# Patient Record
Sex: MALE | Race: WHITE | NOT HISPANIC OR LATINO | Employment: OTHER | URBAN - METROPOLITAN AREA
[De-identification: names, ages, dates, MRNs, and addresses within clinical notes are randomized per-mention and may not be internally consistent; named-entity substitution may affect disease eponyms.]

---

## 2017-02-20 ENCOUNTER — HOSPITAL ENCOUNTER (OUTPATIENT)
Dept: LAB | Facility: HOSPITAL | Age: 72
Discharge: HOME | End: 2017-02-20
Attending: INTERNAL MEDICINE | Admitting: INTERNAL MEDICINE

## 2017-02-20 LAB
ANION GAP SERPL CALC-SCNC: 12.7 MMOL/L (ref 10–20)
BASOPHILS # BLD AUTO: 0.1 10*3/UL (ref 0–0.2)
BASOPHILS NFR BLD AUTO: 1 % (ref 0–2)
BILIRUB UR QL STRIP: NEGATIVE MG/DL
BUN SERPL-MCNC: 10 MG/DL (ref 8–20)
BUN/CREAT SERPL: 7.7 (ref 6.2–20.3)
CALCIUM SERPL-MCNC: 9.5 MG/DL (ref 8.9–10.3)
CASTS URNS QL MICRO: NORMAL /[LPF]
CHLORIDE SERPL-SCNC: 95 MMOL/L (ref 101–111)
COLOR UR: YELLOW
CONV BACTERIA IN URINE MICRO: NEGATIVE
CONV CLARITY OF URINE: CLEAR
CONV CO2: 29 MMOL/L (ref 22–32)
CONV HYALINE CASTS IN URINE MICRO: 0 /[LPF] (ref 0–5)
CONV PROTEIN IN URINE BY AUTOMATED TEST STRIP: NEGATIVE MG/DL
CONV SMALL ROUND CELLS: NORMAL /[HPF]
CONV UROBILINOGEN IN URINE BY AUTOMATED TEST STRIP: 0.2 MG/DL
CREAT 24H UR-MCNC: 58.9 MG/DL
CREAT UR-MCNC: 1.3 MG/DL (ref 0.7–1.2)
CULTURE INDICATED?: NORMAL
DIFFERENTIAL METHOD BLD: (no result)
EOSINOPHIL # BLD AUTO: 0.5 10*3/UL (ref 0–0.3)
EOSINOPHIL # BLD AUTO: 7 % (ref 0–3)
ERYTHROCYTE [DISTWIDTH] IN BLOOD BY AUTOMATED COUNT: 14.8 % (ref 11.5–14.5)
GLUCOSE SERPL-MCNC: 103 MG/DL (ref 65–99)
GLUCOSE UR QL: NEGATIVE MG/DL
HCT VFR BLD AUTO: 38.3 % (ref 40–54)
HGB BLD-MCNC: 12.7 G/DL (ref 14–18)
HGB UR QL STRIP: NEGATIVE
IRON SERPL-MCNC: 55 UG/DL (ref 45–182)
KETONES UR QL STRIP: NEGATIVE MG/DL
LEUKOCYTE ESTERASE UR QL STRIP: NEGATIVE
LYMPHOCYTES # BLD AUTO: 2.1 10*3/UL (ref 0.8–4.8)
LYMPHOCYTES NFR BLD AUTO: 31 % (ref 18–42)
MCH RBC QN AUTO: 30.9 PG (ref 26–32)
MCHC RBC AUTO-ENTMCNC: 33.3 G/DL (ref 32–36)
MCV RBC AUTO: 92.8 FL (ref 80–94)
MONOCYTES # BLD AUTO: 1.1 10*3/UL (ref 0.1–1.3)
MONOCYTES NFR BLD AUTO: 16 % (ref 2–11)
NEUTROPHILS # BLD AUTO: 3 10*3/UL (ref 2.3–8.6)
NEUTROPHILS NFR BLD AUTO: 45 % (ref 50–75)
NITRITE UR QL STRIP: NEGATIVE
NRBC BLD AUTO-RTO: 0 /100{WBCS}
NRBC/RBC NFR BLD MANUAL: 0 10*3/UL
PH UR STRIP.AUTO: 6.5 [PH] (ref 4.5–8)
PHOSPHATE SERPL-MCNC: 3.4 MG/DL (ref 2.4–4.7)
PLATELET # BLD AUTO: 211 10*3/UL (ref 150–450)
PMV BLD AUTO: 9 FL (ref 7.4–10.4)
POTASSIUM SERPL-SCNC: 4.7 MMOL/L (ref 3.6–5.1)
PROT UR-MCNC: 12 MG/DL
RBC # BLD AUTO: 4.12 10*6/UL (ref 4.6–6)
RBC #/AREA URNS HPF: 0 /[HPF] (ref 0–3)
SODIUM SERPL-SCNC: 132 MMOL/L (ref 136–144)
SP GR UR: 1.01 (ref 1–1.03)
SPERM URNS QL MICRO: NORMAL /[HPF]
SQUAMOUS SPT QL MICRO: 1 /[HPF] (ref 0–5)
UNIDENT CRYS URNS QL MICRO: NORMAL /[HPF]
WBC # BLD AUTO: 6.8 10*3/UL (ref 4.5–11.5)
WBC #/AREA URNS HPF: 1 /[HPF] (ref 0–5)
YEAST SPEC QL WET PREP: NORMAL /[HPF]

## 2017-02-22 LAB — PTH-INTACT SERPL-MCNC: 71 PG/ML (ref 11–72)

## 2017-08-04 ENCOUNTER — HOSPITAL ENCOUNTER (OUTPATIENT)
Dept: LAB | Facility: HOSPITAL | Age: 72
Discharge: HOME | End: 2017-08-04
Attending: INTERNAL MEDICINE | Admitting: INTERNAL MEDICINE

## 2017-08-04 LAB
ALBUMIN SERPL-MCNC: 3.7 G/DL (ref 3.5–4.8)
ALBUMIN/GLOB SERPL: 1.3 {RATIO} (ref 1–1.7)
ALP SERPL-CCNC: 51 IU/L (ref 32–91)
ALT SERPL-CCNC: 25 IU/L (ref 17–63)
ANION GAP SERPL CALC-SCNC: 15.3 MMOL/L (ref 10–20)
AST SERPL-CCNC: 34 IU/L (ref 15–41)
BASOPHILS # BLD AUTO: 0.1 10*3/UL (ref 0–0.2)
BASOPHILS NFR BLD AUTO: 1 % (ref 0–2)
BILIRUB SERPL-MCNC: 1.5 MG/DL (ref 0.3–1.2)
BILIRUB UR QL STRIP: NEGATIVE MG/DL
BUN SERPL-MCNC: 17 MG/DL (ref 8–20)
BUN/CREAT SERPL: 10 (ref 6.2–20.3)
CALCIUM SERPL-MCNC: 9.5 MG/DL (ref 8.9–10.3)
CASTS URNS QL MICRO: NORMAL /[LPF]
CHLORIDE SERPL-SCNC: 92 MMOL/L (ref 101–111)
CHOLEST SERPL-MCNC: 190 MG/DL
CHOLEST/HDLC SERPL: 1.5 {RATIO}
COLOR UR: YELLOW
CONV BACTERIA IN URINE MICRO: NEGATIVE
CONV CLARITY OF URINE: CLEAR
CONV CO2: 27 MMOL/L (ref 22–32)
CONV HYALINE CASTS IN URINE MICRO: 0 /[LPF] (ref 0–5)
CONV LDL CHOLESTEROL DIRECT: 49 MG/DL (ref 0–100)
CONV MICROALBUM.,U,RANDOM: 5 MG/L
CONV PROTEIN IN URINE BY AUTOMATED TEST STRIP: NEGATIVE MG/DL
CONV SMALL ROUND CELLS: NORMAL /[HPF]
CONV TOTAL PROTEIN: 6.6 G/DL (ref 6.1–7.9)
CONV UROBILINOGEN IN URINE BY AUTOMATED TEST STRIP: 0.2 MG/DL
CREAT 24H UR-MCNC: 22.8 MG/DL
CREAT 24H UR-MCNC: 23.1 MG/DL
CREAT UR-MCNC: 1.7 MG/DL (ref 0.7–1.2)
CULTURE INDICATED?: NORMAL
DIFFERENTIAL METHOD BLD: (no result)
EOSINOPHIL # BLD AUTO: 0.5 10*3/UL (ref 0–0.3)
EOSINOPHIL # BLD AUTO: 9 % (ref 0–3)
ERYTHROCYTE [DISTWIDTH] IN BLOOD BY AUTOMATED COUNT: 14.2 % (ref 11.5–14.5)
FOLATE SERPL-MCNC: >24.8 NG/ML (ref 5.9–24.8)
GLOBULIN UR ELPH-MCNC: 2.9 G/DL (ref 2.5–3.8)
GLUCOSE SERPL-MCNC: 103 MG/DL (ref 65–99)
GLUCOSE UR QL: NEGATIVE MG/DL
HCT VFR BLD AUTO: 39.7 % (ref 40–54)
HDLC SERPL-MCNC: 127 MG/DL
HGB BLD-MCNC: 13.2 G/DL (ref 14–18)
HGB UR QL STRIP: NEGATIVE
IRON SERPL-MCNC: 187 UG/DL (ref 45–182)
KETONES UR QL STRIP: NEGATIVE MG/DL
LDLC/HDLC SERPL: 0.4 {RATIO}
LEUKOCYTE ESTERASE UR QL STRIP: NEGATIVE
LIPID INTERPRETATION: NORMAL
LYMPHOCYTES # BLD AUTO: 1.6 10*3/UL (ref 0.8–4.8)
LYMPHOCYTES NFR BLD AUTO: 25 % (ref 18–42)
MCH RBC QN AUTO: 31.4 PG (ref 26–32)
MCHC RBC AUTO-ENTMCNC: 33.3 G/DL (ref 32–36)
MCV RBC AUTO: 94.4 FL (ref 80–94)
MICROALBUMIN/CREAT UR: 21.6 UG/MG
MONOCYTES # BLD AUTO: 0.7 10*3/UL (ref 0.1–1.3)
MONOCYTES NFR BLD AUTO: 12 % (ref 2–11)
NEUTROPHILS # BLD AUTO: 3.3 10*3/UL (ref 2.3–8.6)
NEUTROPHILS NFR BLD AUTO: 53 % (ref 50–75)
NITRITE UR QL STRIP: NEGATIVE
NRBC BLD AUTO-RTO: 0 /100{WBCS}
NRBC/RBC NFR BLD MANUAL: 0 10*3/UL
PH UR STRIP.AUTO: 6 [PH] (ref 4.5–8)
PHOSPHATE SERPL-MCNC: 3.3 MG/DL (ref 2.4–4.7)
PLATELET # BLD AUTO: 177 10*3/UL (ref 150–450)
PMV BLD AUTO: 8.2 FL (ref 7.4–10.4)
POTASSIUM SERPL-SCNC: 4.3 MMOL/L (ref 3.6–5.1)
PROT UR-MCNC: <2 MG/DL
PSA SERPL-MCNC: 1.32 NG/ML (ref 0–4)
PTH-INTACT SERPL-MCNC: 105 PG/ML (ref 11–72)
RBC # BLD AUTO: 4.21 10*6/UL (ref 4.6–6)
RBC #/AREA URNS HPF: 0 /[HPF] (ref 0–3)
SODIUM SERPL-SCNC: 130 MMOL/L (ref 136–144)
SP GR UR: 1 (ref 1–1.03)
SPERM URNS QL MICRO: NORMAL /[HPF]
SQUAMOUS SPT QL MICRO: 0 /[HPF] (ref 0–5)
TRIGL SERPL-MCNC: 41 MG/DL
TSH SERPL-ACNC: 0.04 UIU/ML (ref 0.34–5.6)
UNIDENT CRYS URNS QL MICRO: NORMAL /[HPF]
VIT B12 SERPL-MCNC: 494 PG/ML (ref 180–914)
VLDLC SERPL CALC-MCNC: 14.7 MG/DL
WBC # BLD AUTO: 6.2 10*3/UL (ref 4.5–11.5)
WBC #/AREA URNS HPF: 0 /[HPF] (ref 0–5)
YEAST SPEC QL WET PREP: NORMAL /[HPF]

## 2018-02-22 ENCOUNTER — HOSPITAL ENCOUNTER (OUTPATIENT)
Dept: LAB | Facility: HOSPITAL | Age: 73
Setting detail: SPECIMEN
Discharge: HOME | End: 2018-02-22
Attending: FAMILY MEDICINE | Admitting: FAMILY MEDICINE

## 2018-02-23 ENCOUNTER — HOSPITAL ENCOUNTER (OUTPATIENT)
Dept: LAB | Facility: HOSPITAL | Age: 73
Discharge: HOME | End: 2018-02-23
Attending: FAMILY MEDICINE | Admitting: FAMILY MEDICINE

## 2018-02-23 LAB
ALBUMIN SERPL-MCNC: 3.6 G/DL (ref 3.5–4.8)
ALBUMIN/GLOB SERPL: 1 {RATIO} (ref 1–1.7)
ALP SERPL-CCNC: 41 IU/L (ref 32–91)
ALT SERPL-CCNC: 15 IU/L (ref 17–63)
ANION GAP SERPL CALC-SCNC: 12.3 MMOL/L (ref 10–20)
AST SERPL-CCNC: 23 IU/L (ref 15–41)
BASOPHILS # BLD AUTO: 0.1 10*3/UL (ref 0–0.2)
BASOPHILS NFR BLD AUTO: 1 % (ref 0–2)
BILIRUB SERPL-MCNC: 1 MG/DL (ref 0.3–1.2)
BUN SERPL-MCNC: 10 MG/DL (ref 8–20)
BUN/CREAT SERPL: 7.1 (ref 6.2–20.3)
CALCIUM SERPL-MCNC: 9.5 MG/DL (ref 8.9–10.3)
CHLORIDE SERPL-SCNC: 93 MMOL/L (ref 101–111)
CONV CO2: 29 MMOL/L (ref 22–32)
CONV MICROALBUM.,U,RANDOM: 41 MG/L
CONV TOTAL PROTEIN: 7.1 G/DL (ref 6.1–7.9)
CREAT UR-MCNC: 1.4 MG/DL (ref 0.7–1.2)
DIFFERENTIAL METHOD BLD: (no result)
EOSINOPHIL # BLD AUTO: 0.5 10*3/UL (ref 0–0.3)
EOSINOPHIL # BLD AUTO: 8 % (ref 0–3)
ERYTHROCYTE [DISTWIDTH] IN BLOOD BY AUTOMATED COUNT: 14.6 % (ref 11.5–14.5)
GLOBULIN UR ELPH-MCNC: 3.5 G/DL (ref 2.5–3.8)
GLUCOSE SERPL-MCNC: 120 MG/DL (ref 65–99)
HCT VFR BLD AUTO: 42.2 % (ref 40–54)
HGB BLD-MCNC: 13.9 G/DL (ref 14–18)
LYMPHOCYTES # BLD AUTO: 1.4 10*3/UL (ref 0.8–4.8)
LYMPHOCYTES NFR BLD AUTO: 20 % (ref 18–42)
MCH RBC QN AUTO: 30.7 PG (ref 26–32)
MCHC RBC AUTO-ENTMCNC: 32.9 G/DL (ref 32–36)
MCV RBC AUTO: 93.3 FL (ref 80–94)
MONOCYTES # BLD AUTO: 0.9 10*3/UL (ref 0.1–1.3)
MONOCYTES NFR BLD AUTO: 13 % (ref 2–11)
NEUTROPHILS # BLD AUTO: 4.2 10*3/UL (ref 2.3–8.6)
NEUTROPHILS NFR BLD AUTO: 58 % (ref 50–75)
NRBC BLD AUTO-RTO: 0 /100{WBCS}
NRBC/RBC NFR BLD MANUAL: 0 10*3/UL
PLATELET # BLD AUTO: 218 10*3/UL (ref 150–450)
PMV BLD AUTO: 8.2 FL (ref 7.4–10.4)
POTASSIUM SERPL-SCNC: 4.3 MMOL/L (ref 3.6–5.1)
RBC # BLD AUTO: 4.52 10*6/UL (ref 4.6–6)
SODIUM SERPL-SCNC: 130 MMOL/L (ref 136–144)
WBC # BLD AUTO: 7.1 10*3/UL (ref 4.5–11.5)

## 2018-02-26 LAB
BACTERIA SPEC AEROBE CULT: NORMAL
COLONY COUNT: NORMAL
Lab: NORMAL
MICRO REPORT STATUS: NORMAL
SPECIMEN SOURCE: NORMAL

## 2018-05-31 ENCOUNTER — HOSPITAL ENCOUNTER (OUTPATIENT)
Dept: LAB | Facility: HOSPITAL | Age: 73
Discharge: HOME | End: 2018-05-31
Attending: INTERNAL MEDICINE | Admitting: INTERNAL MEDICINE

## 2018-05-31 LAB
ANION GAP SERPL CALC-SCNC: 14.2 MMOL/L (ref 10–20)
BACTERIA SPEC AEROBE CULT: NORMAL
BILIRUB UR QL STRIP: NEGATIVE MG/DL
BUN SERPL-MCNC: 14 MG/DL (ref 8–20)
BUN/CREAT SERPL: 8.8 (ref 6.2–20.3)
CALCIUM SERPL-MCNC: 9.6 MG/DL (ref 8.9–10.3)
CASTS URNS QL MICRO: ABNORMAL /[LPF]
CHLORIDE SERPL-SCNC: 102 MMOL/L (ref 101–111)
COLOR UR: YELLOW
CONV BACTERIA IN URINE MICRO: ABNORMAL
CONV CLARITY OF URINE: CLEAR
CONV CO2: 22 MMOL/L (ref 22–32)
CONV HYALINE CASTS IN URINE MICRO: 0 /[LPF] (ref 0–5)
CONV PROTEIN IN URINE BY AUTOMATED TEST STRIP: NEGATIVE MG/DL
CONV SMALL ROUND CELLS: ABNORMAL /[HPF]
CONV UROBILINOGEN IN URINE BY AUTOMATED TEST STRIP: 0.2 MG/DL
CREAT 24H UR-MCNC: 104.3 MG/DL
CREAT UR-MCNC: 1.6 MG/DL (ref 0.7–1.2)
CULTURE INDICATED?: ABNORMAL
ERYTHROCYTE [DISTWIDTH] IN BLOOD BY AUTOMATED COUNT: 14 % (ref 11.5–14.5)
GLUCOSE SERPL-MCNC: 100 MG/DL (ref 65–99)
GLUCOSE UR QL: NEGATIVE MG/DL
HCT VFR BLD AUTO: 39.1 % (ref 40–54)
HGB BLD-MCNC: 12.8 G/DL (ref 14–18)
HGB UR QL STRIP: NEGATIVE
IRON SERPL-MCNC: 27 UG/DL (ref 45–182)
KETONES UR QL STRIP: ABNORMAL MG/DL
LEUKOCYTE ESTERASE UR QL STRIP: ABNORMAL
Lab: NORMAL
MCH RBC QN AUTO: 30.5 PG (ref 26–32)
MCHC RBC AUTO-ENTMCNC: 32.8 G/DL (ref 32–36)
MCV RBC AUTO: 93.1 FL (ref 80–94)
MICRO REPORT STATUS: NORMAL
NITRITE UR QL STRIP: POSITIVE
PH UR STRIP.AUTO: 6 [PH] (ref 4.5–8)
PLATELET # BLD AUTO: 367 10*3/UL (ref 150–450)
PMV BLD AUTO: 8.3 FL (ref 7.4–10.4)
POTASSIUM SERPL-SCNC: 4.2 MMOL/L (ref 3.6–5.1)
PROT UR-MCNC: 22 MG/DL
PROT/CREAT UR: 0.2 MG/MG (ref 0–22)
RBC # BLD AUTO: 4.2 10*6/UL (ref 4.6–6)
RBC #/AREA URNS HPF: 1 /[HPF] (ref 0–3)
SODIUM SERPL-SCNC: 134 MMOL/L (ref 136–144)
SP GR UR: 1.01 (ref 1–1.03)
SPECIMEN SOURCE: NORMAL
SPERM URNS QL MICRO: ABNORMAL /[HPF]
SQUAMOUS SPT QL MICRO: 0 /[HPF] (ref 0–5)
UNIDENT CRYS URNS QL MICRO: ABNORMAL /[HPF]
WBC # BLD AUTO: 9.6 10*3/UL (ref 4.5–11.5)
WBC #/AREA URNS HPF: 46 /[HPF] (ref 0–5)
YEAST SPEC QL WET PREP: ABNORMAL /[HPF]

## 2018-09-21 ENCOUNTER — HOSPITAL ENCOUNTER (OUTPATIENT)
Dept: LAB | Facility: HOSPITAL | Age: 73
Discharge: HOME | End: 2018-09-21
Attending: FAMILY MEDICINE | Admitting: FAMILY MEDICINE

## 2018-09-21 LAB
ALBUMIN SERPL-MCNC: 3.8 G/DL (ref 3.5–4.8)
ALBUMIN/GLOB SERPL: 1.3 {RATIO} (ref 1–1.7)
ALP SERPL-CCNC: 40 IU/L (ref 32–91)
ALT SERPL-CCNC: 19 IU/L (ref 17–63)
ANION GAP SERPL CALC-SCNC: 16.1 MMOL/L (ref 10–20)
AST SERPL-CCNC: 24 IU/L (ref 15–41)
BASOPHILS # BLD AUTO: 0.1 10*3/UL (ref 0–0.2)
BASOPHILS NFR BLD AUTO: 1 % (ref 0–2)
BILIRUB SERPL-MCNC: 0.9 MG/DL (ref 0.3–1.2)
BUN SERPL-MCNC: 11 MG/DL (ref 8–20)
BUN/CREAT SERPL: 7.9 (ref 6.2–20.3)
CALCIUM SERPL-MCNC: 9.4 MG/DL (ref 8.9–10.3)
CHLORIDE SERPL-SCNC: 94 MMOL/L (ref 101–111)
CONV CO2: 26 MMOL/L (ref 22–32)
CONV MICROALBUM.,U,RANDOM: 27 MG/L
CONV TOTAL PROTEIN: 6.7 G/DL (ref 6.1–7.9)
CREAT UR-MCNC: 1.4 MG/DL (ref 0.7–1.2)
DIFFERENTIAL METHOD BLD: (no result)
EOSINOPHIL # BLD AUTO: 0.3 10*3/UL (ref 0–0.3)
EOSINOPHIL # BLD AUTO: 5 % (ref 0–3)
ERYTHROCYTE [DISTWIDTH] IN BLOOD BY AUTOMATED COUNT: 14.8 % (ref 11.5–14.5)
GLOBULIN UR ELPH-MCNC: 2.9 G/DL (ref 2.5–3.8)
GLUCOSE SERPL-MCNC: 101 MG/DL (ref 65–99)
HCT VFR BLD AUTO: 43.7 % (ref 40–54)
HGB BLD-MCNC: 14.4 G/DL (ref 14–18)
LYMPHOCYTES # BLD AUTO: 1.4 10*3/UL (ref 0.8–4.8)
LYMPHOCYTES NFR BLD AUTO: 20 % (ref 18–42)
MCH RBC QN AUTO: 30.3 PG (ref 26–32)
MCHC RBC AUTO-ENTMCNC: 32.9 G/DL (ref 32–36)
MCV RBC AUTO: 92.1 FL (ref 80–94)
MONOCYTES # BLD AUTO: 0.8 10*3/UL (ref 0.1–1.3)
MONOCYTES NFR BLD AUTO: 11 % (ref 2–11)
NEUTROPHILS # BLD AUTO: 4.2 10*3/UL (ref 2.3–8.6)
NEUTROPHILS NFR BLD AUTO: 63 % (ref 50–75)
NRBC BLD AUTO-RTO: 0 /100{WBCS}
NRBC/RBC NFR BLD MANUAL: 0 10*3/UL
PLATELET # BLD AUTO: 237 10*3/UL (ref 150–450)
PMV BLD AUTO: 8.8 FL (ref 7.4–10.4)
POTASSIUM SERPL-SCNC: 4.1 MMOL/L (ref 3.6–5.1)
RBC # BLD AUTO: 4.74 10*6/UL (ref 4.6–6)
SODIUM SERPL-SCNC: 132 MMOL/L (ref 136–144)
WBC # BLD AUTO: 6.8 10*3/UL (ref 4.5–11.5)

## 2018-12-04 ENCOUNTER — HOSPITAL ENCOUNTER (OUTPATIENT)
Dept: LAB | Facility: HOSPITAL | Age: 73
Discharge: HOME | End: 2018-12-04
Attending: INTERNAL MEDICINE | Admitting: INTERNAL MEDICINE

## 2018-12-04 LAB
25(OH)D3 SERPL-MCNC: 92 NG/ML (ref 30–100)
ANION GAP SERPL CALC-SCNC: 11.3 MMOL/L (ref 10–20)
BACTERIA SPEC AEROBE CULT: NORMAL
BACTERIA SPEC AEROBE CULT: NORMAL
BILIRUB UR QL STRIP: NEGATIVE MG/DL
BUN SERPL-MCNC: 13 MG/DL (ref 8–20)
BUN/CREAT SERPL: 9.3 (ref 6.2–20.3)
CALCIUM SERPL-MCNC: 9.7 MG/DL (ref 8.9–10.3)
CASTS URNS QL MICRO: ABNORMAL /[LPF]
CHLORIDE SERPL-SCNC: 104 MMOL/L (ref 101–111)
COLONY COUNT: NORMAL
COLOR UR: YELLOW
CONV BACTERIA IN URINE MICRO: ABNORMAL
CONV CLARITY OF URINE: CLEAR
CONV CO2: 26 MMOL/L (ref 22–32)
CONV HYALINE CASTS IN URINE MICRO: 0 /[LPF] (ref 0–5)
CONV PROTEIN IN URINE BY AUTOMATED TEST STRIP: ABNORMAL MG/DL
CONV SMALL ROUND CELLS: ABNORMAL /[HPF]
CONV UROBILINOGEN IN URINE BY AUTOMATED TEST STRIP: 0.2 MG/DL
CREAT 24H UR-MCNC: 109.7 MG/DL
CREAT UR-MCNC: 1.4 MG/DL (ref 0.7–1.2)
CULTURE INDICATED?: ABNORMAL
ERYTHROCYTE [DISTWIDTH] IN BLOOD BY AUTOMATED COUNT: 15.1 % (ref 11.5–14.5)
GLUCOSE SERPL-MCNC: 89 MG/DL (ref 65–99)
GLUCOSE UR QL: NEGATIVE MG/DL
HCT VFR BLD AUTO: 43.4 % (ref 40–54)
HGB BLD-MCNC: 14 G/DL (ref 14–18)
HGB UR QL STRIP: NEGATIVE
IRON SERPL-MCNC: 72 UG/DL (ref 45–182)
KETONES UR QL STRIP: ABNORMAL MG/DL
LEUKOCYTE ESTERASE UR QL STRIP: ABNORMAL
Lab: NORMAL
MCH RBC QN AUTO: 30.2 PG (ref 26–32)
MCHC RBC AUTO-ENTMCNC: 32.3 G/DL (ref 32–36)
MCV RBC AUTO: 93.3 FL (ref 80–94)
MICRO REPORT STATUS: NORMAL
NITRITE UR QL STRIP: POSITIVE
PH UR STRIP.AUTO: 6 [PH] (ref 4.5–8)
PLATELET # BLD AUTO: 264 10*3/UL (ref 150–450)
PMV BLD AUTO: 9 FL (ref 7.4–10.4)
POTASSIUM SERPL-SCNC: 4.3 MMOL/L (ref 3.6–5.1)
PROT UR-MCNC: 33 MG/DL
PROT/CREAT UR: 0.3 MG/MG (ref 0–22)
RBC # BLD AUTO: 4.66 10*6/UL (ref 4.6–6)
RBC #/AREA URNS HPF: 1 /[HPF] (ref 0–3)
SODIUM SERPL-SCNC: 137 MMOL/L (ref 136–144)
SP GR UR: 1.02 (ref 1–1.03)
SPECIMEN SOURCE: NORMAL
SPERM URNS QL MICRO: ABNORMAL /[HPF]
SQUAMOUS SPT QL MICRO: 1 /[HPF] (ref 0–5)
UNIDENT CRYS URNS QL MICRO: ABNORMAL /[HPF]
WBC # BLD AUTO: 7 10*3/UL (ref 4.5–11.5)
WBC #/AREA URNS HPF: 20 /[HPF] (ref 0–5)
YEAST SPEC QL WET PREP: ABNORMAL /[HPF]

## 2018-12-05 LAB — PTH-INTACT SERPL-MCNC: 56 PG/ML (ref 11–72)

## 2019-03-01 ENCOUNTER — HOSPITAL ENCOUNTER (OUTPATIENT)
Dept: LAB | Facility: HOSPITAL | Age: 74
Discharge: HOME | End: 2019-03-01
Attending: FAMILY MEDICINE | Admitting: FAMILY MEDICINE

## 2019-03-01 LAB
ALBUMIN SERPL-MCNC: 3.8 G/DL (ref 3.5–4.8)
ALBUMIN/GLOB SERPL: 1.2 {RATIO} (ref 1–1.7)
ALP SERPL-CCNC: 41 IU/L (ref 32–91)
ALT SERPL-CCNC: 15 IU/L (ref 17–63)
ANION GAP SERPL CALC-SCNC: 14.5 MMOL/L (ref 10–20)
AST SERPL-CCNC: 19 IU/L (ref 15–41)
BASOPHILS # BLD AUTO: 0 10*3/UL (ref 0–0.2)
BASOPHILS NFR BLD AUTO: 0 % (ref 0–2)
BILIRUB SERPL-MCNC: 0.8 MG/DL (ref 0.3–1.2)
BUN SERPL-MCNC: 7 MG/DL (ref 8–20)
BUN/CREAT SERPL: 5 (ref 6.2–20.3)
CALCIUM SERPL-MCNC: 9.7 MG/DL (ref 8.9–10.3)
CHLORIDE SERPL-SCNC: 100 MMOL/L (ref 101–111)
CHOLEST SERPL-MCNC: 230 MG/DL
CHOLEST/HDLC SERPL: 3.1 {RATIO}
CONV CO2: 24 MMOL/L (ref 22–32)
CONV LDL CHOLESTEROL DIRECT: 146 MG/DL (ref 0–100)
CONV TOTAL PROTEIN: 7 G/DL (ref 6.1–7.9)
CREAT UR-MCNC: 1.4 MG/DL (ref 0.7–1.2)
DIFFERENTIAL METHOD BLD: (no result)
EOSINOPHIL # BLD AUTO: 0.4 10*3/UL (ref 0–0.3)
EOSINOPHIL # BLD AUTO: 5 % (ref 0–3)
ERYTHROCYTE [DISTWIDTH] IN BLOOD BY AUTOMATED COUNT: 14.9 % (ref 11.5–14.5)
GLOBULIN UR ELPH-MCNC: 3.2 G/DL (ref 2.5–3.8)
GLUCOSE SERPL-MCNC: 94 MG/DL (ref 65–99)
HCT VFR BLD AUTO: 45.2 % (ref 40–54)
HDLC SERPL-MCNC: 74 MG/DL
HGB BLD-MCNC: 15.1 G/DL (ref 14–18)
LDLC/HDLC SERPL: 2 {RATIO}
LIPID INTERPRETATION: ABNORMAL
LYMPHOCYTES # BLD AUTO: 1.8 10*3/UL (ref 0.8–4.8)
LYMPHOCYTES NFR BLD AUTO: 26 % (ref 18–42)
MCH RBC QN AUTO: 31.2 PG (ref 26–32)
MCHC RBC AUTO-ENTMCNC: 33.3 G/DL (ref 32–36)
MCV RBC AUTO: 93.6 FL (ref 80–94)
MONOCYTES # BLD AUTO: 0.8 10*3/UL (ref 0.1–1.3)
MONOCYTES NFR BLD AUTO: 11 % (ref 2–11)
NEUTROPHILS # BLD AUTO: 4 10*3/UL (ref 2.3–8.6)
NEUTROPHILS NFR BLD AUTO: 58 % (ref 50–75)
NRBC BLD AUTO-RTO: 0 /100{WBCS}
NRBC/RBC NFR BLD MANUAL: 0 10*3/UL
PLATELET # BLD AUTO: 234 10*3/UL (ref 150–450)
PMV BLD AUTO: 8.1 FL (ref 7.4–10.4)
POTASSIUM SERPL-SCNC: 4.5 MMOL/L (ref 3.6–5.1)
RBC # BLD AUTO: 4.83 10*6/UL (ref 4.6–6)
SODIUM SERPL-SCNC: 134 MMOL/L (ref 136–144)
TRIGL SERPL-MCNC: 73 MG/DL
VLDLC SERPL CALC-MCNC: 10.1 MG/DL
WBC # BLD AUTO: 6.9 10*3/UL (ref 4.5–11.5)

## 2019-06-10 ENCOUNTER — HOSPITAL ENCOUNTER (OUTPATIENT)
Dept: LAB | Facility: HOSPITAL | Age: 74
Discharge: HOME | End: 2019-06-10
Attending: INTERNAL MEDICINE | Admitting: INTERNAL MEDICINE

## 2019-06-10 LAB
25(OH)D3 SERPL-MCNC: 99 NG/ML (ref 30–100)
ANION GAP SERPL CALC-SCNC: 18.3 MMOL/L (ref 10–20)
BACTERIA SPEC AEROBE CULT: NORMAL
BASOPHILS # BLD AUTO: 0.1 10*3/UL (ref 0–0.2)
BASOPHILS NFR BLD AUTO: 1 % (ref 0–2)
BILIRUB UR QL STRIP: NEGATIVE MG/DL
BUN SERPL-MCNC: 14 MG/DL (ref 8–20)
BUN/CREAT SERPL: 10 (ref 6.2–20.3)
CALCIUM SERPL-MCNC: 9.4 MG/DL (ref 8.9–10.3)
CASTS URNS QL MICRO: ABNORMAL /[LPF]
CHLORIDE SERPL-SCNC: 94 MMOL/L (ref 101–111)
COLOR UR: YELLOW
CONV BACTERIA IN URINE MICRO: ABNORMAL
CONV CLARITY OF URINE: ABNORMAL
CONV CO2: 25 MMOL/L (ref 22–32)
CONV HYALINE CASTS IN URINE MICRO: 2 /[LPF] (ref 0–5)
CONV PROTEIN IN URINE BY AUTOMATED TEST STRIP: NEGATIVE MG/DL
CONV SMALL ROUND CELLS: ABNORMAL /[HPF]
CONV UROBILINOGEN IN URINE BY AUTOMATED TEST STRIP: 1 MG/DL
CREAT 24H UR-MCNC: 102.1 MG/DL
CREAT UR-MCNC: 1.4 MG/DL (ref 0.7–1.2)
CULTURE INDICATED?: ABNORMAL
DIFFERENTIAL METHOD BLD: (no result)
EOSINOPHIL # BLD AUTO: 0.5 10*3/UL (ref 0–0.3)
EOSINOPHIL # BLD AUTO: 7 % (ref 0–3)
ERYTHROCYTE [DISTWIDTH] IN BLOOD BY AUTOMATED COUNT: 14.2 % (ref 11.5–14.5)
GLUCOSE SERPL-MCNC: 98 MG/DL (ref 65–99)
GLUCOSE UR QL: NEGATIVE MG/DL
HCT VFR BLD AUTO: 42.7 % (ref 40–54)
HGB BLD-MCNC: 14.2 G/DL (ref 14–18)
HGB UR QL STRIP: NEGATIVE
KETONES UR QL STRIP: 15 MG/DL
LEUKOCYTE ESTERASE UR QL STRIP: ABNORMAL
LYMPHOCYTES # BLD AUTO: 1.5 10*3/UL (ref 0.8–4.8)
LYMPHOCYTES NFR BLD AUTO: 23 % (ref 18–42)
Lab: NORMAL
MCH RBC QN AUTO: 32.1 PG (ref 26–32)
MCHC RBC AUTO-ENTMCNC: 33.3 G/DL (ref 32–36)
MCV RBC AUTO: 96.4 FL (ref 80–94)
MICRO REPORT STATUS: NORMAL
MONOCYTES # BLD AUTO: 0.8 10*3/UL (ref 0.1–1.3)
MONOCYTES NFR BLD AUTO: 13 % (ref 2–11)
NEUTROPHILS # BLD AUTO: 3.5 10*3/UL (ref 2.3–8.6)
NEUTROPHILS NFR BLD AUTO: 56 % (ref 50–75)
NITRITE UR QL STRIP: POSITIVE
NRBC BLD AUTO-RTO: 0 /100{WBCS}
NRBC/RBC NFR BLD MANUAL: 0 10*3/UL
PH UR STRIP.AUTO: 7 [PH] (ref 4.5–8)
PHOSPHATE SERPL-MCNC: 3 MG/DL (ref 2.4–4.7)
PLATELET # BLD AUTO: 224 10*3/UL (ref 150–450)
PMV BLD AUTO: 8.3 FL (ref 7.4–10.4)
POTASSIUM SERPL-SCNC: 4.3 MMOL/L (ref 3.6–5.1)
PROT UR-MCNC: 20 MG/DL
PROT/CREAT UR: 0.2 MG/MG (ref 0–22)
PTH-INTACT SERPL-MCNC: 51 PG/ML (ref 11–72)
RBC # BLD AUTO: 4.43 10*6/UL (ref 4.6–6)
RBC #/AREA URNS HPF: 1 /[HPF] (ref 0–3)
SODIUM SERPL-SCNC: 133 MMOL/L (ref 136–144)
SP GR UR: 1.01 (ref 1–1.03)
SPECIMEN SOURCE: NORMAL
SPERM URNS QL MICRO: ABNORMAL /[HPF]
SQUAMOUS SPT QL MICRO: 6 /[HPF] (ref 0–5)
UNIDENT CRYS URNS QL MICRO: ABNORMAL /[HPF]
WBC # BLD AUTO: 6.4 10*3/UL (ref 4.5–11.5)
WBC #/AREA URNS HPF: 28 /[HPF] (ref 0–5)
YEAST SPEC QL WET PREP: ABNORMAL /[HPF]

## 2019-08-23 ENCOUNTER — TRANSCRIBE ORDERS (OUTPATIENT)
Dept: ADMINISTRATIVE | Facility: HOSPITAL | Age: 74
End: 2019-08-23

## 2019-08-23 ENCOUNTER — LAB (OUTPATIENT)
Dept: LAB | Facility: HOSPITAL | Age: 74
End: 2019-08-23

## 2019-08-23 DIAGNOSIS — R71.8 OTHER ABNORMALITY OF RED BLOOD CELLS: ICD-10-CM

## 2019-08-23 DIAGNOSIS — M10.00 GOUTY NEURITIS (HCC): ICD-10-CM

## 2019-08-23 DIAGNOSIS — E11.9 DIABETES MELLITUS WITHOUT COMPLICATION (HCC): Primary | ICD-10-CM

## 2019-08-23 DIAGNOSIS — I10 HYPERTENSION, UNSPECIFIED TYPE: ICD-10-CM

## 2019-08-23 DIAGNOSIS — G63 GOUTY NEURITIS (HCC): ICD-10-CM

## 2019-08-23 DIAGNOSIS — E11.9 DIABETES MELLITUS WITHOUT COMPLICATION (HCC): ICD-10-CM

## 2019-08-23 LAB
ALBUMIN SERPL-MCNC: 3.7 G/DL (ref 3.5–4.8)
ALBUMIN/GLOB SERPL: 1.2 G/DL (ref 1–1.7)
ALP SERPL-CCNC: 50 U/L (ref 32–91)
ALT SERPL W P-5'-P-CCNC: 19 U/L (ref 17–63)
ANION GAP SERPL CALCULATED.3IONS-SCNC: 16.1 MMOL/L (ref 5–15)
AST SERPL-CCNC: 27 U/L (ref 15–41)
BASOPHILS # BLD AUTO: 0 10*3/MM3 (ref 0–0.2)
BASOPHILS NFR BLD AUTO: 0.3 % (ref 0–1.5)
BILIRUB SERPL-MCNC: 0.9 MG/DL (ref 0.3–1.2)
BUN BLD-MCNC: 7 MG/DL (ref 8–20)
BUN/CREAT SERPL: 5.4 (ref 6.2–20.3)
CALCIUM SPEC-SCNC: 9.1 MG/DL (ref 8.9–10.3)
CHLORIDE SERPL-SCNC: 91 MMOL/L (ref 101–111)
CO2 SERPL-SCNC: 26 MMOL/L (ref 22–32)
CREAT BLD-MCNC: 1.3 MG/DL (ref 0.7–1.2)
DEPRECATED RDW RBC AUTO: 51.2 FL (ref 37–54)
EOSINOPHIL # BLD AUTO: 0.4 10*3/MM3 (ref 0–0.4)
EOSINOPHIL NFR BLD AUTO: 7.7 % (ref 0.3–6.2)
ERYTHROCYTE [DISTWIDTH] IN BLOOD BY AUTOMATED COUNT: 15.6 % (ref 12.3–15.4)
GFR SERPL CREATININE-BSD FRML MDRD: 54 ML/MIN/1.73
GLOBULIN UR ELPH-MCNC: 3 GM/DL (ref 2.5–3.8)
GLUCOSE BLD-MCNC: 97 MG/DL (ref 65–99)
HCT VFR BLD AUTO: 40.9 % (ref 37.5–51)
HGB BLD-MCNC: 13.6 G/DL (ref 13–17.7)
LYMPHOCYTES # BLD AUTO: 1.4 10*3/MM3 (ref 0.7–3.1)
LYMPHOCYTES NFR BLD AUTO: 28.5 % (ref 19.6–45.3)
MCH RBC QN AUTO: 31.6 PG (ref 26.6–33)
MCHC RBC AUTO-ENTMCNC: 33.4 G/DL (ref 31.5–35.7)
MCV RBC AUTO: 94.7 FL (ref 79–97)
MONOCYTES # BLD AUTO: 0.9 10*3/MM3 (ref 0.1–0.9)
MONOCYTES NFR BLD AUTO: 17.3 % (ref 5–12)
NEUTROPHILS # BLD AUTO: 2.3 10*3/MM3 (ref 1.7–7)
NEUTROPHILS NFR BLD AUTO: 46.2 % (ref 42.7–76)
NRBC BLD AUTO-RTO: 0.1 /100 WBC (ref 0–0.2)
PLATELET # BLD AUTO: 214 10*3/MM3 (ref 140–450)
PMV BLD AUTO: 8 FL (ref 6–12)
POTASSIUM BLD-SCNC: 4.1 MMOL/L (ref 3.6–5.1)
PROT SERPL-MCNC: 6.7 G/DL (ref 6.1–7.9)
RBC # BLD AUTO: 4.32 10*6/MM3 (ref 4.14–5.8)
SODIUM BLD-SCNC: 129 MMOL/L (ref 136–144)
URATE SERPL-MCNC: 5.5 MG/DL (ref 4.8–8.7)
WBC NRBC COR # BLD: 5 10*3/MM3 (ref 3.4–10.8)

## 2019-08-23 PROCEDURE — 36415 COLL VENOUS BLD VENIPUNCTURE: CPT

## 2019-08-23 PROCEDURE — 84550 ASSAY OF BLOOD/URIC ACID: CPT

## 2019-08-23 PROCEDURE — 85025 COMPLETE CBC W/AUTO DIFF WBC: CPT

## 2019-08-23 PROCEDURE — 80053 COMPREHEN METABOLIC PANEL: CPT

## 2019-12-03 ENCOUNTER — TRANSCRIBE ORDERS (OUTPATIENT)
Dept: ADMINISTRATIVE | Facility: HOSPITAL | Age: 74
End: 2019-12-03

## 2019-12-03 ENCOUNTER — LAB (OUTPATIENT)
Dept: LAB | Facility: HOSPITAL | Age: 74
End: 2019-12-03

## 2019-12-03 DIAGNOSIS — I10 ESSENTIAL (PRIMARY) HYPERTENSION: ICD-10-CM

## 2019-12-03 DIAGNOSIS — E87.6 HYPOKALEMIA: ICD-10-CM

## 2019-12-03 DIAGNOSIS — N18.30 CHRONIC KIDNEY DISEASE (CKD), STAGE III (MODERATE) (HCC): Primary | ICD-10-CM

## 2019-12-03 DIAGNOSIS — E55.9 VITAMIN D DEFICIENCY, UNSPECIFIED: ICD-10-CM

## 2019-12-03 DIAGNOSIS — E87.1 HYPONATREMIA: ICD-10-CM

## 2019-12-03 DIAGNOSIS — N18.30 CHRONIC KIDNEY DISEASE (CKD), STAGE III (MODERATE) (HCC): ICD-10-CM

## 2019-12-03 LAB
25(OH)D3 SERPL-MCNC: 97.2 NG/ML (ref 30–100)
ANION GAP SERPL CALCULATED.3IONS-SCNC: 13.4 MMOL/L (ref 5–15)
BACTERIA UR QL AUTO: ABNORMAL /HPF
BASOPHILS # BLD AUTO: 0.09 10*3/MM3 (ref 0–0.2)
BASOPHILS NFR BLD AUTO: 1.4 % (ref 0–1.5)
BILIRUB UR QL STRIP: NEGATIVE
BUN BLD-MCNC: 10 MG/DL (ref 8–23)
BUN/CREAT SERPL: 7.9 (ref 7–25)
CALCIUM SPEC-SCNC: 9.6 MG/DL (ref 8.6–10.5)
CHLORIDE SERPL-SCNC: 90 MMOL/L (ref 98–107)
CLARITY UR: ABNORMAL
CO2 SERPL-SCNC: 27.6 MMOL/L (ref 22–29)
COLOR UR: YELLOW
CREAT BLD-MCNC: 1.26 MG/DL (ref 0.76–1.27)
CREAT UR-MCNC: 71.3 MG/DL
DEPRECATED RDW RBC AUTO: 46 FL (ref 37–54)
EOSINOPHIL # BLD AUTO: 0.49 10*3/MM3 (ref 0–0.4)
EOSINOPHIL NFR BLD AUTO: 7.5 % (ref 0.3–6.2)
ERYTHROCYTE [DISTWIDTH] IN BLOOD BY AUTOMATED COUNT: 13.3 % (ref 12.3–15.4)
GFR SERPL CREATININE-BSD FRML MDRD: 56 ML/MIN/1.73
GLUCOSE BLD-MCNC: 105 MG/DL (ref 65–99)
GLUCOSE UR STRIP-MCNC: NEGATIVE MG/DL
HCT VFR BLD AUTO: 42.1 % (ref 37.5–51)
HGB BLD-MCNC: 14.1 G/DL (ref 13–17.7)
HGB UR QL STRIP.AUTO: NEGATIVE
HYALINE CASTS UR QL AUTO: ABNORMAL /LPF
IMM GRANULOCYTES # BLD AUTO: 0.03 10*3/MM3 (ref 0–0.05)
IMM GRANULOCYTES NFR BLD AUTO: 0.5 % (ref 0–0.5)
KETONES UR QL STRIP: NEGATIVE
LEUKOCYTE ESTERASE UR QL STRIP.AUTO: ABNORMAL
LYMPHOCYTES # BLD AUTO: 1.66 10*3/MM3 (ref 0.7–3.1)
LYMPHOCYTES NFR BLD AUTO: 25.5 % (ref 19.6–45.3)
MAGNESIUM SERPL-MCNC: 2.1 MG/DL (ref 1.6–2.4)
MCH RBC QN AUTO: 31.3 PG (ref 26.6–33)
MCHC RBC AUTO-ENTMCNC: 33.5 G/DL (ref 31.5–35.7)
MCV RBC AUTO: 93.6 FL (ref 79–97)
MONOCYTES # BLD AUTO: 1.01 10*3/MM3 (ref 0.1–0.9)
MONOCYTES NFR BLD AUTO: 15.5 % (ref 5–12)
NEUTROPHILS # BLD AUTO: 3.23 10*3/MM3 (ref 1.7–7)
NEUTROPHILS NFR BLD AUTO: 49.6 % (ref 42.7–76)
NITRITE UR QL STRIP: NEGATIVE
NRBC BLD AUTO-RTO: 0 /100 WBC (ref 0–0.2)
PH UR STRIP.AUTO: 7 [PH] (ref 5–8)
PLATELET # BLD AUTO: 223 10*3/MM3 (ref 140–450)
PMV BLD AUTO: 10.7 FL (ref 6–12)
POTASSIUM BLD-SCNC: 4.7 MMOL/L (ref 3.5–5.2)
PROT UR QL STRIP: NEGATIVE
PROT UR-MCNC: 14 MG/DL
PTH-INTACT SERPL-MCNC: 63.1 PG/ML (ref 15–65)
RBC # BLD AUTO: 4.5 10*6/MM3 (ref 4.14–5.8)
RBC # UR: ABNORMAL /HPF
REF LAB TEST METHOD: ABNORMAL
SODIUM BLD-SCNC: 131 MMOL/L (ref 136–145)
SP GR UR STRIP: 1.01 (ref 1–1.03)
SQUAMOUS #/AREA URNS HPF: ABNORMAL /HPF
TSH SERPL DL<=0.05 MIU/L-ACNC: 0.55 UIU/ML (ref 0.27–4.2)
URATE SERPL-MCNC: 4.9 MG/DL (ref 3.4–7)
UROBILINOGEN UR QL STRIP: ABNORMAL
WBC NRBC COR # BLD: 6.51 10*3/MM3 (ref 3.4–10.8)
WBC UR QL AUTO: ABNORMAL /HPF

## 2019-12-03 PROCEDURE — 85025 COMPLETE CBC W/AUTO DIFF WBC: CPT

## 2019-12-03 PROCEDURE — 84156 ASSAY OF PROTEIN URINE: CPT

## 2019-12-03 PROCEDURE — 82306 VITAMIN D 25 HYDROXY: CPT

## 2019-12-03 PROCEDURE — 83735 ASSAY OF MAGNESIUM: CPT

## 2019-12-03 PROCEDURE — 84443 ASSAY THYROID STIM HORMONE: CPT

## 2019-12-03 PROCEDURE — 80048 BASIC METABOLIC PNL TOTAL CA: CPT

## 2019-12-03 PROCEDURE — 36415 COLL VENOUS BLD VENIPUNCTURE: CPT

## 2019-12-03 PROCEDURE — 82570 ASSAY OF URINE CREATININE: CPT

## 2019-12-03 PROCEDURE — 84550 ASSAY OF BLOOD/URIC ACID: CPT

## 2019-12-03 PROCEDURE — 83970 ASSAY OF PARATHORMONE: CPT

## 2019-12-03 PROCEDURE — 81001 URINALYSIS AUTO W/SCOPE: CPT

## 2019-12-03 PROCEDURE — 87086 URINE CULTURE/COLONY COUNT: CPT

## 2019-12-04 LAB — BACTERIA SPEC AEROBE CULT: NORMAL

## 2019-12-30 ENCOUNTER — HOSPITAL ENCOUNTER (EMERGENCY)
Facility: HOSPITAL | Age: 74
Discharge: HOME | End: 2019-12-30
Attending: EMERGENCY MEDICINE | Admitting: EMERGENCY MEDICINE

## 2019-12-30 VITALS
RESPIRATION RATE: 22 BRPM | OXYGEN SATURATION: 98 % | HEIGHT: 69 IN | DIASTOLIC BLOOD PRESSURE: 80 MMHG | HEART RATE: 88 BPM | WEIGHT: 220 LBS | SYSTOLIC BLOOD PRESSURE: 166 MMHG | BODY MASS INDEX: 32.58 KG/M2 | TEMPERATURE: 98.2 F

## 2019-12-30 DIAGNOSIS — L03.115 BILATERAL CELLULITIS OF LOWER LEG: Primary | ICD-10-CM

## 2019-12-30 DIAGNOSIS — L03.116 BILATERAL CELLULITIS OF LOWER LEG: Primary | ICD-10-CM

## 2019-12-30 LAB
ANION GAP SERPL CALCULATED.3IONS-SCNC: 12 MMOL/L (ref 5–15)
BASOPHILS # BLD AUTO: 0 10*3/MM3 (ref 0–0.2)
BASOPHILS NFR BLD AUTO: 0.3 % (ref 0–1.5)
BUN BLD-MCNC: 7 MG/DL (ref 8–23)
BUN/CREAT SERPL: 6.4 (ref 7–25)
CALCIUM SPEC-SCNC: 9.6 MG/DL (ref 8.6–10.5)
CHLORIDE SERPL-SCNC: 90 MMOL/L (ref 98–107)
CO2 SERPL-SCNC: 27 MMOL/L (ref 22–29)
CREAT BLD-MCNC: 1.09 MG/DL (ref 0.76–1.27)
DEPRECATED RDW RBC AUTO: 47.7 FL (ref 37–54)
EOSINOPHIL # BLD AUTO: 0.6 10*3/MM3 (ref 0–0.4)
EOSINOPHIL NFR BLD AUTO: 8.3 % (ref 0.3–6.2)
ERYTHROCYTE [DISTWIDTH] IN BLOOD BY AUTOMATED COUNT: 14.4 % (ref 12.3–15.4)
GFR SERPL CREATININE-BSD FRML MDRD: 66 ML/MIN/1.73
GLUCOSE BLD-MCNC: 97 MG/DL (ref 65–99)
HCT VFR BLD AUTO: 38.3 % (ref 37.5–51)
HGB BLD-MCNC: 12.9 G/DL (ref 13–17.7)
LYMPHOCYTES # BLD AUTO: 1.3 10*3/MM3 (ref 0.7–3.1)
LYMPHOCYTES NFR BLD AUTO: 18.9 % (ref 19.6–45.3)
MCH RBC QN AUTO: 32.1 PG (ref 26.6–33)
MCHC RBC AUTO-ENTMCNC: 33.7 G/DL (ref 31.5–35.7)
MCV RBC AUTO: 95.2 FL (ref 79–97)
MONOCYTES # BLD AUTO: 1 10*3/MM3 (ref 0.1–0.9)
MONOCYTES NFR BLD AUTO: 14.6 % (ref 5–12)
NEUTROPHILS # BLD AUTO: 3.9 10*3/MM3 (ref 1.7–7)
NEUTROPHILS NFR BLD AUTO: 57.9 % (ref 42.7–76)
NRBC BLD AUTO-RTO: 0 /100 WBC (ref 0–0.2)
PLATELET # BLD AUTO: 165 10*3/MM3 (ref 140–450)
PMV BLD AUTO: 8.3 FL (ref 6–12)
POTASSIUM BLD-SCNC: 4.1 MMOL/L (ref 3.5–5.2)
RBC # BLD AUTO: 4.02 10*6/MM3 (ref 4.14–5.8)
SODIUM BLD-SCNC: 129 MMOL/L (ref 136–145)
WBC NRBC COR # BLD: 6.7 10*3/MM3 (ref 3.4–10.8)

## 2019-12-30 PROCEDURE — 99283 EMERGENCY DEPT VISIT LOW MDM: CPT

## 2019-12-30 PROCEDURE — 80048 BASIC METABOLIC PNL TOTAL CA: CPT | Performed by: EMERGENCY MEDICINE

## 2019-12-30 PROCEDURE — 85025 COMPLETE CBC W/AUTO DIFF WBC: CPT | Performed by: EMERGENCY MEDICINE

## 2019-12-30 RX ORDER — ALLOPURINOL 100 MG/1
100 TABLET ORAL DAILY
COMMUNITY

## 2019-12-30 RX ORDER — FLUOXETINE 10 MG/1
10 CAPSULE ORAL DAILY
COMMUNITY

## 2019-12-30 RX ORDER — FOLIC ACID 1 MG/1
1 TABLET ORAL DAILY
COMMUNITY

## 2019-12-30 RX ORDER — SODIUM CHLORIDE 0.9 % (FLUSH) 0.9 %
10 SYRINGE (ML) INJECTION AS NEEDED
Status: DISCONTINUED | OUTPATIENT
Start: 2019-12-30 | End: 2019-12-30 | Stop reason: HOSPADM

## 2019-12-30 RX ORDER — HYDRALAZINE HYDROCHLORIDE 50 MG/1
50 TABLET, FILM COATED ORAL EVERY EVENING
Status: ON HOLD | COMMUNITY
End: 2022-07-13 | Stop reason: SDUPTHER

## 2019-12-30 RX ORDER — PANTOPRAZOLE SODIUM 40 MG/1
40 TABLET, DELAYED RELEASE ORAL DAILY
COMMUNITY

## 2019-12-30 RX ORDER — SULFAMETHOXAZOLE AND TRIMETHOPRIM 800; 160 MG/1; MG/1
1 TABLET ORAL 2 TIMES DAILY
Qty: 14 TABLET | Refills: 0 | Status: ON HOLD | OUTPATIENT
Start: 2019-12-30 | End: 2022-07-09

## 2019-12-30 RX ORDER — LEVOTHYROXINE SODIUM 175 UG/1
175 TABLET ORAL
COMMUNITY

## 2019-12-30 RX ORDER — CEPHALEXIN 500 MG/1
500 CAPSULE ORAL 3 TIMES DAILY
Qty: 21 CAPSULE | Refills: 0 | Status: ON HOLD | OUTPATIENT
Start: 2019-12-30 | End: 2022-07-09

## 2020-07-18 ENCOUNTER — LAB (OUTPATIENT)
Dept: LAB | Facility: HOSPITAL | Age: 75
End: 2020-07-18

## 2020-07-18 ENCOUNTER — TRANSCRIBE ORDERS (OUTPATIENT)
Dept: ADMINISTRATIVE | Facility: HOSPITAL | Age: 75
End: 2020-07-18

## 2020-07-18 DIAGNOSIS — E87.1 HYPOSMOLALITY SYNDROME: ICD-10-CM

## 2020-07-18 DIAGNOSIS — I10 ESSENTIAL HYPERTENSION, MALIGNANT: ICD-10-CM

## 2020-07-18 DIAGNOSIS — N25.81 SECONDARY HYPERPARATHYROIDISM OF RENAL ORIGIN (HCC): ICD-10-CM

## 2020-07-18 DIAGNOSIS — E55.9 AVITAMINOSIS D: ICD-10-CM

## 2020-07-18 DIAGNOSIS — R32 URINARY INCONTINENCE, UNSPECIFIED TYPE: Primary | ICD-10-CM

## 2020-07-18 DIAGNOSIS — N18.30 CHRONIC KIDNEY DISEASE, STAGE III (MODERATE) (HCC): Primary | ICD-10-CM

## 2020-07-18 DIAGNOSIS — R39.191 NEED TO VOID IMMEDIATELY AFTER URINATING: ICD-10-CM

## 2020-07-18 DIAGNOSIS — R32 URINARY INCONTINENCE, UNSPECIFIED TYPE: ICD-10-CM

## 2020-07-18 DIAGNOSIS — N18.30 CHRONIC KIDNEY DISEASE, STAGE III (MODERATE) (HCC): ICD-10-CM

## 2020-07-18 LAB
25(OH)D3 SERPL-MCNC: 83.1 NG/ML (ref 30–100)
ANION GAP SERPL CALCULATED.3IONS-SCNC: 9.4 MMOL/L (ref 5–15)
BACTERIA UR QL AUTO: NORMAL /HPF
BILIRUB UR QL STRIP: NEGATIVE
BUN SERPL-MCNC: 8 MG/DL (ref 8–23)
BUN/CREAT SERPL: 6.6 (ref 7–25)
CALCIUM SPEC-SCNC: 9.6 MG/DL (ref 8.6–10.5)
CHLORIDE SERPL-SCNC: 94 MMOL/L (ref 98–107)
CLARITY UR: CLEAR
CO2 SERPL-SCNC: 25.6 MMOL/L (ref 22–29)
COLOR UR: YELLOW
CREAT SERPL-MCNC: 1.22 MG/DL (ref 0.76–1.27)
CREAT UR-MCNC: 97.6 MG/DL
DEPRECATED RDW RBC AUTO: 48.2 FL (ref 37–54)
ERYTHROCYTE [DISTWIDTH] IN BLOOD BY AUTOMATED COUNT: 14.1 % (ref 12.3–15.4)
GFR SERPL CREATININE-BSD FRML MDRD: 58 ML/MIN/1.73
GLUCOSE SERPL-MCNC: 94 MG/DL (ref 65–99)
GLUCOSE UR STRIP-MCNC: NEGATIVE MG/DL
HCT VFR BLD AUTO: 42.4 % (ref 37.5–51)
HGB BLD-MCNC: 14.4 G/DL (ref 13–17.7)
HGB UR QL STRIP.AUTO: NEGATIVE
HYALINE CASTS UR QL AUTO: NORMAL /LPF
KETONES UR QL STRIP: NEGATIVE
LEUKOCYTE ESTERASE UR QL STRIP.AUTO: NEGATIVE
MCH RBC QN AUTO: 31.9 PG (ref 26.6–33)
MCHC RBC AUTO-ENTMCNC: 34 G/DL (ref 31.5–35.7)
MCV RBC AUTO: 93.8 FL (ref 79–97)
NITRITE UR QL STRIP: NEGATIVE
PH UR STRIP.AUTO: 7 [PH] (ref 5–8)
PLATELET # BLD AUTO: 172 10*3/MM3 (ref 140–450)
PMV BLD AUTO: 10.8 FL (ref 6–12)
POTASSIUM SERPL-SCNC: 4.5 MMOL/L (ref 3.5–5.2)
PROT UR QL STRIP: ABNORMAL
PROT UR-MCNC: 34 MG/DL
PROT/CREAT UR: 348.4 MG/G CREA (ref 0–200)
RBC # BLD AUTO: 4.52 10*6/MM3 (ref 4.14–5.8)
RBC # UR: NORMAL /HPF
REF LAB TEST METHOD: NORMAL
SODIUM SERPL-SCNC: 129 MMOL/L (ref 136–145)
SP GR UR STRIP: 1.01 (ref 1–1.03)
SQUAMOUS #/AREA URNS HPF: NORMAL /HPF
URATE SERPL-MCNC: 6.4 MG/DL (ref 3.4–7)
UROBILINOGEN UR QL STRIP: ABNORMAL
WBC # BLD AUTO: 5.83 10*3/MM3 (ref 3.4–10.8)
WBC UR QL AUTO: NORMAL /HPF

## 2020-07-18 PROCEDURE — 85027 COMPLETE CBC AUTOMATED: CPT

## 2020-07-18 PROCEDURE — 82570 ASSAY OF URINE CREATININE: CPT

## 2020-07-18 PROCEDURE — 84550 ASSAY OF BLOOD/URIC ACID: CPT

## 2020-07-18 PROCEDURE — 84156 ASSAY OF PROTEIN URINE: CPT

## 2020-07-18 PROCEDURE — 80048 BASIC METABOLIC PNL TOTAL CA: CPT

## 2020-07-18 PROCEDURE — 36415 COLL VENOUS BLD VENIPUNCTURE: CPT

## 2020-07-18 PROCEDURE — 82306 VITAMIN D 25 HYDROXY: CPT

## 2020-07-18 PROCEDURE — 81001 URINALYSIS AUTO W/SCOPE: CPT

## 2021-03-05 ENCOUNTER — LAB (OUTPATIENT)
Dept: LAB | Facility: HOSPITAL | Age: 76
End: 2021-03-05

## 2021-03-05 ENCOUNTER — TRANSCRIBE ORDERS (OUTPATIENT)
Dept: ADMINISTRATIVE | Facility: HOSPITAL | Age: 76
End: 2021-03-05

## 2021-03-05 DIAGNOSIS — R80.9 PROTEINURIA, UNSPECIFIED TYPE: ICD-10-CM

## 2021-03-05 DIAGNOSIS — N18.30 CKD STAGE 3 DUE TO TYPE 1 DIABETES MELLITUS (HCC): Primary | ICD-10-CM

## 2021-03-05 DIAGNOSIS — I10 ESSENTIAL HYPERTENSION: ICD-10-CM

## 2021-03-05 DIAGNOSIS — E10.22 CKD STAGE 3 DUE TO TYPE 1 DIABETES MELLITUS (HCC): Primary | ICD-10-CM

## 2021-03-05 DIAGNOSIS — E55.9 VITAMIN D DEFICIENCY: ICD-10-CM

## 2021-03-05 DIAGNOSIS — N18.30 CKD STAGE 3 DUE TO TYPE 1 DIABETES MELLITUS (HCC): ICD-10-CM

## 2021-03-05 DIAGNOSIS — E10.22 CKD STAGE 3 DUE TO TYPE 1 DIABETES MELLITUS (HCC): ICD-10-CM

## 2021-03-05 LAB
25(OH)D3 SERPL-MCNC: 82.2 NG/ML
ANION GAP SERPL CALCULATED.3IONS-SCNC: 14 MMOL/L (ref 5–15)
BACTERIA UR QL AUTO: ABNORMAL /HPF
BASOPHILS # BLD AUTO: 0.1 10*3/MM3 (ref 0–0.2)
BASOPHILS NFR BLD AUTO: 1.3 % (ref 0–1.5)
BILIRUB UR QL STRIP: NEGATIVE
BUN SERPL-MCNC: 9 MG/DL (ref 8–23)
BUN/CREAT SERPL: 7.6 (ref 7–25)
CALCIUM SPEC-SCNC: 10.3 MG/DL (ref 8.6–10.5)
CHLORIDE SERPL-SCNC: 92 MMOL/L (ref 98–107)
CLARITY UR: CLEAR
CO2 SERPL-SCNC: 26 MMOL/L (ref 22–29)
COLOR UR: YELLOW
CREAT SERPL-MCNC: 1.19 MG/DL (ref 0.76–1.27)
CREAT UR-MCNC: 43.2 MG/DL
DEPRECATED RDW RBC AUTO: 41.7 FL (ref 37–54)
EOSINOPHIL # BLD AUTO: 0.49 10*3/MM3 (ref 0–0.4)
EOSINOPHIL NFR BLD AUTO: 6.5 % (ref 0.3–6.2)
ERYTHROCYTE [DISTWIDTH] IN BLOOD BY AUTOMATED COUNT: 12.5 % (ref 12.3–15.4)
GFR SERPL CREATININE-BSD FRML MDRD: 59 ML/MIN/1.73
GLUCOSE SERPL-MCNC: 96 MG/DL (ref 65–99)
GLUCOSE UR STRIP-MCNC: NEGATIVE MG/DL
HCT VFR BLD AUTO: 46.8 % (ref 37.5–51)
HGB BLD-MCNC: 16.1 G/DL (ref 13–17.7)
HGB UR QL STRIP.AUTO: NEGATIVE
HYALINE CASTS UR QL AUTO: ABNORMAL /LPF
IMM GRANULOCYTES # BLD AUTO: 0.03 10*3/MM3 (ref 0–0.05)
IMM GRANULOCYTES NFR BLD AUTO: 0.4 % (ref 0–0.5)
KETONES UR QL STRIP: NEGATIVE
LEUKOCYTE ESTERASE UR QL STRIP.AUTO: ABNORMAL
LYMPHOCYTES # BLD AUTO: 1.59 10*3/MM3 (ref 0.7–3.1)
LYMPHOCYTES NFR BLD AUTO: 21.1 % (ref 19.6–45.3)
MAGNESIUM SERPL-MCNC: 2.1 MG/DL (ref 1.6–2.4)
MCH RBC QN AUTO: 31.4 PG (ref 26.6–33)
MCHC RBC AUTO-ENTMCNC: 34.4 G/DL (ref 31.5–35.7)
MCV RBC AUTO: 91.4 FL (ref 79–97)
MONOCYTES # BLD AUTO: 0.9 10*3/MM3 (ref 0.1–0.9)
MONOCYTES NFR BLD AUTO: 11.9 % (ref 5–12)
NEUTROPHILS NFR BLD AUTO: 4.43 10*3/MM3 (ref 1.7–7)
NEUTROPHILS NFR BLD AUTO: 58.8 % (ref 42.7–76)
NITRITE UR QL STRIP: NEGATIVE
NRBC BLD AUTO-RTO: 0 /100 WBC (ref 0–0.2)
PH UR STRIP.AUTO: 6.5 [PH] (ref 5–8)
PLATELET # BLD AUTO: 220 10*3/MM3 (ref 140–450)
PMV BLD AUTO: 10.7 FL (ref 6–12)
POTASSIUM SERPL-SCNC: 5.2 MMOL/L (ref 3.5–5.2)
PROT UR QL STRIP: NEGATIVE
PROT UR-MCNC: 10 MG/DL
PROT/CREAT UR: 231.5 MG/G CREA (ref 0–200)
PTH-INTACT SERPL-MCNC: 35.7 PG/ML (ref 15–65)
RBC # BLD AUTO: 5.12 10*6/MM3 (ref 4.14–5.8)
RBC # UR: ABNORMAL /HPF
REF LAB TEST METHOD: ABNORMAL
SODIUM SERPL-SCNC: 132 MMOL/L (ref 136–145)
SP GR UR STRIP: 1.01 (ref 1–1.03)
SQUAMOUS #/AREA URNS HPF: ABNORMAL /HPF
URATE SERPL-MCNC: 6.9 MG/DL (ref 3.4–7)
UROBILINOGEN UR QL STRIP: ABNORMAL
WBC # BLD AUTO: 7.54 10*3/MM3 (ref 3.4–10.8)
WBC UR QL AUTO: ABNORMAL /HPF

## 2021-03-05 PROCEDURE — 84550 ASSAY OF BLOOD/URIC ACID: CPT

## 2021-03-05 PROCEDURE — 36415 COLL VENOUS BLD VENIPUNCTURE: CPT

## 2021-03-05 PROCEDURE — 82306 VITAMIN D 25 HYDROXY: CPT

## 2021-03-05 PROCEDURE — 84156 ASSAY OF PROTEIN URINE: CPT

## 2021-03-05 PROCEDURE — 87086 URINE CULTURE/COLONY COUNT: CPT

## 2021-03-05 PROCEDURE — 85025 COMPLETE CBC W/AUTO DIFF WBC: CPT

## 2021-03-05 PROCEDURE — 81001 URINALYSIS AUTO W/SCOPE: CPT

## 2021-03-05 PROCEDURE — 80048 BASIC METABOLIC PNL TOTAL CA: CPT

## 2021-03-05 PROCEDURE — 83735 ASSAY OF MAGNESIUM: CPT

## 2021-03-05 PROCEDURE — 82570 ASSAY OF URINE CREATININE: CPT

## 2021-03-05 PROCEDURE — 83970 ASSAY OF PARATHORMONE: CPT

## 2021-03-06 LAB — BACTERIA SPEC AEROBE CULT: ABNORMAL

## 2021-10-08 ENCOUNTER — TRANSCRIBE ORDERS (OUTPATIENT)
Dept: ADMINISTRATIVE | Facility: HOSPITAL | Age: 76
End: 2021-10-08

## 2021-10-08 ENCOUNTER — LAB (OUTPATIENT)
Dept: LAB | Facility: HOSPITAL | Age: 76
End: 2021-10-08

## 2021-10-08 DIAGNOSIS — I42.6 ALCOHOLIC CARDIOMYOPATHY (HCC): Primary | ICD-10-CM

## 2021-10-08 DIAGNOSIS — N18.30 STAGE 3 CHRONIC KIDNEY DISEASE, UNSPECIFIED WHETHER STAGE 3A OR 3B CKD (HCC): ICD-10-CM

## 2021-10-08 DIAGNOSIS — R41.3 MEMORY LOSS: ICD-10-CM

## 2021-10-08 DIAGNOSIS — K21.9 CHALASIA OF LOWER ESOPHAGEAL SPHINCTER: ICD-10-CM

## 2021-10-08 DIAGNOSIS — E55.9 AVITAMINOSIS D: Primary | ICD-10-CM

## 2021-10-08 DIAGNOSIS — R71.8 OTHER ABNORMALITY OF RED BLOOD CELLS: ICD-10-CM

## 2021-10-08 DIAGNOSIS — E55.9 AVITAMINOSIS D: ICD-10-CM

## 2021-10-08 DIAGNOSIS — I42.6 ALCOHOLIC CARDIOMYOPATHY (HCC): ICD-10-CM

## 2021-10-08 LAB
25(OH)D3 SERPL-MCNC: 77.7 NG/ML
ALBUMIN SERPL-MCNC: 4.1 G/DL (ref 3.5–5.2)
ALBUMIN UR-MCNC: 2.4 MG/DL
ALBUMIN/GLOB SERPL: 1.6 G/DL
ALP SERPL-CCNC: 43 U/L (ref 39–117)
ALT SERPL W P-5'-P-CCNC: 14 U/L (ref 1–41)
ANION GAP SERPL CALCULATED.3IONS-SCNC: 8.1 MMOL/L (ref 5–15)
AST SERPL-CCNC: 21 U/L (ref 1–40)
BACTERIA UR QL AUTO: ABNORMAL /HPF
BASOPHILS # BLD AUTO: 0.1 10*3/MM3 (ref 0–0.2)
BASOPHILS NFR BLD AUTO: 1.6 % (ref 0–1.5)
BILIRUB SERPL-MCNC: 0.6 MG/DL (ref 0–1.2)
BILIRUB UR QL STRIP: NEGATIVE
BUN SERPL-MCNC: 8 MG/DL (ref 8–23)
BUN/CREAT SERPL: 7.6 (ref 7–25)
CALCIUM SPEC-SCNC: 9.7 MG/DL (ref 8.6–10.5)
CHLORIDE SERPL-SCNC: 96 MMOL/L (ref 98–107)
CHOLEST SERPL-MCNC: 162 MG/DL (ref 0–200)
CLARITY UR: CLEAR
CO2 SERPL-SCNC: 27.9 MMOL/L (ref 22–29)
COLOR UR: YELLOW
CREAT SERPL-MCNC: 1.05 MG/DL (ref 0.76–1.27)
CREAT UR-MCNC: 35.8 MG/DL
DEPRECATED RDW RBC AUTO: 44 FL (ref 37–54)
EOSINOPHIL # BLD AUTO: 0.73 10*3/MM3 (ref 0–0.4)
EOSINOPHIL NFR BLD AUTO: 11.6 % (ref 0.3–6.2)
ERYTHROCYTE [DISTWIDTH] IN BLOOD BY AUTOMATED COUNT: 13.1 % (ref 12.3–15.4)
GFR SERPL CREATININE-BSD FRML MDRD: 68 ML/MIN/1.73
GLOBULIN UR ELPH-MCNC: 2.5 GM/DL
GLUCOSE SERPL-MCNC: 95 MG/DL (ref 65–99)
GLUCOSE UR STRIP-MCNC: NEGATIVE MG/DL
HBA1C MFR BLD: 4.8 % (ref 3.5–5.6)
HCT VFR BLD AUTO: 41.4 % (ref 37.5–51)
HDLC SERPL-MCNC: 93 MG/DL (ref 40–60)
HGB BLD-MCNC: 14.1 G/DL (ref 13–17.7)
HGB UR QL STRIP.AUTO: NEGATIVE
HYALINE CASTS UR QL AUTO: ABNORMAL /LPF
IMM GRANULOCYTES # BLD AUTO: 0.02 10*3/MM3 (ref 0–0.05)
IMM GRANULOCYTES NFR BLD AUTO: 0.3 % (ref 0–0.5)
KETONES UR QL STRIP: NEGATIVE
LDLC SERPL CALC-MCNC: 60 MG/DL (ref 0–100)
LDLC/HDLC SERPL: 0.66 {RATIO}
LEUKOCYTE ESTERASE UR QL STRIP.AUTO: ABNORMAL
LYMPHOCYTES # BLD AUTO: 1.44 10*3/MM3 (ref 0.7–3.1)
LYMPHOCYTES NFR BLD AUTO: 23 % (ref 19.6–45.3)
MAGNESIUM SERPL-MCNC: 1.9 MG/DL (ref 1.6–2.4)
MCH RBC QN AUTO: 31.2 PG (ref 26.6–33)
MCHC RBC AUTO-ENTMCNC: 34.1 G/DL (ref 31.5–35.7)
MCV RBC AUTO: 91.6 FL (ref 79–97)
MONOCYTES # BLD AUTO: 0.85 10*3/MM3 (ref 0.1–0.9)
MONOCYTES NFR BLD AUTO: 13.6 % (ref 5–12)
NEUTROPHILS NFR BLD AUTO: 3.13 10*3/MM3 (ref 1.7–7)
NEUTROPHILS NFR BLD AUTO: 49.9 % (ref 42.7–76)
NITRITE UR QL STRIP: NEGATIVE
NRBC BLD AUTO-RTO: 0 /100 WBC (ref 0–0.2)
PH UR STRIP.AUTO: 7 [PH] (ref 5–8)
PLATELET # BLD AUTO: 209 10*3/MM3 (ref 140–450)
PMV BLD AUTO: 11.1 FL (ref 6–12)
POTASSIUM SERPL-SCNC: 5.5 MMOL/L (ref 3.5–5.2)
PROT SERPL-MCNC: 6.6 G/DL (ref 6–8.5)
PROT UR QL STRIP: NEGATIVE
PROT UR-MCNC: 11 MG/DL
PROT/CREAT UR: 307.3 MG/G CREA (ref 0–200)
PTH-INTACT SERPL-MCNC: 51.3 PG/ML (ref 15–65)
RBC # BLD AUTO: 4.52 10*6/MM3 (ref 4.14–5.8)
RBC # UR: ABNORMAL /HPF
REF LAB TEST METHOD: ABNORMAL
SODIUM SERPL-SCNC: 132 MMOL/L (ref 136–145)
SP GR UR STRIP: 1.01 (ref 1–1.03)
SQUAMOUS #/AREA URNS HPF: ABNORMAL /HPF
TRIGL SERPL-MCNC: 40 MG/DL (ref 0–150)
TSH SERPL DL<=0.05 MIU/L-ACNC: 0.08 UIU/ML (ref 0.27–4.2)
URATE SERPL-MCNC: 5 MG/DL (ref 3.4–7)
UROBILINOGEN UR QL STRIP: ABNORMAL
VLDLC SERPL-MCNC: 9 MG/DL (ref 5–40)
WBC # BLD AUTO: 6.27 10*3/MM3 (ref 3.4–10.8)
WBC UR QL AUTO: ABNORMAL /HPF

## 2021-10-08 PROCEDURE — 83036 HEMOGLOBIN GLYCOSYLATED A1C: CPT

## 2021-10-08 PROCEDURE — 80053 COMPREHEN METABOLIC PANEL: CPT

## 2021-10-08 PROCEDURE — 84154 ASSAY OF PSA FREE: CPT

## 2021-10-08 PROCEDURE — 83970 ASSAY OF PARATHORMONE: CPT

## 2021-10-08 PROCEDURE — 84153 ASSAY OF PSA TOTAL: CPT

## 2021-10-08 PROCEDURE — 82306 VITAMIN D 25 HYDROXY: CPT

## 2021-10-08 PROCEDURE — 85025 COMPLETE CBC W/AUTO DIFF WBC: CPT

## 2021-10-08 PROCEDURE — 83735 ASSAY OF MAGNESIUM: CPT

## 2021-10-08 PROCEDURE — 36415 COLL VENOUS BLD VENIPUNCTURE: CPT

## 2021-10-08 PROCEDURE — 84550 ASSAY OF BLOOD/URIC ACID: CPT

## 2021-10-08 PROCEDURE — 87086 URINE CULTURE/COLONY COUNT: CPT

## 2021-10-08 PROCEDURE — 82570 ASSAY OF URINE CREATININE: CPT

## 2021-10-08 PROCEDURE — 81001 URINALYSIS AUTO W/SCOPE: CPT

## 2021-10-08 PROCEDURE — 84443 ASSAY THYROID STIM HORMONE: CPT

## 2021-10-08 PROCEDURE — 80061 LIPID PANEL: CPT

## 2021-10-08 PROCEDURE — 82043 UR ALBUMIN QUANTITATIVE: CPT

## 2021-10-08 PROCEDURE — 84156 ASSAY OF PROTEIN URINE: CPT

## 2021-10-09 LAB
BACTERIA SPEC AEROBE CULT: ABNORMAL
PSA FREE MFR SERPL: 23.1 %
PSA FREE SERPL-MCNC: 0.3 NG/ML
PSA SERPL-MCNC: 1.3 NG/ML (ref 0–4)

## 2022-07-09 ENCOUNTER — HOSPITAL ENCOUNTER (INPATIENT)
Facility: HOSPITAL | Age: 77
LOS: 4 days | Discharge: NURSING, REHAB OR LONG-TERM CARE | End: 2022-07-13
Attending: EMERGENCY MEDICINE | Admitting: FAMILY MEDICINE

## 2022-07-09 ENCOUNTER — APPOINTMENT (OUTPATIENT)
Dept: GENERAL RADIOLOGY | Facility: HOSPITAL | Age: 77
End: 2022-07-09

## 2022-07-09 DIAGNOSIS — L03.115 CELLULITIS OF RIGHT FOOT: ICD-10-CM

## 2022-07-09 DIAGNOSIS — B87.9: ICD-10-CM

## 2022-07-09 DIAGNOSIS — F10.10 ALCOHOL ABUSE: Primary | Chronic | ICD-10-CM

## 2022-07-09 DIAGNOSIS — M15.9 PRIMARY OSTEOARTHRITIS INVOLVING MULTIPLE JOINTS: ICD-10-CM

## 2022-07-09 LAB
ALBUMIN SERPL-MCNC: 3.8 G/DL (ref 3.5–5.2)
ALBUMIN/GLOB SERPL: 1.3 G/DL
ALP SERPL-CCNC: 42 U/L (ref 39–117)
ALT SERPL W P-5'-P-CCNC: 7 U/L (ref 1–41)
ANION GAP SERPL CALCULATED.3IONS-SCNC: 12 MMOL/L (ref 5–15)
AST SERPL-CCNC: 13 U/L (ref 1–40)
BACTERIA UR QL AUTO: ABNORMAL /HPF
BASOPHILS # BLD AUTO: 0.1 10*3/MM3 (ref 0–0.2)
BASOPHILS NFR BLD AUTO: 1 % (ref 0–1.5)
BILIRUB SERPL-MCNC: 0.8 MG/DL (ref 0–1.2)
BILIRUB UR QL STRIP: NEGATIVE
BUN SERPL-MCNC: 9 MG/DL (ref 8–23)
BUN/CREAT SERPL: 8.2 (ref 7–25)
CALCIUM SPEC-SCNC: 8.9 MG/DL (ref 8.6–10.5)
CHLORIDE SERPL-SCNC: 90 MMOL/L (ref 98–107)
CLARITY UR: CLEAR
CO2 SERPL-SCNC: 23 MMOL/L (ref 22–29)
COLOR UR: YELLOW
CREAT SERPL-MCNC: 1.1 MG/DL (ref 0.76–1.27)
DEPRECATED RDW RBC AUTO: 49.9 FL (ref 37–54)
EGFRCR SERPLBLD CKD-EPI 2021: 68.7 ML/MIN/1.73
EOSINOPHIL # BLD AUTO: 0.4 10*3/MM3 (ref 0–0.4)
EOSINOPHIL NFR BLD AUTO: 6.5 % (ref 0.3–6.2)
ERYTHROCYTE [DISTWIDTH] IN BLOOD BY AUTOMATED COUNT: 15.5 % (ref 12.3–15.4)
GLOBULIN UR ELPH-MCNC: 3 GM/DL
GLUCOSE SERPL-MCNC: 103 MG/DL (ref 65–99)
GLUCOSE UR STRIP-MCNC: NEGATIVE MG/DL
HCT VFR BLD AUTO: 39.7 % (ref 37.5–51)
HGB BLD-MCNC: 12.9 G/DL (ref 13–17.7)
HGB UR QL STRIP.AUTO: NEGATIVE
HYALINE CASTS UR QL AUTO: ABNORMAL /LPF
KETONES UR QL STRIP: NEGATIVE
LEUKOCYTE ESTERASE UR QL STRIP.AUTO: NEGATIVE
LYMPHOCYTES # BLD AUTO: 1.1 10*3/MM3 (ref 0.7–3.1)
LYMPHOCYTES NFR BLD AUTO: 15.8 % (ref 19.6–45.3)
MCH RBC QN AUTO: 30.1 PG (ref 26.6–33)
MCHC RBC AUTO-ENTMCNC: 32.5 G/DL (ref 31.5–35.7)
MCV RBC AUTO: 92.8 FL (ref 79–97)
MONOCYTES # BLD AUTO: 1.1 10*3/MM3 (ref 0.1–0.9)
MONOCYTES NFR BLD AUTO: 16.6 % (ref 5–12)
NEUTROPHILS NFR BLD AUTO: 4.1 10*3/MM3 (ref 1.7–7)
NEUTROPHILS NFR BLD AUTO: 60.1 % (ref 42.7–76)
NITRITE UR QL STRIP: NEGATIVE
NRBC BLD AUTO-RTO: 0 /100 WBC (ref 0–0.2)
NT-PROBNP SERPL-MCNC: 1859 PG/ML (ref 0–1800)
OSMOLALITY UR: 260 MOSM/KG (ref 300–800)
PH UR STRIP.AUTO: 6.5 [PH] (ref 5–8)
PLATELET # BLD AUTO: 159 10*3/MM3 (ref 140–450)
PMV BLD AUTO: 7.9 FL (ref 6–12)
POTASSIUM SERPL-SCNC: 4.2 MMOL/L (ref 3.5–5.2)
PROT SERPL-MCNC: 6.8 G/DL (ref 6–8.5)
PROT UR QL STRIP: ABNORMAL
RBC # BLD AUTO: 4.28 10*6/MM3 (ref 4.14–5.8)
RBC # UR STRIP: ABNORMAL /HPF
REF LAB TEST METHOD: ABNORMAL
SARS-COV-2 RNA PNL SPEC NAA+PROBE: NOT DETECTED
SODIUM SERPL-SCNC: 125 MMOL/L (ref 136–145)
SODIUM UR-SCNC: 45 MMOL/L
SP GR UR STRIP: 1.01 (ref 1–1.03)
SQUAMOUS #/AREA URNS HPF: ABNORMAL /HPF
UROBILINOGEN UR QL STRIP: ABNORMAL
WBC # UR STRIP: ABNORMAL /HPF
WBC NRBC COR # BLD: 6.9 10*3/MM3 (ref 3.4–10.8)

## 2022-07-09 PROCEDURE — 81001 URINALYSIS AUTO W/SCOPE: CPT | Performed by: INTERNAL MEDICINE

## 2022-07-09 PROCEDURE — 73620 X-RAY EXAM OF FOOT: CPT

## 2022-07-09 PROCEDURE — 36415 COLL VENOUS BLD VENIPUNCTURE: CPT

## 2022-07-09 PROCEDURE — 25010000002 THIAMINE PER 100 MG: Performed by: EMERGENCY MEDICINE

## 2022-07-09 PROCEDURE — 80053 COMPREHEN METABOLIC PANEL: CPT | Performed by: EMERGENCY MEDICINE

## 2022-07-09 PROCEDURE — 83880 ASSAY OF NATRIURETIC PEPTIDE: CPT | Performed by: INTERNAL MEDICINE

## 2022-07-09 PROCEDURE — 84300 ASSAY OF URINE SODIUM: CPT | Performed by: INTERNAL MEDICINE

## 2022-07-09 PROCEDURE — 85025 COMPLETE CBC W/AUTO DIFF WBC: CPT | Performed by: EMERGENCY MEDICINE

## 2022-07-09 PROCEDURE — 25010000002 VANCOMYCIN 10 G RECONSTITUTED SOLUTION: Performed by: EMERGENCY MEDICINE

## 2022-07-09 PROCEDURE — 99285 EMERGENCY DEPT VISIT HI MDM: CPT

## 2022-07-09 PROCEDURE — 83935 ASSAY OF URINE OSMOLALITY: CPT | Performed by: INTERNAL MEDICINE

## 2022-07-09 PROCEDURE — 87040 BLOOD CULTURE FOR BACTERIA: CPT | Performed by: EMERGENCY MEDICINE

## 2022-07-09 PROCEDURE — 25010000002 CEFEPIME PER 500 MG: Performed by: EMERGENCY MEDICINE

## 2022-07-09 PROCEDURE — 87635 SARS-COV-2 COVID-19 AMP PRB: CPT | Performed by: EMERGENCY MEDICINE

## 2022-07-09 RX ORDER — SODIUM CHLORIDE 0.9 % (FLUSH) 0.9 %
3 SYRINGE (ML) INJECTION EVERY 12 HOURS SCHEDULED
Status: DISCONTINUED | OUTPATIENT
Start: 2022-07-09 | End: 2022-07-13 | Stop reason: HOSPADM

## 2022-07-09 RX ORDER — HYDRALAZINE HYDROCHLORIDE 25 MG/1
50 TABLET, FILM COATED ORAL EVERY EVENING
Status: DISCONTINUED | OUTPATIENT
Start: 2022-07-10 | End: 2022-07-11

## 2022-07-09 RX ORDER — SODIUM CHLORIDE 0.9 % (FLUSH) 0.9 %
3-10 SYRINGE (ML) INJECTION AS NEEDED
Status: DISCONTINUED | OUTPATIENT
Start: 2022-07-09 | End: 2022-07-13 | Stop reason: HOSPADM

## 2022-07-09 RX ORDER — ENOXAPARIN SODIUM 100 MG/ML
40 INJECTION SUBCUTANEOUS EVERY 24 HOURS
Status: DISCONTINUED | OUTPATIENT
Start: 2022-07-10 | End: 2022-07-13 | Stop reason: HOSPADM

## 2022-07-09 RX ORDER — TRAMADOL HYDROCHLORIDE 50 MG/1
50 TABLET ORAL EVERY 6 HOURS PRN
Status: DISCONTINUED | OUTPATIENT
Start: 2022-07-09 | End: 2022-07-13 | Stop reason: HOSPADM

## 2022-07-09 RX ORDER — SODIUM CHLORIDE 9 MG/ML
75 INJECTION, SOLUTION INTRAVENOUS CONTINUOUS
Status: DISCONTINUED | OUTPATIENT
Start: 2022-07-09 | End: 2022-07-10

## 2022-07-09 RX ORDER — ALLOPURINOL 100 MG/1
100 TABLET ORAL DAILY
Status: DISCONTINUED | OUTPATIENT
Start: 2022-07-10 | End: 2022-07-13 | Stop reason: HOSPADM

## 2022-07-09 RX ORDER — FLUOXETINE 10 MG/1
10 CAPSULE ORAL DAILY
Status: DISCONTINUED | OUTPATIENT
Start: 2022-07-10 | End: 2022-07-13 | Stop reason: HOSPADM

## 2022-07-09 RX ORDER — LEVOTHYROXINE SODIUM 175 UG/1
175 TABLET ORAL DAILY
Status: DISCONTINUED | OUTPATIENT
Start: 2022-07-11 | End: 2022-07-13 | Stop reason: HOSPADM

## 2022-07-09 RX ORDER — SODIUM CHLORIDE 0.9 % (FLUSH) 0.9 %
10 SYRINGE (ML) INJECTION AS NEEDED
Status: DISCONTINUED | OUTPATIENT
Start: 2022-07-09 | End: 2022-07-13 | Stop reason: HOSPADM

## 2022-07-09 RX ORDER — SACCHAROMYCES BOULARDII 250 MG
250 CAPSULE ORAL 2 TIMES DAILY
Status: DISCONTINUED | OUTPATIENT
Start: 2022-07-10 | End: 2022-07-10

## 2022-07-09 RX ORDER — CHLORDIAZEPOXIDE HYDROCHLORIDE 10 MG/1
10 CAPSULE, GELATIN COATED ORAL 4 TIMES DAILY PRN
Status: DISCONTINUED | OUTPATIENT
Start: 2022-07-09 | End: 2022-07-13 | Stop reason: HOSPADM

## 2022-07-09 RX ORDER — CALCIUM CARBONATE 200(500)MG
2 TABLET,CHEWABLE ORAL 2 TIMES DAILY PRN
Status: DISCONTINUED | OUTPATIENT
Start: 2022-07-09 | End: 2022-07-13 | Stop reason: HOSPADM

## 2022-07-09 RX ORDER — SACCHAROMYCES BOULARDII 250 MG
250 CAPSULE ORAL 2 TIMES DAILY
COMMUNITY

## 2022-07-09 RX ORDER — FOLIC ACID 1 MG/1
1 TABLET ORAL DAILY
Status: DISCONTINUED | OUTPATIENT
Start: 2022-07-10 | End: 2022-07-13 | Stop reason: HOSPADM

## 2022-07-09 RX ORDER — ONDANSETRON 2 MG/ML
4 INJECTION INTRAMUSCULAR; INTRAVENOUS EVERY 4 HOURS PRN
Status: DISCONTINUED | OUTPATIENT
Start: 2022-07-09 | End: 2022-07-13

## 2022-07-09 RX ORDER — ONDANSETRON 4 MG/1
4 TABLET, FILM COATED ORAL EVERY 4 HOURS PRN
Status: DISCONTINUED | OUTPATIENT
Start: 2022-07-09 | End: 2022-07-13 | Stop reason: HOSPADM

## 2022-07-09 RX ORDER — PANTOPRAZOLE SODIUM 40 MG/1
40 TABLET, DELAYED RELEASE ORAL DAILY
Status: DISCONTINUED | OUTPATIENT
Start: 2022-07-11 | End: 2022-07-13 | Stop reason: HOSPADM

## 2022-07-09 RX ORDER — ALUMINA, MAGNESIA, AND SIMETHICONE 2400; 2400; 240 MG/30ML; MG/30ML; MG/30ML
15 SUSPENSION ORAL EVERY 6 HOURS PRN
Status: DISCONTINUED | OUTPATIENT
Start: 2022-07-09 | End: 2022-07-13 | Stop reason: HOSPADM

## 2022-07-09 RX ADMIN — CEFEPIME HYDROCHLORIDE 2 G: 2 INJECTION, POWDER, FOR SOLUTION INTRAVENOUS at 14:18

## 2022-07-09 RX ADMIN — VANCOMYCIN HYDROCHLORIDE 1500 MG: 10 INJECTION, POWDER, LYOPHILIZED, FOR SOLUTION INTRAVENOUS at 15:43

## 2022-07-09 RX ADMIN — FOLIC ACID 1000 ML/HR: 5 INJECTION, SOLUTION INTRAMUSCULAR; INTRAVENOUS; SUBCUTANEOUS at 14:33

## 2022-07-09 NOTE — ED PROVIDER NOTES
Subjective   Chief complaint wound on right foot    History of present illness a 76-year-old male who states he has had a wound and some redness and swelling to that foot over this past few days.  EMS was called today and they found the patient had maggots between his first and second toe redness and swelling and transported to the hospital.  He complains of moderate pain throbbing aching nature worse with movement better with rest nonradiating ongoing continuous last few days.  Has had some drainage from it as well.  No recent long car ride plane ride immobilization or foreign travels no one in the home with similar illness.  Family reports that they think this is probably been going on for more than a few days his general caregiver has been out for about 29 days there was no reported wound at that time.  But patient never takes his socks off and she states that he is a chronic alcoholic and does not complain of things.  She suspect this is been ongoing for few weeks.          Review of Systems   Constitutional: Negative for chills and fever.   Eyes: Negative for photophobia and visual disturbance.   Respiratory: Negative for chest tightness and shortness of breath.    Cardiovascular: Positive for leg swelling. Negative for chest pain and palpitations.   Gastrointestinal: Negative for abdominal pain and vomiting.   Endocrine: Negative for cold intolerance and heat intolerance.   Genitourinary: Negative for difficulty urinating and dysuria.   Musculoskeletal: Positive for arthralgias. Negative for back pain.   Skin: Positive for wound. Negative for color change and rash.   Neurological: Negative for dizziness and light-headedness.   Psychiatric/Behavioral: Negative for agitation and behavioral problems.       No past medical history on file.  Alcoholism prediabetic thyroid  No Known Allergies    No past surgical history on file.    No family history on file.    Social History     Socioeconomic History   • Marital  status:    Social history lives at home he does drink beer daily denies tobacco or drug use  Prior to Admission medications    Medication Sig Start Date End Date Taking? Authorizing Provider   allopurinol (ZYLOPRIM) 100 MG tablet Take 100 mg by mouth Daily.    Brian Woo MD   cephalexin (KEFLEX) 500 MG capsule Take 1 capsule by mouth 3 (Three) Times a Day. 12/30/19   Haile Lopez MD   FLUoxetine (PROzac) 10 MG capsule Take 10 mg by mouth Daily.    Brian Woo MD   folic acid (FOLVITE) 1 MG tablet Take 1 mg by mouth Daily.    Brian Woo MD   hydrALAZINE (APRESOLINE) 50 MG tablet Take 50 mg by mouth 3 (Three) Times a Day.    Brian Woo MD   levothyroxine (SYNTHROID, LEVOTHROID) 175 MCG tablet Take 175 mcg by mouth Daily.    Brian Woo MD   pantoprazole (PROTONIX) 40 MG EC tablet Take 40 mg by mouth Daily.    Brian Woo MD   sulfamethoxazole-trimethoprim (BACTRIM DS,SEPTRA DS) 800-160 MG per tablet Take 1 tablet by mouth 2 (Two) Times a Day. 12/30/19   Haile Lopez MD           Objective   Physical Exam  Constitutional 76-year-old awake alert in no acute distress temperature 97.1 heart rate 60 blood pressure 146/88.  HEENT extraocular muscles are intact pupils equal round reactive sclera clear.  Neck supple no adenopathy no meningeal signs lungs clear no retractions heart regular without murmur.  Abdomen soft without tenderness no other masses good bowel sounds extremities pulses are equal throughout upper and lower extremities.  Patient has some chronic edema to both legs from venous stasis changes.  The right foot is red hot along the toes between the first and second toe reveals a large wound ulceration with drainage and foul odor there are multiple maggots that are present.  Foot is warm and dry.  He can move it without difficulty.  No crepitus subtendinous air.  Pulses are equal.  Calf is soft without tenderness.  Neurologic he is awake  alert follows commands motor strength normal without focal weakness  Procedures           ED Course      Results for orders placed or performed during the hospital encounter of 07/09/22   Comprehensive Metabolic Panel    Specimen: Blood   Result Value Ref Range    Glucose 103 (H) 65 - 99 mg/dL    BUN 9 8 - 23 mg/dL    Creatinine 1.10 0.76 - 1.27 mg/dL    Sodium 125 (L) 136 - 145 mmol/L    Potassium 4.2 3.5 - 5.2 mmol/L    Chloride 90 (L) 98 - 107 mmol/L    CO2 23.0 22.0 - 29.0 mmol/L    Calcium 8.9 8.6 - 10.5 mg/dL    Total Protein 6.8 6.0 - 8.5 g/dL    Albumin 3.80 3.50 - 5.20 g/dL    ALT (SGPT) 7 1 - 41 U/L    AST (SGOT) 13 1 - 40 U/L    Alkaline Phosphatase 42 39 - 117 U/L    Total Bilirubin 0.8 0.0 - 1.2 mg/dL    Globulin 3.0 gm/dL    A/G Ratio 1.3 g/dL    BUN/Creatinine Ratio 8.2 7.0 - 25.0    Anion Gap 12.0 5.0 - 15.0 mmol/L    eGFR 68.7 >60.0 mL/min/1.73   CBC Auto Differential    Specimen: Blood   Result Value Ref Range    WBC 6.90 3.40 - 10.80 10*3/mm3    RBC 4.28 4.14 - 5.80 10*6/mm3    Hemoglobin 12.9 (L) 13.0 - 17.7 g/dL    Hematocrit 39.7 37.5 - 51.0 %    MCV 92.8 79.0 - 97.0 fL    MCH 30.1 26.6 - 33.0 pg    MCHC 32.5 31.5 - 35.7 g/dL    RDW 15.5 (H) 12.3 - 15.4 %    RDW-SD 49.9 37.0 - 54.0 fl    MPV 7.9 6.0 - 12.0 fL    Platelets 159 140 - 450 10*3/mm3    Neutrophil % 60.1 42.7 - 76.0 %    Lymphocyte % 15.8 (L) 19.6 - 45.3 %    Monocyte % 16.6 (H) 5.0 - 12.0 %    Eosinophil % 6.5 (H) 0.3 - 6.2 %    Basophil % 1.0 0.0 - 1.5 %    Neutrophils, Absolute 4.10 1.70 - 7.00 10*3/mm3    Lymphocytes, Absolute 1.10 0.70 - 3.10 10*3/mm3    Monocytes, Absolute 1.10 (H) 0.10 - 0.90 10*3/mm3    Eosinophils, Absolute 0.40 0.00 - 0.40 10*3/mm3    Basophils, Absolute 0.10 0.00 - 0.20 10*3/mm3    nRBC 0.0 0.0 - 0.2 /100 WBC     XR Foot 2 View Right    Result Date: 7/9/2022  1. Extensive soft tissue swelling of the right foot may relate to edema and/or cellulitis. 2. No discrete area of osseous erosion. 3. No acute  appearing fracture. 4. Additional chronic findings above.  Electronically Signed By-Renny Johnson MD On:7/9/2022 2:09 PM This report was finalized on [id number] by  Renny Johnson MD.    Medications   sodium chloride 0.9 % flush 10 mL (has no administration in time range)   cefepime 2 gm IVPB in 100 ml NS (MBP) (has no administration in time range)   vancomycin 1500 mg/500 mL 0.9% NS IVPB ([facility]) (has no administration in time range)   thiamine (B-1) 100 mg, folic acid 1 mg in sodium chloride 0.9 % 1,000 mL infusion (has no administration in time range)                                            MDM  Number of Diagnoses or Management Options  Cellulitis of right foot: new and requires workup  Infestation, maggots: new and requires workup  Diagnosis management comments: Medical decision making.  Patient IV established given 1 L banana bag and had the above labs obtained reviewed.  CBC white count 6.9 hemoglobin 12.9 platelets were 159,000 chemistries remarkable for sodium 125 blood sugar 103 cultures pending.  Patient's been started on cefepime and vancomycin.  The patient had the maggots cleaned out of his wound.  There is no drainage to culture at this point.  Foot is warm and dry.  I talked to the family.  This wound appears to be ongoing more than a few days.  I see no evidence of sepsis on clinical exam.  There is no evidence of suggest DVT no evidence of compartment syndrome no evidence of arterial vascular compromise.  There is not complete list of all possibilities.  But he will need to be admitted for further work-up.  Dr. Dawkins has been paged.  Labs obtained reviewed by me.  X-ray no evidence of acute osteomyelitis by plain x-ray.  I talked to the family I talked to his primary care doctor discussed the case with Dr. Dawkins.  Sodium is low at 125 but that is where he lives at after discussion with the primary care doctor.  He has been placed on cefepime and vancomycin.  She request consultation with his  nephrologist Dr. Barahona this was placed.  The patient will be admitted for further work-up and care stable otherwise unremarkable ER course all labs x-rays obtained reviewed by me.       Amount and/or Complexity of Data Reviewed  Clinical lab tests: reviewed    Risk of Complications, Morbidity, and/or Mortality  Presenting problems: high  Diagnostic procedures: high  Management options: high    Patient Progress  Patient progress: stable      Final diagnoses:   Cellulitis of right foot   Infestation, maggots       ED Disposition  ED Disposition     ED Disposition   Decision to Admit    Condition   --    Comment   Level of Care: Med/Surg [1]   Admitting Physician: GONZALO DENNISON [[id number]]   Attending Physician: GONZALO DENNISON [[id number]]               No follow-up provider specified.       Medication List      No changes were made to your prescriptions during this visit.          Skip Tripathi MD  07/09/22 [id number]

## 2022-07-09 NOTE — PLAN OF CARE
Goal Outcome Evaluation:              Outcome Evaluation: Patient pleasant. Admitted from ER. Head-to-toe assessment completed. Made call to answering service to let Dr. Dawkins know patient was admitted to unit 2C.

## 2022-07-10 LAB
ALBUMIN SERPL-MCNC: 3.5 G/DL (ref 3.5–5.2)
ALBUMIN/GLOB SERPL: 1.4 G/DL
ALP SERPL-CCNC: 37 U/L (ref 39–117)
ALT SERPL W P-5'-P-CCNC: 6 U/L (ref 1–41)
ANION GAP SERPL CALCULATED.3IONS-SCNC: 8 MMOL/L (ref 5–15)
AST SERPL-CCNC: 11 U/L (ref 1–40)
BASOPHILS # BLD AUTO: 0.1 10*3/MM3 (ref 0–0.2)
BASOPHILS NFR BLD AUTO: 1.3 % (ref 0–1.5)
BILIRUB SERPL-MCNC: 0.8 MG/DL (ref 0–1.2)
BUN SERPL-MCNC: 9 MG/DL (ref 8–23)
BUN/CREAT SERPL: 8.1 (ref 7–25)
CALCIUM SPEC-SCNC: 8.7 MG/DL (ref 8.6–10.5)
CHLORIDE SERPL-SCNC: 95 MMOL/L (ref 98–107)
CO2 SERPL-SCNC: 24 MMOL/L (ref 22–29)
CREAT SERPL-MCNC: 1.11 MG/DL (ref 0.76–1.27)
DEPRECATED RDW RBC AUTO: 48.1 FL (ref 37–54)
EGFRCR SERPLBLD CKD-EPI 2021: 68 ML/MIN/1.73
EOSINOPHIL # BLD AUTO: 0.5 10*3/MM3 (ref 0–0.4)
EOSINOPHIL NFR BLD AUTO: 8 % (ref 0.3–6.2)
ERYTHROCYTE [DISTWIDTH] IN BLOOD BY AUTOMATED COUNT: 15 % (ref 12.3–15.4)
GLOBULIN UR ELPH-MCNC: 2.5 GM/DL
GLUCOSE SERPL-MCNC: 119 MG/DL (ref 65–99)
HCT VFR BLD AUTO: 35.7 % (ref 37.5–51)
HGB BLD-MCNC: 11.8 G/DL (ref 13–17.7)
LYMPHOCYTES # BLD AUTO: 1.4 10*3/MM3 (ref 0.7–3.1)
LYMPHOCYTES NFR BLD AUTO: 22.3 % (ref 19.6–45.3)
MAGNESIUM SERPL-MCNC: 1.8 MG/DL (ref 1.6–2.4)
MCH RBC QN AUTO: 30.7 PG (ref 26.6–33)
MCHC RBC AUTO-ENTMCNC: 33.1 G/DL (ref 31.5–35.7)
MCV RBC AUTO: 92.6 FL (ref 79–97)
MONOCYTES # BLD AUTO: 1.1 10*3/MM3 (ref 0.1–0.9)
MONOCYTES NFR BLD AUTO: 17.3 % (ref 5–12)
NEUTROPHILS NFR BLD AUTO: 3.2 10*3/MM3 (ref 1.7–7)
NEUTROPHILS NFR BLD AUTO: 51.1 % (ref 42.7–76)
NRBC BLD AUTO-RTO: 0 /100 WBC (ref 0–0.2)
PLATELET # BLD AUTO: 161 10*3/MM3 (ref 140–450)
PMV BLD AUTO: 8.4 FL (ref 6–12)
POTASSIUM SERPL-SCNC: 4.2 MMOL/L (ref 3.5–5.2)
PROT SERPL-MCNC: 6 G/DL (ref 6–8.5)
RBC # BLD AUTO: 3.85 10*6/MM3 (ref 4.14–5.8)
SODIUM SERPL-SCNC: 127 MMOL/L (ref 136–145)
WBC NRBC COR # BLD: 6.3 10*3/MM3 (ref 3.4–10.8)

## 2022-07-10 PROCEDURE — 25010000002 VANCOMYCIN HCL 1.25 G RECONSTITUTED SOLUTION 1 EACH VIAL: Performed by: FAMILY MEDICINE

## 2022-07-10 PROCEDURE — 25010000002 CEFEPIME PER 500 MG: Performed by: FAMILY MEDICINE

## 2022-07-10 PROCEDURE — 80053 COMPREHEN METABOLIC PANEL: CPT | Performed by: FAMILY MEDICINE

## 2022-07-10 PROCEDURE — 25010000002 ENOXAPARIN PER 10 MG: Performed by: FAMILY MEDICINE

## 2022-07-10 PROCEDURE — 25010000002 FUROSEMIDE PER 20 MG: Performed by: INTERNAL MEDICINE

## 2022-07-10 PROCEDURE — 83735 ASSAY OF MAGNESIUM: CPT | Performed by: FAMILY MEDICINE

## 2022-07-10 PROCEDURE — 85025 COMPLETE CBC W/AUTO DIFF WBC: CPT | Performed by: FAMILY MEDICINE

## 2022-07-10 RX ORDER — CHLORDIAZEPOXIDE HYDROCHLORIDE 10 MG/1
10 CAPSULE, GELATIN COATED ORAL 2 TIMES DAILY
Status: DISCONTINUED | OUTPATIENT
Start: 2022-07-10 | End: 2022-07-13 | Stop reason: HOSPADM

## 2022-07-10 RX ORDER — FUROSEMIDE 10 MG/ML
40 INJECTION INTRAMUSCULAR; INTRAVENOUS ONCE
Status: COMPLETED | OUTPATIENT
Start: 2022-07-10 | End: 2022-07-10

## 2022-07-10 RX ORDER — FLUCONAZOLE 100 MG/1
100 TABLET ORAL EVERY 24 HOURS
Status: COMPLETED | OUTPATIENT
Start: 2022-07-10 | End: 2022-07-12

## 2022-07-10 RX ORDER — SACCHAROMYCES BOULARDII 250 MG
250 CAPSULE ORAL 2 TIMES DAILY
Status: DISCONTINUED | OUTPATIENT
Start: 2022-07-10 | End: 2022-07-13 | Stop reason: HOSPADM

## 2022-07-10 RX ORDER — NYSTATIN 100000 [USP'U]/G
POWDER TOPICAL EVERY 12 HOURS SCHEDULED
Status: DISCONTINUED | OUTPATIENT
Start: 2022-07-10 | End: 2022-07-13 | Stop reason: HOSPADM

## 2022-07-10 RX ORDER — HYDRALAZINE HYDROCHLORIDE 25 MG/1
25 TABLET, FILM COATED ORAL DAILY
Status: DISCONTINUED | OUTPATIENT
Start: 2022-07-10 | End: 2022-07-11

## 2022-07-10 RX ADMIN — CHLORDIAZEPOXIDE HYDROCHLORIDE 10 MG: 10 CAPSULE ORAL at 13:12

## 2022-07-10 RX ADMIN — CHLORDIAZEPOXIDE HYDROCHLORIDE 10 MG: 10 CAPSULE ORAL at 06:55

## 2022-07-10 RX ADMIN — ALLOPURINOL 100 MG: 100 TABLET ORAL at 15:19

## 2022-07-10 RX ADMIN — FLUCONAZOLE 100 MG: 100 TABLET ORAL at 17:58

## 2022-07-10 RX ADMIN — ENOXAPARIN SODIUM 40 MG: 100 INJECTION SUBCUTANEOUS at 15:19

## 2022-07-10 RX ADMIN — VANCOMYCIN HYDROCHLORIDE 1250 MG: 1.25 INJECTION, POWDER, LYOPHILIZED, FOR SOLUTION INTRAVENOUS at 09:54

## 2022-07-10 RX ADMIN — FOLIC ACID 1 MG: 1 TABLET ORAL at 15:19

## 2022-07-10 RX ADMIN — TRAMADOL HYDROCHLORIDE 50 MG: 50 TABLET, FILM COATED ORAL at 01:02

## 2022-07-10 RX ADMIN — Medication 250 MG: at 15:19

## 2022-07-10 RX ADMIN — CEFEPIME HYDROCHLORIDE 2 G: 2 INJECTION, POWDER, FOR SOLUTION INTRAVENOUS at 08:23

## 2022-07-10 RX ADMIN — Medication 3 ML: at 09:54

## 2022-07-10 RX ADMIN — Medication 3 ML: at 01:03

## 2022-07-10 RX ADMIN — NYSTATIN: 100000 POWDER TOPICAL at 22:30

## 2022-07-10 RX ADMIN — CEFEPIME HYDROCHLORIDE 2 G: 2 INJECTION, POWDER, FOR SOLUTION INTRAVENOUS at 01:02

## 2022-07-10 RX ADMIN — TRAMADOL HYDROCHLORIDE 50 MG: 50 TABLET, FILM COATED ORAL at 06:54

## 2022-07-10 RX ADMIN — Medication 250 MG: at 22:32

## 2022-07-10 RX ADMIN — HYDRALAZINE HYDROCHLORIDE 50 MG: 25 TABLET, FILM COATED ORAL at 17:55

## 2022-07-10 RX ADMIN — SODIUM CHLORIDE 75 ML/HR: 9 INJECTION, SOLUTION INTRAVENOUS at 01:02

## 2022-07-10 RX ADMIN — Medication 3 ML: at 22:29

## 2022-07-10 RX ADMIN — Medication 100 MG: at 15:20

## 2022-07-10 RX ADMIN — CHLORDIAZEPOXIDE HYDROCHLORIDE 10 MG: 10 CAPSULE ORAL at 01:02

## 2022-07-10 RX ADMIN — FLUOXETINE 10 MG: 10 CAPSULE ORAL at 15:19

## 2022-07-10 RX ADMIN — CHLORDIAZEPOXIDE HYDROCHLORIDE 10 MG: 10 CAPSULE ORAL at 22:29

## 2022-07-10 RX ADMIN — HYDRALAZINE HYDROCHLORIDE 25 MG: 25 TABLET, FILM COATED ORAL at 12:57

## 2022-07-10 RX ADMIN — FUROSEMIDE 40 MG: 10 INJECTION, SOLUTION INTRAMUSCULAR; INTRAVENOUS at 12:56

## 2022-07-10 NOTE — H&P
DATE/TIME OF ADMISSION:  7/9/2022 12:21 PM  Patient Care Team:  Montse Dawkins MD as PCP - General  Montse Dawkins MD as PCP - Family Medicine    Chief complaint DAUGHTER RELYING ON 2 CAREGIVERS FOR BATHING, ETC. MORTIFIED TO SEE A RIGHT FOOT WOUND YESTERDAY WITH MAGGOTS IN IT. SHE CALLED ME IMMEDIATELY AND HE WAS SENT TO THE ER. 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  HE IS A BIT CANTANKEROUS AND WILL NOT SHOWER, THOUGH HE HAS A GREAT SET UP IN THE HOME. DRINKS UP TO 12-15 BEERS A DAY CURRENTLY    Subjective     Patient is a 76 y.o. male presents with A RIGHT FOOT INFECTION AND PHYSICAL DECLINE. Onset of symptoms was OVER THE PAST COUPLE OF WEEKS BUT NOTED AND ADDRESSED PROMPTLY BY DAUGHTER ONCE RECOGNIZED. CURRENT CAREGIVERS HAVE DROPPED THE BALL.      Review of Systems   Endocrine: Positive for cold intolerance.   Skin: Positive for wound.   Hematological: Bruises/bleeds easily.   Psychiatric/Behavioral: Positive for confusion.   All other systems reviewed and are negative.         History  History reviewed. No pertinent past medical history.  History reviewed. No pertinent surgical history.  History reviewed. No pertinent family history.  Social History     Tobacco Use   • Smoking status: Never Smoker   • Smokeless tobacco: Never Used     Medications Prior to Admission   Medication Sig Dispense Refill Last Dose   • allopurinol (ZYLOPRIM) 100 MG tablet Take 100 mg by mouth Daily.   7/8/2022 at Unknown time   • FLUoxetine (PROzac) 10 MG capsule Take 10 mg by mouth Daily.   7/8/2022 at Unknown time   • folic acid (FOLVITE) 1 MG tablet Take 1 mg by mouth Daily.   7/8/2022 at Unknown time   • hydrALAZINE (APRESOLINE) 50 MG tablet Take 50 mg by mouth Every Evening. Daughter said MD aware of changes due to blood pressure dropping.   Patient Taking Differently at Unknown time   • levothyroxine (SYNTHROID, LEVOTHROID) 175 MCG tablet Take 175 mcg by mouth Daily.    "7/8/2022 at Unknown time   • pantoprazole (PROTONIX) 40 MG EC tablet Take 40 mg by mouth Daily.   7/8/2022 at Unknown time   • saccharomyces boulardii (FLORASTOR) 250 MG capsule Take 250 mg by mouth 2 (Two) Times a Day. **Daughter said \"He is on a probiotic because he has a history of C-diff, but I am unsure of which one.\"**   7/8/2022 at Unknown time   • thiamine (VITAMIN B-1) 100 MG tablet  tablet Take 100 mg by mouth Daily.   7/8/2022 at Unknown time     Allergies:  Patient has no known allergies.    Objective     Vital Signs  Temp:  [97.1 °F (36.2 °C)-98 °F (36.7 °C)] 97.5 °F (36.4 °C)  Heart Rate:  [53-78] 63  Resp:  [15-20] 20  BP: (146-175)/(80-92) 173/81     Physical Exam:      General Appearance:    Alert, cooperative, in no acute distress; MILDLY CONFUSED AT BASELINE   Head:    Normocephalic, without obvious abnormality, atraumatic   Eyes:            Lids and lashes normal, conjunctivae and sclerae normal, no   icterus, no pallor, corneas clear, PERRLA   Ears:    Ears appear intact with no abnormalities noted   Throat:   No oral lesions, no thrush, oral mucosa moist   Neck:   No adenopathy, supple, trachea midline, no thyromegaly, no   carotid bruit, no JVD   Lungs:     Clear to auscultation,respirations regular, even and                  unlabored    Heart:    Regular rhythm and normal rate, normal S1 and S2, no            murmur, no gallop, no rub, no click   Chest Wall:    No abnormalities observed   Abdomen:     Normal bowel sounds, no masses, no organomegaly, soft        non-tender, non-distended, no guarding, no rebound                tenderness   Extremities:   Moves all extremities well, 1+ edema, no cyanosis, RIGHT FOOT              Redness AND SWELLING TO THE 2ND TOE; VERY TENDER THERE. MOISTNESS IN BETWEEN ALL TOES, ESPECIALLY ON THE RIGHT FOOT WITH TINEA AND SKIN COMPROMISE; THERE WERE MAGGOTS ON ARRIVAL TO THE ER IN THE OPEN AREAS. THANKFULLY VERY SUPERFICIAL WOUNDS   Pulses:   Pulses palpable " and equal bilaterally   Skin:   No bleeding, bruising or rash; AS ABOVE ON THE FEET   Lymph nodes:   No palpable adenopathy   Neurologic:   Cranial nerves 2 - 12 grossly intact, sensation intact, DTR       present and equal bilaterally; MILD BASELINE CONFUSION  COOPERATIVE       I HAVE PERSONALLY EXAMINED THE PATIENT AND HAVE REVIEWED APPROPRIATE LAB AND IMAGING RESULTS.    Imaging Results (Last 24 Hours)     Procedure Component Value Units Date/Time    XR Foot 2 View Right [[id number]] Collected: 07/09/22 1407     Updated: 07/09/22 1411    Narrative:      DATE OF EXAM:  7/9/2022 1:58 PM     PROCEDURE:  XR FOOT 2 VW RIGHT-     INDICATIONS:  Wound between first and second toe maggots present.  Cellulitis;  L03.115-Cellulitis of right lower limb; B87.9-Myiasis, unspecified     COMPARISON:  No Comparisons Available     TECHNIQUE:   A minimum of two routine standard radiographic views were obtained of  the right foot.     FINDINGS:  There is diffuse soft tissue swelling of the right foot bones are  osteopenic. There is no acute appearing fracture. Postoperative findings  noted in the ankle at the medial malleolus and the distal fibula with  fixation screws. The calcaneus is intact. Small vessel vascular  calcifications noted.        Impression:      1. Extensive soft tissue swelling of the right foot may relate to edema  and/or cellulitis.  2. No discrete area of osseous erosion.  3. No acute appearing fracture.  4. Additional chronic findings above.     Electronically Signed By-Renny Johnson MD On:7/9/2022 2:09 PM  This report was finalized on [id number] by  Renny Johnson MD.           Lab Results (last 24 hours)     Procedure Component Value Units Date/Time    Comprehensive Metabolic Panel [[id number]]  (Abnormal) Collected: 07/10/22 0136    Specimen: Blood Updated: 07/10/22 0225     Glucose 119 mg/dL      BUN 9 mg/dL      Creatinine 1.11 mg/dL      Sodium 127 mmol/L      Potassium 4.2 mmol/L      Chloride 95 mmol/L       CO2 24.0 mmol/L      Calcium 8.7 mg/dL      Total Protein 6.0 g/dL      Albumin 3.50 g/dL      ALT (SGPT) 6 U/L      AST (SGOT) 11 U/L      Alkaline Phosphatase 37 U/L      Total Bilirubin 0.8 mg/dL      Globulin 2.5 gm/dL      A/G Ratio 1.4 g/dL      BUN/Creatinine Ratio 8.1     Anion Gap 8.0 mmol/L      eGFR 68.0 mL/min/1.73      Comment: National Kidney Foundation and American Society of Nephrology (ASN) Task Force recommended calculation based on the Chronic Kidney Disease Epidemiology Collaboration (CKD-EPI) equation refit without adjustment for race.       Narrative:      GFR Normal >60  Chronic Kidney Disease <60  Kidney Failure <15      Magnesium [[id number]]  (Normal) Collected: 07/10/22 0136    Specimen: Blood Updated: 07/10/22 0225     Magnesium 1.8 mg/dL     CBC & Differential [[id number]]  (Abnormal) Collected: 07/10/22 0136    Specimen: Blood Updated: 07/10/22 0157    Narrative:      The following orders were created for panel order CBC & Differential.  Procedure                               Abnormality         Status                     ---------                               -----------         ------                     CBC Auto Differential[[id number]]        Abnormal            Final result                 Please view results for these tests on the individual orders.    CBC Auto Differential [[id number]]  (Abnormal) Collected: 07/10/22 0136    Specimen: Blood Updated: 07/10/22 0157     WBC 6.30 10*3/mm3      RBC 3.85 10*6/mm3      Hemoglobin 11.8 g/dL      Hematocrit 35.7 %      MCV 92.6 fL      MCH 30.7 pg      MCHC 33.1 g/dL      RDW 15.0 %      RDW-SD 48.1 fl      MPV 8.4 fL      Platelets 161 10*3/mm3      Neutrophil % 51.1 %      Lymphocyte % 22.3 %      Monocyte % 17.3 %      Eosinophil % 8.0 %      Basophil % 1.3 %      Neutrophils, Absolute 3.20 10*3/mm3      Lymphocytes, Absolute 1.40 10*3/mm3      Monocytes, Absolute 1.10 10*3/mm3      Eosinophils, Absolute 0.50 10*3/mm3      Basophils,  Absolute 0.10 10*3/mm3      nRBC 0.0 /100 WBC     Urinalysis, Microscopic Only - Urine, Clean Catch [[id number]]  (Abnormal) Collected: 07/09/22 2002    Specimen: Urine, Clean Catch Updated: 07/09/22 2252     RBC, UA 0-2 /HPF      WBC, UA 0-2 /HPF      Bacteria, UA None Seen /HPF      Squamous Epithelial Cells, UA 0-2 /HPF      Hyaline Casts, UA 0-2 /LPF      Methodology Automated Microscopy    Urinalysis With Microscopic If Indicated (No Culture) - Urine, Clean Catch [[id number]]  (Abnormal) Collected: 07/09/22 2002    Specimen: Urine, Clean Catch Updated: 07/09/22 2251     Color, UA Yellow     Appearance, UA Clear     pH, UA 6.5     Specific Gravity, UA 1.009     Glucose, UA Negative     Ketones, UA Negative     Bilirubin, UA Negative     Blood, UA Negative     Protein, UA 30 mg/dL (1+)     Leuk Esterase, UA Negative     Nitrite, UA Negative     Urobilinogen, UA 2.0 E.U./dL    Sodium, Urine, Random - Urine, Clean Catch [[id number]] Collected: 07/09/22 2002    Specimen: Urine, Clean Catch Updated: 07/09/22 2206     Sodium, Urine 45 mmol/L     Narrative:      Reference intervals for random urine have not been established.  Clinical usage is dependent upon physician's interpretation in combination with other laboratory tests.       Osmolality, Urine - Urine, Clean Catch [[id number]]  (Abnormal) Collected: 07/09/22 2002    Specimen: Urine, Clean Catch Updated: 07/09/22 2159     Osmolality, Urine 260 mOsm/kg     BNP [[id number]]  (Abnormal) Collected: 07/09/22 1236    Specimen: Blood Updated: 07/09/22 1800     proBNP 1,859.0 pg/mL     Narrative:      Among patients with dyspnea, NT-proBNP is highly sensitive for the detection of acute congestive heart failure. In addition NT-proBNP of <300 pg/ml effectively rules out acute congestive heart failure with 99% negative predictive value.    Results may be falsely decreased if patient taking Biotin.      COVID PRE-OP / PRE-PROCEDURE SCREENING ORDER (NO ISOLATION) - Swab,  Nasopharynx [[id number]]  (Normal) Collected: 07/09/22 1658    Specimen: Swab from Nasopharynx Updated: 07/09/22 1721    Narrative:      The following orders were created for panel order COVID PRE-OP / PRE-PROCEDURE SCREENING ORDER (NO ISOLATION) - Swab, Nasopharynx.  Procedure                               Abnormality         Status                     ---------                               -----------         ------                     COVID-19,CEPHEID/SHELDON,CO...[[id number]]  Normal              Final result                 Please view results for these tests on the individual orders.    COVID-19,CEPHEID/SHELDON,COR/STACIA/PAD/[facility] IN-HOUSE(OR EMERGENT/ADD-ON),NP SWAB IN TRANSPORT MEDIA 3-4 HR TAT, RT-PCR - Swab, Nasopharynx [[id number]]  (Normal) Collected: 07/09/22 1658    Specimen: Swab from Nasopharynx Updated: 07/09/22 1721     COVID19 Not Detected    Narrative:      Fact sheet for providers: https://www.fda.gov/media/436920/download     Fact sheet for patients: https://www.fda.gov/media/479234/download  Fact sheet for providers: https://www.fda.gov/media/633303/download    Fact sheet for patients: https://www.fda.gov/media/262170/download    Test performed by PCR.    Blood Culture - Blood, Arm, Left [[id number]] Collected: 07/09/22 1349    Specimen: Blood from Arm, Left Updated: 07/09/22 1358    Comprehensive Metabolic Panel [[id number]]  (Abnormal) Collected: 07/09/22 1236    Specimen: Blood Updated: 07/09/22 1304     Glucose 103 mg/dL      BUN 9 mg/dL      Creatinine 1.10 mg/dL      Sodium 125 mmol/L      Potassium 4.2 mmol/L      Chloride 90 mmol/L      CO2 23.0 mmol/L      Calcium 8.9 mg/dL      Total Protein 6.8 g/dL      Albumin 3.80 g/dL      ALT (SGPT) 7 U/L      AST (SGOT) 13 U/L      Alkaline Phosphatase 42 U/L      Total Bilirubin 0.8 mg/dL      Globulin 3.0 gm/dL      A/G Ratio 1.3 g/dL      BUN/Creatinine Ratio 8.2     Anion Gap 12.0 mmol/L      eGFR 68.7 mL/min/1.73      Comment: National Kidney  Foundation and American Society of Nephrology (ASN) Task Force recommended calculation based on the Chronic Kidney Disease Epidemiology Collaboration (CKD-EPI) equation refit without adjustment for race.       Narrative:      GFR Normal >60  Chronic Kidney Disease <60  Kidney Failure <15      CBC & Differential [[id number]]  (Abnormal) Collected: 07/09/22 1236    Specimen: Blood Updated: 07/09/22 1258    Narrative:      The following orders were created for panel order CBC & Differential.  Procedure                               Abnormality         Status                     ---------                               -----------         ------                     CBC Auto Differential[[id number]]        Abnormal            Final result               Scan Slide[[id number]]                                                                    Please view results for these tests on the individual orders.    CBC Auto Differential [[id number]]  (Abnormal) Collected: 07/09/22 1236    Specimen: Blood Updated: 07/09/22 1258     WBC 6.90 10*3/mm3      RBC 4.28 10*6/mm3      Hemoglobin 12.9 g/dL      Hematocrit 39.7 %      MCV 92.8 fL      MCH 30.1 pg      MCHC 32.5 g/dL      RDW 15.5 %      RDW-SD 49.9 fl      MPV 7.9 fL      Platelets 159 10*3/mm3      Neutrophil % 60.1 %      Lymphocyte % 15.8 %      Monocyte % 16.6 %      Eosinophil % 6.5 %      Basophil % 1.0 %      Neutrophils, Absolute 4.10 10*3/mm3      Lymphocytes, Absolute 1.10 10*3/mm3      Monocytes, Absolute 1.10 10*3/mm3      Eosinophils, Absolute 0.40 10*3/mm3      Basophils, Absolute 0.10 10*3/mm3      nRBC 0.0 /100 WBC     Blood Culture - Blood, Arm, Right [[id number]] Collected: 07/09/22 1236    Specimen: Blood from Arm, Right Updated: 07/09/22 1247           I reviewed the patient's new clinical results.    Assessment & Plan   ACUTE CELLULITIS OF THE RIGHT FOOT---IV ATB; DR WALL TO ADVISE; CEFEPINE STARTED IN THE ER AND WOULD LIKE TO DISCONTINUE WITH CONFUSION AND  CKD HISTORY  TINEA---NYSTATIN POWDER AND DIFLUCAN  SOCIAL---[facility] AND OUTSIDE AGENCIES TO HELP DAUGHTER AS SHE CANNOT DO ALL CARE FOR ALL THE DEMANDS ON HER TIME  ETOH ABUSE---WILLING TO REDUCE TO NOT > A 6 PACK DAILY ONCE DISCHARGED  HYPONATREMIA---SECONDARY TO BEER INTAKE  DM II---STABLE  CKD3--STABLE  HTN---WILL ADJUST THE HYDRALAZINE DOSE  HYPOTHYROID---STABLE  GOUT---STABLE  GERD---PPI  DEPRESSION---STABLE WITH PROZAC  Active Problems:    Cellulitis of right foot          I discussed the patients findings and my recommendations with patient, DAUGHTER AND SON IN LAW IN DETAIL     Montse Dawkins MD  07/10/22  08:39 EDT

## 2022-07-10 NOTE — PROGRESS NOTES
"Pharmacy Antimicrobial Dosing Service    Subjective:  Rommel Mcfarland is a 76 y.o.male admitted with cellulitis. Pharmacy has been consulted to dose Vancomycin for possible Bone and Joint/SSTI.  Pt with wound and some redness and swelling to that foot over this past few days. EMS was called today and they found the patient had maggots between his first and second toe redness and swelling and transported to the hospital. Reported that pt never takes off his socks.    PMH: Chronic alcoholic, BMI >30      Assessment/Plan    1. Day #1 Vancomycin: Goal -600 mcg*h/mL. Vanc 1500mg (18.5 mg/kg DBW) given in ED. Will schedule vancomycin 1250mg IV (15.4 mg/kg DBW) q18h. Will obtain levels prior to fourth total dose on 7/11.    2. Day #1 Cefepime: 2g IV q8h for estCrCl > 60 mL/min.    Will continue to monitor drug levels, renal function, culture and sensitivities, and patient clinical status.       Objective:  Relevant clinical data and objective history reviewed:  172.7 cm (67.99\")   99.8 kg (220 lb)   Ideal body weight: 68.4 kg (150 lb 12.1 oz)  Adjusted ideal body weight: 80.9 kg (178 lb 7.2 oz)  Body mass index is 33.46 kg/m².        Results from last 7 days   Lab Units 07/09/22  1236   CREATININE mg/dL 1.10     Estimated Creatinine Clearance: 63.4 mL/min (by C-G formula based on SCr of 1.1 mg/dL).  I/O last 3 completed shifts:  In: 1266.7 [I.V.:1166.7; IV Piggyback:100]  Out: 200 [Urine:200]    Results from last 7 days   Lab Units 07/09/22  1236   WBC 10*3/mm3 6.90     Temperature    07/09/22 1500 07/09/22 1723 07/09/22 1930   Temp: 98 °F (36.7 °C) 97.5 °F (36.4 °C) 97.8 °F (36.6 °C)     Baseline culture/source/susceptibility:  Microbiology Results (last 10 days)       Procedure Component Value - Date/Time    COVID PRE-OP / PRE-PROCEDURE SCREENING ORDER (NO ISOLATION) - Swab, Nasopharynx [[id number]]  (Normal) Collected: 07/09/22 1658    Lab Status: Final result Specimen: Swab from Nasopharynx Updated: 07/09/22 " 1721    Narrative:      The following orders were created for panel order COVID PRE-OP / PRE-PROCEDURE SCREENING ORDER (NO ISOLATION) - Swab, Nasopharynx.  Procedure                               Abnormality         Status                     ---------                               -----------         ------                     COVID-19,CEPHEID/SHELDON,CO...[[id number]]  Normal              Final result                 Please view results for these tests on the individual orders.    COVID-19,CEPHEID/SHELDON,COR/STACIA/PAD/CHARLES IN-HOUSE(OR EMERGENT/ADD-ON),NP SWAB IN TRANSPORT MEDIA 3-4 HR TAT, RT-PCR - Swab, Nasopharynx [[id number]]  (Normal) Collected: 07/09/22 1658    Lab Status: Final result Specimen: Swab from Nasopharynx Updated: 07/09/22 1721     COVID19 Not Detected    Narrative:      Fact sheet for providers: https://www.fda.gov/media/961321/download     Fact sheet for patients: https://www.fda.gov/media/449694/download  Fact sheet for providers: https://www.fda.gov/media/132768/download    Fact sheet for patients: https://www.fda.gov/media/962587/download    Test performed by PCR.            Anti-Infectives (From admission, onward)      Ordered     Dose/Rate Route Frequency Start Stop    07/09/22 2255  cefepime 2 gm IVPB in 100 ml NS (MBP)        Ordering Provider: Montse Dawkins MD    2 g  over 4 Hours Intravenous Every 8 Hours 07/09/22 2300 07/16/22 2259    07/09/22 2259  Pharmacy to dose vancomycin        Ordering Provider: Montse Dawkins MD     Does not apply Continuous PRN 07/09/22 2258 07/16/22 2257    07/09/22 1250  cefepime 2 gm IVPB in 100 ml NS (MBP)        Ordering Provider: Skip Tripathi MD    2 g  over 30 Minutes Intravenous Once 07/09/22 1252 07/09/22 1543    07/09/22 1250  vancomycin 1500 mg/500 mL 0.9% NS IVPB ([facility])        Ordering Provider: Skip Tripathi MD    20 mg/kg × 81 kg (Adjusted) Intravenous Once 07/09/22 1252 07/09/22 1543            Marisol Parsons, [facility]  07/09/22 23:00 EDT

## 2022-07-10 NOTE — CONSULTS
Infectious Diseases Consult Note    Referring Provider: Montse Dawkins MD    Reason for Consultation: Cellulitis, right foot wounds    Patient Care Team:  Montse Dawkins MD as PCP - General  Montse Dawkins MD as PCP - Family Medicine  Huey Keyes MD as Consulting Physician (Nephrology)    Chief complaint family states they found some maggots between the toes of his right foot.    Subjective     The patient has been afebrile for the last 24 hours.  The patient is on room air, hemodynamically stable, and is tolerating antimicrobial therapy.    History of present illness:      This is a 76-year-old male who presents to the hospital on 9/7/2022 with complaints of right foot wound that apparently his family found maggots in.  To discuss case with patient's family and his primary care provider and apparently he is a heavy alcoholic and there is some concern that he does not bathe or take care of his daily needs.  Patient cannot tell me how long his foot wounds have been there but denied any recent treatment.  Patient denies fever, chills, diaphoresis, shortness of breath, cough, GI symptoms, or any urinary symptoms.  Patient apparently drinks approximately 20 beers a day    Patient is currently on room air and does not appear to be in acute distress.  Patient's been afebrile since admission.  Patient has a creatinine 1.11, white blood cell count of 6.3, hemoglobin 1.8 and platelets 161.  Blood cultures are negative and COVID-19 screen is negative.  X-ray right foot showed some edema but no obvious osteomyelitis.  Patient is currently on IV cefepime and IV vancomycin has no known allergies    Review of Systems   Review of Systems   Constitutional: Negative.    HENT: Negative.    Eyes: Negative.    Respiratory: Negative.    Cardiovascular: Negative.    Gastrointestinal: Negative.    Endocrine: Negative.    Genitourinary: Negative.    Musculoskeletal: Negative.    Skin: Positive for wound.   Neurological: Negative.   "  Psychiatric/Behavioral: Negative.    All other systems reviewed and are negative.      Medications  Medications Prior to Admission   Medication Sig Dispense Refill Last Dose   • allopurinol (ZYLOPRIM) 100 MG tablet Take 100 mg by mouth Daily.      • FLUoxetine (PROzac) 10 MG capsule Take 10 mg by mouth Daily.      • folic acid (FOLVITE) 1 MG tablet Take 1 mg by mouth Daily.      • hydrALAZINE (APRESOLINE) 50 MG tablet Take 50 mg by mouth Every Evening.      • levothyroxine (SYNTHROID, LEVOTHROID) 175 MCG tablet Take 175 mcg by mouth Every Morning.   7/8/2022   • pantoprazole (PROTONIX) 40 MG EC tablet Take 40 mg by mouth Daily.      • Thiamine HCl (VITAMIN B1 PO) Take 1 dose by mouth Daily.      • saccharomyces boulardii (FLORASTOR) 250 MG capsule Take 250 mg by mouth 2 (Two) Times a Day. **Daughter said \"He is on a probiotic because he has a history of C-diff, but I am unsure of which one.\"**          History  History reviewed. No pertinent past medical history.  History reviewed. No pertinent surgical history.    Family History  History reviewed. No pertinent family history.    Social History   reports that he has never smoked. He has never used smokeless tobacco.    Allergies  Tigecycline    Objective     Vital Signs   Vital Signs (last 24 hours)       07/09 0700  07/10 0659 07/10 0700  07/10 1455   Most Recent      Temp (°F) 97.1 -  98      96.9     96.9 (36.1) 07/10 1213    Heart Rate 53 -  78      59     59 07/10 1213    Resp 15 -  20      20     20 07/10 1213    /88 -  175/88      175/96     175/96 07/10 1213    SpO2 (%) 95 -  98      97     97 07/10 1213          Physical Exam:  Physical Exam  Vitals and nursing note reviewed.   Constitutional:       General: He is not in acute distress.     Appearance: He is well-developed and normal weight. He is ill-appearing. He is not diaphoretic.      Comments:   Chronically ill-appearing   HENT:      Head: Normocephalic and atraumatic.   Eyes:      " Conjunctiva/sclera: Conjunctivae normal.      Pupils: Pupils are equal, round, and reactive to light.   Cardiovascular:      Rate and Rhythm: Normal rate and regular rhythm.      Heart sounds: Normal heart sounds, S1 normal and S2 normal.   Pulmonary:      Effort: Pulmonary effort is normal. No respiratory distress.      Breath sounds: Normal breath sounds. No stridor. No wheezing or rales.   Abdominal:      General: Bowel sounds are normal. There is no distension.      Palpations: Abdomen is soft. There is no mass.      Tenderness: There is no abdominal tenderness. There is no guarding.   Musculoskeletal:         General: No deformity. Normal range of motion.      Cervical back: Neck supple.   Skin:     General: Skin is warm and dry.      Coloration: Skin is not pale.      Findings: No erythema or rash.      Comments: Patient has chronic skin changes to both bilateral lower legs and feet is concerning for chronic edema.  He has some superficial wounds between each of the toes of the right foot but no obvious abscess or drainage.  There are some mild erythema to both bilateral lower legs but no warmth.   Neurological:      Mental Status: He is alert and oriented to person, place, and time.      Cranial Nerves: No cranial nerve deficit.   Psychiatric:         Mood and Affect: Mood normal.         Microbiology  Microbiology Results (last 10 days)     Procedure Component Value - Date/Time    COVID PRE-OP / PRE-PROCEDURE SCREENING ORDER (NO ISOLATION) - Swab, Nasopharynx [[id number]]  (Normal) Collected: 07/09/22 1658    Lab Status: Final result Specimen: Swab from Nasopharynx Updated: 07/09/22 1721    Narrative:      The following orders were created for panel order COVID PRE-OP / PRE-PROCEDURE SCREENING ORDER (NO ISOLATION) - Swab, Nasopharynx.  Procedure                               Abnormality         Status                     ---------                               -----------         ------                      COVID-19,CEPHEID/SHELDON,CO...[[id number]]  Normal              Final result                 Please view results for these tests on the individual orders.    COVID-19,CEPHEID/SHELDON,COR/STACIA/PAD/CHARLES IN-HOUSE(OR EMERGENT/ADD-ON),NP SWAB IN TRANSPORT MEDIA 3-4 HR TAT, RT-PCR - Swab, Nasopharynx [[id number]]  (Normal) Collected: 07/09/22 1658    Lab Status: Final result Specimen: Swab from Nasopharynx Updated: 07/09/22 1721     COVID19 Not Detected    Narrative:      Fact sheet for providers: https://www.fda.gov/media/594505/download     Fact sheet for patients: https://www.fda.gov/media/385201/download  Fact sheet for providers: https://www.fda.gov/media/735148/download    Fact sheet for patients: https://www.fda.gov/media/102015/download    Test performed by PCR.    Blood Culture - Blood, Arm, Left [[id number]]  (Normal) Collected: 07/09/22 1349    Lab Status: Preliminary result Specimen: Blood from Arm, Left Updated: 07/10/22 1404     Blood Culture No growth at 24 hours    Blood Culture - Blood, Arm, Right [[id number]]  (Normal) Collected: 07/09/22 1236    Lab Status: Preliminary result Specimen: Blood from Arm, Right Updated: 07/10/22 1247     Blood Culture No growth at 24 hours          Laboratory  Results from last 7 days   Lab Units 07/10/22  0136   WBC 10*3/mm3 6.30   HEMOGLOBIN g/dL 11.8*   HEMATOCRIT % 35.7*   PLATELETS 10*3/mm3 161     Results from last 7 days   Lab Units 07/10/22  0136   SODIUM mmol/L 127*   POTASSIUM mmol/L 4.2   CHLORIDE mmol/L 95*   CO2 mmol/L 24.0   BUN mg/dL 9   CREATININE mg/dL 1.11   GLUCOSE mg/dL 119*   CALCIUM mg/dL 8.7     Results from last 7 days   Lab Units 07/10/22  0136   SODIUM mmol/L 127*   POTASSIUM mmol/L 4.2   CHLORIDE mmol/L 95*   CO2 mmol/L 24.0   BUN mg/dL 9   CREATININE mg/dL 1.11   GLUCOSE mg/dL 119*   CALCIUM mg/dL 8.7                   Radiology  Imaging Results (Last 72 Hours)     Procedure Component Value Units Date/Time    XR Foot 2 View Right [[id number]] Collected:  07/09/22 1407     Updated: 07/09/22 1411    Narrative:      DATE OF EXAM:  7/9/2022 1:58 PM     PROCEDURE:  XR FOOT 2 VW RIGHT-     INDICATIONS:  Wound between first and second toe maggots present.  Cellulitis;  L03.115-Cellulitis of right lower limb; B87.9-Myiasis, unspecified     COMPARISON:  No Comparisons Available     TECHNIQUE:   A minimum of two routine standard radiographic views were obtained of  the right foot.     FINDINGS:  There is diffuse soft tissue swelling of the right foot bones are  osteopenic. There is no acute appearing fracture. Postoperative findings  noted in the ankle at the medial malleolus and the distal fibula with  fixation screws. The calcaneus is intact. Small vessel vascular  calcifications noted.        Impression:      1. Extensive soft tissue swelling of the right foot may relate to edema  and/or cellulitis.  2. No discrete area of osseous erosion.  3. No acute appearing fracture.  4. Additional chronic findings above.     Electronically Signed By-Renny Johnson MD On:7/9/2022 2:09 PM  This report was finalized on [id number] by  Renny Johnson MD.          Cardiology      Results Review:  I have reviewed all clinical data, test, lab, and imaging results.       Schedule Meds  allopurinol, 100 mg, Oral, Daily  enoxaparin, 40 mg, Subcutaneous, Q24H  FLUoxetine, 10 mg, Oral, Daily  folic acid, 1 mg, Oral, Daily  hydrALAZINE, 25 mg, Oral, Daily  hydrALAZINE, 50 mg, Oral, Q PM  [START ON 7/11/2022] levothyroxine, 175 mcg, Oral, Daily  [START ON 7/11/2022] pantoprazole, 40 mg, Oral, Daily  saccharomyces boulardii, 250 mg, Oral, BID  sodium chloride, 3 mL, Intravenous, Q12H  thiamine, 100 mg, Oral, Daily  vancomycin, 15 mg/kg (Adjusted), Intravenous, Q18H        Infusion Meds  Pharmacy to Dose enoxaparin (LOVENOX),   Pharmacy to dose vancomycin,         PRN Meds  aluminum-magnesium hydroxide-simethicone  •  calcium carbonate  •  chlordiazePOXIDE  •  ondansetron **OR** ondansetron  •   Pharmacy to Dose enoxaparin (LOVENOX)  •  Pharmacy to dose vancomycin  •  [COMPLETED] Insert peripheral IV **AND** sodium chloride  •  sodium chloride  •  traMADol      Assessment & Plan       Assessment    Mildly superimposed cellulitis on top of chronic skin skin changes related to chronic edema.  Patient has some superficial wounds between each toe of his right foot without any significant drainage or signs of abscess.  Family reports they did find several maggots between the first and second toes  -Blood cultures are negative so far    Patient denies significant history other than alcohol abuse.  He apparently drinks approximately 20 beers a day.  Apparently has issues with hygiene/ADLs because of his drinking    History of C. difficile colitis per daughter    Plan    Discontinue IV cefepime   Continue IV vancomycin for now-we will likely be able to switch to p.o. antibiotics in a day or so  Continue supportive care  A.m. labs  Encourage patient to keep feet elevated is much as possible  Case discussed with patient and family members at bedside  Case discussed with primary care provider        NORM Barrios  07/10/22  14:55 EDT    Note is dictated utilizing voice recognition software/Dragon

## 2022-07-10 NOTE — CONSULTS
NEPHROLOGY CONSULTATION-----[facility]    [facility]  [phone]  Huey Keyes MD    Patient Care Team:  Montse Dawkins MD as PCP - General  Montse Dawkins MD as PCP - Family Medicine  Huey Keyes MD as Consulting Physician (Nephrology)    CC/REASON FOR CONSULTATION: Hyponatremia    PHYSICIAN REQUESTING CONSULTATION: Dr. Dawkins    History of Present Illness  76-year-old male with past medical history of hypertension, chronic alcohol use presented to ER with complaints of redness and swelling right foot.  He had worsening swelling in his foot.  EMS was called and found to have maggots between his first and second toes along with redness and swelling.  His sodium was 125 and creatinine was 1.1 admission.  Patient drinks 2-4 beers almost every day.  He is not on any thiazide.  Denies any vomiting or diarrhea.  He does have some swelling in both his legs as well.  No chest pain.  He has some dyspnea.  No vomiting or diarrhea.  He is admitted with cellulitis of the right foot.    Review of Systems   As noted above otherwise 10 systems reviewed and were negative.    History reviewed. No pertinent past medical history.    History reviewed. No pertinent surgical history.    History reviewed. No pertinent family history.    Social History     Tobacco Use   • Smoking status: Never Smoker   • Smokeless tobacco: Never Used       Home Meds:   Medications Prior to Admission   Medication Sig Dispense Refill Last Dose   • allopurinol (ZYLOPRIM) 100 MG tablet Take 100 mg by mouth Daily.   7/8/2022 at Unknown time   • FLUoxetine (PROzac) 10 MG capsule Take 10 mg by mouth Daily.   7/8/2022 at Unknown time   • folic acid (FOLVITE) 1 MG tablet Take 1 mg by mouth Daily.   7/8/2022 at Unknown time   • hydrALAZINE (APRESOLINE) 50 MG tablet Take 50 mg by mouth Every Evening. Daughter said MD aware of changes due to blood pressure dropping.   Patient Taking Differently  "at Unknown time   • levothyroxine (SYNTHROID, LEVOTHROID) 175 MCG tablet Take 175 mcg by mouth Daily.   7/8/2022 at Unknown time   • pantoprazole (PROTONIX) 40 MG EC tablet Take 40 mg by mouth Daily.   7/8/2022 at Unknown time   • saccharomyces boulardii (FLORASTOR) 250 MG capsule Take 250 mg by mouth 2 (Two) Times a Day. **Daughter said \"He is on a probiotic because he has a history of C-diff, but I am unsure of which one.\"**   7/8/2022 at Unknown time   • thiamine (VITAMIN B-1) 100 MG tablet  tablet Take 100 mg by mouth Daily.   7/8/2022 at Unknown time       Scheduled Meds:  allopurinol, 100 mg, Oral, Daily  cefepime, 2 g, Intravenous, Q8H  enoxaparin, 40 mg, Subcutaneous, Q24H  FLUoxetine, 10 mg, Oral, Daily  folic acid, 1 mg, Oral, Daily  hydrALAZINE, 25 mg, Oral, Daily  hydrALAZINE, 50 mg, Oral, Q PM  levothyroxine, 175 mcg, Oral, Daily  pantoprazole, 40 mg, Oral, Daily  saccharomyces boulardii, 250 mg, Oral, BID  sodium chloride, 3 mL, Intravenous, Q12H  thiamine, 100 mg, Oral, Daily  vancomycin, 15 mg/kg (Adjusted), Intravenous, Q18H        Continuous Infusions:  Pharmacy to Dose enoxaparin (LOVENOX),   Pharmacy to dose vancomycin,   sodium chloride, 75 mL/hr, Last Rate: 75 mL/hr (07/10/22 0102)        PRN Meds:  aluminum-magnesium hydroxide-simethicone  •  calcium carbonate  •  chlordiazePOXIDE  •  ondansetron **OR** ondansetron  •  Pharmacy to Dose enoxaparin (LOVENOX)  •  Pharmacy to dose vancomycin  •  [COMPLETED] Insert peripheral IV **AND** sodium chloride  •  sodium chloride  •  traMADol    Allergies:  Patient has no known allergies.    OBJECTIVE    Vital Signs  Temp:  [97.1 °F (36.2 °C)-98 °F (36.7 °C)] 97.5 °F (36.4 °C)  Heart Rate:  [53-78] 63  Resp:  [15-20] 20  BP: (146-175)/(80-92) 173/81    I/O this shift:  In: 240 [P.O.:240]  Out: 250 [Urine:250]  I/O last 3 completed shifts:  In: 1746.7 [P.O.:480; I.V.:1166.7; IV Piggyback:100]  Out: 850 [Urine:850]    Physical Exam:  General Appearance: " alert, appears stated age and cooperative  Head: normocephalic, without obvious abnormality and atraumatic  Eyes: conjunctivae and sclerae normal and no icterus  Neck: supple and no JVD  Lungs: clear to auscultation and respirations regular  Heart: regular rhythm & normal rate and normal S1, S2  Chest Wall: no abnormalities observed  Abdomen: normal bowel sounds and soft non-tender  Extremities: moves extremities well, bilateral edema present.  Skin: Erythema right foot.  Large wound between first and second toe.  Neurologic: Alert, and oriented. No focal deficits    Results Review:    I reviewed the patient's new clinical results.    WBC WBC   Date Value Ref Range Status   07/10/2022 6.30 3.40 - 10.80 10*3/mm3 Final   07/09/2022 6.90 3.40 - 10.80 10*3/mm3 Final      HGB Hemoglobin   Date Value Ref Range Status   07/10/2022 11.8 (L) 13.0 - 17.7 g/dL Final   07/09/2022 12.9 (L) 13.0 - 17.7 g/dL Final      HCT Hematocrit   Date Value Ref Range Status   07/10/2022 35.7 (L) 37.5 - 51.0 % Final   07/09/2022 39.7 37.5 - 51.0 % Final      Platlets No results found for: LABPLAT   MCV MCV   Date Value Ref Range Status   07/10/2022 92.6 79.0 - 97.0 fL Final   07/09/2022 92.8 79.0 - 97.0 fL Final          Sodium Sodium   Date Value Ref Range Status   07/10/2022 127 (L) 136 - 145 mmol/L Final   07/09/2022 125 (L) 136 - 145 mmol/L Final      Potassium Potassium   Date Value Ref Range Status   07/10/2022 4.2 3.5 - 5.2 mmol/L Final   07/09/2022 4.2 3.5 - 5.2 mmol/L Final      Chloride Chloride   Date Value Ref Range Status   07/10/2022 95 (L) 98 - 107 mmol/L Final   07/09/2022 90 (L) 98 - 107 mmol/L Final      CO2 CO2   Date Value Ref Range Status   07/10/2022 24.0 22.0 - 29.0 mmol/L Final   07/09/2022 23.0 22.0 - 29.0 mmol/L Final      BUN BUN   Date Value Ref Range Status   07/10/2022 9 8 - 23 mg/dL Final   07/09/2022 9 8 - 23 mg/dL Final      Creatinine Creatinine   Date Value Ref Range Status   07/10/2022 1.11 0.76 - 1.27  mg/dL Final   07/09/2022 1.10 0.76 - 1.27 mg/dL Final      Calcium Calcium   Date Value Ref Range Status   07/10/2022 8.7 8.6 - 10.5 mg/dL Final   07/09/2022 8.9 8.6 - 10.5 mg/dL Final      PO4 No results found for: CAPO4   Albumin Albumin   Date Value Ref Range Status   07/10/2022 3.50 3.50 - 5.20 g/dL Final   07/09/2022 3.80 3.50 - 5.20 g/dL Final      Magnesium Magnesium   Date Value Ref Range Status   07/10/2022 1.8 1.6 - 2.4 mg/dL Final      Uric Acid No results found for: URICACID       Imaging Results (Last 72 Hours)     Procedure Component Value Units Date/Time    XR Foot 2 View Right [[id number]] Collected: 07/09/22 1407     Updated: 07/09/22 1411    Narrative:      DATE OF EXAM:  7/9/2022 1:58 PM     PROCEDURE:  XR FOOT 2 VW RIGHT-     INDICATIONS:  Wound between first and second toe maggots present.  Cellulitis;  L03.115-Cellulitis of right lower limb; B87.9-Myiasis, unspecified     COMPARISON:  No Comparisons Available     TECHNIQUE:   A minimum of two routine standard radiographic views were obtained of  the right foot.     FINDINGS:  There is diffuse soft tissue swelling of the right foot bones are  osteopenic. There is no acute appearing fracture. Postoperative findings  noted in the ankle at the medial malleolus and the distal fibula with  fixation screws. The calcaneus is intact. Small vessel vascular  calcifications noted.        Impression:      1. Extensive soft tissue swelling of the right foot may relate to edema  and/or cellulitis.  2. No discrete area of osseous erosion.  3. No acute appearing fracture.  4. Additional chronic findings above.     Electronically Signed By-Renny Johnson MD On:7/9/2022 2:09 PM  This report was finalized on [id number] by  Renny Johnson MD.            Results for orders placed during the hospital encounter of 07/09/22    XR Foot 2 View Right    Narrative  DATE OF EXAM:  7/9/2022 1:58 PM    PROCEDURE:  XR FOOT 2 VW RIGHT-    INDICATIONS:  Wound between first and  second toe maggots present.  Cellulitis;  L03.115-Cellulitis of right lower limb; B87.9-Myiasis, unspecified    COMPARISON:  No Comparisons Available    TECHNIQUE:  A minimum of two routine standard radiographic views were obtained of  the right foot.    FINDINGS:  There is diffuse soft tissue swelling of the right foot bones are  osteopenic. There is no acute appearing fracture. Postoperative findings  noted in the ankle at the medial malleolus and the distal fibula with  fixation screws. The calcaneus is intact. Small vessel vascular  calcifications noted.    Impression  1. Extensive soft tissue swelling of the right foot may relate to edema  and/or cellulitis.  2. No discrete area of osseous erosion.  3. No acute appearing fracture.  4. Additional chronic findings above.    Electronically Signed By-Renny Johnson MD On:7/9/2022 2:09 PM  This report was finalized on [id number] by  Renny Johnson MD.            ASSESSMENT / PLAN      Cellulitis of right foot    · Hyponatremia-likely in setting of alcohol use and volume overload.  His urine osmolality is appropriately  256.  We will place him on fluid restriction of 1500 mL/day and gently diurese  · CKD-patient appears to have underlying CKD with baseline creatinine 1-1.2.  CKD may be related to hypertensive nephrosclerosis  · Cellulitis right foot  · Hypertension  · Chronic alcohol use        I discussed the patients findings and my recommendations with patient and nursing staff    Will follow along closely. Thank you for allowing us to see this patient in renal consultation.    [facility]  [phone]  MD Huey Cotter MD  07/10/22  11:17 EDT

## 2022-07-10 NOTE — PLAN OF CARE
Problem: Adult Inpatient Plan of Care  Goal: Plan of Care Review  Outcome: Ongoing, Progressing  Flowsheets (Taken 7/10/2022 1404)  Progress: improving  Plan of Care Reviewed With: patient  Outcome Evaluation: On restriction 1500cc/day per kidney  gave antibiotics and IV lasix as order. no other c/o. will continue to monitor.

## 2022-07-10 NOTE — CASE MANAGEMENT/SOCIAL WORK
Social Work Assessment  [facility]     Patient Name: Rommel Mcfarland  MRN: [id number]  Today's Date: 7/10/2022    Admit Date: 7/9/2022     Psychosocial     Row Name 07/10/22 1827       Coping/Stress    Coping/Stress Comments SW attempted to visit patient at 16:35pm, patient sleeping. SW to follow up                No physical contact with patient on this date.    HARRY Salazar    Phone: [phone]  Fax: [phone]  [email or web address]

## 2022-07-10 NOTE — PLAN OF CARE
Pt resting intermittently during shift; complaints of pain treated per MAR. Pt on room air; continuous cardiac monitoring maintained. Bradycardia noted. IV abx administered per MAR; pt screened throughout shift for s/sx of ETOH withdrawal. Frequent rounding implemented; pt's room close to nurse's station. Call light within reach; pt able to make needs known. Ongoing treatment plan updated per this nurse. Monitoring continued per hospital policy during remainder of this nurse's shift.

## 2022-07-11 LAB
ALBUMIN SERPL-MCNC: 3.3 G/DL (ref 3.5–5.2)
ALBUMIN/GLOB SERPL: 1.2 G/DL
ALP SERPL-CCNC: 40 U/L (ref 39–117)
ALT SERPL W P-5'-P-CCNC: 8 U/L (ref 1–41)
ANION GAP SERPL CALCULATED.3IONS-SCNC: 10 MMOL/L (ref 5–15)
AST SERPL-CCNC: 13 U/L (ref 1–40)
BASOPHILS # BLD AUTO: 0.1 10*3/MM3 (ref 0–0.2)
BASOPHILS # BLD AUTO: 0.1 10*3/MM3 (ref 0–0.2)
BASOPHILS NFR BLD AUTO: 1 % (ref 0–1.5)
BASOPHILS NFR BLD AUTO: 1.2 % (ref 0–1.5)
BILIRUB SERPL-MCNC: 0.5 MG/DL (ref 0–1.2)
BUN SERPL-MCNC: 10 MG/DL (ref 8–23)
BUN/CREAT SERPL: 9.9 (ref 7–25)
CALCIUM SPEC-SCNC: 8.9 MG/DL (ref 8.6–10.5)
CHLORIDE SERPL-SCNC: 96 MMOL/L (ref 98–107)
CO2 SERPL-SCNC: 29 MMOL/L (ref 22–29)
CREAT SERPL-MCNC: 1.01 MG/DL (ref 0.76–1.27)
DEPRECATED RDW RBC AUTO: 49 FL (ref 37–54)
DEPRECATED RDW RBC AUTO: 49.9 FL (ref 37–54)
EGFRCR SERPLBLD CKD-EPI 2021: 76.1 ML/MIN/1.73
EOSINOPHIL # BLD AUTO: 0.6 10*3/MM3 (ref 0–0.4)
EOSINOPHIL # BLD AUTO: 0.7 10*3/MM3 (ref 0–0.4)
EOSINOPHIL NFR BLD AUTO: 7.9 % (ref 0.3–6.2)
EOSINOPHIL NFR BLD AUTO: 9.6 % (ref 0.3–6.2)
ERYTHROCYTE [DISTWIDTH] IN BLOOD BY AUTOMATED COUNT: 15.3 % (ref 12.3–15.4)
ERYTHROCYTE [DISTWIDTH] IN BLOOD BY AUTOMATED COUNT: 15.5 % (ref 12.3–15.4)
GLOBULIN UR ELPH-MCNC: 2.8 GM/DL
GLUCOSE SERPL-MCNC: 103 MG/DL (ref 65–99)
HCT VFR BLD AUTO: 39.2 % (ref 37.5–51)
HCT VFR BLD AUTO: 39.7 % (ref 37.5–51)
HGB BLD-MCNC: 12.8 G/DL (ref 13–17.7)
HGB BLD-MCNC: 13.1 G/DL (ref 13–17.7)
LYMPHOCYTES # BLD AUTO: 1.1 10*3/MM3 (ref 0.7–3.1)
LYMPHOCYTES # BLD AUTO: 1.2 10*3/MM3 (ref 0.7–3.1)
LYMPHOCYTES NFR BLD AUTO: 14.5 % (ref 19.6–45.3)
LYMPHOCYTES NFR BLD AUTO: 15.5 % (ref 19.6–45.3)
MAGNESIUM SERPL-MCNC: 1.8 MG/DL (ref 1.6–2.4)
MCH RBC QN AUTO: 30.3 PG (ref 26.6–33)
MCH RBC QN AUTO: 30.6 PG (ref 26.6–33)
MCHC RBC AUTO-ENTMCNC: 32.7 G/DL (ref 31.5–35.7)
MCHC RBC AUTO-ENTMCNC: 32.9 G/DL (ref 31.5–35.7)
MCV RBC AUTO: 92.2 FL (ref 79–97)
MCV RBC AUTO: 93.6 FL (ref 79–97)
MONOCYTES # BLD AUTO: 1.2 10*3/MM3 (ref 0.1–0.9)
MONOCYTES # BLD AUTO: 1.5 10*3/MM3 (ref 0.1–0.9)
MONOCYTES NFR BLD AUTO: 15.4 % (ref 5–12)
MONOCYTES NFR BLD AUTO: 18.7 % (ref 5–12)
NEUTROPHILS NFR BLD AUTO: 4.4 10*3/MM3 (ref 1.7–7)
NEUTROPHILS NFR BLD AUTO: 4.6 10*3/MM3 (ref 1.7–7)
NEUTROPHILS NFR BLD AUTO: 57.9 % (ref 42.7–76)
NEUTROPHILS NFR BLD AUTO: 58.3 % (ref 42.7–76)
NRBC BLD AUTO-RTO: 0 /100 WBC (ref 0–0.2)
NRBC BLD AUTO-RTO: 0.1 /100 WBC (ref 0–0.2)
PLATELET # BLD AUTO: 184 10*3/MM3 (ref 140–450)
PLATELET # BLD AUTO: 196 10*3/MM3 (ref 140–450)
PMV BLD AUTO: 8.3 FL (ref 6–12)
PMV BLD AUTO: 8.7 FL (ref 6–12)
POTASSIUM SERPL-SCNC: 4.1 MMOL/L (ref 3.5–5.2)
PROT SERPL-MCNC: 6.1 G/DL (ref 6–8.5)
RBC # BLD AUTO: 4.18 10*6/MM3 (ref 4.14–5.8)
RBC # BLD AUTO: 4.31 10*6/MM3 (ref 4.14–5.8)
SODIUM SERPL-SCNC: 135 MMOL/L (ref 136–145)
VANCOMYCIN PEAK SERPL-MCNC: 30.6 MCG/ML (ref 20–40)
WBC NRBC COR # BLD: 7.6 10*3/MM3 (ref 3.4–10.8)
WBC NRBC COR # BLD: 7.9 10*3/MM3 (ref 3.4–10.8)

## 2022-07-11 PROCEDURE — 83735 ASSAY OF MAGNESIUM: CPT | Performed by: FAMILY MEDICINE

## 2022-07-11 PROCEDURE — 80053 COMPREHEN METABOLIC PANEL: CPT | Performed by: FAMILY MEDICINE

## 2022-07-11 PROCEDURE — 85025 COMPLETE CBC W/AUTO DIFF WBC: CPT | Performed by: FAMILY MEDICINE

## 2022-07-11 PROCEDURE — 25010000002 ENOXAPARIN PER 10 MG: Performed by: FAMILY MEDICINE

## 2022-07-11 PROCEDURE — 80202 ASSAY OF VANCOMYCIN: CPT | Performed by: FAMILY MEDICINE

## 2022-07-11 PROCEDURE — 25010000002 VANCOMYCIN HCL 1.25 G RECONSTITUTED SOLUTION 1 EACH VIAL: Performed by: FAMILY MEDICINE

## 2022-07-11 PROCEDURE — 97162 PT EVAL MOD COMPLEX 30 MIN: CPT

## 2022-07-11 RX ORDER — HYDRALAZINE HYDROCHLORIDE 25 MG/1
50 TABLET, FILM COATED ORAL EVERY 12 HOURS SCHEDULED
Status: DISCONTINUED | OUTPATIENT
Start: 2022-07-11 | End: 2022-07-13 | Stop reason: HOSPADM

## 2022-07-11 RX ORDER — SULFAMETHOXAZOLE AND TRIMETHOPRIM 800; 160 MG/1; MG/1
1 TABLET ORAL EVERY 12 HOURS SCHEDULED
Status: DISCONTINUED | OUTPATIENT
Start: 2022-07-11 | End: 2022-07-13 | Stop reason: HOSPADM

## 2022-07-11 RX ORDER — HYDRALAZINE HYDROCHLORIDE 25 MG/1
50 TABLET, FILM COATED ORAL
Status: DISCONTINUED | OUTPATIENT
Start: 2022-07-11 | End: 2022-07-11

## 2022-07-11 RX ORDER — BUMETANIDE 1 MG/1
1 TABLET ORAL DAILY
Status: DISCONTINUED | OUTPATIENT
Start: 2022-07-11 | End: 2022-07-13 | Stop reason: HOSPADM

## 2022-07-11 RX ADMIN — FOLIC ACID 1 MG: 1 TABLET ORAL at 08:29

## 2022-07-11 RX ADMIN — ENOXAPARIN SODIUM 40 MG: 100 INJECTION SUBCUTANEOUS at 16:45

## 2022-07-11 RX ADMIN — HYDRALAZINE HYDROCHLORIDE 50 MG: 25 TABLET, FILM COATED ORAL at 08:29

## 2022-07-11 RX ADMIN — LEVOTHYROXINE SODIUM 175 MCG: 0.17 TABLET ORAL at 08:29

## 2022-07-11 RX ADMIN — CHLORDIAZEPOXIDE HYDROCHLORIDE 10 MG: 10 CAPSULE ORAL at 08:29

## 2022-07-11 RX ADMIN — Medication 3 ML: at 21:22

## 2022-07-11 RX ADMIN — Medication 250 MG: at 08:29

## 2022-07-11 RX ADMIN — Medication 250 MG: at 21:22

## 2022-07-11 RX ADMIN — HYDRALAZINE HYDROCHLORIDE 50 MG: 25 TABLET, FILM COATED ORAL at 21:22

## 2022-07-11 RX ADMIN — VANCOMYCIN HYDROCHLORIDE 1250 MG: 1.25 INJECTION, POWDER, LYOPHILIZED, FOR SOLUTION INTRAVENOUS at 04:50

## 2022-07-11 RX ADMIN — Medication 3 ML: at 08:29

## 2022-07-11 RX ADMIN — ALLOPURINOL 100 MG: 100 TABLET ORAL at 08:29

## 2022-07-11 RX ADMIN — BUMETANIDE 1 MG: 1 TABLET ORAL at 16:45

## 2022-07-11 RX ADMIN — PANTOPRAZOLE SODIUM 40 MG: 40 TABLET, DELAYED RELEASE ORAL at 08:29

## 2022-07-11 RX ADMIN — Medication 100 MG: at 08:29

## 2022-07-11 RX ADMIN — FLUCONAZOLE 100 MG: 100 TABLET ORAL at 16:45

## 2022-07-11 RX ADMIN — FLUOXETINE 10 MG: 10 CAPSULE ORAL at 08:29

## 2022-07-11 RX ADMIN — CHLORDIAZEPOXIDE HYDROCHLORIDE 10 MG: 10 CAPSULE ORAL at 21:22

## 2022-07-11 RX ADMIN — SULFAMETHOXAZOLE AND TRIMETHOPRIM 1 TABLET: 800; 160 TABLET ORAL at 21:22

## 2022-07-11 RX ADMIN — NYSTATIN: 100000 POWDER TOPICAL at 21:22

## 2022-07-11 RX ADMIN — NYSTATIN: 100000 POWDER TOPICAL at 08:30

## 2022-07-11 NOTE — PROGRESS NOTES
"DATE/TIME OF ADMISSION:  7/9/2022 12:21 PM     LOS: 2 days   Patient Care Team:  Montse Dawkins MD as PCP - General  Montse Dawkins MD as PCP - Family Medicine  Huey Keyes MD as Consulting Physician (Nephrology)  Consults     Date and Time Order Name Status Description    7/10/2022 12:34 AM Inpatient Infectious Diseases Consult Completed     7/9/2022  2:14 PM Nephrology (on -call MD unless specified) Completed           Subjective BP IS STILL ELEVATED  INCREASED THE HYDRALAZINE AGAIN THIS AM  TREATING TO PREVENT DT'S WITH LIBRIUM  MILDLY ANXIOUS    Interval History: GENTLY ADDRESSING THE HYPONATREMIA---DUE TO BEER INTAKE AND CKD    Patient Complaints: COOPERATIVE    History taken from: patient    Review of Systems        Objective     Vital Signs  /90   Pulse 62   Temp 97.3 °F (36.3 °C) (Oral)   Resp 18   Ht 172.7 cm (67.99\")   Wt 108 kg (238 lb 5.1 oz)   SpO2 96%   BMI 36.24 kg/m²     Physical Exam:         General Appearance:    Alert, cooperative, in no acute distress; AT BASELINE FOR MILD CONFUSION   Head:    Normocephalic, without obvious abnormality, atraumatic   Eyes:            Lids and lashes normal, conjunctivae and sclerae normal, no   icterus, no pallor, corneas clear, PERRLA   Ears:    Ears appear intact with no abnormalities noted   Throat:   No oral lesions, no thrush, oral mucosa moist   Neck:   No adenopathy, supple, trachea midline, no thyromegaly, no   carotid bruit, no JVD   Lungs:     Clear to auscultation,respirations regular, even and                  unlabored    Heart:    Regular rhythm and normal rate, normal S1 and S2, no            murmur, no gallop, no rub, no click   Chest Wall:    No abnormalities observed   Abdomen:     Normal bowel sounds, no masses, no organomegaly, soft        non-tender, non-distended, no guarding, no rebound                tenderness   Extremities:   Moves all extremities well, 1+ edema, no cyanosis, MILD              Redness AT THE GREAT " TOE AND 2ND TOE OF THE RIGHT FOOT WITH MILD SWELLING LOCALIZED IN THE AREA OF CELLULITIS; MOIST BETWEEN THE TOES AND REDDENED---RIGHT FOOT >LEFT   Pulses:   Pulses palpable and equal bilaterally   Skin:   No bleeding,+  bruising or rash; FEET AS ABOVE   Lymph nodes:   No palpable adenopathy   Neurologic:   Grossly normal, alert and O x 2; MILD CONFUSION AT BASELINE; PLEASANT        I HAVE PERSONALLY EXAMINED AND REVIEWED THE PATIENT'S RECORD     Lab Results (last 48 hours)     Procedure Component Value Units Date/Time    Blood Culture - Blood, Arm, Left [[id number]]  (Normal) Collected: 07/09/22 1349    Specimen: Blood from Arm, Left Updated: 07/10/22 1404     Blood Culture No growth at 24 hours    Blood Culture - Blood, Arm, Right [[id number]]  (Normal) Collected: 07/09/22 1236    Specimen: Blood from Arm, Right Updated: 07/10/22 1247     Blood Culture No growth at 24 hours    Comprehensive Metabolic Panel [[id number]]  (Abnormal) Collected: 07/10/22 0136    Specimen: Blood Updated: 07/10/22 0225     Glucose 119 mg/dL      BUN 9 mg/dL      Creatinine 1.11 mg/dL      Sodium 127 mmol/L      Potassium 4.2 mmol/L      Chloride 95 mmol/L      CO2 24.0 mmol/L      Calcium 8.7 mg/dL      Total Protein 6.0 g/dL      Albumin 3.50 g/dL      ALT (SGPT) 6 U/L      AST (SGOT) 11 U/L      Alkaline Phosphatase 37 U/L      Total Bilirubin 0.8 mg/dL      Globulin 2.5 gm/dL      A/G Ratio 1.4 g/dL      BUN/Creatinine Ratio 8.1     Anion Gap 8.0 mmol/L      eGFR 68.0 mL/min/1.73      Comment: National Kidney Foundation and American Society of Nephrology (ASN) Task Force recommended calculation based on the Chronic Kidney Disease Epidemiology Collaboration (CKD-EPI) equation refit without adjustment for race.       Narrative:      GFR Normal >60  Chronic Kidney Disease <60  Kidney Failure <15      Magnesium [[id number]]  (Normal) Collected: 07/10/22 0136    Specimen: Blood Updated: 07/10/22 0225     Magnesium 1.8 mg/dL     CBC &  Differential [[id number]]  (Abnormal) Collected: 07/10/22 0136    Specimen: Blood Updated: 07/10/22 0157    Narrative:      The following orders were created for panel order CBC & Differential.  Procedure                               Abnormality         Status                     ---------                               -----------         ------                     CBC Auto Differential[[id number]]        Abnormal            Final result                 Please view results for these tests on the individual orders.    CBC Auto Differential [[id number]]  (Abnormal) Collected: 07/10/22 0136    Specimen: Blood Updated: 07/10/22 0157     WBC 6.30 10*3/mm3      RBC 3.85 10*6/mm3      Hemoglobin 11.8 g/dL      Hematocrit 35.7 %      MCV 92.6 fL      MCH 30.7 pg      MCHC 33.1 g/dL      RDW 15.0 %      RDW-SD 48.1 fl      MPV 8.4 fL      Platelets 161 10*3/mm3      Neutrophil % 51.1 %      Lymphocyte % 22.3 %      Monocyte % 17.3 %      Eosinophil % 8.0 %      Basophil % 1.3 %      Neutrophils, Absolute 3.20 10*3/mm3      Lymphocytes, Absolute 1.40 10*3/mm3      Monocytes, Absolute 1.10 10*3/mm3      Eosinophils, Absolute 0.50 10*3/mm3      Basophils, Absolute 0.10 10*3/mm3      nRBC 0.0 /100 WBC     Urinalysis, Microscopic Only - Urine, Clean Catch [[id number]]  (Abnormal) Collected: 07/09/22 2002    Specimen: Urine, Clean Catch Updated: 07/09/22 2252     RBC, UA 0-2 /HPF      WBC, UA 0-2 /HPF      Bacteria, UA None Seen /HPF      Squamous Epithelial Cells, UA 0-2 /HPF      Hyaline Casts, UA 0-2 /LPF      Methodology Automated Microscopy    Urinalysis With Microscopic If Indicated (No Culture) - Urine, Clean Catch [[id number]]  (Abnormal) Collected: 07/09/22 2002    Specimen: Urine, Clean Catch Updated: 07/09/22 2251     Color, UA Yellow     Appearance, UA Clear     pH, UA 6.5     Specific Gravity, UA 1.009     Glucose, UA Negative     Ketones, UA Negative     Bilirubin, UA Negative     Blood, UA Negative     Protein, UA 30  mg/dL (1+)     Leuk Esterase, UA Negative     Nitrite, UA Negative     Urobilinogen, UA 2.0 E.U./dL    Sodium, Urine, Random - Urine, Clean Catch [[id number]] Collected: 07/09/22 2002    Specimen: Urine, Clean Catch Updated: 07/09/22 2206     Sodium, Urine 45 mmol/L     Narrative:      Reference intervals for random urine have not been established.  Clinical usage is dependent upon physician's interpretation in combination with other laboratory tests.       Osmolality, Urine - Urine, Clean Catch [[id number]]  (Abnormal) Collected: 07/09/22 2002    Specimen: Urine, Clean Catch Updated: 07/09/22 2159     Osmolality, Urine 260 mOsm/kg     BNP [[id number]]  (Abnormal) Collected: 07/09/22 1236    Specimen: Blood Updated: 07/09/22 1800     proBNP 1,859.0 pg/mL     Narrative:      Among patients with dyspnea, NT-proBNP is highly sensitive for the detection of acute congestive heart failure. In addition NT-proBNP of <300 pg/ml effectively rules out acute congestive heart failure with 99% negative predictive value.    Results may be falsely decreased if patient taking Biotin.      COVID PRE-OP / PRE-PROCEDURE SCREENING ORDER (NO ISOLATION) - Swab, Nasopharynx [[id number]]  (Normal) Collected: 07/09/22 1658    Specimen: Swab from Nasopharynx Updated: 07/09/22 1721    Narrative:      The following orders were created for panel order COVID PRE-OP / PRE-PROCEDURE SCREENING ORDER (NO ISOLATION) - Swab, Nasopharynx.  Procedure                               Abnormality         Status                     ---------                               -----------         ------                     COVID-19,CEPHEID/SHELDON,CO...[[id number]]  Normal              Final result                 Please view results for these tests on the individual orders.    COVID-19,CEPHEID/SHELDON,COR/STACIA/PAD/CHARLES IN-HOUSE(OR EMERGENT/ADD-ON),NP SWAB IN TRANSPORT MEDIA 3-4 HR TAT, RT-PCR - Swab, Nasopharynx [[id number]]  (Normal) Collected: 07/09/22 1658    Specimen: Swab  from Nasopharynx Updated: 07/09/22 1721     COVID19 Not Detected    Narrative:      Fact sheet for providers: https://www.fda.gov/media/377580/download     Fact sheet for patients: https://www.fda.gov/media/635622/download  Fact sheet for providers: https://www.fda.gov/media/906457/download    Fact sheet for patients: https://www.fda.gov/media/634637/download    Test performed by PCR.    Comprehensive Metabolic Panel [[id number]]  (Abnormal) Collected: 07/09/22 1236    Specimen: Blood Updated: 07/09/22 1304     Glucose 103 mg/dL      BUN 9 mg/dL      Creatinine 1.10 mg/dL      Sodium 125 mmol/L      Potassium 4.2 mmol/L      Chloride 90 mmol/L      CO2 23.0 mmol/L      Calcium 8.9 mg/dL      Total Protein 6.8 g/dL      Albumin 3.80 g/dL      ALT (SGPT) 7 U/L      AST (SGOT) 13 U/L      Alkaline Phosphatase 42 U/L      Total Bilirubin 0.8 mg/dL      Globulin 3.0 gm/dL      A/G Ratio 1.3 g/dL      BUN/Creatinine Ratio 8.2     Anion Gap 12.0 mmol/L      eGFR 68.7 mL/min/1.73      Comment: National Kidney Foundation and American Society of Nephrology (ASN) Task Force recommended calculation based on the Chronic Kidney Disease Epidemiology Collaboration (CKD-EPI) equation refit without adjustment for race.       Narrative:      GFR Normal >60  Chronic Kidney Disease <60  Kidney Failure <15      CBC & Differential [[id number]]  (Abnormal) Collected: 07/09/22 1236    Specimen: Blood Updated: 07/09/22 1258    Narrative:      The following orders were created for panel order CBC & Differential.  Procedure                               Abnormality         Status                     ---------                               -----------         ------                     CBC Auto Differential[[id number]]        Abnormal            Final result               Scan Slide[[id number]]                                                                    Please view results for these tests on the individual orders.    CBC Auto Differential  [[id number]]  (Abnormal) Collected: 07/09/22 1236    Specimen: Blood Updated: 07/09/22 1258     WBC 6.90 10*3/mm3      RBC 4.28 10*6/mm3      Hemoglobin 12.9 g/dL      Hematocrit 39.7 %      MCV 92.8 fL      MCH 30.1 pg      MCHC 32.5 g/dL      RDW 15.5 %      RDW-SD 49.9 fl      MPV 7.9 fL      Platelets 159 10*3/mm3      Neutrophil % 60.1 %      Lymphocyte % 15.8 %      Monocyte % 16.6 %      Eosinophil % 6.5 %      Basophil % 1.0 %      Neutrophils, Absolute 4.10 10*3/mm3      Lymphocytes, Absolute 1.10 10*3/mm3      Monocytes, Absolute 1.10 10*3/mm3      Eosinophils, Absolute 0.40 10*3/mm3      Basophils, Absolute 0.10 10*3/mm3      nRBC 0.0 /100 WBC            Imaging Results (Last 48 Hours)     Procedure Component Value Units Date/Time    XR Foot 2 View Right [[id number]] Collected: 07/09/22 1407     Updated: 07/09/22 1411    Narrative:      DATE OF EXAM:  7/9/2022 1:58 PM     PROCEDURE:  XR FOOT 2 VW RIGHT-     INDICATIONS:  Wound between first and second toe maggots present.  Cellulitis;  L03.115-Cellulitis of right lower limb; B87.9-Myiasis, unspecified     COMPARISON:  No Comparisons Available     TECHNIQUE:   A minimum of two routine standard radiographic views were obtained of  the right foot.     FINDINGS:  There is diffuse soft tissue swelling of the right foot bones are  osteopenic. There is no acute appearing fracture. Postoperative findings  noted in the ankle at the medial malleolus and the distal fibula with  fixation screws. The calcaneus is intact. Small vessel vascular  calcifications noted.        Impression:      1. Extensive soft tissue swelling of the right foot may relate to edema  and/or cellulitis.  2. No discrete area of osseous erosion.  3. No acute appearing fracture.  4. Additional chronic findings above.     Electronically Signed By-Renny Johnson MD On:7/9/2022 2:09 PM  This report was finalized on [id number] by  Renny Johnson MD.               I reviewed the patient's new clinical  results.    History reviewed. No pertinent past medical history.  History reviewed. No pertinent surgical history.      Medication Review:   Scheduled Meds:allopurinol, 100 mg, Oral, Daily  chlordiazePOXIDE, 10 mg, Oral, BID  enoxaparin, 40 mg, Subcutaneous, Q24H  fluconazole, 100 mg, Oral, Q24H  FLUoxetine, 10 mg, Oral, Daily  folic acid, 1 mg, Oral, Daily  hydrALAZINE, 25 mg, Oral, Daily  hydrALAZINE, 50 mg, Oral, Q PM  levothyroxine, 175 mcg, Oral, Daily  nystatin, , Topical, Q12H  pantoprazole, 40 mg, Oral, Daily  saccharomyces boulardii, 250 mg, Oral, BID  sodium chloride, 3 mL, Intravenous, Q12H  thiamine, 100 mg, Oral, Daily  vancomycin, 15 mg/kg (Adjusted), Intravenous, Q18H      Continuous Infusions:Pharmacy to Dose enoxaparin (LOVENOX),   Pharmacy to dose vancomycin,       PRN Meds:.aluminum-magnesium hydroxide-simethicone  •  calcium carbonate  •  chlordiazePOXIDE  •  ondansetron **OR** ondansetron  •  Pharmacy to Dose enoxaparin (LOVENOX)  •  Pharmacy to dose vancomycin  •  [COMPLETED] Insert peripheral IV **AND** sodium chloride  •  sodium chloride  •  traMADol     Assessment & Plan   ACUTE CELLULITIS OF THE RIGHT FOOT---IV ATB; DR WALL TO ADVISE; CEFEPINE STARTED IN THE ER AND WOULD LIKE TO DISCONTINUE WITH CONFUSION AND CKD HISTORY  TINEA---NYSTATIN POWDER AND DIFLUCAN  SOCIAL---[facility] AND OUTSIDE AGENCIES TO HELP DAUGHTER AS SHE CANNOT DO ALL CARE FOR ALL THE DEMANDS ON HER TIME  ETOH ABUSE---WILLING TO REDUCE TO NOT > A 6 PACK DAILY ONCE DISCHARGED  HYPONATREMIA---SECONDARY TO BEER INTAKE  DM II---STABLE  CKD3--STABLE  HTN---WILL ADJUST THE HYDRALAZINE DOSE  HYPOTHYROID---STABLE  GOUT---STABLE  GERD---PPI  DEPRESSION---STABLE WITH PROZAC  Active Problems:    Cellulitis of right foot          Plan for disposition:HOME WITH [facility] AND OUTSIDE [facility] PRIVATE PAY SERVICES AS WELL    Montse Dawkins MD  07/11/22  06:42 EDT

## 2022-07-11 NOTE — PROGRESS NOTES
Infectious Diseases Progress Note      LOS: 2 days   Patient Care Team:  Montse Dawkins MD as PCP - General  Montse Dawkins MD as PCP - Family Medicine  Huey Keyes MD as Consulting Physician (Nephrology)    Chief Complaint: Leg edema    Subjective     The patient had no fever during the last 24 hours.  He remained hemodynamically stable.  He is awake and alert.    Review of Systems:   Review of Systems   Constitutional: Negative.    HENT: Negative.    Eyes: Negative.    Respiratory: Negative.    Cardiovascular: Positive for leg swelling.   Gastrointestinal: Negative.    Genitourinary: Negative.    Musculoskeletal: Negative.    Skin: Positive for rash.   Neurological: Negative.    Hematological: Negative.    Psychiatric/Behavioral: Negative.         Objective     Vital Signs  Temp:  [96.7 °F (35.9 °C)-98.4 °F (36.9 °C)] 98.4 °F (36.9 °C)  Heart Rate:  [57-87] 75  Resp:  [18-20] 20  BP: (137-182)/(75-90) 137/75    Physical Exam:  Physical Exam  Vitals and nursing note reviewed.   Constitutional:       Appearance: He is well-developed.   HENT:      Head: Normocephalic and atraumatic.   Eyes:      Pupils: Pupils are equal, round, and reactive to light.   Cardiovascular:      Rate and Rhythm: Normal rate and regular rhythm.      Heart sounds: Normal heart sounds.   Pulmonary:      Effort: Pulmonary effort is normal. No respiratory distress.      Breath sounds: Normal breath sounds. No wheezing or rales.   Abdominal:      General: Bowel sounds are normal. There is no distension.      Palpations: Abdomen is soft. There is no mass.      Tenderness: There is no abdominal tenderness. There is no guarding or rebound.   Musculoskeletal:         General: No deformity. Normal range of motion.      Cervical back: Normal range of motion and neck supple.      Right lower leg: Edema present.      Left lower leg: Edema present.      Comments: Chronic venous stasis changes bilaterally with very subtle superimposed erythema  mostly on the left leg   Skin:     General: Skin is warm.      Findings: No erythema or rash.   Neurological:      Mental Status: He is alert and oriented to person, place, and time.      Cranial Nerves: No cranial nerve deficit.          Results Review:    I have reviewed all clinical data, test, lab, and imaging results.     Radiology  No Radiology Exams Resulted Within Past 24 Hours    Cardiology    Laboratory    Results from last 7 days   Lab Units 07/11/22  0748 07/10/22  0136 07/09/22  1236   WBC 10*3/mm3 7.60 6.30 6.90   HEMOGLOBIN g/dL 12.8* 11.8* 12.9*   HEMATOCRIT % 39.2 35.7* 39.7   PLATELETS 10*3/mm3 184 161 159     Results from last 7 days   Lab Units 07/11/22  0748 07/10/22  0136 07/09/22  1236   SODIUM mmol/L 135* 127* 125*   POTASSIUM mmol/L 4.1 4.2 4.2   CHLORIDE mmol/L 96* 95* 90*   CO2 mmol/L 29.0 24.0 23.0   BUN mg/dL 10 9 9   CREATININE mg/dL 1.01 1.11 1.10   GLUCOSE mg/dL 103* 119* 103*   ALBUMIN g/dL 3.30* 3.50 3.80   BILIRUBIN mg/dL 0.5 0.8 0.8   ALK PHOS U/L 40 37* 42   AST (SGOT) U/L 13 11 13   ALT (SGPT) U/L 8 6 7   CALCIUM mg/dL 8.9 8.7 8.9                 Microbiology   Microbiology Results (last 10 days)     Procedure Component Value - Date/Time    COVID PRE-OP / PRE-PROCEDURE SCREENING ORDER (NO ISOLATION) - Swab, Nasopharynx [[id number]]  (Normal) Collected: 07/09/22 1658    Lab Status: Final result Specimen: Swab from Nasopharynx Updated: 07/09/22 1721    Narrative:      The following orders were created for panel order COVID PRE-OP / PRE-PROCEDURE SCREENING ORDER (NO ISOLATION) - Swab, Nasopharynx.  Procedure                               Abnormality         Status                     ---------                               -----------         ------                     COVID-19,CEPHEID/SHELDON,CO...[[id number]]  Normal              Final result                 Please view results for these tests on the individual orders.    COVID-19,CEPHEID/SHELDON,COR/STACIA/PAD/CHARLES IN-HOUSE(OR  EMERGENT/ADD-ON),NP SWAB IN TRANSPORT MEDIA 3-4 HR TAT, RT-PCR - Swab, Nasopharynx [[id number]]  (Normal) Collected: 07/09/22 1658    Lab Status: Final result Specimen: Swab from Nasopharynx Updated: 07/09/22 1721     COVID19 Not Detected    Narrative:      Fact sheet for providers: https://www.fda.gov/media/609862/download     Fact sheet for patients: https://www.fda.gov/media/852285/download  Fact sheet for providers: https://www.fda.gov/media/353234/download    Fact sheet for patients: https://www.[employer].gov/media/529089/download    Test performed by PCR.    Blood Culture - Blood, Arm, Left [[id number]]  (Normal) Collected: 07/09/22 1349    Lab Status: Preliminary result Specimen: Blood from Arm, Left Updated: 07/11/22 1402     Blood Culture No growth at 2 days    Blood Culture - Blood, Arm, Right [[id number]]  (Normal) Collected: 07/09/22 1236    Lab Status: Preliminary result Specimen: Blood from Arm, Right Updated: 07/11/22 1246     Blood Culture No growth at 2 days          Medication Review:       Schedule Meds  allopurinol, 100 mg, Oral, Daily  bumetanide, 1 mg, Oral, Daily  chlordiazePOXIDE, 10 mg, Oral, BID  enoxaparin, 40 mg, Subcutaneous, Q24H  fluconazole, 100 mg, Oral, Q24H  FLUoxetine, 10 mg, Oral, Daily  folic acid, 1 mg, Oral, Daily  hydrALAZINE, 50 mg, Oral, Q12H  levothyroxine, 175 mcg, Oral, Daily  nystatin, , Topical, Q12H  pantoprazole, 40 mg, Oral, Daily  saccharomyces boulardii, 250 mg, Oral, BID  sodium chloride, 3 mL, Intravenous, Q12H  sulfamethoxazole-trimethoprim, 1 tablet, Oral, Q12H  thiamine, 100 mg, Oral, Daily        Infusion Meds  Pharmacy to Dose enoxaparin (LOVENOX),         PRN Meds  aluminum-magnesium hydroxide-simethicone  •  calcium carbonate  •  chlordiazePOXIDE  •  ondansetron **OR** ondansetron  •  Pharmacy to Dose enoxaparin (LOVENOX)  •  [COMPLETED] Insert peripheral IV **AND** sodium chloride  •  sodium chloride  •  traMADol        Assessment & Plan       Antimicrobial  Therapy   1.         2.        3.        4.        5.            Assessment     Bilateral venous stasis changes with possible mild superimposed cellulitis.  Mostly noted on the left leg.  The patient has no clinical signs of sepsis with normal white count     Patient denies significant history other than alcohol abuse.  He apparently drinks approximately 20 beers a day.  Apparently has issues with hygiene/ADLs because of his drinking     History of C. difficile colitis      Plan     Discontinue IV vancomycin  Start Bactrim DS 1 tablet p.o. twice daily for 7 days  Probable discharge soon home versus rehab           Moise Liu MD  07/11/22  14:04 EDT    Note is dictated utilizing voice recognition software/Dragon

## 2022-07-11 NOTE — NURSING NOTE
WOCN note:    76 yr old male admitted 7/9/22 with cellulitis of the right foot. The patient reported he had a wound in the right 1st-2nd toe space with maggots noted.     No wound noted today upon assessment. Patient has current orders for cleansing and Nystatin powder to the toe spaces. Will continue current treatment and follow as needed.

## 2022-07-11 NOTE — CASE MANAGEMENT/SOCIAL WORK
Discharge Planning Assessment  [facility]     Patient Name: Rommel Mcfarland  MRN: [id number]  Today's Date: 7/11/2022    Admit Date: 7/9/2022     Discharge Needs Assessment     Row Name 07/11/22 1354       Living Environment    People in Home alone    Current Living Arrangements home    Primary Care Provided by self    Provides Primary Care For no one, unable/limited ability to care for self    Family Caregiver if Needed child(any), adult    Quality of Family Relationships helpful    Able to Return to Prior Arrangements no       Resource/Environmental Concerns    Resource/Environmental Concerns none    Transportation Concerns none       Transition Planning    Patient/Family Anticipates Transition to inpatient rehabilitation facility    Patient/Family Anticipated Services at Transition rehabilitation services    Transportation Anticipated family or friend will provide       Discharge Needs Assessment    Readmission Within the Last 30 Days no previous admission in last 30 days    Current Outpatient/Agency/Support Group homecare agency    Equipment Currently Used at Home cane, straight;walker, rolling    Concerns to be Addressed discharge planning    Anticipated Changes Related to Illness inability to care for self    Equipment Needed After Discharge none    Outpatient/Agency/Support Group Needs skilled nursing facility    Provided Post Acute Provider List? Yes    Post Acute Provider List Nursing Home    Delivered To Support Person    Support Person carmina Gates    Method of Delivery Telephone    Current Discharge Risk physical impairment;lives alone               Discharge Plan     Row Name 07/11/22 7484       Plan    Plan Referral to [facility], pending acceptance. Facility to complete PASRR. No precert required. Next choice [facility].    Patient/Family in Agreement with Plan yes    Plan Comments Met with patient at bedside, reports he lives at home alone. Has a couple caregivers that come in to assist him in  the home. Does not drive, daughter or friends provide transportation. PCP and pharmacy confirmed. No issues affording food or medications. Informed Ayesha DOUGLAS of patients ETOH abuse and need for additional caregiver resources at home. Patient is agreeable to home health on d/c and he choose [facility] since that was being recommended by Dr. Dawkins. Referral to [facility], pending acceptance. Later CM met with patient again to inform therapy recommends skilled rehab on d/c, he is hesitant to be agreeable but agreeable for CM to call his daughter to discuss d/c plan. Called and spoke to daughter Yajaira, she reports she has private pay caregivers that come in the home 4x/week. She is working to arrange additional caregivers at home but having issues finding an agency with staffing, informed Ayesha DOUGLAS to follow up for additional resources. Informed of therapy recommendations and she is agreeable as long as this is recommended by Dr. Dawkins as well. Informed Dr. Dawkins via secure chat and she is agreeable. Informed daughter of recommendations from Dr. Dawkins and her top rehab choices are 1. [facility], 2. [facility] and 3. [facility].    Update 1600-- Received notice from [facility] that patient is declined due to no bed available. Referral to liaison with [facility] as next choice, pending acceptance.              Continued Care and Services - Admitted Since 7/9/2022     Destination     Service Provider Request Status Selected Services Address Phone Fax Patient Preferred    [facility]  Pending - Request Sent N/A [address] [place] [address] [phone] --    [facility]  Pending - Request Sent N/A [address], [place] [address] [phone] --          Home Medical Care     Service Provider Request Status Selected Services Address Phone Fax Patient Preferred    [facility]  Accepted N/A [address] [place]  [address] [phone] --              Expected Discharge Date and Time     Expected Discharge Date Expected Discharge Time    Jul 13, 2022          Demographic Summary     Row Name 07/11/22 1352       General Information    Admission Type inpatient    Arrived From emergency department    Required Notices Provided Important Message from Medicare    Referral Source admission list    Reason for Consult discharge planning    Preferred Language English               Functional Status     Row Name 07/11/22 1352       Functional Status    Usual Activity Tolerance moderate    Current Activity Tolerance fair       Functional Status, IADL    Medications independent    Meal Preparation assistive person    Housekeeping assistive person    Laundry assistive person    Shopping assistive person            Met with patient in room wearing PPE: mask    Maintained distance greater than six feet and spent less than 15 minutes in the room.        Minal Munson RN

## 2022-07-11 NOTE — THERAPY EVALUATION
Patient Name: Rommel Mcfarland  : 1945    MRN: [id number]                              Today's Date: 2022       Admit Date: 2022    Visit Dx:     ICD-10-CM ICD-9-CM   1. Cellulitis of right foot  L03.115 682.7   2. Infestation, maggots  B87.9 134.0     Patient Active Problem List   Diagnosis   • Cellulitis of right foot     History reviewed. No pertinent past medical history.  History reviewed. No pertinent surgical history.   General Information     [facility] Name 22 1114          Physical Therapy Time and Intention    Document Type evaluation  -     Mode of Treatment physical therapy  -[facility] Name 22 111          General Information    Patient Profile Reviewed yes  -JR     Prior Level of Function mod assist:;community mobility  -     Barriers to Rehab cognitive status  -[facility] Name 22 111          Living Environment    People in Home alone;other (see comments)  has life lock he states  -[facility] Name 22 111          Cognition    Orientation Status (Cognition) oriented x 3  -[facility] Name 22 111          Safety Issues, Functional Mobility    Safety Issues Affecting Function (Mobility) positioning of assistive device  -     Impairments Affecting Function (Mobility) balance;strength;endurance/activity tolerance  -           User Key  (r) = Recorded By, (t) = Taken By, (c) = Cosigned By    Initials Name Provider Type    JR Jessy Harris, PT Physical Therapist               Mobility     Row Name 22 111          Bed Mobility    Bed Mobility bed mobility (all) activities  -     All Activities, [place] (Bed Mobility) minimum assist (75% patient effort)  -     Row Name 22 111          Bed-Chair Transfer    Bed-Chair [place] (Transfers) minimum assist (75% patient effort)  -     Assistive Device (Bed-Chair Transfers) walker, front-wheeled  -     Row Name 22 111          Sit-Stand Transfer    Sit-Stand [place]  (Transfers) modified independence  -     Assistive Device (Sit-Stand Transfers) walker, front-wheeled  -     Comment, (Sit-Stand Transfer) needs bed at highest level  -     Row Name 07/11/22 1115          Gait/Stairs (Locomotion)    [place] Level (Gait) minimum assist (75% patient effort)  -     Assistive Device (Gait) walker, front-wheeled  -JR     Distance in Feet (Gait) 3'  -JR     Deviations/Abnormal Patterns (Gait) festinating/shuffling;other (see comments)  no heel strike or toe off  -           User Key  (r) = Recorded By, (t) = Taken By, (c) = Cosigned By    Initials Name Provider Type     Jessy Harris, PT Physical Therapist               Obj/Interventions     Row Name 07/11/22 1117          Range of Motion Comprehensive    General Range of Motion no range of motion deficits identified  -     Row Name 07/11/22 1117          Strength Comprehensive (MMT)    General Manual Muscle Testing (MMT) Assessment lower extremity strength deficits identified  -     Comment, General Manual Muscle Testing (MMT) Assessment LE grossly graded 4-/5  -JR     Row Name 07/11/22 1117          Balance    Balance Assessment sitting static balance;sitting dynamic balance;standing static balance;standing dynamic balance  -JR     Static Sitting Balance independent  -JR     Dynamic Sitting Balance independent  -JR     Static Standing Balance modified independence  -     Dynamic Standing Balance minimal assist  -           User Key  (r) = Recorded By, (t) = Taken By, (c) = Cosigned By    Initials Name Provider Type     Jessy Harris, PT Physical Therapist               Goals/Plan     Row Name 07/11/22 1122          Bed Mobility Goal 1 (PT)    Activity/Assistive Device (Bed Mobility Goal 1, PT) bed mobility activities, all  -JR     [place] Level/Cues Needed (Bed Mobility Goal 1, PT) modified independence  -JR     Time Frame (Bed Mobility Goal 1, PT) 2 weeks  -     Progress/Outcomes (Bed Mobility  Goal 1, PT) continuing progress toward goal  -JR     Row Name 07/11/22 1122          Transfer Goal 1 (PT)    Activity/Assistive Device (Transfer Goal 1, PT) transfers, all  -JR     [place] Level/Cues Needed (Transfer Goal 1, PT) modified independence  -JR     Time Frame (Transfer Goal 1, PT) 2 weeks  -JR     Progress/Outcome (Transfer Goal 1, PT) continuing progress toward goal  -JR     Row Name 07/11/22 1122          Gait Training Goal 1 (PT)    Activity/Assistive Device (Gait Training Goal 1, PT) gait (walking locomotion);assistive device use;decrease fall risk;diminish gait deviation;forward stepping;increase endurance/gait distance  -JR     [place] Level (Gait Training Goal 1, PT) modified independence  -JR     Distance (Gait Training Goal 1, PT) 50'  -JR     Time Frame (Gait Training Goal 1, PT) 2 weeks  -JR     Row Name 07/11/22 1122          Therapy Assessment/Plan (PT)    Planned Therapy Interventions (PT) balance training;bed mobility training;gait training;home exercise program;patient/family education;transfer training;strengthening  -JR           User Key  (r) = Recorded By, (t) = Taken By, (c) = Cosigned By    Initials Name Provider Type    Jessy Talley, PT Physical Therapist               Clinical Impression     Row Name 07/11/22 1117          Plan of Care Review    Plan of Care Reviewed With patient  -JR     Progress no change  -JR     Outcome Evaluation Patient admitted with complications with LE wound. He is alert and oriented.  States he lives alone but has paid caregivers throughout the week and family nearby to check on him.  He states he also has life lock.  He demonstrates LE weakness and issues with transfers, come to stand and gait.  He is able come to stand with modifications of raising bed to highest level.. he could not come to stand from toilet or chair ... he is very unsteady with gait and poor gait dynamics (no heel strike or toe off) and is a high fall risk.  He would  not be safe to return home to live alone at this time and PT recommends SNF until LE strength increases and fall risk decreases.  -     Row Name 07/11/22 1117          Therapy Assessment/Plan (PT)    Rehab Potential (PT) good, to achieve stated therapy goals  -     Criteria for Skilled Interventions Met (PT) yes  -JR     Therapy Frequency (PT) 5 times/wk  -JR     Row Name 07/11/22 1117          Positioning and Restraints    Pre-Treatment Position in bed  -JR     Post Treatment Position bed  -JR     In Bed notified nsg;supine;call light within reach;encouraged to call for assist;exit alarm on  -JR           User Key  (r) = Recorded By, (t) = Taken By, (c) = Cosigned By    Initials Name Provider Type    Jessy Talley, PT Physical Therapist               Outcome Measures     Row Name 07/11/22 1123 07/11/22 0800       How much help from another person do you currently need...    Turning from your back to your side while in flat bed without using bedrails? 4  -JR 4  -CR    Moving from lying on back to sitting on the side of a flat bed without bedrails? 3  -JR 4  -CR    Moving to and from a bed to a chair (including a wheelchair)? 3  -JR 3  -CR    Standing up from a chair using your arms (e.g., wheelchair, bedside chair)? 3  -JR 3  -CR    Climbing 3-5 steps with a railing? 2  -JR 2  -CR    To walk in hospital room? 2  -JR 2  -CR    AM-PAC 6 Clicks Score (PT) 17  -JR 18  -CR          User Key  (r) = Recorded By, (t) = Taken By, (c) = Cosigned By    Initials Name Provider Type    Yael Mills, RN Registered Nurse    Jessy Talley, PT Physical Therapist                             Physical Therapy Education                 Title: PT OT SLP Therapies (In Progress)     Topic: Physical Therapy (In Progress)     Point: Mobility training (In Progress)     Learning Progress Summary           Patient Acceptance, E, NR by  at 7/11/2022 1123    Comment: safety education with use of AD to decrease falls and  improve gait mechanics                               User Key     Initials Effective Dates Name Provider Type Discipline     06/16/21 -  Jessy Harris PT Physical Therapist PT              PT Recommendation and Plan  Planned Therapy Interventions (PT): balance training, bed mobility training, gait training, home exercise program, patient/family education, transfer training, strengthening  Plan of Care Reviewed With: patient  Progress: no change  Outcome Evaluation: Patient admitted with complications with LE wound. He is alert and oriented.  States he lives alone but has paid caregivers throughout the week and family nearby to check on him.  He states he also has life lock.  He demonstrates LE weakness and issues with transfers, come to stand and gait.  He is able come to stand with modifications of raising bed to highest level.. he could not come to stand from toilet or chair ... he is very unsteady with gait and poor gait dynamics (no heel strike or toe off) and is a high fall risk.  He would not be safe to return home to live alone at this time and PT recommends SNF until LE strength increases and fall risk decreases.     Time Calculation:    PT Charges     Row Name 07/11/22 1125             Time Calculation    Start Time 1042  -      Stop Time 1115  -      Time Calculation (min) 33 min  -      PT Received On 07/11/22  -      PT - Next Appointment 07/12/22  -      PT Goal Re-Cert Due Date 07/25/22  -            User Key  (r) = Recorded By, (t) = Taken By, (c) = Cosigned By    Initials Name Provider Type     Jessy Harris PT Physical Therapist              Therapy Charges for Today     Code Description Service Date Service Provider Modifiers Qty    [id number] HC PT EVAL MOD COMPLEXITY 4 7/11/2022 Jessy Harris, PT GP 1          PT G-Codes  AM-PAC 6 Clicks Score (PT): 17    Jessy Harris PT  7/11/2022

## 2022-07-11 NOTE — PLAN OF CARE
Problem: Adult Inpatient Plan of Care  Goal: Plan of Care Review  Outcome: Ongoing, Progressing  Goal: Patient-Specific Goal (Individualized)  Outcome: Ongoing, Progressing  Goal: Absence of Hospital-Acquired Illness or Injury  Outcome: Ongoing, Progressing  Intervention: Identify and Manage Fall Risk  Recent Flowsheet Documentation  Taken 7/11/2022 1800 by Yael Menjivar RN  Safety Promotion/Fall Prevention: safety round/check completed  Taken 7/11/2022 1600 by Yael Menjivar RN  Safety Promotion/Fall Prevention: safety round/check completed  Taken 7/11/2022 1400 by Yael Menjivar RN  Safety Promotion/Fall Prevention: safety round/check completed  Taken 7/11/2022 1200 by Yael Menjivar RN  Safety Promotion/Fall Prevention: safety round/check completed  Taken 7/11/2022 1000 by Yael Menjivar RN  Safety Promotion/Fall Prevention: safety round/check completed  Taken 7/11/2022 0800 by Yael Menjivar RN  Safety Promotion/Fall Prevention:   activity supervised   assistive device/personal items within reach   clutter free environment maintained   nonskid shoes/slippers when out of bed  Goal: Optimal Comfort and Wellbeing  Outcome: Ongoing, Progressing  Goal: Readiness for Transition of Care  Outcome: Ongoing, Progressing     Problem: Fall Injury Risk  Goal: Absence of Fall and Fall-Related Injury  Outcome: Ongoing, Progressing  Intervention: Promote Injury-Free Environment  Recent Flowsheet Documentation  Taken 7/11/2022 1800 by Yael Menjivar RN  Safety Promotion/Fall Prevention: safety round/check completed  Taken 7/11/2022 1600 by Yael Menjivar RN  Safety Promotion/Fall Prevention: safety round/check completed  Taken 7/11/2022 1400 by Yael Menjivar RN  Safety Promotion/Fall Prevention: safety round/check completed  Taken 7/11/2022 1200 by Yael Menjivar RN  Safety Promotion/Fall Prevention: safety round/check completed  Taken 7/11/2022 1000 by Yael Menjivar  RN  Safety Promotion/Fall Prevention: safety round/check completed  Taken 7/11/2022 0800 by Yael Menjivar RN  Safety Promotion/Fall Prevention:   activity supervised   assistive device/personal items within reach   clutter free environment maintained   nonskid shoes/slippers when out of bed     Problem: Pain Acute  Goal: Acceptable Pain Control and Functional Ability  Outcome: Ongoing, Progressing     Problem: Skin Injury Risk Increased  Goal: Skin Health and Integrity  Outcome: Ongoing, Progressing     Problem: Alcohol Withdrawal  Goal: Alcohol Withdrawal Symptom Control  Outcome: Ongoing, Progressing     Problem: Acute Neurologic Deterioration (Alcohol Withdrawal)  Goal: Optimal Neurologic Function  Outcome: Ongoing, Progressing     Problem: Substance Misuse (Alcohol Withdrawal)  Goal: Readiness for Change Identified  Outcome: Ongoing, Progressing     Problem: Activity Intolerance  Goal: Enhanced Capacity and Energy  Outcome: Ongoing, Progressing     Problem: Skin or Soft Tissue Infection  Goal: Absence of Infection Signs and Symptoms  Outcome: Ongoing, Progressing     Problem: Impaired Wound Healing  Goal: Optimal Wound Healing  Outcome: Ongoing, Progressing   Goal Outcome Evaluation:

## 2022-07-11 NOTE — PLAN OF CARE
Goal Outcome Evaluation:  Plan of Care Reviewed With: patient        Progress: no change  Outcome Evaluation: Patient admitted with complications with LE wound. He is alert and oriented.  States he lives alone but has paid caregivers throughout the week and family nearby to check on him.  He states he also has life lock.  He demonstrates LE weakness and issues with transfers, come to stand and gait.  He is able come to stand with modifications of raising bed to highest level.. he could not come to stand from toilet or chair ... he is very unsteady with gait and poor gait dynamics (no heel strike or toe off) and is a high fall risk.  He would not be safe to return home to live alone at this time and PT recommends SNF until LE strength increases and fall risk decreases.

## 2022-07-11 NOTE — PLAN OF CARE
Goal Outcome Evaluation:  Plan of Care Reviewed With: patient        Progress: improving   Nystatin powder applied in between toes, pt receiving IV antibiotics. No complaints of pain. Resting throughout night with no complaints, will continue to monitor.

## 2022-07-11 NOTE — PROGRESS NOTES
"NEPHROLOGY PROGRESS NOTE------[facility]    [facility]  [phone]  Huey Keyes MD      Patient Care Team:  Montse Dawkins MD as PCP - General  Montse Dawkins MD as PCP - Family Medicine  Huey Keyes MD as Consulting Physician (Nephrology)      Provider:  Huey Keyes MD  Patient Name: Rommel Mcfarland  :  1945    SUBJECTIVE:  F/u hyponatremia  NO chest pain or SOA. Breathing better    Medication:  allopurinol, 100 mg, Oral, Daily  chlordiazePOXIDE, 10 mg, Oral, BID  enoxaparin, 40 mg, Subcutaneous, Q24H  fluconazole, 100 mg, Oral, Q24H  FLUoxetine, 10 mg, Oral, Daily  folic acid, 1 mg, Oral, Daily  hydrALAZINE, 50 mg, Oral, Q PM  hydrALAZINE, 50 mg, Oral, Daily With Breakfast  levothyroxine, 175 mcg, Oral, Daily  nystatin, , Topical, Q12H  pantoprazole, 40 mg, Oral, Daily  saccharomyces boulardii, 250 mg, Oral, BID  sodium chloride, 3 mL, Intravenous, Q12H  thiamine, 100 mg, Oral, Daily  vancomycin, 15 mg/kg (Adjusted), Intravenous, Q18H      Pharmacy to Dose enoxaparin (LOVENOX),   Pharmacy to dose vancomycin,         OBJECTIVE    Vital Sign Min/Max for last 24 hours  Temp  Min: 96.7 °F (35.9 °C)  Max: 98.1 °F (36.7 °C)   BP  Min: 160/85  Max: 182/82   Pulse  Min: 57  Max: 87   Resp  Min: 18  Max: 20   SpO2  Min: 96 %  Max: 98 %   No data recorded   Weight  Min: 108 kg (238 lb 5.1 oz)  Max: 108 kg (238 lb 5.1 oz)     Flowsheet Rows    Flowsheet Row First Filed Value   Admission Height 172.7 cm (68\") Documented at 2022 1217   Admission Weight 99.8 kg (220 lb) Documented at 2022 1217          No intake/output data recorded.  I/O last 3 completed shifts:  In: 960 [P.O.:960]  Out: 4350 [Urine:4350]    Physical Exam:  General Appearance: alert, appears stated age and cooperative  Head: normocephalic, without obvious abnormality and atraumatic  Eyes: conjunctivae and sclerae normal and no icterus  Neck: supple and no " JVD  Lungs: clear to auscultation and respirations regular  Heart: regular rhythm & normal rate and normal S1, S2  Chest: Wall no abnormalities observed  Abdomen: normal bowel sounds and soft non-tender  Extremities: moves extremities well, no edema, no cyanosis  Skin: no bleeding, some erythema right foot  Neurologic: Alert, and oriented. No focal deficits    Labs:    WBC WBC   Date Value Ref Range Status   07/11/2022 7.60 3.40 - 10.80 10*3/mm3 Final   07/10/2022 6.30 3.40 - 10.80 10*3/mm3 Final   07/09/2022 6.90 3.40 - 10.80 10*3/mm3 Final      HGB Hemoglobin   Date Value Ref Range Status   07/11/2022 12.8 (L) 13.0 - 17.7 g/dL Final   07/10/2022 11.8 (L) 13.0 - 17.7 g/dL Final   07/09/2022 12.9 (L) 13.0 - 17.7 g/dL Final      HCT Hematocrit   Date Value Ref Range Status   07/11/2022 39.2 37.5 - 51.0 % Final   07/10/2022 35.7 (L) 37.5 - 51.0 % Final   07/09/2022 39.7 37.5 - 51.0 % Final      Platlets No results found for: LABPLAT   MCV MCV   Date Value Ref Range Status   07/11/2022 93.6 79.0 - 97.0 fL Final   07/10/2022 92.6 79.0 - 97.0 fL Final   07/09/2022 92.8 79.0 - 97.0 fL Final          Sodium Sodium   Date Value Ref Range Status   07/10/2022 127 (L) 136 - 145 mmol/L Final   07/09/2022 125 (L) 136 - 145 mmol/L Final      Potassium Potassium   Date Value Ref Range Status   07/10/2022 4.2 3.5 - 5.2 mmol/L Final   07/09/2022 4.2 3.5 - 5.2 mmol/L Final      Chloride Chloride   Date Value Ref Range Status   07/10/2022 95 (L) 98 - 107 mmol/L Final   07/09/2022 90 (L) 98 - 107 mmol/L Final      CO2 CO2   Date Value Ref Range Status   07/10/2022 24.0 22.0 - 29.0 mmol/L Final   07/09/2022 23.0 22.0 - 29.0 mmol/L Final      BUN BUN   Date Value Ref Range Status   07/10/2022 9 8 - 23 mg/dL Final   07/09/2022 9 8 - 23 mg/dL Final      Creatinine Creatinine   Date Value Ref Range Status   07/10/2022 1.11 0.76 - 1.27 mg/dL Final   07/09/2022 1.10 0.76 - 1.27 mg/dL Final      Calcium Calcium   Date Value Ref Range Status    07/10/2022 8.7 8.6 - 10.5 mg/dL Final   07/09/2022 8.9 8.6 - 10.5 mg/dL Final      PO4 No components found for: PO4   Albumin Albumin   Date Value Ref Range Status   07/10/2022 3.50 3.50 - 5.20 g/dL Final   07/09/2022 3.80 3.50 - 5.20 g/dL Final      Magnesium Magnesium   Date Value Ref Range Status   07/10/2022 1.8 1.6 - 2.4 mg/dL Final      Uric Acid No components found for: URIC ACID     Imaging Results (Last 72 Hours)     Procedure Component Value Units Date/Time    XR Foot 2 View Right [[id number]] Collected: 07/09/22 1407     Updated: 07/09/22 1411    Narrative:      DATE OF EXAM:  7/9/2022 1:58 PM     PROCEDURE:  XR FOOT 2 VW RIGHT-     INDICATIONS:  Wound between first and second toe maggots present.  Cellulitis;  L03.115-Cellulitis of right lower limb; B87.9-Myiasis, unspecified     COMPARISON:  No Comparisons Available     TECHNIQUE:   A minimum of two routine standard radiographic views were obtained of  the right foot.     FINDINGS:  There is diffuse soft tissue swelling of the right foot bones are  osteopenic. There is no acute appearing fracture. Postoperative findings  noted in the ankle at the medial malleolus and the distal fibula with  fixation screws. The calcaneus is intact. Small vessel vascular  calcifications noted.        Impression:      1. Extensive soft tissue swelling of the right foot may relate to edema  and/or cellulitis.  2. No discrete area of osseous erosion.  3. No acute appearing fracture.  4. Additional chronic findings above.     Electronically Signed By-Renny Johnson MD On:7/9/2022 2:09 PM  This report was finalized on [id number] by  Renny Johnson MD.          Results for orders placed during the hospital encounter of 07/09/22    XR Foot 2 View Right    Narrative  DATE OF EXAM:  7/9/2022 1:58 PM    PROCEDURE:  XR FOOT 2 VW RIGHT-    INDICATIONS:  Wound between first and second toe maggots present.  Cellulitis;  L03.115-Cellulitis of right lower limb; B87.9-Myiasis,  unspecified    COMPARISON:  No Comparisons Available    TECHNIQUE:  A minimum of two routine standard radiographic views were obtained of  the right foot.    FINDINGS:  There is diffuse soft tissue swelling of the right foot bones are  osteopenic. There is no acute appearing fracture. Postoperative findings  noted in the ankle at the medial malleolus and the distal fibula with  fixation screws. The calcaneus is intact. Small vessel vascular  calcifications noted.    Impression  1. Extensive soft tissue swelling of the right foot may relate to edema  and/or cellulitis.  2. No discrete area of osseous erosion.  3. No acute appearing fracture.  4. Additional chronic findings above.    Electronically Signed By-Renny Johnson MD On:2022 2:09 PM  This report was finalized on [id number] by  Renny Johnson MD.            ASSESSMENT / PLAN      Cellulitis of right foot            Huey Keyes MD  [facility]  [phone]  22  08:36 EDT  NEPHROLOGY PROGRESS NOTE------[facility]    [facility]  [phone]  Huye Keyes MD      Patient Care Team:  Montse Dawkins MD as PCP - General  Montse Dawkins MD as PCP - Family Medicine  Huey Keyes MD as Consulting Physician (Nephrology)      Provider:  Huey Keyes MD  Patient Name: Rommel Mcfarland  :  1945    SUBJECTIVE:      Medication:  allopurinol, 100 mg, Oral, Daily  chlordiazePOXIDE, 10 mg, Oral, BID  enoxaparin, 40 mg, Subcutaneous, Q24H  fluconazole, 100 mg, Oral, Q24H  FLUoxetine, 10 mg, Oral, Daily  folic acid, 1 mg, Oral, Daily  hydrALAZINE, 50 mg, Oral, Q PM  hydrALAZINE, 50 mg, Oral, Daily With Breakfast  levothyroxine, 175 mcg, Oral, Daily  nystatin, , Topical, Q12H  pantoprazole, 40 mg, Oral, Daily  saccharomyces boulardii, 250 mg, Oral, BID  sodium chloride, 3 mL, Intravenous, Q12H  thiamine, 100 mg, Oral, Daily  vancomycin, 15 mg/kg (Adjusted),  "Intravenous, Q18H      Pharmacy to Dose enoxaparin (LOVENOX),   Pharmacy to dose vancomycin,         OBJECTIVE    Vital Sign Min/Max for last 24 hours  Temp  Min: 96.7 °F (35.9 °C)  Max: 98.1 °F (36.7 °C)   BP  Min: 160/85  Max: 182/82   Pulse  Min: 57  Max: 87   Resp  Min: 18  Max: 20   SpO2  Min: 96 %  Max: 98 %   No data recorded   Weight  Min: 108 kg (238 lb 5.1 oz)  Max: 108 kg (238 lb 5.1 oz)     Flowsheet Rows    Flowsheet Row First Filed Value   Admission Height 172.7 cm (68\") Documented at 07/09/2022 1217   Admission Weight 99.8 kg (220 lb) Documented at 07/09/2022 1217          No intake/output data recorded.  I/O last 3 completed shifts:  In: 960 [P.O.:960]  Out: 4350 [Urine:4350]    Physical Exam:  General Appearance: alert, appears stated age and cooperative  Head: normocephalic, without obvious abnormality and atraumatic  Eyes: conjunctivae and sclerae normal and no icterus  Neck: supple and no JVD  Lungs: clear to auscultation and respirations regular  Heart: regular rhythm & normal rate and normal S1, S2  Chest: Wall no abnormalities observed  Abdomen: normal bowel sounds and soft non-tender  Extremities: moves extremities well, no edema, no cyanosis and no redness  Skin: no bleeding, bruising or rash, turgor normal, color normal and no lesions noted  Neurologic: Alert, and oriented. No focal deficits    Labs:    WBC WBC   Date Value Ref Range Status   07/11/2022 7.60 3.40 - 10.80 10*3/mm3 Final   07/10/2022 6.30 3.40 - 10.80 10*3/mm3 Final   07/09/2022 6.90 3.40 - 10.80 10*3/mm3 Final      HGB Hemoglobin   Date Value Ref Range Status   07/11/2022 12.8 (L) 13.0 - 17.7 g/dL Final   07/10/2022 11.8 (L) 13.0 - 17.7 g/dL Final   07/09/2022 12.9 (L) 13.0 - 17.7 g/dL Final      HCT Hematocrit   Date Value Ref Range Status   07/11/2022 39.2 37.5 - 51.0 % Final   07/10/2022 35.7 (L) 37.5 - 51.0 % Final   07/09/2022 39.7 37.5 - 51.0 % Final      Platlets No results found for: LABPLAT   MCV MCV   Date Value " Ref Range Status   07/11/2022 93.6 79.0 - 97.0 fL Final   07/10/2022 92.6 79.0 - 97.0 fL Final   07/09/2022 92.8 79.0 - 97.0 fL Final          Sodium Sodium   Date Value Ref Range Status   07/10/2022 127 (L) 136 - 145 mmol/L Final   07/09/2022 125 (L) 136 - 145 mmol/L Final      Potassium Potassium   Date Value Ref Range Status   07/10/2022 4.2 3.5 - 5.2 mmol/L Final   07/09/2022 4.2 3.5 - 5.2 mmol/L Final      Chloride Chloride   Date Value Ref Range Status   07/10/2022 95 (L) 98 - 107 mmol/L Final   07/09/2022 90 (L) 98 - 107 mmol/L Final      CO2 CO2   Date Value Ref Range Status   07/10/2022 24.0 22.0 - 29.0 mmol/L Final   07/09/2022 23.0 22.0 - 29.0 mmol/L Final      BUN BUN   Date Value Ref Range Status   07/10/2022 9 8 - 23 mg/dL Final   07/09/2022 9 8 - 23 mg/dL Final      Creatinine Creatinine   Date Value Ref Range Status   07/10/2022 1.11 0.76 - 1.27 mg/dL Final   07/09/2022 1.10 0.76 - 1.27 mg/dL Final      Calcium Calcium   Date Value Ref Range Status   07/10/2022 8.7 8.6 - 10.5 mg/dL Final   07/09/2022 8.9 8.6 - 10.5 mg/dL Final      PO4 No components found for: PO4   Albumin Albumin   Date Value Ref Range Status   07/10/2022 3.50 3.50 - 5.20 g/dL Final   07/09/2022 3.80 3.50 - 5.20 g/dL Final      Magnesium Magnesium   Date Value Ref Range Status   07/10/2022 1.8 1.6 - 2.4 mg/dL Final      Uric Acid No components found for: URIC ACID     Imaging Results (Last 72 Hours)     Procedure Component Value Units Date/Time    XR Foot 2 View Right [[id number]] Collected: 07/09/22 1407     Updated: 07/09/22 1411    Narrative:      DATE OF EXAM:  7/9/2022 1:58 PM     PROCEDURE:  XR FOOT 2 VW RIGHT-     INDICATIONS:  Wound between first and second toe maggots present.  Cellulitis;  L03.115-Cellulitis of right lower limb; B87.9-Myiasis, unspecified     COMPARISON:  No Comparisons Available     TECHNIQUE:   A minimum of two routine standard radiographic views were obtained of  the right foot.     FINDINGS:  There is  diffuse soft tissue swelling of the right foot bones are  osteopenic. There is no acute appearing fracture. Postoperative findings  noted in the ankle at the medial malleolus and the distal fibula with  fixation screws. The calcaneus is intact. Small vessel vascular  calcifications noted.        Impression:      1. Extensive soft tissue swelling of the right foot may relate to edema  and/or cellulitis.  2. No discrete area of osseous erosion.  3. No acute appearing fracture.  4. Additional chronic findings above.     Electronically Signed By-Renny Johnson MD On:7/9/2022 2:09 PM  This report was finalized on [id number] by  Renny Johnson MD.          Results for orders placed during the hospital encounter of 07/09/22    XR Foot 2 View Right    Narrative  DATE OF EXAM:  7/9/2022 1:58 PM    PROCEDURE:  XR FOOT 2 VW RIGHT-    INDICATIONS:  Wound between first and second toe maggots present.  Cellulitis;  L03.115-Cellulitis of right lower limb; B87.9-Myiasis, unspecified    COMPARISON:  No Comparisons Available    TECHNIQUE:  A minimum of two routine standard radiographic views were obtained of  the right foot.    FINDINGS:  There is diffuse soft tissue swelling of the right foot bones are  osteopenic. There is no acute appearing fracture. Postoperative findings  noted in the ankle at the medial malleolus and the distal fibula with  fixation screws. The calcaneus is intact. Small vessel vascular  calcifications noted.    Impression  1. Extensive soft tissue swelling of the right foot may relate to edema  and/or cellulitis.  2. No discrete area of osseous erosion.  3. No acute appearing fracture.  4. Additional chronic findings above.    Electronically Signed By-Renny Johnson MD On:7/9/2022 2:09 PM  This report was finalized on [id number] by  Renny Johnson MD.            ASSESSMENT / PLAN      Cellulitis of right foot    · Hyponatremia-likely in setting of alcohol use and volume overload and or thiazides.  His urine  osmolality is appropriately  256.  We will place him on fluid restriction of 1500 mL/day and gently diurese. Avoid Thiazides  · CKD-patient appears to have underlying CKD with baseline creatinine 1-1.2.  CKD may be related to hypertensive nephrosclerosis  · Cellulitis right foot  · Hypertension  · Chronic alcohol use    Sodium better  Continue bumex 1 mg daily/ fluid restriction  Labs in am        Huey Keyes MD  [facility]  [phone]  07/11/22  08:36 EDT

## 2022-07-11 NOTE — CASE MANAGEMENT/SOCIAL WORK
Social Work Assessment   [facility]     Patient Name: Rommel Mcfarland  MRN: [id number]  Today's Date: 7/11/2022    Admit Date: 7/9/2022     Discharge Needs Assessment     Row Name 07/11/22 1646       Discharge Needs Assessment    Concerns to be Addressed decision-making;substance/tobacco abuse/use    Concerns Comments SW met w/ Pt bedside in room 248. Pt. was lying in bed w/ no visitors present. SW introduced self and told Pt the reason for the consult: Re Alcohol. SW asked Pt about his drinking and Pt stated that he currently drinks a six pack of beer daily. Pt. stated that he had previously been drinking a twelve pack of beer daily but was advised to cut back due to his health. SW asked Pt for permission to contact his daughter, Pt gave SW permission to call her. SW contacted Yajaira Pt daughter and told her about the consult for her father regarding alcohol. Yajaira stated that her father has been drinking since the age of 16 yrs. old and he is 77 yrs. old and he is never going to quit. MISAEL told Yajaira about her conversation with Pt, and she stated that he is allowed to drink one six pack of beer daily. She stated that Pt is aware that if he drinks more and his sodium drops, he will end up in the hospital again and she will have to make some in his best interest. MISAEL asked Yajaira if she could refer Pt for any assistance w/ Division of Aging. Yajaira stated that Pt would not qualify for any assistance due to his pension. Yajaira thanked SW for reaching out and stated that there were no other services needed at this time.    Current Discharge Risk substance use/abuse                                                                                                      Substance Abuse     Row Name 07/11/22 1644       Substance Use    Substance Use Status current alcohol use    Last Alcohol Use 07/09/22    Environment Typically Uses Alcohol with group of people;alone;private residence    Reported Characteristics of Alcohol  Use continue despite physical/psychological problems    Attempts to Quit Alcohol Alcoholics Anonymous    Longest Period of Alcohol Sobriety Unk. Pt's daughter stated that Pt has attempted numerous times and had attended AA for a long time.    Previous Substance Use Treatment support group       AUDIT-C (Alcohol Use Disorders ID Test)    Q1: How often do you have a drink containing alcohol? 4 or more ti    Q2: How many drinks containing alcohol do you have on a typical day when you are drinking? 10 or more    Q3: How often do you have six or more drinks on one occasion? Daily    Audit-C Score 12           Met with patient in room wearing PPE: mask.      Maintained distance greater than six feet and spent less than 15 minutes in the room.    Ayesha Ivey,  [facility]        [phone] office  [phone] fax    [email or web address]

## 2022-07-11 NOTE — DISCHARGE PLACEMENT REQUEST
"Bartolo Rommel SPAULDING (76 y.o. Male)             Date of Birth   1945    Social Security Number       Address   [address] [place]    Home Phone   [phone]    MRN   [id number]       Alevism   Holiness    Marital Status                               Admission Date   7/9/22    Admission Type   Emergency    Admitting Provider   Montse Dawkins MD    Attending Provider   Montse Dawkins MD    Department, Room/Bed   [address] MEDICAL INPATIENT, 248/1       Discharge Date       Discharge Disposition       Discharge Destination                               Attending Provider: Montse Dawkins MD    Allergies: Tigecycline    Isolation: None   Infection: None   Code Status: CPR   Advance Care Planning Activity    Ht: 172.7 cm (67.99\")   Wt: 108 kg (238 lb 5.1 oz)    Admission Cmt: None   Principal Problem: None                Active Insurance as of 7/9/2022     Primary Coverage     Payor Plan Insurance Group Employer/Plan Group    MEDICARE MEDICARE A & B      Payor Plan Address Payor Plan Phone Number Payor Plan Fax Number Effective Dates    [facility] [id number] [phone]  10/1/2008 - None Entered    [facility] [id number]       Subscriber Name Subscriber Birth Date Member ID       ROMMEL LOVING 1945 [id number]           Secondary Coverage     Payor Plan Insurance Group Employer/Plan Group    [employer] [place] [employer] [place]      Payor Plan Address Payor Plan Phone Number Payor Plan Fax Number Effective Dates    [address] [employer] [place] [address] [phone]  10/1/2008 - None Entered    [facility] [id number]       Subscriber Name Subscriber Birth Date Member ID       ROMMEL LOVING 1945 [id number]                 Emergency Contacts      (Rel.) Home Phone Work Phone Mobile Phone    [employer]INT [employer] (Daughter) -- -- [phone]          "

## 2022-07-12 LAB
ALBUMIN SERPL-MCNC: 3.7 G/DL (ref 3.5–5.2)
ALBUMIN/GLOB SERPL: 1.2 G/DL
ALP SERPL-CCNC: 51 U/L (ref 39–117)
ALT SERPL W P-5'-P-CCNC: 7 U/L (ref 1–41)
ANION GAP SERPL CALCULATED.3IONS-SCNC: 12 MMOL/L (ref 5–15)
AST SERPL-CCNC: 11 U/L (ref 1–40)
BILIRUB SERPL-MCNC: 0.4 MG/DL (ref 0–1.2)
BUN SERPL-MCNC: 13 MG/DL (ref 8–23)
BUN/CREAT SERPL: 10.7 (ref 7–25)
CALCIUM SPEC-SCNC: 9.1 MG/DL (ref 8.6–10.5)
CHLORIDE SERPL-SCNC: 93 MMOL/L (ref 98–107)
CO2 SERPL-SCNC: 27 MMOL/L (ref 22–29)
CREAT SERPL-MCNC: 1.22 MG/DL (ref 0.76–1.27)
EGFRCR SERPLBLD CKD-EPI 2021: 60.7 ML/MIN/1.73
GLOBULIN UR ELPH-MCNC: 3 GM/DL
GLUCOSE SERPL-MCNC: 119 MG/DL (ref 65–99)
MAGNESIUM SERPL-MCNC: 1.7 MG/DL (ref 1.6–2.4)
POTASSIUM SERPL-SCNC: 3.8 MMOL/L (ref 3.5–5.2)
PROT SERPL-MCNC: 6.7 G/DL (ref 6–8.5)
SODIUM SERPL-SCNC: 132 MMOL/L (ref 136–145)

## 2022-07-12 PROCEDURE — 25010000002 ENOXAPARIN PER 10 MG: Performed by: FAMILY MEDICINE

## 2022-07-12 RX ADMIN — FLUCONAZOLE 100 MG: 100 TABLET ORAL at 17:05

## 2022-07-12 RX ADMIN — NYSTATIN: 100000 POWDER TOPICAL at 21:16

## 2022-07-12 RX ADMIN — ENOXAPARIN SODIUM 40 MG: 100 INJECTION SUBCUTANEOUS at 17:05

## 2022-07-12 RX ADMIN — CHLORDIAZEPOXIDE HYDROCHLORIDE 10 MG: 10 CAPSULE ORAL at 07:44

## 2022-07-12 RX ADMIN — ALLOPURINOL 100 MG: 100 TABLET ORAL at 07:45

## 2022-07-12 RX ADMIN — Medication 3 ML: at 07:45

## 2022-07-12 RX ADMIN — FLUOXETINE 10 MG: 10 CAPSULE ORAL at 07:46

## 2022-07-12 RX ADMIN — PANTOPRAZOLE SODIUM 40 MG: 40 TABLET, DELAYED RELEASE ORAL at 07:45

## 2022-07-12 RX ADMIN — FOLIC ACID 1 MG: 1 TABLET ORAL at 07:45

## 2022-07-12 RX ADMIN — SULFAMETHOXAZOLE AND TRIMETHOPRIM 1 TABLET: 800; 160 TABLET ORAL at 07:45

## 2022-07-12 RX ADMIN — LEVOTHYROXINE SODIUM 175 MCG: 0.17 TABLET ORAL at 07:45

## 2022-07-12 RX ADMIN — SULFAMETHOXAZOLE AND TRIMETHOPRIM 1 TABLET: 800; 160 TABLET ORAL at 21:16

## 2022-07-12 RX ADMIN — NYSTATIN: 100000 POWDER TOPICAL at 07:47

## 2022-07-12 RX ADMIN — Medication 250 MG: at 07:44

## 2022-07-12 RX ADMIN — CHLORDIAZEPOXIDE HYDROCHLORIDE 10 MG: 10 CAPSULE ORAL at 21:16

## 2022-07-12 RX ADMIN — HYDRALAZINE HYDROCHLORIDE 50 MG: 25 TABLET, FILM COATED ORAL at 21:16

## 2022-07-12 RX ADMIN — Medication 3 ML: at 21:16

## 2022-07-12 RX ADMIN — Medication 100 MG: at 07:45

## 2022-07-12 RX ADMIN — HYDRALAZINE HYDROCHLORIDE 50 MG: 25 TABLET, FILM COATED ORAL at 07:45

## 2022-07-12 RX ADMIN — BUMETANIDE 1 MG: 1 TABLET ORAL at 07:45

## 2022-07-12 RX ADMIN — Medication 250 MG: at 21:16

## 2022-07-12 NOTE — CASE MANAGEMENT/SOCIAL WORK
Continued Stay Note   [facility]     Patient Name: Rommel Mcfarland  MRN: [id number]  Today's Date: 7/12/2022    Admit Date: 7/9/2022     Discharge Plan     Row Name 07/12/22 1354       Plan    Plan Referral to [place], pending acceptance. Facility to complete PASRR. No precert required.    Plan Comments [facility], declined, no bed available. Referral to [place] liaison, pending acceptance. Updated patient at bedside and remains agreeable to d/c plan and states he has been to [facility] in the past. Anticipate d/c tomorrow. ID signed off, patient will d/c on PO abx. Renal following patient.              Met with patient in room wearing PPE: mask    Maintained distance greater than six feet and spent less than 15 minutes in the room.          Minal Munson RN

## 2022-07-12 NOTE — CASE MANAGEMENT/SOCIAL WORK
Continued Stay Note  [facility]     Patient Name: Rommel Mcfarland  MRN: [id number]  Today's Date: 7/12/2022    Admit Date: 7/9/2022     Discharge Plan     Row Name 07/12/22 1458       Plan    Plan Referral to [facility], pending acceptance. No PASRR or precert required.    Patient/Family in Agreement with Plan yes    Post Acute Provider List Inpatient Rehab;Nursing Home    Delivered To Support Person    Support Person daughter Yajaira    Method of Delivery Telephone    Plan Comments Received notice from [facility] that patient is declined due to no bed. Called and informed daughter Yajaira, provided with additional skilled rehab choices and IPR choices related to Medicare waiver that is still in place. She choose [facility]. Referral to [facility], pending acceptance.                       Phone communication or documentation only - no physical contact with patient or family.        Minal Munson RN

## 2022-07-12 NOTE — PLAN OF CARE
Goal Outcome Evaluation:  Plan of Care Reviewed With: patient           Outcome Evaluation: Patient now on P.O antibiotics, wound care done per orders. Dr Dawkins has adjuted blood pressure medications, still running on the higher side this shift. Pt rested well through the night with no complaints, will continue to monitor at this time

## 2022-07-12 NOTE — PROGRESS NOTES
Infectious Diseases Progress Note      LOS: 3 days   Patient Care Team:  Montse Dawkins MD as PCP - General  Montse Dawkins MD as PCP - Family Medicine  Huey Keyes MD as Consulting Physician (Nephrology)    Chief Complaint: Leg edema    Subjective     The patient had no fever during the last 24 hours.  He remained hemodynamically stable.  He is awake and alert.    Review of Systems:   Review of Systems   Constitutional: Negative.    HENT: Negative.    Eyes: Negative.    Respiratory: Negative.    Cardiovascular: Positive for leg swelling.   Gastrointestinal: Negative.    Genitourinary: Negative.    Musculoskeletal: Negative.    Skin: Positive for rash.   Neurological: Negative.    Hematological: Negative.    Psychiatric/Behavioral: Negative.         Objective     Vital Signs  Temp:  [97.5 °F (36.4 °C)-98.4 °F (36.9 °C)] 97.9 °F (36.6 °C)  Heart Rate:  [62-86] 68  Resp:  [17-18] 17  BP: (130-178)/(65-90) 130/74    Physical Exam:  Physical Exam  Vitals and nursing note reviewed.   Constitutional:       Appearance: He is well-developed.   HENT:      Head: Normocephalic and atraumatic.   Eyes:      Pupils: Pupils are equal, round, and reactive to light.   Cardiovascular:      Rate and Rhythm: Normal rate and regular rhythm.      Heart sounds: Normal heart sounds.   Pulmonary:      Effort: Pulmonary effort is normal. No respiratory distress.      Breath sounds: Normal breath sounds. No wheezing or rales.   Abdominal:      General: Bowel sounds are normal. There is no distension.      Palpations: Abdomen is soft. There is no mass.      Tenderness: There is no abdominal tenderness. There is no guarding or rebound.   Musculoskeletal:         General: No deformity. Normal range of motion.      Cervical back: Normal range of motion and neck supple.      Right lower leg: Edema present.      Left lower leg: Edema present.      Comments: Chronic venous stasis changes bilaterally with very subtle superimposed erythema  mostly on the left leg   Skin:     General: Skin is warm.      Findings: No erythema or rash.   Neurological:      Mental Status: He is alert and oriented to person, place, and time.      Cranial Nerves: No cranial nerve deficit.          Results Review:    I have reviewed all clinical data, test, lab, and imaging results.     Radiology  No Radiology Exams Resulted Within Past 24 Hours    Cardiology    Laboratory    Results from last 7 days   Lab Units 07/11/22  2310 07/11/22  0748 07/10/22  0136 07/09/22  1236   WBC 10*3/mm3 7.90 7.60 6.30 6.90   HEMOGLOBIN g/dL 13.1 12.8* 11.8* 12.9*   HEMATOCRIT % 39.7 39.2 35.7* 39.7   PLATELETS 10*3/mm3 196 184 161 159     Results from last 7 days   Lab Units 07/11/22  2310 07/11/22  0748 07/10/22  0136 07/09/22  1236   SODIUM mmol/L 132* 135* 127* 125*   POTASSIUM mmol/L 3.8 4.1 4.2 4.2   CHLORIDE mmol/L 93* 96* 95* 90*   CO2 mmol/L 27.0 29.0 24.0 23.0   BUN mg/dL 13 10 9 9   CREATININE mg/dL 1.22 1.01 1.11 1.10   GLUCOSE mg/dL 119* 103* 119* 103*   ALBUMIN g/dL 3.70 3.30* 3.50 3.80   BILIRUBIN mg/dL 0.4 0.5 0.8 0.8   ALK PHOS U/L 51 40 37* 42   AST (SGOT) U/L 11 13 11 13   ALT (SGPT) U/L 7 8 6 7   CALCIUM mg/dL 9.1 8.9 8.7 8.9                 Microbiology   Microbiology Results (last 10 days)     Procedure Component Value - Date/Time    COVID PRE-OP / PRE-PROCEDURE SCREENING ORDER (NO ISOLATION) - Swab, Nasopharynx [[id number]]  (Normal) Collected: 07/09/22 1658    Lab Status: Final result Specimen: Swab from Nasopharynx Updated: 07/09/22 7811    Narrative:      The following orders were created for panel order COVID PRE-OP / PRE-PROCEDURE SCREENING ORDER (NO ISOLATION) - Swab, Nasopharynx.  Procedure                               Abnormality         Status                     ---------                               -----------         ------                     COVID-19,CEPHEID/SHELDON,CO...[[id number]]  Normal              Final result                 Please view results for  these tests on the individual orders.    COVID-19,CEPHEID/SHELDON,COR/STACIA/PAD/[facility] IN-HOUSE(OR EMERGENT/ADD-ON),NP SWAB IN TRANSPORT MEDIA 3-4 HR TAT, RT-PCR - Swab, Nasopharynx [[id number]]  (Normal) Collected: 07/09/22 1658    Lab Status: Final result Specimen: Swab from Nasopharynx Updated: 07/09/22 1721     COVID19 Not Detected    Narrative:      Fact sheet for providers: https://www.fda.gov/media/124533/download     Fact sheet for patients: https://www.fda.gov/media/354665/download  Fact sheet for providers: https://www.fda.gov/media/061964/download    Fact sheet for patients: https://www.[place].gov/media/589919/download    Test performed by PCR.    Blood Culture - Blood, Arm, Left [[id number]]  (Normal) Collected: 07/09/22 1349    Lab Status: Preliminary result Specimen: Blood from Arm, Left Updated: 07/11/22 1402     Blood Culture No growth at 2 days    Blood Culture - Blood, Arm, Right [[id number]]  (Normal) Collected: 07/09/22 1236    Lab Status: Preliminary result Specimen: Blood from Arm, Right Updated: 07/12/22 1247     Blood Culture No growth at 3 days          Medication Review:       Schedule Meds  allopurinol, 100 mg, Oral, Daily  bumetanide, 1 mg, Oral, Daily  chlordiazePOXIDE, 10 mg, Oral, BID  enoxaparin, 40 mg, Subcutaneous, Q24H  fluconazole, 100 mg, Oral, Q24H  FLUoxetine, 10 mg, Oral, Daily  folic acid, 1 mg, Oral, Daily  hydrALAZINE, 50 mg, Oral, Q12H  levothyroxine, 175 mcg, Oral, Daily  nystatin, , Topical, Q12H  pantoprazole, 40 mg, Oral, Daily  saccharomyces boulardii, 250 mg, Oral, BID  sodium chloride, 3 mL, Intravenous, Q12H  sulfamethoxazole-trimethoprim, 1 tablet, Oral, Q12H  thiamine, 100 mg, Oral, Daily        Infusion Meds  Pharmacy to Dose enoxaparin (LOVENOX),         PRN Meds  aluminum-magnesium hydroxide-simethicone  •  calcium carbonate  •  chlordiazePOXIDE  •  ondansetron **OR** ondansetron  •  Pharmacy to Dose enoxaparin (LOVENOX)  •  [COMPLETED] Insert peripheral IV **AND** sodium  chloride  •  sodium chloride  •  traMADol        Assessment & Plan       Antimicrobial Therapy   1.  P.o. Bactrim        2.        3.        4.        5.            Assessment     Bilateral venous stasis changes with possible mild superimposed cellulitis.  Mostly noted on the left leg.  The patient has no clinical signs of sepsis with normal white count     Patient denies significant history other than alcohol abuse.  He apparently drinks approximately 20 beers a day.  Apparently has issues with hygiene/ADLs because of his drinking     History of C. difficile colitis      Plan     Continue Bactrim DS 1 tablet p.o. twice daily for a total of 7 days  Okay to discharge patient from infectious disease point           Moise Liu MD  07/12/22  13:34 EDT    Note is dictated utilizing voice recognition software/Dragon

## 2022-07-12 NOTE — PROGRESS NOTES
"NEPHROLOGY PROGRESS NOTE------[facility]    [facility]  [phone]  Huey Keyes MD      Patient Care Team:  Montse Dawkins MD as PCP - General  Montse Dawkins MD as PCP - Family Medicine  Huey Keyes MD as Consulting Physician (Nephrology)      Provider:  Huey Keyes MD  Patient Name: Rommel Mcfarland  :  1945    SUBJECTIVE:  F/u hyponatremia  NO chest pain or SOA. Breathing better    Medication:  allopurinol, 100 mg, Oral, Daily  bumetanide, 1 mg, Oral, Daily  chlordiazePOXIDE, 10 mg, Oral, BID  enoxaparin, 40 mg, Subcutaneous, Q24H  fluconazole, 100 mg, Oral, Q24H  FLUoxetine, 10 mg, Oral, Daily  folic acid, 1 mg, Oral, Daily  hydrALAZINE, 50 mg, Oral, Q12H  levothyroxine, 175 mcg, Oral, Daily  nystatin, , Topical, Q12H  pantoprazole, 40 mg, Oral, Daily  saccharomyces boulardii, 250 mg, Oral, BID  sodium chloride, 3 mL, Intravenous, Q12H  sulfamethoxazole-trimethoprim, 1 tablet, Oral, Q12H  thiamine, 100 mg, Oral, Daily      Pharmacy to Dose enoxaparin (LOVENOX),         OBJECTIVE    Vital Sign Min/Max for last 24 hours  Temp  Min: 97.5 °F (36.4 °C)  Max: 98.4 °F (36.9 °C)   BP  Min: 137/75  Max: 176/80   Pulse  Min: 62  Max: 86   Resp  Min: 18  Max: 20   SpO2  Min: 96 %  Max: 97 %   No data recorded   Weight  Min: 106 kg (234 lb 2.1 oz)  Max: 106 kg (234 lb 2.1 oz)     Flowsheet Rows    Flowsheet Row First Filed Value   Admission Height 172.7 cm (68\") Documented at 2022 1217   Admission Weight 99.8 kg (220 lb) Documented at 2022 1217          No intake/output data recorded.  I/O last 3 completed shifts:  In: 900 [P.O.:900]  Out: 2475 [Urine:2475]    Physical Exam:  General Appearance: alert, appears stated age and cooperative  Head: normocephalic, without obvious abnormality and atraumatic  Eyes: conjunctivae and sclerae normal and no icterus  Neck: supple and no JVD  Lungs: clear to auscultation and respirations " regular  Heart: regular rhythm & normal rate and normal S1, S2  Chest: Wall no abnormalities observed  Abdomen: normal bowel sounds and soft non-tender  Extremities: moves extremities well, no edema, no cyanosis  Skin: no bleeding, some erythema right foot  Neurologic: Alert, and oriented. No focal deficits    Labs:    WBC WBC   Date Value Ref Range Status   07/11/2022 7.90 3.40 - 10.80 10*3/mm3 Final   07/11/2022 7.60 3.40 - 10.80 10*3/mm3 Final   07/10/2022 6.30 3.40 - 10.80 10*3/mm3 Final   07/09/2022 6.90 3.40 - 10.80 10*3/mm3 Final      HGB Hemoglobin   Date Value Ref Range Status   07/11/2022 13.1 13.0 - 17.7 g/dL Final   07/11/2022 12.8 (L) 13.0 - 17.7 g/dL Final   07/10/2022 11.8 (L) 13.0 - 17.7 g/dL Final   07/09/2022 12.9 (L) 13.0 - 17.7 g/dL Final      HCT Hematocrit   Date Value Ref Range Status   07/11/2022 39.7 37.5 - 51.0 % Final   07/11/2022 39.2 37.5 - 51.0 % Final   07/10/2022 35.7 (L) 37.5 - 51.0 % Final   07/09/2022 39.7 37.5 - 51.0 % Final      Platlets No results found for: LABPLAT   MCV MCV   Date Value Ref Range Status   07/11/2022 92.2 79.0 - 97.0 fL Final   07/11/2022 93.6 79.0 - 97.0 fL Final   07/10/2022 92.6 79.0 - 97.0 fL Final   07/09/2022 92.8 79.0 - 97.0 fL Final          Sodium Sodium   Date Value Ref Range Status   07/11/2022 132 (L) 136 - 145 mmol/L Final   07/11/2022 135 (L) 136 - 145 mmol/L Final   07/10/2022 127 (L) 136 - 145 mmol/L Final   07/09/2022 125 (L) 136 - 145 mmol/L Final      Potassium Potassium   Date Value Ref Range Status   07/11/2022 3.8 3.5 - 5.2 mmol/L Final   07/11/2022 4.1 3.5 - 5.2 mmol/L Final   07/10/2022 4.2 3.5 - 5.2 mmol/L Final   07/09/2022 4.2 3.5 - 5.2 mmol/L Final      Chloride Chloride   Date Value Ref Range Status   07/11/2022 93 (L) 98 - 107 mmol/L Final   07/11/2022 96 (L) 98 - 107 mmol/L Final   07/10/2022 95 (L) 98 - 107 mmol/L Final   07/09/2022 90 (L) 98 - 107 mmol/L Final      CO2 CO2   Date Value Ref Range Status   07/11/2022 27.0 22.0 -  29.0 mmol/L Final   07/11/2022 29.0 22.0 - 29.0 mmol/L Final   07/10/2022 24.0 22.0 - 29.0 mmol/L Final   07/09/2022 23.0 22.0 - 29.0 mmol/L Final      BUN BUN   Date Value Ref Range Status   07/11/2022 13 8 - 23 mg/dL Final   07/11/2022 10 8 - 23 mg/dL Final   07/10/2022 9 8 - 23 mg/dL Final   07/09/2022 9 8 - 23 mg/dL Final      Creatinine Creatinine   Date Value Ref Range Status   07/11/2022 1.22 0.76 - 1.27 mg/dL Final   07/11/2022 1.01 0.76 - 1.27 mg/dL Final   07/10/2022 1.11 0.76 - 1.27 mg/dL Final   07/09/2022 1.10 0.76 - 1.27 mg/dL Final      Calcium Calcium   Date Value Ref Range Status   07/11/2022 9.1 8.6 - 10.5 mg/dL Final   07/11/2022 8.9 8.6 - 10.5 mg/dL Final   07/10/2022 8.7 8.6 - 10.5 mg/dL Final   07/09/2022 8.9 8.6 - 10.5 mg/dL Final      PO4 No components found for: PO4   Albumin Albumin   Date Value Ref Range Status   07/11/2022 3.70 3.50 - 5.20 g/dL Final   07/11/2022 3.30 (L) 3.50 - 5.20 g/dL Final   07/10/2022 3.50 3.50 - 5.20 g/dL Final   07/09/2022 3.80 3.50 - 5.20 g/dL Final      Magnesium Magnesium   Date Value Ref Range Status   07/11/2022 1.7 1.6 - 2.4 mg/dL Final   07/11/2022 1.8 1.6 - 2.4 mg/dL Final   07/10/2022 1.8 1.6 - 2.4 mg/dL Final      Uric Acid No components found for: URIC ACID     Imaging Results (Last 72 Hours)     Procedure Component Value Units Date/Time    XR Foot 2 View Right [[id number]] Collected: 07/09/22 1407     Updated: 07/09/22 1411    Narrative:      DATE OF EXAM:  7/9/2022 1:58 PM     PROCEDURE:  XR FOOT 2 VW RIGHT-     INDICATIONS:  Wound between first and second toe maggots present.  Cellulitis;  L03.115-Cellulitis of right lower limb; B87.9-Myiasis, unspecified     COMPARISON:  No Comparisons Available     TECHNIQUE:   A minimum of two routine standard radiographic views were obtained of  the right foot.     FINDINGS:  There is diffuse soft tissue swelling of the right foot bones are  osteopenic. There is no acute appearing fracture. Postoperative  findings  noted in the ankle at the medial malleolus and the distal fibula with  fixation screws. The calcaneus is intact. Small vessel vascular  calcifications noted.        Impression:      1. Extensive soft tissue swelling of the right foot may relate to edema  and/or cellulitis.  2. No discrete area of osseous erosion.  3. No acute appearing fracture.  4. Additional chronic findings above.     Electronically Signed By-Renny Johnson MD On:7/9/2022 2:09 PM  This report was finalized on [id number] by  Renny Johnson MD.          Results for orders placed during the hospital encounter of 07/09/22    XR Foot 2 View Right    Narrative  DATE OF EXAM:  7/9/2022 1:58 PM    PROCEDURE:  XR FOOT 2 VW RIGHT-    INDICATIONS:  Wound between first and second toe maggots present.  Cellulitis;  L03.115-Cellulitis of right lower limb; B87.9-Myiasis, unspecified    COMPARISON:  No Comparisons Available    TECHNIQUE:  A minimum of two routine standard radiographic views were obtained of  the right foot.    FINDINGS:  There is diffuse soft tissue swelling of the right foot bones are  osteopenic. There is no acute appearing fracture. Postoperative findings  noted in the ankle at the medial malleolus and the distal fibula with  fixation screws. The calcaneus is intact. Small vessel vascular  calcifications noted.    Impression  1. Extensive soft tissue swelling of the right foot may relate to edema  and/or cellulitis.  2. No discrete area of osseous erosion.  3. No acute appearing fracture.  4. Additional chronic findings above.    Electronically Signed By-Renny Johnson MD On:7/9/2022 2:09 PM  This report was finalized on [id number] by  Renny Johnson MD.            ASSESSMENT / PLAN      Cellulitis of right foot            Huey Keyes MD  [facility]  [phone]  07/12/22  07:40 EDT  NEPHROLOGY PROGRESS NOTE------[facility]    [facility]  [phone]  Huey Keyes MD      Patient Care Team:  Montse Dawkins MD as PCP - General  Montse Dawkins MD as PCP - Family Medicine  Huey Keyes MD as Consulting Physician (Nephrology)      Provider:  Huey Keyes MD  Patient Name: Rommel Mcfarland  :  1945    SUBJECTIVE:      Medication:  allopurinol, 100 mg, Oral, Daily  bumetanide, 1 mg, Oral, Daily  chlordiazePOXIDE, 10 mg, Oral, BID  enoxaparin, 40 mg, Subcutaneous, Q24H  fluconazole, 100 mg, Oral, Q24H  FLUoxetine, 10 mg, Oral, Daily  folic acid, 1 mg, Oral, Daily  hydrALAZINE, 50 mg, Oral, Q12H  levothyroxine, 175 mcg, Oral, Daily  nystatin, , Topical, Q12H  pantoprazole, 40 mg, Oral, Daily  saccharomyces boulardii, 250 mg, Oral, BID  sodium chloride, 3 mL, Intravenous, Q12H  sulfamethoxazole-trimethoprim, 1 tablet, Oral, Q12H  thiamine, 100 mg, Oral, Daily      Pharmacy to Dose enoxaparin (LOVENOX),         OBJECTIVE    Vital Sign Min/Max for last 24 hours  Temp  Min: 97.5 °F (36.4 °C)  Max: 98.4 °F (36.9 °C)   BP  Min: 137/75  Max: 176/80   Pulse  Min: 62  Max: 86   Resp  Min: 18  Max: 20   SpO2  Min: 96 %  Max: 97 %   No data recorded   Weight  Min: 106 kg (234 lb 2.1 oz)  Max: 106 kg (234 lb 2.1 oz)     Flowsheet Rows    Flowsheet Row First Filed Value   Admission Height 172.7 cm (68\") Documented at 2022 1217   Admission Weight 99.8 kg (220 lb) Documented at 2022 1217          No intake/output data recorded.  I/O last 3 completed shifts:  In: 900 [P.O.:900]  Out: 2475 [Urine:2475]    Physical Exam:  General Appearance: alert, appears stated age and cooperative  Head: normocephalic, without obvious abnormality and atraumatic  Eyes: conjunctivae and sclerae normal and no icterus  Neck: supple and no JVD  Lungs: clear to auscultation and respirations regular  Heart: regular rhythm & normal rate and normal S1, S2  Chest: Wall no abnormalities observed  Abdomen: normal bowel sounds and soft " non-tender  Extremities: moves extremities well, no edema, no cyanosis and no redness  Skin: no bleeding, bruising or rash, turgor normal, color normal and no lesions noted  Neurologic: Alert, and oriented. No focal deficits    Labs:    WBC WBC   Date Value Ref Range Status   07/11/2022 7.90 3.40 - 10.80 10*3/mm3 Final   07/11/2022 7.60 3.40 - 10.80 10*3/mm3 Final   07/10/2022 6.30 3.40 - 10.80 10*3/mm3 Final   07/09/2022 6.90 3.40 - 10.80 10*3/mm3 Final      HGB Hemoglobin   Date Value Ref Range Status   07/11/2022 13.1 13.0 - 17.7 g/dL Final   07/11/2022 12.8 (L) 13.0 - 17.7 g/dL Final   07/10/2022 11.8 (L) 13.0 - 17.7 g/dL Final   07/09/2022 12.9 (L) 13.0 - 17.7 g/dL Final      HCT Hematocrit   Date Value Ref Range Status   07/11/2022 39.7 37.5 - 51.0 % Final   07/11/2022 39.2 37.5 - 51.0 % Final   07/10/2022 35.7 (L) 37.5 - 51.0 % Final   07/09/2022 39.7 37.5 - 51.0 % Final      Platlets No results found for: LABPLAT   MCV MCV   Date Value Ref Range Status   07/11/2022 92.2 79.0 - 97.0 fL Final   07/11/2022 93.6 79.0 - 97.0 fL Final   07/10/2022 92.6 79.0 - 97.0 fL Final   07/09/2022 92.8 79.0 - 97.0 fL Final          Sodium Sodium   Date Value Ref Range Status   07/11/2022 132 (L) 136 - 145 mmol/L Final   07/11/2022 135 (L) 136 - 145 mmol/L Final   07/10/2022 127 (L) 136 - 145 mmol/L Final   07/09/2022 125 (L) 136 - 145 mmol/L Final      Potassium Potassium   Date Value Ref Range Status   07/11/2022 3.8 3.5 - 5.2 mmol/L Final   07/11/2022 4.1 3.5 - 5.2 mmol/L Final   07/10/2022 4.2 3.5 - 5.2 mmol/L Final   07/09/2022 4.2 3.5 - 5.2 mmol/L Final      Chloride Chloride   Date Value Ref Range Status   07/11/2022 93 (L) 98 - 107 mmol/L Final   07/11/2022 96 (L) 98 - 107 mmol/L Final   07/10/2022 95 (L) 98 - 107 mmol/L Final   07/09/2022 90 (L) 98 - 107 mmol/L Final      CO2 CO2   Date Value Ref Range Status   07/11/2022 27.0 22.0 - 29.0 mmol/L Final   07/11/2022 29.0 22.0 - 29.0 mmol/L Final   07/10/2022 24.0 22.0 -  29.0 mmol/L Final   07/09/2022 23.0 22.0 - 29.0 mmol/L Final      BUN BUN   Date Value Ref Range Status   07/11/2022 13 8 - 23 mg/dL Final   07/11/2022 10 8 - 23 mg/dL Final   07/10/2022 9 8 - 23 mg/dL Final   07/09/2022 9 8 - 23 mg/dL Final      Creatinine Creatinine   Date Value Ref Range Status   07/11/2022 1.22 0.76 - 1.27 mg/dL Final   07/11/2022 1.01 0.76 - 1.27 mg/dL Final   07/10/2022 1.11 0.76 - 1.27 mg/dL Final   07/09/2022 1.10 0.76 - 1.27 mg/dL Final      Calcium Calcium   Date Value Ref Range Status   07/11/2022 9.1 8.6 - 10.5 mg/dL Final   07/11/2022 8.9 8.6 - 10.5 mg/dL Final   07/10/2022 8.7 8.6 - 10.5 mg/dL Final   07/09/2022 8.9 8.6 - 10.5 mg/dL Final      PO4 No components found for: PO4   Albumin Albumin   Date Value Ref Range Status   07/11/2022 3.70 3.50 - 5.20 g/dL Final   07/11/2022 3.30 (L) 3.50 - 5.20 g/dL Final   07/10/2022 3.50 3.50 - 5.20 g/dL Final   07/09/2022 3.80 3.50 - 5.20 g/dL Final      Magnesium Magnesium   Date Value Ref Range Status   07/11/2022 1.7 1.6 - 2.4 mg/dL Final   07/11/2022 1.8 1.6 - 2.4 mg/dL Final   07/10/2022 1.8 1.6 - 2.4 mg/dL Final      Uric Acid No components found for: URIC ACID     Imaging Results (Last 72 Hours)     Procedure Component Value Units Date/Time    XR Foot 2 View Right [[id number]] Collected: 07/09/22 1407     Updated: 07/09/22 1411    Narrative:      DATE OF EXAM:  7/9/2022 1:58 PM     PROCEDURE:  XR FOOT 2 VW RIGHT-     INDICATIONS:  Wound between first and second toe maggots present.  Cellulitis;  L03.115-Cellulitis of right lower limb; B87.9-Myiasis, unspecified     COMPARISON:  No Comparisons Available     TECHNIQUE:   A minimum of two routine standard radiographic views were obtained of  the right foot.     FINDINGS:  There is diffuse soft tissue swelling of the right foot bones are  osteopenic. There is no acute appearing fracture. Postoperative findings  noted in the ankle at the medial malleolus and the distal fibula with  fixation  screws. The calcaneus is intact. Small vessel vascular  calcifications noted.        Impression:      1. Extensive soft tissue swelling of the right foot may relate to edema  and/or cellulitis.  2. No discrete area of osseous erosion.  3. No acute appearing fracture.  4. Additional chronic findings above.     Electronically Signed By-Renny Johnson MD On:7/9/2022 2:09 PM  This report was finalized on [id number] by  Renny Johnson MD.          Results for orders placed during the hospital encounter of 07/09/22    XR Foot 2 View Right    Narrative  DATE OF EXAM:  7/9/2022 1:58 PM    PROCEDURE:  XR FOOT 2 VW RIGHT-    INDICATIONS:  Wound between first and second toe maggots present.  Cellulitis;  L03.115-Cellulitis of right lower limb; B87.9-Myiasis, unspecified    COMPARISON:  No Comparisons Available    TECHNIQUE:  A minimum of two routine standard radiographic views were obtained of  the right foot.    FINDINGS:  There is diffuse soft tissue swelling of the right foot bones are  osteopenic. There is no acute appearing fracture. Postoperative findings  noted in the ankle at the medial malleolus and the distal fibula with  fixation screws. The calcaneus is intact. Small vessel vascular  calcifications noted.    Impression  1. Extensive soft tissue swelling of the right foot may relate to edema  and/or cellulitis.  2. No discrete area of osseous erosion.  3. No acute appearing fracture.  4. Additional chronic findings above.    Electronically Signed By-Renny Johnson MD On:7/9/2022 2:09 PM  This report was finalized on [id number] by  Renny Johnson MD.            ASSESSMENT / PLAN      Cellulitis of right foot    · Hyponatremia-likely in setting of alcohol use and volume overload and or thiazides.  His urine osmolality is appropriately  256.  We will place him on fluid restriction of 1500 mL/day and gently diurese. Avoid Thiazides  · CKD-patient appears to have underlying CKD with baseline creatinine 1-1.2.  CKD may be  related to hypertensive nephrosclerosis  · Cellulitis right foot  · Hypertension  · Chronic alcohol use    Sodium slightly down  On bactrim, monitor CR/K  Continue bumex 1 mg daily/ fluid restriction  Labs in am        Huey Keyes MD  [facility]  [phone]  07/12/22  07:40 EDT

## 2022-07-12 NOTE — PLAN OF CARE
Goal Outcome Evaluation:  Plan of Care Reviewed With: patient        Progress: no change  Outcome Evaluation: Pt resting in bed. Currently on PO abx. Vital signs stable. Will continue to monitor.

## 2022-07-13 VITALS
WEIGHT: 235.45 LBS | TEMPERATURE: 98 F | HEART RATE: 70 BPM | RESPIRATION RATE: 17 BRPM | DIASTOLIC BLOOD PRESSURE: 74 MMHG | BODY MASS INDEX: 35.68 KG/M2 | HEIGHT: 68 IN | SYSTOLIC BLOOD PRESSURE: 135 MMHG | OXYGEN SATURATION: 95 %

## 2022-07-13 PROBLEM — F10.10 ALCOHOL ABUSE: Chronic | Status: ACTIVE | Noted: 2022-07-13

## 2022-07-13 PROBLEM — E03.9 HYPOTHYROIDISM (ACQUIRED): Chronic | Status: ACTIVE | Noted: 2022-07-13

## 2022-07-13 PROBLEM — K21.9 GERD WITHOUT ESOPHAGITIS: Status: ACTIVE | Noted: 2022-07-13

## 2022-07-13 PROBLEM — F32.9 MAJOR DEPRESSIVE DISORDER WITH CURRENT ACTIVE EPISODE: Status: ACTIVE | Noted: 2022-07-13

## 2022-07-13 PROBLEM — I10 ESSENTIAL HYPERTENSION: Chronic | Status: ACTIVE | Noted: 2022-07-13

## 2022-07-13 PROBLEM — B35.1 ONYCHOMYCOSIS OF MULTIPLE TOENAILS WITH TYPE 2 DIABETES MELLITUS: Status: ACTIVE | Noted: 2022-07-13

## 2022-07-13 PROBLEM — N18.31 STAGE 3A CHRONIC KIDNEY DISEASE: Chronic | Status: ACTIVE | Noted: 2022-07-13

## 2022-07-13 PROBLEM — E11.69 ONYCHOMYCOSIS OF MULTIPLE TOENAILS WITH TYPE 2 DIABETES MELLITUS: Status: ACTIVE | Noted: 2022-07-13

## 2022-07-13 PROBLEM — M10.9 GOUT: Chronic | Status: ACTIVE | Noted: 2022-07-13

## 2022-07-13 PROBLEM — R26.81 GAIT INSTABILITY: Chronic | Status: ACTIVE | Noted: 2022-07-13

## 2022-07-13 PROBLEM — E11.40 TYPE 2 DIABETES MELLITUS WITH DIABETIC NEUROPATHY, WITHOUT LONG-TERM CURRENT USE OF INSULIN (HCC): Chronic | Status: ACTIVE | Noted: 2022-07-13

## 2022-07-13 PROBLEM — B35.3 TINEA PEDIS OF BOTH FEET: Status: ACTIVE | Noted: 2022-07-13

## 2022-07-13 LAB
ALBUMIN SERPL-MCNC: 3.3 G/DL (ref 3.5–5.2)
ALBUMIN/GLOB SERPL: 1.1 G/DL
ALP SERPL-CCNC: 42 U/L (ref 39–117)
ALT SERPL W P-5'-P-CCNC: 7 U/L (ref 1–41)
ANION GAP SERPL CALCULATED.3IONS-SCNC: 13 MMOL/L (ref 5–15)
AST SERPL-CCNC: 13 U/L (ref 1–40)
BASOPHILS # BLD AUTO: 0.1 10*3/MM3 (ref 0–0.2)
BASOPHILS NFR BLD AUTO: 0.6 % (ref 0–1.5)
BILIRUB SERPL-MCNC: 0.7 MG/DL (ref 0–1.2)
BUN SERPL-MCNC: 12 MG/DL (ref 8–23)
BUN/CREAT SERPL: 10.4 (ref 7–25)
CALCIUM SPEC-SCNC: 8.6 MG/DL (ref 8.6–10.5)
CHLORIDE SERPL-SCNC: 88 MMOL/L (ref 98–107)
CO2 SERPL-SCNC: 28 MMOL/L (ref 22–29)
CREAT SERPL-MCNC: 1.15 MG/DL (ref 0.76–1.27)
DEPRECATED RDW RBC AUTO: 48.6 FL (ref 37–54)
EGFRCR SERPLBLD CKD-EPI 2021: 65.1 ML/MIN/1.73
EOSINOPHIL # BLD AUTO: 0.4 10*3/MM3 (ref 0–0.4)
EOSINOPHIL NFR BLD AUTO: 4.5 % (ref 0.3–6.2)
ERYTHROCYTE [DISTWIDTH] IN BLOOD BY AUTOMATED COUNT: 15.2 % (ref 12.3–15.4)
GLOBULIN UR ELPH-MCNC: 2.9 GM/DL
GLUCOSE SERPL-MCNC: 105 MG/DL (ref 65–99)
HCT VFR BLD AUTO: 41 % (ref 37.5–51)
HGB BLD-MCNC: 13.8 G/DL (ref 13–17.7)
LYMPHOCYTES # BLD AUTO: 1.4 10*3/MM3 (ref 0.7–3.1)
LYMPHOCYTES NFR BLD AUTO: 16.1 % (ref 19.6–45.3)
MAGNESIUM SERPL-MCNC: 1.5 MG/DL (ref 1.6–2.4)
MCH RBC QN AUTO: 30.8 PG (ref 26.6–33)
MCHC RBC AUTO-ENTMCNC: 33.5 G/DL (ref 31.5–35.7)
MCV RBC AUTO: 91.9 FL (ref 79–97)
MONOCYTES # BLD AUTO: 1.5 10*3/MM3 (ref 0.1–0.9)
MONOCYTES NFR BLD AUTO: 16.9 % (ref 5–12)
NEUTROPHILS NFR BLD AUTO: 5.4 10*3/MM3 (ref 1.7–7)
NEUTROPHILS NFR BLD AUTO: 61.9 % (ref 42.7–76)
NRBC BLD AUTO-RTO: 0 /100 WBC (ref 0–0.2)
PLATELET # BLD AUTO: 215 10*3/MM3 (ref 140–450)
PMV BLD AUTO: 8.4 FL (ref 6–12)
POTASSIUM SERPL-SCNC: 3.6 MMOL/L (ref 3.5–5.2)
PROT SERPL-MCNC: 6.2 G/DL (ref 6–8.5)
RBC # BLD AUTO: 4.47 10*6/MM3 (ref 4.14–5.8)
SODIUM SERPL-SCNC: 129 MMOL/L (ref 136–145)
WBC NRBC COR # BLD: 8.7 10*3/MM3 (ref 3.4–10.8)

## 2022-07-13 PROCEDURE — 25010000002 MAGNESIUM SULFATE 2 GM/50ML SOLUTION: Performed by: FAMILY MEDICINE

## 2022-07-13 PROCEDURE — 80053 COMPREHEN METABOLIC PANEL: CPT | Performed by: FAMILY MEDICINE

## 2022-07-13 PROCEDURE — 97530 THERAPEUTIC ACTIVITIES: CPT

## 2022-07-13 PROCEDURE — 25010000002 ENOXAPARIN PER 10 MG: Performed by: FAMILY MEDICINE

## 2022-07-13 PROCEDURE — 83735 ASSAY OF MAGNESIUM: CPT | Performed by: FAMILY MEDICINE

## 2022-07-13 PROCEDURE — 85025 COMPLETE CBC W/AUTO DIFF WBC: CPT | Performed by: FAMILY MEDICINE

## 2022-07-13 RX ORDER — NYSTATIN 100000 [USP'U]/G
POWDER TOPICAL EVERY 12 HOURS SCHEDULED
Start: 2022-07-13

## 2022-07-13 RX ORDER — HYDRALAZINE HYDROCHLORIDE 50 MG/1
50 TABLET, FILM COATED ORAL 2 TIMES DAILY
Start: 2022-07-13

## 2022-07-13 RX ORDER — SODIUM CHLORIDE 1000 MG
1 TABLET, SOLUBLE MISCELLANEOUS
Status: DISCONTINUED | OUTPATIENT
Start: 2022-07-13 | End: 2022-07-13 | Stop reason: HOSPADM

## 2022-07-13 RX ORDER — BUMETANIDE 1 MG/1
1 TABLET ORAL DAILY
Start: 2022-07-13

## 2022-07-13 RX ORDER — CHLORDIAZEPOXIDE HYDROCHLORIDE 10 MG/1
10 CAPSULE, GELATIN COATED ORAL 2 TIMES DAILY
Qty: 8 CAPSULE | Refills: 0 | Status: SHIPPED | OUTPATIENT
Start: 2022-07-13 | End: 2022-07-17

## 2022-07-13 RX ORDER — ALUMINA, MAGNESIA, AND SIMETHICONE 2400; 2400; 240 MG/30ML; MG/30ML; MG/30ML
15 SUSPENSION ORAL EVERY 6 HOURS PRN
Start: 2022-07-13

## 2022-07-13 RX ORDER — MAGNESIUM SULFATE HEPTAHYDRATE 40 MG/ML
2 INJECTION, SOLUTION INTRAVENOUS ONCE
Status: COMPLETED | OUTPATIENT
Start: 2022-07-13 | End: 2022-07-13

## 2022-07-13 RX ORDER — TRAMADOL HYDROCHLORIDE 50 MG/1
50 TABLET ORAL EVERY 6 HOURS PRN
Start: 2022-07-13 | End: 2022-07-19

## 2022-07-13 RX ORDER — AMLODIPINE BESYLATE 5 MG/1
5 TABLET ORAL
Status: DISCONTINUED | OUTPATIENT
Start: 2022-07-13 | End: 2022-07-13 | Stop reason: HOSPADM

## 2022-07-13 RX ORDER — SODIUM CHLORIDE 1000 MG
1 TABLET, SOLUBLE MISCELLANEOUS
Start: 2022-07-13

## 2022-07-13 RX ORDER — AMLODIPINE BESYLATE 5 MG/1
5 TABLET ORAL
Start: 2022-07-14

## 2022-07-13 RX ORDER — ENOXAPARIN SODIUM 100 MG/ML
40 INJECTION SUBCUTANEOUS EVERY 24 HOURS
Start: 2022-07-14

## 2022-07-13 RX ORDER — ONDANSETRON 4 MG/1
4 TABLET, FILM COATED ORAL EVERY 4 HOURS PRN
Start: 2022-07-13

## 2022-07-13 RX ORDER — CHLORDIAZEPOXIDE HYDROCHLORIDE 10 MG/1
10 CAPSULE, GELATIN COATED ORAL 4 TIMES DAILY PRN
Start: 2022-07-13 | End: 2022-07-16

## 2022-07-13 RX ORDER — SULFAMETHOXAZOLE AND TRIMETHOPRIM 800; 160 MG/1; MG/1
1 TABLET ORAL EVERY 12 HOURS SCHEDULED
Qty: 10 TABLET | Refills: 0 | Status: SHIPPED | OUTPATIENT
Start: 2022-07-13 | End: 2022-07-18

## 2022-07-13 RX ADMIN — Medication 3 ML: at 07:55

## 2022-07-13 RX ADMIN — BUMETANIDE 1 MG: 1 TABLET ORAL at 07:55

## 2022-07-13 RX ADMIN — SULFAMETHOXAZOLE AND TRIMETHOPRIM 1 TABLET: 800; 160 TABLET ORAL at 07:55

## 2022-07-13 RX ADMIN — PANTOPRAZOLE SODIUM 40 MG: 40 TABLET, DELAYED RELEASE ORAL at 07:55

## 2022-07-13 RX ADMIN — LEVOTHYROXINE SODIUM 175 MCG: 0.17 TABLET ORAL at 07:54

## 2022-07-13 RX ADMIN — SODIUM CHLORIDE 1 G: 1 TABLET ORAL at 17:57

## 2022-07-13 RX ADMIN — ENOXAPARIN SODIUM 40 MG: 100 INJECTION SUBCUTANEOUS at 15:53

## 2022-07-13 RX ADMIN — MAGNESIUM SULFATE HEPTAHYDRATE 2 G: 2 INJECTION, SOLUTION INTRAVENOUS at 09:58

## 2022-07-13 RX ADMIN — SODIUM CHLORIDE 1 G: 1 TABLET ORAL at 12:42

## 2022-07-13 RX ADMIN — FOLIC ACID 1 MG: 1 TABLET ORAL at 07:55

## 2022-07-13 RX ADMIN — Medication 100 MG: at 07:55

## 2022-07-13 RX ADMIN — ALLOPURINOL 100 MG: 100 TABLET ORAL at 07:55

## 2022-07-13 RX ADMIN — FLUOXETINE 10 MG: 10 CAPSULE ORAL at 07:55

## 2022-07-13 RX ADMIN — AMLODIPINE BESYLATE 5 MG: 5 TABLET ORAL at 09:58

## 2022-07-13 RX ADMIN — NYSTATIN: 100000 POWDER TOPICAL at 08:01

## 2022-07-13 RX ADMIN — CHLORDIAZEPOXIDE HYDROCHLORIDE 10 MG: 10 CAPSULE ORAL at 07:55

## 2022-07-13 RX ADMIN — Medication 250 MG: at 07:55

## 2022-07-13 RX ADMIN — HYDRALAZINE HYDROCHLORIDE 50 MG: 25 TABLET, FILM COATED ORAL at 07:55

## 2022-07-13 NOTE — PLAN OF CARE
Goal Outcome Evaluation:  Plan of Care Reviewed With: patient        Progress: improving   Nystatin powder applied. Started on PO antibiotics, awaiting acceptance to [facility]. Pt resting comfortably throughout night with no complaints. Will continue to monitor.

## 2022-07-13 NOTE — CASE MANAGEMENT/SOCIAL WORK
Continued Stay Note   Gonzalo     Patient Name: Rommel Mcfarland  MRN: [id number]  Today's Date: 7/13/2022    Admit Date: 7/9/2022     Discharge Plan     Row Name 07/13/22 1240       Plan    Plan [facility], accepted, bed available 7/13. No PASRR required. No precert required.    Plan Comments Received notice from [facility] liaison [facility] is [facility] and she confirmed d/c plan with daughter Yajaira. Bed available today after 3pm. Received notice from daughter Yajaira she is agreeable to [facility] on [facility]/c. Updated Dr. Dawkins via secure chat. Updated RN. Discharge anticipated today.                       Phone communication or documentation only - no physical contact with patient or family.      Minal Munson RN

## 2022-07-13 NOTE — DISCHARGE SUMMARY
DATE/TIME OF ADMISSION:  7/9/2022 12:21 PM  Date of Discharge:  7/13/2022    Discharge Diagnosis:   ACUTE CELLULITIS OF THE RIGHT FOOT---ON ORAL ATB NOW AND GREATLY IMPROVED; DR WALL  ADVISING; CEFEPINE STARTED IN THE ER AND  DISCONTINUED WITH CONFUSION AND CKD HISTORY  TINEA---NYSTATIN POWDER AND DIFLUCAN; GREATLY BETTER  SOCIAL---[facility] AND OUTSIDE AGENCIES  AFTER DISCHARGE FROM REHAB IN 2 WEEKS OR SO TO HELP DAUGHTER AS SHE CANNOT DO ALL CARE FOR ALL THE DEMANDS ON HER TIME  ETOH ABUSE---WILLING TO REDUCE TO NOT > A 6 PACK DAILY ONCE DISCHARGED HOME AND HE HAS BEEN FINE HERE IN THE HOSPITAL WITHOUT BEER; VERY PLEASANT AND COOPERATIVE  HYPONATREMIA---SECONDARY TO BEER INTAKE; WORSE TODAY AND WILL CONSULT NEPHROLOGY FOR OPINION; WILL DO BMP EVERY 3 DAYS AND SEND TO NEPHROLOGIST AND PCP  DM II---STABLE; CONSISTENT CARB DIET  CKD3--STABLE WITH BUMEX BUT WILL WATCH CLOSELY  HTN---ADJUSTED THE HYDRALAZINE DOSE AND IMPROVED; AMLODIPINE ADDED TODAY BY NEPHROLOGIST AND WILL HAVE TO WATCH TO BE SURE SWELLING DOES NOT INCREASE  HYPOTHYROID---STABLE  GOUT---STABLE  GERD---PPI  DEPRESSION---STABLE WITH PROZAC  LOW MAGNESIUM---REPLACED; WILL MONITOR LEVELS  Presenting Problem/History of Present Illness  Active Hospital Problems    Diagnosis  POA   • Cellulitis of right foot [L03.115]  Yes      Resolved Hospital Problems   No resolved problems to display.          Hospital Course  Rommel Mcfarland is a 76 y.o. male who presents with SUPERFICIAL FOOT CELLULITIS AND TINEA. GREATLY IMPROVED WITH TOPICAL AND IV TREATMENTS. KEY IS KEEPING THE WEB SPACES OF THE TOES DRY AND TREATED WITH NYSTATIN POWDER.  OFF OF ALCOHOL AND WE USED LIBRIUM TO PREVENT WITHDRAWAL. HE HAS REMAINED PLEASANT AND COOPERATIVE. THE LIBRIUM CAN BE WEANED OFF MOST LIKELY IN REHAB  BP ELEVATED AND WE HAD TO INCREASE THE HYDRALAZINE AND TODAY DR SOTO ADDED ALSO AMLODIPINE IN A LOW DOSE AS WELL  GOAL 140/80 OR LESS  GOAL IS TO GET BACK HOME WITH INCREASED  CAREGIVERS IN 2 WEEKS OR SO AFTER REHAB      Procedures Performed         Consults:   Consults     Date and Time Order Name Status Description    7/10/2022 12:34 AM Inpatient Infectious Diseases Consult Completed     7/9/2022  2:14 PM Nephrology (on -call MD unless specified) Completed           Pertinent Test Results:    Lab Results (most recent)     Procedure Component Value Units Date/Time    Blood Culture - Blood, Arm, Left [[id number]]  (Normal) Collected: 07/09/22 1349    Specimen: Blood from Arm, Left Updated: 07/13/22 1403     Blood Culture No growth at 4 days    Blood Culture - Blood, Arm, Right [[id number]]  (Normal) Collected: 07/09/22 1236    Specimen: Blood from Arm, Right Updated: 07/13/22 1247     Blood Culture No growth at 4 days    Comprehensive Metabolic Panel [[id number]]  (Abnormal) Collected: 07/13/22 0030    Specimen: Blood Updated: 07/13/22 0150     Glucose 105 mg/dL      BUN 12 mg/dL      Creatinine 1.15 mg/dL      Sodium 129 mmol/L      Potassium 3.6 mmol/L      Comment: Slight hemolysis detected by analyzer. Results may be affected.        Chloride 88 mmol/L      CO2 28.0 mmol/L      Calcium 8.6 mg/dL      Total Protein 6.2 g/dL      Albumin 3.30 g/dL      ALT (SGPT) 7 U/L      AST (SGOT) 13 U/L      Comment: Slight hemolysis detected by analyzer. Results may be affected.        Alkaline Phosphatase 42 U/L      Total Bilirubin 0.7 mg/dL      Globulin 2.9 gm/dL      A/G Ratio 1.1 g/dL      BUN/Creatinine Ratio 10.4     Anion Gap 13.0 mmol/L      eGFR 65.1 mL/min/1.73      Comment: National Kidney Foundation and American Society of Nephrology (ASN) Task Force recommended calculation based on the Chronic Kidney Disease Epidemiology Collaboration (CKD-EPI) equation refit without adjustment for race.       Narrative:      GFR Normal >60  Chronic Kidney Disease <60  Kidney Failure <15      Magnesium [[id number]]  (Abnormal) Collected: 07/13/22 0030    Specimen: Blood Updated: 07/13/22 0147      Magnesium 1.5 mg/dL     CBC & Differential [[id number]]  (Abnormal) Collected: 07/13/22 0030    Specimen: Blood Updated: 07/13/22 0118    Narrative:      The following orders were created for panel order CBC & Differential.  Procedure                               Abnormality         Status                     ---------                               -----------         ------                     CBC Auto Differential[[id number]]        Abnormal            Final result                 Please view results for these tests on the individual orders.    CBC Auto Differential [[id number]]  (Abnormal) Collected: 07/13/22 0030    Specimen: Blood Updated: 07/13/22 0118     WBC 8.70 10*3/mm3      RBC 4.47 10*6/mm3      Hemoglobin 13.8 g/dL      Hematocrit 41.0 %      MCV 91.9 fL      MCH 30.8 pg      MCHC 33.5 g/dL      RDW 15.2 %      RDW-SD 48.6 fl      MPV 8.4 fL      Platelets 215 10*3/mm3      Neutrophil % 61.9 %      Lymphocyte % 16.1 %      Monocyte % 16.9 %      Eosinophil % 4.5 %      Basophil % 0.6 %      Neutrophils, Absolute 5.40 10*3/mm3      Lymphocytes, Absolute 1.40 10*3/mm3      Monocytes, Absolute 1.50 10*3/mm3      Eosinophils, Absolute 0.40 10*3/mm3      Basophils, Absolute 0.10 10*3/mm3      nRBC 0.0 /100 WBC     Magnesium [[id number]]  (Normal) Collected: 07/11/22 2310    Specimen: Blood Updated: 07/12/22 0004     Magnesium 1.7 mg/dL     Comprehensive Metabolic Panel [[id number]]  (Abnormal) Collected: 07/11/22 2310    Specimen: Blood Updated: 07/12/22 0004     Glucose 119 mg/dL      BUN 13 mg/dL      Creatinine 1.22 mg/dL      Sodium 132 mmol/L      Potassium 3.8 mmol/L      Chloride 93 mmol/L      CO2 27.0 mmol/L      Calcium 9.1 mg/dL      Total Protein 6.7 g/dL      Albumin 3.70 g/dL      ALT (SGPT) 7 U/L      AST (SGOT) 11 U/L      Alkaline Phosphatase 51 U/L      Total Bilirubin 0.4 mg/dL      Globulin 3.0 gm/dL      A/G Ratio 1.2 g/dL      BUN/Creatinine Ratio 10.7     Anion Gap 12.0 mmol/L      eGFR  60.7 mL/min/1.73      Comment: National Kidney Foundation and American Society of Nephrology (ASN) Task Force recommended calculation based on the Chronic Kidney Disease Epidemiology Collaboration (CKD-EPI) equation refit without adjustment for race.       Narrative:      GFR Normal >60  Chronic Kidney Disease <60  Kidney Failure <15      CBC & Differential [[id number]]  (Abnormal) Collected: 07/11/22 2310    Specimen: Blood Updated: 07/11/22 2340    Narrative:      The following orders were created for panel order CBC & Differential.  Procedure                               Abnormality         Status                     ---------                               -----------         ------                     CBC Auto Differential[[id number]]        Abnormal            Final result                 Please view results for these tests on the individual orders.    CBC Auto Differential [[id number]]  (Abnormal) Collected: 07/11/22 2310    Specimen: Blood Updated: 07/11/22 2340     WBC 7.90 10*3/mm3      RBC 4.31 10*6/mm3      Hemoglobin 13.1 g/dL      Hematocrit 39.7 %      MCV 92.2 fL      MCH 30.3 pg      MCHC 32.9 g/dL      RDW 15.3 %      RDW-SD 49.0 fl      MPV 8.3 fL      Platelets 196 10*3/mm3      Neutrophil % 57.9 %      Lymphocyte % 14.5 %      Monocyte % 18.7 %      Eosinophil % 7.9 %      Basophil % 1.0 %      Neutrophils, Absolute 4.60 10*3/mm3      Lymphocytes, Absolute 1.10 10*3/mm3      Monocytes, Absolute 1.50 10*3/mm3      Eosinophils, Absolute 0.60 10*3/mm3      Basophils, Absolute 0.10 10*3/mm3      nRBC 0.1 /100 WBC     Vancomycin, Peak [[id number]]  (Normal) Collected: 07/11/22 0748    Specimen: Blood Updated: 07/11/22 0844     Vancomycin Peak 30.60 mcg/mL     Narrative:      Therapeutic Ranges for Vancomycin    Vancomycin Random   5.0-40.0 mcg/mL  Vancomycin Trough   5.0-20.0 mcg/mL  Vancomycin Peak     20.0-40.0 mcg/mL    Urinalysis, Microscopic Only - Urine, Clean Catch [[id number]]  (Abnormal)  Collected: 07/09/22 2002    Specimen: Urine, Clean Catch Updated: 07/09/22 2252     RBC, UA 0-2 /HPF      WBC, UA 0-2 /HPF      Bacteria, UA None Seen /HPF      Squamous Epithelial Cells, UA 0-2 /HPF      Hyaline Casts, UA 0-2 /LPF      Methodology Automated Microscopy    Urinalysis With Microscopic If Indicated (No Culture) - Urine, Clean Catch [[id number]]  (Abnormal) Collected: 07/09/22 2002    Specimen: Urine, Clean Catch Updated: 07/09/22 2251     Color, UA Yellow     Appearance, UA Clear     pH, UA 6.5     Specific Gravity, UA 1.009     Glucose, UA Negative     Ketones, UA Negative     Bilirubin, UA Negative     Blood, UA Negative     Protein, UA 30 mg/dL (1+)     Leuk Esterase, UA Negative     Nitrite, UA Negative     Urobilinogen, UA 2.0 E.U./dL    Sodium, Urine, Random - Urine, Clean Catch [[id number]] Collected: 07/09/22 2002    Specimen: Urine, Clean Catch Updated: 07/09/22 2206     Sodium, Urine 45 mmol/L     Narrative:      Reference intervals for random urine have not been established.  Clinical usage is dependent upon physician's interpretation in combination with other laboratory tests.       Osmolality, Urine - Urine, Clean Catch [[id number]]  (Abnormal) Collected: 07/09/22 2002    Specimen: Urine, Clean Catch Updated: 07/09/22 2159     Osmolality, Urine 260 mOsm/kg     BNP [[id number]]  (Abnormal) Collected: 07/09/22 1236    Specimen: Blood Updated: 07/09/22 1800     proBNP 1,859.0 pg/mL     Narrative:      Among patients with dyspnea, NT-proBNP is highly sensitive for the detection of acute congestive heart failure. In addition NT-proBNP of <300 pg/ml effectively rules out acute congestive heart failure with 99% negative predictive value.    Results may be falsely decreased if patient taking Biotin.      COVID PRE-OP / PRE-PROCEDURE SCREENING ORDER (NO ISOLATION) - Swab, Nasopharynx [[id number]]  (Normal) Collected: 07/09/22 1658    Specimen: Swab from Nasopharynx Updated: 07/09/22 1721    Narrative:       The following orders were created for panel order COVID PRE-OP / PRE-PROCEDURE SCREENING ORDER (NO ISOLATION) - Swab, Nasopharynx.  Procedure                               Abnormality         Status                     ---------                               -----------         ------                     COVID-19,CEPHEID/SHELDON,CO...[[id number]]  Normal              Final result                 Please view results for these tests on the individual orders.    COVID-19,CEPHEID/SHELDON,COR/STACIA/PAD/[facility] IN-HOUSE(OR EMERGENT/ADD-ON),NP SWAB IN TRANSPORT MEDIA 3-4 HR TAT, RT-PCR - Swab, Nasopharynx [[id number]]  (Normal) Collected: 07/09/22 1658    Specimen: Swab from Nasopharynx Updated: 07/09/22 1721     COVID19 Not Detected    Narrative:      Fact sheet for providers: https://www.fda.gov/media/495217/download     Fact sheet for patients: https://www.fda.gov/media/973953/download  Fact sheet for providers: https://www.fda.gov/media/810806/download    Fact sheet for patients: https://www.fda.gov/media/830173/download    Test performed by PCR.                OK       Condition on Discharge:  GOOD    Vital Signs  Temp:  [97.5 °F (36.4 °C)-98.7 °F (37.1 °C)] 98 °F (36.7 °C)  Heart Rate:  [62-74] 70  Resp:  [17-18] 17  BP: (135-174)/(67-93) 135/74    Physical Exam:     General Appearance:    Alert, cooperative, in no acute distress; weak to stand and walks with a shuffling small step gait   Head:    Normocephalic, without obvious abnormality, atraumatic   Eyes:            Lids and lashes normal, conjunctivae and sclerae normal, no   icterus, no pallor, corneas clear, PERRLA   Ears:    Ears appear intact with no abnormalities noted   Throat:   No oral lesions, no thrush, oral mucosa moist   Neck:   No adenopathy, supple, trachea midline, no thyromegaly, no   carotid bruit, no JVD   Lungs:     Clear to auscultation,respirations regular, even and                  unlabored    Heart:    Regular rhythm and normal rate, normal S1 and  "S2, soft 2/6 systolic             murmur, no gallop, no rub, no click   Chest Wall:    No abnormalities observed   Abdomen:     Normal bowel sounds, no masses, no organomegaly, soft        non-tender, non-distended, no guarding, no rebound                Tenderness   Extremities:   Moves all extremities well, trace  edema, no cyanosis, no             Redness; no calf tenderness; toes no longer reddened; nystatin powder in between the toes nicely and dry now   Pulses:   Pulses palpable and equal bilaterally   Skin:   No bleeding, bruising or rash   Lymph nodes:   No palpable adenopathy   Neurologic:   Cranial nerves 2 - 12 grossly intact, sensation decreased in the legs , DTR       present and equal bilaterally; alert O x 2 and pleasant, cooperative. Requires a 2 person assist to get up from the bedside commode and to transfer to the recliner. He tries to help as he can and follows directions. Weak to sit down and tends to \"flop\" down into the chair due to weakness and partly habit       Discharge Disposition  TO [facility]; daughter to take him by car by request      Discharge Medications   Hydralazine is 50mg bid                                              Amlodipine 5mg q hs                                              bumex 1mg qam                                              Librium 10mg bid                                              Nystatin powder bid to the groin area and in between the toes bilaterally                                              Septra ds bid to end 7/17/22                                              Vitamin B1  100mg daily                                              NaCl 1 gram po tid with meals  And as below:       Discharge Medications      ASK your doctor about these medications      Instructions Start Date   allopurinol 100 MG tablet  Commonly known as: ZYLOPRIM   100 mg, Oral, Daily      FLUoxetine 10 MG capsule  Commonly known as: PROzac   10 mg, Oral, Daily      folic acid 1 MG " "tablet  Commonly known as: FOLVITE   1 mg, Oral, Daily      hydrALAZINE 50 MG tablet  Commonly known as: APRESOLINE   50 mg, Oral, Every Evening      levothyroxine 175 MCG tablet  Commonly known as: SYNTHROID, LEVOTHROID   175 mcg, Oral, Every Early Morning      pantoprazole 40 MG EC tablet  Commonly known as: PROTONIX   40 mg, Oral, Daily      saccharomyces boulardii 250 MG capsule  Commonly known as: FLORASTOR   250 mg, Oral, 2 Times Daily, **Daughter said \"He is on a probiotic because he has a history of C-diff, but I am unsure of which one.\"**      VITAMIN B1 PO  Ask about: Which instructions should I use?   1 dose, Oral, Daily             Discharge Diet: CONSISTENT CARB, 1200CC FLUID RESTRICTION    Activity at Discharge: UP WITH ASSISTANCE USING WALKER; BED ALARMS    Follow-up Appointments  ONE WEEK AFTER RELEASE FROM REHAB WITH DR DENNISON AND DR SANCHES OR DR SOTO IN 2 WEEKS  No future appointments.      Test Results Pending at Discharge   BLOOD CULTURES---NO GROWTH SO FAR    DO BMP EVERY 3 DAYS AND SEND RESULTS TO ASAF SANCHES AND [facility] AS WELL AS TO DR DENNISON (FAX # [phone])  Pending Labs     Order Current Status    Blood Culture - Blood, Arm, Left Preliminary result    Blood Culture - Blood, Arm, Right Preliminary result           Montse Dennison MD  07/13/22  16:25 EDT              "

## 2022-07-13 NOTE — PLAN OF CARE
"Rommel Mcfarland presents with functional mobility impairments which indicate the need for skilled intervention. Tolerating session today without incident. Pt requires min A for bed mobility, transfers, and ambulation this date with decreased B LE strength and gait impairments. Pt would benefit from skilled rehab to address functional deficits. Will continue to follow and progress as tolerated.     Plan/Recommendations:   Moderate Intensity Therapy recommended post-acute care. This is recommended as therapy feels the patient would require 3-4 days per week and wouldn't tolerate \"3 hour daily\" rehab intensity. SNF would be the preferred choice. If the patient does not agree to SNF, arrange HH or OP depending on home bound status. If patient is medically complex, consider LTACH.. Pt requires no DME at discharge.     Pt desires Skilled Rehab placement at discharge. Pt cooperative; agreeable to therapeutic recommendations and plan of care.   "

## 2022-07-13 NOTE — CASE MANAGEMENT/SOCIAL WORK
Continued Stay Note   Gonzalo     Patient Name: Rommel Mcfarland  MRN: [id number]  Today's Date: 7/13/2022    Admit Date: 7/9/2022     Discharge Plan     Row Name 07/13/22 1212       Plan    Plan Referral to [facility]. Pending acceptance. Facility to complete PASRR. No precert required.    Plan Comments Received notice from [facility] liafernando that patient is declined. Referral to the other I[facility] for Medicare Waiver, [facility], pending acceptance. Called and updated daughter Yajaira, she is agreeable to [facility] if Dr. Dawkins is also agreeable. Secure chat message sent to Dr. Dawkins to give an update and she is agreeable to [facility] if they accept. Daughter also wants CM to check [facility] and [facility]. She stated no to [facility] or [facility]. Referral to [facility], declined due to no bed. Referral to [facility], pending acceptance. Updated patient at bedside, he remains agreeable to d/c plan.              Met with patient in room wearing PPE: mask    Maintained distance greater than six feet and spent less than 15 minutes in the room.            Minal Munson RN

## 2022-07-13 NOTE — PROGRESS NOTES
"NEPHROLOGY PROGRESS NOTE------[facility]    [facility]  [phone]  Huey Keyes MD      Patient Care Team:  Montse Dawkins MD as PCP - General  Montse Dawkins MD as PCP - Family Medicine  Huey Keyes MD as Consulting Physician (Nephrology)      Provider:  Huey Keyes MD  Patient Name: Rommel Mcfarland  :  1945    SUBJECTIVE:  F/u hyponatremia  NO chest pain or SOA. Breathing better    Medication:  allopurinol, 100 mg, Oral, Daily  bumetanide, 1 mg, Oral, Daily  chlordiazePOXIDE, 10 mg, Oral, BID  enoxaparin, 40 mg, Subcutaneous, Q24H  FLUoxetine, 10 mg, Oral, Daily  folic acid, 1 mg, Oral, Daily  hydrALAZINE, 50 mg, Oral, Q12H  levothyroxine, 175 mcg, Oral, Daily  magnesium sulfate, 2 g, Intravenous, Once  nystatin, , Topical, Q12H  pantoprazole, 40 mg, Oral, Daily  saccharomyces boulardii, 250 mg, Oral, BID  sodium chloride, 3 mL, Intravenous, Q12H  sulfamethoxazole-trimethoprim, 1 tablet, Oral, Q12H  thiamine, 100 mg, Oral, Daily      Pharmacy to Dose enoxaparin (LOVENOX),         OBJECTIVE    Vital Sign Min/Max for last 24 hours  Temp  Min: 97.4 °F (36.3 °C)  Max: 98.7 °F (37.1 °C)   BP  Min: 130/74  Max: 174/93   Pulse  Min: 62  Max: 74   Resp  Min: 17  Max: 18   SpO2  Min: 94 %  Max: 96 %   No data recorded   Weight  Min: 107 kg (235 lb 7.2 oz)  Max: 107 kg (235 lb 7.2 oz)     Flowsheet Rows    Flowsheet Row First Filed Value   Admission Height 172.7 cm (68\") Documented at 2022 1217   Admission Weight 99.8 kg (220 lb) Documented at 2022 1217          No intake/output data recorded.  I/O last 3 completed shifts:  In:  [P.O.:]  Out: 2925 [Urine:2925]    Physical Exam:  General Appearance: alert, appears stated age and cooperative  Head: normocephalic, without obvious abnormality and atraumatic  Eyes: conjunctivae and sclerae normal and no icterus  Neck: supple and no JVD  Lungs: clear to auscultation and " respirations regular  Heart: regular rhythm & normal rate and normal S1, S2  Chest: Wall no abnormalities observed  Abdomen: normal bowel sounds and soft non-tender  Extremities: moves extremities well, no edema, no cyanosis  Skin: no bleeding, some erythema right foot  Neurologic: Alert, and oriented. No focal deficits    Labs:    WBC WBC   Date Value Ref Range Status   07/13/2022 8.70 3.40 - 10.80 10*3/mm3 Final   07/11/2022 7.90 3.40 - 10.80 10*3/mm3 Final   07/11/2022 7.60 3.40 - 10.80 10*3/mm3 Final      HGB Hemoglobin   Date Value Ref Range Status   07/13/2022 13.8 13.0 - 17.7 g/dL Final   07/11/2022 13.1 13.0 - 17.7 g/dL Final   07/11/2022 12.8 (L) 13.0 - 17.7 g/dL Final      HCT Hematocrit   Date Value Ref Range Status   07/13/2022 41.0 37.5 - 51.0 % Final   07/11/2022 39.7 37.5 - 51.0 % Final   07/11/2022 39.2 37.5 - 51.0 % Final      Platlets No results found for: LABPLAT   MCV MCV   Date Value Ref Range Status   07/13/2022 91.9 79.0 - 97.0 fL Final   07/11/2022 92.2 79.0 - 97.0 fL Final   07/11/2022 93.6 79.0 - 97.0 fL Final          Sodium Sodium   Date Value Ref Range Status   07/13/2022 129 (L) 136 - 145 mmol/L Final   07/11/2022 132 (L) 136 - 145 mmol/L Final   07/11/2022 135 (L) 136 - 145 mmol/L Final      Potassium Potassium   Date Value Ref Range Status   07/13/2022 3.6 3.5 - 5.2 mmol/L Final     Comment:     Slight hemolysis detected by analyzer. Results may be affected.   07/11/2022 3.8 3.5 - 5.2 mmol/L Final   07/11/2022 4.1 3.5 - 5.2 mmol/L Final      Chloride Chloride   Date Value Ref Range Status   07/13/2022 88 (L) 98 - 107 mmol/L Final   07/11/2022 93 (L) 98 - 107 mmol/L Final   07/11/2022 96 (L) 98 - 107 mmol/L Final      CO2 CO2   Date Value Ref Range Status   07/13/2022 28.0 22.0 - 29.0 mmol/L Final   07/11/2022 27.0 22.0 - 29.0 mmol/L Final   07/11/2022 29.0 22.0 - 29.0 mmol/L Final      BUN BUN   Date Value Ref Range Status   07/13/2022 12 8 - 23 mg/dL Final   07/11/2022 13 8 - 23  mg/dL Final   07/11/2022 10 8 - 23 mg/dL Final      Creatinine Creatinine   Date Value Ref Range Status   07/13/2022 1.15 0.76 - 1.27 mg/dL Final   07/11/2022 1.22 0.76 - 1.27 mg/dL Final   07/11/2022 1.01 0.76 - 1.27 mg/dL Final      Calcium Calcium   Date Value Ref Range Status   07/13/2022 8.6 8.6 - 10.5 mg/dL Final   07/11/2022 9.1 8.6 - 10.5 mg/dL Final   07/11/2022 8.9 8.6 - 10.5 mg/dL Final      PO4 No components found for: PO4   Albumin Albumin   Date Value Ref Range Status   07/13/2022 3.30 (L) 3.50 - 5.20 g/dL Final   07/11/2022 3.70 3.50 - 5.20 g/dL Final   07/11/2022 3.30 (L) 3.50 - 5.20 g/dL Final      Magnesium Magnesium   Date Value Ref Range Status   07/13/2022 1.5 (L) 1.6 - 2.4 mg/dL Final   07/11/2022 1.7 1.6 - 2.4 mg/dL Final   07/11/2022 1.8 1.6 - 2.4 mg/dL Final      Uric Acid No components found for: URIC ACID     Imaging Results (Last 72 Hours)     ** No results found for the last 72 hours. **          Results for orders placed during the hospital encounter of 07/09/22    XR Foot 2 View Right    Narrative  DATE OF EXAM:  7/9/2022 1:58 PM    PROCEDURE:  XR FOOT 2 VW RIGHT-    INDICATIONS:  Wound between first and second toe maggots present.  Cellulitis;  L03.115-Cellulitis of right lower limb; B87.9-Myiasis, unspecified    COMPARISON:  No Comparisons Available    TECHNIQUE:  A minimum of two routine standard radiographic views were obtained of  the right foot.    FINDINGS:  There is diffuse soft tissue swelling of the right foot bones are  osteopenic. There is no acute appearing fracture. Postoperative findings  noted in the ankle at the medial malleolus and the distal fibula with  fixation screws. The calcaneus is intact. Small vessel vascular  calcifications noted.    Impression  1. Extensive soft tissue swelling of the right foot may relate to edema  and/or cellulitis.  2. No discrete area of osseous erosion.  3. No acute appearing fracture.  4. Additional chronic findings  "above.    Electronically Signed By-Renny Johnson MD On:2022 2:09 PM  This report was finalized on [id number] by  Renny Johnson MD.            ASSESSMENT / PLAN      Cellulitis of right foot            Huey Keyes MD  [facility]  [phone]  22  08:41 EDT  NEPHROLOGY PROGRESS NOTE------[facility]    [facility]  [phone]  Huey Keyes MD      Patient Care Team:  Montse Dawkins MD as PCP - General  Montse Dawkins MD as PCP - Family Medicine  Huey Keyes MD as Consulting Physician (Nephrology)      Provider:  Huey Keyes MD  Patient Name: Rommel Mcfarland  :  1945    SUBJECTIVE:      Medication:  allopurinol, 100 mg, Oral, Daily  bumetanide, 1 mg, Oral, Daily  chlordiazePOXIDE, 10 mg, Oral, BID  enoxaparin, 40 mg, Subcutaneous, Q24H  FLUoxetine, 10 mg, Oral, Daily  folic acid, 1 mg, Oral, Daily  hydrALAZINE, 50 mg, Oral, Q12H  levothyroxine, 175 mcg, Oral, Daily  magnesium sulfate, 2 g, Intravenous, Once  nystatin, , Topical, Q12H  pantoprazole, 40 mg, Oral, Daily  saccharomyces boulardii, 250 mg, Oral, BID  sodium chloride, 3 mL, Intravenous, Q12H  sulfamethoxazole-trimethoprim, 1 tablet, Oral, Q12H  thiamine, 100 mg, Oral, Daily      Pharmacy to Dose enoxaparin (LOVENOX),         OBJECTIVE    Vital Sign Min/Max for last 24 hours  Temp  Min: 97.4 °F (36.3 °C)  Max: 98.7 °F (37.1 °C)   BP  Min: 130/74  Max: 174/93   Pulse  Min: 62  Max: 74   Resp  Min: 17  Max: 18   SpO2  Min: 94 %  Max: 96 %   No data recorded   Weight  Min: 107 kg (235 lb 7.2 oz)  Max: 107 kg (235 lb 7.2 oz)     Flowsheet Rows    Flowsheet Row First Filed Value   Admission Height 172.7 cm (68\") Documented at 2022 1217   Admission Weight 99.8 kg (220 lb) Documented at 2022 1217          No intake/output data recorded.  I/O last 3 completed shifts:  In:  [P.O.:]  Out: 2925 [Urine:2925]    Physical " Exam:  General Appearance: alert, appears stated age and cooperative  Head: normocephalic, without obvious abnormality and atraumatic  Eyes: conjunctivae and sclerae normal and no icterus  Neck: supple and no JVD  Lungs: clear to auscultation and respirations regular  Heart: regular rhythm & normal rate and normal S1, S2  Chest: Wall no abnormalities observed  Abdomen: normal bowel sounds and soft non-tender  Extremities: moves extremities well, no edema, no cyanosis and no redness  Skin: no bleeding, bruising or rash, turgor normal, color normal and no lesions noted  Neurologic: Alert, and oriented. No focal deficits    Labs:    WBC WBC   Date Value Ref Range Status   07/13/2022 8.70 3.40 - 10.80 10*3/mm3 Final   07/11/2022 7.90 3.40 - 10.80 10*3/mm3 Final   07/11/2022 7.60 3.40 - 10.80 10*3/mm3 Final      HGB Hemoglobin   Date Value Ref Range Status   07/13/2022 13.8 13.0 - 17.7 g/dL Final   07/11/2022 13.1 13.0 - 17.7 g/dL Final   07/11/2022 12.8 (L) 13.0 - 17.7 g/dL Final      HCT Hematocrit   Date Value Ref Range Status   07/13/2022 41.0 37.5 - 51.0 % Final   07/11/2022 39.7 37.5 - 51.0 % Final   07/11/2022 39.2 37.5 - 51.0 % Final      Platlets No results found for: LABPLAT   MCV MCV   Date Value Ref Range Status   07/13/2022 91.9 79.0 - 97.0 fL Final   07/11/2022 92.2 79.0 - 97.0 fL Final   07/11/2022 93.6 79.0 - 97.0 fL Final          Sodium Sodium   Date Value Ref Range Status   07/13/2022 129 (L) 136 - 145 mmol/L Final   07/11/2022 132 (L) 136 - 145 mmol/L Final   07/11/2022 135 (L) 136 - 145 mmol/L Final      Potassium Potassium   Date Value Ref Range Status   07/13/2022 3.6 3.5 - 5.2 mmol/L Final     Comment:     Slight hemolysis detected by analyzer. Results may be affected.   07/11/2022 3.8 3.5 - 5.2 mmol/L Final   07/11/2022 4.1 3.5 - 5.2 mmol/L Final      Chloride Chloride   Date Value Ref Range Status   07/13/2022 88 (L) 98 - 107 mmol/L Final   07/11/2022 93 (L) 98 - 107 mmol/L Final   07/11/2022 96  (L) 98 - 107 mmol/L Final      CO2 CO2   Date Value Ref Range Status   07/13/2022 28.0 22.0 - 29.0 mmol/L Final   07/11/2022 27.0 22.0 - 29.0 mmol/L Final   07/11/2022 29.0 22.0 - 29.0 mmol/L Final      BUN BUN   Date Value Ref Range Status   07/13/2022 12 8 - 23 mg/dL Final   07/11/2022 13 8 - 23 mg/dL Final   07/11/2022 10 8 - 23 mg/dL Final      Creatinine Creatinine   Date Value Ref Range Status   07/13/2022 1.15 0.76 - 1.27 mg/dL Final   07/11/2022 1.22 0.76 - 1.27 mg/dL Final   07/11/2022 1.01 0.76 - 1.27 mg/dL Final      Calcium Calcium   Date Value Ref Range Status   07/13/2022 8.6 8.6 - 10.5 mg/dL Final   07/11/2022 9.1 8.6 - 10.5 mg/dL Final   07/11/2022 8.9 8.6 - 10.5 mg/dL Final      PO4 No components found for: PO4   Albumin Albumin   Date Value Ref Range Status   07/13/2022 3.30 (L) 3.50 - 5.20 g/dL Final   07/11/2022 3.70 3.50 - 5.20 g/dL Final   07/11/2022 3.30 (L) 3.50 - 5.20 g/dL Final      Magnesium Magnesium   Date Value Ref Range Status   07/13/2022 1.5 (L) 1.6 - 2.4 mg/dL Final   07/11/2022 1.7 1.6 - 2.4 mg/dL Final   07/11/2022 1.8 1.6 - 2.4 mg/dL Final      Uric Acid No components found for: URIC ACID     Imaging Results (Last 72 Hours)     ** No results found for the last 72 hours. **          Results for orders placed during the hospital encounter of 07/09/22    XR Foot 2 View Right    Narrative  DATE OF EXAM:  7/9/2022 1:58 PM    PROCEDURE:  XR FOOT 2 VW RIGHT-    INDICATIONS:  Wound between first and second toe maggots present.  Cellulitis;  L03.115-Cellulitis of right lower limb; B87.9-Myiasis, unspecified    COMPARISON:  No Comparisons Available    TECHNIQUE:  A minimum of two routine standard radiographic views were obtained of  the right foot.    FINDINGS:  There is diffuse soft tissue swelling of the right foot bones are  osteopenic. There is no acute appearing fracture. Postoperative findings  noted in the ankle at the medial malleolus and the distal fibula with  fixation screws.  The calcaneus is intact. Small vessel vascular  calcifications noted.    Impression  1. Extensive soft tissue swelling of the right foot may relate to edema  and/or cellulitis.  2. No discrete area of osseous erosion.  3. No acute appearing fracture.  4. Additional chronic findings above.    Electronically Signed By-Renny Johnson MD On:7/9/2022 2:09 PM  This report was finalized on [id number] by  Renny Johnson MD.            ASSESSMENT / PLAN      Cellulitis of right foot    · Hyponatremia-likely in setting of alcohol use and volume overload and or thiazides.  His urine osmolality is appropriately  256.  We will place him on fluid restriction of 1500 mL/day and gently diurese. Avoid Thiazides  · CKD-patient appears to have underlying CKD with baseline creatinine 1-1.2.  CKD may be related to hypertensive nephrosclerosis  · Cellulitis right foot  · Hypertension  · Chronic alcohol use    Sodium down to 129.  Tighter fluid restriction to 1200 mls/day , continue bumex 1 mg daily  On bactrim, monitor CR/K   Add amlodipine 5 mg daily for HTN  Add NaCl 1 gm TID  Labs in am        Huey Keyes MD  [facility]  [phone]  07/13/22  08:41 EDT

## 2022-07-13 NOTE — THERAPY TREATMENT NOTE
"Subjective: Pt agreeable to therapeutic plan of care.    Objective:     Bed mobility - Min-A for trunk support and scooting towards EOB.     Transfers -2x STS from EOB Min-A and with rolling walker. First STS, pt was able to stand with posterior lean and had LOB And sat back down. Second attempt with improved balance and given cues to stand upright.     Ambulation - 5 feet Min-A and with rolling walker. Pt with cues given for safety and exhibits short, discontinuous steps and B LE weakness.     Vitals: WNL    Pain: 0 VAS  Education: Provided education on importance of mobility and skilled verbal / tactile cueing throughout intervention.     Assessment: Rommel Mcfarland presents with functional mobility impairments which indicate the need for skilled intervention. Tolerating session today without incident. Pt requires min A for bed mobility, transfers, and ambulation this date with decreased B LE strength and gait impairments. Pt would benefit from skilled rehab to address functional deficits. Will continue to follow and progress as tolerated.     Plan/Recommendations:   Moderate Intensity Therapy recommended post-acute care. This is recommended as therapy feels the patient would require 3-4 days per week and wouldn't tolerate \"3 hour daily\" rehab intensity. SNF would be the preferred choice. If the patient does not agree to SNF, arrange HH or OP depending on home bound status. If patient is medically complex, consider LTACH.. Pt requires no DME at discharge.     Pt desires Skilled Rehab placement at discharge. Pt cooperative; agreeable to therapeutic recommendations and plan of care.         Basic Mobility 6-click:  Rollin = Total, A lot = 2, A little = 3; 4 = None  Supine>Sit:   1 = Total, A lot = 2, A little = 3; 4 = None   Sit>Stand with arms:  1 = Total, A lot = 2, A little = 3; 4 = None  Bed>Chair:   1 = Total, A lot = 2, A little = 3; 4 = None  Ambulate in room:  1 = Total, A lot = 2, A little = " 3; 4 = None  3-5 Steps with railin = Total, A lot = 2, A little = 3; 4 = None  Score: 14    Modified [place]: N/A = No pre-op stroke/TIA    Post-Tx Position: Up in Chair, Alarms activated and Call light and personal items within reach  PPE: gloves, surgical mask, eyewear protection

## 2022-07-13 NOTE — CASE MANAGEMENT/SOCIAL WORK
Continued Stay Note   [place]     Patient Name: Rommel Mcfarland  MRN: [id number]  Today's Date: 7/13/2022    Admit Date: 7/9/2022     Discharge Plan     Row Name 07/13/22 1446       Plan    Plan Comments Received notice from Dr. Dawkins that patient will d/c today pending renal clearance. Per Marisol RN she confirmed with Dr. Keyes patient is cleared for d/c and needs BMP in 1 week and follow up appt in 2 weeks. Informed Dr. Dawkins of this information. She plans to d/c today and would prefer patient to d/c via EMS. Case discussed with RN and PT regarding medical necessity criteria for EMS transportation. Per PT patient was able to ambulate 5 feet and sit in the chair. Based on this information patient does not meet Medicare critieria for EMS transportation. Relayed this information to patients daughter and she is willing to private pay for transportation assistance since [facility] does not offer transportation at this time. Provided daughter with the number for [employer] (# [phone]) and [employer] (# [phone]) so she can call and arrange transportation for patient. Awaiting d/c orders.    Received notice from daughter that she is unable to arrange transportation herself due to her work schedule. She will plan to pick the patient up after work this evening. Updated RN via secure chat.                 Phone communication or documentation only - no physical contact with patient or family.      Minal Munson RN

## 2022-07-13 NOTE — PLAN OF CARE
Goal Outcome Evaluation:  Plan of Care Reviewed With: patient        Progress: improving  Outcome Evaluation: Pt resting in bed. Wound cacre being performed per Dr. Dawkins's orders. Planned discharge pending rehab placement. Will continue to monitor.

## 2022-07-13 NOTE — PLAN OF CARE
Goal Outcome Evaluation:  Plan of Care Reviewed With: patient        Progress: improving  Outcome Evaluation: Pt discharging to rehab, daughter to provide transportation

## 2022-07-13 NOTE — PROGRESS NOTES
Infectious Diseases Progress Note      LOS: 4 days   Patient Care Team:  Montse Dawkins MD as PCP - General  Montse Dawkins MD as PCP - Family Medicine  Huey Keyes MD as Consulting Physician (Nephrology)    Chief Complaint: Leg edema    Subjective     The patient had no fever during the last 24 hours.  He remained hemodynamically stable.  He is awake and alert.    Review of Systems:   Review of Systems   Constitutional: Negative.    HENT: Negative.    Eyes: Negative.    Respiratory: Negative.    Cardiovascular: Positive for leg swelling.   Gastrointestinal: Negative.    Genitourinary: Negative.    Musculoskeletal: Negative.    Skin: Positive for rash.   Neurological: Negative.    Hematological: Negative.    Psychiatric/Behavioral: Negative.         Objective     Vital Signs  Temp:  [97.4 °F (36.3 °C)-98.7 °F (37.1 °C)] 98.4 °F (36.9 °C)  Heart Rate:  [62-74] 68  Resp:  [17-18] 17  BP: (134-174)/(67-93) 143/71    Physical Exam:  Physical Exam  Vitals and nursing note reviewed.   Constitutional:       Appearance: He is well-developed.   HENT:      Head: Normocephalic and atraumatic.   Eyes:      Pupils: Pupils are equal, round, and reactive to light.   Cardiovascular:      Rate and Rhythm: Normal rate and regular rhythm.      Heart sounds: Normal heart sounds.   Pulmonary:      Effort: Pulmonary effort is normal. No respiratory distress.      Breath sounds: Normal breath sounds. No wheezing or rales.   Abdominal:      General: Bowel sounds are normal. There is no distension.      Palpations: Abdomen is soft. There is no mass.      Tenderness: There is no abdominal tenderness. There is no guarding or rebound.   Musculoskeletal:         General: No deformity. Normal range of motion.      Cervical back: Normal range of motion and neck supple.      Right lower leg: Edema present.      Left lower leg: Edema present.      Comments: Chronic venous stasis changes bilaterally with very subtle superimposed erythema  mostly on the left leg   Skin:     General: Skin is warm.      Findings: No erythema or rash.   Neurological:      Mental Status: He is alert and oriented to person, place, and time.      Cranial Nerves: No cranial nerve deficit.          Results Review:    I have reviewed all clinical data, test, lab, and imaging results.     Radiology  No Radiology Exams Resulted Within Past 24 Hours    Cardiology    Laboratory    Results from last 7 days   Lab Units 07/13/22  0030 07/11/22  2310 07/11/22  0748 07/10/22  0136 07/09/22  1236   WBC 10*3/mm3 8.70 7.90 7.60 6.30 6.90   HEMOGLOBIN g/dL 13.8 13.1 12.8* 11.8* 12.9*   HEMATOCRIT % 41.0 39.7 39.2 35.7* 39.7   PLATELETS 10*3/mm3 215 196 184 161 159     Results from last 7 days   Lab Units 07/13/22  0030 07/11/22 2310 07/11/22 0748 07/10/22  0136 07/09/22  1236   SODIUM mmol/L 129* 132* 135* 127* 125*   POTASSIUM mmol/L 3.6 3.8 4.1 4.2 4.2   CHLORIDE mmol/L 88* 93* 96* 95* 90*   CO2 mmol/L 28.0 27.0 29.0 24.0 23.0   BUN mg/dL 12 13 10 9 9   CREATININE mg/dL 1.15 1.22 1.01 1.11 1.10   GLUCOSE mg/dL 105* 119* 103* 119* 103*   ALBUMIN g/dL 3.30* 3.70 3.30* 3.50 3.80   BILIRUBIN mg/dL 0.7 0.4 0.5 0.8 0.8   ALK PHOS U/L 42 51 40 37* 42   AST (SGOT) U/L 13 11 13 11 13   ALT (SGPT) U/L 7 7 8 6 7   CALCIUM mg/dL 8.6 9.1 8.9 8.7 8.9                 Microbiology   Microbiology Results (last 10 days)     Procedure Component Value - Date/Time    COVID PRE-OP / PRE-PROCEDURE SCREENING ORDER (NO ISOLATION) - Swab, Nasopharynx [[id number]]  (Normal) Collected: 07/09/22 1658    Lab Status: Final result Specimen: Swab from Nasopharynx Updated: 07/09/22 7533    Narrative:      The following orders were created for panel order COVID PRE-OP / PRE-PROCEDURE SCREENING ORDER (NO ISOLATION) - Swab, Nasopharynx.  Procedure                               Abnormality         Status                     ---------                               -----------         ------                      COVID-19,CEPHEID/SHELDON,CO...[[id number]]  Normal              Final result                 Please view results for these tests on the individual orders.    COVID-19,CEPHEID/SHELDON,COR/STACIA/PAD/CHARLES IN-HOUSE(OR EMERGENT/ADD-ON),NP SWAB IN TRANSPORT MEDIA 3-4 HR TAT, RT-PCR - Swab, Nasopharynx [[id number]]  (Normal) Collected: 07/09/22 1658    Lab Status: Final result Specimen: Swab from Nasopharynx Updated: 07/09/22 1721     COVID19 Not Detected    Narrative:      Fact sheet for providers: https://www.fda.gov/media/305493/download     Fact sheet for patients: https://www.fda.gov/media/026759/download  Fact sheet for providers: https://www.fda.gov/media/799363/download    Fact sheet for patients: https://www.[employer].gov/media/116531/download    Test performed by PCR.    Blood Culture - Blood, Arm, Left [[id number]]  (Normal) Collected: 07/09/22 1349    Lab Status: Preliminary result Specimen: Blood from Arm, Left Updated: 07/12/22 1404     Blood Culture No growth at 3 days    Blood Culture - Blood, Arm, Right [[id number]]  (Normal) Collected: 07/09/22 1236    Lab Status: Preliminary result Specimen: Blood from Arm, Right Updated: 07/13/22 1247     Blood Culture No growth at 4 days          Medication Review:       Schedule Meds  allopurinol, 100 mg, Oral, Daily  amLODIPine, 5 mg, Oral, Q24H  bumetanide, 1 mg, Oral, Daily  chlordiazePOXIDE, 10 mg, Oral, BID  enoxaparin, 40 mg, Subcutaneous, Q24H  FLUoxetine, 10 mg, Oral, Daily  folic acid, 1 mg, Oral, Daily  hydrALAZINE, 50 mg, Oral, Q12H  levothyroxine, 175 mcg, Oral, Daily  nystatin, , Topical, Q12H  pantoprazole, 40 mg, Oral, Daily  saccharomyces boulardii, 250 mg, Oral, BID  sodium chloride, 3 mL, Intravenous, Q12H  sodium chloride, 1 g, Oral, TID With Meals  sulfamethoxazole-trimethoprim, 1 tablet, Oral, Q12H  thiamine, 100 mg, Oral, Daily        Infusion Meds  Pharmacy to Dose enoxaparin (LOVENOX),         PRN Meds  aluminum-magnesium hydroxide-simethicone  •  calcium  carbonate  •  chlordiazePOXIDE  •  ondansetron **OR** ondansetron  •  Pharmacy to Dose enoxaparin (LOVENOX)  •  [COMPLETED] Insert peripheral IV **AND** sodium chloride  •  sodium chloride  •  traMADol        Assessment & Plan       Antimicrobial Therapy   1.  P.o. Bactrim        2.        3.        4.        5.            Assessment     Bilateral venous stasis changes with possible mild superimposed cellulitis.  Mostly noted on the left leg.  The patient has no clinical signs of sepsis with normal white count     Patient denies significant history other than alcohol abuse.  He apparently drinks approximately 20 beers a day.  Apparently has issues with hygiene/ADLs because of his drinking     History of C. difficile colitis      Plan     Continue Bactrim DS 1 tablet p.o. twice daily for a total of 7 days  Continue supportive care  Okay to discharge patient from infectious disease point           Marleny Sanders, APRN  07/13/22  13:00 EDT    Note is dictated utilizing voice recognition software/Dragon

## 2022-07-13 NOTE — PROGRESS NOTES
"DATE/TIME OF ADMISSION:  7/9/2022 12:21 PM     LOS: 4 days   Patient Care Team:  Montse Dawkins MD as PCP - General  Montse Dawkins MD as PCP - Family Medicine  Huey Keyes MD as Consulting Physician (Nephrology)  Consults     Date and Time Order Name Status Description    7/10/2022 12:34 AM Inpatient Infectious Diseases Consult Completed     7/9/2022  2:14 PM Nephrology (on -call MD unless specified) Completed           Subjective MOVED FROM BED TO CHAIR WITH HELP  CAN PIVOT  BALANCE NOT GOOD  ALERT AND COOPERATIVE  LOOKING AT INPT REHAB OPTIONS    Interval History: FEET WITH TINEA AND CELLULITIS  IMPROVING    Patient Complaints: NONE    History taken from: patient    Review of Systems        Objective     Vital Signs  /75 (BP Location: Left arm, Patient Position: Lying)   Pulse 74   Temp 97.5 °F (36.4 °C) (Oral)   Resp 18   Ht 172.7 cm (67.99\")   Wt 107 kg (235 lb 7.2 oz)   SpO2 96%   BMI 35.81 kg/m²     Physical Exam:     General Appearance:    Alert, cooperative, in no acute distress; PLEASANT   Head:    Normocephalic, without obvious abnormality, atraumatic   Eyes:            Lids and lashes normal, conjunctivae and sclerae normal, no   icterus, no pallor, corneas clear, PERRLA   Ears:    Ears appear intact with no abnormalities noted   Throat:   No oral lesions, no thrush, oral mucosa moist   Neck:   No adenopathy, supple, trachea midline, no thyromegaly, no   carotid bruit, no JVD   Lungs:     Clear to auscultation,respirations regular, even and                  unlabored    Heart:    Regular rhythm and normal rate, normal S1 and S2, no            murmur, no gallop, no rub, no click   Chest Wall:    No abnormalities observed   Abdomen:     Normal bowel sounds, no masses, no organomegaly, soft        non-tender, non-distended, no guarding, no rebound                tenderness   Extremities:   Moves all extremities well, TRACE  edema, no cyanosis, no             Redness; WEBSPACES FOR THE " FEET NOW DRY AND POWDERED AND MUCH IMPROVED   Pulses:   Pulses palpable and equal bilaterally   Skin:   No bleeding, bruising or rash; AS ABOVE   Lymph nodes:   No palpable adenopathy   Neurologic:   Grossly normal, alert and O x 2        I HAVE PERSONALLY EXAMINED AND REVIEWED THE PATIENT'S RECORD     Lab Results (last 48 hours)     Procedure Component Value Units Date/Time    Comprehensive Metabolic Panel [[id number]]  (Abnormal) Collected: 07/13/22 0030    Specimen: Blood Updated: 07/13/22 0150     Glucose 105 mg/dL      BUN 12 mg/dL      Creatinine 1.15 mg/dL      Sodium 129 mmol/L      Potassium 3.6 mmol/L      Comment: Slight hemolysis detected by analyzer. Results may be affected.        Chloride 88 mmol/L      CO2 28.0 mmol/L      Calcium 8.6 mg/dL      Total Protein 6.2 g/dL      Albumin 3.30 g/dL      ALT (SGPT) 7 U/L      AST (SGOT) 13 U/L      Comment: Slight hemolysis detected by analyzer. Results may be affected.        Alkaline Phosphatase 42 U/L      Total Bilirubin 0.7 mg/dL      Globulin 2.9 gm/dL      A/G Ratio 1.1 g/dL      BUN/Creatinine Ratio 10.4     Anion Gap 13.0 mmol/L      eGFR 65.1 mL/min/1.73      Comment: National Kidney Foundation and American Society of Nephrology (ASN) Task Force recommended calculation based on the Chronic Kidney Disease Epidemiology Collaboration (CKD-EPI) equation refit without adjustment for race.       Narrative:      GFR Normal >60  Chronic Kidney Disease <60  Kidney Failure <15      Magnesium [[id number]]  (Abnormal) Collected: 07/13/22 0030    Specimen: Blood Updated: 07/13/22 0149     Magnesium 1.5 mg/dL     CBC & Differential [[id number]]  (Abnormal) Collected: 07/13/22 0030    Specimen: Blood Updated: 07/13/22 0118    Narrative:      The following orders were created for panel order CBC & Differential.  Procedure                               Abnormality         Status                     ---------                               -----------         ------                      CBC Auto Differential[[id number]]        Abnormal            Final result                 Please view results for these tests on the individual orders.    CBC Auto Differential [[id number]]  (Abnormal) Collected: 07/13/22 0030    Specimen: Blood Updated: 07/13/22 0118     WBC 8.70 10*3/mm3      RBC 4.47 10*6/mm3      Hemoglobin 13.8 g/dL      Hematocrit 41.0 %      MCV 91.9 fL      MCH 30.8 pg      MCHC 33.5 g/dL      RDW 15.2 %      RDW-SD 48.6 fl      MPV 8.4 fL      Platelets 215 10*3/mm3      Neutrophil % 61.9 %      Lymphocyte % 16.1 %      Monocyte % 16.9 %      Eosinophil % 4.5 %      Basophil % 0.6 %      Neutrophils, Absolute 5.40 10*3/mm3      Lymphocytes, Absolute 1.40 10*3/mm3      Monocytes, Absolute 1.50 10*3/mm3      Eosinophils, Absolute 0.40 10*3/mm3      Basophils, Absolute 0.10 10*3/mm3      nRBC 0.0 /100 WBC     Blood Culture - Blood, Arm, Left [[id number]]  (Normal) Collected: 07/09/22 1349    Specimen: Blood from Arm, Left Updated: 07/12/22 1404     Blood Culture No growth at 3 days    Blood Culture - Blood, Arm, Right [[id number]]  (Normal) Collected: 07/09/22 1236    Specimen: Blood from Arm, Right Updated: 07/12/22 1247     Blood Culture No growth at 3 days    Magnesium [[id number]]  (Normal) Collected: 07/11/22 2310    Specimen: Blood Updated: 07/12/22 0004     Magnesium 1.7 mg/dL     Comprehensive Metabolic Panel [[id number]]  (Abnormal) Collected: 07/11/22 2310    Specimen: Blood Updated: 07/12/22 0004     Glucose 119 mg/dL      BUN 13 mg/dL      Creatinine 1.22 mg/dL      Sodium 132 mmol/L      Potassium 3.8 mmol/L      Chloride 93 mmol/L      CO2 27.0 mmol/L      Calcium 9.1 mg/dL      Total Protein 6.7 g/dL      Albumin 3.70 g/dL      ALT (SGPT) 7 U/L      AST (SGOT) 11 U/L      Alkaline Phosphatase 51 U/L      Total Bilirubin 0.4 mg/dL      Globulin 3.0 gm/dL      A/G Ratio 1.2 g/dL      BUN/Creatinine Ratio 10.7     Anion Gap 12.0 mmol/L      eGFR 60.7 mL/min/1.73       Comment: National Kidney Foundation and American Society of Nephrology (ASN) Task Force recommended calculation based on the Chronic Kidney Disease Epidemiology Collaboration (CKD-EPI) equation refit without adjustment for race.       Narrative:      GFR Normal >60  Chronic Kidney Disease <60  Kidney Failure <15      CBC & Differential [[id number]]  (Abnormal) Collected: 07/11/22 2310    Specimen: Blood Updated: 07/11/22 2340    Narrative:      The following orders were created for panel order CBC & Differential.  Procedure                               Abnormality         Status                     ---------                               -----------         ------                     CBC Auto Differential[[id number]]        Abnormal            Final result                 Please view results for these tests on the individual orders.    CBC Auto Differential [[id number]]  (Abnormal) Collected: 07/11/22 2310    Specimen: Blood Updated: 07/11/22 2340     WBC 7.90 10*3/mm3      RBC 4.31 10*6/mm3      Hemoglobin 13.1 g/dL      Hematocrit 39.7 %      MCV 92.2 fL      MCH 30.3 pg      MCHC 32.9 g/dL      RDW 15.3 %      RDW-SD 49.0 fl      MPV 8.3 fL      Platelets 196 10*3/mm3      Neutrophil % 57.9 %      Lymphocyte % 14.5 %      Monocyte % 18.7 %      Eosinophil % 7.9 %      Basophil % 1.0 %      Neutrophils, Absolute 4.60 10*3/mm3      Lymphocytes, Absolute 1.10 10*3/mm3      Monocytes, Absolute 1.50 10*3/mm3      Eosinophils, Absolute 0.60 10*3/mm3      Basophils, Absolute 0.10 10*3/mm3      nRBC 0.1 /100 WBC     Comprehensive Metabolic Panel [[id number]]  (Abnormal) Collected: 07/11/22 0748    Specimen: Blood Updated: 07/11/22 0844     Glucose 103 mg/dL      BUN 10 mg/dL      Creatinine 1.01 mg/dL      Sodium 135 mmol/L      Potassium 4.1 mmol/L      Chloride 96 mmol/L      CO2 29.0 mmol/L      Calcium 8.9 mg/dL      Total Protein 6.1 g/dL      Albumin 3.30 g/dL      ALT (SGPT) 8 U/L      AST (SGOT) 13 U/L      Alkaline  Phosphatase 40 U/L      Total Bilirubin 0.5 mg/dL      Globulin 2.8 gm/dL      A/G Ratio 1.2 g/dL      BUN/Creatinine Ratio 9.9     Anion Gap 10.0 mmol/L      eGFR 76.1 mL/min/1.73      Comment: National Kidney Foundation and American Society of Nephrology (ASN) Task Force recommended calculation based on the Chronic Kidney Disease Epidemiology Collaboration (CKD-EPI) equation refit without adjustment for race.       Narrative:      GFR Normal >60  Chronic Kidney Disease <60  Kidney Failure <15      Vancomycin, Peak [[id number]]  (Normal) Collected: 07/11/22 0748    Specimen: Blood Updated: 07/11/22 0844     Vancomycin Peak 30.60 mcg/mL     Narrative:      Therapeutic Ranges for Vancomycin    Vancomycin Random   5.0-40.0 mcg/mL  Vancomycin Trough   5.0-20.0 mcg/mL  Vancomycin Peak     20.0-40.0 mcg/mL    Magnesium [[id number]]  (Normal) Collected: 07/11/22 0748    Specimen: Blood Updated: 07/11/22 0844     Magnesium 1.8 mg/dL     CBC & Differential [[id number]]  (Abnormal) Collected: 07/11/22 0748    Specimen: Blood Updated: 07/11/22 0824    Narrative:      The following orders were created for panel order CBC & Differential.  Procedure                               Abnormality         Status                     ---------                               -----------         ------                     CBC Auto Differential[[id number]]        Abnormal            Final result                 Please view results for these tests on the individual orders.    CBC Auto Differential [[id number]]  (Abnormal) Collected: 07/11/22 0748    Specimen: Blood Updated: 07/11/22 0824     WBC 7.60 10*3/mm3      RBC 4.18 10*6/mm3      Hemoglobin 12.8 g/dL      Hematocrit 39.2 %      MCV 93.6 fL      MCH 30.6 pg      MCHC 32.7 g/dL      RDW 15.5 %      RDW-SD 49.9 fl      MPV 8.7 fL      Platelets 184 10*3/mm3      Neutrophil % 58.3 %      Lymphocyte % 15.5 %      Monocyte % 15.4 %      Eosinophil % 9.6 %      Basophil % 1.2 %      Neutrophils,  Absolute 4.40 10*3/mm3      Lymphocytes, Absolute 1.20 10*3/mm3      Monocytes, Absolute 1.20 10*3/mm3      Eosinophils, Absolute 0.70 10*3/mm3      Basophils, Absolute 0.10 10*3/mm3      nRBC 0.0 /100 WBC            Imaging Results (Last 48 Hours)     ** No results found for the last 48 hours. **               I reviewed the patient's new clinical results.    History reviewed. No pertinent past medical history.  History reviewed. No pertinent surgical history.      Medication Review:   Scheduled Meds:allopurinol, 100 mg, Oral, Daily  bumetanide, 1 mg, Oral, Daily  chlordiazePOXIDE, 10 mg, Oral, BID  enoxaparin, 40 mg, Subcutaneous, Q24H  FLUoxetine, 10 mg, Oral, Daily  folic acid, 1 mg, Oral, Daily  hydrALAZINE, 50 mg, Oral, Q12H  levothyroxine, 175 mcg, Oral, Daily  nystatin, , Topical, Q12H  pantoprazole, 40 mg, Oral, Daily  saccharomyces boulardii, 250 mg, Oral, BID  sodium chloride, 3 mL, Intravenous, Q12H  sulfamethoxazole-trimethoprim, 1 tablet, Oral, Q12H  thiamine, 100 mg, Oral, Daily      Continuous Infusions:Pharmacy to Dose enoxaparin (LOVENOX),       PRN Meds:.aluminum-magnesium hydroxide-simethicone  •  calcium carbonate  •  chlordiazePOXIDE  •  ondansetron **OR** ondansetron  •  Pharmacy to Dose enoxaparin (LOVENOX)  •  [COMPLETED] Insert peripheral IV **AND** sodium chloride  •  sodium chloride  •  traMADol     Assessment & Plan   ACUTE CELLULITIS OF THE RIGHT FOOT---ON ORAL ATB NOW AND GREATLY IMPROVED; DR WALL  ADVISING; CEFEPINE STARTED IN THE ER AND  DISCONTINUED WITH CONFUSION AND CKD HISTORY  TINEA---NYSTATIN POWDER AND DIFLUCAN; GREATLY BETTER  SOCIAL---[facility] AND OUTSIDE AGENCIES TO HELP DAUGHTER AS SHE CANNOT DO ALL CARE FOR ALL THE DEMANDS ON HER TIME  ETOH ABUSE---WILLING TO REDUCE TO NOT > A 6 PACK DAILY ONCE DISCHARGED  HYPONATREMIA---SECONDARY TO BEER INTAKE  DM II---STABLE  CKD3--STABLE  HTN---ADJUSTED THE HYDRALAZINE DOSE AND  IMPROVED  HYPOTHYROID---STABLE  GOUT---STABLE  GERD---PPI  DEPRESSION---STABLE WITH PROZAC  LOW MAGNESIUM---REPLACED  Active Problems:    Cellulitis of right foot          Plan for disposition:IDEALLY INPT REHAB BUT AWAITING A BED; MAY HAVE TO DO HH AND PT THERE    Montse Dawkins MD  07/13/22  07:29 EDT

## 2022-07-14 LAB
BACTERIA SPEC AEROBE CULT: NORMAL
BACTERIA SPEC AEROBE CULT: NORMAL

## 2022-07-14 NOTE — CASE MANAGEMENT/SOCIAL WORK
Case Management Discharge Note      Final Note: [facility]    Provided Post Acute Provider List?: Yes  Post Acute Provider List: Inpatient Rehab, Nursing Home  Delivered To: Support Person  Support Person: carmina Gates  Method of Delivery: Telephone    Selected Continued Care - Discharged on 7/13/2022 Admission date: 7/9/2022 - Discharge disposition: Rehab Facility or Unit (DC - External)    Destination Coordination complete.    Service Provider Selected Services Address Phone Fax Patient Preferred    [facility]  Inpatient Rehabilitation [address] [place] [address] [phone] --               Transportation Services  Private: Car    Final Discharge Disposition Code: 62 - inpatient rehab facility

## 2022-07-25 ENCOUNTER — TRANSCRIBE ORDERS (OUTPATIENT)
Dept: HOME HEALTH SERVICES | Facility: HOME HEALTHCARE | Age: 77
End: 2022-07-25

## 2022-07-25 ENCOUNTER — HOME HEALTH ADMISSION (OUTPATIENT)
Dept: HOME HEALTH SERVICES | Facility: HOME HEALTHCARE | Age: 77
End: 2022-07-25

## 2022-07-25 DIAGNOSIS — L03.115 CELLULITIS OF RIGHT LOWER EXTREMITY: Primary | ICD-10-CM

## 2022-07-26 ENCOUNTER — HOME CARE VISIT (OUTPATIENT)
Dept: HOME HEALTH SERVICES | Facility: HOME HEALTHCARE | Age: 77
End: 2022-07-26

## 2022-07-26 VITALS
DIASTOLIC BLOOD PRESSURE: 82 MMHG | OXYGEN SATURATION: 97 % | RESPIRATION RATE: 17 BRPM | TEMPERATURE: 98 F | HEART RATE: 85 BPM | SYSTOLIC BLOOD PRESSURE: 140 MMHG

## 2022-07-26 PROCEDURE — G0299 HHS/HOSPICE OF RN EA 15 MIN: HCPCS

## 2022-07-26 NOTE — HOME HEALTH
Primary focus of care: Cellulitis of right lower leg; education and monitoring     Vital signs stable. Edema present on left and right lower legs. Education provided about home health services; patient was agreeable and verbilized understanding. Patient denies falls. Patient has family close by and caregiver in the home. Walker at chairside. No wounds present. Appetite fair.     CP assess  monitor edema

## 2022-07-27 ENCOUNTER — HOME CARE VISIT (OUTPATIENT)
Dept: HOME HEALTH SERVICES | Facility: HOME HEALTHCARE | Age: 77
End: 2022-07-27

## 2022-07-27 VITALS
TEMPERATURE: 97.5 F | HEART RATE: 64 BPM | SYSTOLIC BLOOD PRESSURE: 126 MMHG | OXYGEN SATURATION: 96 % | DIASTOLIC BLOOD PRESSURE: 62 MMHG

## 2022-07-27 PROCEDURE — G0151 HHCP-SERV OF PT,EA 15 MIN: HCPCS

## 2022-07-27 NOTE — HOME HEALTH
Pt referred for home health PT sp hospitalization and rehab stay for LE cellulitis.    PLOF Pt residential ambulator with use of wheeled walker. Pt has paid caregivers and daughter taking turns assisting patient at home.     Environment Lives in a single story house with one steps and grab bars to get in and out. House is clutter free. Pt able to access all areas of residence. Pt has appropriate DME.    Pt is I with bed mobility and transfers with use of wheeled walker. Pt able to ambulate in house with use of wheeled walker with shuffling pattern. Pt states he does not need any PT services and is at baseline. PT eval only per pt request. Pt denies medication changes or adverse reaction. Pt agrees with continued use of wheeled walker for support.

## 2022-08-02 ENCOUNTER — HOME CARE VISIT (OUTPATIENT)
Dept: HOME HEALTH SERVICES | Facility: HOME HEALTHCARE | Age: 77
End: 2022-08-02

## 2022-08-02 VITALS
TEMPERATURE: 98.1 F | OXYGEN SATURATION: 97 % | RESPIRATION RATE: 17 BRPM | DIASTOLIC BLOOD PRESSURE: 82 MMHG | HEART RATE: 64 BPM | SYSTOLIC BLOOD PRESSURE: 142 MMHG

## 2022-08-02 PROCEDURE — G0299 HHS/HOSPICE OF RN EA 15 MIN: HCPCS

## 2022-08-02 NOTE — HOME HEALTH
Vital signs stable. Patient does not monitor blood sugar; education provided about the importance; patient verbilized understanding. Appetite well. No issues with elimination. Patient denies falls and pain. Right lower leg assessed; healing noted.     Cp assess  vital signs

## 2022-08-09 ENCOUNTER — TRANSCRIBE ORDERS (OUTPATIENT)
Dept: ADMINISTRATIVE | Facility: HOSPITAL | Age: 77
End: 2022-08-09

## 2022-08-09 ENCOUNTER — HOME CARE VISIT (OUTPATIENT)
Dept: HOME HEALTH SERVICES | Facility: HOME HEALTHCARE | Age: 77
End: 2022-08-09

## 2022-08-09 ENCOUNTER — LAB (OUTPATIENT)
Dept: LAB | Facility: HOSPITAL | Age: 77
End: 2022-08-09

## 2022-08-09 VITALS
DIASTOLIC BLOOD PRESSURE: 64 MMHG | OXYGEN SATURATION: 95 % | SYSTOLIC BLOOD PRESSURE: 118 MMHG | RESPIRATION RATE: 17 BRPM | TEMPERATURE: 98.2 F | HEART RATE: 66 BPM

## 2022-08-09 DIAGNOSIS — E55.9 VITAMIN D DEFICIENCY: ICD-10-CM

## 2022-08-09 DIAGNOSIS — N18.30 STAGE 3 CHRONIC KIDNEY DISEASE, UNSPECIFIED WHETHER STAGE 3A OR 3B CKD: ICD-10-CM

## 2022-08-09 DIAGNOSIS — N18.30 STAGE 3 CHRONIC KIDNEY DISEASE, UNSPECIFIED WHETHER STAGE 3A OR 3B CKD: Primary | ICD-10-CM

## 2022-08-09 LAB
25(OH)D3 SERPL-MCNC: 59.3 NG/ML (ref 30–100)
ANION GAP SERPL CALCULATED.3IONS-SCNC: 14.3 MMOL/L (ref 5–15)
BASOPHILS # BLD AUTO: 0.08 10*3/MM3 (ref 0–0.2)
BASOPHILS NFR BLD AUTO: 1.1 % (ref 0–1.5)
BILIRUB UR QL STRIP: NEGATIVE
BUN SERPL-MCNC: 5 MG/DL (ref 8–23)
BUN/CREAT SERPL: 4.9 (ref 7–25)
CALCIUM SPEC-SCNC: 9.1 MG/DL (ref 8.6–10.5)
CHLORIDE SERPL-SCNC: 91 MMOL/L (ref 98–107)
CLARITY UR: CLEAR
CO2 SERPL-SCNC: 27.7 MMOL/L (ref 22–29)
COLOR UR: YELLOW
CREAT SERPL-MCNC: 1.03 MG/DL (ref 0.76–1.27)
CREAT UR-MCNC: 59 MG/DL
DEPRECATED RDW RBC AUTO: 44.2 FL (ref 37–54)
EGFRCR SERPLBLD CKD-EPI 2021: 74.4 ML/MIN/1.73
EOSINOPHIL # BLD AUTO: 0.55 10*3/MM3 (ref 0–0.4)
EOSINOPHIL NFR BLD AUTO: 7.8 % (ref 0.3–6.2)
ERYTHROCYTE [DISTWIDTH] IN BLOOD BY AUTOMATED COUNT: 13.7 % (ref 12.3–15.4)
GLUCOSE SERPL-MCNC: 92 MG/DL (ref 65–99)
GLUCOSE UR STRIP-MCNC: NEGATIVE MG/DL
HCT VFR BLD AUTO: 37.1 % (ref 37.5–51)
HGB BLD-MCNC: 12.7 G/DL (ref 13–17.7)
HGB UR QL STRIP.AUTO: NEGATIVE
IMM GRANULOCYTES # BLD AUTO: 0.03 10*3/MM3 (ref 0–0.05)
IMM GRANULOCYTES NFR BLD AUTO: 0.4 % (ref 0–0.5)
KETONES UR QL STRIP: NEGATIVE
LEUKOCYTE ESTERASE UR QL STRIP.AUTO: NEGATIVE
LYMPHOCYTES # BLD AUTO: 1.86 10*3/MM3 (ref 0.7–3.1)
LYMPHOCYTES NFR BLD AUTO: 26.4 % (ref 19.6–45.3)
MAGNESIUM SERPL-MCNC: 1.6 MG/DL (ref 1.6–2.4)
MCH RBC QN AUTO: 30.6 PG (ref 26.6–33)
MCHC RBC AUTO-ENTMCNC: 34.2 G/DL (ref 31.5–35.7)
MCV RBC AUTO: 89.4 FL (ref 79–97)
MONOCYTES # BLD AUTO: 1.02 10*3/MM3 (ref 0.1–0.9)
MONOCYTES NFR BLD AUTO: 14.5 % (ref 5–12)
NEUTROPHILS NFR BLD AUTO: 3.5 10*3/MM3 (ref 1.7–7)
NEUTROPHILS NFR BLD AUTO: 49.8 % (ref 42.7–76)
NITRITE UR QL STRIP: NEGATIVE
NRBC BLD AUTO-RTO: 0 /100 WBC (ref 0–0.2)
PH UR STRIP.AUTO: 6 [PH] (ref 5–8)
PLATELET # BLD AUTO: 198 10*3/MM3 (ref 140–450)
PMV BLD AUTO: 10.8 FL (ref 6–12)
POTASSIUM SERPL-SCNC: 4.4 MMOL/L (ref 3.5–5.2)
PROT ?TM UR-MCNC: 10.4 MG/DL
PROT UR QL STRIP: NEGATIVE
PROT/CREAT UR: 176.3 MG/G CREA (ref 0–200)
PTH-INTACT SERPL-MCNC: 75.3 PG/ML (ref 15–65)
RBC # BLD AUTO: 4.15 10*6/MM3 (ref 4.14–5.8)
SODIUM SERPL-SCNC: 133 MMOL/L (ref 136–145)
SP GR UR STRIP: 1.01 (ref 1–1.03)
URATE SERPL-MCNC: 8.5 MG/DL (ref 3.4–7)
UROBILINOGEN UR QL STRIP: NORMAL
WBC NRBC COR # BLD: 7.04 10*3/MM3 (ref 3.4–10.8)

## 2022-08-09 PROCEDURE — G0299 HHS/HOSPICE OF RN EA 15 MIN: HCPCS

## 2022-08-09 PROCEDURE — 80048 BASIC METABOLIC PNL TOTAL CA: CPT

## 2022-08-09 PROCEDURE — 36415 COLL VENOUS BLD VENIPUNCTURE: CPT

## 2022-08-09 PROCEDURE — 83735 ASSAY OF MAGNESIUM: CPT

## 2022-08-09 PROCEDURE — 82306 VITAMIN D 25 HYDROXY: CPT

## 2022-08-09 PROCEDURE — 84550 ASSAY OF BLOOD/URIC ACID: CPT

## 2022-08-09 PROCEDURE — 83970 ASSAY OF PARATHORMONE: CPT

## 2022-08-09 PROCEDURE — 84156 ASSAY OF PROTEIN URINE: CPT

## 2022-08-09 PROCEDURE — 82570 ASSAY OF URINE CREATININE: CPT

## 2022-08-09 PROCEDURE — 85025 COMPLETE CBC W/AUTO DIFF WBC: CPT

## 2022-08-09 PROCEDURE — 81003 URINALYSIS AUTO W/O SCOPE: CPT

## 2022-08-09 NOTE — HOME HEALTH
Pt reports feeling well at this time. Pt denies cough, reports good elimination, denies dizziness or headaches, no pain. Vitals WNL. Legs are both swollen and some weeping noted. Pt encouraged to elevate while sitting in his chair throughout the day. Pt reports it does not make a difference that his edema stays about the same regardless. No open areas noted at this time. Pt denies med changes and reports his daughter manages all medications for him.       CP assess  follow up on lab results  assess edema and weeping  assess for open areas   assess pain  assess falls

## 2022-08-16 ENCOUNTER — HOME CARE VISIT (OUTPATIENT)
Dept: HOME HEALTH SERVICES | Facility: HOME HEALTHCARE | Age: 77
End: 2022-08-16

## 2022-08-16 VITALS
DIASTOLIC BLOOD PRESSURE: 84 MMHG | HEART RATE: 77 BPM | TEMPERATURE: 97.8 F | SYSTOLIC BLOOD PRESSURE: 124 MMHG | OXYGEN SATURATION: 98 % | RESPIRATION RATE: 17 BRPM

## 2022-08-16 PROCEDURE — G0299 HHS/HOSPICE OF RN EA 15 MIN: HCPCS

## 2022-08-16 NOTE — HOME HEALTH
Pt denies any new issues at this time or pain. Pts edema continues but not weeping at this time. No open sores or blisters noted. Pt encouraged to get compression socks while out next time and wear them throughout the day to help decrease swelling and his risk for developing new sores or blisters. Pt and caregiver report understanding.       CP assess  edema assess  assess if compliant with compression socks  assess pain  assess falls

## 2022-08-23 ENCOUNTER — HOME CARE VISIT (OUTPATIENT)
Dept: HOME HEALTH SERVICES | Facility: HOME HEALTHCARE | Age: 77
End: 2022-08-23

## 2022-08-23 VITALS
HEART RATE: 73 BPM | OXYGEN SATURATION: 95 % | DIASTOLIC BLOOD PRESSURE: 68 MMHG | TEMPERATURE: 97.8 F | SYSTOLIC BLOOD PRESSURE: 114 MMHG

## 2022-08-23 PROCEDURE — G0299 HHS/HOSPICE OF RN EA 15 MIN: HCPCS

## 2022-08-23 NOTE — HOME HEALTH
Pt denies any new issues at this time. Pt reports overall feeing well. Pts edema has improved since caregiver has been wrapping legs. No open areas noted at this time. Pt denies pain and reports legs feel better wrapped. Pt encouraged to try to elevate legs to help reduce edema even more. Pt went to see kidney MD last week and got a good report. No changes made, labs all looked good.       CP assess  edema monitoring  assess pain  assess falls  assess compliance with wraps and elevation

## 2022-08-30 ENCOUNTER — HOME CARE VISIT (OUTPATIENT)
Dept: HOME HEALTH SERVICES | Facility: HOME HEALTHCARE | Age: 77
End: 2022-08-30

## 2022-08-30 VITALS
HEART RATE: 77 BPM | RESPIRATION RATE: 18 BRPM | SYSTOLIC BLOOD PRESSURE: 104 MMHG | OXYGEN SATURATION: 97 % | DIASTOLIC BLOOD PRESSURE: 56 MMHG | TEMPERATURE: 97.7 F

## 2022-08-30 PROCEDURE — G0299 HHS/HOSPICE OF RN EA 15 MIN: HCPCS

## 2022-08-30 NOTE — HOME HEALTH
PLAN FOR NEXT VISIT    CP assessment  assess safety and pain  assess edema and compliance with elevation        Patient is non compliant with elevating SHERRON LE, says he just doesnt do it. he reports he cant get out of recliner with foot prop

## 2022-09-06 ENCOUNTER — HOME CARE VISIT (OUTPATIENT)
Dept: HOME HEALTH SERVICES | Facility: HOME HEALTHCARE | Age: 77
End: 2022-09-06

## 2022-09-06 VITALS
SYSTOLIC BLOOD PRESSURE: 124 MMHG | OXYGEN SATURATION: 95 % | RESPIRATION RATE: 17 BRPM | HEART RATE: 75 BPM | DIASTOLIC BLOOD PRESSURE: 74 MMHG | TEMPERATURE: 97.9 F

## 2022-09-06 PROCEDURE — G0299 HHS/HOSPICE OF RN EA 15 MIN: HCPCS

## 2022-09-06 NOTE — HOME HEALTH
No new issues at this time. Pts edema has stabalized with compression wraps at this time. Pt admits his legs do feel better when they are wrapped. Pt aware drinking beer does not help his swelling but he has no plans to stop anytime. Pt educated that DC visit is scheduled next week.       CP assess  discharge  assess pain  assess edema  assess falls  edema education

## 2022-09-12 ENCOUNTER — HOME CARE VISIT (OUTPATIENT)
Dept: HOME HEALTH SERVICES | Facility: HOME HEALTHCARE | Age: 77
End: 2022-09-12

## 2022-09-12 VITALS
HEART RATE: 81 BPM | SYSTOLIC BLOOD PRESSURE: 126 MMHG | OXYGEN SATURATION: 95 % | TEMPERATURE: 98.3 F | RESPIRATION RATE: 16 BRPM | DIASTOLIC BLOOD PRESSURE: 74 MMHG

## 2022-09-12 PROCEDURE — G0299 HHS/HOSPICE OF RN EA 15 MIN: HCPCS

## 2022-10-24 ENCOUNTER — APPOINTMENT (OUTPATIENT)
Dept: CARDIOLOGY | Facility: HOSPITAL | Age: 77
End: 2022-10-24

## 2022-10-24 ENCOUNTER — HOSPITAL ENCOUNTER (EMERGENCY)
Facility: HOSPITAL | Age: 77
Discharge: HOME | End: 2022-10-24
Attending: EMERGENCY MEDICINE | Admitting: EMERGENCY MEDICINE

## 2022-10-24 VITALS
BODY MASS INDEX: 36.88 KG/M2 | OXYGEN SATURATION: 96 % | RESPIRATION RATE: 19 BRPM | TEMPERATURE: 98.4 F | HEIGHT: 67 IN | WEIGHT: 235 LBS | HEART RATE: 64 BPM | DIASTOLIC BLOOD PRESSURE: 85 MMHG | SYSTOLIC BLOOD PRESSURE: 163 MMHG

## 2022-10-24 DIAGNOSIS — L03.115 CELLULITIS OF RIGHT FOOT: Primary | ICD-10-CM

## 2022-10-24 DIAGNOSIS — L03.116 BILATERAL CELLULITIS OF LOWER LEG: ICD-10-CM

## 2022-10-24 DIAGNOSIS — I83.009 VENOUS STASIS ULCER, UNSPECIFIED SITE, UNSPECIFIED ULCER STAGE, UNSPECIFIED WHETHER VARICOSE VEINS PRESENT: ICD-10-CM

## 2022-10-24 DIAGNOSIS — L97.909 VENOUS STASIS ULCER, UNSPECIFIED SITE, UNSPECIFIED ULCER STAGE, UNSPECIFIED WHETHER VARICOSE VEINS PRESENT: ICD-10-CM

## 2022-10-24 DIAGNOSIS — L03.115 BILATERAL CELLULITIS OF LOWER LEG: ICD-10-CM

## 2022-10-24 LAB
ANION GAP SERPL CALCULATED.3IONS-SCNC: 12 MMOL/L (ref 5–15)
BASOPHILS # BLD AUTO: 0 10*3/MM3 (ref 0–0.2)
BASOPHILS NFR BLD AUTO: 0.3 % (ref 0–1.5)
BH CV LOWER VASCULAR LEFT COMMON FEMORAL AUGMENT: NORMAL
BH CV LOWER VASCULAR LEFT COMMON FEMORAL COMPETENT: NORMAL
BH CV LOWER VASCULAR LEFT COMMON FEMORAL COMPRESS: NORMAL
BH CV LOWER VASCULAR LEFT COMMON FEMORAL PHASIC: NORMAL
BH CV LOWER VASCULAR LEFT COMMON FEMORAL SPONT: NORMAL
BH CV LOWER VASCULAR LEFT DISTAL FEMORAL COMPRESS: NORMAL
BH CV LOWER VASCULAR LEFT GASTRONEMIUS COMPRESS: NORMAL
BH CV LOWER VASCULAR LEFT GREATER SAPH AK COMPRESS: NORMAL
BH CV LOWER VASCULAR LEFT GREATER SAPH BK COMPRESS: NORMAL
BH CV LOWER VASCULAR LEFT LESSER SAPH COMPRESS: NORMAL
BH CV LOWER VASCULAR LEFT MID FEMORAL AUGMENT: NORMAL
BH CV LOWER VASCULAR LEFT MID FEMORAL COMPETENT: NORMAL
BH CV LOWER VASCULAR LEFT MID FEMORAL COMPRESS: NORMAL
BH CV LOWER VASCULAR LEFT MID FEMORAL PHASIC: NORMAL
BH CV LOWER VASCULAR LEFT MID FEMORAL SPONT: NORMAL
BH CV LOWER VASCULAR LEFT PERONEAL COMPRESS: NORMAL
BH CV LOWER VASCULAR LEFT POPLITEAL AUGMENT: NORMAL
BH CV LOWER VASCULAR LEFT POPLITEAL COMPETENT: NORMAL
BH CV LOWER VASCULAR LEFT POPLITEAL COMPRESS: NORMAL
BH CV LOWER VASCULAR LEFT POPLITEAL PHASIC: NORMAL
BH CV LOWER VASCULAR LEFT POPLITEAL SPONT: NORMAL
BH CV LOWER VASCULAR LEFT POSTERIOR TIBIAL COMPRESS: NORMAL
BH CV LOWER VASCULAR LEFT PROXIMAL FEMORAL COMPRESS: NORMAL
BH CV LOWER VASCULAR LEFT SAPHENOFEMORAL JUNCTION COMPRESS: NORMAL
BH CV LOWER VASCULAR RIGHT COMMON FEMORAL AUGMENT: NORMAL
BH CV LOWER VASCULAR RIGHT COMMON FEMORAL COMPETENT: NORMAL
BH CV LOWER VASCULAR RIGHT COMMON FEMORAL COMPRESS: NORMAL
BH CV LOWER VASCULAR RIGHT COMMON FEMORAL PHASIC: NORMAL
BH CV LOWER VASCULAR RIGHT COMMON FEMORAL SPONT: NORMAL
BH CV LOWER VASCULAR RIGHT DISTAL FEMORAL COMPRESS: NORMAL
BH CV LOWER VASCULAR RIGHT GASTRONEMIUS COMPRESS: NORMAL
BH CV LOWER VASCULAR RIGHT GREATER SAPH AK COMPRESS: NORMAL
BH CV LOWER VASCULAR RIGHT GREATER SAPH BK COMPRESS: NORMAL
BH CV LOWER VASCULAR RIGHT LESSER SAPH COMPRESS: NORMAL
BH CV LOWER VASCULAR RIGHT MID FEMORAL AUGMENT: NORMAL
BH CV LOWER VASCULAR RIGHT MID FEMORAL COMPETENT: NORMAL
BH CV LOWER VASCULAR RIGHT MID FEMORAL COMPRESS: NORMAL
BH CV LOWER VASCULAR RIGHT MID FEMORAL PHASIC: NORMAL
BH CV LOWER VASCULAR RIGHT MID FEMORAL SPONT: NORMAL
BH CV LOWER VASCULAR RIGHT PERONEAL COMPRESS: NORMAL
BH CV LOWER VASCULAR RIGHT POPLITEAL AUGMENT: NORMAL
BH CV LOWER VASCULAR RIGHT POPLITEAL COMPETENT: NORMAL
BH CV LOWER VASCULAR RIGHT POPLITEAL COMPRESS: NORMAL
BH CV LOWER VASCULAR RIGHT POPLITEAL PHASIC: NORMAL
BH CV LOWER VASCULAR RIGHT POPLITEAL SPONT: NORMAL
BH CV LOWER VASCULAR RIGHT POSTERIOR TIBIAL COMPRESS: NORMAL
BH CV LOWER VASCULAR RIGHT PROXIMAL FEMORAL COMPRESS: NORMAL
BH CV LOWER VASCULAR RIGHT SAPHENOFEMORAL JUNCTION COMPRESS: NORMAL
BH CV VAS PRELIMINARY FINDINGS SCRIPTING: 1
BUN SERPL-MCNC: 9 MG/DL (ref 8–23)
BUN/CREAT SERPL: 8.9 (ref 7–25)
CALCIUM SPEC-SCNC: 9.2 MG/DL (ref 8.6–10.5)
CHLORIDE SERPL-SCNC: 94 MMOL/L (ref 98–107)
CO2 SERPL-SCNC: 27 MMOL/L (ref 22–29)
CREAT SERPL-MCNC: 1.01 MG/DL (ref 0.76–1.27)
D DIMER PPP FEU-MCNC: 2.5 MG/L (FEU) (ref 0–0.59)
DEPRECATED RDW RBC AUTO: 49.4 FL (ref 37–54)
EGFRCR SERPLBLD CKD-EPI 2021: 76.1 ML/MIN/1.73
EOSINOPHIL # BLD AUTO: 0.4 10*3/MM3 (ref 0–0.4)
EOSINOPHIL NFR BLD AUTO: 4.7 % (ref 0.3–6.2)
ERYTHROCYTE [DISTWIDTH] IN BLOOD BY AUTOMATED COUNT: 14.7 % (ref 12.3–15.4)
GLUCOSE SERPL-MCNC: 96 MG/DL (ref 65–99)
HCT VFR BLD AUTO: 40.3 % (ref 37.5–51)
HGB BLD-MCNC: 13.4 G/DL (ref 13–17.7)
LYMPHOCYTES # BLD AUTO: 1.6 10*3/MM3 (ref 0.7–3.1)
LYMPHOCYTES NFR BLD AUTO: 19.9 % (ref 19.6–45.3)
MAXIMAL PREDICTED HEART RATE: 142 BPM
MCH RBC QN AUTO: 30.7 PG (ref 26.6–33)
MCHC RBC AUTO-ENTMCNC: 33.2 G/DL (ref 31.5–35.7)
MCV RBC AUTO: 92.5 FL (ref 79–97)
MONOCYTES # BLD AUTO: 0.9 10*3/MM3 (ref 0.1–0.9)
MONOCYTES NFR BLD AUTO: 10.6 % (ref 5–12)
NEUTROPHILS NFR BLD AUTO: 5.3 10*3/MM3 (ref 1.7–7)
NEUTROPHILS NFR BLD AUTO: 64.5 % (ref 42.7–76)
NRBC BLD AUTO-RTO: 0 /100 WBC (ref 0–0.2)
NT-PROBNP SERPL-MCNC: 3099 PG/ML (ref 0–1800)
PLATELET # BLD AUTO: 393 10*3/MM3 (ref 140–450)
PMV BLD AUTO: 7.7 FL (ref 6–12)
POTASSIUM SERPL-SCNC: 4 MMOL/L (ref 3.5–5.2)
RBC # BLD AUTO: 4.36 10*6/MM3 (ref 4.14–5.8)
SODIUM SERPL-SCNC: 133 MMOL/L (ref 136–145)
STRESS TARGET HR: 121 BPM
WBC NRBC COR # BLD: 8.3 10*3/MM3 (ref 3.4–10.8)

## 2022-10-24 PROCEDURE — 85379 FIBRIN DEGRADATION QUANT: CPT | Performed by: EMERGENCY MEDICINE

## 2022-10-24 PROCEDURE — 85025 COMPLETE CBC W/AUTO DIFF WBC: CPT | Performed by: EMERGENCY MEDICINE

## 2022-10-24 PROCEDURE — 93970 EXTREMITY STUDY: CPT

## 2022-10-24 PROCEDURE — 83880 ASSAY OF NATRIURETIC PEPTIDE: CPT | Performed by: EMERGENCY MEDICINE

## 2022-10-24 PROCEDURE — 99284 EMERGENCY DEPT VISIT MOD MDM: CPT

## 2022-10-24 PROCEDURE — 80048 BASIC METABOLIC PNL TOTAL CA: CPT | Performed by: EMERGENCY MEDICINE

## 2022-10-24 RX ORDER — TRAMADOL HYDROCHLORIDE 50 MG/1
50 TABLET ORAL EVERY 6 HOURS PRN
Qty: 12 TABLET | Refills: 0 | Status: SHIPPED | OUTPATIENT
Start: 2022-10-24

## 2022-10-24 RX ORDER — SODIUM CHLORIDE 0.9 % (FLUSH) 0.9 %
10 SYRINGE (ML) INJECTION AS NEEDED
Status: DISCONTINUED | OUTPATIENT
Start: 2022-10-24 | End: 2022-10-24 | Stop reason: HOSPADM

## 2022-10-24 RX ORDER — SULFAMETHOXAZOLE AND TRIMETHOPRIM 800; 160 MG/1; MG/1
1 TABLET ORAL 2 TIMES DAILY
Qty: 14 TABLET | Refills: 0 | Status: SHIPPED | OUTPATIENT
Start: 2022-10-24

## 2022-10-24 NOTE — ED PROVIDER NOTES
Subjective   History of Present Illness  Patient is a 76-year-old male complaining of swelling redness and pain to both lower legs.  This is developed over the past several weeks.  He denies fever numbness tingling or other complaint        Review of Systems  Negative for headache earache throat cough fever chest pain shortness of breath abdominal pain Augie diarrhea dysuria achiness weight loss recent trauma numbness tingling weakness or other complaint  Past Medical History:   Diagnosis Date   • Alcohol abuse 7/13/2022   • Essential hypertension 7/13/2022   • Gait instability 7/13/2022   • GERD without esophagitis 7/13/2022   • Gout 7/13/2022   • Hypothyroidism (acquired) 7/13/2022   • Onychomycosis of multiple toenails with type 2 diabetes mellitus ([facility]) 7/13/2022   • Stage 3a chronic kidney disease ([facility]) 7/13/2022   • Tinea pedis of both feet 7/13/2022   • Type 2 diabetes mellitus with diabetic neuropathy, without long-term current use of insulin ([facility]) 7/13/2022       Allergies   Allergen Reactions   • Tigecycline Swelling and Rash       No past surgical history on file.    No family history on file.    Social History     Socioeconomic History   • Marital status:    Tobacco Use   • Smoking status: Never   • Smokeless tobacco: Never           Objective   Physical Exam  HEENT exam shows TMs to be clear.  Oropharynx comers but sclerae nonicteric.  Neck has no adenopathy JVD or bruits.  Lungs clear.  Heart has rate rhythm.  Chest is nontender.  Ab soft nontender.  Patient has normal bowel sounds without rebound or guarding.  Back has no CVA tenderness.  Extremity exam shows venous stasis ulcers bilaterally.  These are superficial with surrounding erythema.  There is no apparent drainage.  He has 2+ pulses and is neurovascular intact.  Procedures           ED Course      Results for orders placed or performed during the hospital encounter of 10/24/22   Basic Metabolic Panel    Specimen: Arm, Right; Blood    Result Value Ref Range    Glucose 96 65 - 99 mg/dL    BUN 9 8 - 23 mg/dL    Creatinine 1.01 0.76 - 1.27 mg/dL    Sodium 133 (L) 136 - 145 mmol/L    Potassium 4.0 3.5 - 5.2 mmol/L    Chloride 94 (L) 98 - 107 mmol/L    CO2 27.0 22.0 - 29.0 mmol/L    Calcium 9.2 8.6 - 10.5 mg/dL    BUN/Creatinine Ratio 8.9 7.0 - 25.0    Anion Gap 12.0 5.0 - 15.0 mmol/L    eGFR 76.1 >60.0 mL/min/1.73   BNP    Specimen: Arm, Right; Blood   Result Value Ref Range    proBNP 3,099.0 (H) 0.0 - 1,800.0 pg/mL   D-dimer, Quantitative    Specimen: Arm, Right; Blood   Result Value Ref Range    D-Dimer, Quantitative 2.50 (H) 0.00 - 0.59 mg/L (FEU)   CBC Auto Differential    Specimen: Arm, Right; Blood   Result Value Ref Range    WBC 8.30 3.40 - 10.80 10*3/mm3    RBC 4.36 4.14 - 5.80 10*6/mm3    Hemoglobin 13.4 13.0 - 17.7 g/dL    Hematocrit 40.3 37.5 - 51.0 %    MCV 92.5 79.0 - 97.0 fL    MCH 30.7 26.6 - 33.0 pg    MCHC 33.2 31.5 - 35.7 g/dL    RDW 14.7 12.3 - 15.4 %    RDW-SD 49.4 37.0 - 54.0 fl    MPV 7.7 6.0 - 12.0 fL    Platelets 393 140 - 450 10*3/mm3    Neutrophil % 64.5 42.7 - 76.0 %    Lymphocyte % 19.9 19.6 - 45.3 %    Monocyte % 10.6 5.0 - 12.0 %    Eosinophil % 4.7 0.3 - 6.2 %    Basophil % 0.3 0.0 - 1.5 %    Neutrophils, Absolute 5.30 1.70 - 7.00 10*3/mm3    Lymphocytes, Absolute 1.60 0.70 - 3.10 10*3/mm3    Monocytes, Absolute 0.90 0.10 - 0.90 10*3/mm3    Eosinophils, Absolute 0.40 0.00 - 0.40 10*3/mm3    Basophils, Absolute 0.00 0.00 - 0.20 10*3/mm3    nRBC 0.0 0.0 - 0.2 /100 WBC   Duplex Venous Lower Extremity - Bilateral   Result Value Ref Range    Target HR (85%) 121 bpm    Max. Pred. HR (100%) 142 bpm    BH CV VAS PRELIMINARY FINDINGS SCRIPTING 1.0     Right Common Femoral Spont Y     Right Common Femoral Competent Y     Right Common Femoral Phasic Y     Right Common Femoral Compress C     Right Common Femoral Augment Y     Right Saphenofemoral Junction Compress C     Right Proximal Femoral Compress C     Right Mid Femoral  Spont Y     Right Mid Femoral Competent Y     Right Mid Femoral Phasic Y     Right Mid Femoral Compress C     Right Mid Femoral Augment Y     Right Distal Femoral Compress C     Right Popliteal Spont Y     Right Popliteal Competent Y     Right Popliteal Phasic Y     Right Popliteal Compress C     Right Popliteal Augment Y     Right Posterior Tibial Compress C     Right Peroneal Compress C     Right Gastronemius Compress C     Right Greater Saph AK Compress C     Right Greater Saph BK Compress C     Right Lesser Saph Compress C     Left Common Femoral Spont Y     Left Common Femoral Competent Y     Left Common Femoral Phasic Y     Left Common Femoral Compress C     Left Common Femoral Augment Y     Left Saphenofemoral Junction Compress C     Left Proximal Femoral Compress C     Left Mid Femoral Spont Y     Left Mid Femoral Competent Y     Left Mid Femoral Phasic Y     Left Mid Femoral Compress C     Left Mid Femoral Augment Y     Left Distal Femoral Compress C     Left Popliteal Spont Y     Left Popliteal Competent Y     Left Popliteal Phasic Y     Left Popliteal Compress C     Left Popliteal Augment Y     Left Posterior Tibial Compress C     Left Peroneal Compress C     Left Gastronemius Compress C     Left Greater Saph AK Compress C     Left Greater Saph BK Compress C     Left Lesser Saph Compress C                                             MDM  Number of Diagnoses or Management Options  Diagnosis management comments: Patient did have elevated D-dimer with negative duplex scan for DVT.  Patient does have findings consistent with cellulitis and venous stasis ulcers.  Will be discharged.  Will be placed on doxycycline.  We will elevate his legs is much as possible.  We will follow his MD for recheck.    Risk of Complications, Morbidity, and/or Mortality  Presenting problems: high  Diagnostic procedures: high  Management options: high    Patient Progress  Patient progress: stable      Final diagnoses:   Venous  stasis ulcer, unspecified site, unspecified ulcer stage, unspecified whether varicose veins present (HCC)   Bilateral cellulitis of lower leg       ED Disposition  ED Disposition     ED Disposition   Discharge    Condition   Stable    Comment   --             [facility]  [facility] [address] [phone]             Medication List      New Prescriptions    sulfamethoxazole-trimethoprim 800-160 MG per tablet  Commonly known as: BACTRIM DS,SEPTRA DS  Take 1 tablet by mouth 2 (Two) Times a Day.     traMADol 50 MG tablet  Commonly known as: ULTRAM  Take 1 tablet by mouth Every 6 (Six) Hours As Needed for Severe Pain.           Where to Get Your Medications      Information about where to get these medications is not yet available    Ask your nurse or doctor about these medications  · sulfamethoxazole-trimethoprim 800-160 MG per tablet  · traMADol 50 MG tablet          Haile Lopez MD  10/24/22 [id number]

## 2022-10-24 NOTE — DISCHARGE INSTRUCTIONS
Take 1 extra Lasix pill for 3 days.  Elevate your legs is much as possible.  Follow in the wound care clinic as directed.

## 2022-10-24 NOTE — CASE MANAGEMENT/SOCIAL WORK
Case Management/Social Work    Patient Name:  Rommel Mcfarland  YOB: 1945  MRN: [id number]  Admit Date:  10/24/2022    Spoke with , requesting this patient have follow up at [facility] ([phone]) Called clinic left voice mail of referral in [facility]. Notified patient and caregiver Tia at bedside that the would clinic will follow up with making an appointment.Wound clinic order printed and given to caregiver.      Addendum: Wound clinic returned call and said will call patient to schedule appointment.    Met with patient in room wearing PPE: mask, face shield/goggles, gloves, gown.      Maintained distance greater than six feet and spent less than 15 minutes in the room.    '          Electronically signed by:  Kinga Shetty RN  10/24/22 13:18 EDT

## 2022-10-31 ENCOUNTER — TRANSCRIBE ORDERS (OUTPATIENT)
Dept: ADMINISTRATIVE | Facility: HOSPITAL | Age: 77
End: 2022-10-31

## 2022-10-31 ENCOUNTER — OFFICE VISIT (OUTPATIENT)
Dept: WOUND CARE | Facility: HOSPITAL | Age: 77
End: 2022-10-31

## 2022-10-31 DIAGNOSIS — I70.223 ATHEROSCLEROSIS OF NATIVE ARTERY OF BOTH LOWER EXTREMITIES WITH REST PAIN: Primary | ICD-10-CM

## 2022-10-31 DIAGNOSIS — L97.812 ULCER OF RIGHT PRETIBIAL REGION, WITH FAT LAYER EXPOSED: Primary | ICD-10-CM

## 2022-10-31 PROCEDURE — 87070 CULTURE OTHR SPECIMN AEROBIC: CPT

## 2022-10-31 PROCEDURE — 87205 SMEAR GRAM STAIN: CPT

## 2022-10-31 PROCEDURE — G0463 HOSPITAL OUTPT CLINIC VISIT: HCPCS

## 2022-11-03 LAB
BACTERIA SPEC AEROBE CULT: NORMAL
GRAM STN SPEC: NORMAL

## 2022-11-04 ENCOUNTER — APPOINTMENT (OUTPATIENT)
Dept: CARDIOLOGY | Facility: HOSPITAL | Age: 77
End: 2022-11-04

## 2022-11-07 ENCOUNTER — HOSPITAL ENCOUNTER (OUTPATIENT)
Dept: CARDIOLOGY | Facility: HOSPITAL | Age: 77
Discharge: HOME | End: 2022-11-07

## 2022-11-07 ENCOUNTER — OFFICE VISIT (OUTPATIENT)
Dept: WOUND CARE | Facility: HOSPITAL | Age: 77
End: 2022-11-07

## 2022-11-07 DIAGNOSIS — I70.223 ATHEROSCLEROSIS OF NATIVE ARTERY OF BOTH LOWER EXTREMITIES WITH REST PAIN: ICD-10-CM

## 2022-11-07 LAB
BH CV LOWER ARTERIAL LEFT ABI RATIO: 1.09
BH CV LOWER ARTERIAL LEFT DORSALIS PEDIS SYS MAX: 172
BH CV LOWER ARTERIAL LEFT GREAT TOE SYS MAX: 143
BH CV LOWER ARTERIAL LEFT POST TIBIAL SYS MAX: 170
BH CV LOWER ARTERIAL LEFT TBI RATIO: 0.91
BH CV LOWER ARTERIAL RIGHT ABI RATIO: 0.98
BH CV LOWER ARTERIAL RIGHT DORSALIS PEDIS SYS MAX: 153
BH CV LOWER ARTERIAL RIGHT GREAT TOE SYS MAX: 150
BH CV LOWER ARTERIAL RIGHT POST TIBIAL SYS MAX: 155
BH CV LOWER ARTERIAL RIGHT TBI RATIO: 0.95
MAXIMAL PREDICTED HEART RATE: 141 BPM
STRESS TARGET HR: 120 BPM
UPPER ARTERIAL LEFT ARM BRACHIAL SYS MAX: 140 MMHG
UPPER ARTERIAL RIGHT ARM BRACHIAL SYS MAX: 158 MMHG

## 2022-11-07 PROCEDURE — G0463 HOSPITAL OUTPT CLINIC VISIT: HCPCS

## 2022-11-07 PROCEDURE — 93922 UPR/L XTREMITY ART 2 LEVELS: CPT

## 2022-11-14 ENCOUNTER — OFFICE VISIT (OUTPATIENT)
Dept: WOUND CARE | Facility: HOSPITAL | Age: 77
End: 2022-11-14

## 2022-11-14 PROCEDURE — G0463 HOSPITAL OUTPT CLINIC VISIT: HCPCS

## 2022-11-25 ENCOUNTER — TRANSCRIBE ORDERS (OUTPATIENT)
Dept: ADMINISTRATIVE | Facility: HOSPITAL | Age: 77
End: 2022-11-25

## 2022-11-25 ENCOUNTER — LAB (OUTPATIENT)
Dept: LAB | Facility: HOSPITAL | Age: 77
End: 2022-11-25
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
## 2022-11-25 DIAGNOSIS — R29.6 FALLS FREQUENTLY: ICD-10-CM

## 2022-11-25 DIAGNOSIS — I10 ESSENTIAL HYPERTENSION, MALIGNANT: ICD-10-CM

## 2022-11-25 DIAGNOSIS — R71.8 OTHER ABNORMALITY OF RED BLOOD CELLS: ICD-10-CM

## 2022-11-25 DIAGNOSIS — N18.2 CHRONIC KIDNEY DISEASE, STAGE II (MILD): ICD-10-CM

## 2022-11-25 DIAGNOSIS — E87.1 HYPOSMOLALITY SYNDROME: ICD-10-CM

## 2022-11-25 DIAGNOSIS — E11.9 DIABETES MELLITUS WITHOUT COMPLICATION: ICD-10-CM

## 2022-11-25 DIAGNOSIS — E11.9 DIABETES MELLITUS WITHOUT COMPLICATION: Primary | ICD-10-CM

## 2022-11-25 LAB
ALBUMIN SERPL-MCNC: 3.4 G/DL (ref 3.5–5.2)
ALBUMIN/GLOB SERPL: 1.2 G/DL
ALP SERPL-CCNC: 43 U/L (ref 39–117)
ALT SERPL W P-5'-P-CCNC: <5 U/L (ref 1–41)
ANION GAP SERPL CALCULATED.3IONS-SCNC: 14.1 MMOL/L (ref 5–15)
AST SERPL-CCNC: 9 U/L (ref 1–40)
BACTERIA UR QL AUTO: NORMAL /HPF
BASOPHILS # BLD AUTO: 0.06 10*3/MM3 (ref 0–0.2)
BASOPHILS NFR BLD AUTO: 0.9 % (ref 0–1.5)
BILIRUB SERPL-MCNC: 1 MG/DL (ref 0–1.2)
BILIRUB UR QL STRIP: NEGATIVE
BILIRUB UR QL STRIP: NEGATIVE
BUN SERPL-MCNC: 8 MG/DL (ref 8–23)
BUN/CREAT SERPL: 6.6 (ref 7–25)
CALCIUM SPEC-SCNC: 9.5 MG/DL (ref 8.6–10.5)
CHLORIDE SERPL-SCNC: 93 MMOL/L (ref 98–107)
CLARITY UR: CLEAR
CLARITY UR: CLEAR
CO2 SERPL-SCNC: 27.9 MMOL/L (ref 22–29)
COLOR UR: YELLOW
COLOR UR: YELLOW
CREAT SERPL-MCNC: 1.22 MG/DL (ref 0.76–1.27)
DEPRECATED RDW RBC AUTO: 44.5 FL (ref 37–54)
EGFRCR SERPLBLD CKD-EPI 2021: 60.3 ML/MIN/1.73
EOSINOPHIL # BLD AUTO: 0.59 10*3/MM3 (ref 0–0.4)
EOSINOPHIL NFR BLD AUTO: 8.7 % (ref 0.3–6.2)
ERYTHROCYTE [DISTWIDTH] IN BLOOD BY AUTOMATED COUNT: 14 % (ref 12.3–15.4)
GLOBULIN UR ELPH-MCNC: 2.9 GM/DL
GLUCOSE SERPL-MCNC: 82 MG/DL (ref 65–99)
GLUCOSE UR STRIP-MCNC: NEGATIVE MG/DL
GLUCOSE UR STRIP-MCNC: NEGATIVE MG/DL
HCT VFR BLD AUTO: 37.6 % (ref 37.5–51)
HGB BLD-MCNC: 12.8 G/DL (ref 13–17.7)
HGB UR QL STRIP.AUTO: NEGATIVE
HGB UR QL STRIP.AUTO: NEGATIVE
HYALINE CASTS UR QL AUTO: NORMAL /LPF
IMM GRANULOCYTES # BLD AUTO: 0.04 10*3/MM3 (ref 0–0.05)
IMM GRANULOCYTES NFR BLD AUTO: 0.6 % (ref 0–0.5)
KETONES UR QL STRIP: NEGATIVE
KETONES UR QL STRIP: NEGATIVE
LEUKOCYTE ESTERASE UR QL STRIP.AUTO: NEGATIVE
LEUKOCYTE ESTERASE UR QL STRIP.AUTO: NEGATIVE
LYMPHOCYTES # BLD AUTO: 1.67 10*3/MM3 (ref 0.7–3.1)
LYMPHOCYTES NFR BLD AUTO: 24.7 % (ref 19.6–45.3)
MCH RBC QN AUTO: 29.8 PG (ref 26.6–33)
MCHC RBC AUTO-ENTMCNC: 34 G/DL (ref 31.5–35.7)
MCV RBC AUTO: 87.6 FL (ref 79–97)
MONOCYTES # BLD AUTO: 1.12 10*3/MM3 (ref 0.1–0.9)
MONOCYTES NFR BLD AUTO: 16.6 % (ref 5–12)
NEUTROPHILS NFR BLD AUTO: 3.28 10*3/MM3 (ref 1.7–7)
NEUTROPHILS NFR BLD AUTO: 48.5 % (ref 42.7–76)
NITRITE UR QL STRIP: NEGATIVE
NITRITE UR QL STRIP: NEGATIVE
NRBC BLD AUTO-RTO: 0 /100 WBC (ref 0–0.2)
PH UR STRIP.AUTO: 7 [PH] (ref 5–8)
PH UR STRIP.AUTO: 7 [PH] (ref 5–8)
PLATELET # BLD AUTO: 153 10*3/MM3 (ref 140–450)
PMV BLD AUTO: 10.7 FL (ref 6–12)
POTASSIUM SERPL-SCNC: 4.3 MMOL/L (ref 3.5–5.2)
PROT SERPL-MCNC: 6.3 G/DL (ref 6–8.5)
PROT UR QL STRIP: NEGATIVE
PROT UR QL STRIP: NEGATIVE
RBC # BLD AUTO: 4.29 10*6/MM3 (ref 4.14–5.8)
RBC # UR STRIP: NORMAL /HPF
REF LAB TEST METHOD: NORMAL
SODIUM SERPL-SCNC: 135 MMOL/L (ref 136–145)
SP GR UR STRIP: 1.01 (ref 1–1.03)
SP GR UR STRIP: 1.01 (ref 1–1.03)
SQUAMOUS #/AREA URNS HPF: NORMAL /HPF
UROBILINOGEN UR QL STRIP: ABNORMAL
UROBILINOGEN UR QL STRIP: NORMAL
WBC # UR STRIP: NORMAL /HPF
WBC NRBC COR # BLD: 6.76 10*3/MM3 (ref 3.4–10.8)

## 2022-11-25 PROCEDURE — 81001 URINALYSIS AUTO W/SCOPE: CPT

## 2022-11-25 PROCEDURE — 36415 COLL VENOUS BLD VENIPUNCTURE: CPT

## 2022-11-25 PROCEDURE — 81003 URINALYSIS AUTO W/O SCOPE: CPT

## 2022-11-25 PROCEDURE — 85025 COMPLETE CBC W/AUTO DIFF WBC: CPT

## 2022-11-25 PROCEDURE — 80053 COMPREHEN METABOLIC PANEL: CPT

## 2023-02-08 ENCOUNTER — HOSPITAL ENCOUNTER (OUTPATIENT)
Facility: HOSPITAL | Age: 78
Discharge: HOME | End: 2023-02-08
Attending: EMERGENCY MEDICINE | Admitting: EMERGENCY MEDICINE
Payer: MEDICARE

## 2023-02-08 VITALS
OXYGEN SATURATION: 95 % | BODY MASS INDEX: 36.1 KG/M2 | TEMPERATURE: 98.6 F | HEIGHT: 67 IN | HEART RATE: 75 BPM | WEIGHT: 230 LBS | DIASTOLIC BLOOD PRESSURE: 84 MMHG | SYSTOLIC BLOOD PRESSURE: 170 MMHG | RESPIRATION RATE: 16 BRPM

## 2023-02-08 DIAGNOSIS — I87.2 CHRONIC VENOUS STASIS DERMATITIS: ICD-10-CM

## 2023-02-08 DIAGNOSIS — L03.115 CELLULITIS OF RIGHT LOWER EXTREMITY: Primary | ICD-10-CM

## 2023-02-08 DIAGNOSIS — I89.0 LYMPHEDEMA: ICD-10-CM

## 2023-02-08 PROCEDURE — G0463 HOSPITAL OUTPT CLINIC VISIT: HCPCS | Performed by: EMERGENCY MEDICINE

## 2023-02-08 PROCEDURE — 99204 OFFICE O/P NEW MOD 45 MIN: CPT | Performed by: EMERGENCY MEDICINE

## 2023-02-08 RX ORDER — CEPHALEXIN 500 MG/1
500 CAPSULE ORAL 4 TIMES DAILY
Qty: 40 CAPSULE | Refills: 0 | Status: SHIPPED | OUTPATIENT
Start: 2023-02-08 | End: 2023-02-18

## 2023-02-08 NOTE — DISCHARGE INSTRUCTIONS
Take antibiotics as prescribed.  Follow-up with wound care and primary care.  Continue your current wound care regimen

## 2023-02-08 NOTE — FSED PROVIDER NOTE
"Subjective   History of Present Illness  pt's caregiver states that pt has bilateral skin infection to his LE, states over the weekend he had a \"water blister\" that he popped.    The patient is a 77-year-old who presents with a popped blister on his right lower extremity.  There is some slight increase in erythema.  The patient denies any warmth or significant pain.  The patient's chronic lymphedema and venous stasis dermatitis bilateral lower extremities.  There is no fever chills or any other significant complaints        Review of Systems   Constitutional: Negative.    HENT: Negative.  Negative for sinus pressure and sore throat.    Eyes: Negative.    Respiratory: Negative.  Negative for cough and shortness of breath.    Cardiovascular: Negative.  Negative for chest pain.   Gastrointestinal: Negative for abdominal pain, diarrhea, nausea and vomiting.   Endocrine: Negative.    Genitourinary: Negative.    Musculoskeletal: Negative.    Skin: Negative.    Allergic/Immunologic: Negative.    Neurological: Negative.  Negative for syncope, numbness and headaches.   Hematological: Negative.    Psychiatric/Behavioral: Negative.  Negative for confusion.   All other systems reviewed and are negative.      Past Medical History:   Diagnosis Date   • Alcohol abuse 7/13/2022   • Essential hypertension 7/13/2022   • Gait instability 7/13/2022   • GERD without esophagitis 7/13/2022   • Gout 7/13/2022   • Hypothyroidism (acquired) 7/13/2022   • Onychomycosis of multiple toenails with type 2 diabetes mellitus (HCC) 7/13/2022   • Stage 3a chronic kidney disease ([facility]) 7/13/2022   • Tinea pedis of both feet 7/13/2022   • Type 2 diabetes mellitus with diabetic neuropathy, without long-term current use of insulin ([facility]) 7/13/2022       Allergies   Allergen Reactions   • Tigecycline Swelling and Rash       History reviewed. No pertinent surgical history.    History reviewed. No pertinent family history.    Social History     Socioeconomic " History   • Marital status:    Tobacco Use   • Smoking status: Never   • Smokeless tobacco: Never           Objective   Physical Exam  Vitals and nursing note reviewed.   Constitutional:       Appearance: Normal appearance. He is normal weight.   HENT:      Head: Normocephalic and atraumatic.      Right Ear: External ear normal.      Left Ear: External ear normal.      Nose: Nose normal.      Mouth/Throat:      Mouth: Mucous membranes are moist.      Pharynx: Oropharynx is clear.   Eyes:      Extraocular Movements: Extraocular movements intact.      Conjunctiva/sclera: Conjunctivae normal.      Pupils: Pupils are equal, round, and reactive to light.   Cardiovascular:      Rate and Rhythm: Normal rate and regular rhythm.      Pulses: Normal pulses.      Heart sounds: Normal heart sounds.   Pulmonary:      Effort: Pulmonary effort is normal.      Breath sounds: Normal breath sounds.   Abdominal:      General: Abdomen is flat.      Palpations: Abdomen is soft.      Tenderness: There is no abdominal tenderness.   Musculoskeletal:         General: Normal range of motion.      Cervical back: Normal range of motion and neck supple. No rigidity.   Skin:     General: Skin is warm and dry.      Comments: Chronic venous stasis and lymphedema changes bilateral lower extremities.  Enlarged size bilaterally.  Warmth is symmetric.  Mild increased erythema around a ruptured blister on the anterior aspect of the right lower extremity.   Neurological:      General: No focal deficit present.      Mental Status: He is alert and oriented to person, place, and time. Mental status is at baseline.      Cranial Nerves: No cranial nerve deficit.      Sensory: No sensory deficit.      Motor: No weakness.      Gait: Gait normal.   Psychiatric:         Mood and Affect: Mood normal.         Behavior: Behavior normal.         Thought Content: Thought content normal.         Judgment: Judgment normal.         Procedures           ED  Course                                           Medical Decision Making  This patient presents with initial presentation of local erythema, warmth, swelling concerning for cellulitis. Sensitivity/pain to light touch around the erythematous area. No lymphangitic spread visible and no fluid pockets or fluctuance concerning for abscess noted. Low concern for osteomyelitis or DVT. No immune compromise, bullae, pain out of proportion, or rapid progression concerning for necrotizing fasciitis. Patient to be discharged home with keflex with follow up with their PMD.        Cellulitis of right lower extremity: complicated acute illness or injury  Chronic venous stasis dermatitis: complicated acute illness or injury  Lymphedema: complicated acute illness or injury  Risk  Prescription drug management.          Final diagnoses:   Lymphedema   Chronic venous stasis dermatitis   Cellulitis of right lower extremity       ED Disposition  ED Disposition     ED Disposition   Discharge    Condition   Stable    Comment   --             Montse Dawkins MD  [address] [place] [address] [phone]    Schedule an appointment as soon as possible for a visit in 1 day  For wound re-check         Medication List      New Prescriptions    cephalexin 500 MG capsule  Commonly known as: KEFLEX  Take 1 capsule by mouth 4 (Four) Times a Day for 10 days.           Where to Get Your Medications      These medications were sent to [facility] - [place], [place] - [address] - [phone]  - [phone]   [address] [place] [address]    Phone: [phone]   · cephalexin 500 MG capsule

## 2023-06-09 ENCOUNTER — LAB (OUTPATIENT)
Dept: LAB | Facility: HOSPITAL | Age: 78
End: 2023-06-09
Payer: MEDICARE

## 2023-06-09 ENCOUNTER — TRANSCRIBE ORDERS (OUTPATIENT)
Dept: ADMINISTRATIVE | Facility: HOSPITAL | Age: 78
End: 2023-06-09
Payer: MEDICARE

## 2023-06-09 DIAGNOSIS — E55.9 VITAMIN D DEFICIENCY DISEASE: ICD-10-CM

## 2023-06-09 DIAGNOSIS — I10 HYPERTENSION, UNSPECIFIED TYPE: ICD-10-CM

## 2023-06-09 DIAGNOSIS — R71.8 OTHER ABNORMALITY OF RED BLOOD CELLS: ICD-10-CM

## 2023-06-09 DIAGNOSIS — E03.9 HYPOTHYROIDISM, UNSPECIFIED TYPE: Primary | ICD-10-CM

## 2023-06-09 DIAGNOSIS — N18.30 STAGE 3 CHRONIC KIDNEY DISEASE, UNSPECIFIED WHETHER STAGE 3A OR 3B CKD: Primary | ICD-10-CM

## 2023-06-09 DIAGNOSIS — N18.2 CHRONIC KIDNEY DISEASE, STAGE II (MILD): ICD-10-CM

## 2023-06-09 DIAGNOSIS — N18.30 STAGE 3 CHRONIC KIDNEY DISEASE, UNSPECIFIED WHETHER STAGE 3A OR 3B CKD: ICD-10-CM

## 2023-06-09 DIAGNOSIS — E05.90 HYPERTHYROIDISM: ICD-10-CM

## 2023-06-09 DIAGNOSIS — E03.9 HYPOTHYROIDISM, UNSPECIFIED TYPE: ICD-10-CM

## 2023-06-09 DIAGNOSIS — K21.9 CHALASIA: ICD-10-CM

## 2023-06-09 DIAGNOSIS — E87.1 HYPONATREMIA: ICD-10-CM

## 2023-06-09 DIAGNOSIS — E11.9 DIABETES MELLITUS WITHOUT COMPLICATION: ICD-10-CM

## 2023-06-09 LAB
25(OH)D3 SERPL-MCNC: 46.1 NG/ML (ref 30–100)
ALBUMIN SERPL-MCNC: 3.6 G/DL (ref 3.5–5.2)
ALBUMIN/GLOB SERPL: 1.1 G/DL
ALP SERPL-CCNC: 44 U/L (ref 39–117)
ALT SERPL W P-5'-P-CCNC: 9 U/L (ref 1–41)
ANION GAP SERPL CALCULATED.3IONS-SCNC: 10.9 MMOL/L (ref 5–15)
AST SERPL-CCNC: 14 U/L (ref 1–40)
BACTERIA UR QL AUTO: NORMAL /HPF
BASOPHILS # BLD AUTO: 0.1 10*3/MM3 (ref 0–0.2)
BASOPHILS NFR BLD AUTO: 1.6 % (ref 0–1.5)
BILIRUB SERPL-MCNC: 0.3 MG/DL (ref 0–1.2)
BILIRUB UR QL STRIP: NEGATIVE
BUN SERPL-MCNC: 12 MG/DL (ref 8–23)
BUN/CREAT SERPL: 9.2 (ref 7–25)
CALCIUM SPEC-SCNC: 10 MG/DL (ref 8.6–10.5)
CHLORIDE SERPL-SCNC: 97 MMOL/L (ref 98–107)
CLARITY UR: CLEAR
CO2 SERPL-SCNC: 27.1 MMOL/L (ref 22–29)
COLOR UR: YELLOW
CREAT SERPL-MCNC: 1.3 MG/DL (ref 0.76–1.27)
CREAT UR-MCNC: 76.2 MG/DL
DEPRECATED RDW RBC AUTO: 43.6 FL (ref 37–54)
EGFRCR SERPLBLD CKD-EPI 2021: 55.9 ML/MIN/1.73
EOSINOPHIL # BLD AUTO: 0.6 10*3/MM3 (ref 0–0.4)
EOSINOPHIL NFR BLD AUTO: 9.7 % (ref 0.3–6.2)
ERYTHROCYTE [DISTWIDTH] IN BLOOD BY AUTOMATED COUNT: 13.3 % (ref 12.3–15.4)
GLOBULIN UR ELPH-MCNC: 3.3 GM/DL
GLUCOSE SERPL-MCNC: 100 MG/DL (ref 65–99)
GLUCOSE UR STRIP-MCNC: NEGATIVE MG/DL
HBA1C MFR BLD: 5.1 % (ref 4.8–5.6)
HCT VFR BLD AUTO: 39.1 % (ref 37.5–51)
HGB BLD-MCNC: 13.2 G/DL (ref 13–17.7)
HGB UR QL STRIP.AUTO: NEGATIVE
HYALINE CASTS UR QL AUTO: NORMAL /LPF
IMM GRANULOCYTES # BLD AUTO: 0.02 10*3/MM3 (ref 0–0.05)
IMM GRANULOCYTES NFR BLD AUTO: 0.3 % (ref 0–0.5)
KETONES UR QL STRIP: NEGATIVE
LEUKOCYTE ESTERASE UR QL STRIP.AUTO: NEGATIVE
LYMPHOCYTES # BLD AUTO: 1.88 10*3/MM3 (ref 0.7–3.1)
LYMPHOCYTES NFR BLD AUTO: 30.3 % (ref 19.6–45.3)
MCH RBC QN AUTO: 30.1 PG (ref 26.6–33)
MCHC RBC AUTO-ENTMCNC: 33.8 G/DL (ref 31.5–35.7)
MCV RBC AUTO: 89.3 FL (ref 79–97)
MONOCYTES # BLD AUTO: 0.78 10*3/MM3 (ref 0.1–0.9)
MONOCYTES NFR BLD AUTO: 12.6 % (ref 5–12)
NEUTROPHILS NFR BLD AUTO: 2.82 10*3/MM3 (ref 1.7–7)
NEUTROPHILS NFR BLD AUTO: 45.5 % (ref 42.7–76)
NITRITE UR QL STRIP: NEGATIVE
NRBC BLD AUTO-RTO: 0 /100 WBC (ref 0–0.2)
PH UR STRIP.AUTO: 7 [PH] (ref 5–8)
PLATELET # BLD AUTO: 246 10*3/MM3 (ref 140–450)
PMV BLD AUTO: 10.8 FL (ref 6–12)
POTASSIUM SERPL-SCNC: 4.5 MMOL/L (ref 3.5–5.2)
PROT ?TM UR-MCNC: 26 MG/DL
PROT SERPL-MCNC: 6.9 G/DL (ref 6–8.5)
PROT UR QL STRIP: ABNORMAL
PROT/CREAT UR: 341.2 MG/G CREA (ref 0–200)
PTH-INTACT SERPL-MCNC: 72.3 PG/ML (ref 15–65)
RBC # BLD AUTO: 4.38 10*6/MM3 (ref 4.14–5.8)
RBC # UR STRIP: NORMAL /HPF
REF LAB TEST METHOD: NORMAL
SODIUM SERPL-SCNC: 135 MMOL/L (ref 136–145)
SP GR UR STRIP: 1.01 (ref 1–1.03)
SQUAMOUS #/AREA URNS HPF: NORMAL /HPF
TSH SERPL DL<=0.05 MIU/L-ACNC: 1.5 UIU/ML (ref 0.27–4.2)
URATE SERPL-MCNC: 7 MG/DL (ref 3.4–7)
UROBILINOGEN UR QL STRIP: ABNORMAL
WBC # UR STRIP: NORMAL /HPF
WBC NRBC COR # BLD: 6.2 10*3/MM3 (ref 3.4–10.8)

## 2023-06-09 PROCEDURE — 84443 ASSAY THYROID STIM HORMONE: CPT

## 2023-06-09 PROCEDURE — 81001 URINALYSIS AUTO W/SCOPE: CPT

## 2023-06-09 PROCEDURE — 83970 ASSAY OF PARATHORMONE: CPT

## 2023-06-09 PROCEDURE — 82570 ASSAY OF URINE CREATININE: CPT

## 2023-06-09 PROCEDURE — 84550 ASSAY OF BLOOD/URIC ACID: CPT

## 2023-06-09 PROCEDURE — 82306 VITAMIN D 25 HYDROXY: CPT

## 2023-06-09 PROCEDURE — 80053 COMPREHEN METABOLIC PANEL: CPT

## 2023-06-09 PROCEDURE — 83036 HEMOGLOBIN GLYCOSYLATED A1C: CPT

## 2023-06-09 PROCEDURE — 36415 COLL VENOUS BLD VENIPUNCTURE: CPT

## 2023-06-09 PROCEDURE — 84156 ASSAY OF PROTEIN URINE: CPT

## 2023-06-09 PROCEDURE — 85025 COMPLETE CBC W/AUTO DIFF WBC: CPT

## 2023-09-15 ENCOUNTER — APPOINTMENT (OUTPATIENT)
Dept: GENERAL RADIOLOGY | Facility: HOSPITAL | Age: 78
End: 2023-09-15
Payer: MEDICARE

## 2023-09-15 ENCOUNTER — HOSPITAL ENCOUNTER (EMERGENCY)
Facility: HOSPITAL | Age: 78
Discharge: HOME | End: 2023-09-15
Attending: EMERGENCY MEDICINE
Payer: MEDICARE

## 2023-09-15 VITALS
HEIGHT: 67 IN | SYSTOLIC BLOOD PRESSURE: 183 MMHG | OXYGEN SATURATION: 97 % | TEMPERATURE: 98.2 F | WEIGHT: 230 LBS | DIASTOLIC BLOOD PRESSURE: 97 MMHG | RESPIRATION RATE: 15 BRPM | BODY MASS INDEX: 36.1 KG/M2 | HEART RATE: 63 BPM

## 2023-09-15 DIAGNOSIS — L03.115 CELLULITIS OF RIGHT LEG: Primary | ICD-10-CM

## 2023-09-15 DIAGNOSIS — B87.9 INFESTATION BY MAGGOTS: ICD-10-CM

## 2023-09-15 LAB
ANION GAP SERPL CALCULATED.3IONS-SCNC: 10 MMOL/L (ref 5–15)
BASOPHILS # BLD AUTO: 0.1 10*3/MM3 (ref 0–0.2)
BASOPHILS NFR BLD AUTO: 1.1 % (ref 0–1.5)
BUN SERPL-MCNC: 19 MG/DL (ref 8–23)
BUN/CREAT SERPL: 15.4 (ref 7–25)
CALCIUM SPEC-SCNC: 9.4 MG/DL (ref 8.6–10.5)
CHLORIDE SERPL-SCNC: 94 MMOL/L (ref 98–107)
CO2 SERPL-SCNC: 28 MMOL/L (ref 22–29)
CREAT SERPL-MCNC: 1.23 MG/DL (ref 0.76–1.27)
CRP SERPL-MCNC: 0.7 MG/DL (ref 0–0.5)
D-LACTATE SERPL-SCNC: 1.1 MMOL/L (ref 0.3–2)
DEPRECATED RDW RBC AUTO: 50.8 FL (ref 37–54)
EGFRCR SERPLBLD CKD-EPI 2021: 59.7 ML/MIN/1.73
EOSINOPHIL # BLD AUTO: 0.1 10*3/MM3 (ref 0–0.4)
EOSINOPHIL NFR BLD AUTO: 1.9 % (ref 0.3–6.2)
ERYTHROCYTE [DISTWIDTH] IN BLOOD BY AUTOMATED COUNT: 16.1 % (ref 12.3–15.4)
ERYTHROCYTE [SEDIMENTATION RATE] IN BLOOD: 33 MM/HR (ref 0–20)
GLUCOSE SERPL-MCNC: 131 MG/DL (ref 65–99)
HCT VFR BLD AUTO: 38.7 % (ref 37.5–51)
HGB BLD-MCNC: 12.7 G/DL (ref 13–17.7)
LYMPHOCYTES # BLD AUTO: 1.7 10*3/MM3 (ref 0.7–3.1)
LYMPHOCYTES NFR BLD AUTO: 20.9 % (ref 19.6–45.3)
MCH RBC QN AUTO: 30 PG (ref 26.6–33)
MCHC RBC AUTO-ENTMCNC: 32.8 G/DL (ref 31.5–35.7)
MCV RBC AUTO: 91.4 FL (ref 79–97)
MONOCYTES # BLD AUTO: 0.9 10*3/MM3 (ref 0.1–0.9)
MONOCYTES NFR BLD AUTO: 11.2 % (ref 5–12)
NEUTROPHILS NFR BLD AUTO: 5.2 10*3/MM3 (ref 1.7–7)
NEUTROPHILS NFR BLD AUTO: 64.9 % (ref 42.7–76)
NRBC BLD AUTO-RTO: 0.1 /100 WBC (ref 0–0.2)
PLATELET # BLD AUTO: 276 10*3/MM3 (ref 140–450)
PMV BLD AUTO: 8.2 FL (ref 6–12)
POTASSIUM SERPL-SCNC: 4.1 MMOL/L (ref 3.5–5.2)
RBC # BLD AUTO: 4.24 10*6/MM3 (ref 4.14–5.8)
SODIUM SERPL-SCNC: 132 MMOL/L (ref 136–145)
WBC NRBC COR # BLD: 7.9 10*3/MM3 (ref 3.4–10.8)
WHOLE BLOOD HOLD COAG: NORMAL

## 2023-09-15 PROCEDURE — 99283 EMERGENCY DEPT VISIT LOW MDM: CPT

## 2023-09-15 PROCEDURE — 85652 RBC SED RATE AUTOMATED: CPT | Performed by: PHYSICIAN ASSISTANT

## 2023-09-15 PROCEDURE — 86140 C-REACTIVE PROTEIN: CPT | Performed by: PHYSICIAN ASSISTANT

## 2023-09-15 PROCEDURE — 25010000002 CEFTRIAXONE PER 250 MG: Performed by: PHYSICIAN ASSISTANT

## 2023-09-15 PROCEDURE — 83605 ASSAY OF LACTIC ACID: CPT

## 2023-09-15 PROCEDURE — 73630 X-RAY EXAM OF FOOT: CPT

## 2023-09-15 PROCEDURE — 96365 THER/PROPH/DIAG IV INF INIT: CPT

## 2023-09-15 PROCEDURE — 80048 BASIC METABOLIC PNL TOTAL CA: CPT | Performed by: PHYSICIAN ASSISTANT

## 2023-09-15 PROCEDURE — 36415 COLL VENOUS BLD VENIPUNCTURE: CPT

## 2023-09-15 PROCEDURE — 87040 BLOOD CULTURE FOR BACTERIA: CPT | Performed by: PHYSICIAN ASSISTANT

## 2023-09-15 PROCEDURE — 85025 COMPLETE CBC W/AUTO DIFF WBC: CPT | Performed by: PHYSICIAN ASSISTANT

## 2023-09-15 RX ORDER — SODIUM CHLORIDE 0.9 % (FLUSH) 0.9 %
10 SYRINGE (ML) INJECTION AS NEEDED
Status: DISCONTINUED | OUTPATIENT
Start: 2023-09-15 | End: 2023-09-15 | Stop reason: HOSPADM

## 2023-09-15 RX ORDER — ACETAMINOPHEN 650 MG
TABLET, EXTENDED RELEASE ORAL DAILY
Status: DISCONTINUED | OUTPATIENT
Start: 2023-09-15 | End: 2023-09-15 | Stop reason: HOSPADM

## 2023-09-15 RX ORDER — LEVOFLOXACIN 250 MG/1
250 TABLET ORAL DAILY
Qty: 10 TABLET | Refills: 0 | Status: SHIPPED | OUTPATIENT
Start: 2023-09-15

## 2023-09-15 RX ADMIN — CEFTRIAXONE 2000 MG: 2 INJECTION, POWDER, FOR SOLUTION INTRAMUSCULAR; INTRAVENOUS at 15:25

## 2023-09-15 RX ADMIN — Medication: at 17:22

## 2023-09-15 NOTE — DISCHARGE INSTRUCTIONS
Take Levaquin antibiotics to completion    Continue with wound care and try to keep your feet clean and dry.  If there is a lot of moisture it could cause maggots    Follow-up with your primary care provider for recheck  Return to the ER for new or worsening symptoms

## 2023-09-15 NOTE — NURSING NOTE
WOCN note:    77 yr old male presented to the ED on 9/15/23 for a wound check on the recommendation of his home health care nurse. Patient was noted to have maggots in the right first toe space. He reports he had a podiatry appointment on Tuesday during which the right great toenail was removed. There is also dried blood on several toes of the left foot possibly from nail debridement.   Patient's legs were wrapped with 3 layer compression wraps upon arrival. Per chart review patient has been seen by [facility] wound center in the past for chronic venous insufficiency. ABIs done Nov 2022 were normal. Pedal pulses are palpable.      The maggots were removed as well as the compression wraps. There is edema noted to the lower legs with right greater than left. There is slight erythema to the toes with moisture in the toe spaces. The legs and feet were cleansed. The toes and toe spaces were painted with Betadine. Betadine soaked gauze sponges were placed over the right great toe and in the toe spaces of the right foot. Both legs were re-wrapped with 3 layer compression wraps.   Recommend patient continue with compression wraps per [facility] orders and daily Betadine paint to the right toes and toe spaces.

## 2023-09-15 NOTE — ED PROVIDER NOTES
Subjective   History of Present Illness  Chief Complaint: Right foot wound    Patient is a 77-year-old white male history of hypertension, GERD, diabetes, kidney disease presents to the ER from home for wound check as requested by his wound care nurse.  He states he was told by his wound care nurse to come to the hospital because there were maggots between his toes.  Patient denies any pain.  Patient states that he is able to bear weight on the foot and walk with his walker.  Patient states that wound care nurse comes to clean and take care of his feet weekly.  He states that he also recently had a toenail removed by his doctor.  Patient does have a wound he denies fever or chills.  He denies abdominal pain, chest pain, shortness of breath, nausea vomiting.    PCP: Montse Dawkins    History provided by:  Patient    Review of Systems   Constitutional:  Negative for chills and fever.   HENT:  Negative for sore throat and trouble swallowing.    Eyes: Negative.    Respiratory:  Negative for shortness of breath and wheezing.    Cardiovascular:  Negative for chest pain.   Gastrointestinal:  Negative for abdominal pain, diarrhea, nausea and vomiting.   Endocrine: Negative.    Genitourinary:  Negative for dysuria.   Skin:  Positive for wound. Negative for rash.   Allergic/Immunologic: Negative.    Neurological:  Negative for headaches.   Psychiatric/Behavioral:  Negative for behavioral problems.    All other systems reviewed and are negative.    Past Medical History:   Diagnosis Date    Alcohol abuse 7/13/2022    Essential hypertension 7/13/2022    Gait instability 7/13/2022    GERD without esophagitis 7/13/2022    Gout 7/13/2022    Hypothyroidism (acquired) 7/13/2022    Onychomycosis of multiple toenails with type 2 diabetes mellitus 7/13/2022    Stage 3a chronic kidney disease 7/13/2022    Tinea pedis of both feet 7/13/2022    Type 2 diabetes mellitus with diabetic neuropathy, without long-term current use of insulin  7/13/2022       Allergies   Allergen Reactions    Tigecycline Swelling and Rash       No past surgical history on file.    No family history on file.    Social History     Socioeconomic History    Marital status:    Tobacco Use    Smoking status: Never    Smokeless tobacco: Never           Objective   Physical Exam  Vitals and nursing note reviewed.   Constitutional:       General: He is not in acute distress.     Appearance: Normal appearance. He is obese. He is ill-appearing. He is not diaphoretic.      Comments: Chronically ill in appearance   HENT:      Head: Normocephalic and atraumatic.      Mouth/Throat:      Mouth: Mucous membranes are moist.   Eyes:      Extraocular Movements: Extraocular movements intact.      Pupils: Pupils are equal, round, and reactive to light.   Cardiovascular:      Rate and Rhythm: Normal rate and regular rhythm.      Pulses: Normal pulses.      Heart sounds: Normal heart sounds.   Pulmonary:      Effort: Pulmonary effort is normal.      Breath sounds: Normal breath sounds.   Abdominal:      General: Abdomen is flat.      Palpations: Abdomen is soft.      Tenderness: There is no abdominal tenderness.   Musculoskeletal:         General: Swelling present. Normal range of motion.      Right lower leg: Edema present.      Left lower leg: Edema present.      Comments: Bilateral lower extremity swelling, right lower extremity more erythematous than left    Significant hyperkeratosis of the skin bilateral lower extremities and around the right foot in particular    Missing right great toenail, this was removed per patient's doctor    Area between the first and second toe on the right foot moist with some mild skin breakdown but no large ulcer draining or bleeding wound.  There is a large amount of maggots in this area   Skin:     General: Skin is warm.      Capillary Refill: Capillary refill takes less than 2 seconds.      Findings: Erythema present.   Neurological:      General: No  "focal deficit present.      Mental Status: He is alert and oriented to person, place, and time.   Psychiatric:         Mood and Affect: Mood normal.         Behavior: Behavior normal.       Procedures           ED Course  ED Course as of 09/15/23 2222   Fri Sep 15, 2023   1515 Wound care was consulted and they were able to clean and appropriately dressed and wrap patient's legs []   8265 I spoke with Dr. Dawkins, she states that patient's lower extremities are always erythematous and swollen.  She states \"he always has maggots between his toes\".  She states that this is not anything new.  Patient does not have any leukocytosis, x-ray does not show evidence of osteomyelitis.  After discussion with Dr. Dawkins, patient will be discharged home on Levaquin 250 once daily for 10 days. [MC]      ED Course User Index  [MC] Rhoda Galloway PA    BP (!) 183/97   Pulse 63   Temp 98.2 °F (36.8 °C) (Oral)   Resp 15   Ht 170.2 cm (67\")   Wt 104 kg (230 lb)   SpO2 97%   BMI 36.02 kg/m²   Labs Reviewed   BASIC METABOLIC PANEL - Abnormal; Notable for the following components:       Result Value    Glucose 131 (*)     Sodium 132 (*)     Chloride 94 (*)     eGFR 59.7 (*)     All other components within normal limits    Narrative:     GFR Normal >60  Chronic Kidney Disease <60  Kidney Failure <15    The GFR formula is only valid for adults with stable renal function between ages 18 and 70.   C-REACTIVE PROTEIN - Abnormal; Notable for the following components:    C-Reactive Protein 0.70 (*)     All other components within normal limits   SEDIMENTATION RATE - Abnormal; Notable for the following components:    Sed Rate 33 (*)     All other components within normal limits   CBC WITH AUTO DIFFERENTIAL - Abnormal; Notable for the following components:    Hemoglobin 12.7 (*)     RDW 16.1 (*)     All other components within normal limits   POC LACTATE - Normal   BLOOD CULTURE   BLOOD CULTURE   CBC AND DIFFERENTIAL    Narrative:     " The following orders were created for panel order CBC & Differential.  Procedure                               Abnormality         Status                     ---------                               -----------         ------                     CBC Auto Differential[[id number]]        Abnormal            Final result                 Please view results for these tests on the individual orders.   EXTRA TUBES    Narrative:     The following orders were created for panel order Extra Tubes.  Procedure                               Abnormality         Status                     ---------                               -----------         ------                     Light Blue Top[[id number]]                                   Final result                 Please view results for these tests on the individual orders.   LIGHT BLUE TOP     Medications   cefTRIAXone (ROCEPHIN) 2,000 mg in sodium chloride 0.9 % 100 mL IVPB (0 mg Intravenous Stopped 9/15/23 1712)     XR Foot 3+ View Right    Result Date: 9/15/2023  Impression: 1. No discrete area of osseous erosion to indicate osteomyelitis. 2. Soft tissue swelling overlying the dorsum of the foot may relate to edema and/or cellulitis. 3. Diffuse osteopenia. 4. Extensive small vessel vascular calcifications. Electronically Signed: Renny Johnson MD  9/15/2023 2:35 PM EDT  Workstation ID: [id number]                                          Medical Decision Making  Differential Dx (Includes but not limited to): Osteomyelitis abscess ulcer necrotic tissue  Medical Records Reviewed: Patient's last admission 7/9/2022 for acute cellulitis of the right foot.    Patient had Doppler ABIs November 2022 that were normal.  Labs: On my interpretation, CBC no leukocytosis.  BMP glucose 131 sodium 132 chloride 94.  CRP mildly elevated 0.7.  Sed rate mildly elevated 33.  Imaging: X-ray shows no evidence of osteomyelitis.  Telemetry: N/A  Testing considered but not ordered: MRI, x-ray negative for  osteomyelitis  Nature of Complaint: Acute  Admission vs Discharge: Discharge  Discussion: While in the ED IV was placed and labs were obtained appropriate PPE was worn during exam and throughout all encounters with the patient.  Patient had above evaluation.  Patient no complaints of pain or discomfort.  He is afebrile, nontoxic appearance.  Patient was sent by wound care for wound check and evaluation.  Patient had obvious infestation of maggots between the first and second toe on the right foot.  This was rinsed away with normal saline, revealing some mild skin breakdown and moist skin area but no obvious ulcer or deep wound.  Patient was given IV Rocephin empirically.  Lab work reassuring.  X-ray imaging shows no evidence of osteomyelitis.  I spoke with patient's PCP, Dr. Dawkins, she states that patient was recently on Levaquin for right lower extremity cellulitis.  There is no large wound, drainage bleeding or ulceration at this time.  Patient is already established with home health and wound care.  After discussion with PCP, patient felt stable for discharge for outpatient follow-up.  We will restart patient on Levaquin 250 once daily for 10 days as per PCP request.  Wound care was consulted for appropriate cleaning and wrapping of the patient's legs prior to discharge.    Discharge plan and instructions were discussed with the patient who verbalized understanding and is in agreement with the plan, all questions were answered at this time.  Patient is aware of signs symptoms that would require immediate return to the emergency room.  Patient understands importance of following up with primary care provider for further evaluation and worsening concerns as well as blood pressure recheck in the next 4 weeks.    Patient was discharged in improved stable condition with an upright steady gait.    Patient is aware that discharge does not mean that nothing is wrong but indicates no emergencies present and they must  continue care with follow-up as given below or physician of their choice.    Problems Addressed:  Cellulitis of right leg: acute illness or injury  Infestation by maggots: acute illness or injury    Amount and/or Complexity of Data Reviewed  Labs: ordered. Decision-making details documented in ED Course.  Radiology: ordered. Decision-making details documented in ED Course.    Risk  Prescription drug management.        Final diagnoses:   Cellulitis of right leg   Infestation by maggots       ED Disposition  ED Disposition       ED Disposition   Discharge    Condition   Stable    Comment   --               Montse Dawkins MD  [address] [place] [address] [phone]    Schedule an appointment as soon as possible for a visit in 2 days  As needed, If symptoms worsen         Medication List        New Prescriptions      levoFLOXacin 250 MG tablet  Commonly known as: LEVAQUIN  Take 1 tablet by mouth Daily.               Where to Get Your Medications        These medications were sent to [facility] - [place], [place] - [address] - [phone] [facility] [phone]   [address] [place] [address]      Phone: [phone]   levoFLOXacin 250 MG tablet            Rhoda Galloway PA  09/15/23 7273

## 2023-09-20 LAB
BACTERIA SPEC AEROBE CULT: NORMAL
BACTERIA SPEC AEROBE CULT: NORMAL

## 2024-01-02 ENCOUNTER — LAB (OUTPATIENT)
Dept: LAB | Facility: HOSPITAL | Age: 79
End: 2024-01-02
Payer: MEDICARE

## 2024-01-02 ENCOUNTER — TRANSCRIBE ORDERS (OUTPATIENT)
Dept: ADMINISTRATIVE | Facility: HOSPITAL | Age: 79
End: 2024-01-02
Payer: MEDICARE

## 2024-01-02 DIAGNOSIS — N17.9 ACUTE RENAL FAILURE, UNSPECIFIED ACUTE RENAL FAILURE TYPE: Primary | ICD-10-CM

## 2024-01-02 DIAGNOSIS — R80.9 PROTEINURIA, UNSPECIFIED TYPE: ICD-10-CM

## 2024-01-02 DIAGNOSIS — N18.2 CHRONIC KIDNEY DISEASE, STAGE II (MILD): ICD-10-CM

## 2024-01-02 DIAGNOSIS — E55.9 AVITAMINOSIS D: ICD-10-CM

## 2024-01-02 DIAGNOSIS — N17.9 ACUTE RENAL FAILURE, UNSPECIFIED ACUTE RENAL FAILURE TYPE: ICD-10-CM

## 2024-01-02 DIAGNOSIS — E05.90 PRETIBIAL MYXEDEMA: ICD-10-CM

## 2024-01-02 LAB
25(OH)D3 SERPL-MCNC: 42.2 NG/ML (ref 30–100)
ALBUMIN SERPL-MCNC: 3.9 G/DL (ref 3.5–5.2)
ALBUMIN/GLOB SERPL: 1.3 G/DL
ALP SERPL-CCNC: 54 U/L (ref 39–117)
ALT SERPL W P-5'-P-CCNC: 6 U/L (ref 1–41)
ANION GAP SERPL CALCULATED.3IONS-SCNC: 10 MMOL/L (ref 5–15)
AST SERPL-CCNC: 12 U/L (ref 1–40)
BACTERIA UR QL AUTO: NORMAL /HPF
BASOPHILS # BLD AUTO: 0.1 10*3/MM3 (ref 0–0.2)
BASOPHILS NFR BLD AUTO: 1.1 % (ref 0–1.5)
BILIRUB SERPL-MCNC: 0.5 MG/DL (ref 0–1.2)
BILIRUB UR QL STRIP: NEGATIVE
BUN SERPL-MCNC: 11 MG/DL (ref 8–23)
BUN/CREAT SERPL: 9.9 (ref 7–25)
CALCIUM SPEC-SCNC: 9.6 MG/DL (ref 8.6–10.5)
CHLORIDE SERPL-SCNC: 95 MMOL/L (ref 98–107)
CLARITY UR: CLEAR
CO2 SERPL-SCNC: 25 MMOL/L (ref 22–29)
COLOR UR: YELLOW
CREAT SERPL-MCNC: 1.11 MG/DL (ref 0.76–1.27)
CREAT UR-MCNC: 68 MG/DL
DEPRECATED RDW RBC AUTO: 48.6 FL (ref 37–54)
EGFRCR SERPLBLD CKD-EPI 2021: 67.1 ML/MIN/1.73
EOSINOPHIL # BLD AUTO: 0.5 10*3/MM3 (ref 0–0.4)
EOSINOPHIL NFR BLD AUTO: 8 % (ref 0.3–6.2)
ERYTHROCYTE [DISTWIDTH] IN BLOOD BY AUTOMATED COUNT: 14.8 % (ref 12.3–15.4)
GLOBULIN UR ELPH-MCNC: 3.1 GM/DL
GLUCOSE SERPL-MCNC: 107 MG/DL (ref 65–99)
GLUCOSE UR STRIP-MCNC: NEGATIVE MG/DL
HCT VFR BLD AUTO: 40.7 % (ref 37.5–51)
HGB BLD-MCNC: 13.1 G/DL (ref 13–17.7)
HGB UR QL STRIP.AUTO: NEGATIVE
HYALINE CASTS UR QL AUTO: NORMAL /LPF
KETONES UR QL STRIP: NEGATIVE
LEUKOCYTE ESTERASE UR QL STRIP.AUTO: NEGATIVE
LYMPHOCYTES # BLD AUTO: 1.4 10*3/MM3 (ref 0.7–3.1)
LYMPHOCYTES NFR BLD AUTO: 22.2 % (ref 19.6–45.3)
MCH RBC QN AUTO: 29.2 PG (ref 26.6–33)
MCHC RBC AUTO-ENTMCNC: 32.3 G/DL (ref 31.5–35.7)
MCV RBC AUTO: 90.5 FL (ref 79–97)
MONOCYTES # BLD AUTO: 0.8 10*3/MM3 (ref 0.1–0.9)
MONOCYTES NFR BLD AUTO: 12.3 % (ref 5–12)
NEUTROPHILS NFR BLD AUTO: 3.6 10*3/MM3 (ref 1.7–7)
NEUTROPHILS NFR BLD AUTO: 56.4 % (ref 42.7–76)
NITRITE UR QL STRIP: NEGATIVE
NRBC BLD AUTO-RTO: 0.1 /100 WBC (ref 0–0.2)
PH UR STRIP.AUTO: 6.5 [PH] (ref 5–8)
PLATELET # BLD AUTO: 255 10*3/MM3 (ref 140–450)
PMV BLD AUTO: 8.6 FL (ref 6–12)
POTASSIUM SERPL-SCNC: 4.2 MMOL/L (ref 3.5–5.2)
PROT ?TM UR-MCNC: 33.9 MG/DL
PROT SERPL-MCNC: 7 G/DL (ref 6–8.5)
PROT UR QL STRIP: ABNORMAL
PROT/CREAT UR: 498.5 MG/G CREA (ref 0–200)
RBC # BLD AUTO: 4.49 10*6/MM3 (ref 4.14–5.8)
RBC # UR STRIP: NORMAL /HPF
REF LAB TEST METHOD: NORMAL
SODIUM SERPL-SCNC: 130 MMOL/L (ref 136–145)
SP GR UR STRIP: 1.01 (ref 1–1.03)
SQUAMOUS #/AREA URNS HPF: NORMAL /HPF
T4 FREE SERPL-MCNC: 1.52 NG/DL (ref 0.93–1.7)
TSH SERPL DL<=0.05 MIU/L-ACNC: 1.49 UIU/ML (ref 0.27–4.2)
UROBILINOGEN UR QL STRIP: ABNORMAL
WBC # UR STRIP: NORMAL /HPF
WBC NRBC COR # BLD AUTO: 6.3 10*3/MM3 (ref 3.4–10.8)

## 2024-01-02 PROCEDURE — 82570 ASSAY OF URINE CREATININE: CPT

## 2024-01-02 PROCEDURE — 84156 ASSAY OF PROTEIN URINE: CPT

## 2024-01-02 PROCEDURE — 84443 ASSAY THYROID STIM HORMONE: CPT

## 2024-01-02 PROCEDURE — 81001 URINALYSIS AUTO W/SCOPE: CPT

## 2024-01-02 PROCEDURE — 82306 VITAMIN D 25 HYDROXY: CPT

## 2024-01-02 PROCEDURE — 80053 COMPREHEN METABOLIC PANEL: CPT

## 2024-01-02 PROCEDURE — 85025 COMPLETE CBC W/AUTO DIFF WBC: CPT

## 2024-01-02 PROCEDURE — 36415 COLL VENOUS BLD VENIPUNCTURE: CPT

## 2024-01-02 PROCEDURE — 84439 ASSAY OF FREE THYROXINE: CPT

## 2024-02-26 ENCOUNTER — APPOINTMENT (OUTPATIENT)
Dept: CARDIOLOGY | Facility: HOSPITAL | Age: 79
DRG: 603 | End: 2024-02-26
Payer: MEDICARE

## 2024-02-26 ENCOUNTER — HOSPITAL ENCOUNTER (INPATIENT)
Facility: HOSPITAL | Age: 79
LOS: 3 days | Discharge: NURSING, REHAB OR LONG-TERM CARE | DRG: 603 | End: 2024-03-01
Attending: EMERGENCY MEDICINE | Admitting: FAMILY MEDICINE
Payer: MEDICARE

## 2024-02-26 DIAGNOSIS — L03.116 CELLULITIS OF LEFT LOWER EXTREMITY: Primary | ICD-10-CM

## 2024-02-26 PROBLEM — L03.90 CELLULITIS: Status: ACTIVE | Noted: 2024-02-26

## 2024-02-26 LAB
ALBUMIN SERPL-MCNC: 3.8 G/DL (ref 3.5–5.2)
ALBUMIN/GLOB SERPL: 1.2 G/DL
ALP SERPL-CCNC: 55 U/L (ref 39–117)
ALT SERPL W P-5'-P-CCNC: 6 U/L (ref 1–41)
ANION GAP SERPL CALCULATED.3IONS-SCNC: 13 MMOL/L (ref 5–15)
AST SERPL-CCNC: 14 U/L (ref 1–40)
BASOPHILS # BLD AUTO: 0.2 10*3/MM3 (ref 0–0.2)
BASOPHILS NFR BLD AUTO: 2.5 % (ref 0–1.5)
BH CV LOWER VASCULAR LEFT COMMON FEMORAL AUGMENT: NORMAL
BH CV LOWER VASCULAR LEFT COMMON FEMORAL COMPETENT: NORMAL
BH CV LOWER VASCULAR LEFT COMMON FEMORAL COMPRESS: NORMAL
BH CV LOWER VASCULAR LEFT COMMON FEMORAL PHASIC: NORMAL
BH CV LOWER VASCULAR LEFT COMMON FEMORAL SPONT: NORMAL
BH CV LOWER VASCULAR LEFT DISTAL FEMORAL COMPRESS: NORMAL
BH CV LOWER VASCULAR LEFT GASTRONEMIUS COMPRESS: NORMAL
BH CV LOWER VASCULAR LEFT GREATER SAPH AK COMPRESS: NORMAL
BH CV LOWER VASCULAR LEFT GREATER SAPH BK COMPRESS: NORMAL
BH CV LOWER VASCULAR LEFT LESSER SAPH COMPRESS: NORMAL
BH CV LOWER VASCULAR LEFT MID FEMORAL AUGMENT: NORMAL
BH CV LOWER VASCULAR LEFT MID FEMORAL COMPETENT: NORMAL
BH CV LOWER VASCULAR LEFT MID FEMORAL COMPRESS: NORMAL
BH CV LOWER VASCULAR LEFT MID FEMORAL PHASIC: NORMAL
BH CV LOWER VASCULAR LEFT MID FEMORAL SPONT: NORMAL
BH CV LOWER VASCULAR LEFT PERONEAL COMPRESS: NORMAL
BH CV LOWER VASCULAR LEFT POPLITEAL AUGMENT: NORMAL
BH CV LOWER VASCULAR LEFT POPLITEAL COMPETENT: NORMAL
BH CV LOWER VASCULAR LEFT POPLITEAL COMPRESS: NORMAL
BH CV LOWER VASCULAR LEFT POPLITEAL PHASIC: NORMAL
BH CV LOWER VASCULAR LEFT POPLITEAL SPONT: NORMAL
BH CV LOWER VASCULAR LEFT POSTERIOR TIBIAL COMPRESS: NORMAL
BH CV LOWER VASCULAR LEFT PROXIMAL FEMORAL COMPRESS: NORMAL
BH CV LOWER VASCULAR LEFT SAPHENOFEMORAL JUNCTION COMPRESS: NORMAL
BH CV LOWER VASCULAR RIGHT COMMON FEMORAL AUGMENT: NORMAL
BH CV LOWER VASCULAR RIGHT COMMON FEMORAL COMPETENT: NORMAL
BH CV LOWER VASCULAR RIGHT COMMON FEMORAL COMPRESS: NORMAL
BH CV LOWER VASCULAR RIGHT COMMON FEMORAL PHASIC: NORMAL
BH CV LOWER VASCULAR RIGHT COMMON FEMORAL SPONT: NORMAL
BH CV VAS PRELIMINARY FINDINGS SCRIPTING: 1
BILIRUB SERPL-MCNC: 0.6 MG/DL (ref 0–1.2)
BUN SERPL-MCNC: 15 MG/DL (ref 8–23)
BUN/CREAT SERPL: 9.8 (ref 7–25)
CALCIUM SPEC-SCNC: 9.1 MG/DL (ref 8.6–10.5)
CHLORIDE SERPL-SCNC: 93 MMOL/L (ref 98–107)
CO2 SERPL-SCNC: 25 MMOL/L (ref 22–29)
CREAT SERPL-MCNC: 1.53 MG/DL (ref 0.76–1.27)
CRP SERPL-MCNC: 10.98 MG/DL (ref 0–0.5)
D-LACTATE SERPL-SCNC: 0.8 MMOL/L (ref 0.3–2)
DEPRECATED RDW RBC AUTO: 45.1 FL (ref 37–54)
EGFRCR SERPLBLD CKD-EPI 2021: 45.7 ML/MIN/1.73
EOSINOPHIL # BLD AUTO: 0.5 10*3/MM3 (ref 0–0.4)
EOSINOPHIL NFR BLD AUTO: 6.5 % (ref 0.3–6.2)
ERYTHROCYTE [DISTWIDTH] IN BLOOD BY AUTOMATED COUNT: 14.8 % (ref 12.3–15.4)
ERYTHROCYTE [SEDIMENTATION RATE] IN BLOOD: 46 MM/HR (ref 0–20)
FLUAV RNA RESP QL NAA+PROBE: NOT DETECTED
FLUBV RNA RESP QL NAA+PROBE: NOT DETECTED
GLOBULIN UR ELPH-MCNC: 3.2 GM/DL
GLUCOSE BLDC GLUCOMTR-MCNC: 91 MG/DL (ref 70–105)
GLUCOSE SERPL-MCNC: 116 MG/DL (ref 65–99)
HCT VFR BLD AUTO: 39 % (ref 37.5–51)
HGB BLD-MCNC: 12.2 G/DL (ref 13–17.7)
HOLD SPECIMEN: NORMAL
LYMPHOCYTES # BLD AUTO: 1.3 10*3/MM3 (ref 0.7–3.1)
LYMPHOCYTES NFR BLD AUTO: 18.5 % (ref 19.6–45.3)
MCH RBC QN AUTO: 27.8 PG (ref 26.6–33)
MCHC RBC AUTO-ENTMCNC: 31.3 G/DL (ref 31.5–35.7)
MCV RBC AUTO: 88.9 FL (ref 79–97)
MONOCYTES # BLD AUTO: 1 10*3/MM3 (ref 0.1–0.9)
MONOCYTES NFR BLD AUTO: 13.8 % (ref 5–12)
NEUTROPHILS NFR BLD AUTO: 4.1 10*3/MM3 (ref 1.7–7)
NEUTROPHILS NFR BLD AUTO: 58.7 % (ref 42.7–76)
NRBC BLD AUTO-RTO: 0.1 /100 WBC (ref 0–0.2)
PLATELET # BLD AUTO: 275 10*3/MM3 (ref 140–450)
PMV BLD AUTO: 7.8 FL (ref 6–12)
POTASSIUM SERPL-SCNC: 4 MMOL/L (ref 3.5–5.2)
PROT SERPL-MCNC: 7 G/DL (ref 6–8.5)
RBC # BLD AUTO: 4.39 10*6/MM3 (ref 4.14–5.8)
RSV RNA RESP QL NAA+PROBE: NOT DETECTED
SARS-COV-2 RNA RESP QL NAA+PROBE: NOT DETECTED
SODIUM SERPL-SCNC: 131 MMOL/L (ref 136–145)
WBC NRBC COR # BLD AUTO: 7 10*3/MM3 (ref 3.4–10.8)

## 2024-02-26 PROCEDURE — 82948 REAGENT STRIP/BLOOD GLUCOSE: CPT

## 2024-02-26 PROCEDURE — G0378 HOSPITAL OBSERVATION PER HR: HCPCS

## 2024-02-26 PROCEDURE — 80053 COMPREHEN METABOLIC PANEL: CPT | Performed by: PHYSICIAN ASSISTANT

## 2024-02-26 PROCEDURE — 85652 RBC SED RATE AUTOMATED: CPT | Performed by: PHYSICIAN ASSISTANT

## 2024-02-26 PROCEDURE — 99285 EMERGENCY DEPT VISIT HI MDM: CPT

## 2024-02-26 PROCEDURE — 87637 SARSCOV2&INF A&B&RSV AMP PRB: CPT | Performed by: EMERGENCY MEDICINE

## 2024-02-26 PROCEDURE — 87040 BLOOD CULTURE FOR BACTERIA: CPT | Performed by: PHYSICIAN ASSISTANT

## 2024-02-26 PROCEDURE — 83605 ASSAY OF LACTIC ACID: CPT | Performed by: PHYSICIAN ASSISTANT

## 2024-02-26 PROCEDURE — 25010000002 ENOXAPARIN PER 10 MG: Performed by: FAMILY MEDICINE

## 2024-02-26 PROCEDURE — 86140 C-REACTIVE PROTEIN: CPT | Performed by: PHYSICIAN ASSISTANT

## 2024-02-26 PROCEDURE — 25010000002 CEFAZOLIN PER 500 MG: Performed by: PHYSICIAN ASSISTANT

## 2024-02-26 PROCEDURE — 93971 EXTREMITY STUDY: CPT

## 2024-02-26 PROCEDURE — 25010000002 CEFAZOLIN PER 500 MG: Performed by: FAMILY MEDICINE

## 2024-02-26 PROCEDURE — 85025 COMPLETE CBC W/AUTO DIFF WBC: CPT | Performed by: PHYSICIAN ASSISTANT

## 2024-02-26 RX ORDER — NITROGLYCERIN 0.4 MG/1
0.4 TABLET SUBLINGUAL
Status: DISCONTINUED | OUTPATIENT
Start: 2024-02-26 | End: 2024-03-01 | Stop reason: HOSPADM

## 2024-02-26 RX ORDER — SODIUM CHLORIDE 0.9 % (FLUSH) 0.9 %
10 SYRINGE (ML) INJECTION EVERY 12 HOURS SCHEDULED
Status: DISCONTINUED | OUTPATIENT
Start: 2024-02-26 | End: 2024-03-01 | Stop reason: HOSPADM

## 2024-02-26 RX ORDER — MAGNESIUM OXIDE 400 MG/1
400 TABLET ORAL DAILY
COMMUNITY
End: 2024-02-26

## 2024-02-26 RX ORDER — BUMETANIDE 1 MG/1
0.5 TABLET ORAL DAILY
Status: DISCONTINUED | OUTPATIENT
Start: 2024-02-27 | End: 2024-02-27

## 2024-02-26 RX ORDER — CALCIUM CARBONATE 500 MG/1
2 TABLET, CHEWABLE ORAL 2 TIMES DAILY PRN
Status: DISCONTINUED | OUTPATIENT
Start: 2024-02-26 | End: 2024-03-01 | Stop reason: HOSPADM

## 2024-02-26 RX ORDER — BUMETANIDE 0.5 MG/1
0.5 TABLET ORAL DAILY
COMMUNITY
End: 2024-03-01 | Stop reason: HOSPADM

## 2024-02-26 RX ORDER — ONDANSETRON 2 MG/ML
4 INJECTION INTRAMUSCULAR; INTRAVENOUS EVERY 4 HOURS PRN
Status: DISCONTINUED | OUTPATIENT
Start: 2024-02-26 | End: 2024-03-01 | Stop reason: HOSPADM

## 2024-02-26 RX ORDER — SODIUM CHLORIDE 0.9 % (FLUSH) 0.9 %
10 SYRINGE (ML) INJECTION AS NEEDED
Status: DISCONTINUED | OUTPATIENT
Start: 2024-02-26 | End: 2024-03-01 | Stop reason: HOSPADM

## 2024-02-26 RX ORDER — ACETAMINOPHEN 325 MG/1
650 TABLET ORAL EVERY 4 HOURS PRN
Status: DISCONTINUED | OUTPATIENT
Start: 2024-02-26 | End: 2024-03-01 | Stop reason: HOSPADM

## 2024-02-26 RX ORDER — TRAMADOL HYDROCHLORIDE 50 MG/1
50 TABLET ORAL EVERY 6 HOURS PRN
Status: DISCONTINUED | OUTPATIENT
Start: 2024-02-26 | End: 2024-03-01

## 2024-02-26 RX ORDER — AMOXICILLIN 250 MG
2 CAPSULE ORAL 2 TIMES DAILY PRN
Status: DISCONTINUED | OUTPATIENT
Start: 2024-02-26 | End: 2024-03-01 | Stop reason: HOSPADM

## 2024-02-26 RX ORDER — MONTELUKAST SODIUM 4 MG/1
1 TABLET, CHEWABLE ORAL DAILY
COMMUNITY

## 2024-02-26 RX ORDER — LEVOTHYROXINE SODIUM 175 UG/1
175 TABLET ORAL
Status: DISCONTINUED | OUTPATIENT
Start: 2024-02-27 | End: 2024-03-01 | Stop reason: HOSPADM

## 2024-02-26 RX ORDER — ALUMINA, MAGNESIA, AND SIMETHICONE 2400; 2400; 240 MG/30ML; MG/30ML; MG/30ML
15 SUSPENSION ORAL EVERY 6 HOURS PRN
Status: DISCONTINUED | OUTPATIENT
Start: 2024-02-26 | End: 2024-03-01 | Stop reason: HOSPADM

## 2024-02-26 RX ORDER — CHOLESTYRAMINE LIGHT 4 G/5.7G
1 POWDER, FOR SUSPENSION ORAL DAILY
Status: DISCONTINUED | OUTPATIENT
Start: 2024-02-27 | End: 2024-03-01 | Stop reason: HOSPADM

## 2024-02-26 RX ORDER — SODIUM CHLORIDE 1 G/1
1 TABLET ORAL 2 TIMES DAILY
COMMUNITY
End: 2024-03-01 | Stop reason: HOSPADM

## 2024-02-26 RX ORDER — ALLOPURINOL 100 MG/1
100 TABLET ORAL DAILY
Status: DISCONTINUED | OUTPATIENT
Start: 2024-02-27 | End: 2024-03-01 | Stop reason: HOSPADM

## 2024-02-26 RX ORDER — HYDRALAZINE HYDROCHLORIDE 25 MG/1
25 TABLET, FILM COATED ORAL DAILY
COMMUNITY
End: 2024-03-01 | Stop reason: HOSPADM

## 2024-02-26 RX ORDER — PANTOPRAZOLE SODIUM 40 MG/1
40 TABLET, DELAYED RELEASE ORAL DAILY
Status: DISCONTINUED | OUTPATIENT
Start: 2024-02-27 | End: 2024-03-01 | Stop reason: HOSPADM

## 2024-02-26 RX ORDER — FOLIC ACID 1 MG/1
1 TABLET ORAL DAILY
Status: DISCONTINUED | OUTPATIENT
Start: 2024-02-27 | End: 2024-03-01 | Stop reason: HOSPADM

## 2024-02-26 RX ORDER — POLYETHYLENE GLYCOL 3350 17 G/17G
17 POWDER, FOR SOLUTION ORAL DAILY PRN
Status: DISCONTINUED | OUTPATIENT
Start: 2024-02-26 | End: 2024-03-01 | Stop reason: HOSPADM

## 2024-02-26 RX ORDER — SODIUM CHLORIDE 1 G/1
1 TABLET ORAL 2 TIMES DAILY
Status: DISCONTINUED | OUTPATIENT
Start: 2024-02-26 | End: 2024-03-01

## 2024-02-26 RX ORDER — BISACODYL 5 MG/1
5 TABLET, DELAYED RELEASE ORAL DAILY PRN
Status: DISCONTINUED | OUTPATIENT
Start: 2024-02-26 | End: 2024-03-01 | Stop reason: HOSPADM

## 2024-02-26 RX ORDER — ENOXAPARIN SODIUM 100 MG/ML
40 INJECTION SUBCUTANEOUS DAILY
Status: DISCONTINUED | OUTPATIENT
Start: 2024-02-26 | End: 2024-03-01 | Stop reason: HOSPADM

## 2024-02-26 RX ORDER — FLUOXETINE 10 MG/1
10 CAPSULE ORAL DAILY
Status: DISCONTINUED | OUTPATIENT
Start: 2024-02-27 | End: 2024-03-01 | Stop reason: HOSPADM

## 2024-02-26 RX ORDER — SODIUM CHLORIDE 9 MG/ML
40 INJECTION, SOLUTION INTRAVENOUS AS NEEDED
Status: DISCONTINUED | OUTPATIENT
Start: 2024-02-26 | End: 2024-03-01 | Stop reason: HOSPADM

## 2024-02-26 RX ORDER — SACCHAROMYCES BOULARDII 250 MG
250 CAPSULE ORAL 2 TIMES DAILY
Status: DISCONTINUED | OUTPATIENT
Start: 2024-02-26 | End: 2024-03-01 | Stop reason: HOSPADM

## 2024-02-26 RX ORDER — ONDANSETRON 4 MG/1
4 TABLET, ORALLY DISINTEGRATING ORAL EVERY 4 HOURS PRN
Status: DISCONTINUED | OUTPATIENT
Start: 2024-02-26 | End: 2024-03-01 | Stop reason: HOSPADM

## 2024-02-26 RX ORDER — BISACODYL 10 MG
10 SUPPOSITORY, RECTAL RECTAL DAILY PRN
Status: DISCONTINUED | OUTPATIENT
Start: 2024-02-26 | End: 2024-03-01 | Stop reason: HOSPADM

## 2024-02-26 RX ORDER — HYDRALAZINE HYDROCHLORIDE 25 MG/1
25 TABLET, FILM COATED ORAL DAILY
Status: DISCONTINUED | OUTPATIENT
Start: 2024-02-27 | End: 2024-02-27

## 2024-02-26 RX ADMIN — SODIUM CHLORIDE 2000 MG: 900 INJECTION INTRAVENOUS at 11:47

## 2024-02-26 RX ADMIN — SODIUM CHLORIDE 2000 MG: 900 INJECTION INTRAVENOUS at 19:44

## 2024-02-26 RX ADMIN — ENOXAPARIN SODIUM 40 MG: 100 INJECTION SUBCUTANEOUS at 19:44

## 2024-02-26 RX ADMIN — Medication 10 ML: at 20:03

## 2024-02-26 RX ADMIN — SODIUM CHLORIDE 1 G: 1 TABLET ORAL at 21:04

## 2024-02-26 RX ADMIN — Medication 250 MG: at 21:04

## 2024-02-26 NOTE — ED PROVIDER NOTES
Subjective   History of Present Illness  Patient is an 78-year-old male PMH significant for alcohol abuse currently drinks about 5 beers daily, hypertension, GERD, gout, hypothyroidism, CKD, diabetes resenting to the ED with complaints of left calf pain and left leg swelling over the past month.  States the pain has increased over the past several days.  Patient did scratch his leg a few days ago and was started on a 10-day course of Levaquin which she completed on 2/22 with minimal improvement of symptoms.  This was prescribed by PCP.  Spoke to patient's daughter (Yajaira) who is a nurse practitioner via telephone states she has been wrapping his legs reports his right leg is baseline the left leg has gotten worse.  No new chest pain or shortness of breath.  Patient does take Bumex daily no missed doses.    History provided by:  Patient and relative      Review of Systems   Constitutional: Negative.    Respiratory: Negative.     Cardiovascular:  Positive for leg swelling. Negative for chest pain and palpitations.   Gastrointestinal:  Negative for nausea and vomiting.   Musculoskeletal:  Negative for arthralgias, neck pain and neck stiffness.   Skin:  Positive for color change and wound.   Neurological:  Negative for weakness and numbness.       Past Medical History:   Diagnosis Date    Alcohol abuse 7/13/2022    Essential hypertension 7/13/2022    Gait instability 7/13/2022    GERD without esophagitis 7/13/2022    Gout 7/13/2022    Hypothyroidism (acquired) 7/13/2022    Onychomycosis of multiple toenails with type 2 diabetes mellitus 7/13/2022    Stage 3a chronic kidney disease 7/13/2022    Tinea pedis of both feet 7/13/2022    Type 2 diabetes mellitus with diabetic neuropathy, without long-term current use of insulin 7/13/2022       Allergies   Allergen Reactions    Tigecycline Swelling and Rash       No past surgical history on file.    No family history on file.    Social History     Socioeconomic History     Marital status:    Tobacco Use    Smoking status: Never    Smokeless tobacco: Never           Objective   Physical Exam  Vitals and nursing note reviewed.   Constitutional:       General: He is not in acute distress.     Appearance: He is well-developed. He is obese. He is not ill-appearing, toxic-appearing or diaphoretic.   HENT:      Head: Normocephalic and atraumatic.      Mouth/Throat:      Mouth: Mucous membranes are moist.      Pharynx: Oropharynx is clear.   Eyes:      Extraocular Movements: Extraocular movements intact.      Pupils: Pupils are equal, round, and reactive to light.   Cardiovascular:      Rate and Rhythm: Normal rate and regular rhythm.      Pulses: Normal pulses.      Heart sounds: No murmur heard.     No friction rub. No gallop.   Pulmonary:      Effort: Pulmonary effort is normal. No tachypnea or accessory muscle usage.      Breath sounds: Normal breath sounds. No stridor. No decreased breath sounds, wheezing, rhonchi or rales.   Chest:      Chest wall: No mass, deformity, tenderness or crepitus.   Abdominal:      Palpations: Abdomen is soft.      Tenderness: There is no abdominal tenderness. There is no guarding or rebound.   Musculoskeletal:      Right lower leg: Edema present.      Left lower leg: Edema present.        Legs:       Comments: Bilateral lower extremities are identified this left lower extremity is erythematous with warmth.  There are some superficial abrasions noted along the medial aspect as depicted above.  Sensation intact.  Tenderness in the calf of the left lower extremity.  Compartments are soft.   Skin:     General: Skin is warm.      Capillary Refill: Capillary refill takes less than 2 seconds.      Findings: No rash.   Neurological:      Mental Status: He is alert and oriented to person, place, and time.   Psychiatric:         Mood and Affect: Mood normal.         Behavior: Behavior normal.         Procedures           ED Course    /79   Pulse 50    "Temp 97.9 °F (36.6 °C)   Resp 20   Ht 172.7 cm (68\")   Wt 99.8 kg (220 lb)   SpO2 98%   BMI 33.45 kg/m²   Medications   sodium chloride 0.9 % flush 10 mL (has no administration in time range)   ceFAZolin 2000 mg IVPB in 100 mL NS (MBP) (2,000 mg Intravenous Given 2/26/24 1147)     Labs Reviewed   COMPREHENSIVE METABOLIC PANEL - Abnormal; Notable for the following components:       Result Value    Glucose 116 (*)     Creatinine 1.53 (*)     Sodium 131 (*)     Chloride 93 (*)     eGFR 45.7 (*)     All other components within normal limits    Narrative:     GFR Normal >60  Chronic Kidney Disease <60  Kidney Failure <15    The GFR formula is only valid for adults with stable renal function between ages 18 and 70.   SEDIMENTATION RATE - Abnormal; Notable for the following components:    Sed Rate 46 (*)     All other components within normal limits   C-REACTIVE PROTEIN - Abnormal; Notable for the following components:    C-Reactive Protein 10.98 (*)     All other components within normal limits   CBC WITH AUTO DIFFERENTIAL - Abnormal; Notable for the following components:    Hemoglobin 12.2 (*)     MCHC 31.3 (*)     Lymphocyte % 18.5 (*)     Monocyte % 13.8 (*)     Eosinophil % 6.5 (*)     Basophil % 2.5 (*)     Monocytes, Absolute 1.00 (*)     Eosinophils, Absolute 0.50 (*)     All other components within normal limits   POC LACTATE - Normal   BLOOD CULTURE   BLOOD CULTURE   CBC AND DIFFERENTIAL    Narrative:     The following orders were created for panel order CBC & Differential.  Procedure                               Abnormality         Status                     ---------                               -----------         ------                     CBC Auto Differential[[id number]]        Abnormal            Final result                 Please view results for these tests on the individual orders.   EXTRA TUBES    Narrative:     The following orders were created for panel order Extra Tubes.  Procedure            "                    Abnormality         Status                     ---------                               -----------         ------                     Gold [facility] - [facility][[id number]]                                   Final result                 Please view results for these tests on the individual orders.   GOLD TOP - [facility]     No orders to display                                              Medical Decision Making    Differentials: DVT, cellulitis, CHF, wound infection      ;this list is not all inclusive and does not constitute the entirety of considered causes  Labs: As above    Disposition/Treatment:  Appropriate PPE was worn during exam and throughout all encounters with the patient.  When the ED IV was placed labs were obtained patient placed on proper monitors presents to the ED with complaints of worsening swelling redness and warmth of the left leg.  Labs and imaging were obtained including ultrasound that was negative for acute DVT.  Redness of his leg is consistent with cellulitis he was recently on Levaquin my opinion he has failed outpatient treatment.  Patient was started on cefazolin there is no real drainage to collect for wound culture unfortunately.  Labs are significant for no leukocytosis anemia that appears somewhat chronic and stable.  Sed rate and CRP are elevated.  POC lactic is normal blood cultures are ordered.  Metabolic panel showed a creatinine 1.53.  Does have history of chronic kidney disease however creatinine was normal about a month ago.  Findings were discussed with patient's PCP Dr. Dawkins was in agreement with plan of observation for antibiotics further evaluation of cellulitis.  Findings were discussed with the patient and family at bedside who are in agreement with plan all questions were answered.    Problems Addressed:  Cellulitis of left lower extremity: complicated acute illness or injury    Amount and/or Complexity of Data Reviewed  Labs: ordered. Decision-making details  documented in ED Course.  Discussion of management or test interpretation with external provider(s): As above    Risk  Prescription drug management.  Decision regarding hospitalization.        Final diagnoses:   Cellulitis of left lower extremity       ED Disposition  ED Disposition       ED Disposition   Decision to Admit    Condition   --    Comment   Level of Care: Telemetry [5]   Admitting Physician: GONZALO DENNISON [[id number]]   Attending Physician: GONZALO DENNISON [[id number]]   Patient Class: Observation [104]                 No follow-up provider specified.       Medication List      No changes were made to your prescriptions during this visit.            Luis Antonio Chambers PA  02/26/24 1211

## 2024-02-26 NOTE — CASE MANAGEMENT/SOCIAL WORK
Discharge Planning Assessment   [facility]     Patient Name: Rommel Mcfarland  MRN: [id number]  Today's Date: 2/26/2024    Admit Date: 2/26/2024    Plan: Home, Caregiver 2 dys wk. Used [facility] in past   Discharge Needs Assessment       Row Name 02/26/24 1243       Living Environment    People in Home alone    Unique Family Situation DaughterNhan lives nearby    Current Living Arrangements home    Potentially Unsafe Housing Conditions none    Primary Care Provided by self;child(any);other (see comments)  Caregiver-Tia    Provides Primary Care For no one    Family Caregiver if Needed child(any), adult;other (see comments)    Quality of Family Relationships helpful;involved;supportive    Able to Return to Prior Arrangements yes       Resource/Environmental Concerns    Resource/Environmental Concerns none    Transportation Concerns none       Transition Planning    Patient/Family Anticipates Transition to home    Transportation Anticipated family or friend will provide  Tia-caregiver or Yajaira-daughter       Discharge Needs Assessment    Readmission Within the Last 30 Days no previous admission in last 30 days    Equipment Currently Used at Home walker, standard    Concerns to be Addressed discharge planning    Discharge Facility/Level of Care Needs home with home health  [facility]  in past                   Discharge Plan       Row Name 02/26/24 1246       Plan    Plan Home, Caregiver 2 dys wk. Used [facility]  in past    Plan Comments Spoke with patient at bedside, lives alone. Used [facility]  in the past.Currently has caregiver name Tia 3 dys/wk. Daughter Yajaira available to assist. Confimed PCP and pharmacy. Denies transportation or medication coverage issues. DC Barrier: IVAB's                  Continued Care and Services - Admitted Since 2/26/2024    Coordination has not been started for this encounter.       Expected Discharge Date and Time       Expected Discharge Date Expected Discharge Time     Feb 27, 2024            Demographic Summary       Row Name 02/26/24 1242       General Information    Admission Type observation    Arrived From home    Referral Source patient    Reason for Consult discharge planning    Preferred Language English                   Functional Status       Row Name 02/26/24 1242       Functional Status    Usual Activity Tolerance moderate    Current Activity Tolerance moderate       Functional Status, IADL    Medications assistive person    Meal Preparation assistive person    Housekeeping assistive person    Laundry assistive person    Shopping assistive person       Mental Status    General Appearance WDL WDL           Met with patient in room wearing PPE: mask, face shield/goggles, gloves, gown.      Maintained distance greater than six feet and spent less than 15 minutes in the room.     Kinga Shetty RN

## 2024-02-26 NOTE — Clinical Note
Level of Care: Telemetry [5]   Admitting Physician: GONZALO DENNISON [[id number]]   Attending Physician: GONZALO DENNISON [[id number]]   Patient Class: Observation [104]

## 2024-02-27 LAB
ALBUMIN SERPL-MCNC: 3.3 G/DL (ref 3.5–5.2)
ALBUMIN/GLOB SERPL: 1.2 G/DL
ALP SERPL-CCNC: 40 U/L (ref 39–117)
ALT SERPL W P-5'-P-CCNC: <5 U/L (ref 1–41)
ANION GAP SERPL CALCULATED.3IONS-SCNC: 10 MMOL/L (ref 5–15)
AST SERPL-CCNC: 16 U/L (ref 1–40)
BASOPHILS # BLD AUTO: 0.1 10*3/MM3 (ref 0–0.2)
BASOPHILS NFR BLD AUTO: 1.2 % (ref 0–1.5)
BILIRUB SERPL-MCNC: 0.4 MG/DL (ref 0–1.2)
BUN SERPL-MCNC: 16 MG/DL (ref 8–23)
BUN/CREAT SERPL: 11.9 (ref 7–25)
CALCIUM SPEC-SCNC: 8.7 MG/DL (ref 8.6–10.5)
CHLORIDE SERPL-SCNC: 100 MMOL/L (ref 98–107)
CK SERPL-CCNC: 59 U/L (ref 20–200)
CO2 SERPL-SCNC: 24 MMOL/L (ref 22–29)
CREAT SERPL-MCNC: 1.35 MG/DL (ref 0.76–1.27)
DEPRECATED RDW RBC AUTO: 45.1 FL (ref 37–54)
EGFRCR SERPLBLD CKD-EPI 2021: 53.1 ML/MIN/1.73
EOSINOPHIL # BLD AUTO: 0.3 10*3/MM3 (ref 0–0.4)
EOSINOPHIL NFR BLD AUTO: 5.1 % (ref 0.3–6.2)
ERYTHROCYTE [DISTWIDTH] IN BLOOD BY AUTOMATED COUNT: 14.8 % (ref 12.3–15.4)
GLOBULIN UR ELPH-MCNC: 2.7 GM/DL
GLUCOSE SERPL-MCNC: 91 MG/DL (ref 65–99)
HBA1C MFR BLD: 5.2 % (ref 4.8–5.6)
HCT VFR BLD AUTO: 36.5 % (ref 37.5–51)
HGB BLD-MCNC: 11.8 G/DL (ref 13–17.7)
LYMPHOCYTES # BLD AUTO: 1.5 10*3/MM3 (ref 0.7–3.1)
LYMPHOCYTES NFR BLD AUTO: 23 % (ref 19.6–45.3)
MAGNESIUM SERPL-MCNC: 1.8 MG/DL (ref 1.6–2.4)
MCH RBC QN AUTO: 28.5 PG (ref 26.6–33)
MCHC RBC AUTO-ENTMCNC: 32.3 G/DL (ref 31.5–35.7)
MCV RBC AUTO: 88.3 FL (ref 79–97)
MONOCYTES # BLD AUTO: 0.7 10*3/MM3 (ref 0.1–0.9)
MONOCYTES NFR BLD AUTO: 11.4 % (ref 5–12)
NEUTROPHILS NFR BLD AUTO: 3.8 10*3/MM3 (ref 1.7–7)
NEUTROPHILS NFR BLD AUTO: 59.3 % (ref 42.7–76)
NRBC BLD AUTO-RTO: 0.1 /100 WBC (ref 0–0.2)
PHOSPHATE SERPL-MCNC: 3.3 MG/DL (ref 2.5–4.5)
PLATELET # BLD AUTO: 259 10*3/MM3 (ref 140–450)
PMV BLD AUTO: 8.3 FL (ref 6–12)
POTASSIUM SERPL-SCNC: 4.2 MMOL/L (ref 3.5–5.2)
PROT SERPL-MCNC: 6 G/DL (ref 6–8.5)
RBC # BLD AUTO: 4.13 10*6/MM3 (ref 4.14–5.8)
SODIUM SERPL-SCNC: 134 MMOL/L (ref 136–145)
TSH SERPL DL<=0.05 MIU/L-ACNC: 1.69 UIU/ML (ref 0.27–4.2)
URATE SERPL-MCNC: 6.4 MG/DL (ref 3.4–7)
WBC NRBC COR # BLD AUTO: 6.4 10*3/MM3 (ref 3.4–10.8)

## 2024-02-27 PROCEDURE — 84443 ASSAY THYROID STIM HORMONE: CPT | Performed by: FAMILY MEDICINE

## 2024-02-27 PROCEDURE — 80053 COMPREHEN METABOLIC PANEL: CPT | Performed by: FAMILY MEDICINE

## 2024-02-27 PROCEDURE — 97162 PT EVAL MOD COMPLEX 30 MIN: CPT

## 2024-02-27 PROCEDURE — 25010000002 MAGNESIUM SULFATE IN D5W 1G/100ML (PREMIX) 1-5 GM/100ML-% SOLUTION: Performed by: INTERNAL MEDICINE

## 2024-02-27 PROCEDURE — 82550 ASSAY OF CK (CPK): CPT | Performed by: INTERNAL MEDICINE

## 2024-02-27 PROCEDURE — 84100 ASSAY OF PHOSPHORUS: CPT | Performed by: FAMILY MEDICINE

## 2024-02-27 PROCEDURE — 84550 ASSAY OF BLOOD/URIC ACID: CPT | Performed by: INTERNAL MEDICINE

## 2024-02-27 PROCEDURE — 25810000003 SODIUM CHLORIDE 0.9 % SOLUTION: Performed by: INTERNAL MEDICINE

## 2024-02-27 PROCEDURE — 25010000002 CEFAZOLIN PER 500 MG: Performed by: FAMILY MEDICINE

## 2024-02-27 PROCEDURE — 85025 COMPLETE CBC W/AUTO DIFF WBC: CPT | Performed by: FAMILY MEDICINE

## 2024-02-27 PROCEDURE — 83735 ASSAY OF MAGNESIUM: CPT | Performed by: FAMILY MEDICINE

## 2024-02-27 PROCEDURE — 25010000002 ENOXAPARIN PER 10 MG: Performed by: FAMILY MEDICINE

## 2024-02-27 PROCEDURE — 25010000002 CEFAZOLIN PER 500 MG: Performed by: INTERNAL MEDICINE

## 2024-02-27 PROCEDURE — 83036 HEMOGLOBIN GLYCOSYLATED A1C: CPT | Performed by: FAMILY MEDICINE

## 2024-02-27 RX ORDER — SODIUM CHLORIDE 9 MG/ML
75 INJECTION, SOLUTION INTRAVENOUS CONTINUOUS
Status: DISCONTINUED | OUTPATIENT
Start: 2024-02-27 | End: 2024-02-28

## 2024-02-27 RX ORDER — MAGNESIUM SULFATE 1 G/100ML
1 INJECTION INTRAVENOUS ONCE
Status: COMPLETED | OUTPATIENT
Start: 2024-02-27 | End: 2024-02-27

## 2024-02-27 RX ORDER — HYDRALAZINE HYDROCHLORIDE 25 MG/1
50 TABLET, FILM COATED ORAL 3 TIMES DAILY
Status: DISCONTINUED | OUTPATIENT
Start: 2024-02-27 | End: 2024-03-01

## 2024-02-27 RX ADMIN — SODIUM CHLORIDE 1 G: 1 TABLET ORAL at 21:53

## 2024-02-27 RX ADMIN — CHOLESTYRAMINE 4 G: 4 POWDER, FOR SUSPENSION ORAL at 11:02

## 2024-02-27 RX ADMIN — SODIUM CHLORIDE 2000 MG: 900 INJECTION INTRAVENOUS at 12:32

## 2024-02-27 RX ADMIN — ACETAMINOPHEN 650 MG: 325 TABLET, FILM COATED ORAL at 21:52

## 2024-02-27 RX ADMIN — Medication 10 ML: at 11:01

## 2024-02-27 RX ADMIN — Medication 250 MG: at 11:00

## 2024-02-27 RX ADMIN — LEVOTHYROXINE SODIUM 175 MCG: 0.17 TABLET ORAL at 06:21

## 2024-02-27 RX ADMIN — ENOXAPARIN SODIUM 40 MG: 100 INJECTION SUBCUTANEOUS at 16:02

## 2024-02-27 RX ADMIN — FLUOXETINE 10 MG: 10 CAPSULE ORAL at 11:00

## 2024-02-27 RX ADMIN — SODIUM CHLORIDE 2000 MG: 900 INJECTION INTRAVENOUS at 06:21

## 2024-02-27 RX ADMIN — HYDRALAZINE HYDROCHLORIDE 50 MG: 25 TABLET ORAL at 16:14

## 2024-02-27 RX ADMIN — MAGNESIUM SULFATE IN DEXTROSE 1 G: 10 INJECTION, SOLUTION INTRAVENOUS at 11:00

## 2024-02-27 RX ADMIN — Medication 250 MG: at 21:53

## 2024-02-27 RX ADMIN — HYDRALAZINE HYDROCHLORIDE 50 MG: 25 TABLET ORAL at 21:53

## 2024-02-27 RX ADMIN — PANTOPRAZOLE SODIUM 40 MG: 40 TABLET, DELAYED RELEASE ORAL at 11:00

## 2024-02-27 RX ADMIN — ALLOPURINOL 100 MG: 100 TABLET ORAL at 11:00

## 2024-02-27 RX ADMIN — Medication 100 MG: at 11:00

## 2024-02-27 RX ADMIN — Medication 10 ML: at 21:53

## 2024-02-27 RX ADMIN — SODIUM CHLORIDE 1 G: 1 TABLET ORAL at 11:00

## 2024-02-27 RX ADMIN — SODIUM CHLORIDE 75 ML/HR: 9 INJECTION, SOLUTION INTRAVENOUS at 11:01

## 2024-02-27 RX ADMIN — FOLIC ACID 1 MG: 1 TABLET ORAL at 11:00

## 2024-02-27 RX ADMIN — SODIUM CHLORIDE 2000 MG: 900 INJECTION INTRAVENOUS at 22:03

## 2024-02-27 NOTE — THERAPY EVALUATION
Patient Name: Rommel Mcfarland  : 1945    MRN: [id number]                              Today's Date: 2024       Admit Date: 2024    Visit Dx:     ICD-10-CM ICD-9-CM   1. Cellulitis of left lower extremity  L03.116 682.6     Patient Active Problem List   Diagnosis    Cellulitis of right foot    Tinea pedis of both feet    Onychomycosis of multiple toenails with type 2 diabetes mellitus    Type 2 diabetes mellitus with diabetic neuropathy, without long-term current use of insulin    Essential hypertension    Stage 3a chronic kidney disease    Alcohol abuse    Gout    Hypothyroidism (acquired)    Major depressive disorder with current active episode    GERD without esophagitis    Gait instability    Cellulitis     Past Medical History:   Diagnosis Date    Alcohol abuse 2022    Essential hypertension 2022    Gait instability 2022    GERD without esophagitis 2022    Gout 2022    Hypothyroidism (acquired) 2022    Onychomycosis of multiple toenails with type 2 diabetes mellitus 2022    Stage 3a chronic kidney disease 2022    Tinea pedis of both feet 2022    Type 2 diabetes mellitus with diabetic neuropathy, without long-term current use of insulin 2022     No past surgical history on file.   General Information       Row Name 24 1130          Physical Therapy Time and Intention    Document Type evaluation  -BR     Mode of Treatment physical therapy  -BR       Row Name 24 1130          General Information    Patient Profile Reviewed yes  -BR     Prior Level of Function independent:;all household mobility;gait;transfer  Pt uses a rolling walker at baseline. He reports he rarely leaves his home.  -BR     Existing Precautions/Restrictions oxygen therapy device and L/min;fall;other (see comments)  Bilateral lower legs are ace wrapped.  -BR     Barriers to Rehab medically complex  -BR       Row Name 24 1132          Living Environment     People in Home alone;other (see comments)  Pt reports having caregivers 3 days/week for a few hours.  -BR       Row Name 02/27/24 1130          Home Main Entrance    Number of Stairs, Main Entrance two  -BR     Stair Railings, Main Entrance railings safe and in good condition  -BR       Row Name 02/27/24 1130          Stairs Within Home, Primary    Number of Stairs, Within Home, Primary none  -BR       Row Name 02/27/24 1130          Cognition    Orientation Status (Cognition) oriented x 4  -BR       Row Name 02/27/24 1130          Safety Issues, Functional Mobility    Impairments Affecting Function (Mobility) endurance/activity tolerance;strength;shortness of breath;balance;pain;range of motion (ROM)  -BR               User Key  (r) = Recorded By, (t) = Taken By, (c) = Cosigned By      Initials Name Provider Type    BR Angie Hager, PT Physical Therapist                   Mobility       Row Name 02/27/24 1132          Bed Mobility    Bed Mobility supine-sit  -BR     Supine-Sit [place] (Bed Mobility) moderate assist (50% patient effort);1 person assist;verbal cues  -BR     Assistive Device (Bed Mobility) head of bed elevated;draw sheet  -BR     Comment, (Bed Mobility) Pt required extra effort.  -BR       Row Name 02/27/24 1132          Bed-Chair Transfer    Bed-Chair [place] (Transfers) moderate assist (50% patient effort);1 person assist;verbal cues  -BR     Assistive Device (Bed-Chair Transfers) walker, front-wheeled  -BR       Row Name 02/27/24 1132          Sit-Stand Transfer    Sit-Stand [place] (Transfers) minimum assist (75% patient effort);1 person assist;verbal cues  -BR     Assistive Device (Sit-Stand Transfers) walker, front-wheeled  -BR     Comment, (Sit-Stand Transfer) The height of bed was elevated.  -BR       Row Name 02/27/24 1132          Gait/Stairs (Locomotion)    [place] Level (Gait) moderate assist (50% patient effort);1 person assist;verbal cues  -BR     Assistive  Device (Gait) walker, front-wheeled  -BR     Distance in Feet (Gait) 5 feet for ambulatory steps from bed over to recliner.  -BR     Deviations/Abnormal Patterns (Gait) antalgic;base of support, wide;siva decreased  -BR     Bilateral Gait Deviations heel strike decreased;forward flexed posture  -BR               User Key  (r) = Recorded By, (t) = Taken By, (c) = Cosigned By      Initials Name Provider Type    Angie Mcintosh PT Physical Therapist                   Obj/Interventions       Row Name 02/27/24 1135          Range of Motion Comprehensive    General Range of Motion bilateral lower extremity ROM WFL  -BR     Comment, General Range of Motion Bilateral lower legs and feet are ace wrapped.  -BR       Row Name 02/27/24 1135          Strength Comprehensive (MMT)    Comment, General Manual Muscle Testing (MMT) Assessment BLE strength grossly 3/5  -BR       Row Name 02/27/24 1135          Balance    Balance Assessment sitting static balance;standing static balance  -BR     Static Sitting Balance supervision  -BR     Position, Sitting Balance unsupported;sitting edge of bed  -BR     Static Standing Balance minimal assist;1-person assist  -BR     Position/Device Used, Standing Balance walker, rolling  -BR       Row Name 02/27/24 1135          Sensory Assessment (Somatosensory)    Sensory Assessment (Somatosensory) unable/difficult to assess  -BR               User Key  (r) = Recorded By, (t) = Taken By, (c) = Cosigned By      Initials Name Provider Type    Angie Mcintosh PT Physical Therapist                   Goals/Plan       Row Name 02/27/24 1142          Bed Mobility Goal 1 (PT)    Activity/Assistive Device (Bed Mobility Goal 1, PT) bed mobility activities, all  -BR     [place] Level/Cues Needed (Bed Mobility Goal 1, PT) modified independence  -BR     Time Frame (Bed Mobility Goal 1, PT) long term goal (LTG);2 weeks  -BR       Row Name 02/27/24 1149          Transfer Goal 1 (PT)     Activity/Assistive Device (Transfer Goal 1, PT) transfers, all  -BR     [place] Level/Cues Needed (Transfer Goal 1, PT) supervision required  -BR     Time Frame (Transfer Goal 1, PT) long term goal (LTG);2 weeks  -BR       Row Name 02/27/24 1148          Gait Training Goal 1 (PT)    Activity/Assistive Device (Gait Training Goal 1, PT) gait (walking locomotion);assistive device use  -BR     [place] Level (Gait Training Goal 1, PT) supervision required  -BR     Distance (Gait Training Goal 1, PT) 35  -BR     Time Frame (Gait Training Goal 1, PT) long term goal (LTG);2 weeks  -BR       Row Name 02/27/24 1148          Therapy Assessment/Plan (PT)    Planned Therapy Interventions (PT) balance training;bed mobility training;gait training;neuromuscular re-education;strengthening;ROM (range of motion);transfer training;patient/family education  -BR               User Key  (r) = Recorded By, (t) = Taken By, (c) = Cosigned By      Initials Name Provider Type    BR Angie Hager, PT Physical Therapist                   Clinical Impression       Row Name 02/27/24 1136          Pain    Pretreatment Pain Rating 1/10  -BR     Posttreatment Pain Rating 1/10  -BR     Pain Location - Side/Orientation Left  -BR     Pain Location lower  -BR     Pain Location - extremity  -BR       Row Name 02/27/24 1147 02/27/24 1136       Plan of Care Review    Plan of Care Reviewed With -- patient  -BR    Outcome Evaluation Plan of Care Reviewed With: patient  Patient is an 78-year-old male PM[facility] significant for alcohol abuse, hypertension, GERD, gout, hypothyroidism, CKD, diabetes resenting to the ED with complaints of left calf pain and left leg swelling over the past month.  Per H and P, Patient did scratch his leg a few days ago and was started on a 10-day course of Levaquin which he completed on 2/22 with minimal improvement of symptoms. Pt being hospitalized for treatment of cellulitis. Doppler LLE was normal. Bilateral lower legs  and feet are ace wrapped. Pt A and O x 4. At baseline, pt lives alone in [facility] with 2 BALWINDER. He uses a rolling walker for independence with household mobility. Pt reports that he has a caregiver 3 days /week for a few hours. Pt on O2 at 2 L and he does not use at home. This date, pt requiring mod assist of 1 for supine>sit and for ambulatory transfer with rolling walker from bed over to recliner. Pt was quite fatigued after this ambulatory transfer. PT will follow pt for generalized weakness, decreased functional activity tolerance, and decreased functional mobility skills. PT recommending Skilled Nursing Facility as pt is far below his baseline presently. Pt feels he will not need SNF; if pt does DC to home then he would benefit from Home Health Physical Therapy at least.  -BR --      Row Name 02/27/24 1136          Therapy Assessment/Plan (PT)    Rehab Potential (PT) good, to achieve stated therapy goals  -BR     Criteria for Skilled Interventions Met (PT) yes;meets criteria;skilled treatment is necessary  -BR     Therapy Frequency (PT) 5 times/wk  -BR     Predicted Duration of Therapy Intervention (PT) until D/C  -BR       Row Name 02/27/24 1136          Vital Signs    Pre Systolic BP Rehab 150  -BR     Pre Treatment Diastolic BP 67  -BR     Pretreatment Heart Rate (beats/min) 68  -BR     Pretreatment Resp Rate (breaths/min) 14  -BR     Pre SpO2 (%) 96  -BR     O2 Delivery Pre Treatment nasal cannula  2L  -BR     Intra SpO2 (%) 90  -BR     O2 Delivery Intra Treatment room air  -BR     Post SpO2 (%) 94  -BR     O2 Delivery Post Treatment nasal cannula  2L  -BR     Pre Patient Position Supine  -BR     Intra Patient Position Standing  -BR     Post Patient Position Sitting  -BR       Row Name 02/27/24 1136          Positioning and Restraints    Pre-Treatment Position in bed  -BR     Post Treatment Position chair  -BR     In Chair notified nsg;reclined;call light within reach;encouraged to call for assist;exit alarm  on;legs elevated  -BR               User Key  (r) = Recorded By, (t) = Taken By, (c) = Cosigned By      Initials Name Provider Type    Angie Mcintosh PT Physical Therapist                   Outcome Measures       Row Name 02/27/24 1149 02/27/24 0400       How much help from another person do you currently need...    Turning from your back to your side while in flat bed without using bedrails? 2  -BR 2  -CS    Moving from lying on back to sitting on the side of a flat bed without bedrails? 2  -BR 2  -CS    Moving to and from a bed to a chair (including a wheelchair)? 2  -BR 2  -CS    Standing up from a chair using your arms (e.g., wheelchair, bedside chair)? 2  -BR 2  -CS    Climbing 3-5 steps with a railing? 1  -BR 2  -CS    To walk in hospital room? 2  -BR 2  -CS    AM-PAC 6 Clicks Score (PT) 11  -BR 12  -CS    Highest Level of Mobility Goal 4 --> Transfer to chair/commode  -BR 4 --> Transfer to chair/commode  -CS      Row Name 02/27/24 0000          How much help from another person do you currently need...    Turning from your back to your side while in flat bed without using bedrails? 2  -CS     Moving from lying on back to sitting on the side of a flat bed without bedrails? 2  -CS     Moving to and from a bed to a chair (including a wheelchair)? 2  -CS     Standing up from a chair using your arms (e.g., wheelchair, bedside chair)? 2  -CS     Climbing 3-5 steps with a railing? 2  -CS     To walk in hospital room? 2  -CS     AM-PAC 6 Clicks Score (PT) 12  -CS     Highest Level of Mobility Goal 4 --> Transfer to chair/commode  -CS       Row Name 02/27/24 1149          Functional Assessment    Outcome Measure Options AM-PAC 6 Clicks Basic Mobility (PT)  -BR               User Key  (r) = Recorded By, (t) = Taken By, (c) = Cosigned By      Initials Name Provider Type    Angie Mcintosh, [place] Physical Therapist    CS Kailee Blanton LPN Licensed Nurse                                 Physical Therapy  Education       Title: PT OT SLP Therapies (Done)       Topic: Physical Therapy (Done)       Point: Mobility training (Done)       Learning Progress Summary             Patient Acceptance, E,D, VU,DU,NR by BR at 2/27/2024 1149                         Point: Body mechanics (Done)       Learning Progress Summary             Patient Acceptance, E,D, VU,DU,NR by BR at 2/27/2024 1149                         Point: Precautions (Done)       Learning Progress Summary             Patient Acceptance, E,D, VU,DU,NR by BR at 2/27/2024 1149                                         User Key       Initials Effective Dates Name Provider Type Discipline    BR 02/01/22 -  Angie Hager, [place] Physical Therapist PT                  PT Recommendation and Plan  Planned Therapy Interventions (PT): balance training, bed mobility training, gait training, neuromuscular re-education, strengthening, ROM (range of motion), transfer training, patient/family education  Plan of Care Reviewed With: patient  Outcome Evaluation: Plan of Care Reviewed With: patient  Patient is an 78-year-old male [facility] significant for alcohol abuse, hypertension, GERD, gout, hypothyroidism, CKD, diabetes resenting to the ED with complaints of left calf pain and left leg swelling over the past month.  Per H and P, Patient did scratch his leg a few days ago and was started on a 10-day course of Levaquin which he completed on 2/22 with minimal improvement of symptoms. Pt being hospitalized for treatment of cellulitis. Doppler LLE was normal. Bilateral lower legs and feet are ace wrapped. Pt A and O x 4. At baseline, pt lives alone in [facility] with 2 BALWINDER. He uses a rolling walker for independence with household mobility. Pt reports that he has a caregiver 3 days /week for a few hours. Pt on O2 at 2 L and he does not use at home. This date, pt requiring mod assist of 1 for supine>sit and for ambulatory transfer with rolling walker from bed over to recliner. Pt was quite fatigued  after this ambulatory transfer. PT will follow pt for generalized weakness, decreased functional activity tolerance, and decreased functional mobility skills. PT recommending Skilled Nursing Facility as pt is far below his baseline presently. Pt feels he will not need SNF; if pt does DC to home then he would benefit from Home Health Physical Therapy at least.     Time Calculation:         PT Charges       Row Name 02/27/24 1149             Time Calculation    Start Time 0958  -BR      Stop Time 1024  -BR      Time Calculation (min) 26 min  -BR      PT Received On 02/27/24  -BR      PT - Next Appointment 02/28/24  -BR      PT Goal Re-Cert Due Date 03/12/24  -BR         Time Calculation- PT    Total Timed Code Minutes- PT 0 minute(s)  -BR                User Key  (r) = Recorded By, (t) = Taken By, (c) = Cosigned By      Initials Name Provider Type    Angie Mcintosh, [place] Physical Therapist                  Therapy Charges for Today       Code Description Service Date Service Provider Modifiers Qty    [id number] HC PT EVAL MOD COMPLEXITY 4 2/27/2024 Angie Hager, PT GP 1            PT G-Codes  Outcome Measure Options: AM-PAC 6 Clicks Basic Mobility (PT)  AM-PAC 6 Clicks Score (PT): 11  PT Discharge Summary  Anticipated Discharge Disposition (PT): skilled nursing facility    Angie Hager PT  2/27/2024

## 2024-02-27 NOTE — CONSULTS
NEPHROLOGY CONSULTATION-----[facility]    [facility]  [phone]  Ines Barahona MD    Patient Care Team:  Montse Dawkins MD as PCP - General  Montse Dawkins MD as PCP - Family Medicine  Huey Keyes MD as Consulting Physician (Nephrology)    CC/REASON FOR CONSULTATION: RENAL FAILURE/ELEVATED SERUM CREATININE    PHYSICIAN REQUESTING CONSULTATION:     History of Present Illness    HPI    Patient is a 78 y.o. WM whom I was asked to see in consultation for evaluation and management of renal failure/elevated serum creatinine. Patient was admitted after presenting to ER  with c/o left calf pain and left leg swelling. Patient with known CRF/CKD STG 3. +EtOH abuse.  No NSAIDs or recent IV dye exposure. No known h/o hepatitis, TB, rheumatic fever, jaundice, SLE. Does bleed/bruise easily. Some urinary hesitancy.  No edema or fluid retention.  +Compliance with home meds. Was on diuretics in the form of Bumex prior to admission. Was not on ACE-I/ARB prior to admission. No herbal med use.    Review of Systems   Constitutional:  Positive for activity change and fatigue. Negative for appetite change, chills, diaphoresis, fever and unexpected weight change.   HENT:  Negative for congestion, dental problem, drooling, ear discharge, ear pain, facial swelling, hearing loss, mouth sores, nosebleeds, postnasal drip, rhinorrhea, sinus pressure, sinus pain, sneezing, sore throat, tinnitus, trouble swallowing and voice change.    Eyes:  Negative for photophobia, pain, discharge, redness, itching and visual disturbance.   Respiratory:  Negative for apnea, cough, choking, chest tightness, shortness of breath, wheezing and stridor.    Cardiovascular:  Positive for leg swelling. Negative for chest pain and palpitations.   Gastrointestinal:  Negative for abdominal distention, abdominal pain, anal bleeding, blood in stool, constipation, diarrhea, nausea, rectal  pain and vomiting.   Endocrine: Negative for cold intolerance, heat intolerance, polydipsia, polyphagia and polyuria.   Genitourinary:  Positive for difficulty urinating. Negative for decreased urine volume, dysuria, enuresis, flank pain, frequency, genital sores, hematuria and urgency.   Musculoskeletal:  Positive for arthralgias and back pain. Negative for gait problem, joint swelling, myalgias, neck pain and neck stiffness.   Skin:  Negative for color change, pallor, rash and wound.   Allergic/Immunologic: Negative for environmental allergies, food allergies and immunocompromised state.   Neurological:  Positive for weakness. Negative for dizziness, tremors, seizures, syncope, facial asymmetry, speech difficulty, light-headedness, numbness and headaches.   Hematological:  Negative for adenopathy. Bruises/bleeds easily.   Psychiatric/Behavioral:  Positive for dysphoric mood. Negative for agitation, behavioral problems, confusion, decreased concentration, hallucinations, self-injury, sleep disturbance and suicidal ideas. The patient is not nervous/anxious and is not hyperactive.           Past Medical History:   Diagnosis Date    Alcohol abuse 7/13/2022    Essential hypertension 7/13/2022    Gait instability 7/13/2022    GERD without esophagitis 7/13/2022    Gout 7/13/2022    Hypothyroidism (acquired) 7/13/2022    Onychomycosis of multiple toenails with type 2 diabetes mellitus 7/13/2022    Stage 3a chronic kidney disease 7/13/2022    Tinea pedis of both feet 7/13/2022    Type 2 diabetes mellitus with diabetic neuropathy, without long-term current use of insulin 7/13/2022       No past surgical history on file.    No family history on file.    Social History     Tobacco Use    Smoking status: Never    Smokeless tobacco: Never       Home Meds:   Medications Prior to Admission   Medication Sig Dispense Refill Last Dose    allopurinol (ZYLOPRIM) 100 MG tablet Take 1 tablet by mouth Daily.   2/26/2024    bumetanide  (BUMEX) 0.5 MG tablet Take 1 tablet by mouth Daily.   2/26/2024    colestipol (COLESTID) 1 g tablet Take 1 tablet by mouth Daily.   2/26/2024    FLUoxetine (PROzac) 10 MG capsule Take 1 capsule by mouth Daily.   2/26/2024    folic acid (FOLVITE) 1 MG tablet Take 1 tablet by mouth Daily.   2/26/2024    hydrALAZINE (APRESOLINE) 25 MG tablet Take 1 tablet by mouth Daily.   2/26/2024    levothyroxine (SYNTHROID, LEVOTHROID) 175 MCG tablet Take 1 tablet by mouth Every Morning.   2/26/2024    pantoprazole (PROTONIX) 40 MG EC tablet Take 1 tablet by mouth Daily.   2/26/2024    saccharomyces boulardii (FLORASTOR) 250 MG capsule Take 1 capsule by mouth 2 (Two) Times a Day.   2/26/2024    sodium chloride 1 g tablet Take 1 tablet by mouth 2 (Two) Times a Day.   2/26/2024    Thiamine HCl (Vitamin B1) 100 MG tablet tablet Take 1 tablet by mouth Daily.   2/26/2024       Scheduled Meds:  allopurinol, 100 mg, Oral, Daily  bumetanide, 0.5 mg, Oral, Daily  ceFAZolin, 2,000 mg, Intravenous, Q8H  cholestyramine light, 1 packet, Oral, Daily  enoxaparin, 40 mg, Subcutaneous, Daily  FLUoxetine, 10 mg, Oral, Daily  folic acid, 1 mg, Oral, Daily  hydrALAZINE, 25 mg, Oral, Daily  levothyroxine, 175 mcg, Oral, Q AM  pantoprazole, 40 mg, Oral, Daily  saccharomyces boulardii, 250 mg, Oral, BID  sodium chloride, 10 mL, Intravenous, Q12H  sodium chloride, 1 g, Oral, BID  Vitamin B1, 100 mg, Oral, Daily        Continuous Infusions:  Pharmacy to Dose enoxaparin (LOVENOX),         PRN Meds:    acetaminophen    aluminum-magnesium hydroxide-simethicone    senna-docusate sodium **AND** polyethylene glycol **AND** bisacodyl **AND** bisacodyl    calcium carbonate    nitroglycerin    ondansetron ODT **OR** ondansetron    Pharmacy to Dose enoxaparin (LOVENOX)    [COMPLETED] Insert Peripheral IV **AND** sodium chloride    sodium chloride    sodium chloride    traMADol    Allergies:  Tigecycline    OBJECTIVE    Vital Signs  Temp:  [97.9 °F (36.6 °C)-98.3 °F  "(36.8 °C)] 98.3 °F (36.8 °C)  Heart Rate:  [] 66  Resp:  [13-23] 13  BP: (107-190)/(56-91) 153/70    No intake/output data recorded.  I/O last 3 completed shifts:  In: 120 [P.O.:120]  Out: 875 [Urine:875]    Physical Exam:  General Appearance: alert, appears stated age and cooperative  Head: normocephalic, without obvious abnormality and atraumatic +DRY OP  Eyes: conjunctivae and sclerae normal and no icterus  Neck: supple and no JVD  Lungs: +FEW SCATTERED RHONCHI  Heart: regular rhythm & normal rate and normal S1, S2 +BALDOMERO  Chest Wall: no abnormalities observed  Abdomen: normal bowel sounds and soft, nontender  Extremities: moves extremities well, 1+ BILAT LE EDEMA (R>L), no cyanosis  Skin: +CHRONIC VENOUS INSUFFICIENCY BILAT LE WITH BILAT LE WOUNDS (FOOT AND CALF/PRETIBLAL AREAS)  Neurologic: Alert, and oriented. No focal deficits    Results Review:    I reviewed the patient's new clinical results.    WBC WBC   Date Value Ref Range Status   02/27/2024 6.40 3.40 - 10.80 10*3/mm3 Final   02/26/2024 7.00 3.40 - 10.80 10*3/mm3 Final      HGB Hemoglobin   Date Value Ref Range Status   02/27/2024 11.8 (L) 13.0 - 17.7 g/dL Final   02/26/2024 12.2 (L) 13.0 - 17.7 g/dL Final      HCT Hematocrit   Date Value Ref Range Status   02/27/2024 36.5 (L) 37.5 - 51.0 % Final   02/26/2024 39.0 37.5 - 51.0 % Final      Platelets No results found for: \"LABPLAT\"   MCV MCV   Date Value Ref Range Status   02/27/2024 88.3 79.0 - 97.0 fL Final   02/26/2024 88.9 79.0 - 97.0 fL Final          Sodium Sodium   Date Value Ref Range Status   02/27/2024 134 (L) 136 - 145 mmol/L Final   02/26/2024 131 (L) 136 - 145 mmol/L Final      Potassium Potassium   Date Value Ref Range Status   02/27/2024 4.2 3.5 - 5.2 mmol/L Final   02/26/2024 4.0 3.5 - 5.2 mmol/L Final      Chloride Chloride   Date Value Ref Range Status   02/27/2024 100 98 - 107 mmol/L Final   02/26/2024 93 (L) 98 - 107 mmol/L Final      CO2 CO2   Date Value Ref Range Status " "  02/27/2024 24.0 22.0 - 29.0 mmol/L Final   02/26/2024 25.0 22.0 - 29.0 mmol/L Final      BUN BUN   Date Value Ref Range Status   02/27/2024 16 8 - 23 mg/dL Final   02/26/2024 15 8 - 23 mg/dL Final      Creatinine Creatinine   Date Value Ref Range Status   02/27/2024 1.35 (H) 0.76 - 1.27 mg/dL Final   02/26/2024 1.53 (H) 0.76 - 1.27 mg/dL Final      Calcium Calcium   Date Value Ref Range Status   02/27/2024 8.7 8.6 - 10.5 mg/dL Final   02/26/2024 9.1 8.6 - 10.5 mg/dL Final      PO4 No results found for: \"CAPO4\"   Albumin Albumin   Date Value Ref Range Status   02/27/2024 3.3 (L) 3.5 - 5.2 g/dL Final   02/26/2024 3.8 3.5 - 5.2 g/dL Final      Magnesium Magnesium   Date Value Ref Range Status   02/27/2024 1.8 1.6 - 2.4 mg/dL Final      Uric Acid No results found for: \"URICACID\"       Imaging Results (Last 72 Hours)       ** No results found for the last 72 hours. **              Results for orders placed during the hospital encounter of 09/15/23    XR Foot 3+ View Right    Narrative  XR FOOT 3+ VW RIGHT    Date of Exam: 9/15/2023 2:30 PM EDT    Indication: wound, infection    Comparison: 7/9/2022    Findings:  Bones are diffusely osteopenic. There is no discrete area of osseous erosion to indicate a site of osteomyelitis. Postsurgical changes of screw fixation noted at the distal tibia and fibula similar to the prior exam. Extensive small vessel vascular  calcifications. Soft tissue swelling overlying the dorsum of the foot which may relate to edema and/or cellulitis.    Impression  Impression:  1. No discrete area of osseous erosion to indicate osteomyelitis.  2. Soft tissue swelling overlying the dorsum of the foot may relate to edema and/or cellulitis.  3. Diffuse osteopenia.  4. Extensive small vessel vascular calcifications.        Electronically Signed: Renny Johnson MD  9/15/2023 2:35 PM EDT  Workstation ID: [id number]      Results for orders placed during the hospital encounter of 07/09/22    XR Foot 2 View " Right    Narrative  DATE OF EXAM:  7/9/2022 1:58 PM    PROCEDURE:  XR FOOT 2 VW RIGHT-    INDICATIONS:  Wound between first and second toe maggots present.  Cellulitis;  L03.115-Cellulitis of right lower limb; B87.9-Myiasis, unspecified    COMPARISON:  No Comparisons Available    TECHNIQUE:  A minimum of two routine standard radiographic views were obtained of  the right foot.    FINDINGS:  There is diffuse soft tissue swelling of the right foot bones are  osteopenic. There is no acute appearing fracture. Postoperative findings  noted in the ankle at the medial malleolus and the distal fibula with  fixation screws. The calcaneus is intact. Small vessel vascular  calcifications noted.    Impression  1. Extensive soft tissue swelling of the right foot may relate to edema  and/or cellulitis.  2. No discrete area of osseous erosion.  3. No acute appearing fracture.  4. Additional chronic findings above.    Electronically Signed By-Renny Johnson MD On:7/9/2022 2:09 PM  This report was finalized on [id number] by  Renny Johnson MD.        Results for orders placed during the hospital encounter of 02/26/24    Duplex venous lower extremity left    Interpretation Summary    Normal left lower extremity venous duplex scan.      ASSESSMENT / PLAN      Cellulitis    CRF/CKD-----Nonoliguric. CRF/CKD STG 3A secondary to HTN NS. Clinically prerenal despite LE  Edema/chronic venous insufficiency. Hold Bumex. Gently hydrate. Check few urine and serum studies and PVR. No NSAIDs or IV dye. Dose meds for CrCl 45-60 cc/min    2. MILD HYPONATREMIA--------Not clinically significant right now. Secondary to EtOH abuse, poor solute intake, and SSRI use. No neuro sx. Hold Bumex and give little NS and folow    3. BILATERAL LE EDEMA/CHRONIC VENOUS INSUFFICIENCY/WOUNDS/CELLULITIS------Abx, wound and wound care. Adjust Kefzol dose for CKD    4. DEPRESSION-----Ok to continue SSRI for now    5. ETOH ABUSE------Counseled    6.  OA/DJD/HYPERURICEMIA------On Allopurinol. No NSAIDs.     7. HTN WITH CKD-----BP ok. Avoid hypotension. No ACE-I/ARB/DRI    8. GERD/PUD PROPHYLAXIS------On PPI. Benefits outweigh risks despite CKD    9. HYPOTHYROIDISM------On Synthroid. TSH ok    10. DVT PROPHYLAXIS------Lovenox      I discussed the patient's findings and my recommendations with patient and nursing staff    Will follow along closely. Thank you for allowing us to see this patient in renal consultation.    [facility]  [phone]  MD Ines Orellana MD  02/27/24  09:30 EST

## 2024-02-27 NOTE — PLAN OF CARE
Goal Outcome Evaluation:  Plan of Care Reviewed With: patient  Patient is an 78-year-old male [facility] significant for alcohol abuse, hypertension, GERD, gout, hypothyroidism, CKD, diabetes resenting to the ED with complaints of left calf pain and left leg swelling over the past month.  Per H and P, Patient did scratch his leg a few days ago and was started on a 10-day course of Levaquin which he completed on 2/22 with minimal improvement of symptoms. Pt being hospitalized for treatment of cellulitis. Doppler LLE was normal. Bilateral lower legs and feet are ace wrapped. Pt A and O x 4. At baseline, pt lives alone in [facility] with 2 BALWINDER. He uses a rolling walker for independence with household mobility. Pt reports that he has a caregiver 3 days /week for a few hours. Pt on O2 at 2 L and he does not use at home. This date, pt requiring mod assist of 1 for supine>sit and for ambulatory transfer with rolling walker from bed over to recliner. Pt was quite fatigued after this ambulatory transfer. PT will follow pt for generalized weakness, decreased functional activity tolerance, and decreased functional mobility skills. PT recommending Skilled Nursing Facility as pt is far below his baseline presently. Pt feels he will not need SNF; if pt does DC to home then he would benefit from Home Health Physical Therapy at least.                 Anticipated Discharge Disposition (PT): skilled nursing facility

## 2024-02-27 NOTE — NURSING NOTE
78-year-old male with a history of alcohol abuse, hypertension, GERD, gout, hypothyroidism, CKD, and diabetes presents to the hospital with complaints of left calf pain and leg swelling over the last month.  Patient is seen for bilateral lower extremities.  Patient reports that he has no open areas, however when I remove the dressings that are in place to the left lower extremity.  There is a large superficially denuded area open.  He is also weeping some.  The areas are superficial, pink clean but there is an area of clustering about 5 x 5 cm noted.  Patient has considerable edema to the lower extremities.  proBNP elevated.  Patient has had ABIs in the pastThat has been in November 2022.  At that time they were normal.  I am not able to palpate a pedal pulse through the edema.  He also has very thick and peeling flaking skin.  I cleansed the leg with Vashe solution.  I then applied remedy antifungal cream Maxorb and ABD pads Curlex and Ace wrap's.  The right lower extremity does not have anything open.  There is some very small amount of weeping noted.  I cleansed that leg and applied ABD pads and Curlex and Ace wrap's using two 4 inch Ace wrap's I wrapped from the base of the toes to just below the knee.  Patient would likely benefit from multilayer if he is able to tolerate would consider and recommend follow-up with outpatient wound care after discharge.

## 2024-02-27 NOTE — H&P
DATE/TIME OF ADMISSION:  2/26/2024  9:31 AM  Patient Care Team:  Montse Dawkins MD as PCP - General  Montse Dawkins MD as PCP - Family Medicine  Huey Keyes MD as Consulting Physician (Nephrology)    Chief complaint LEFT LEG SWELLING AND REDNESS DESPITE A 10 DAY COURSE OF RENALLY DOSED LEVAQUIN (WITH HX OF PSEUDOMONAS) AND TOPICAL TREATMENT FROM NP DAUGHTER  NO KNOWN FEVER    Subjective     Patient is a 78 y.o. male presents with LEFT LEB REDNESS AND PAIN. HX OF WELL CONTROLLED DM II, COGNITIVE DECLINE, AND REGULAR ETOH ABUSE (WEANED TO 5 BEERS DAILY)Onset of symptoms was GRADUAL AND HAVE BEEN PRESENT FOR OVER 3-4 WEEKS. BEGAN AS A SCRATCH TO THE LEG.    LIVES ALONE BUT WITH MUCH FAMILY AND HIRED CAREGIVER SUPPORT. HE IS TO BE DNI/NO CPR/NO SHOCK BUT ALL ELSE AS SUPPORT TO BE GIVEN AND HE HAS A LIVING WILL AS WELL. I COMMUNICATED WITH DAUGHTER YESTERDAY IN DETAIL.  Review of Systems   Endocrine: Positive for cold intolerance.   Allergic/Immunologic: Negative for immunocompromised state.   Neurological:  Positive for dizziness.   All other systems reviewed and are negative.         History  Past Medical History:   Diagnosis Date    Alcohol abuse 7/13/2022    Essential hypertension 7/13/2022    Gait instability 7/13/2022    GERD without esophagitis 7/13/2022    Gout 7/13/2022    Hypothyroidism (acquired) 7/13/2022    Onychomycosis of multiple toenails with type 2 diabetes mellitus 7/13/2022    Stage 3a chronic kidney disease 7/13/2022    Tinea pedis of both feet 7/13/2022    Type 2 diabetes mellitus with diabetic neuropathy, without long-term current use of insulin 7/13/2022     No past surgical history on file.  No family history on file.  Social History     Tobacco Use    Smoking status: Never    Smokeless tobacco: Never     Medications Prior to Admission   Medication Sig Dispense Refill Last Dose    allopurinol (ZYLOPRIM) 100 MG tablet Take 1 tablet by mouth Daily.   2/26/2024    bumetanide (BUMEX) 0.5 MG  tablet Take 1 tablet by mouth Daily.   2/26/2024    colestipol (COLESTID) 1 g tablet Take 1 tablet by mouth Daily.   2/26/2024    FLUoxetine (PROzac) 10 MG capsule Take 1 capsule by mouth Daily.   2/26/2024    folic acid (FOLVITE) 1 MG tablet Take 1 tablet by mouth Daily.   2/26/2024    hydrALAZINE (APRESOLINE) 25 MG tablet Take 1 tablet by mouth Daily.   2/26/2024    levothyroxine (SYNTHROID, LEVOTHROID) 175 MCG tablet Take 1 tablet by mouth Every Morning.   2/26/2024    pantoprazole (PROTONIX) 40 MG EC tablet Take 1 tablet by mouth Daily.   2/26/2024    saccharomyces boulardii (FLORASTOR) 250 MG capsule Take 1 capsule by mouth 2 (Two) Times a Day.   2/26/2024    sodium chloride 1 g tablet Take 1 tablet by mouth 2 (Two) Times a Day.   2/26/2024    Thiamine HCl (Vitamin B1) 100 MG tablet tablet Take 1 tablet by mouth Daily.   2/26/2024     Allergies:  Tigecycline    Objective     Vital Signs  Temp:  [97.9 °F (36.6 °C)-98.3 °F (36.8 °C)] 98.3 °F (36.8 °C)  Heart Rate:  [] 66  Resp:  [13-23] 13  BP: (107-190)/(56-91) 153/70     Physical Exam                  O X 3 AND KNEW ME INSTANTLY; APPROPRIATELY CONVERSANT       Alert, cooperative, in no acute distress      Normocephalic, without obvious abnormality, atraumatic      Lids and lashes normal, conjunctivae and sclerae normal, no   icterus, no pallor, corneas clear, PERRLA      Ears appear intact with no abnormalities noted      No oral lesions BUT VERY POOR ORAL HYGIENE---I POINTED OUT THAT ORAL CARE WAS NEEDED TO THE RN, no thrush, oral mucosa moist      No adenopathy, supple, trachea midline, no thyromegaly, no   carotid bruit, no JVD      Clear to auscultation,respirations regular, even and                  unlabored      Regular rhythm and normal rate, normal S1 and S2, no            murmur, no gallop, no rub, no click      No abnormalities observed      Normal bowel sounds, no masses, no organomegaly, soft        non-tender, non-distended, no guarding, no  rebound                tenderness      Moves all extremities well, 1+edema, no cyanosis, MARKED REDNESS TO THE LEFT LEG TO THE THIGH; PICTURES TAKEN AND WOUND CARE NURSE HAS DONE WRAPS OF BOTH LEGS. I DID NOT REMOVE THE FRESHLY DONE DRESSINGS                 Pulses palpable and equal bilaterally      No bleeding, bruising or rash; SKIN IS VERY DRY AND FLAKING      No palpable adenopathy      Cranial nerves 2 - 12 grossly intact, sensation intact, DTR       present and equal bilaterally       I HAVE PERSONALLY EXAMINED THE PATIENT AND HAVE REVIEWED APPROPRIATE LAB AND IMAGING RESULTS.    Imaging Results (Last 24 Hours)       ** No results found for the last 24 hours. **             Lab Results (last 24 hours)       Procedure Component Value Units Date/Time    TSH [[id number]]  (Normal) Collected: 02/27/24 0554    Specimen: Blood Updated: 02/27/24 0629     TSH 1.690 uIU/mL     Phosphorus [[id number]]  (Normal) Collected: 02/27/24 0554    Specimen: Blood Updated: 02/27/24 0628     Phosphorus 3.3 mg/dL     Hemoglobin A1c [[id number]]  (Normal) Collected: 02/27/24 0554    Specimen: Blood Updated: 02/27/24 0626     Hemoglobin A1C 5.20 %     CBC Auto Differential [[id number]]  (Abnormal) Collected: 02/27/24 0554    Specimen: Blood Updated: 02/27/24 0603     WBC 6.40 10*3/mm3      RBC 4.13 10*6/mm3      Hemoglobin 11.8 g/dL      Hematocrit 36.5 %      MCV 88.3 fL      MCH 28.5 pg      MCHC 32.3 g/dL      RDW 14.8 %      RDW-SD 45.1 fl      MPV 8.3 fL      Platelets 259 10*3/mm3      Neutrophil % 59.3 %      Lymphocyte % 23.0 %      Monocyte % 11.4 %      Eosinophil % 5.1 %      Basophil % 1.2 %      Neutrophils, Absolute 3.80 10*3/mm3      Lymphocytes, Absolute 1.50 10*3/mm3      Monocytes, Absolute 0.70 10*3/mm3      Eosinophils, Absolute 0.30 10*3/mm3      Basophils, Absolute 0.10 10*3/mm3      nRBC 0.1 /100 WBC     Comprehensive Metabolic Panel [[id number]] Collected: 02/27/24 0554    Specimen: Blood Updated: 02/27/24 0558     Magnesium [[id number]] Collected: 02/27/24 0554    Specimen: Blood Updated: 02/27/24 0558    POC Glucose Once [[id number]]  (Normal) Collected: 02/26/24 1703    Specimen: Blood Updated: 02/26/24 1705     Glucose 91 mg/dL      Comment: Serial Number: [id number]:  [id number]       COVID-19, FLU A/B, RSV PCR 1 HR TAT - Swab, Nasopharynx [[id number]]  (Normal) Collected: 02/26/24 1550    Specimen: Swab from Nasopharynx Updated: 02/26/24 1642     COVID19 Not Detected     Influenza A PCR Not Detected     Influenza B PCR Not Detected     RSV, PCR Not Detected    Narrative:      Fact sheet for providers: https://www.fda.gov/media/071485/download    Fact sheet for patients: https://www.fda.gov/media/967791/download    Test performed by PCR.    Extra Tubes [[id number]] Collected: 02/26/24 1029    Specimen: Blood, Venous Line Updated: 02/26/24 1130    Narrative:      The following orders were created for panel order Extra Tubes.  Procedure                               Abnormality         Status                     ---------                               -----------         ------                     Gold Top - SST[[id number]]                                   Final result                 Please view results for these tests on the individual orders.    Gold Top - SST [[id number]] Collected: 02/26/24 1029    Specimen: Blood Updated: 02/26/24 1130     Extra Tube Hold for add-ons.     Comment: Auto resulted.       C-reactive Protein [[id number]]  (Abnormal) Collected: 02/26/24 1029    Specimen: Blood Updated: 02/26/24 1123     C-Reactive Protein 10.98 mg/dL     Comprehensive Metabolic Panel [[id number]]  (Abnormal) Collected: 02/26/24 1029    Specimen: Blood Updated: 02/26/24 1103     Glucose 116 mg/dL      BUN 15 mg/dL      Creatinine 1.53 mg/dL      Sodium 131 mmol/L      Potassium 4.0 mmol/L      Chloride 93 mmol/L      CO2 25.0 mmol/L      Calcium 9.1 mg/dL      Total Protein 7.0 g/dL      Albumin 3.8 g/dL      ALT (SGPT) 6 U/L       AST (SGOT) 14 U/L      Alkaline Phosphatase 55 U/L      Total Bilirubin 0.6 mg/dL      Globulin 3.2 gm/dL      A/G Ratio 1.2 g/dL      BUN/Creatinine Ratio 9.8     Anion Gap 13.0 mmol/L      eGFR 45.7 mL/min/1.73     Narrative:      GFR Normal >60  Chronic Kidney Disease <60  Kidney Failure <15    The GFR formula is only valid for adults with stable renal function between ages 18 and 70.    Sedimentation Rate [[id number]]  (Abnormal) Collected: 02/26/24 1029    Specimen: Blood Updated: 02/26/24 1101     Sed Rate 46 mm/hr     Blood Culture - Blood, Arm, Right [[id number]] Collected: 02/26/24 1029    Specimen: Blood from Arm, Right Updated: 02/26/24 1043    CBC & Differential [[id number]]  (Abnormal) Collected: 02/26/24 1029    Specimen: Blood Updated: 02/26/24 1034    Narrative:      The following orders were created for panel order CBC & Differential.  Procedure                               Abnormality         Status                     ---------                               -----------         ------                     CBC Auto Differential[[id number]]        Abnormal            Final result                 Please view results for these tests on the individual orders.    CBC Auto Differential [[id number]]  (Abnormal) Collected: 02/26/24 1029    Specimen: Blood Updated: 02/26/24 1034     WBC 7.00 10*3/mm3      RBC 4.39 10*6/mm3      Hemoglobin 12.2 g/dL      Hematocrit 39.0 %      MCV 88.9 fL      MCH 27.8 pg      MCHC 31.3 g/dL      RDW 14.8 %      RDW-SD 45.1 fl      MPV 7.8 fL      Platelets 275 10*3/mm3      Neutrophil % 58.7 %      Lymphocyte % 18.5 %      Monocyte % 13.8 %      Eosinophil % 6.5 %      Basophil % 2.5 %      Neutrophils, Absolute 4.10 10*3/mm3      Lymphocytes, Absolute 1.30 10*3/mm3      Monocytes, Absolute 1.00 10*3/mm3      Eosinophils, Absolute 0.50 10*3/mm3      Basophils, Absolute 0.20 10*3/mm3      nRBC 0.1 /100 WBC     Blood Culture - Blood, Arm, Left [[id number]] Collected: 02/26/24  1029    Specimen: Blood from Arm, Left Updated: 02/26/24 1030    POC Lactate [[id number]]  (Normal) Collected: 02/26/24 1028    Specimen: Blood Updated: 02/26/24 1029     Lactate 0.8 mmol/L      Comment: Serial Number: [id number]:  [id number]                I reviewed the patient's new clinical results.    Assessment & Plan   CELLULITIS OF THE LEFT LEG WITH FAILED OUTPT TREATMENT AND MANAGEMENT---IF KEFZOL AND TOPICAL CARE; WOUND CARE NURSE TO BE INVOLVED; DRESSINGS DONE; CAN FOLLOW AS OUTPATIENT AS WELL   Alcohol abuse---WATCH FOR DT'S ; DISCUSSED WITH HOUSE SUPERVISOR LAST PM; WILL PUT HIM AT THE PCU FOR NOW  *COGNITIVE DECLINE---LIVES ALONE WITH MUCH HELP FROM HIS NP DAUGHTER AND HIRED CAREGIVERS; COULD BENEFIT FROM SKILLED NURSING FOR A BIT BEFORE GOING HOME. 30 DAYS OR LESS IS WHAT I FEEL CURRENTLY 7/13/2022    Essential hypertension---STABLE ON MEDS 7/13/2022    Gait instability---PT; HE WILL NEED 30 DAYS OR LESS OF SKILLED SERVICES; CASE MANAGEMENT WORKING WITH DAUGHTER (POA) TO GET PROPER FACILITY SET UP 7/13/2022    GERD without esophagitis--PPI 7/13/2022    Gout---STABLE ON MEDS 7/13/2022    Hypothyroidism (acquired) 7/13/2022    Onychomycosis of multiple toenails with type 2 diabetes mellitus 7/13/2022    Stage 3a chronic kidney disease---DR SANCHES/CHARLES 7/13/2022    Tinea pedis of both feet---CHRONIC MANAGEMENT WITH ANTIFUNGALS 7/13/2022    Type 2 diabetes mellitus with diabetic neuropathy, without long-term current use of insulin  DNR/DNI LIMITED CODE STATUS PER DAUGHTER      Principal Problem:    Cellulitis          I discussed the patients findings and my recommendations with patient AND DAUGHTER, CLINT Dawkins MD  02/27/24  06:38 EST

## 2024-02-27 NOTE — SIGNIFICANT NOTE
02/27/24 1628   PASRR   PASRR Status Needs to be completed  (Pending requested exemption and H&P to submit, requested from MD.)

## 2024-02-27 NOTE — PLAN OF CARE
Goal Outcome Evaluation: Pt pleasant and cooperative today. Up in chair most of the day. No change. VSS. Wound care consulted. Will continue to monitor.

## 2024-02-27 NOTE — CASE MANAGEMENT/SOCIAL WORK
Continued Stay Note  ARTUR Sánchez     Patient Name: Rommel Mcfarland  MRN: [id number]  Today's Date: 2/27/2024    Admit Date: 2/26/2024    Plan: SNF, pending choices (no precert needed, needs PASRR) vs return home with caregiver 2 days/ week.   Has used [facility] in past.   Discharge Plan       Row Name 02/27/24 1656       Plan    Plan SNF, pending choices (no precert needed, needs PASRR) vs return home with caregiver 2 days/ week.   Has used [facility] in past.    Patient/Family in Agreement with Plan yes    Plan Comments Barriers to discharge: IV abx. IV fluids.  6L O2.  Nephrology following.               Expected Discharge Date and Time       Expected Discharge Date Expected Discharge Time    Feb 27, 2024               Hien Ferrer RN    Office Phone [phone]  Office Cell [phone]

## 2024-02-28 LAB
ALBUMIN SERPL-MCNC: 3.1 G/DL (ref 3.5–5.2)
ALBUMIN/GLOB SERPL: 1.2 G/DL
ALP SERPL-CCNC: 42 U/L (ref 39–117)
ALT SERPL W P-5'-P-CCNC: <5 U/L (ref 1–41)
ANION GAP SERPL CALCULATED.3IONS-SCNC: 9 MMOL/L (ref 5–15)
AST SERPL-CCNC: 8 U/L (ref 1–40)
BASOPHILS # BLD AUTO: 0 10*3/MM3 (ref 0–0.2)
BASOPHILS NFR BLD AUTO: 0.4 % (ref 0–1.5)
BILIRUB SERPL-MCNC: 0.2 MG/DL (ref 0–1.2)
BILIRUB UR QL STRIP: NEGATIVE
BUN SERPL-MCNC: 16 MG/DL (ref 8–23)
BUN/CREAT SERPL: 11.2 (ref 7–25)
CA-I SERPL ISE-MCNC: 1.25 MMOL/L (ref 1.2–1.3)
CALCIUM SPEC-SCNC: 8.8 MG/DL (ref 8.6–10.5)
CHLORIDE SERPL-SCNC: 105 MMOL/L (ref 98–107)
CLARITY UR: CLEAR
CO2 SERPL-SCNC: 25 MMOL/L (ref 22–29)
COLOR UR: YELLOW
CREAT SERPL-MCNC: 1.43 MG/DL (ref 0.76–1.27)
DEPRECATED RDW RBC AUTO: 45.1 FL (ref 37–54)
EGFRCR SERPLBLD CKD-EPI 2021: 49.5 ML/MIN/1.73
EOSINOPHIL # BLD AUTO: 0.2 10*3/MM3 (ref 0–0.4)
EOSINOPHIL NFR BLD AUTO: 4.5 % (ref 0.3–6.2)
ERYTHROCYTE [DISTWIDTH] IN BLOOD BY AUTOMATED COUNT: 14.7 % (ref 12.3–15.4)
GLOBULIN UR ELPH-MCNC: 2.6 GM/DL
GLUCOSE SERPL-MCNC: 127 MG/DL (ref 65–99)
GLUCOSE UR STRIP-MCNC: NEGATIVE MG/DL
HCT VFR BLD AUTO: 35 % (ref 37.5–51)
HGB BLD-MCNC: 11.1 G/DL (ref 13–17.7)
HGB UR QL STRIP.AUTO: NEGATIVE
KETONES UR QL STRIP: NEGATIVE
LEUKOCYTE ESTERASE UR QL STRIP.AUTO: NEGATIVE
LYMPHOCYTES # BLD AUTO: 1.5 10*3/MM3 (ref 0.7–3.1)
LYMPHOCYTES NFR BLD AUTO: 26.1 % (ref 19.6–45.3)
MAGNESIUM SERPL-MCNC: 2.3 MG/DL (ref 1.6–2.4)
MCH RBC QN AUTO: 28.2 PG (ref 26.6–33)
MCHC RBC AUTO-ENTMCNC: 31.7 G/DL (ref 31.5–35.7)
MCV RBC AUTO: 89 FL (ref 79–97)
MONOCYTES # BLD AUTO: 0.6 10*3/MM3 (ref 0.1–0.9)
MONOCYTES NFR BLD AUTO: 11.1 % (ref 5–12)
NEUTROPHILS NFR BLD AUTO: 3.2 10*3/MM3 (ref 1.7–7)
NEUTROPHILS NFR BLD AUTO: 57.9 % (ref 42.7–76)
NITRITE UR QL STRIP: NEGATIVE
NRBC BLD AUTO-RTO: 0 /100 WBC (ref 0–0.2)
PH UR STRIP.AUTO: 6 [PH] (ref 5–8)
PHOSPHATE SERPL-MCNC: 3.2 MG/DL (ref 2.5–4.5)
PLATELET # BLD AUTO: 247 10*3/MM3 (ref 140–450)
PMV BLD AUTO: 7.9 FL (ref 6–12)
POTASSIUM SERPL-SCNC: 4.7 MMOL/L (ref 3.5–5.2)
PROT SERPL-MCNC: 5.7 G/DL (ref 6–8.5)
PROT UR QL STRIP: ABNORMAL
RBC # BLD AUTO: 3.94 10*6/MM3 (ref 4.14–5.8)
SODIUM SERPL-SCNC: 139 MMOL/L (ref 136–145)
SODIUM UR-SCNC: 110 MMOL/L
SP GR UR STRIP: 1.02 (ref 1–1.03)
UROBILINOGEN UR QL STRIP: ABNORMAL
WBC NRBC COR # BLD AUTO: 5.6 10*3/MM3 (ref 3.4–10.8)

## 2024-02-28 PROCEDURE — 85025 COMPLETE CBC W/AUTO DIFF WBC: CPT | Performed by: FAMILY MEDICINE

## 2024-02-28 PROCEDURE — 97110 THERAPEUTIC EXERCISES: CPT

## 2024-02-28 PROCEDURE — 97530 THERAPEUTIC ACTIVITIES: CPT

## 2024-02-28 PROCEDURE — 25810000003 SODIUM CHLORIDE 0.9 % SOLUTION: Performed by: INTERNAL MEDICINE

## 2024-02-28 PROCEDURE — 25010000002 ENOXAPARIN PER 10 MG: Performed by: FAMILY MEDICINE

## 2024-02-28 PROCEDURE — 97116 GAIT TRAINING THERAPY: CPT

## 2024-02-28 PROCEDURE — 84100 ASSAY OF PHOSPHORUS: CPT | Performed by: INTERNAL MEDICINE

## 2024-02-28 PROCEDURE — 82330 ASSAY OF CALCIUM: CPT | Performed by: INTERNAL MEDICINE

## 2024-02-28 PROCEDURE — 97166 OT EVAL MOD COMPLEX 45 MIN: CPT

## 2024-02-28 PROCEDURE — 83735 ASSAY OF MAGNESIUM: CPT | Performed by: INTERNAL MEDICINE

## 2024-02-28 PROCEDURE — 25010000002 CEFAZOLIN PER 500 MG: Performed by: INTERNAL MEDICINE

## 2024-02-28 PROCEDURE — 80053 COMPREHEN METABOLIC PANEL: CPT | Performed by: INTERNAL MEDICINE

## 2024-02-28 PROCEDURE — 84300 ASSAY OF URINE SODIUM: CPT | Performed by: INTERNAL MEDICINE

## 2024-02-28 PROCEDURE — 81003 URINALYSIS AUTO W/O SCOPE: CPT | Performed by: INTERNAL MEDICINE

## 2024-02-28 RX ADMIN — SODIUM CHLORIDE 2000 MG: 900 INJECTION INTRAVENOUS at 09:29

## 2024-02-28 RX ADMIN — TRAMADOL HYDROCHLORIDE 50 MG: 50 TABLET, COATED ORAL at 21:04

## 2024-02-28 RX ADMIN — TRAMADOL HYDROCHLORIDE 50 MG: 50 TABLET, COATED ORAL at 01:02

## 2024-02-28 RX ADMIN — ENOXAPARIN SODIUM 40 MG: 100 INJECTION SUBCUTANEOUS at 17:09

## 2024-02-28 RX ADMIN — Medication 10 ML: at 21:06

## 2024-02-28 RX ADMIN — FLUOXETINE 10 MG: 10 CAPSULE ORAL at 09:28

## 2024-02-28 RX ADMIN — HYDRALAZINE HYDROCHLORIDE 50 MG: 25 TABLET ORAL at 13:51

## 2024-02-28 RX ADMIN — SODIUM CHLORIDE 1 G: 1 TABLET ORAL at 21:04

## 2024-02-28 RX ADMIN — LEVOTHYROXINE SODIUM 175 MCG: 0.17 TABLET ORAL at 05:29

## 2024-02-28 RX ADMIN — Medication 100 MG: at 09:28

## 2024-02-28 RX ADMIN — SODIUM CHLORIDE 75 ML/HR: 9 INJECTION, SOLUTION INTRAVENOUS at 04:05

## 2024-02-28 RX ADMIN — PANTOPRAZOLE SODIUM 40 MG: 40 TABLET, DELAYED RELEASE ORAL at 09:28

## 2024-02-28 RX ADMIN — Medication 250 MG: at 09:28

## 2024-02-28 RX ADMIN — Medication 10 ML: at 09:28

## 2024-02-28 RX ADMIN — ALLOPURINOL 100 MG: 100 TABLET ORAL at 09:28

## 2024-02-28 RX ADMIN — SODIUM CHLORIDE 1 G: 1 TABLET ORAL at 09:28

## 2024-02-28 RX ADMIN — CHOLESTYRAMINE 4 G: 4 POWDER, FOR SUSPENSION ORAL at 09:30

## 2024-02-28 RX ADMIN — ACETAMINOPHEN 650 MG: 325 TABLET, FILM COATED ORAL at 23:17

## 2024-02-28 RX ADMIN — SODIUM CHLORIDE 2000 MG: 900 INJECTION INTRAVENOUS at 23:18

## 2024-02-28 RX ADMIN — FOLIC ACID 1 MG: 1 TABLET ORAL at 09:28

## 2024-02-28 RX ADMIN — HYDRALAZINE HYDROCHLORIDE 50 MG: 25 TABLET ORAL at 09:28

## 2024-02-28 RX ADMIN — HYDRALAZINE HYDROCHLORIDE 50 MG: 25 TABLET ORAL at 21:04

## 2024-02-28 RX ADMIN — Medication 250 MG: at 21:04

## 2024-02-28 NOTE — PLAN OF CARE
"Assessment: Rommel Mcfarland presents with functional mobility impairments which indicate the need for skilled intervention. Pt tolerated session well, with pt demonstrating some self-limiting behaviors, requiring increased encouragement to mobilize as independently as possible. Will continue to follow and progress as tolerated.     Plan/Recommendations:   If medically appropriate, Moderate Intensity Therapy recommended post-acute care. This is recommended as therapy feels the patient would require 3-4 days per week and wouldn't tolerate \"3 hour daily\" rehab intensity. SNF would be the preferred choice. If the patient does not agree to SNF, arrange HH or OP depending on home bound status. If patient is medically complex, consider LTACH. Pt requires no DME at discharge.     Pt desires Skilled Rehab placement at discharge. Pt cooperative; agreeable to therapeutic recommendations and plan of care.        "

## 2024-02-28 NOTE — PLAN OF CARE
Goal Outcome Evaluation:              Outcome Evaluation: alert and oriened x4 no complaints of pain or discomfort. Daily dressing changes for BLE. patient up with assist x 1 sitting in chair, patient turning self when encouraged every 2 hours and as needed.

## 2024-02-28 NOTE — THERAPY EVALUATION
Patient Name: Rommel Mcfarland  : 1945    MRN: [id number]                              Today's Date: 2024       Admit Date: 2024    Visit Dx:     ICD-10-CM ICD-9-CM   1. Cellulitis of left lower extremity  L03.116 682.6     Patient Active Problem List   Diagnosis    Cellulitis of right foot    Tinea pedis of both feet    Onychomycosis of multiple toenails with type 2 diabetes mellitus    Type 2 diabetes mellitus with diabetic neuropathy, without long-term current use of insulin    Essential hypertension    Stage 3a chronic kidney disease    Alcohol abuse    Gout    Hypothyroidism (acquired)    Major depressive disorder with current active episode    GERD without esophagitis    Gait instability    Cellulitis     Past Medical History:   Diagnosis Date    Alcohol abuse 2022    Essential hypertension 2022    Gait instability 2022    GERD without esophagitis 2022    Gout 2022    Hypothyroidism (acquired) 2022    Onychomycosis of multiple toenails with type 2 diabetes mellitus 2022    Sleep apnea     Stage 3a chronic kidney disease 2022    Tinea pedis of both feet 2022    Type 2 diabetes mellitus with diabetic neuropathy, without long-term current use of insulin 2022     Past Surgical History:   Procedure Laterality Date    ORTHOPEDIC SURGERY      fractured ankle and dislocated shoulder      General Information       Row Name 24 1354          OT Time and Intention    Document Type evaluation  -ES     Mode of Treatment occupational therapy  -ES       Row Name 24 1351          General Information    Patient Profile Reviewed yes  -ES     Prior Level of Function independent:  -ES     Existing Precautions/Restrictions oxygen therapy device and L/min;fall;other (see comments)  Bilateral lower legs are ace wrapped.  -ES       Row Name 24 3860          Occupational Profile    Reason for Services/Referral (Occupational Profile) Pt  is an 79 yo male admitted 2/26/24 with c/o L calf pain. He has been treated with 10-day course of antibiotic  for a scratch to LLE prior to admission. Doppler (-) acute changes. BLE now ace wrapped.    PMHx significant for T2DM, CKD III, ETOH abuse, DPN.     At baseline, pt resides alone in [facility] with 2 BALWINDER. He has assist of daughter, who lives nearby, and caregiver 3x/week. Uses RW at baseline, and states he is able to complete ADLs for self, but requires assist for IADLs.  -ES       Row Name 02/28/24 1354          Living Environment    People in Home alone  -ES       Row Name 02/28/24 1354          Home Main Entrance    Number of Stairs, Main Entrance two  -ES       Row Name 02/28/24 1354          Cognition    Orientation Status (Cognition) oriented x 4  Short Blessed Test: 9/28, indicating questionable cogitive impairment  -ES       Row Name 02/28/24 1354          Safety Issues, Functional Mobility    Safety Issues Affecting Function (Mobility) ability to follow commands;awareness of need for assistance;safety precautions follow-through/compliance;insight into deficits/self-awareness  -ES     Impairments Affecting Function (Mobility) endurance/activity tolerance;strength;shortness of breath;balance;pain;range of motion (ROM)  -ES               User Key  (r) = Recorded By, (t) = Taken By, (c) = Cosigned By      Initials Name Provider Type    Keeley More OT Occupational Therapist                     Mobility/ADL's       Row Name 02/28/24 1400          Transfers    Transfers sit-stand transfer  -ES       Row Name 02/28/24 1400          Bed-Chair Transfer    Bed-Chair [place] (Transfers) moderate assist (50% patient effort);verbal cues;minimum assist (75% patient effort)  -ES     Assistive Device (Bed-Chair Transfers) walker, front-wheeled  -ES       Row Name 02/28/24 1400          Sit-Stand Transfer    Sit-Stand [place] (Transfers) minimum assist (75% patient effort);verbal cues  -ES     Assistive  Device (Sit-Stand Transfers) walker, front-wheeled  -ES       Row Name 02/28/24 1400          Functional Mobility    Functional Mobility- Ind. Level not tested  -ES       Row Name 02/28/24 1400          Activities of Daily Living    BADL Assessment/Intervention lower body dressing;upper body dressing  -ES       Row Name 02/28/24 1400          Lower Body Dressing Assessment/Training    [place] Level (Lower Body Dressing) maximum assist (25% patient effort)  -ES       Row Name 02/28/24 1400          Upper Body Dressing Assessment/Training    [place] Level (Upper Body Dressing) minimum assist (75% patient effort)  -ES               User Key  (r) = Recorded By, (t) = Taken By, (c) = Cosigned By      Initials Name Provider Type    ES Keeley Hall OT Occupational Therapist                   Obj/Interventions       Row Name 02/28/24 1401          Sensory Assessment (Somatosensory)    Sensory Assessment (Somatosensory) unable/difficult to assess  -ES       Row Name 02/28/24 1401          Vision Assessment/Intervention    Visual Impairment/Limitations corrective lenses for reading  -ES       Row Name 02/28/24 1401          Range of Motion Comprehensive    General Range of Motion bilateral upper extremity ROM WNL  -ES       Row Name 02/28/24 1401          Strength Comprehensive (MMT)    Comment, General Manual Muscle Testing (MMT) Assessment BUE 4/5  -ES       Row Name 02/28/24 1401          Balance    Balance Assessment sitting static balance;sitting dynamic balance;standing static balance;standing dynamic balance  -ES     Static Sitting Balance supervision  -ES     Dynamic Sitting Balance supervision  -ES     Static Standing Balance minimal assist  -ES     Dynamic Standing Balance moderate assist;minimal assist  -ES     Position/Device Used, Standing Balance walker, rolling  -ES     Balance Interventions sitting;standing;static;dynamic  -ES               User Key  (r) = Recorded By, (t) = Taken By, (c) =  Cosigned By      Initials Name Provider Type    Keeley More OT Occupational Therapist                   Goals/Plan       Row Name 02/28/24 1404          Bathing Goal 1 (OT)    Activity/Device (Bathing Goal 1, OT) bathing skills, all  -ES     [place] Level/Cues Needed (Bathing Goal 1, OT) minimum assist (75% or more patient effort)  -ES     Time Frame (Bathing Goal 1, OT) 2 weeks  -ES       Row Name 02/28/24 1404          Toileting Goal 1 (OT)    Activity/Device (Toileting Goal 1, OT) toileting skills, all  -ES     [place] Level/Cues Needed (Toileting Goal 1, OT) supervision required  -ES     Time Frame (Toileting Goal 1, OT) 2 weeks  -ES       Row Name 02/28/24 1404          Grooming Goal 1 (OT)    Activity/Device (Grooming Goal 1, OT) grooming skills, all  -ES     [place] (Grooming Goal 1, OT) modified independence  -ES     Time Frame (Grooming Goal 1, OT) 2 weeks  -ES     Strategies/Barriers (Grooming Goal 1, OT) standing at sink  -ES       Row Name 02/28/24 1404          Therapy Assessment/Plan (OT)    Planned Therapy Interventions (OT) activity tolerance training;BADL retraining;neuromuscular control/coordination retraining;cognitive/visual perception retraining;occupation/activity based interventions;patient/caregiver education/training;ROM/therapeutic exercise;strengthening exercise;functional balance retraining  -ES               User Key  (r) = Recorded By, (t) = Taken By, (c) = Cosigned By      Initials Name Provider Type    Keeley More OT Occupational Therapist                   Clinical Impression       Row Name 02/28/24 1402          Pain Assessment    Pretreatment Pain Rating 2/10  -ES     Posttreatment Pain Rating 2/10  -ES     Pain Location lower  -ES     Pain Location - extremity  -ES       Row Name 02/28/24 1400          Plan of Care Review    Plan of Care Reviewed With patient  -ES     Outcome Evaluation Pt is an 79 yo male admitted 2/26/24 with c/o L calf pain.  He has been treated with 10-day course of antibiotic  for a scratch to LLE prior to admission. Doppler (-) acute changes. BLE now ace wrapped.    PMHx significant for T2DM, CKD III, ETOH abuse, DPN.     At baseline, pt resides alone in [facility] with 2 BALWINDER. He has assist of daughter, who lives nearby, and caregiver 3x/week. Uses RW at baseline, and states he is able to complete ADLs for self, but requires assist for IADLs.     Oriented x4. Short Blessed Test: 9/28, indicating questionable cogitive impairment. Patient up in chair upon OT arrival. He requires min - Mod A for transfers, and significant assist for LB dressing due to ace bandages. Patient demos poor activity tolerance, and generalized weakness. He would benefit from acute rehab at [place].  -ES       Row Name 02/28/24 1402          Therapy Assessment/Plan (OT)    Rehab Potential (OT) good, to achieve stated therapy goals  -ES     Criteria for Skilled Therapeutic Interventions Met (OT) yes;skilled treatment is necessary  -ES     Therapy Frequency (OT) 3 times/wk  -ES     Predicted Duration of Therapy Intervention (OT) until dc  -ES       Row Name 02/28/24 1402          Vital Signs    Pre SpO2 (%) 90  -ES     O2 Delivery Pre Treatment room air  -ES     Intra SpO2 (%) 91  -ES     O2 Delivery Intra Treatment room air  -ES     Post SpO2 (%) 90  -ES     O2 Delivery Post Treatment room air  -ES     Pre Patient Position Sitting  -ES     Intra Patient Position Standing  -ES     Post Patient Position Sitting  -ES       Row Name 02/28/24 1402          Positioning and Restraints    Pre-Treatment Position sitting in chair/recliner  -ES     Post Treatment Position chair  -ES     In Chair notified nsg;exit alarm on;with family/caregiver;encouraged to call for assist  -ES               User Key  (r) = Recorded By, (t) = Taken By, (c) = Cosigned By      Initials Name Provider Type    Keeley More OT Occupational Therapist                   Outcome Measures       Row Name  02/28/24 1405          How much help from another is currently needed...    Putting on and taking off regular lower body clothing? 2  -ES     Bathing (including washing, rinsing, and drying) 2  -ES     Toileting (which includes using toilet bed pan or urinal) 2  -ES     Putting on and taking off regular upper body clothing 3  -ES     Taking care of personal grooming (such as brushing teeth) 3  -ES     Eating meals 3  -ES     AM-PAC 6 Clicks Score (OT) 15  -ES       Row Name 02/28/24 0800          How much help from another person do you currently need...    Turning from your back to your side while in flat bed without using bedrails? 2  -JE     Moving from lying on back to sitting on the side of a flat bed without bedrails? 2  -JE     Moving to and from a bed to a chair (including a wheelchair)? 2  -JE     Standing up from a chair using your arms (e.g., wheelchair, bedside chair)? 2  -JE     Climbing 3-5 steps with a railing? 2  -JE     To walk in hospital room? 2  -JE     AM-PAC 6 Clicks Score (PT) 12  -JE     Highest Level of Mobility Goal 4 --> Transfer to chair/commode  -JE       Row Name 02/28/24 1405          Functional Assessment    Outcome Measure Options AM-PAC 6 Clicks Daily Activity (OT)  -ES               User Key  (r) = Recorded By, (t) = Taken By, (c) = Cosigned By      Initials Name Provider Type    Keeley More OT Occupational Therapist    Maria Del Rosario Padgett, RN Registered Nurse                    Occupational Therapy Education       Title: PT OT SLP Therapies (In Progress)       Topic: Occupational Therapy (In Progress)       Point: ADL training (Done)       Description:   Instruct learner(s) on proper safety adaptation and remediation techniques during self care or transfers.   Instruct in proper use of assistive devices.                  Learning Progress Summary             Patient Acceptance, E,TB, VU,NR by  at 2/28/2024 1406                         Point: Home exercise  program (Not Started)       Description:   Instruct learner(s) on appropriate technique for monitoring, assisting and/or progressing therapeutic exercises/activities.                  Learner Progress:  Not documented in this visit.              Point: Precautions (Done)       Description:   Instruct learner(s) on prescribed precautions during self-care and functional transfers.                  Learning Progress Summary             Patient Acceptance, E,TB, VU,NR by ES at 2/28/2024 1406                         Point: Body mechanics (Done)       Description:   Instruct learner(s) on proper positioning and spine alignment during self-care, functional mobility activities and/or exercises.                  Learning Progress Summary             Patient Acceptance, E,TB, VU,NR by  at 2/28/2024 1406                                         User Key       Initials Effective Dates Name Provider Type Discipline     06/16/21 -  Keeley Hall OT Occupational Therapist OT                  OT Recommendation and Plan  Planned Therapy Interventions (OT): activity tolerance training, BADL retraining, neuromuscular control/coordination retraining, cognitive/visual perception retraining, occupation/activity based interventions, patient/caregiver education/training, ROM/therapeutic exercise, strengthening exercise, functional balance retraining  Therapy Frequency (OT): 3 times/wk  Plan of Care Review  Plan of Care Reviewed With: patient  Outcome Evaluation: Pt is an 77 yo male admitted 2/26/24 with c/o L calf pain. He has been treated with 10-day course of antibiotic  for a scratch to LLE prior to admission. Doppler (-) acute changes. BLE now ace wrapped.    PMHx significant for T2DM, CKD III, ETOH abuse, DPN.     At baseline, pt resides alone in [facility] with 2 BALWINDER. He has assist of daughter, who lives nearby, and caregiver 3x/week. Uses RW at baseline, and states he is able to complete ADLs for self, but requires assist for  IADLs.     Oriented x4. Short Blessed Test: 9/28, indicating questionable cogitive impairment. Patient up in chair upon OT arrival. He requires min - Mod A for transfers, and significant assist for LB dressing due to ace bandages. Patient demos poor activity tolerance, and generalized weakness. He would benefit from acute rehab at [place].     Time Calculation:         Time Calculation- OT       Row Name 02/28/24 1405             Time Calculation- OT    OT Received On 02/28/24  -ES      OT - Next Appointment 02/29/24  -ES      OT Goal Re-Cert Due Date 03/13/24  -ES                User Key  (r) = Recorded By, (t) = Taken By, (c) = Cosigned By      Initials Name Provider Type    Keeley More OT Occupational Therapist                           Keeley Hall OT  2/28/2024

## 2024-02-28 NOTE — CASE MANAGEMENT/SOCIAL WORK
Continued Stay Note  [facility]     Patient Name: Rommel Mcfarland  MRN: [id number]  Today's Date: 2/28/2024    Admit Date: 2/26/2024    Plan: [facility] referral pending acceptance. No precert needed, will require PASRR. From home with caregiver 2 days/week. Former[facility] in the past.   Discharge Plan       Row Name 02/28/24 1047       Plan    Plan [facility] referral pending acceptance. No precert needed, will require PASRR. From home with caregiver 2 days/week. Former[facility] in the past.    Patient/Family in Agreement with Plan yes    Provided Post Acute Provider List? Yes    Post Acute Provider List Nursing Home    Provided Post Acute Provider Quality & Resource List? Yes    Post Acute Provider Quality and Resource List Nursing Home    Delivered To Patient    Method of Delivery In person    Plan Comments CM met with patient at bedside to discuss IMM letter and dc planning. Discussed therapy’s recommendation for SNF. SNF list provided for patient to review. He provided his top choice as [facility]. CM added new referral in basket and sent to liaison Marilyn to review. Updated LSW of need for PASRR.             Expected Discharge Date and Time       Expected Discharge Date Expected Discharge Time    Mar 1, 2024           Charity Ovalles RN     Office Phone: [phone]  Office Cell: [phone]

## 2024-02-28 NOTE — DISCHARGE PLACEMENT REQUEST
"Rommel Mcfarland (78 y.o. Male)       Date of Birth   1945    Social Security Number       Address   [address] [place] [facility]    Home Phone   [phone]    MRN   [id number]       Scientology   [employer]    Marital Status                               Admission Date   2/26/24    Admission Type   Emergency    Admitting Provider   Montse Dawkins MD    Attending Provider   Montse Dawkins MD    Department, Room/Bed   [facility], 2111/1       Discharge Date       Discharge Disposition       Discharge Destination                                 Attending Provider: Montse Dawkins MD    Allergies: Tigecycline    Isolation: None   Infection: None   Code Status: No CPR    Ht: 170.2 cm (67\")   Wt: 111 kg (243 lb 13.3 oz)    Admission Cmt: None   Principal Problem: Cellulitis [L03.90]                   Active Insurance as of 2/26/2024       Primary Coverage       Payor Plan Insurance Group Employer/Plan Group    MEDICARE MEDICARE A & B        Payor Plan Address Payor Plan Phone Number Payor Plan Fax Number Effective Dates    [facility] [id number] [phone]  10/1/2008 - None Entered    [facility] [id number]         Subscriber Name Subscriber Birth Date Member ID       ROMMEL MCFARLAND 1945 [id number]               Secondary Coverage       Payor Plan Insurance Group Employer/Plan Group    [employer]        Payor Plan Address Payor Plan Phone Number Payor Plan Fax Number Effective Dates    [address] [phone]  10/1/2008 - None Entered    [facility] [address]         Subscriber Name Subscriber Birth Date Member ID       ROMMEL MCFARLAND 1945 [id number]                     Emergency Contacts        (Rel.) Home Phone Work Phone Mobile Phone    CL[employer] (Daughter) [phone] -- [phone]                 History & Physical        Montse Dawkins MD at 02/27/24 0638           DATE/TIME OF " ADMISSION:  2/26/2024  9:31 AM  Patient Care Team:  Montse Dawkins MD as PCP - General  Montse Dawkins MD as PCP - Family Medicine  Huey Keyes MD as Consulting Physician (Nephrology)    Chief complaint LEFT LEG SWELLING AND REDNESS DESPITE A 10 DAY COURSE OF RENALLY DOSED LEVAQUIN (WITH HX OF PSEUDOMONAS) AND TOPICAL TREATMENT FROM NP DAUGHTER  NO KNOWN FEVER    Subjective    Patient is a 78 y.o. male presents with LEFT LEB REDNESS AND PAIN. HX OF WELL CONTROLLED DM II, COGNITIVE DECLINE, AND REGULAR ETOH ABUSE (WEANED TO 5 BEERS DAILY)Onset of symptoms was GRADUAL AND HAVE BEEN PRESENT FOR OVER 3-4 WEEKS. BEGAN AS A SCRATCH TO THE LEG.    LIVES ALONE BUT WITH MUCH FAMILY AND HIRED CAREGIVER SUPPORT. HE IS TO BE DNI/NO CPR/NO SHOCK BUT ALL ELSE AS SUPPORT TO BE GIVEN AND HE HAS A LIVING WILL AS WELL. I COMMUNICATED WITH DAUGHTER YESTERDAY IN DETAIL.  Review of Systems   Endocrine: Positive for cold intolerance.   Allergic/Immunologic: Negative for immunocompromised state.   Neurological:  Positive for dizziness.   All other systems reviewed and are negative.         History  Past Medical History:   Diagnosis Date    Alcohol abuse 7/13/2022    Essential hypertension 7/13/2022    Gait instability 7/13/2022    GERD without esophagitis 7/13/2022    Gout 7/13/2022    Hypothyroidism (acquired) 7/13/2022    Onychomycosis of multiple toenails with type 2 diabetes mellitus 7/13/2022    Stage 3a chronic kidney disease 7/13/2022    Tinea pedis of both feet 7/13/2022    Type 2 diabetes mellitus with diabetic neuropathy, without long-term current use of insulin 7/13/2022     No past surgical history on file.  No family history on file.  Social History     Tobacco Use    Smoking status: Never    Smokeless tobacco: Never     Medications Prior to Admission   Medication Sig Dispense Refill Last Dose    allopurinol (ZYLOPRIM) 100 MG tablet Take 1 tablet by mouth Daily.   2/26/2024    bumetanide (BUMEX) 0.5 MG tablet Take 1  tablet by mouth Daily.   2/26/2024    colestipol (COLESTID) 1 g tablet Take 1 tablet by mouth Daily.   2/26/2024    FLUoxetine (PROzac) 10 MG capsule Take 1 capsule by mouth Daily.   2/26/2024    folic acid (FOLVITE) 1 MG tablet Take 1 tablet by mouth Daily.   2/26/2024    hydrALAZINE (APRESOLINE) 25 MG tablet Take 1 tablet by mouth Daily.   2/26/2024    levothyroxine (SYNTHROID, LEVOTHROID) 175 MCG tablet Take 1 tablet by mouth Every Morning.   2/26/2024    pantoprazole (PROTONIX) 40 MG EC tablet Take 1 tablet by mouth Daily.   2/26/2024    saccharomyces boulardii (FLORASTOR) 250 MG capsule Take 1 capsule by mouth 2 (Two) Times a Day.   2/26/2024    sodium chloride 1 g tablet Take 1 tablet by mouth 2 (Two) Times a Day.   2/26/2024    Thiamine HCl (Vitamin B1) 100 MG tablet tablet Take 1 tablet by mouth Daily.   2/26/2024     Allergies:  Tigecycline    Objective    Vital Signs  Temp:  [97.9 °F (36.6 °C)-98.3 °F (36.8 °C)] 98.3 °F (36.8 °C)  Heart Rate:  [] 66  Resp:  [13-23] 13  BP: (107-190)/(56-91) 153/70     Physical Exam                  O X 3 AND KNEW ME INSTANTLY; APPROPRIATELY CONVERSANT       Alert, cooperative, in no acute distress      Normocephalic, without obvious abnormality, atraumatic      Lids and lashes normal, conjunctivae and sclerae normal, no   icterus, no pallor, corneas clear, PERRLA      Ears appear intact with no abnormalities noted      No oral lesions BUT VERY POOR ORAL HYGIENE---I POINTED OUT THAT ORAL CARE WAS NEEDED TO THE RN, no thrush, oral mucosa moist      No adenopathy, supple, trachea midline, no thyromegaly, no   carotid bruit, no JVD      Clear to auscultation,respirations regular, even and                  unlabored      Regular rhythm and normal rate, normal S1 and S2, no            murmur, no gallop, no rub, no click      No abnormalities observed      Normal bowel sounds, no masses, no organomegaly, soft        non-tender, non-distended, no guarding, no rebound                 tenderness      Moves all extremities well, 1+edema, no cyanosis, MARKED REDNESS TO THE LEFT LEG TO THE THIGH; PICTURES TAKEN AND WOUND CARE NURSE HAS DONE WRAPS OF BOTH LEGS. I DID NOT REMOVE THE FRESHLY DONE DRESSINGS                 Pulses palpable and equal bilaterally      No bleeding, bruising or rash; SKIN IS VERY DRY AND FLAKING      No palpable adenopathy      Cranial nerves 2 - 12 grossly intact, sensation intact, DTR       present and equal bilaterally       I HAVE PERSONALLY EXAMINED THE PATIENT AND HAVE REVIEWED APPROPRIATE LAB AND IMAGING RESULTS.    Imaging Results (Last 24 Hours)       ** No results found for the last 24 hours. **             Lab Results (last 24 hours)       Procedure Component Value Units Date/Time    TSH [[id number]]  (Normal) Collected: 02/27/24 0554    Specimen: Blood Updated: 02/27/24 0629     TSH 1.690 uIU/mL     Phosphorus [[id number]]  (Normal) Collected: 02/27/24 0554    Specimen: Blood Updated: 02/27/24 0628     Phosphorus 3.3 mg/dL     Hemoglobin A1c [[id number]]  (Normal) Collected: 02/27/24 0554    Specimen: Blood Updated: 02/27/24 0626     Hemoglobin A1C 5.20 %     CBC Auto Differential [[id number]]  (Abnormal) Collected: 02/27/24 0554    Specimen: Blood Updated: 02/27/24 0603     WBC 6.40 10*3/mm3      RBC 4.13 10*6/mm3      Hemoglobin 11.8 g/dL      Hematocrit 36.5 %      MCV 88.3 fL      MCH 28.5 pg      MCHC 32.3 g/dL      RDW 14.8 %      RDW-SD 45.1 fl      MPV 8.3 fL      Platelets 259 10*3/mm3      Neutrophil % 59.3 %      Lymphocyte % 23.0 %      Monocyte % 11.4 %      Eosinophil % 5.1 %      Basophil % 1.2 %      Neutrophils, Absolute 3.80 10*3/mm3      Lymphocytes, Absolute 1.50 10*3/mm3      Monocytes, Absolute 0.70 10*3/mm3      Eosinophils, Absolute 0.30 10*3/mm3      Basophils, Absolute 0.10 10*3/mm3      nRBC 0.1 /100 WBC     Comprehensive Metabolic Panel [[id number]] Collected: 02/27/24 0554    Specimen: Blood Updated: 02/27/24 0558    Magnesium  [[id number]] Collected: 02/27/24 0554    Specimen: Blood Updated: 02/27/24 0558    POC Glucose Once [[id number]]  (Normal) Collected: 02/26/24 1703    Specimen: Blood Updated: 02/26/24 1705     Glucose 91 mg/dL      Comment: Serial Number: [id number]:  [id number]       COVID-19, FLU A/B, RSV PCR 1 HR TAT - Swab, Nasopharynx [[id number]]  (Normal) Collected: 02/26/24 1550    Specimen: Swab from Nasopharynx Updated: 02/26/24 1642     COVID19 Not Detected     Influenza A PCR Not Detected     Influenza B PCR Not Detected     RSV, PCR Not Detected    Narrative:      Fact sheet for providers: https://www.fda.gov/media/075167/download    Fact sheet for patients: https://www.fda.gov/media/497832/download    Test performed by PCR.    Extra Tubes [[id number]] Collected: 02/26/24 1029    Specimen: Blood, Venous Line Updated: 02/26/24 1130    Narrative:      The following orders were created for panel order Extra Tubes.  Procedure                               Abnormality         Status                     ---------                               -----------         ------                     Gold Top - SST[[id number]]                                   Final result                 Please view results for these tests on the individual orders.    Gold Top - SST [[id number]] Collected: 02/26/24 1029    Specimen: Blood Updated: 02/26/24 1130     Extra Tube Hold for add-ons.     Comment: Auto resulted.       C-reactive Protein [[id number]]  (Abnormal) Collected: 02/26/24 1029    Specimen: Blood Updated: 02/26/24 1123     C-Reactive Protein 10.98 mg/dL     Comprehensive Metabolic Panel [[id number]]  (Abnormal) Collected: 02/26/24 1029    Specimen: Blood Updated: 02/26/24 1103     Glucose 116 mg/dL      BUN 15 mg/dL      Creatinine 1.53 mg/dL      Sodium 131 mmol/L      Potassium 4.0 mmol/L      Chloride 93 mmol/L      CO2 25.0 mmol/L      Calcium 9.1 mg/dL      Total Protein 7.0 g/dL      Albumin 3.8 g/dL      ALT (SGPT) 6 U/L      AST  (SGOT) 14 U/L      Alkaline Phosphatase 55 U/L      Total Bilirubin 0.6 mg/dL      Globulin 3.2 gm/dL      A/G Ratio 1.2 g/dL      BUN/Creatinine Ratio 9.8     Anion Gap 13.0 mmol/L      eGFR 45.7 mL/min/1.73     Narrative:      GFR Normal >60  Chronic Kidney Disease <60  Kidney Failure <15    The GFR formula is only valid for adults with stable renal function between ages 18 and 70.    Sedimentation Rate [[id number]]  (Abnormal) Collected: 02/26/24 1029    Specimen: Blood Updated: 02/26/24 1101     Sed Rate 46 mm/hr     Blood Culture - Blood, Arm, Right [[id number]] Collected: 02/26/24 1029    Specimen: Blood from Arm, Right Updated: 02/26/24 1043    CBC & Differential [[id number]]  (Abnormal) Collected: 02/26/24 1029    Specimen: Blood Updated: 02/26/24 1034    Narrative:      The following orders were created for panel order CBC & Differential.  Procedure                               Abnormality         Status                     ---------                               -----------         ------                     CBC Auto Differential[[id number]]        Abnormal            Final result                 Please view results for these tests on the individual orders.    CBC Auto Differential [[id number]]  (Abnormal) Collected: 02/26/24 1029    Specimen: Blood Updated: 02/26/24 1034     WBC 7.00 10*3/mm3      RBC 4.39 10*6/mm3      Hemoglobin 12.2 g/dL      Hematocrit 39.0 %      MCV 88.9 fL      MCH 27.8 pg      MCHC 31.3 g/dL      RDW 14.8 %      RDW-SD 45.1 fl      MPV 7.8 fL      Platelets 275 10*3/mm3      Neutrophil % 58.7 %      Lymphocyte % 18.5 %      Monocyte % 13.8 %      Eosinophil % 6.5 %      Basophil % 2.5 %      Neutrophils, Absolute 4.10 10*3/mm3      Lymphocytes, Absolute 1.30 10*3/mm3      Monocytes, Absolute 1.00 10*3/mm3      Eosinophils, Absolute 0.50 10*3/mm3      Basophils, Absolute 0.20 10*3/mm3      nRBC 0.1 /100 WBC     Blood Culture - Blood, Arm, Left [[id number]] Collected: 02/26/24 1029     Specimen: Blood from Arm, Left Updated: 02/26/24 1030    POC Lactate [[id number]]  (Normal) Collected: 02/26/24 1028    Specimen: Blood Updated: 02/26/24 1029     Lactate 0.8 mmol/L      Comment: Serial Number: [id number]:  [id number]                I reviewed the patient's new clinical results.    Assessment & Plan  CELLULITIS OF THE LEFT LEG WITH FAILED OUTPT TREATMENT AND MANAGEMENT---IF KEFZOL AND TOPICAL CARE; WOUND CARE NURSE TO BE INVOLVED; DRESSINGS DONE; CAN FOLLOW AS OUTPATIENT AS WELL   Alcohol abuse---WATCH FOR DT'S ; DISCUSSED WITH HOUSE SUPERVISOR LAST PM; WILL PUT HIM AT THE PCU FOR NOW  *COGNITIVE DECLINE---LIVES ALONE WITH MUCH HELP FROM HIS NP DAUGHTER AND HIRED CAREGIVERS; COULD BENEFIT FROM SKILLED NURSING FOR A BIT BEFORE GOING HOME. 30 DAYS OR LESS IS WHAT I FEEL CURRENTLY 7/13/2022    Essential hypertension---STABLE ON MEDS 7/13/2022    Gait instability---PT; HE WILL NEED 30 DAYS OR LESS OF SKILLED SERVICES; CASE MANAGEMENT WORKING WITH DAUGHTER (POA) TO GET PROPER FACILITY SET UP 7/13/2022    GERD without esophagitis--PPI 7/13/2022    Gout---STABLE ON MEDS 7/13/2022    Hypothyroidism (acquired) 7/13/2022    Onychomycosis of multiple toenails with type 2 diabetes mellitus 7/13/2022    Stage 3a chronic kidney disease---DR SANCHES/CHARLES 7/13/2022    Tinea pedis of both feet---CHRONIC MANAGEMENT WITH ANTIFUNGALS 7/13/2022    Type 2 diabetes mellitus with diabetic neuropathy, without long-term current use of insulin  DNR/DNI LIMITED CODE STATUS PER DAUGHTER      Principal Problem:    Cellulitis          I discussed the patients findings and my recommendations with patient AND DAUGHTER, CLINT Dawkins MD  02/27/24  06:38 EST          Electronically signed by Montse Dawkins MD at 02/27/24 5502

## 2024-02-28 NOTE — PLAN OF CARE
Goal Outcome Evaluation:  Plan of Care Reviewed With: patient           Outcome Evaluation: Pt is an 79 yo male admitted 2/26/24 with c/o L calf pain. He has been treated with 10-day course of antibiotic  for a scratch to LLE prior to admission. Doppler (-) acute changes. BLE now ace wrapped.    PMHx significant for T2DM, CKD III, ETOH abuse, DPN.     At baseline, pt resides alone in [facility] with 2 BALWINDER. He has assist of daughter, who lives nearby, and caregiver 3x/week. Uses RW at baseline, and states he is able to complete ADLs for self, but requires assist for IADLs.     Oriented x4. Short Blessed Test: 9/28, indicating questionable cogitive impairment. Patient up in chair upon OT arrival. He requires min - Mod A for transfers, and significant assist for LB dressing due to ace bandages. Patient demos poor activity tolerance, and generalized weakness. He would benefit from acute rehab at [place].

## 2024-02-28 NOTE — CASE MANAGEMENT/SOCIAL WORK
Continued Stay Note  ARTUR Sánchez     Patient Name: Rommel Mcfarland  MRN: [id number]  Today's Date: 2/28/2024    Admit Date: 2/26/2024    Plan: [facility] referral pending acceptance. No precert required, PASRR QFR. From home with caregiver 2 days/week. [facility] in the past.   Discharge Plan       Row Name 02/28/24 1304       Plan    Plan [facility] referral pending acceptance. No precert required, PASRR QFR. From home with caregiver 2 days/week. [facility] in the past.    Patient/Family in Agreement with Plan yes    Plan Comments Received update from [facility] liaison Marilyn that patient’s daughter requested to discuss with CM. CM contacted patient’s daughter, Yajaira who reported that patient has been to [facility] before. She reported that he has also had [facility] as they have a wound care nurse. She reported that his legs are a chronic concern and she does have caregivers for patient, but wanted to further discuss with MD. NITIN sent request to MD.             Charity Ovalles RN     Office Phone: [phone]  Office Cell: [phone]

## 2024-02-28 NOTE — THERAPY TREATMENT NOTE
"Subjective: Pt agreeable to therapeutic plan of care.    Objective:     Bed mobility - Mod-A. Increased encouragement required to complete task as independently as possible.    Transfers - STS from elevated EOB Min-A and with rolling walker    Ambulation - 5 feet CGA and with rolling walker    Therapeutic Exercise - 20 Reps B LE AROM supported sitting / chair    Vitals: WNL    Pain: 6 VAS   Location: RLE  Intervention for pain: Repositioned, Increased Activity, and Therapeutic Presence    Education: Provided education on the importance of mobility in the acute care setting, Verbal/Tactile Cues, Transfer Training, Gait Training, and Stroke prevention and risk factors    Assessment: Rommel Mcfarland presents with functional mobility impairments which indicate the need for skilled intervention. Pt tolerated session well, with pt demonstrating some self-limiting behaviors, requiring increased encouragement to mobilize as independently as possible. Will continue to follow and progress as tolerated.     Plan/Recommendations:   If medically appropriate, Moderate Intensity Therapy recommended post-acute care. This is recommended as therapy feels the patient would require 3-4 days per week and wouldn't tolerate \"3 hour daily\" rehab intensity. SNF would be the preferred choice. If the patient does not agree to SNF, arrange HH or OP depending on home bound status. If patient is medically complex, consider LTACH. Pt requires no DME at discharge.     Pt desires Skilled Rehab placement at discharge. Pt cooperative; agreeable to therapeutic recommendations and plan of care.         Basic Mobility 6-click:  Rollin = Total, A lot = 2, A little = 3; 4 = None  Supine>Sit:   1 = Total, A lot = 2, A little = 3; 4 = None   Sit>Stand with arms:  1 = Total, A lot = 2, A little = 3; 4 = None  Bed>Chair:   1 = Total, A lot = 2, A little = 3; 4 = None  Ambulate in room:  1 = Total, A lot = 2, A little = 3; 4 = None  3-5 Steps " with railin = Total, A lot = 2, A little = 3; 4 = None  Score: 13    Modified Corinne: N/A = No pre-op stroke/TIA    Post-Tx Position: Up in Chair, Alarms activated, and Call light and personal items within reach  PPE: gloves

## 2024-02-28 NOTE — PROGRESS NOTES
"NEPHROLOGY PROGRESS NOTE------[facility]    [facility]  [phone]  Ines Barahona MD      Patient Care Team:  Montse Dawkins MD as PCP - General  Montse Dawkins MD as PCP - Family Medicine  Huey Keyes MD as Consulting Physician (Nephrology)      Provider:  Ines Barahona MD  Patient Name: Rommel Mcfarland  :  1945    SUBJECTIVE:    F/U CRF/CKD    Comfortable. No dysuria. No SOB, CP, palpitations, cramping.     Medication:  allopurinol, 100 mg, Oral, Daily  ceFAZolin, 2,000 mg, Intravenous, Q12H  cholestyramine light, 1 packet, Oral, Daily  enoxaparin, 40 mg, Subcutaneous, Daily  FLUoxetine, 10 mg, Oral, Daily  folic acid, 1 mg, Oral, Daily  hydrALAZINE, 50 mg, Oral, TID  levothyroxine, 175 mcg, Oral, Q AM  pantoprazole, 40 mg, Oral, Daily  saccharomyces boulardii, 250 mg, Oral, BID  sodium chloride, 10 mL, Intravenous, Q12H  sodium chloride, 1 g, Oral, BID  Vitamin B1, 100 mg, Oral, Daily      Pharmacy to Dose enoxaparin (LOVENOX),   sodium chloride, 75 mL/hr, Last Rate: 75 mL/hr (24 0405)        OBJECTIVE    Vital Sign Min/Max for last 24 hours  Temp  Min: 97.9 °F (36.6 °C)  Max: 98.3 °F (36.8 °C)   BP  Min: 121/54  Max: 169/77   Pulse  Min: 61  Max: 82   Resp  Min: 12  Max: 21   SpO2  Min: 92 %  Max: 99 %   No data recorded   Weight  Min: 111 kg (243 lb 13.3 oz)  Max: 111 kg (243 lb 13.3 oz)     Flowsheet Rows      Flowsheet Row First Filed Value   Admission Height 172.7 cm (68\") Documented at 2024 0938   Admission Weight 99.8 kg (220 lb) Documented at 2024 0938            No intake/output data recorded.  I/O last 3 completed shifts:  In: 1500 [P.O.:1500]  Out: 750 [Urine:750]    Physical Exam:  General Appearance: alert, appears stated age and cooperative  Head: normocephalic, without obvious abnormality and atraumatic    Eyes: conjunctivae and sclerae normal and no icterus  Neck: supple and no " "JVD  Lungs: +FEW SCATTERED RHONCHI  Heart: regular rhythm & normal rate and normal S1, S2 +BALDOMERO  Chest Wall: no abnormalities observed  Abdomen: normal bowel sounds and soft, nontender  Extremities: moves extremities well, 1+ BILAT LE EDEMA (R>L), no cyanosis  Skin: +CHRONIC VENOUS INSUFFICIENCY BILAT LE WITH BILAT LE WOUNDS (FOOT AND CALF/PRETIBLAL AREAS)  Neurologic: Alert, and oriented. No focal deficits    Labs:    WBC WBC   Date Value Ref Range Status   02/28/2024 5.60 3.40 - 10.80 10*3/mm3 Final   02/27/2024 6.40 3.40 - 10.80 10*3/mm3 Final   02/26/2024 7.00 3.40 - 10.80 10*3/mm3 Final      HGB Hemoglobin   Date Value Ref Range Status   02/28/2024 11.1 (L) 13.0 - 17.7 g/dL Final   02/27/2024 11.8 (L) 13.0 - 17.7 g/dL Final   02/26/2024 12.2 (L) 13.0 - 17.7 g/dL Final      HCT Hematocrit   Date Value Ref Range Status   02/28/2024 35.0 (L) 37.5 - 51.0 % Final   02/27/2024 36.5 (L) 37.5 - 51.0 % Final   02/26/2024 39.0 37.5 - 51.0 % Final      Platelets No results found for: \"LABPLAT\"   MCV MCV   Date Value Ref Range Status   02/28/2024 89.0 79.0 - 97.0 fL Final   02/27/2024 88.3 79.0 - 97.0 fL Final   02/26/2024 88.9 79.0 - 97.0 fL Final          Sodium Sodium   Date Value Ref Range Status   02/28/2024 139 136 - 145 mmol/L Final   02/27/2024 134 (L) 136 - 145 mmol/L Final   02/26/2024 131 (L) 136 - 145 mmol/L Final      Potassium Potassium   Date Value Ref Range Status   02/28/2024 4.7 3.5 - 5.2 mmol/L Final   02/27/2024 4.2 3.5 - 5.2 mmol/L Final   02/26/2024 4.0 3.5 - 5.2 mmol/L Final      Chloride Chloride   Date Value Ref Range Status   02/28/2024 105 98 - 107 mmol/L Final   02/27/2024 100 98 - 107 mmol/L Final   02/26/2024 93 (L) 98 - 107 mmol/L Final      CO2 CO2   Date Value Ref Range Status   02/28/2024 25.0 22.0 - 29.0 mmol/L Final   02/27/2024 24.0 22.0 - 29.0 mmol/L Final   02/26/2024 25.0 22.0 - 29.0 mmol/L Final      BUN BUN   Date Value Ref Range Status   02/28/2024 16 8 - 23 mg/dL Final " "  02/27/2024 16 8 - 23 mg/dL Final   02/26/2024 15 8 - 23 mg/dL Final      Creatinine Creatinine   Date Value Ref Range Status   02/28/2024 1.43 (H) 0.76 - 1.27 mg/dL Final   02/27/2024 1.35 (H) 0.76 - 1.27 mg/dL Final   02/26/2024 1.53 (H) 0.76 - 1.27 mg/dL Final      Calcium Calcium   Date Value Ref Range Status   02/28/2024 8.8 8.6 - 10.5 mg/dL Final   02/27/2024 8.7 8.6 - 10.5 mg/dL Final   02/26/2024 9.1 8.6 - 10.5 mg/dL Final      PO4 No components found for: \"PO4\"   Albumin Albumin   Date Value Ref Range Status   02/28/2024 3.1 (L) 3.5 - 5.2 g/dL Final   02/27/2024 3.3 (L) 3.5 - 5.2 g/dL Final   02/26/2024 3.8 3.5 - 5.2 g/dL Final      Magnesium Magnesium   Date Value Ref Range Status   02/28/2024 2.3 1.6 - 2.4 mg/dL Final   02/27/2024 1.8 1.6 - 2.4 mg/dL Final      Uric Acid No components found for: \"URIC ACID\"     Imaging Results (Last 72 Hours)       ** No results found for the last 72 hours. **            Results for orders placed during the hospital encounter of 09/15/23    XR Foot 3+ View Right    Narrative  XR FOOT 3+ VW RIGHT    Date of Exam: 9/15/2023 2:30 PM EDT    Indication: wound, infection    Comparison: 7/9/2022    Findings:  Bones are diffusely osteopenic. There is no discrete area of osseous erosion to indicate a site of osteomyelitis. Postsurgical changes of screw fixation noted at the distal tibia and fibula similar to the prior exam. Extensive small vessel vascular  calcifications. Soft tissue swelling overlying the dorsum of the foot which may relate to edema and/or cellulitis.    Impression  Impression:  1. No discrete area of osseous erosion to indicate osteomyelitis.  2. Soft tissue swelling overlying the dorsum of the foot may relate to edema and/or cellulitis.  3. Diffuse osteopenia.  4. Extensive small vessel vascular calcifications.        Electronically Signed: Renny Johnson MD  9/15/2023 2:35 PM EDT  Workstation ID: [id number]      Results for orders placed during the hospital " encounter of 07/09/22    XR Foot 2 View Right    Narrative  DATE OF EXAM:  7/9/2022 1:58 PM    PROCEDURE:  XR FOOT 2 VW RIGHT-    INDICATIONS:  Wound between first and second toe maggots present.  Cellulitis;  L03.115-Cellulitis of right lower limb; B87.9-Myiasis, unspecified    COMPARISON:  No Comparisons Available    TECHNIQUE:  A minimum of two routine standard radiographic views were obtained of  the right foot.    FINDINGS:  There is diffuse soft tissue swelling of the right foot bones are  osteopenic. There is no acute appearing fracture. Postoperative findings  noted in the ankle at the medial malleolus and the distal fibula with  fixation screws. The calcaneus is intact. Small vessel vascular  calcifications noted.    Impression  1. Extensive soft tissue swelling of the right foot may relate to edema  and/or cellulitis.  2. No discrete area of osseous erosion.  3. No acute appearing fracture.  4. Additional chronic findings above.    Electronically Signed By-Renny Johnson MD On:7/9/2022 2:09 PM  This report was finalized on [id number] by  Renny Johnson MD.      Results for orders placed during the hospital encounter of 02/26/24    Duplex venous lower extremity left    Interpretation Summary    Normal left lower extremity venous duplex scan.        ASSESSMENT / PLAN      Cellulitis    CRF/CKD-----Nonoliguric. CRF/CKD STG 3A secondary to HTN NS. Clinically prerenal despite LE  Edema/chronic venous insufficiency. Holding Bumex. D/C IVFs today. No NSAIDs or IV dye. Dose meds for CrCl 45-60 cc/min     2. MILD HYPONATREMIA--------Na+ normal off of Bumex and post NS. Not clinically significant right now. Secondary to EtOH abuse, poor solute intake, and SSRI use. No neuro sx.       3. BILATERAL LE EDEMA/CHRONIC VENOUS INSUFFICIENCY/WOUNDS/CELLULITIS------Abx, wound and wound care. Adjust Kefzol dose for CKD     4. DEPRESSION-----Ok to continue SSRI for now     5. ETOH ABUSE------Counseled     6.  OA/DJD/HYPERURICEMIA------On Allopurinol. No NSAIDs.      7. HTN WITH CKD-----BP ok. Avoid hypotension. No ACE-I/ARB/DRI     8. GERD/PUD PROPHYLAXIS------On PPI. Benefits outweigh risks despite CKD     9. HYPOTHYROIDISM------On Synthroid. TSH ok     10. DVT PROPHYLAXIS------Lovenox      Ines Barahona MD  [facility]  [phone]  02/28/24  07:28 EST

## 2024-02-28 NOTE — PROGRESS NOTES
"DATE/TIME OF ADMISSION:  2/26/2024  9:31 AM     LOS: 1 day   Patient Care Team:  Montse Dawkins MD as PCP - General  Montse Dawkins MD as PCP - Family Medicine  Huey Keyes MD as Consulting Physician (Nephrology)  Consults       Date and Time Order Name Status Description    2/26/2024  5:16 PM Inpatient Nephrology Consult Completed     2/26/2024 11:35 AM Family Medicine Consult              Subjective     Interval History: LOOKING AT GOING TO REHAB AFTER DISCHARGE  ON IV ANTIBIOTICS AND TOPICAL TREATMENT OF THE LEGS AS WELL  WOUND CARE RN CONSULTED  WILLING TO REDUCE FURTHER HIS BEER INTAKE    Patient Complaints: NONE    History taken from: patient    Review of Systems        Objective     Vital Signs  /59   Pulse 63   Temp 98.2 °F (36.8 °C) (Oral)   Resp 20   Ht 170.2 cm (67\")   Wt 110 kg (243 lb 6.2 oz)   SpO2 95%   BMI 38.12 kg/m²     Physical Exam:        General Appearance:    Alert, cooperative, in no acute distress   Head:    Normocephalic, without obvious abnormality, atraumatic   Eyes:            Lids and lashes normal, conjunctivae and sclerae normal, no   icterus, no pallor, corneas clear, PERRLA   Ears:    Ears appear intact with no abnormalities noted   Throat:   No oral lesions, no thrush, oral mucosa moist   Neck:   No adenopathy, supple, trachea midline, no thyromegaly, no   carotid bruit, no JVD   Lungs:     Clear to auscultation,respirations regular, even and                  unlabored    Heart:    Regular rhythm and normal rate, normal S1 and S2, no            murmur, no gallop, no rub, no click   Chest Wall:    No abnormalities observed   Abdomen:     Normal bowel sounds, no masses, no organomegaly, soft        non-tender, non-distended, no guarding, no rebound                tenderness   Extremities:   Moves all extremities well, 1+edema, no cyanosis, LESSENED LEFT LEG              redness; WRAP NOT REMOVED HOWEVER   Pulses:   Pulses palpable and equal bilaterally   Skin:   " No bleeding, bruising or rash; VERY DRY   Lymph nodes:   No palpable adenopathy   Neurologic:   Grossly normal, alert and O x 2        I HAVE PERSONALLY EXAMINED AND REVIEWED THE PATIENT'S RECORD     Lab Results (last 48 hours)       Procedure Component Value Units Date/Time    Magnesium [[id number]]  (Normal) Collected: 02/28/24 0403    Specimen: Blood Updated: 02/28/24 0459     Magnesium 2.3 mg/dL     Phosphorus [[id number]]  (Normal) Collected: 02/28/24 0403    Specimen: Blood Updated: 02/28/24 0449     Phosphorus 3.2 mg/dL     Calcium, Ionized [[id number]]  (Normal) Collected: 02/28/24 0403    Specimen: Blood Updated: 02/28/24 0446     Ionized Calcium 1.25 mmol/L     CBC Auto Differential [[id number]]  (Abnormal) Collected: 02/28/24 0403    Specimen: Blood Updated: 02/28/24 0417     WBC 5.60 10*3/mm3      RBC 3.94 10*6/mm3      Hemoglobin 11.1 g/dL      Hematocrit 35.0 %      MCV 89.0 fL      MCH 28.2 pg      MCHC 31.7 g/dL      RDW 14.7 %      RDW-SD 45.1 fl      MPV 7.9 fL      Platelets 247 10*3/mm3      Neutrophil % 57.9 %      Lymphocyte % 26.1 %      Monocyte % 11.1 %      Eosinophil % 4.5 %      Basophil % 0.4 %      Neutrophils, Absolute 3.20 10*3/mm3      Lymphocytes, Absolute 1.50 10*3/mm3      Monocytes, Absolute 0.60 10*3/mm3      Eosinophils, Absolute 0.20 10*3/mm3      Basophils, Absolute 0.00 10*3/mm3      nRBC 0.0 /100 WBC     Comprehensive Metabolic Panel [[id number]] Collected: 02/28/24 0403    Specimen: Blood Updated: 02/28/24 0410    Sodium, Urine, Random - Urine, Clean Catch [[id number]] Collected: 02/28/24 0058    Specimen: Urine, Clean Catch Updated: 02/28/24 0122     Sodium, Urine 110 mmol/L     Narrative:      Reference intervals for random urine have not been established.  Clinical usage is dependent upon physician's interpretation in combination with other laboratory tests.       Urinalysis With Culture If Indicated - Urine, Clean Catch [[id number]]  (Abnormal) Collected: 02/28/24 0058     Specimen: Urine, Clean Catch Updated: 02/28/24 0108     Color, UA Yellow     Appearance, UA Clear     pH, UA 6.0     Specific Gravity, UA 1.017     Glucose, UA Negative     Ketones, UA Negative     Bilirubin, UA Negative     Blood, UA Negative     Protein, UA Trace     Leuk Esterase, UA Negative     Nitrite, UA Negative     Urobilinogen, UA 0.2 E.U./dL    Narrative:      In absence of clinical symptoms, the presence of pyuria, bacteria, and/or nitrites on the urinalysis result does not correlate with infection.  Urine microscopic not indicated.    CK [[id number]]  (Normal) Collected: 02/27/24 0554    Specimen: Blood Updated: 02/27/24 1800     Creatine Kinase 59 U/L     Uric Acid [[id number]]  (Normal) Collected: 02/27/24 0554    Specimen: Blood Updated: 02/27/24 1800     Uric Acid 6.4 mg/dL     Blood Culture - Blood, Arm, Right [[id number]]  (Normal) Collected: 02/26/24 1029    Specimen: Blood from Arm, Right Updated: 02/27/24 1045     Blood Culture No growth at 24 hours    Blood Culture - Blood, Arm, Left [[id number]]  (Normal) Collected: 02/26/24 1029    Specimen: Blood from Arm, Left Updated: 02/27/24 1045     Blood Culture No growth at 24 hours    Comprehensive Metabolic Panel [[id number]]  (Abnormal) Collected: 02/27/24 0554    Specimen: Blood Updated: 02/27/24 0639     Glucose 91 mg/dL      BUN 16 mg/dL      Creatinine 1.35 mg/dL      Sodium 134 mmol/L      Potassium 4.2 mmol/L      Chloride 100 mmol/L      CO2 24.0 mmol/L      Calcium 8.7 mg/dL      Total Protein 6.0 g/dL      Albumin 3.3 g/dL      ALT (SGPT) <5 U/L      AST (SGOT) 16 U/L      Alkaline Phosphatase 40 U/L      Total Bilirubin 0.4 mg/dL      Globulin 2.7 gm/dL      A/G Ratio 1.2 g/dL      BUN/Creatinine Ratio 11.9     Anion Gap 10.0 mmol/L      eGFR 53.1 mL/min/1.73     Narrative:      GFR Normal >60  Chronic Kidney Disease <60  Kidney Failure <15    The GFR formula is only valid for adults with stable renal function between ages 18 and 70.     Magnesium [[id number]]  (Normal) Collected: 02/27/24 0554    Specimen: Blood Updated: 02/27/24 0639     Magnesium 1.8 mg/dL     TSH [[id number]]  (Normal) Collected: 02/27/24 0554    Specimen: Blood Updated: 02/27/24 0629     TSH 1.690 uIU/mL     Phosphorus [[id number]]  (Normal) Collected: 02/27/24 0554    Specimen: Blood Updated: 02/27/24 0628     Phosphorus 3.3 mg/dL     Hemoglobin A1c [[id number]]  (Normal) Collected: 02/27/24 0554    Specimen: Blood Updated: 02/27/24 0626     Hemoglobin A1C 5.20 %     CBC Auto Differential [[id number]]  (Abnormal) Collected: 02/27/24 0554    Specimen: Blood Updated: 02/27/24 0603     WBC 6.40 10*3/mm3      RBC 4.13 10*6/mm3      Hemoglobin 11.8 g/dL      Hematocrit 36.5 %      MCV 88.3 fL      MCH 28.5 pg      MCHC 32.3 g/dL      RDW 14.8 %      RDW-SD 45.1 fl      MPV 8.3 fL      Platelets 259 10*3/mm3      Neutrophil % 59.3 %      Lymphocyte % 23.0 %      Monocyte % 11.4 %      Eosinophil % 5.1 %      Basophil % 1.2 %      Neutrophils, Absolute 3.80 10*3/mm3      Lymphocytes, Absolute 1.50 10*3/mm3      Monocytes, Absolute 0.70 10*3/mm3      Eosinophils, Absolute 0.30 10*3/mm3      Basophils, Absolute 0.10 10*3/mm3      nRBC 0.1 /100 WBC     POC Glucose Once [[id number]]  (Normal) Collected: 02/26/24 1703    Specimen: Blood Updated: 02/26/24 1705     Glucose 91 mg/dL      Comment: Serial Number: [id number]:  [id number]       COVID-19, FLU A/B, RSV PCR 1 HR TAT - Swab, Nasopharynx [[id number]]  (Normal) Collected: 02/26/24 1550    Specimen: Swab from Nasopharynx Updated: 02/26/24 1642     COVID19 Not Detected     Influenza A PCR Not Detected     Influenza B PCR Not Detected     RSV, PCR Not Detected    Narrative:      Fact sheet for providers: https://www.fda.gov/media/027036/download    Fact sheet for patients: https://www.fda.gov/media/809045/download    Test performed by PCR.    Extra Tubes [[id number]] Collected: 02/26/24 1029    Specimen: Blood, Venous Line Updated:  02/26/24 1130    Narrative:      The following orders were created for panel order Extra Tubes.  Procedure                               Abnormality         Status                     ---------                               -----------         ------                     Gold Top - SST[[id number]]                                   Final result                 Please view results for these tests on the individual orders.    Gold Top - SST [[id number]] Collected: 02/26/24 1029    Specimen: Blood Updated: 02/26/24 1130     Extra Tube Hold for add-ons.     Comment: Auto resulted.       C-reactive Protein [[id number]]  (Abnormal) Collected: 02/26/24 1029    Specimen: Blood Updated: 02/26/24 1123     C-Reactive Protein 10.98 mg/dL     Comprehensive Metabolic Panel [[id number]]  (Abnormal) Collected: 02/26/24 1029    Specimen: Blood Updated: 02/26/24 1103     Glucose 116 mg/dL      BUN 15 mg/dL      Creatinine 1.53 mg/dL      Sodium 131 mmol/L      Potassium 4.0 mmol/L      Chloride 93 mmol/L      CO2 25.0 mmol/L      Calcium 9.1 mg/dL      Total Protein 7.0 g/dL      Albumin 3.8 g/dL      ALT (SGPT) 6 U/L      AST (SGOT) 14 U/L      Alkaline Phosphatase 55 U/L      Total Bilirubin 0.6 mg/dL      Globulin 3.2 gm/dL      A/G Ratio 1.2 g/dL      BUN/Creatinine Ratio 9.8     Anion Gap 13.0 mmol/L      eGFR 45.7 mL/min/1.73     Narrative:      GFR Normal >60  Chronic Kidney Disease <60  Kidney Failure <15    The GFR formula is only valid for adults with stable renal function between ages 18 and 70.    Sedimentation Rate [[id number]]  (Abnormal) Collected: 02/26/24 1029    Specimen: Blood Updated: 02/26/24 1101     Sed Rate 46 mm/hr     CBC & Differential [[id number]]  (Abnormal) Collected: 02/26/24 1029    Specimen: Blood Updated: 02/26/24 1034    Narrative:      The following orders were created for panel order CBC & Differential.  Procedure                               Abnormality         Status                     ---------                                -----------         ------                     CBC Auto Differential[[id number]]        Abnormal            Final result                 Please view results for these tests on the individual orders.    CBC Auto Differential [[id number]]  (Abnormal) Collected: 02/26/24 1029    Specimen: Blood Updated: 02/26/24 1034     WBC 7.00 10*3/mm3      RBC 4.39 10*6/mm3      Hemoglobin 12.2 g/dL      Hematocrit 39.0 %      MCV 88.9 fL      MCH 27.8 pg      MCHC 31.3 g/dL      RDW 14.8 %      RDW-SD 45.1 fl      MPV 7.8 fL      Platelets 275 10*3/mm3      Neutrophil % 58.7 %      Lymphocyte % 18.5 %      Monocyte % 13.8 %      Eosinophil % 6.5 %      Basophil % 2.5 %      Neutrophils, Absolute 4.10 10*3/mm3      Lymphocytes, Absolute 1.30 10*3/mm3      Monocytes, Absolute 1.00 10*3/mm3      Eosinophils, Absolute 0.50 10*3/mm3      Basophils, Absolute 0.20 10*3/mm3      nRBC 0.1 /100 WBC     POC Lactate [[id number]]  (Normal) Collected: 02/26/24 1028    Specimen: Blood Updated: 02/26/24 1029     Lactate 0.8 mmol/L      Comment: Serial Number: [id number]:  [id number]                Imaging Results (Last 48 Hours)       ** No results found for the last 48 hours. **                 I reviewed the patient's new clinical results.    Past Medical History:   Diagnosis Date    Alcohol abuse 07/13/2022    Essential hypertension 07/13/2022    Gait instability 07/13/2022    GERD without esophagitis 07/13/2022    Gout 07/13/2022    Hypothyroidism (acquired) 07/13/2022    Onychomycosis of multiple toenails with type 2 diabetes mellitus 07/13/2022    Sleep apnea     Stage 3a chronic kidney disease 07/13/2022    Tinea pedis of both feet 07/13/2022    Type 2 diabetes mellitus with diabetic neuropathy, without long-term current use of insulin 07/13/2022     Past Surgical History:   Procedure Laterality Date    ORTHOPEDIC SURGERY      fractured ankle and dislocated shoulder         Medication Review:   Scheduled  Meds:allopurinol, 100 mg, Oral, Daily  ceFAZolin, 2,000 mg, Intravenous, Q12H  cholestyramine light, 1 packet, Oral, Daily  enoxaparin, 40 mg, Subcutaneous, Daily  FLUoxetine, 10 mg, Oral, Daily  folic acid, 1 mg, Oral, Daily  hydrALAZINE, 50 mg, Oral, TID  levothyroxine, 175 mcg, Oral, Q AM  pantoprazole, 40 mg, Oral, Daily  saccharomyces boulardii, 250 mg, Oral, BID  sodium chloride, 10 mL, Intravenous, Q12H  sodium chloride, 1 g, Oral, BID  Vitamin B1, 100 mg, Oral, Daily      Continuous Infusions:Pharmacy to Dose enoxaparin (LOVENOX),   sodium chloride, 75 mL/hr, Last Rate: 75 mL/hr (02/28/24 0405)      PRN Meds:.  acetaminophen    aluminum-magnesium hydroxide-simethicone    senna-docusate sodium **AND** polyethylene glycol **AND** bisacodyl **AND** bisacodyl    calcium carbonate    nitroglycerin    ondansetron ODT **OR** ondansetron    Pharmacy to Dose enoxaparin (LOVENOX)    [COMPLETED] Insert Peripheral IV **AND** sodium chloride    sodium chloride    sodium chloride    traMADol     Assessment & Plan   CELLULITIS OF THE LEFT LEG WITH FAILED OUTPT TREATMENT AND MANAGEMENT---IF KEFZOL AND TOPICAL CARE; WOUND CARE NURSE TO BE INVOLVED; DRESSINGS DONE; CAN FOLLOW AS OUTPATIENT AS WELL AS CELLULITIS HAS BEEN RECURRENT   Alcohol abuse---WATCH FOR DT'S ; DISCUSSED WITH HOUSE SUPERVISOR LAST PM; WILL PUT HIM AT THE PCU FOR NOW; DOING OK SO FAR  *COGNITIVE DECLINE---LIVES ALONE WITH MUCH HELP FROM HIS NP DAUGHTER AND HIRED CAREGIVERS; COULD BENEFIT FROM SKILLED NURSING FOR A BIT BEFORE GOING HOME. 30 DAYS OR LESS IS WHAT I FEEL CURRENTLY 7/13/2022    Essential hypertension---STABLE ON MEDS 7/13/2022    Gait instability---PT; HE WILL NEED 30 DAYS OR LESS OF SKILLED SERVICES; CASE MANAGEMENT WORKING WITH DAUGHTER (POA) TO GET PROPER FACILITY SET UP 7/13/2022    GERD without esophagitis--PPI 7/13/2022    Gout---STABLE ON MEDS 7/13/2022    Hypothyroidism (acquired) 7/13/2022    Onychomycosis of multiple toenails with type  2 diabetes mellitus 7/13/2022    Stage 3a chronic kidney disease---DR SANCHES/CHARLES 7/13/2022    Tinea pedis of both feet---CHRONIC MANAGEMENT WITH ANTIFUNGALS 7/13/2022    Type 2 diabetes mellitus with diabetic neuropathy, without long-term current use of insulin; COULD CONSIDER ORAL MEDS  DNR/DNI LIMITED CODE STATUS PER DAUGHTER      Principal Problem:    Cellulitis          Plan for disposition:TO REHAB UPON DISCHARGE  HE HAS BEEN AT [facility] IN THE PAST    Montse Dawkins MD  02/28/24  05:00 EST

## 2024-02-29 LAB
ANION GAP SERPL CALCULATED.3IONS-SCNC: 9 MMOL/L (ref 5–15)
BASOPHILS # BLD AUTO: 0.1 10*3/MM3 (ref 0–0.2)
BASOPHILS NFR BLD AUTO: 2 % (ref 0–1.5)
BUN SERPL-MCNC: 17 MG/DL (ref 8–23)
BUN/CREAT SERPL: 13.1 (ref 7–25)
CALCIUM SPEC-SCNC: 9 MG/DL (ref 8.6–10.5)
CHLORIDE SERPL-SCNC: 105 MMOL/L (ref 98–107)
CO2 SERPL-SCNC: 24 MMOL/L (ref 22–29)
CREAT SERPL-MCNC: 1.3 MG/DL (ref 0.76–1.27)
DEPRECATED RDW RBC AUTO: 45.1 FL (ref 37–54)
EGFRCR SERPLBLD CKD-EPI 2021: 55.5 ML/MIN/1.73
EOSINOPHIL # BLD AUTO: 0.4 10*3/MM3 (ref 0–0.4)
EOSINOPHIL NFR BLD AUTO: 7.2 % (ref 0.3–6.2)
ERYTHROCYTE [DISTWIDTH] IN BLOOD BY AUTOMATED COUNT: 14.8 % (ref 12.3–15.4)
GLUCOSE SERPL-MCNC: 94 MG/DL (ref 65–99)
HCT VFR BLD AUTO: 35.5 % (ref 37.5–51)
HGB BLD-MCNC: 11.1 G/DL (ref 13–17.7)
LYMPHOCYTES # BLD AUTO: 1.6 10*3/MM3 (ref 0.7–3.1)
LYMPHOCYTES NFR BLD AUTO: 27.6 % (ref 19.6–45.3)
MAGNESIUM SERPL-MCNC: 1.8 MG/DL (ref 1.6–2.4)
MCH RBC QN AUTO: 28 PG (ref 26.6–33)
MCHC RBC AUTO-ENTMCNC: 31.4 G/DL (ref 31.5–35.7)
MCV RBC AUTO: 89 FL (ref 79–97)
MONOCYTES # BLD AUTO: 0.6 10*3/MM3 (ref 0.1–0.9)
MONOCYTES NFR BLD AUTO: 10 % (ref 5–12)
NEUTROPHILS NFR BLD AUTO: 3.1 10*3/MM3 (ref 1.7–7)
NEUTROPHILS NFR BLD AUTO: 53.2 % (ref 42.7–76)
NRBC BLD AUTO-RTO: 0 /100 WBC (ref 0–0.2)
PHOSPHATE SERPL-MCNC: 3.3 MG/DL (ref 2.5–4.5)
PLATELET # BLD AUTO: 224 10*3/MM3 (ref 140–450)
PMV BLD AUTO: 7.8 FL (ref 6–12)
POTASSIUM SERPL-SCNC: 4.8 MMOL/L (ref 3.5–5.2)
RBC # BLD AUTO: 3.99 10*6/MM3 (ref 4.14–5.8)
SODIUM SERPL-SCNC: 138 MMOL/L (ref 136–145)
WBC NRBC COR # BLD AUTO: 5.8 10*3/MM3 (ref 3.4–10.8)

## 2024-02-29 PROCEDURE — 97112 NEUROMUSCULAR REEDUCATION: CPT

## 2024-02-29 PROCEDURE — 97116 GAIT TRAINING THERAPY: CPT

## 2024-02-29 PROCEDURE — 84100 ASSAY OF PHOSPHORUS: CPT | Performed by: INTERNAL MEDICINE

## 2024-02-29 PROCEDURE — 85025 COMPLETE CBC W/AUTO DIFF WBC: CPT | Performed by: FAMILY MEDICINE

## 2024-02-29 PROCEDURE — 97535 SELF CARE MNGMENT TRAINING: CPT

## 2024-02-29 PROCEDURE — 25010000002 CEFAZOLIN PER 500 MG: Performed by: INTERNAL MEDICINE

## 2024-02-29 PROCEDURE — 80048 BASIC METABOLIC PNL TOTAL CA: CPT | Performed by: INTERNAL MEDICINE

## 2024-02-29 PROCEDURE — 97530 THERAPEUTIC ACTIVITIES: CPT

## 2024-02-29 PROCEDURE — 25010000002 CEFAZOLIN PER 500 MG: Performed by: FAMILY MEDICINE

## 2024-02-29 PROCEDURE — 25010000002 MAGNESIUM SULFATE IN D5W 1G/100ML (PREMIX) 1-5 GM/100ML-% SOLUTION: Performed by: INTERNAL MEDICINE

## 2024-02-29 PROCEDURE — 83735 ASSAY OF MAGNESIUM: CPT | Performed by: INTERNAL MEDICINE

## 2024-02-29 PROCEDURE — 25010000002 ENOXAPARIN PER 10 MG: Performed by: FAMILY MEDICINE

## 2024-02-29 RX ORDER — MAGNESIUM SULFATE 1 G/100ML
1 INJECTION INTRAVENOUS ONCE
Status: COMPLETED | OUTPATIENT
Start: 2024-02-29 | End: 2024-02-29

## 2024-02-29 RX ADMIN — PANTOPRAZOLE SODIUM 40 MG: 40 TABLET, DELAYED RELEASE ORAL at 08:43

## 2024-02-29 RX ADMIN — Medication 10 ML: at 08:43

## 2024-02-29 RX ADMIN — ALLOPURINOL 100 MG: 100 TABLET ORAL at 08:43

## 2024-02-29 RX ADMIN — SODIUM CHLORIDE 2000 MG: 900 INJECTION INTRAVENOUS at 19:40

## 2024-02-29 RX ADMIN — HYDRALAZINE HYDROCHLORIDE 50 MG: 25 TABLET ORAL at 15:58

## 2024-02-29 RX ADMIN — LEVOTHYROXINE SODIUM 175 MCG: 0.17 TABLET ORAL at 05:49

## 2024-02-29 RX ADMIN — HYDRALAZINE HYDROCHLORIDE 50 MG: 25 TABLET ORAL at 20:00

## 2024-02-29 RX ADMIN — Medication 100 MG: at 08:43

## 2024-02-29 RX ADMIN — SODIUM CHLORIDE 1 G: 1 TABLET ORAL at 20:00

## 2024-02-29 RX ADMIN — Medication 10 ML: at 20:00

## 2024-02-29 RX ADMIN — HYDRALAZINE HYDROCHLORIDE 50 MG: 25 TABLET ORAL at 08:43

## 2024-02-29 RX ADMIN — ENOXAPARIN SODIUM 40 MG: 100 INJECTION SUBCUTANEOUS at 15:58

## 2024-02-29 RX ADMIN — CHOLESTYRAMINE 4 G: 4 POWDER, FOR SUSPENSION ORAL at 11:59

## 2024-02-29 RX ADMIN — Medication 250 MG: at 08:43

## 2024-02-29 RX ADMIN — FLUOXETINE 10 MG: 10 CAPSULE ORAL at 08:43

## 2024-02-29 RX ADMIN — FOLIC ACID 1 MG: 1 TABLET ORAL at 08:43

## 2024-02-29 RX ADMIN — Medication 250 MG: at 20:00

## 2024-02-29 RX ADMIN — MAGNESIUM SULFATE IN DEXTROSE 1 G: 10 INJECTION, SOLUTION INTRAVENOUS at 08:43

## 2024-02-29 RX ADMIN — SODIUM CHLORIDE 1 G: 1 TABLET ORAL at 08:43

## 2024-02-29 RX ADMIN — SODIUM CHLORIDE 2000 MG: 900 INJECTION INTRAVENOUS at 12:00

## 2024-02-29 NOTE — PLAN OF CARE
Goal Outcome Evaluation:      Provided ABT IV, encouraged water intake throughout the shift, and turned q2h. Patient appears to have had a good night. BLE wound tx done this am.

## 2024-02-29 NOTE — THERAPY TREATMENT NOTE
"Subjective: Pt agreeable to therapeutic plan of care.    Objective:   Pt having spillage with attempt at using urinal; pt agreeable to transferring to BSC.     Bed mobility - Mod-A with verbal cues for task segmentation  Transfers - Min-A, Assist x 2, and with rolling walker for sit<>stand from elevated EOB and transfer to BSC  Ambulation - 8 feet Min-A, Assist x 2, and with rolling walker    Vitals: WNL    Pain: 2 VAS   Location: feet  Intervention for pain: Repositioned, Increased Activity, and Therapeutic Presence    Education: Provided education on the importance of mobility in the acute care setting, Verbal/Tactile Cues, Transfer Training, and Gait Training    Assessment: Rommel Mcfarland presents with functional mobility impairments which indicate the need for skilled intervention. Pt required total assist for perianal care. Pt ambulates with forward flexed posture with wide base of support and decreased foot clearance BLE. Tolerating session today without incident. Will continue to follow and progress as tolerated.     Plan/Recommendations:   If medically appropriate, Moderate Intensity Therapy recommended post-acute care. This is recommended as therapy feels the patient would require 3-4 days per week and wouldn't tolerate \"3 hour daily\" rehab intensity. SNF would be the preferred choice. If the patient does not agree to SNF, arrange HH or OP depending on home bound status. If patient is medically complex, consider LTACH. Pt requires no DME at discharge.     Pt desires Skilled Rehab placement at discharge. Pt cooperative; agreeable to therapeutic recommendations and plan of care.         Basic Mobility 6-click:  Rollin = Total, A lot = 2, A little = 3; 4 = None  Supine>Sit:   1 = Total, A lot = 2, A little = 3; 4 = None   Sit>Stand with arms:  1 = Total, A lot = 2, A little = 3; 4 = None  Bed>Chair:   1 = Total, A lot = 2, A little = 3; 4 = None  Ambulate in room:  1 = Total, A lot = 2, A " little = 3; 4 = None  3-5 Steps with railin = Total, A lot = 2, A little = 3; 4 = None  Score: 11    Modified [place]: N/A = No pre-op stroke/TIA    Post-Tx Position: Up in Chair, Alarms activated, and Call light and personal items within reach  PPE: gloves

## 2024-02-29 NOTE — PLAN OF CARE
Goal Outcome Evaluation:        Problem: Skin Injury Risk Increased  Goal: Skin Health and Integrity  Outcome: Ongoing, Progressing  Intervention: Optimize Skin Protection  Recent Flowsheet Documentation  Taken 2/29/2024 1627 by Ayala Fisher RN  Pressure Reduction Techniques: frequent weight shift encouraged  Head of Bed (HOB) Positioning: HOB at 30-45 degrees  Pressure Reduction Devices: positioning supports utilized  Skin Protection: adhesive use limited  Taken 2/29/2024 1235 by Ayala Fisher RN  Pressure Reduction Techniques: frequent weight shift encouraged  Head of Bed (HOB) Positioning: HOB at 30-45 degrees  Pressure Reduction Devices: positioning supports utilized  Skin Protection: adhesive use limited  Taken 2/29/2024 0845 by Ayala Fisher RN  Pressure Reduction Techniques: frequent weight shift encouraged  Head of Bed (HOB) Positioning: HOB at 30-45 degrees  Pressure Reduction Devices: positioning supports utilized  Skin Protection: adhesive use limited     Problem: Fall Injury Risk  Goal: Absence of Fall and Fall-Related Injury  Outcome: Ongoing, Progressing  Intervention: Identify and Manage Contributors  Recent Flowsheet Documentation  Taken 2/29/2024 1627 by Ayala Fisher RN  Medication Review/Management: medications reviewed  Self-Care Promotion:   independence encouraged   BADL personal objects within reach  Taken 2/29/2024 1446 by Ayala Fisher RN  Medication Review/Management: medications reviewed  Taken 2/29/2024 1235 by Ayala Fisher RN  Medication Review/Management: medications reviewed  Self-Care Promotion:   independence encouraged   BADL personal objects within reach  Taken 2/29/2024 1036 by Ayala Fisher RN  Medication Review/Management: medications reviewed  Taken 2/29/2024 0845 by Ayala Fisher RN  Medication Review/Management: medications reviewed  Self-Care Promotion:   independence encouraged   BADL personal objects within reach  Intervention: Promote Injury-Free  Environment  Recent Flowsheet Documentation  Taken 2/29/2024 1627 by Ayala Fisher RN  Safety Promotion/Fall Prevention:   activity supervised   clutter free environment maintained   assistive device/personal items within reach   fall prevention program maintained   nonskid shoes/slippers when out of bed   room organization consistent   safety round/check completed  Taken 2/29/2024 1446 by Ayala Fisher RN  Safety Promotion/Fall Prevention:   activity supervised   assistive device/personal items within reach   clutter free environment maintained   fall prevention program maintained   nonskid shoes/slippers when out of bed   room organization consistent   safety round/check completed  Taken 2/29/2024 1235 by Ayala Fisher RN  Safety Promotion/Fall Prevention:   activity supervised   assistive device/personal items within reach   clutter free environment maintained   fall prevention program maintained   nonskid shoes/slippers when out of bed   room organization consistent   safety round/check completed  Taken 2/29/2024 1036 by Ayala Fisher RN  Safety Promotion/Fall Prevention:   activity supervised   assistive device/personal items within reach   clutter free environment maintained   fall prevention program maintained   nonskid shoes/slippers when out of bed   room organization consistent   safety round/check completed  Taken 2/29/2024 0845 by Ayala Fisher RN  Safety Promotion/Fall Prevention:   activity supervised   assistive device/personal items within reach   clutter free environment maintained   fall prevention program maintained   nonskid shoes/slippers when out of bed   room organization consistent   safety round/check completed     Problem: Adult Inpatient Plan of Care  Goal: Plan of Care Review  Outcome: Ongoing, Progressing  Goal: Patient-Specific Goal (Individualized)  Outcome: Ongoing, Progressing  Goal: Absence of Hospital-Acquired Illness or Injury  Outcome: Ongoing,  Progressing  Intervention: Identify and Manage Fall Risk  Recent Flowsheet Documentation  Taken 2/29/2024 1627 by Ayala Fisher RN  Safety Promotion/Fall Prevention:   activity supervised   clutter free environment maintained   assistive device/personal items within reach   fall prevention program maintained   nonskid shoes/slippers when out of bed   room organization consistent   safety round/check completed  Taken 2/29/2024 1446 by Ayala Fisher RN  Safety Promotion/Fall Prevention:   activity supervised   assistive device/personal items within reach   clutter free environment maintained   fall prevention program maintained   nonskid shoes/slippers when out of bed   room organization consistent   safety round/check completed  Taken 2/29/2024 1235 by Ayala Fisher RN  Safety Promotion/Fall Prevention:   activity supervised   assistive device/personal items within reach   clutter free environment maintained   fall prevention program maintained   nonskid shoes/slippers when out of bed   room organization consistent   safety round/check completed  Taken 2/29/2024 1036 by Ayala Fisher RN  Safety Promotion/Fall Prevention:   activity supervised   assistive device/personal items within reach   clutter free environment maintained   fall prevention program maintained   nonskid shoes/slippers when out of bed   room organization consistent   safety round/check completed  Taken 2/29/2024 0845 by Ayala Fisher RN  Safety Promotion/Fall Prevention:   activity supervised   assistive device/personal items within reach   clutter free environment maintained   fall prevention program maintained   nonskid shoes/slippers when out of bed   room organization consistent   safety round/check completed  Intervention: Prevent Skin Injury  Recent Flowsheet Documentation  Taken 2/29/2024 1627 by Ayala Fisher RN  Body Position: position changed independently  Skin Protection: adhesive use limited  Taken 2/29/2024 1235 by  Ayala Fisher RN  Body Position: position changed independently  Skin Protection: adhesive use limited  Taken 2/29/2024 0845 by Ayala Fisher RN  Body Position:   turned   left  Skin Protection: adhesive use limited  Intervention: Prevent and Manage VTE (Venous Thromboembolism) Risk  Recent Flowsheet Documentation  Taken 2/29/2024 1627 by Ayala Fisher RN  Activity Management: up in chair  VTE Prevention/Management: patient refused intervention  Range of Motion: active ROM (range of motion) encouraged  Taken 2/29/2024 1235 by Ayala Fisher RN  Activity Management: up in chair  VTE Prevention/Management: patient refused intervention  Range of Motion: active ROM (range of motion) encouraged  Taken 2/29/2024 0845 by Ayala Fisher RN  Activity Management: activity encouraged  VTE Prevention/Management: patient refused intervention  Range of Motion: active ROM (range of motion) encouraged  Intervention: Prevent Infection  Recent Flowsheet Documentation  Taken 2/29/2024 1627 by Ayala Fisher RN  Infection Prevention:   hand hygiene promoted   personal protective equipment utilized   rest/sleep promoted   single patient room provided   environmental surveillance performed  Taken 2/29/2024 1446 by Ayala Fisher RN  Infection Prevention:   hand hygiene promoted   personal protective equipment utilized   rest/sleep promoted   single patient room provided   environmental surveillance performed  Taken 2/29/2024 1235 by Ayala Fisher RN  Infection Prevention:   hand hygiene promoted   personal protective equipment utilized   rest/sleep promoted   single patient room provided   environmental surveillance performed  Taken 2/29/2024 1036 by Ayala Fisher RN  Infection Prevention:   hand hygiene promoted   personal protective equipment utilized   rest/sleep promoted   single patient room provided   environmental surveillance performed  Taken 2/29/2024 0845 by Ayala Fisher RN  Infection Prevention:    hand hygiene promoted   personal protective equipment utilized   rest/sleep promoted   single patient room provided   environmental surveillance performed  Goal: Optimal Comfort and Wellbeing  Outcome: Ongoing, Progressing  Intervention: Provide Person-Centered Care  Recent Flowsheet Documentation  Taken 2/29/2024 1627 by Ayala Fisher RN  Trust Relationship/Rapport:   care explained   choices provided  Taken 2/29/2024 1235 by Ayala Fisher, RN  Trust Relationship/Rapport:   care explained   choices provided  Taken 2/29/2024 0845 by Ayala Fisher RN  Trust Relationship/Rapport:   care explained   choices provided  Goal: Readiness for Transition of Care  Outcome: Ongoing, Progressing

## 2024-02-29 NOTE — PLAN OF CARE
"Goal Outcome Evaluation:  Plan of Care Reviewed With: patient     Assessment: Rommel Mcfarland presents with functional mobility impairments which indicate the need for skilled intervention. Pt required total assist for perianal care. Pt ambulates with forward flexed posture with wide base of support and decreased foot clearance BLE. Tolerating session today without incident. Will continue to follow and progress as tolerated.     Plan/Recommendations:   If medically appropriate, Moderate Intensity Therapy recommended post-acute care. This is recommended as therapy feels the patient would require 3-4 days per week and wouldn't tolerate \"3 hour daily\" rehab intensity. SNF would be the preferred choice. If the patient does not agree to SNF, arrange HH or OP depending on home bound status. If patient is medically complex, consider LTACH. Pt requires no DME at discharge.     Pt desires Skilled Rehab placement at discharge. Pt cooperative; agreeable to therapeutic recommendations and plan of care.          Anticipated Discharge Disposition (PT): skilled nursing facility                        "

## 2024-02-29 NOTE — PROGRESS NOTES
"NEPHROLOGY PROGRESS NOTE------[facility]    [facility]  [phone]  Ines Barahona MD      Patient Care Team:  Montse Dawkins MD as PCP - General  Montse Dawkins MD as PCP - Family Medicine  Huey Keyes MD as Consulting Physician (Nephrology)      Provider:  Ines Barahona MD  Patient Name: Rommel Mcfarland  :  1945    SUBJECTIVE:    F/U CRF/CKD    No complaints. No SOB, CP, dysuria. Legs wrapped. Appetite good     Medication:  allopurinol, 100 mg, Oral, Daily  ceFAZolin, 2,000 mg, Intravenous, Q12H  cholestyramine light, 1 packet, Oral, Daily  enoxaparin, 40 mg, Subcutaneous, Daily  FLUoxetine, 10 mg, Oral, Daily  folic acid, 1 mg, Oral, Daily  hydrALAZINE, 50 mg, Oral, TID  levothyroxine, 175 mcg, Oral, Q AM  pantoprazole, 40 mg, Oral, Daily  saccharomyces boulardii, 250 mg, Oral, BID  sodium chloride, 10 mL, Intravenous, Q12H  sodium chloride, 1 g, Oral, BID  Vitamin B1, 100 mg, Oral, Daily      Pharmacy to Dose enoxaparin (LOVENOX),         OBJECTIVE    Vital Sign Min/Max for last 24 hours  Temp  Min: 97.6 °F (36.4 °C)  Max: 98.7 °F (37.1 °C)   BP  Min: 131/60  Max: 177/84   Pulse  Min: 50  Max: 70   Resp  Min: 13  Max: 20   SpO2  Min: 92 %  Max: 94 %   No data recorded   Weight  Min: 111 kg (245 lb 9.5 oz)  Max: 111 kg (245 lb 9.5 oz)     Flowsheet Rows      Flowsheet Row First Filed Value   Admission Height 172.7 cm (68\") Documented at 2024 0938   Admission Weight 99.8 kg (220 lb) Documented at 2024 0938            No intake/output data recorded.  I/O last 3 completed shifts:  In: 1320 [P.O.:1320]  Out: 1325 [Urine:1325]    Physical Exam:  General Appearance: alert, appears stated age and cooperative  Head: normocephalic, without obvious abnormality and atraumatic    Eyes: conjunctivae and sclerae normal and no icterus  Neck: supple and no JVD  Lungs: +FEW SCATTERED RHONCHI  Heart: regular rhythm & normal " "rate and normal S1, S2 +BALDOMERO  Chest Wall: no abnormalities observed  Abdomen: normal bowel sounds and soft, nontender  Extremities: moves extremities well, 1+ BILAT LE EDEMA (R>L), no cyanosis  Skin: +CHRONIC VENOUS INSUFFICIENCY BILAT LE WITH BILAT LE WOUNDS (FOOT AND CALF/PRETIBLAL AREAS)  Neurologic: Alert, and oriented. No focal deficits    Labs:    WBC WBC   Date Value Ref Range Status   02/29/2024 5.80 3.40 - 10.80 10*3/mm3 Final   02/28/2024 5.60 3.40 - 10.80 10*3/mm3 Final   02/27/2024 6.40 3.40 - 10.80 10*3/mm3 Final   02/26/2024 7.00 3.40 - 10.80 10*3/mm3 Final      HGB Hemoglobin   Date Value Ref Range Status   02/29/2024 11.1 (L) 13.0 - 17.7 g/dL Final   02/28/2024 11.1 (L) 13.0 - 17.7 g/dL Final   02/27/2024 11.8 (L) 13.0 - 17.7 g/dL Final   02/26/2024 12.2 (L) 13.0 - 17.7 g/dL Final      HCT Hematocrit   Date Value Ref Range Status   02/29/2024 35.5 (L) 37.5 - 51.0 % Final   02/28/2024 35.0 (L) 37.5 - 51.0 % Final   02/27/2024 36.5 (L) 37.5 - 51.0 % Final   02/26/2024 39.0 37.5 - 51.0 % Final      Platelets No results found for: \"LABPLAT\"   MCV MCV   Date Value Ref Range Status   02/29/2024 89.0 79.0 - 97.0 fL Final   02/28/2024 89.0 79.0 - 97.0 fL Final   02/27/2024 88.3 79.0 - 97.0 fL Final   02/26/2024 88.9 79.0 - 97.0 fL Final          Sodium Sodium   Date Value Ref Range Status   02/29/2024 138 136 - 145 mmol/L Final   02/28/2024 139 136 - 145 mmol/L Final   02/27/2024 134 (L) 136 - 145 mmol/L Final   02/26/2024 131 (L) 136 - 145 mmol/L Final      Potassium Potassium   Date Value Ref Range Status   02/29/2024 4.8 3.5 - 5.2 mmol/L Final   02/28/2024 4.7 3.5 - 5.2 mmol/L Final   02/27/2024 4.2 3.5 - 5.2 mmol/L Final   02/26/2024 4.0 3.5 - 5.2 mmol/L Final      Chloride Chloride   Date Value Ref Range Status   02/29/2024 105 98 - 107 mmol/L Final   02/28/2024 105 98 - 107 mmol/L Final   02/27/2024 100 98 - 107 mmol/L Final   02/26/2024 93 (L) 98 - 107 mmol/L Final      CO2 CO2   Date Value Ref Range " "Status   02/29/2024 24.0 22.0 - 29.0 mmol/L Final   02/28/2024 25.0 22.0 - 29.0 mmol/L Final   02/27/2024 24.0 22.0 - 29.0 mmol/L Final   02/26/2024 25.0 22.0 - 29.0 mmol/L Final      BUN BUN   Date Value Ref Range Status   02/29/2024 17 8 - 23 mg/dL Final   02/28/2024 16 8 - 23 mg/dL Final   02/27/2024 16 8 - 23 mg/dL Final   02/26/2024 15 8 - 23 mg/dL Final      Creatinine Creatinine   Date Value Ref Range Status   02/29/2024 1.30 (H) 0.76 - 1.27 mg/dL Final   02/28/2024 1.43 (H) 0.76 - 1.27 mg/dL Final   02/27/2024 1.35 (H) 0.76 - 1.27 mg/dL Final   02/26/2024 1.53 (H) 0.76 - 1.27 mg/dL Final      Calcium Calcium   Date Value Ref Range Status   02/29/2024 9.0 8.6 - 10.5 mg/dL Final   02/28/2024 8.8 8.6 - 10.5 mg/dL Final   02/27/2024 8.7 8.6 - 10.5 mg/dL Final   02/26/2024 9.1 8.6 - 10.5 mg/dL Final      PO4 No components found for: \"PO4\"   Albumin Albumin   Date Value Ref Range Status   02/28/2024 3.1 (L) 3.5 - 5.2 g/dL Final   02/27/2024 3.3 (L) 3.5 - 5.2 g/dL Final   02/26/2024 3.8 3.5 - 5.2 g/dL Final      Magnesium Magnesium   Date Value Ref Range Status   02/29/2024 1.8 1.6 - 2.4 mg/dL Final   02/28/2024 2.3 1.6 - 2.4 mg/dL Final   02/27/2024 1.8 1.6 - 2.4 mg/dL Final      Uric Acid No components found for: \"URIC ACID\"     Imaging Results (Last 72 Hours)       ** No results found for the last 72 hours. **            Results for orders placed during the hospital encounter of 09/15/23    XR Foot 3+ View Right    Narrative  XR FOOT 3+ VW RIGHT    Date of Exam: 9/15/2023 2:30 PM EDT    Indication: wound, infection    Comparison: 7/9/2022    Findings:  Bones are diffusely osteopenic. There is no discrete area of osseous erosion to indicate a site of osteomyelitis. Postsurgical changes of screw fixation noted at the distal tibia and fibula similar to the prior exam. Extensive small vessel vascular  calcifications. Soft tissue swelling overlying the dorsum of the foot which may relate to edema and/or " cellulitis.    Impression  Impression:  1. No discrete area of osseous erosion to indicate osteomyelitis.  2. Soft tissue swelling overlying the dorsum of the foot may relate to edema and/or cellulitis.  3. Diffuse osteopenia.  4. Extensive small vessel vascular calcifications.        Electronically Signed: Renny Johnson MD  9/15/2023 2:35 PM EDT  Workstation ID: [id number]      Results for orders placed during the hospital encounter of 07/09/22    XR Foot 2 View Right    Narrative  DATE OF EXAM:  7/9/2022 1:58 PM    PROCEDURE:  XR FOOT 2 VW RIGHT-    INDICATIONS:  Wound between first and second toe maggots present.  Cellulitis;  L03.115-Cellulitis of right lower limb; B87.9-Myiasis, unspecified    COMPARISON:  No Comparisons Available    TECHNIQUE:  A minimum of two routine standard radiographic views were obtained of  the right foot.    FINDINGS:  There is diffuse soft tissue swelling of the right foot bones are  osteopenic. There is no acute appearing fracture. Postoperative findings  noted in the ankle at the medial malleolus and the distal fibula with  fixation screws. The calcaneus is intact. Small vessel vascular  calcifications noted.    Impression  1. Extensive soft tissue swelling of the right foot may relate to edema  and/or cellulitis.  2. No discrete area of osseous erosion.  3. No acute appearing fracture.  4. Additional chronic findings above.    Electronically Signed By-Renny Johnson MD On:7/9/2022 2:09 PM  This report was finalized on [id number] by  Renny Johnson MD.      Results for orders placed during the hospital encounter of 02/26/24    Duplex venous lower extremity left    Interpretation Summary    Normal left lower extremity venous duplex scan.        ASSESSMENT / PLAN      Cellulitis    CRF/CKD-----Nonoliguric. CRF/CKD STG 3A secondary to HTN NS. Clinically prerenal despite LE  Edema/chronic venous insufficiency. Holding Bumex. Stable off of IVFs. No NSAIDs or IV dye. Dose meds for CrCl 45-60  cc/min     2. MILD HYPONATREMIA--------Na+ normal off of Bumex and post NS. Normal levels now. Secondary to EtOH abuse, poor solute intake, and SSRI use. No neuro sx.       3. BILATERAL LE EDEMA/CHRONIC VENOUS INSUFFICIENCY/WOUNDS/CELLULITIS------Abx, wound and wound care. Adjust Kefzol dose for CKD     4. DEPRESSION-----Ok to continue SSRI for now     5. ETOH ABUSE------Counseled     6. OA/DJD/HYPERURICEMIA------On Allopurinol. No NSAIDs.      7. HTN WITH CKD-----BP ok. Avoid hypotension. No ACE-I/ARB/DRI     8. GERD/PUD PROPHYLAXIS------On PPI. Benefits outweigh risks despite CKD     9. HYPOTHYROIDISM------On Synthroid. TSH ok     10. DVT PROPHYLAXIS------Lovenox      Ines Barahona MD  [facility]  [phone]  02/29/24  08:21 EST

## 2024-02-29 NOTE — PLAN OF CARE
"  Assessment: Rommel Mcfarland presents with ADL impairments affecting function including balance, endurance / activity tolerance, pain, and strength. Demonstrated functioning below baseline abilities indicate the need for continued skilled intervention while inpatient. Tolerating session today without incident. Will continue to follow and progress as tolerated.     Plan/Recommendations:   Moderate Intensity Therapy recommended post-acute care. This is recommended as therapy feels the patient would require 3-4 days per week and wouldn't tolerate \"3 hour daily\" rehab intensity. SNF would be the preferred choice. If the patient does not agree to SNF, arrange HH or OP depending on home bound status. If patient is medically complex, consider LTACH.. Pt requires no DME at discharge.   "

## 2024-02-29 NOTE — CASE MANAGEMENT/SOCIAL WORK
Continued Stay Note  [facility]     Patient Name: Rommel Mcfarland  MRN: [id number]  Today's Date: 2/29/2024    Admit Date: 2/26/2024    Plan: [facility] accepted. Bed available 3/1. PASRR approved. From home with caregiver 2 days/week. Has used [facility] previously.   Discharge Plan       Row Name 02/29/24 1322       Plan    Plan [facility] accepted. Bed available 3/1. PASRR approved. From home with caregiver 2 days/week. Has used [facility] previously.    Patient/Family in Agreement with Plan yes    Plan Comments CM confirmed with [facility] liaison that bed will be available tomorrow. CM updated MD. CM updated daughter by phone, verbalizes agreement. Barrier to D/C: IV abx, replacing electrolytes.                      Expected Discharge Date and Time       Expected Discharge Date Expected Discharge Time    Mar 1, 2024                Phone communication or documentation only - no physical contact with patient or family.     BLAISE Davey, RN    [address] [address]    Office: [phone]  Fax: [phone]

## 2024-02-29 NOTE — CASE MANAGEMENT/SOCIAL WORK
Social Work Assessment  [facility]     Patient Name: Rommel Mcfarland  MRN: [id number]  Today's Date: 2/29/2024    Admit Date: 2/26/2024     Discharge Plan       Row Name 02/29/24 0901       Plan    Plan [facility] referral pending acceptance. No precert required, PASRR approved. From home with caregiver 2 days/week. [facility] in the past      MARYAN Shi, MSW    Phone: [phone]  Fax: [phone]  Email: [email or web address]

## 2024-02-29 NOTE — THERAPY TREATMENT NOTE
"Subjective: Pt agreeable to therapeutic plan of care.  Cognition: arousal/alertness: Alert and Attentive    Objective:     Bed Mobility: Mod-A   Functional Transfers: Min-A and Assist x 2     Balance: sitting EOB Supervision    Toileting: Dependent  ADL Position: supported standing  ADL Comments: [place] care     Vitals: WNL    Pain: 2 VAS  Location: Feet  Interventions for pain: Repositioned  Education: Provided education on the importance of mobility in the acute care setting      Assessment: Rommel Mcfarland presents with ADL impairments affecting function including balance, endurance / activity tolerance, pain, and strength. Demonstrated functioning below baseline abilities indicate the need for continued skilled intervention while inpatient. Tolerating session today without incident. Will continue to follow and progress as tolerated.     Plan/Recommendations:   Moderate Intensity Therapy recommended post-acute care. This is recommended as therapy feels the patient would require 3-4 days per week and wouldn't tolerate \"3 hour daily\" rehab intensity. SNF would be the preferred choice. If the patient does not agree to SNF, arrange HH or OP depending on home bound status. If patient is medically complex, consider LTACH.. Pt requires no DME at discharge.     Pt desires Skilled Rehab placement at discharge. Pt cooperative; agreeable to therapeutic recommendations and plan of care.     Modified [place]: N/A = No pre-op stroke/TIA    Post-Tx Position: Up in Chair, Alarms activated, and Call light and personal items within reach  PPE: gloves    "

## 2024-02-29 NOTE — PROGRESS NOTES
"DATE/TIME OF ADMISSION:  2/26/2024  9:31 AM     LOS: 2 days   Patient Care Team:  Montse Dawkins MD as PCP - General  Montse Dawkins MD as PCP - Family Medicine  Huey Keyes MD as Consulting Physician (Nephrology)  Consults       Date and Time Order Name Status Description    2/26/2024  5:16 PM Inpatient Nephrology Consult Completed     2/26/2024 11:35 AM Family Medicine Consult              Subjective PLANNING ON [facility] TOMORROW FOR FURTHER CARE NEEDED FOR CELLULITIS    Interval History: DOING WELL; NO EVIDENCE OF DT'S    Patient Complaints: NONE; PLEASANT AND AGREEABLE TO [facility] FOR REHAB  AGREES TO NOT >2 BEERS A DAY    History taken from: patient    Review of Systems        Objective     Vital Signs  /64 (BP Location: Right arm, Patient Position: Lying)   Pulse 57   Temp 97.6 °F (36.4 °C) (Oral)   Resp 20   Ht 170.2 cm (67\")   Wt 111 kg (245 lb 9.5 oz)   SpO2 94%   BMI 38.47 kg/m²     Physical Exam:       General Appearance:    Alert, cooperative, in no acute distress WITTING UP IN THE BEDSIDE CHAIR   Head:    Normocephalic, without obvious abnormality, atraumatic   Eyes:            Lids and lashes normal, conjunctivae and sclerae normal, no   icterus, no pallor, corneas clear, PERRLA   Ears:    Ears appear intact with no abnormalities noted   Throat:   No oral lesions, no thrush, oral mucosa moist   Neck:   No adenopathy, supple, trachea midline, no thyromegaly, no   carotid bruit, no JVD   Lungs:     Clear to auscultation,respirations regular, even and                  unlabored    Heart:    Regular rhythm and normal rate, normal S1 and S2, no            murmur, no gallop, no rub, no click   Chest Wall:    No abnormalities observed   Abdomen:     Normal bowel sounds, no masses, no organomegaly, soft        non-tender, non-distended, no guarding, no rebound                tenderness   Extremities:   Moves all extremities well, no edema, no cyanosis, REDUCED  redness TO THE LEFT " LEG; NON TENDER NOW; LEGS WRAPPED   Pulses:   Pulses palpable and equal bilaterally   Skin:   No bleeding, bruising or rash   Lymph nodes:   No palpable adenopathy   Neurologic:   Grossly normal, alert and O x 3        I HAVE PERSONALLY EXAMINED AND REVIEWED THE PATIENT'S RECORD     Lab Results (last 48 hours)       Procedure Component Value Units Date/Time    Basic Metabolic Panel [[id number]]  (Abnormal) Collected: 02/29/24 0438    Specimen: Blood from Arm, Left Updated: 02/29/24 0508     Glucose 94 mg/dL      BUN 17 mg/dL      Creatinine 1.30 mg/dL      Sodium 138 mmol/L      Potassium 4.8 mmol/L      Chloride 105 mmol/L      CO2 24.0 mmol/L      Calcium 9.0 mg/dL      BUN/Creatinine Ratio 13.1     Anion Gap 9.0 mmol/L      eGFR 55.5 mL/min/1.73     Narrative:      GFR Normal >60  Chronic Kidney Disease <60  Kidney Failure <15    The GFR formula is only valid for adults with stable renal function between ages 18 and 70.    Magnesium [[id number]]  (Normal) Collected: 02/29/24 0438    Specimen: Blood from Arm, Left Updated: 02/29/24 0508     Magnesium 1.8 mg/dL     Phosphorus [[id number]]  (Normal) Collected: 02/29/24 0438    Specimen: Blood from Arm, Left Updated: 02/29/24 0508     Phosphorus 3.3 mg/dL     CBC Auto Differential [[id number]]  (Abnormal) Collected: 02/29/24 0438    Specimen: Blood from Arm, Left Updated: 02/29/24 0444     WBC 5.80 10*3/mm3      RBC 3.99 10*6/mm3      Hemoglobin 11.1 g/dL      Hematocrit 35.5 %      MCV 89.0 fL      MCH 28.0 pg      MCHC 31.4 g/dL      RDW 14.8 %      RDW-SD 45.1 fl      MPV 7.8 fL      Platelets 224 10*3/mm3      Neutrophil % 53.2 %      Lymphocyte % 27.6 %      Monocyte % 10.0 %      Eosinophil % 7.2 %      Basophil % 2.0 %      Neutrophils, Absolute 3.10 10*3/mm3      Lymphocytes, Absolute 1.60 10*3/mm3      Monocytes, Absolute 0.60 10*3/mm3      Eosinophils, Absolute 0.40 10*3/mm3      Basophils, Absolute 0.10 10*3/mm3      nRBC 0.0 /100 WBC     Blood Culture -  Blood, Arm, Right [[id number]]  (Normal) Collected: 02/26/24 1029    Specimen: Blood from Arm, Right Updated: 02/28/24 1045     Blood Culture No growth at 2 days    Blood Culture - Blood, Arm, Left [[id number]]  (Normal) Collected: 02/26/24 1029    Specimen: Blood from Arm, Left Updated: 02/28/24 1045     Blood Culture No growth at 2 days    Comprehensive Metabolic Panel [[id number]]  (Abnormal) Collected: 02/28/24 0403    Specimen: Blood Updated: 02/28/24 0506     Glucose 127 mg/dL      BUN 16 mg/dL      Creatinine 1.43 mg/dL      Sodium 139 mmol/L      Potassium 4.7 mmol/L      Chloride 105 mmol/L      CO2 25.0 mmol/L      Calcium 8.8 mg/dL      Total Protein 5.7 g/dL      Albumin 3.1 g/dL      ALT (SGPT) <5 U/L      AST (SGOT) 8 U/L      Alkaline Phosphatase 42 U/L      Total Bilirubin 0.2 mg/dL      Globulin 2.6 gm/dL      A/G Ratio 1.2 g/dL      BUN/Creatinine Ratio 11.2     Anion Gap 9.0 mmol/L      eGFR 49.5 mL/min/1.73     Narrative:      GFR Normal >60  Chronic Kidney Disease <60  Kidney Failure <15    The GFR formula is only valid for adults with stable renal function between ages 18 and 70.    Magnesium [[id number]]  (Normal) Collected: 02/28/24 0403    Specimen: Blood Updated: 02/28/24 0459     Magnesium 2.3 mg/dL     Phosphorus [[id number]]  (Normal) Collected: 02/28/24 0403    Specimen: Blood Updated: 02/28/24 0449     Phosphorus 3.2 mg/dL     Calcium, Ionized [[id number]]  (Normal) Collected: 02/28/24 0403    Specimen: Blood Updated: 02/28/24 0446     Ionized Calcium 1.25 mmol/L     CBC Auto Differential [[id number]]  (Abnormal) Collected: 02/28/24 0403    Specimen: Blood Updated: 02/28/24 0417     WBC 5.60 10*3/mm3      RBC 3.94 10*6/mm3      Hemoglobin 11.1 g/dL      Hematocrit 35.0 %      MCV 89.0 fL      MCH 28.2 pg      MCHC 31.7 g/dL      RDW 14.7 %      RDW-SD 45.1 fl      MPV 7.9 fL      Platelets 247 10*3/mm3      Neutrophil % 57.9 %      Lymphocyte % 26.1 %      Monocyte % 11.1 %      Eosinophil %  4.5 %      Basophil % 0.4 %      Neutrophils, Absolute 3.20 10*3/mm3      Lymphocytes, Absolute 1.50 10*3/mm3      Monocytes, Absolute 0.60 10*3/mm3      Eosinophils, Absolute 0.20 10*3/mm3      Basophils, Absolute 0.00 10*3/mm3      nRBC 0.0 /100 WBC     Sodium, Urine, Random - Urine, Clean Catch [[id number]] Collected: 02/28/24 0058    Specimen: Urine, Clean Catch Updated: 02/28/24 0122     Sodium, Urine 110 mmol/L     Narrative:      Reference intervals for random urine have not been established.  Clinical usage is dependent upon physician's interpretation in combination with other laboratory tests.       Urinalysis With Culture If Indicated - Urine, Clean Catch [[id number]]  (Abnormal) Collected: 02/28/24 0058    Specimen: Urine, Clean Catch Updated: 02/28/24 0108     Color, UA Yellow     Appearance, UA Clear     pH, UA 6.0     Specific Gravity, UA 1.017     Glucose, UA Negative     Ketones, UA Negative     Bilirubin, UA Negative     Blood, UA Negative     Protein, UA Trace     Leuk Esterase, UA Negative     Nitrite, UA Negative     Urobilinogen, UA 0.2 E.U./dL    Narrative:      In absence of clinical symptoms, the presence of pyuria, bacteria, and/or nitrites on the urinalysis result does not correlate with infection.  Urine microscopic not indicated.    CK [[id number]]  (Normal) Collected: 02/27/24 0554    Specimen: Blood Updated: 02/27/24 1800     Creatine Kinase 59 U/L     Uric Acid [[id number]]  (Normal) Collected: 02/27/24 0554    Specimen: Blood Updated: 02/27/24 1800     Uric Acid 6.4 mg/dL              Imaging Results (Last 48 Hours)       ** No results found for the last 48 hours. **                 I reviewed the patient's new clinical results.    Past Medical History:   Diagnosis Date    Alcohol abuse 07/13/2022    Essential hypertension 07/13/2022    Gait instability 07/13/2022    GERD without esophagitis 07/13/2022    Gout 07/13/2022    Hypothyroidism (acquired) 07/13/2022    Onychomycosis of multiple  toenails with type 2 diabetes mellitus 07/13/2022    Sleep apnea     Stage 3a chronic kidney disease 07/13/2022    Tinea pedis of both feet 07/13/2022    Type 2 diabetes mellitus with diabetic neuropathy, without long-term current use of insulin 07/13/2022     Past Surgical History:   Procedure Laterality Date    ORTHOPEDIC SURGERY      fractured ankle and dislocated shoulder         Medication Review:   Scheduled Meds:allopurinol, 100 mg, Oral, Daily  ceFAZolin, 2,000 mg, Intravenous, Q12H  cholestyramine light, 1 packet, Oral, Daily  enoxaparin, 40 mg, Subcutaneous, Daily  FLUoxetine, 10 mg, Oral, Daily  folic acid, 1 mg, Oral, Daily  hydrALAZINE, 50 mg, Oral, TID  levothyroxine, 175 mcg, Oral, Q AM  pantoprazole, 40 mg, Oral, Daily  saccharomyces boulardii, 250 mg, Oral, BID  sodium chloride, 10 mL, Intravenous, Q12H  sodium chloride, 1 g, Oral, BID  Vitamin B1, 100 mg, Oral, Daily      Continuous Infusions:Pharmacy to Dose enoxaparin (LOVENOX),       PRN Meds:.  acetaminophen    aluminum-magnesium hydroxide-simethicone    senna-docusate sodium **AND** polyethylene glycol **AND** bisacodyl **AND** bisacodyl    calcium carbonate    nitroglycerin    ondansetron ODT **OR** ondansetron    Pharmacy to Dose enoxaparin (LOVENOX)    [COMPLETED] Insert Peripheral IV **AND** sodium chloride    sodium chloride    sodium chloride    traMADol     Assessment & Plan   CELLULITIS OF THE LEFT LEG WITH FAILED OUTPT TREATMENT AND MANAGEMENT---IF KEFZOL AND TOPICAL CARE---WILL CHANGE TO PO KEFLEX UPON DISCHARGE TO COMPLETE COURSE OF TREATMENT; WOUND CARE NURSE TO BE INVOLVED; DRESSINGS DONE; CAN FOLLOW AS OUTPATIENT AS WELL AS CELLULITIS HAS BEEN RECURRENT   Alcohol abuse---WATCH FOR DT'S ; DISCUSSED WITH HOUSE SUPERVISOR LAST PM; WILL PUT HIM AT THE PCU FOR NOW; DOING OK SO FAR; NO EVEIDENCE OF DT'S  *COGNITIVE DECLINE---LIVES ALONE WITH MUCH HELP FROM HIS NP DAUGHTER AND HIRED CAREGIVERS; COULD BENEFIT FROM SKILLED NURSING FOR A  BIT BEFORE GOING HOME. 30 DAYS OR LESS IS WHAT I FEEL CURRENTLY; HOPEFULLY TRANSFER TO [facility] TOMORROW PENDING BED AVAILABILITY 7/13/2022    Essential hypertension---STABLE ON MEDS 7/13/2022    Gait instability---PT; HE WILL NEED 30 DAYS OR LESS OF SKILLED SERVICES; CASE MANAGEMENT WORKING WITH DAUGHTER (POA) TO GET PROPER FACILITY SET UP 7/13/2022    GERD without esophagitis--PPI 7/13/2022    Gout---STABLE ON MEDS 7/13/2022    Hypothyroidism (acquired) 7/13/2022    Onychomycosis of multiple toenails with type 2 diabetes mellitus 7/13/2022    Stage 3a chronic kidney disease---DR SANCHES/CHARLES 7/13/2022    Tinea pedis of both feet---CHRONIC MANAGEMENT WITH ANTIFUNGALS 7/13/2022    Type 2 diabetes mellitus with diabetic neuropathy, without long-term current use of insulin; COULD CONSIDER ORAL MEDS---JANUVIA 50MG DAILY OK WITH NEPHROLOGIST  DNR/DNI LIMITED CODE STATUS PER DAUGHTER      Principal Problem:    Cellulitis          Plan for disposition:[facility] FOR REHAB AND SKILLED NURSING CARE UPON DISCHARGE---?TOMORROW    Montse Dawkins MD  02/29/24  07:15 EST

## 2024-03-01 VITALS
SYSTOLIC BLOOD PRESSURE: 170 MMHG | HEART RATE: 67 BPM | DIASTOLIC BLOOD PRESSURE: 85 MMHG | HEIGHT: 67 IN | OXYGEN SATURATION: 92 % | WEIGHT: 250 LBS | TEMPERATURE: 98.3 F | RESPIRATION RATE: 20 BRPM | BODY MASS INDEX: 39.24 KG/M2

## 2024-03-01 LAB
ANION GAP SERPL CALCULATED.3IONS-SCNC: 10 MMOL/L (ref 5–15)
BASOPHILS # BLD AUTO: 0 10*3/MM3 (ref 0–0.2)
BASOPHILS NFR BLD AUTO: 0.2 % (ref 0–1.5)
BUN SERPL-MCNC: 18 MG/DL (ref 8–23)
BUN/CREAT SERPL: 13.1 (ref 7–25)
CALCIUM SPEC-SCNC: 8.9 MG/DL (ref 8.6–10.5)
CHLORIDE SERPL-SCNC: 100 MMOL/L (ref 98–107)
CO2 SERPL-SCNC: 24 MMOL/L (ref 22–29)
CREAT SERPL-MCNC: 1.37 MG/DL (ref 0.76–1.27)
DEPRECATED RDW RBC AUTO: 45.1 FL (ref 37–54)
EGFRCR SERPLBLD CKD-EPI 2021: 52.1 ML/MIN/1.73
EOSINOPHIL # BLD AUTO: 0.6 10*3/MM3 (ref 0–0.4)
EOSINOPHIL NFR BLD AUTO: 8.8 % (ref 0.3–6.2)
ERYTHROCYTE [DISTWIDTH] IN BLOOD BY AUTOMATED COUNT: 14.7 % (ref 12.3–15.4)
GLUCOSE SERPL-MCNC: 100 MG/DL (ref 65–99)
HCT VFR BLD AUTO: 35.2 % (ref 37.5–51)
HGB BLD-MCNC: 11.1 G/DL (ref 13–17.7)
LYMPHOCYTES # BLD AUTO: 1.6 10*3/MM3 (ref 0.7–3.1)
LYMPHOCYTES NFR BLD AUTO: 23.1 % (ref 19.6–45.3)
MAGNESIUM SERPL-MCNC: 1.9 MG/DL (ref 1.6–2.4)
MCH RBC QN AUTO: 27.9 PG (ref 26.6–33)
MCHC RBC AUTO-ENTMCNC: 31.7 G/DL (ref 31.5–35.7)
MCV RBC AUTO: 88.1 FL (ref 79–97)
MONOCYTES # BLD AUTO: 0.7 10*3/MM3 (ref 0.1–0.9)
MONOCYTES NFR BLD AUTO: 9.9 % (ref 5–12)
NEUTROPHILS NFR BLD AUTO: 4.1 10*3/MM3 (ref 1.7–7)
NEUTROPHILS NFR BLD AUTO: 58 % (ref 42.7–76)
NRBC BLD AUTO-RTO: 0 /100 WBC (ref 0–0.2)
PLATELET # BLD AUTO: 252 10*3/MM3 (ref 140–450)
PMV BLD AUTO: 8.1 FL (ref 6–12)
POTASSIUM SERPL-SCNC: 4.7 MMOL/L (ref 3.5–5.2)
RBC # BLD AUTO: 3.99 10*6/MM3 (ref 4.14–5.8)
SODIUM SERPL-SCNC: 134 MMOL/L (ref 136–145)
WBC NRBC COR # BLD AUTO: 7 10*3/MM3 (ref 3.4–10.8)

## 2024-03-01 PROCEDURE — 80048 BASIC METABOLIC PNL TOTAL CA: CPT | Performed by: INTERNAL MEDICINE

## 2024-03-01 PROCEDURE — 25010000002 CEFAZOLIN PER 500 MG: Performed by: FAMILY MEDICINE

## 2024-03-01 PROCEDURE — 25010000002 MAGNESIUM SULFATE IN D5W 1G/100ML (PREMIX) 1-5 GM/100ML-% SOLUTION: Performed by: INTERNAL MEDICINE

## 2024-03-01 PROCEDURE — 85025 COMPLETE CBC W/AUTO DIFF WBC: CPT | Performed by: FAMILY MEDICINE

## 2024-03-01 PROCEDURE — 83735 ASSAY OF MAGNESIUM: CPT | Performed by: INTERNAL MEDICINE

## 2024-03-01 RX ORDER — HYDRALAZINE HYDROCHLORIDE 25 MG/1
75 TABLET, FILM COATED ORAL 3 TIMES DAILY
Status: DISCONTINUED | OUTPATIENT
Start: 2024-03-01 | End: 2024-03-01 | Stop reason: HOSPADM

## 2024-03-01 RX ORDER — MAGNESIUM SULFATE 1 G/100ML
1 INJECTION INTRAVENOUS ONCE
Status: COMPLETED | OUTPATIENT
Start: 2024-03-01 | End: 2024-03-01

## 2024-03-01 RX ORDER — ENOXAPARIN SODIUM 100 MG/ML
40 INJECTION SUBCUTANEOUS DAILY
Start: 2024-03-01

## 2024-03-01 RX ORDER — BISACODYL 5 MG/1
5 TABLET, DELAYED RELEASE ORAL DAILY PRN
Start: 2024-03-01

## 2024-03-01 RX ORDER — HYDRALAZINE HYDROCHLORIDE 25 MG/1
75 TABLET, FILM COATED ORAL 3 TIMES DAILY
Qty: 135 TABLET | Refills: 11 | Status: SHIPPED | OUTPATIENT
Start: 2024-03-01

## 2024-03-01 RX ORDER — HYDRALAZINE HYDROCHLORIDE 25 MG/1
75 TABLET, FILM COATED ORAL 3 TIMES DAILY
Status: DISCONTINUED | OUTPATIENT
Start: 2024-03-01 | End: 2024-03-01

## 2024-03-01 RX ORDER — ACETAMINOPHEN 325 MG/1
650 TABLET ORAL EVERY 4 HOURS PRN
Start: 2024-03-01

## 2024-03-01 RX ORDER — CEPHALEXIN 500 MG/1
500 CAPSULE ORAL EVERY 6 HOURS SCHEDULED
Status: DISCONTINUED | OUTPATIENT
Start: 2024-03-01 | End: 2024-03-01 | Stop reason: HOSPADM

## 2024-03-01 RX ORDER — HYDRALAZINE HYDROCHLORIDE 25 MG/1
100 TABLET, FILM COATED ORAL 3 TIMES DAILY
Status: DISCONTINUED | OUTPATIENT
Start: 2024-03-01 | End: 2024-03-01

## 2024-03-01 RX ORDER — HYDRALAZINE HYDROCHLORIDE 50 MG/1
50 TABLET, FILM COATED ORAL 3 TIMES DAILY
Start: 2024-03-01

## 2024-03-01 RX ORDER — ONDANSETRON 4 MG/1
4 TABLET, ORALLY DISINTEGRATING ORAL EVERY 4 HOURS PRN
Start: 2024-03-01

## 2024-03-01 RX ORDER — BISACODYL 10 MG
10 SUPPOSITORY, RECTAL RECTAL DAILY PRN
Start: 2024-03-01

## 2024-03-01 RX ORDER — CEPHALEXIN 500 MG/1
500 CAPSULE ORAL EVERY 6 HOURS SCHEDULED
Qty: 56 CAPSULE | Refills: 0 | Status: SHIPPED | OUTPATIENT
Start: 2024-03-01 | End: 2024-03-15

## 2024-03-01 RX ORDER — POLYETHYLENE GLYCOL 3350 17 G/17G
17 POWDER, FOR SOLUTION ORAL DAILY PRN
Start: 2024-03-01

## 2024-03-01 RX ORDER — AMOXICILLIN 250 MG
2 CAPSULE ORAL 2 TIMES DAILY PRN
Start: 2024-03-01

## 2024-03-01 RX ADMIN — Medication 100 MG: at 08:41

## 2024-03-01 RX ADMIN — Medication 250 MG: at 08:42

## 2024-03-01 RX ADMIN — CEPHALEXIN 500 MG: 500 CAPSULE ORAL at 11:37

## 2024-03-01 RX ADMIN — ALLOPURINOL 100 MG: 100 TABLET ORAL at 08:42

## 2024-03-01 RX ADMIN — MAGNESIUM SULFATE IN DEXTROSE 1 G: 10 INJECTION, SOLUTION INTRAVENOUS at 08:43

## 2024-03-01 RX ADMIN — HYDRALAZINE HYDROCHLORIDE 50 MG: 25 TABLET ORAL at 08:42

## 2024-03-01 RX ADMIN — CHOLESTYRAMINE 4 G: 4 POWDER, FOR SUSPENSION ORAL at 11:37

## 2024-03-01 RX ADMIN — FOLIC ACID 1 MG: 1 TABLET ORAL at 08:42

## 2024-03-01 RX ADMIN — FLUOXETINE 10 MG: 10 CAPSULE ORAL at 08:41

## 2024-03-01 RX ADMIN — SODIUM CHLORIDE 2000 MG: 900 INJECTION INTRAVENOUS at 06:17

## 2024-03-01 RX ADMIN — SODIUM CHLORIDE 1 G: 1 TABLET ORAL at 08:42

## 2024-03-01 RX ADMIN — PANTOPRAZOLE SODIUM 40 MG: 40 TABLET, DELAYED RELEASE ORAL at 08:41

## 2024-03-01 RX ADMIN — Medication 10 ML: at 08:43

## 2024-03-01 RX ADMIN — LEVOTHYROXINE SODIUM 175 MCG: 0.17 TABLET ORAL at 06:17

## 2024-03-01 NOTE — CASE MANAGEMENT/SOCIAL WORK
Case Management Discharge Note      Final Note: [facility]    Provided Post Acute Provider List?: Yes  Post Acute Provider List: Nursing Home  Provided Post Acute Provider Quality & Resource List?: Yes  Post Acute Provider Quality and Resource List: Nursing Home  Delivered To: Patient  Method of Delivery: In person    Selected Continued Care - Admitted Since 2/26/2024       Destination Coordination complete.      Service Provider Selected Services Address Phone Fax Patient Preferred    [facility]  [address] [place] [address] [phone]            Transportation Services  W/C Van: Skilled Nursing Facility Van    Final Discharge Disposition Code: 03 - skilled nursing facility (SNF)      Continued Stay Note   [place]     Patient Name: Rommel Mcfarland  MRN: [id number]  Today's Date: 3/1/2024    Admit Date: 2/26/2024    Plan: [facility] accepted. Bed available 3/1. PASRR approved. From home with caregiver 2 days/week. Has used [facility] previously.   Discharge Plan       Row Name 03/01/24 1137       Plan    Plan Comments verified with [facility] liasion that bed will be ready at noon today.  Facility to transport at noon.  RN aware. DC orders noted.                       Expected Discharge Date and Time       Expected Discharge Date Expected Discharge Time    Mar 1, 2024           Hien Ferrer RN     Office Phone [phone]  Office Cell [phone]

## 2024-03-01 NOTE — DISCHARGE SUMMARY
DATE/TIME OF ADMISSION:  2/26/2024  9:31 AM  Date of Discharge:  3/1/2024    Discharge Diagnosis: CELLULITIS OF THE LEFT LEG WITH FAILED OUTPT TREATMENT AND MANAGEMENT---IF KEFZOL AND TOPICAL CARE--- CHANGED TO PO KEFLEX UPON DISCHARGE TO COMPLETE COURSE OF TREATMENT; WOUND CARE NURSE TO BE INVOLVED; DRESSINGS DONE; CAN FOLLOW AS OUTPATIENT AS WELL AS CELLULITIS HAS BEEN RECURRENT   Alcohol abuse---WATCH FOR DT'S ; DISCUSSED WITH HOUSE SUPERVISOR LAST PM; WILL PUT HIM AT THE PCU FOR NOW; DOING OK SO FAR; NO EVEIDENCE OF DT'S  *COGNITIVE DECLINE---LIVES ALONE WITH MUCH HELP FROM HIS NP DAUGHTER AND HIRED CAREGIVERS; COULD BENEFIT FROM SKILLED NURSING FOR A BIT BEFORE GOING HOME. 30 DAYS OR LESS IS WHAT I FEEL CURRENTLY; TRANSFER TO [facility] TODAY WITH BED AVAILABILITY 7/13/2022    Essential hypertension---STABLE ON MEDS 7/13/2022    Gait instability---PT; HE WILL NEED 30 DAYS OR LESS OF SKILLED SERVICES; CASE MANAGEMENT WORKING WITH DAUGHTER (POA) TO GET PROPER FACILITY SET UP 7/13/2022    GERD without esophagitis--PPI 7/13/2022    Gout---STABLE ON MEDS 7/13/2022    Hypothyroidism (acquired) 7/13/2022    Onychomycosis of multiple toenails with type 2 diabetes mellitus 7/13/2022    Stage 3a chronic kidney disease---DR SANCHES/CHARLES 7/13/2022    Tinea pedis of both feet---CHRONIC MANAGEMENT WITH ANTIFUNGALS 7/13/2022    Type 2 diabetes mellitus with diabetic neuropathy, without long-term current use of insulin; COULD CONSIDER ORAL MEDS---JANUVIA 50MG DAILY OK WITH NEPHROLOGIST  DNR/DNI LIMITED CODE STATUS PER DAUGHTER        Presenting Problem/History of Present Illness  Active Hospital Problems    Diagnosis  POA    **Cellulitis [L03.90]  Yes      Resolved Hospital Problems   No resolved problems to display.          Hospital Course  Rommel Mcfarland is a 78 y.o. male who presents with LEFT LEG RECURRENT CELLULITIS. IMPROVED WITH IV KEFZOL. LABS ALWAYS GOOD AND WAS AFEBRILE ON ADMISSION AND REMAINED SO THROUGH  DISCHARGE. NO EVIDENCE OF DT'S AND REMAINS PLEASANT.       Procedures Performed         Consults:   Consults       Date and Time Order Name Status Description    2/26/2024  5:16 PM Inpatient Nephrology Consult Completed     2/26/2024 11:35 AM Family Medicine Consult              Pertinent Test Results:    Lab Results (most recent)       Procedure Component Value Units Date/Time    Basic Metabolic Panel [[id number]]  (Abnormal) Collected: 03/01/24 0254    Specimen: Blood from Arm, Right Updated: 03/01/24 0337     Glucose 100 mg/dL      BUN 18 mg/dL      Creatinine 1.37 mg/dL      Sodium 134 mmol/L      Potassium 4.7 mmol/L      Chloride 100 mmol/L      CO2 24.0 mmol/L      Calcium 8.9 mg/dL      BUN/Creatinine Ratio 13.1     Anion Gap 10.0 mmol/L      eGFR 52.1 mL/min/1.73     Narrative:      GFR Normal >60  Chronic Kidney Disease <60  Kidney Failure <15    The GFR formula is only valid for adults with stable renal function between ages 18 and 70.    Magnesium [[id number]]  (Normal) Collected: 03/01/24 0254    Specimen: Blood from Arm, Right Updated: 03/01/24 0337     Magnesium 1.9 mg/dL     CBC Auto Differential [[id number]]  (Abnormal) Collected: 03/01/24 0254    Specimen: Blood from Arm, Right Updated: 03/01/24 0300     WBC 7.00 10*3/mm3      RBC 3.99 10*6/mm3      Hemoglobin 11.1 g/dL      Hematocrit 35.2 %      MCV 88.1 fL      MCH 27.9 pg      MCHC 31.7 g/dL      RDW 14.7 %      RDW-SD 45.1 fl      MPV 8.1 fL      Platelets 252 10*3/mm3      Neutrophil % 58.0 %      Lymphocyte % 23.1 %      Monocyte % 9.9 %      Eosinophil % 8.8 %      Basophil % 0.2 %      Neutrophils, Absolute 4.10 10*3/mm3      Lymphocytes, Absolute 1.60 10*3/mm3      Monocytes, Absolute 0.70 10*3/mm3      Eosinophils, Absolute 0.60 10*3/mm3      Basophils, Absolute 0.00 10*3/mm3      nRBC 0.0 /100 WBC     Blood Culture - Blood, Arm, Right [[id number]]  (Normal) Collected: 02/26/24 1029    Specimen: Blood from Arm, Right Updated: 02/29/24 1045      Blood Culture No growth at 3 days    Blood Culture - Blood, Arm, Left [[id number]]  (Normal) Collected: 02/26/24 1029    Specimen: Blood from Arm, Left Updated: 02/29/24 1045     Blood Culture No growth at 3 days    Basic Metabolic Panel [[id number]]  (Abnormal) Collected: 02/29/24 0438    Specimen: Blood from Arm, Left Updated: 02/29/24 0508     Glucose 94 mg/dL      BUN 17 mg/dL      Creatinine 1.30 mg/dL      Sodium 138 mmol/L      Potassium 4.8 mmol/L      Chloride 105 mmol/L      CO2 24.0 mmol/L      Calcium 9.0 mg/dL      BUN/Creatinine Ratio 13.1     Anion Gap 9.0 mmol/L      eGFR 55.5 mL/min/1.73     Narrative:      GFR Normal >60  Chronic Kidney Disease <60  Kidney Failure <15    The GFR formula is only valid for adults with stable renal function between ages 18 and 70.    Magnesium [[id number]]  (Normal) Collected: 02/29/24 0438    Specimen: Blood from Arm, Left Updated: 02/29/24 0508     Magnesium 1.8 mg/dL     Phosphorus [[id number]]  (Normal) Collected: 02/29/24 0438    Specimen: Blood from Arm, Left Updated: 02/29/24 0508     Phosphorus 3.3 mg/dL     CBC Auto Differential [[id number]]  (Abnormal) Collected: 02/29/24 0438    Specimen: Blood from Arm, Left Updated: 02/29/24 0444     WBC 5.80 10*3/mm3      RBC 3.99 10*6/mm3      Hemoglobin 11.1 g/dL      Hematocrit 35.5 %      MCV 89.0 fL      MCH 28.0 pg      MCHC 31.4 g/dL      RDW 14.8 %      RDW-SD 45.1 fl      MPV 7.8 fL      Platelets 224 10*3/mm3      Neutrophil % 53.2 %      Lymphocyte % 27.6 %      Monocyte % 10.0 %      Eosinophil % 7.2 %      Basophil % 2.0 %      Neutrophils, Absolute 3.10 10*3/mm3      Lymphocytes, Absolute 1.60 10*3/mm3      Monocytes, Absolute 0.60 10*3/mm3      Eosinophils, Absolute 0.40 10*3/mm3      Basophils, Absolute 0.10 10*3/mm3      nRBC 0.0 /100 WBC     Comprehensive Metabolic Panel [[id number]]  (Abnormal) Collected: 02/28/24 0403    Specimen: Blood Updated: 02/28/24 0506     Glucose 127 mg/dL      BUN 16 mg/dL       Creatinine 1.43 mg/dL      Sodium 139 mmol/L      Potassium 4.7 mmol/L      Chloride 105 mmol/L      CO2 25.0 mmol/L      Calcium 8.8 mg/dL      Total Protein 5.7 g/dL      Albumin 3.1 g/dL      ALT (SGPT) <5 U/L      AST (SGOT) 8 U/L      Alkaline Phosphatase 42 U/L      Total Bilirubin 0.2 mg/dL      Globulin 2.6 gm/dL      A/G Ratio 1.2 g/dL      BUN/Creatinine Ratio 11.2     Anion Gap 9.0 mmol/L      eGFR 49.5 mL/min/1.73     Narrative:      GFR Normal >60  Chronic Kidney Disease <60  Kidney Failure <15    The GFR formula is only valid for adults with stable renal function between ages 18 and 70.    Phosphorus [[id number]]  (Normal) Collected: 02/28/24 0403    Specimen: Blood Updated: 02/28/24 0449     Phosphorus 3.2 mg/dL     Calcium, Ionized [[id number]]  (Normal) Collected: 02/28/24 0403    Specimen: Blood Updated: 02/28/24 0446     Ionized Calcium 1.25 mmol/L     Sodium, Urine, Random - Urine, Clean Catch [[id number]] Collected: 02/28/24 0058    Specimen: Urine, Clean Catch Updated: 02/28/24 0122     Sodium, Urine 110 mmol/L     Narrative:      Reference intervals for random urine have not been established.  Clinical usage is dependent upon physician's interpretation in combination with other laboratory tests.       Urinalysis With Culture If Indicated - Urine, Clean Catch [[id number]]  (Abnormal) Collected: 02/28/24 0058    Specimen: Urine, Clean Catch Updated: 02/28/24 0108     Color, UA Yellow     Appearance, UA Clear     pH, UA 6.0     Specific Gravity, UA 1.017     Glucose, UA Negative     Ketones, UA Negative     Bilirubin, UA Negative     Blood, UA Negative     Protein, UA Trace     Leuk Esterase, UA Negative     Nitrite, UA Negative     Urobilinogen, UA 0.2 E.U./dL    Narrative:      In absence of clinical symptoms, the presence of pyuria, bacteria, and/or nitrites on the urinalysis result does not correlate with infection.  Urine microscopic not indicated.    CK [[id number]]  (Normal) Collected: 02/27/24  0554    Specimen: Blood Updated: 02/27/24 1800     Creatine Kinase 59 U/L     Uric Acid [[id number]]  (Normal) Collected: 02/27/24 0554    Specimen: Blood Updated: 02/27/24 1800     Uric Acid 6.4 mg/dL     Comprehensive Metabolic Panel [[id number]]  (Abnormal) Collected: 02/27/24 0554    Specimen: Blood Updated: 02/27/24 0639     Glucose 91 mg/dL      BUN 16 mg/dL      Creatinine 1.35 mg/dL      Sodium 134 mmol/L      Potassium 4.2 mmol/L      Chloride 100 mmol/L      CO2 24.0 mmol/L      Calcium 8.7 mg/dL      Total Protein 6.0 g/dL      Albumin 3.3 g/dL      ALT (SGPT) <5 U/L      AST (SGOT) 16 U/L      Alkaline Phosphatase 40 U/L      Total Bilirubin 0.4 mg/dL      Globulin 2.7 gm/dL      A/G Ratio 1.2 g/dL      BUN/Creatinine Ratio 11.9     Anion Gap 10.0 mmol/L      eGFR 53.1 mL/min/1.73     Narrative:      GFR Normal >60  Chronic Kidney Disease <60  Kidney Failure <15    The GFR formula is only valid for adults with stable renal function between ages 18 and 70.    TSH [[id number]]  (Normal) Collected: 02/27/24 0554    Specimen: Blood Updated: 02/27/24 0629     TSH 1.690 uIU/mL     Hemoglobin A1c [[id number]]  (Normal) Collected: 02/27/24 0554    Specimen: Blood Updated: 02/27/24 0626     Hemoglobin A1C 5.20 %     POC Glucose Once [[id number]]  (Normal) Collected: 02/26/24 1703    Specimen: Blood Updated: 02/26/24 1705     Glucose 91 mg/dL      Comment: Serial Number: [id number]:  [id number]       COVID-19, FLU A/B, RSV PCR 1 HR TAT - Swab, Nasopharynx [[id number]]  (Normal) Collected: 02/26/24 1550    Specimen: Swab from Nasopharynx Updated: 02/26/24 1642     COVID19 Not Detected     Influenza A PCR Not Detected     Influenza B PCR Not Detected     RSV, PCR Not Detected    Narrative:      Fact sheet for providers: https://www.fda.gov/media/921350/download    Fact sheet for patients: https://www.fda.gov/media/575771/download    Test performed by PCR.    Extra Tubes [[id number]] Collected: 02/26/24 1029     Specimen: Blood, Venous Line Updated: 02/26/24 1130    Narrative:      The following orders were created for panel order Extra Tubes.  Procedure                               Abnormality         Status                     ---------                               -----------         ------                     Gold Top - SST[[id number]]                                   Final result                 Please view results for these tests on the individual orders.    Gold Top - SST [[id number]] Collected: 02/26/24 1029    Specimen: Blood Updated: 02/26/24 1130     Extra Tube Hold for add-ons.     Comment: Auto resulted.       C-reactive Protein [[id number]]  (Abnormal) Collected: 02/26/24 1029    Specimen: Blood Updated: 02/26/24 1123     C-Reactive Protein 10.98 mg/dL     Sedimentation Rate [[id number]]  (Abnormal) Collected: 02/26/24 1029    Specimen: Blood Updated: 02/26/24 1101     Sed Rate 46 mm/hr     CBC & Differential [[id number]]  (Abnormal) Collected: 02/26/24 1029    Specimen: Blood Updated: 02/26/24 1034    Narrative:      The following orders were created for panel order CBC & Differential.  Procedure                               Abnormality         Status                     ---------                               -----------         ------                     CBC Auto Differential[[id number]]        Abnormal            Final result                 Please view results for these tests on the individual orders.    POC Lactate [[id number]]  (Normal) Collected: 02/26/24 1028    Specimen: Blood Updated: 02/26/24 1029     Lactate 0.8 mmol/L      Comment: Serial Number: [id number]:  [id number]                     ok       Condition on Discharge:      Vital Signs  Temp:  [97.4 °F (36.3 °C)-98.3 °F (36.8 °C)] 98.3 °F (36.8 °C)  Heart Rate:  [57-70] 67  Resp:  [16-20] 20  BP: (137-179)/(61-94) 170/85    Physical Exam:     General Appearance:    Alert, cooperative, in no acute distress   Head:    Normocephalic, without  obvious abnormality, atraumatic   Eyes:            Lids and lashes normal, conjunctivae and sclerae normal, no   icterus, no pallor, corneas clear, PERRLA   Ears:    Ears appear intact with no abnormalities noted   Throat:   No oral lesions, no thrush, oral mucosa moist   Neck:   No adenopathy, supple, trachea midline, no thyromegaly, no   carotid bruit, no JVD   Lungs:     Clear to auscultation,respirations regular, even and                  unlabored    Heart:    Regular rhythm and normal rate, normal S1 and S2, no            murmur, no gallop, no rub, no click   Chest Wall:    No abnormalities observed   Abdomen:     Normal bowel sounds, no masses, no organomegaly, soft        non-tender, non-distended, no guarding, no rebound                tenderness   Extremities:   Moves all extremities well, no edema, no cyanosis, MILD RESIDUAL            redness OF THE LEFT LEG---GREATLY IMPROVED   Pulses:   Pulses palpable and equal bilaterally   Skin:   No bleeding, bruising or rash   Lymph nodes:   No palpable adenopathy   Neurologic:   Cranial nerves 2 - 12 grossly intact, sensation intact, DTR       present and equal bilaterally       Discharge Disposition  Skilled Nursing Facility (DC - External)    Discharge Medications     Discharge Medications        New Medications        Instructions Start Date   acetaminophen 325 MG tablet  Commonly known as: TYLENOL   650 mg, Oral, Every 4 Hours PRN      bisacodyl 5 MG EC tablet  Commonly known as: DULCOLAX   5 mg, Oral, Daily PRN      bisacodyl 10 MG suppository  Commonly known as: DULCOLAX   10 mg, Rectal, Daily PRN      cephalexin 500 MG capsule  Commonly known as: KEFLEX   500 mg, Oral, Every 6 Hours Scheduled      Enoxaparin Sodium 40 MG/0.4ML solution prefilled syringe syringe  Commonly known as: LOVENOX   40 mg, Subcutaneous, Daily      ondansetron ODT 4 MG disintegrating tablet  Commonly known as: ZOFRAN-ODT   4 mg, Translingual, Every 4 Hours PRN      polyethylene  glycol 17 g packet  Commonly known as: MIRALAX   17 g, Oral, Daily PRN      sennosides-docusate 8.6-50 MG per tablet  Commonly known as: PERICOLACE   2 tablets, Oral, 2 Times Daily PRN             Changes to Medications        Instructions Start Date   hydrALAZINE 50 MG tablet  Commonly known as: APRESOLINE  What changed:   medication strength  how much to take  when to take this   50 mg, Oral, 3 times daily             Continue These Medications        Instructions Start Date   allopurinol 100 MG tablet  Commonly known as: ZYLOPRIM   100 mg, Oral, Daily      colestipol 1 g tablet  Commonly known as: COLESTID   1 g, Oral, Daily      FLUoxetine 10 MG capsule  Commonly known as: PROzac   10 mg, Oral, Daily      folic acid 1 MG tablet  Commonly known as: FOLVITE   1 mg, Oral, Daily      levothyroxine 175 MCG tablet  Commonly known as: SYNTHROID, LEVOTHROID   175 mcg, Oral, Every Early Morning      pantoprazole 40 MG EC tablet  Commonly known as: PROTONIX   40 mg, Oral, Daily      saccharomyces boulardii 250 MG capsule  Commonly known as: FLORASTOR   250 mg, Oral, 2 Times Daily      Vitamin B1 100 MG tablet tablet   100 mg, Oral, Daily             Stop These Medications      bumetanide 0.5 MG tablet  Commonly known as: BUMEX     sodium chloride 1 g tablet              Discharge Diet: CONSISTENT CARB    Activity at Discharge: UP WITH WALKER WITH ASSISTANCE    Follow-up Appointments   [facility] FOR FOLLOW UP  No future appointments.      Test Results Pending at Discharge  Pending Labs       Order Current Status    Blood Culture - Blood, Arm, Left Preliminary result    Blood Culture - Blood, Arm, Right Preliminary result             Montse Dawkins MD  03/01/24  10:39 EST

## 2024-03-01 NOTE — PLAN OF CARE
Goal Outcome Evaluation:           Problem: Skin Injury Risk Increased  Goal: Skin Health and Integrity  Outcome: Ongoing, Progressing  Intervention: Optimize Skin Protection  Recent Flowsheet Documentation  Taken 3/1/2024 0400 by Kanwal Love RN  Pressure Reduction Techniques:   frequent weight shift encouraged   sit time limited to 2 hours   weight shift assistance provided  Pressure Reduction Devices: pressure-redistributing mattress utilized  Skin Protection:   adhesive use limited   protective footwear used   tubing/devices free from skin contact  Taken 3/1/2024 0000 by Kanwal Love RN  Pressure Reduction Techniques:   frequent weight shift encouraged   sit time limited to 2 hours   weight shift assistance provided  Pressure Reduction Devices: pressure-redistributing mattress utilized  Skin Protection: adhesive use limited  Taken 2/29/2024 2000 by Kanwal Love RN  Pressure Reduction Techniques:   frequent weight shift encouraged   sit time limited to 2 hours   weight shift assistance provided  Pressure Reduction Devices: pressure-redistributing mattress utilized  Skin Protection:   adhesive use limited   incontinence pads utilized     Problem: Fall Injury Risk  Goal: Absence of Fall and Fall-Related Injury  Outcome: Ongoing, Progressing  Intervention: Identify and Manage Contributors  Recent Flowsheet Documentation  Taken 3/1/2024 0600 by Kanwal Love RN  Medication Review/Management: medications reviewed  Taken 3/1/2024 0400 by Kanwal Love RN  Medication Review/Management: medications reviewed  Taken 3/1/2024 0200 by Kanwal Love RN  Medication Review/Management: medications reviewed  Taken 3/1/2024 0000 by Kanwal Love RN  Medication Review/Management: medications reviewed  Taken 2/29/2024 2200 by Kanwal Love RN  Medication Review/Management: medications reviewed  Taken 2/29/2024 2000 by Kanwal Love RN  Medication Review/Management: medications reviewed  Intervention: Promote Injury-Free  Environment  Recent Flowsheet Documentation  Taken 3/1/2024 0600 by Kanwal Love RN  Safety Promotion/Fall Prevention:   room organization consistent   safety round/check completed  Taken 3/1/2024 0400 by Kanwal Love RN  Safety Promotion/Fall Prevention:   safety round/check completed   room organization consistent  Taken 3/1/2024 0200 by Kanwal Love RN  Safety Promotion/Fall Prevention:   room organization consistent   safety round/check completed  Taken 3/1/2024 0000 by Kanwal Love RN  Safety Promotion/Fall Prevention:   safety round/check completed   room organization consistent  Taken 2/29/2024 2200 by Kanwal Love RN  Safety Promotion/Fall Prevention:   safety round/check completed   room organization consistent  Taken 2/29/2024 2000 by Kanwal Love RN  Safety Promotion/Fall Prevention:   safety round/check completed   room organization consistent     Problem: Adult Inpatient Plan of Care  Goal: Plan of Care Review  Outcome: Ongoing, Progressing  Goal: Patient-Specific Goal (Individualized)  Outcome: Ongoing, Progressing  Goal: Absence of Hospital-Acquired Illness or Injury  Outcome: Ongoing, Progressing  Intervention: Identify and Manage Fall Risk  Recent Flowsheet Documentation  Taken 3/1/2024 0600 by Kanwal Love RN  Safety Promotion/Fall Prevention:   room organization consistent   safety round/check completed  Taken 3/1/2024 0400 by Kanwal Love RN  Safety Promotion/Fall Prevention:   safety round/check completed   room organization consistent  Taken 3/1/2024 0200 by Kanwal Love RN  Safety Promotion/Fall Prevention:   room organization consistent   safety round/check completed  Taken 3/1/2024 0000 by Kanwal Love RN  Safety Promotion/Fall Prevention:   safety round/check completed   room organization consistent  Taken 2/29/2024 2200 by Kanwal Love RN  Safety Promotion/Fall Prevention:   safety round/check completed   room organization consistent  Taken 2/29/2024 2000 by Kanwal Love  RN  Safety Promotion/Fall Prevention:   safety round/check completed   room organization consistent  Intervention: Prevent Skin Injury  Recent Flowsheet Documentation  Taken 3/1/2024 0400 by Kanwal Love RN  Skin Protection:   adhesive use limited   protective footwear used   tubing/devices free from skin contact  Taken 3/1/2024 0000 by Kanwal Love RN  Skin Protection: adhesive use limited  Taken 2/29/2024 2000 by Kanwal Love RN  Skin Protection:   adhesive use limited   incontinence pads utilized  Intervention: Prevent and Manage VTE (Venous Thromboembolism) Risk  Recent Flowsheet Documentation  Taken 3/1/2024 0400 by Kanwal Love RN  Range of Motion: active ROM (range of motion) encouraged  Taken 3/1/2024 0000 by Kanwal Love RN  Range of Motion: active ROM (range of motion) encouraged  Taken 2/29/2024 2000 by Kanwal Love RN  Range of Motion: active ROM (range of motion) encouraged  Intervention: Prevent Infection  Recent Flowsheet Documentation  Taken 3/1/2024 0600 by Kanwal Love RN  Infection Prevention:   single patient room provided   rest/sleep promoted   hand hygiene promoted  Taken 3/1/2024 0400 by Kanwal Love RN  Infection Prevention:   single patient room provided   rest/sleep promoted   hand hygiene promoted  Taken 3/1/2024 0200 by Kanwal Love RN  Infection Prevention:   single patient room provided   rest/sleep promoted   hand hygiene promoted  Taken 3/1/2024 0000 by Kanwal Love RN  Infection Prevention:   single patient room provided   rest/sleep promoted   hand hygiene promoted  Taken 2/29/2024 2200 by Kanwal Love RN  Infection Prevention:   single patient room provided   rest/sleep promoted   hand hygiene promoted  Taken 2/29/2024 2000 by Kanwal Love RN  Infection Prevention:   single patient room provided   rest/sleep promoted   hand hygiene promoted  Goal: Optimal Comfort and Wellbeing  Outcome: Ongoing, Progressing  Intervention: Provide Person-Centered Care  Recent Flowsheet  Documentation  Taken 3/1/2024 0400 by Kanwal Love RN  Trust Relationship/Rapport:   care explained   choices provided   emotional support provided  Taken 3/1/2024 0000 by Kanwal Love RN  Trust Relationship/Rapport:   care explained   choices provided   emotional support provided  Taken 2/29/2024 2000 by Kanwal Love RN  Trust Relationship/Rapport:   care explained   choices provided   emotional support provided  Goal: Readiness for Transition of Care  Outcome: Ongoing, Progressing

## 2024-03-01 NOTE — PROGRESS NOTES
"NEPHROLOGY PROGRESS NOTE------[facility]    [facility]  [phone]  Ines Barahona MD      Patient Care Team:  Montse Dawkins MD as PCP - General  Montse Dawkins MD as PCP - Family Medicine  Huey Keyes MD as Consulting Physician (Nephrology)      Provider:  Ines Barahona MD  Patient Name: Rommel Mcfarland  :  1945    SUBJECTIVE:    F/U CRF/CKD    No complaints. No SOB, CP, palpitations, cramping. No dysuria.     Medication:  allopurinol, 100 mg, Oral, Daily  ceFAZolin, 2,000 mg, Intravenous, Q8H  cholestyramine light, 1 packet, Oral, Daily  enoxaparin, 40 mg, Subcutaneous, Daily  FLUoxetine, 10 mg, Oral, Daily  folic acid, 1 mg, Oral, Daily  hydrALAZINE, 50 mg, Oral, TID  levothyroxine, 175 mcg, Oral, Q AM  pantoprazole, 40 mg, Oral, Daily  saccharomyces boulardii, 250 mg, Oral, BID  sodium chloride, 10 mL, Intravenous, Q12H  sodium chloride, 1 g, Oral, BID  Vitamin B1, 100 mg, Oral, Daily      Pharmacy to Dose enoxaparin (LOVENOX),         OBJECTIVE    Vital Sign Min/Max for last 24 hours  Temp  Min: 97.4 °F (36.3 °C)  Max: 98.2 °F (36.8 °C)   BP  Min: 137/64  Max: 179/94   Pulse  Min: 57  Max: 70   Resp  Min: 16  Max: 23   SpO2  Min: 93 %  Max: 96 %   No data recorded   Weight  Min: 113 kg (250 lb)  Max: 113 kg (250 lb)     Flowsheet Rows      Flowsheet Row First Filed Value   Admission Height 172.7 cm (68\") Documented at 2024 0938   Admission Weight 99.8 kg (220 lb) Documented at 2024 0938            No intake/output data recorded.  I/O last 3 completed shifts:  In: 660 [P.O.:660]  Out: 1875 [Urine:1875]    Physical Exam:  General Appearance: alert, appears stated age and cooperative  Head: normocephalic, without obvious abnormality and atraumatic    Eyes: conjunctivae and sclerae normal and no icterus  Neck: supple and no JVD  Lungs: +FEW SCATTERED RHONCHI  Heart: regular rhythm & normal rate and normal " "S1, S2 +BALDOMERO  Chest Wall: no abnormalities observed  Abdomen: normal bowel sounds and soft, nontender  Extremities: moves extremities well, 1+ BILAT LE EDEMA (R>L), no cyanosis  Skin: +CHRONIC VENOUS INSUFFICIENCY BILAT LE WITH BILAT LE WOUNDS (FOOT AND CALF/PRETIBLAL AREAS)  Neurologic: Alert, and oriented. No focal deficits    Labs:    WBC WBC   Date Value Ref Range Status   03/01/2024 7.00 3.40 - 10.80 10*3/mm3 Final   02/29/2024 5.80 3.40 - 10.80 10*3/mm3 Final   02/28/2024 5.60 3.40 - 10.80 10*3/mm3 Final      HGB Hemoglobin   Date Value Ref Range Status   03/01/2024 11.1 (L) 13.0 - 17.7 g/dL Final   02/29/2024 11.1 (L) 13.0 - 17.7 g/dL Final   02/28/2024 11.1 (L) 13.0 - 17.7 g/dL Final      HCT Hematocrit   Date Value Ref Range Status   03/01/2024 35.2 (L) 37.5 - 51.0 % Final   02/29/2024 35.5 (L) 37.5 - 51.0 % Final   02/28/2024 35.0 (L) 37.5 - 51.0 % Final      Platelets No results found for: \"LABPLAT\"   MCV MCV   Date Value Ref Range Status   03/01/2024 88.1 79.0 - 97.0 fL Final   02/29/2024 89.0 79.0 - 97.0 fL Final   02/28/2024 89.0 79.0 - 97.0 fL Final          Sodium Sodium   Date Value Ref Range Status   03/01/2024 134 (L) 136 - 145 mmol/L Final   02/29/2024 138 136 - 145 mmol/L Final   02/28/2024 139 136 - 145 mmol/L Final      Potassium Potassium   Date Value Ref Range Status   03/01/2024 4.7 3.5 - 5.2 mmol/L Final   02/29/2024 4.8 3.5 - 5.2 mmol/L Final   02/28/2024 4.7 3.5 - 5.2 mmol/L Final      Chloride Chloride   Date Value Ref Range Status   03/01/2024 100 98 - 107 mmol/L Final   02/29/2024 105 98 - 107 mmol/L Final   02/28/2024 105 98 - 107 mmol/L Final      CO2 CO2   Date Value Ref Range Status   03/01/2024 24.0 22.0 - 29.0 mmol/L Final   02/29/2024 24.0 22.0 - 29.0 mmol/L Final   02/28/2024 25.0 22.0 - 29.0 mmol/L Final      BUN BUN   Date Value Ref Range Status   03/01/2024 18 8 - 23 mg/dL Final   02/29/2024 17 8 - 23 mg/dL Final   02/28/2024 16 8 - 23 mg/dL Final      Creatinine Creatinine " "  Date Value Ref Range Status   03/01/2024 1.37 (H) 0.76 - 1.27 mg/dL Final   02/29/2024 1.30 (H) 0.76 - 1.27 mg/dL Final   02/28/2024 1.43 (H) 0.76 - 1.27 mg/dL Final      Calcium Calcium   Date Value Ref Range Status   03/01/2024 8.9 8.6 - 10.5 mg/dL Final   02/29/2024 9.0 8.6 - 10.5 mg/dL Final   02/28/2024 8.8 8.6 - 10.5 mg/dL Final      PO4 No components found for: \"PO4\"   Albumin Albumin   Date Value Ref Range Status   02/28/2024 3.1 (L) 3.5 - 5.2 g/dL Final      Magnesium Magnesium   Date Value Ref Range Status   03/01/2024 1.9 1.6 - 2.4 mg/dL Final   02/29/2024 1.8 1.6 - 2.4 mg/dL Final   02/28/2024 2.3 1.6 - 2.4 mg/dL Final      Uric Acid No components found for: \"URIC ACID\"     Imaging Results (Last 72 Hours)       ** No results found for the last 72 hours. **            Results for orders placed during the hospital encounter of 09/15/23    XR Foot 3+ View Right    Narrative  XR FOOT 3+ VW RIGHT    Date of Exam: 9/15/2023 2:30 PM EDT    Indication: wound, infection    Comparison: 7/9/2022    Findings:  Bones are diffusely osteopenic. There is no discrete area of osseous erosion to indicate a site of osteomyelitis. Postsurgical changes of screw fixation noted at the distal tibia and fibula similar to the prior exam. Extensive small vessel vascular  calcifications. Soft tissue swelling overlying the dorsum of the foot which may relate to edema and/or cellulitis.    Impression  Impression:  1. No discrete area of osseous erosion to indicate osteomyelitis.  2. Soft tissue swelling overlying the dorsum of the foot may relate to edema and/or cellulitis.  3. Diffuse osteopenia.  4. Extensive small vessel vascular calcifications.        Electronically Signed: Renny Johnson MD  9/15/2023 2:35 PM EDT  Workstation ID: [id number]      Results for orders placed during the hospital encounter of 07/09/22    XR Foot 2 View Right    Narrative  DATE OF EXAM:  7/9/2022 1:58 PM    PROCEDURE:  XR FOOT 2 VW " RIGHT-    INDICATIONS:  Wound between first and second toe maggots present.  Cellulitis;  L03.115-Cellulitis of right lower limb; B87.9-Myiasis, unspecified    COMPARISON:  No Comparisons Available    TECHNIQUE:  A minimum of two routine standard radiographic views were obtained of  the right foot.    FINDINGS:  There is diffuse soft tissue swelling of the right foot bones are  osteopenic. There is no acute appearing fracture. Postoperative findings  noted in the ankle at the medial malleolus and the distal fibula with  fixation screws. The calcaneus is intact. Small vessel vascular  calcifications noted.    Impression  1. Extensive soft tissue swelling of the right foot may relate to edema  and/or cellulitis.  2. No discrete area of osseous erosion.  3. No acute appearing fracture.  4. Additional chronic findings above.    Electronically Signed By-Renny Johnson MD On:7/9/2022 2:09 PM  This report was finalized on [id number] by  Renny Johnson MD.      Results for orders placed during the hospital encounter of 02/26/24    Duplex venous lower extremity left    Interpretation Summary    Normal left lower extremity venous duplex scan.        ASSESSMENT / PLAN      Cellulitis    CRF/CKD-----Nonoliguric. CRF/CKD STG 3A secondary to HTN NS. Clinically prerenal despite LE  Edema/chronic venous insufficiency. Holding Bumex. Stable off of IVFs. No NSAIDs or IV dye. Dose meds for CrCl 45-60 cc/min     2. MILD HYPONATREMIA--------Resolved. D/C Salt tabs     3. BILATERAL LE EDEMA/CHRONIC VENOUS INSUFFICIENCY/WOUNDS/CELLULITIS------Abx, wound and wound care. Adjust Kefzol dose for CKD     4. DEPRESSION-----Ok to continue SSRI for now     5. ETOH ABUSE------Counseled     6. OA/DJD/HYPERURICEMIA------On Allopurinol. No NSAIDs.      7. HTN WITH CKD-----BP up. Increase Hydralazine to 75 mg po TID and follow. Avoid hypotension. No ACE-I/ARB/DRI     8. GERD/PUD PROPHYLAXIS------On PPI. Benefits outweigh risks despite CKD     9.  HYPOTHYROIDISM------On Synthroid. TSH ok     10. DVT PROPHYLAXIS------Lovenox    Okay from RENAL standpoint to d/c to rehab today    Ines Barahona MD  [facility]  [phone]  03/01/24  07:57 EST

## 2024-03-01 NOTE — PLAN OF CARE
Goal Outcome Evaluation:         Patient to d/c to [facility]

## 2024-03-02 LAB
BACTERIA SPEC AEROBE CULT: NORMAL
BACTERIA SPEC AEROBE CULT: NORMAL

## 2024-04-01 ENCOUNTER — LAB REQUISITION (OUTPATIENT)
Dept: LAB | Facility: HOSPITAL | Age: 79
End: 2024-04-01
Payer: MEDICARE

## 2024-04-01 DIAGNOSIS — D69.3 IMMUNE THROMBOCYTOPENIC PURPURA: ICD-10-CM

## 2024-04-01 LAB
ALBUMIN SERPL-MCNC: 3.4 G/DL (ref 3.5–5.2)
ALBUMIN/GLOB SERPL: 1.3 G/DL
ALP SERPL-CCNC: 41 U/L (ref 39–117)
ALT SERPL W P-5'-P-CCNC: 6 U/L (ref 1–41)
ANION GAP SERPL CALCULATED.3IONS-SCNC: 10 MMOL/L (ref 5–15)
APTT PPP: 30.5 SECONDS (ref 24–31)
AST SERPL-CCNC: 10 U/L (ref 1–40)
BASOPHILS # BLD AUTO: 0.02 10*3/MM3 (ref 0–0.2)
BASOPHILS NFR BLD AUTO: 0.5 % (ref 0–1.5)
BILIRUB SERPL-MCNC: 0.4 MG/DL (ref 0–1.2)
BUN SERPL-MCNC: 21 MG/DL (ref 8–23)
BUN/CREAT SERPL: 13.1 (ref 7–25)
CALCIUM SPEC-SCNC: 8.6 MG/DL (ref 8.6–10.5)
CHLORIDE SERPL-SCNC: 101 MMOL/L (ref 98–107)
CO2 SERPL-SCNC: 20 MMOL/L (ref 22–29)
CREAT SERPL-MCNC: 1.6 MG/DL (ref 0.76–1.27)
DEPRECATED RDW RBC AUTO: 41.5 FL (ref 37–54)
EGFRCR SERPLBLD CKD-EPI 2021: 43.3 ML/MIN/1.73
EOSINOPHIL # BLD AUTO: 0.13 10*3/MM3 (ref 0–0.4)
EOSINOPHIL NFR BLD AUTO: 3 % (ref 0.3–6.2)
ERYTHROCYTE [DISTWIDTH] IN BLOOD BY AUTOMATED COUNT: 13.5 % (ref 12.3–15.4)
GLOBULIN UR ELPH-MCNC: 2.6 GM/DL
GLUCOSE SERPL-MCNC: 89 MG/DL (ref 65–99)
HCT VFR BLD AUTO: 33.5 % (ref 37.5–51)
HGB BLD-MCNC: 10.9 G/DL (ref 13–17.7)
IMM GRANULOCYTES # BLD AUTO: 0.03 10*3/MM3 (ref 0–0.05)
IMM GRANULOCYTES NFR BLD AUTO: 0.7 % (ref 0–0.5)
INR PPP: 1.03 (ref 0.93–1.1)
LYMPHOCYTES # BLD AUTO: 1.32 10*3/MM3 (ref 0.7–3.1)
LYMPHOCYTES NFR BLD AUTO: 30 % (ref 19.6–45.3)
MCH RBC QN AUTO: 27.8 PG (ref 26.6–33)
MCHC RBC AUTO-ENTMCNC: 32.5 G/DL (ref 31.5–35.7)
MCV RBC AUTO: 85.5 FL (ref 79–97)
MONOCYTES # BLD AUTO: 0.87 10*3/MM3 (ref 0.1–0.9)
MONOCYTES NFR BLD AUTO: 19.8 % (ref 5–12)
NEUTROPHILS NFR BLD AUTO: 2.03 10*3/MM3 (ref 1.7–7)
NEUTROPHILS NFR BLD AUTO: 46 % (ref 42.7–76)
NRBC BLD AUTO-RTO: 0 /100 WBC (ref 0–0.2)
PLATELET # BLD AUTO: 260 10*3/MM3 (ref 140–450)
PMV BLD AUTO: 10.2 FL (ref 6–12)
POTASSIUM SERPL-SCNC: 5 MMOL/L (ref 3.5–5.2)
PROT SERPL-MCNC: 6 G/DL (ref 6–8.5)
PROTHROMBIN TIME: 11.2 SECONDS (ref 9.6–11.7)
RBC # BLD AUTO: 3.92 10*6/MM3 (ref 4.14–5.8)
SODIUM SERPL-SCNC: 131 MMOL/L (ref 136–145)
WBC NRBC COR # BLD AUTO: 4.4 10*3/MM3 (ref 3.4–10.8)

## 2024-04-01 PROCEDURE — 85610 PROTHROMBIN TIME: CPT | Performed by: FAMILY MEDICINE

## 2024-04-01 PROCEDURE — 85025 COMPLETE CBC W/AUTO DIFF WBC: CPT | Performed by: FAMILY MEDICINE

## 2024-04-01 PROCEDURE — 80053 COMPREHEN METABOLIC PANEL: CPT | Performed by: FAMILY MEDICINE

## 2024-04-01 PROCEDURE — 85300 ANTITHROMBIN III ACTIVITY: CPT | Performed by: FAMILY MEDICINE

## 2024-04-01 PROCEDURE — 85730 THROMBOPLASTIN TIME PARTIAL: CPT | Performed by: FAMILY MEDICINE

## 2024-04-03 LAB — AT III PPP CHRO-ACNC: 83 % (ref 75–120)

## 2024-07-08 ENCOUNTER — LAB (OUTPATIENT)
Dept: LAB | Facility: HOSPITAL | Age: 79
End: 2024-07-08
Payer: MEDICARE

## 2024-07-08 ENCOUNTER — TRANSCRIBE ORDERS (OUTPATIENT)
Dept: ADMINISTRATIVE | Facility: HOSPITAL | Age: 79
End: 2024-07-08
Payer: MEDICARE

## 2024-07-08 DIAGNOSIS — E79.0 HYPERURICEMIA: ICD-10-CM

## 2024-07-08 DIAGNOSIS — E55.9 VITAMIN D DEFICIENCY DISEASE: ICD-10-CM

## 2024-07-08 DIAGNOSIS — E05.90 PRIMARY HYPERTHYROIDISM: ICD-10-CM

## 2024-07-08 DIAGNOSIS — N18.30 STAGE 3 CHRONIC KIDNEY DISEASE, UNSPECIFIED WHETHER STAGE 3A OR 3B CKD: Primary | ICD-10-CM

## 2024-07-08 DIAGNOSIS — N18.30 STAGE 3 CHRONIC KIDNEY DISEASE, UNSPECIFIED WHETHER STAGE 3A OR 3B CKD: ICD-10-CM

## 2024-07-08 LAB
25(OH)D3 SERPL-MCNC: 26.4 NG/ML (ref 30–100)
ANION GAP SERPL CALCULATED.3IONS-SCNC: 10 MMOL/L (ref 5–15)
BASOPHILS # BLD AUTO: 0.11 10*3/MM3 (ref 0–0.2)
BASOPHILS NFR BLD AUTO: 1.4 % (ref 0–1.5)
BILIRUB UR QL STRIP: NEGATIVE
BUN SERPL-MCNC: 15 MG/DL (ref 8–23)
BUN/CREAT SERPL: 11.9 (ref 7–25)
CALCIUM SPEC-SCNC: 9.6 MG/DL (ref 8.6–10.5)
CHLORIDE SERPL-SCNC: 103 MMOL/L (ref 98–107)
CLARITY UR: CLEAR
CO2 SERPL-SCNC: 25 MMOL/L (ref 22–29)
COLOR UR: YELLOW
CREAT SERPL-MCNC: 1.26 MG/DL (ref 0.76–1.27)
CREAT UR-MCNC: 50.2 MG/DL
DEPRECATED RDW RBC AUTO: 47.6 FL (ref 37–54)
EGFRCR SERPLBLD CKD-EPI 2021: 57.7 ML/MIN/1.73
EOSINOPHIL # BLD AUTO: 0.44 10*3/MM3 (ref 0–0.4)
EOSINOPHIL NFR BLD AUTO: 5.8 % (ref 0.3–6.2)
ERYTHROCYTE [DISTWIDTH] IN BLOOD BY AUTOMATED COUNT: 15.4 % (ref 12.3–15.4)
GLUCOSE SERPL-MCNC: 94 MG/DL (ref 65–99)
GLUCOSE UR STRIP-MCNC: NEGATIVE MG/DL
HCT VFR BLD AUTO: 41.2 % (ref 37.5–51)
HGB BLD-MCNC: 13 G/DL (ref 13–17.7)
HGB UR QL STRIP.AUTO: NEGATIVE
HOLD SPECIMEN: NORMAL
IMM GRANULOCYTES # BLD AUTO: 0.03 10*3/MM3 (ref 0–0.05)
IMM GRANULOCYTES NFR BLD AUTO: 0.4 % (ref 0–0.5)
KETONES UR QL STRIP: NEGATIVE
LEUKOCYTE ESTERASE UR QL STRIP.AUTO: NEGATIVE
LYMPHOCYTES # BLD AUTO: 1.7 10*3/MM3 (ref 0.7–3.1)
LYMPHOCYTES NFR BLD AUTO: 22.3 % (ref 19.6–45.3)
MAGNESIUM SERPL-MCNC: 2 MG/DL (ref 1.6–2.4)
MCH RBC QN AUTO: 27.1 PG (ref 26.6–33)
MCHC RBC AUTO-ENTMCNC: 31.6 G/DL (ref 31.5–35.7)
MCV RBC AUTO: 85.8 FL (ref 79–97)
MONOCYTES # BLD AUTO: 0.79 10*3/MM3 (ref 0.1–0.9)
MONOCYTES NFR BLD AUTO: 10.3 % (ref 5–12)
NEUTROPHILS NFR BLD AUTO: 4.57 10*3/MM3 (ref 1.7–7)
NEUTROPHILS NFR BLD AUTO: 59.8 % (ref 42.7–76)
NITRITE UR QL STRIP: NEGATIVE
NRBC BLD AUTO-RTO: 0 /100 WBC (ref 0–0.2)
PH UR STRIP.AUTO: 6.5 [PH] (ref 5–8)
PHOSPHATE SERPL-MCNC: 3.2 MG/DL (ref 2.5–4.5)
PLATELET # BLD AUTO: 234 10*3/MM3 (ref 140–450)
PMV BLD AUTO: 10.6 FL (ref 6–12)
POTASSIUM SERPL-SCNC: 4.8 MMOL/L (ref 3.5–5.2)
PROT ?TM UR-MCNC: 14.1 MG/DL
PROT UR QL STRIP: NEGATIVE
PROT/CREAT UR: 280.9 MG/G CREA (ref 0–200)
PTH-INTACT SERPL-MCNC: 57 PG/ML (ref 15–65)
RBC # BLD AUTO: 4.8 10*6/MM3 (ref 4.14–5.8)
SODIUM SERPL-SCNC: 138 MMOL/L (ref 136–145)
SP GR UR STRIP: 1.01 (ref 1–1.03)
TSH SERPL DL<=0.05 MIU/L-ACNC: 0.37 UIU/ML (ref 0.27–4.2)
URATE SERPL-MCNC: 5.7 MG/DL (ref 3.4–7)
UROBILINOGEN UR QL STRIP: NORMAL
WBC NRBC COR # BLD AUTO: 7.64 10*3/MM3 (ref 3.4–10.8)

## 2024-07-08 PROCEDURE — 82306 VITAMIN D 25 HYDROXY: CPT

## 2024-07-08 PROCEDURE — 81003 URINALYSIS AUTO W/O SCOPE: CPT

## 2024-07-08 PROCEDURE — 85025 COMPLETE CBC W/AUTO DIFF WBC: CPT

## 2024-07-08 PROCEDURE — 84100 ASSAY OF PHOSPHORUS: CPT

## 2024-07-08 PROCEDURE — 83970 ASSAY OF PARATHORMONE: CPT

## 2024-07-08 PROCEDURE — 84156 ASSAY OF PROTEIN URINE: CPT

## 2024-07-08 PROCEDURE — 80048 BASIC METABOLIC PNL TOTAL CA: CPT

## 2024-07-08 PROCEDURE — 82570 ASSAY OF URINE CREATININE: CPT

## 2024-07-08 PROCEDURE — 84443 ASSAY THYROID STIM HORMONE: CPT

## 2024-07-08 PROCEDURE — 83735 ASSAY OF MAGNESIUM: CPT

## 2024-07-08 PROCEDURE — 36415 COLL VENOUS BLD VENIPUNCTURE: CPT

## 2024-07-08 PROCEDURE — 84550 ASSAY OF BLOOD/URIC ACID: CPT

## 2025-01-28 ENCOUNTER — TRANSCRIBE ORDERS (OUTPATIENT)
Dept: ADMINISTRATIVE | Facility: HOSPITAL | Age: 80
End: 2025-01-28
Payer: MEDICARE

## 2025-01-28 ENCOUNTER — LAB (OUTPATIENT)
Dept: LAB | Facility: HOSPITAL | Age: 80
End: 2025-01-28
Payer: MEDICARE

## 2025-01-28 DIAGNOSIS — E05.90 PRETIBIAL MYXEDEMA: ICD-10-CM

## 2025-01-28 DIAGNOSIS — N18.30 STAGE 3 CHRONIC KIDNEY DISEASE, UNSPECIFIED WHETHER STAGE 3A OR 3B CKD: ICD-10-CM

## 2025-01-28 DIAGNOSIS — N18.30 STAGE 3 CHRONIC KIDNEY DISEASE, UNSPECIFIED WHETHER STAGE 3A OR 3B CKD: Primary | ICD-10-CM

## 2025-01-28 LAB
25(OH)D3 SERPL-MCNC: 25.1 NG/ML (ref 30–100)
ANION GAP SERPL CALCULATED.3IONS-SCNC: 11.1 MMOL/L (ref 5–15)
BACTERIA UR QL AUTO: NORMAL /HPF
BASOPHILS # BLD AUTO: 0.12 10*3/MM3 (ref 0–0.2)
BASOPHILS NFR BLD AUTO: 1.4 % (ref 0–1.5)
BILIRUB UR QL STRIP: NEGATIVE
BUN SERPL-MCNC: 24 MG/DL (ref 8–23)
BUN/CREAT SERPL: 15 (ref 7–25)
CALCIUM SPEC-SCNC: 9.3 MG/DL (ref 8.6–10.5)
CHLORIDE SERPL-SCNC: 101 MMOL/L (ref 98–107)
CK SERPL-CCNC: 35 U/L (ref 20–200)
CLARITY UR: CLEAR
CO2 SERPL-SCNC: 25.9 MMOL/L (ref 22–29)
COLOR UR: YELLOW
CREAT SERPL-MCNC: 1.6 MG/DL (ref 0.76–1.27)
CREAT UR-MCNC: 70.7 MG/DL
DEPRECATED RDW RBC AUTO: 47.3 FL (ref 37–54)
EGFRCR SERPLBLD CKD-EPI 2021: 43 ML/MIN/1.73
EOSINOPHIL # BLD AUTO: 0.45 10*3/MM3 (ref 0–0.4)
EOSINOPHIL NFR BLD AUTO: 5.2 % (ref 0.3–6.2)
ERYTHROCYTE [DISTWIDTH] IN BLOOD BY AUTOMATED COUNT: 14.8 % (ref 12.3–15.4)
GLUCOSE SERPL-MCNC: 88 MG/DL (ref 65–99)
GLUCOSE UR STRIP-MCNC: NEGATIVE MG/DL
HCT VFR BLD AUTO: 42.5 % (ref 37.5–51)
HGB BLD-MCNC: 13 G/DL (ref 13–17.7)
HGB UR QL STRIP.AUTO: NEGATIVE
HOLD SPECIMEN: NORMAL
HYALINE CASTS UR QL AUTO: NORMAL /LPF
IMM GRANULOCYTES # BLD AUTO: 0.04 10*3/MM3 (ref 0–0.05)
IMM GRANULOCYTES NFR BLD AUTO: 0.5 % (ref 0–0.5)
KETONES UR QL STRIP: NEGATIVE
LEUKOCYTE ESTERASE UR QL STRIP.AUTO: NEGATIVE
LYMPHOCYTES # BLD AUTO: 2.04 10*3/MM3 (ref 0.7–3.1)
LYMPHOCYTES NFR BLD AUTO: 23.7 % (ref 19.6–45.3)
MAGNESIUM SERPL-MCNC: 2 MG/DL (ref 1.6–2.4)
MCH RBC QN AUTO: 26.6 PG (ref 26.6–33)
MCHC RBC AUTO-ENTMCNC: 30.6 G/DL (ref 31.5–35.7)
MCV RBC AUTO: 87.1 FL (ref 79–97)
MONOCYTES # BLD AUTO: 0.74 10*3/MM3 (ref 0.1–0.9)
MONOCYTES NFR BLD AUTO: 8.6 % (ref 5–12)
NEUTROPHILS NFR BLD AUTO: 5.23 10*3/MM3 (ref 1.7–7)
NEUTROPHILS NFR BLD AUTO: 60.6 % (ref 42.7–76)
NITRITE UR QL STRIP: NEGATIVE
NRBC BLD AUTO-RTO: 0 /100 WBC (ref 0–0.2)
PH UR STRIP.AUTO: 6.5 [PH] (ref 5–8)
PHOSPHATE SERPL-MCNC: 3.1 MG/DL (ref 2.5–4.5)
PLATELET # BLD AUTO: 285 10*3/MM3 (ref 140–450)
PMV BLD AUTO: 10.7 FL (ref 6–12)
POTASSIUM SERPL-SCNC: 4.7 MMOL/L (ref 3.5–5.2)
PROT ?TM UR-MCNC: 31.8 MG/DL
PROT UR QL STRIP: ABNORMAL
PROT/CREAT UR: 449.8 MG/G CREA (ref 0–200)
PTH-INTACT SERPL-MCNC: 83.2 PG/ML (ref 15–65)
RBC # BLD AUTO: 4.88 10*6/MM3 (ref 4.14–5.8)
RBC # UR STRIP: NORMAL /HPF
REF LAB TEST METHOD: NORMAL
SODIUM SERPL-SCNC: 138 MMOL/L (ref 136–145)
SP GR UR STRIP: 1.02 (ref 1–1.03)
SQUAMOUS #/AREA URNS HPF: NORMAL /HPF
TSH SERPL DL<=0.05 MIU/L-ACNC: 0.99 UIU/ML (ref 0.27–4.2)
URATE SERPL-MCNC: 6.3 MG/DL (ref 3.4–7)
UROBILINOGEN UR QL STRIP: ABNORMAL
WBC # UR STRIP: NORMAL /HPF
WBC NRBC COR # BLD AUTO: 8.62 10*3/MM3 (ref 3.4–10.8)

## 2025-01-28 PROCEDURE — 84100 ASSAY OF PHOSPHORUS: CPT

## 2025-01-28 PROCEDURE — 82550 ASSAY OF CK (CPK): CPT

## 2025-01-28 PROCEDURE — 83970 ASSAY OF PARATHORMONE: CPT

## 2025-01-28 PROCEDURE — 83735 ASSAY OF MAGNESIUM: CPT

## 2025-01-28 PROCEDURE — 36415 COLL VENOUS BLD VENIPUNCTURE: CPT

## 2025-01-28 PROCEDURE — 84443 ASSAY THYROID STIM HORMONE: CPT

## 2025-01-28 PROCEDURE — 84156 ASSAY OF PROTEIN URINE: CPT

## 2025-01-28 PROCEDURE — 82570 ASSAY OF URINE CREATININE: CPT

## 2025-01-28 PROCEDURE — 84550 ASSAY OF BLOOD/URIC ACID: CPT

## 2025-01-28 PROCEDURE — 81001 URINALYSIS AUTO W/SCOPE: CPT

## 2025-01-28 PROCEDURE — 85025 COMPLETE CBC W/AUTO DIFF WBC: CPT

## 2025-01-28 PROCEDURE — 82306 VITAMIN D 25 HYDROXY: CPT

## 2025-01-28 PROCEDURE — 80048 BASIC METABOLIC PNL TOTAL CA: CPT

## 2025-03-10 ENCOUNTER — TRANSCRIBE ORDERS (OUTPATIENT)
Dept: ADMINISTRATIVE | Facility: HOSPITAL | Age: 80
End: 2025-03-10
Payer: MEDICARE

## 2025-03-10 ENCOUNTER — LAB (OUTPATIENT)
Dept: LAB | Facility: HOSPITAL | Age: 80
End: 2025-03-10
Payer: MEDICARE

## 2025-03-10 DIAGNOSIS — N18.30 STAGE 3 CHRONIC KIDNEY DISEASE, UNSPECIFIED WHETHER STAGE 3A OR 3B CKD: Primary | ICD-10-CM

## 2025-03-10 DIAGNOSIS — N18.30 STAGE 3 CHRONIC KIDNEY DISEASE, UNSPECIFIED WHETHER STAGE 3A OR 3B CKD: ICD-10-CM

## 2025-03-10 LAB
ANION GAP SERPL CALCULATED.3IONS-SCNC: 11 MMOL/L (ref 5–15)
BUN SERPL-MCNC: 18 MG/DL (ref 8–23)
BUN/CREAT SERPL: 11.6 (ref 7–25)
CALCIUM SPEC-SCNC: 9.7 MG/DL (ref 8.6–10.5)
CHLORIDE SERPL-SCNC: 99 MMOL/L (ref 98–107)
CO2 SERPL-SCNC: 24 MMOL/L (ref 22–29)
CREAT SERPL-MCNC: 1.55 MG/DL (ref 0.76–1.27)
EGFRCR SERPLBLD CKD-EPI 2021: 44.7 ML/MIN/1.73
GLUCOSE SERPL-MCNC: 95 MG/DL (ref 65–99)
POTASSIUM SERPL-SCNC: 4.8 MMOL/L (ref 3.5–5.2)
SODIUM SERPL-SCNC: 134 MMOL/L (ref 136–145)

## 2025-03-10 PROCEDURE — 80048 BASIC METABOLIC PNL TOTAL CA: CPT

## 2025-03-10 PROCEDURE — 36415 COLL VENOUS BLD VENIPUNCTURE: CPT

## 2025-03-27 ENCOUNTER — APPOINTMENT (OUTPATIENT)
Dept: CARDIOLOGY | Facility: HOSPITAL | Age: 80
End: 2025-03-27
Payer: MEDICARE

## 2025-03-27 ENCOUNTER — HOSPITAL ENCOUNTER (INPATIENT)
Facility: HOSPITAL | Age: 80
LOS: 5 days | Discharge: HOME HEALTH | End: 2025-04-01
Attending: EMERGENCY MEDICINE | Admitting: FAMILY MEDICINE
Payer: MEDICARE

## 2025-03-27 DIAGNOSIS — L03.116 CELLULITIS OF LEFT LOWER EXTREMITY: Primary | ICD-10-CM

## 2025-03-27 LAB
ALBUMIN SERPL-MCNC: 3.7 G/DL (ref 3.5–5.2)
ALBUMIN/GLOB SERPL: 1.3 G/DL
ALP SERPL-CCNC: 57 U/L (ref 39–117)
ALT SERPL W P-5'-P-CCNC: 7 U/L (ref 1–41)
ANION GAP SERPL CALCULATED.3IONS-SCNC: 10.9 MMOL/L (ref 5–15)
APTT PPP: 26.5 SECONDS (ref 22.7–35.4)
AST SERPL-CCNC: 13 U/L (ref 1–40)
BASOPHILS # BLD AUTO: 0.03 10*3/MM3 (ref 0–0.2)
BASOPHILS # BLD AUTO: 0.03 10*3/MM3 (ref 0–0.2)
BASOPHILS NFR BLD AUTO: 0.4 % (ref 0–1.5)
BASOPHILS NFR BLD AUTO: 0.6 % (ref 0–1.5)
BH CV LOWER VASCULAR LEFT COMMON FEMORAL AUGMENT: NORMAL
BH CV LOWER VASCULAR LEFT COMMON FEMORAL COMPETENT: NORMAL
BH CV LOWER VASCULAR LEFT COMMON FEMORAL COMPRESS: NORMAL
BH CV LOWER VASCULAR LEFT COMMON FEMORAL PHASIC: NORMAL
BH CV LOWER VASCULAR LEFT COMMON FEMORAL SPONT: NORMAL
BH CV LOWER VASCULAR LEFT DISTAL FEMORAL COMPRESS: NORMAL
BH CV LOWER VASCULAR LEFT GASTRONEMIUS COMPRESS: NORMAL
BH CV LOWER VASCULAR LEFT GREATER SAPH AK COMPRESS: NORMAL
BH CV LOWER VASCULAR LEFT GREATER SAPH BK COMPRESS: NORMAL
BH CV LOWER VASCULAR LEFT MID FEMORAL AUGMENT: NORMAL
BH CV LOWER VASCULAR LEFT MID FEMORAL COMPETENT: NORMAL
BH CV LOWER VASCULAR LEFT MID FEMORAL COMPRESS: NORMAL
BH CV LOWER VASCULAR LEFT MID FEMORAL PHASIC: NORMAL
BH CV LOWER VASCULAR LEFT MID FEMORAL SPONT: NORMAL
BH CV LOWER VASCULAR LEFT PERONEAL COMPRESS: NORMAL
BH CV LOWER VASCULAR LEFT POPLITEAL AUGMENT: NORMAL
BH CV LOWER VASCULAR LEFT POPLITEAL COMPETENT: NORMAL
BH CV LOWER VASCULAR LEFT POPLITEAL COMPRESS: NORMAL
BH CV LOWER VASCULAR LEFT POPLITEAL PHASIC: NORMAL
BH CV LOWER VASCULAR LEFT POPLITEAL SPONT: NORMAL
BH CV LOWER VASCULAR LEFT POSTERIOR TIBIAL COMPRESS: NORMAL
BH CV LOWER VASCULAR LEFT PROXIMAL FEMORAL COMPRESS: NORMAL
BH CV LOWER VASCULAR LEFT SAPHENOFEMORAL JUNCTION COMPRESS: NORMAL
BH CV LOWER VASCULAR RIGHT COMMON FEMORAL AUGMENT: NORMAL
BH CV LOWER VASCULAR RIGHT COMMON FEMORAL COMPETENT: NORMAL
BH CV LOWER VASCULAR RIGHT COMMON FEMORAL COMPRESS: NORMAL
BH CV LOWER VASCULAR RIGHT COMMON FEMORAL PHASIC: NORMAL
BH CV LOWER VASCULAR RIGHT COMMON FEMORAL SPONT: NORMAL
BH CV LOWER VASCULAR RIGHT GASTRONEMIUS COMPRESS: NORMAL
BH CV LOWER VASCULAR RIGHT GREATER SAPH AK COMPRESS: NORMAL
BH CV LOWER VASCULAR RIGHT GREATER SAPH BK COMPRESS: NORMAL
BH CV LOWER VASCULAR RIGHT MID FEMORAL AUGMENT: NORMAL
BH CV LOWER VASCULAR RIGHT MID FEMORAL COMPETENT: NORMAL
BH CV LOWER VASCULAR RIGHT MID FEMORAL COMPRESS: NORMAL
BH CV LOWER VASCULAR RIGHT MID FEMORAL PHASIC: NORMAL
BH CV LOWER VASCULAR RIGHT MID FEMORAL SPONT: NORMAL
BH CV LOWER VASCULAR RIGHT PERONEAL COMPRESS: NORMAL
BH CV LOWER VASCULAR RIGHT POPLITEAL AUGMENT: NORMAL
BH CV LOWER VASCULAR RIGHT POPLITEAL COMPETENT: NORMAL
BH CV LOWER VASCULAR RIGHT POPLITEAL COMPRESS: NORMAL
BH CV LOWER VASCULAR RIGHT POPLITEAL PHASIC: NORMAL
BH CV LOWER VASCULAR RIGHT POPLITEAL SPONT: NORMAL
BH CV LOWER VASCULAR RIGHT POSTERIOR TIBIAL COMPRESS: NORMAL
BH CV LOWER VASCULAR RIGHT PROXIMAL FEMORAL COMPRESS: NORMAL
BH CV LOWER VASCULAR RIGHT SAPHENOFEMORAL JUNCTION COMPRESS: NORMAL
BH CV VAS PRELIMINARY FINDINGS SCRIPTING: 1
BILIRUB SERPL-MCNC: 0.6 MG/DL (ref 0–1.2)
BUN SERPL-MCNC: 17 MG/DL (ref 8–23)
BUN/CREAT SERPL: 11.3 (ref 7–25)
CALCIUM SPEC-SCNC: 9.1 MG/DL (ref 8.6–10.5)
CHLORIDE SERPL-SCNC: 99 MMOL/L (ref 98–107)
CO2 SERPL-SCNC: 24.1 MMOL/L (ref 22–29)
CREAT SERPL-MCNC: 1.5 MG/DL (ref 0.76–1.27)
CRP SERPL-MCNC: 18.7 MG/DL (ref 0–0.5)
D-LACTATE SERPL-SCNC: 0.7 MMOL/L (ref 0.3–2)
DEPRECATED RDW RBC AUTO: 49.5 FL (ref 37–54)
DEPRECATED RDW RBC AUTO: 50.7 FL (ref 37–54)
EGFRCR SERPLBLD CKD-EPI 2021: 46.5 ML/MIN/1.73
EOSINOPHIL # BLD AUTO: 0.15 10*3/MM3 (ref 0–0.4)
EOSINOPHIL # BLD AUTO: 0.23 10*3/MM3 (ref 0–0.4)
EOSINOPHIL NFR BLD AUTO: 2.2 % (ref 0.3–6.2)
EOSINOPHIL NFR BLD AUTO: 4.3 % (ref 0.3–6.2)
ERYTHROCYTE [DISTWIDTH] IN BLOOD BY AUTOMATED COUNT: 15.8 % (ref 12.3–15.4)
ERYTHROCYTE [DISTWIDTH] IN BLOOD BY AUTOMATED COUNT: 15.9 % (ref 12.3–15.4)
ERYTHROCYTE [SEDIMENTATION RATE] IN BLOOD: 21 MM/HR (ref 0–20)
GLOBULIN UR ELPH-MCNC: 2.8 GM/DL
GLUCOSE SERPL-MCNC: 128 MG/DL (ref 65–99)
HCT VFR BLD AUTO: 37.9 % (ref 37.5–51)
HCT VFR BLD AUTO: 41.2 % (ref 37.5–51)
HGB BLD-MCNC: 11.8 G/DL (ref 13–17.7)
HGB BLD-MCNC: 12.7 G/DL (ref 13–17.7)
HOLD SPECIMEN: NORMAL
IMM GRANULOCYTES # BLD AUTO: 0.04 10*3/MM3 (ref 0–0.05)
IMM GRANULOCYTES # BLD AUTO: 0.04 10*3/MM3 (ref 0–0.05)
IMM GRANULOCYTES NFR BLD AUTO: 0.6 % (ref 0–0.5)
IMM GRANULOCYTES NFR BLD AUTO: 0.7 % (ref 0–0.5)
INR PPP: 1.17 (ref 0.9–1.1)
LYMPHOCYTES # BLD AUTO: 1.4 10*3/MM3 (ref 0.7–3.1)
LYMPHOCYTES # BLD AUTO: 1.75 10*3/MM3 (ref 0.7–3.1)
LYMPHOCYTES NFR BLD AUTO: 20.6 % (ref 19.6–45.3)
LYMPHOCYTES NFR BLD AUTO: 32.5 % (ref 19.6–45.3)
MAGNESIUM SERPL-MCNC: 1.9 MG/DL (ref 1.6–2.4)
MCH RBC QN AUTO: 26.7 PG (ref 26.6–33)
MCH RBC QN AUTO: 26.8 PG (ref 26.6–33)
MCHC RBC AUTO-ENTMCNC: 30.8 G/DL (ref 31.5–35.7)
MCHC RBC AUTO-ENTMCNC: 31.1 G/DL (ref 31.5–35.7)
MCV RBC AUTO: 85.9 FL (ref 79–97)
MCV RBC AUTO: 86.7 FL (ref 79–97)
MONOCYTES # BLD AUTO: 0.67 10*3/MM3 (ref 0.1–0.9)
MONOCYTES # BLD AUTO: 0.9 10*3/MM3 (ref 0.1–0.9)
MONOCYTES NFR BLD AUTO: 12.4 % (ref 5–12)
MONOCYTES NFR BLD AUTO: 13.2 % (ref 5–12)
NEUTROPHILS NFR BLD AUTO: 2.67 10*3/MM3 (ref 1.7–7)
NEUTROPHILS NFR BLD AUTO: 4.29 10*3/MM3 (ref 1.7–7)
NEUTROPHILS NFR BLD AUTO: 49.5 % (ref 42.7–76)
NEUTROPHILS NFR BLD AUTO: 63 % (ref 42.7–76)
NRBC BLD AUTO-RTO: 0 /100 WBC (ref 0–0.2)
NRBC BLD AUTO-RTO: 0 /100 WBC (ref 0–0.2)
PLATELET # BLD AUTO: 259 10*3/MM3 (ref 140–450)
PLATELET # BLD AUTO: 281 10*3/MM3 (ref 140–450)
PMV BLD AUTO: 10.3 FL (ref 6–12)
PMV BLD AUTO: 10.4 FL (ref 6–12)
POTASSIUM SERPL-SCNC: 4.8 MMOL/L (ref 3.5–5.2)
PROCALCITONIN SERPL-MCNC: 0.06 NG/ML (ref 0–0.25)
PROT SERPL-MCNC: 6.5 G/DL (ref 6–8.5)
PROTHROMBIN TIME: 14.8 SECONDS (ref 11.7–14.2)
RBC # BLD AUTO: 4.41 10*6/MM3 (ref 4.14–5.8)
RBC # BLD AUTO: 4.75 10*6/MM3 (ref 4.14–5.8)
SODIUM SERPL-SCNC: 134 MMOL/L (ref 136–145)
URATE SERPL-MCNC: 7.5 MG/DL (ref 3.4–7)
WBC NRBC COR # BLD AUTO: 5.39 10*3/MM3 (ref 3.4–10.8)
WBC NRBC COR # BLD AUTO: 6.81 10*3/MM3 (ref 3.4–10.8)

## 2025-03-27 PROCEDURE — 83605 ASSAY OF LACTIC ACID: CPT

## 2025-03-27 PROCEDURE — 99285 EMERGENCY DEPT VISIT HI MDM: CPT

## 2025-03-27 PROCEDURE — 25010000002 CEFAZOLIN PER 500 MG: Performed by: EMERGENCY MEDICINE

## 2025-03-27 PROCEDURE — 86140 C-REACTIVE PROTEIN: CPT | Performed by: NURSE PRACTITIONER

## 2025-03-27 PROCEDURE — 85025 COMPLETE CBC W/AUTO DIFF WBC: CPT | Performed by: NURSE PRACTITIONER

## 2025-03-27 PROCEDURE — 36415 COLL VENOUS BLD VENIPUNCTURE: CPT

## 2025-03-27 PROCEDURE — 83735 ASSAY OF MAGNESIUM: CPT | Performed by: FAMILY MEDICINE

## 2025-03-27 PROCEDURE — 85025 COMPLETE CBC W/AUTO DIFF WBC: CPT | Performed by: FAMILY MEDICINE

## 2025-03-27 PROCEDURE — 80053 COMPREHEN METABOLIC PANEL: CPT | Performed by: NURSE PRACTITIONER

## 2025-03-27 PROCEDURE — 84550 ASSAY OF BLOOD/URIC ACID: CPT | Performed by: NURSE PRACTITIONER

## 2025-03-27 PROCEDURE — 25010000002 CEFAZOLIN PER 500 MG: Performed by: FAMILY MEDICINE

## 2025-03-27 PROCEDURE — 83036 HEMOGLOBIN GLYCOSYLATED A1C: CPT | Performed by: FAMILY MEDICINE

## 2025-03-27 PROCEDURE — 93970 EXTREMITY STUDY: CPT | Performed by: STUDENT IN AN ORGANIZED HEALTH CARE EDUCATION/TRAINING PROGRAM

## 2025-03-27 PROCEDURE — 85610 PROTHROMBIN TIME: CPT | Performed by: EMERGENCY MEDICINE

## 2025-03-27 PROCEDURE — 83540 ASSAY OF IRON: CPT | Performed by: INTERNAL MEDICINE

## 2025-03-27 PROCEDURE — 85730 THROMBOPLASTIN TIME PARTIAL: CPT | Performed by: EMERGENCY MEDICINE

## 2025-03-27 PROCEDURE — 82550 ASSAY OF CK (CPK): CPT | Performed by: INTERNAL MEDICINE

## 2025-03-27 PROCEDURE — 83735 ASSAY OF MAGNESIUM: CPT | Performed by: NURSE PRACTITIONER

## 2025-03-27 PROCEDURE — 93970 EXTREMITY STUDY: CPT

## 2025-03-27 PROCEDURE — 25010000002 ENOXAPARIN PER 10 MG: Performed by: FAMILY MEDICINE

## 2025-03-27 PROCEDURE — 85652 RBC SED RATE AUTOMATED: CPT | Performed by: NURSE PRACTITIONER

## 2025-03-27 PROCEDURE — 84550 ASSAY OF BLOOD/URIC ACID: CPT | Performed by: INTERNAL MEDICINE

## 2025-03-27 PROCEDURE — 84466 ASSAY OF TRANSFERRIN: CPT | Performed by: INTERNAL MEDICINE

## 2025-03-27 PROCEDURE — 82728 ASSAY OF FERRITIN: CPT | Performed by: INTERNAL MEDICINE

## 2025-03-27 PROCEDURE — 25010000002 MORPHINE PER 10 MG: Performed by: EMERGENCY MEDICINE

## 2025-03-27 PROCEDURE — 84145 PROCALCITONIN (PCT): CPT | Performed by: NURSE PRACTITIONER

## 2025-03-27 PROCEDURE — 87040 BLOOD CULTURE FOR BACTERIA: CPT | Performed by: NURSE PRACTITIONER

## 2025-03-27 PROCEDURE — 80053 COMPREHEN METABOLIC PANEL: CPT | Performed by: FAMILY MEDICINE

## 2025-03-27 RX ORDER — BISACODYL 10 MG
10 SUPPOSITORY, RECTAL RECTAL DAILY PRN
Status: DISCONTINUED | OUTPATIENT
Start: 2025-03-27 | End: 2025-04-01

## 2025-03-27 RX ORDER — CALCIUM CARBONATE 500 MG/1
2 TABLET, CHEWABLE ORAL 2 TIMES DAILY PRN
Status: DISCONTINUED | OUTPATIENT
Start: 2025-03-27 | End: 2025-04-01

## 2025-03-27 RX ORDER — ENOXAPARIN SODIUM 100 MG/ML
40 INJECTION SUBCUTANEOUS ONCE
Status: COMPLETED | OUTPATIENT
Start: 2025-03-27 | End: 2025-03-27

## 2025-03-27 RX ORDER — SACCHAROMYCES BOULARDII 250 MG
250 CAPSULE ORAL 2 TIMES DAILY
Status: DISCONTINUED | OUTPATIENT
Start: 2025-03-27 | End: 2025-04-01 | Stop reason: HOSPADM

## 2025-03-27 RX ORDER — LEVOTHYROXINE SODIUM 175 UG/1
175 TABLET ORAL
Status: DISCONTINUED | OUTPATIENT
Start: 2025-03-28 | End: 2025-04-01 | Stop reason: HOSPADM

## 2025-03-27 RX ORDER — SODIUM CHLORIDE 0.9 % (FLUSH) 0.9 %
10 SYRINGE (ML) INJECTION AS NEEDED
Status: DISCONTINUED | OUTPATIENT
Start: 2025-03-27 | End: 2025-04-01

## 2025-03-27 RX ORDER — AMOXICILLIN 250 MG
2 CAPSULE ORAL 2 TIMES DAILY PRN
Status: DISCONTINUED | OUTPATIENT
Start: 2025-03-27 | End: 2025-04-01

## 2025-03-27 RX ORDER — POLYETHYLENE GLYCOL 3350 17 G/17G
17 POWDER, FOR SOLUTION ORAL DAILY PRN
Status: DISCONTINUED | OUTPATIENT
Start: 2025-03-27 | End: 2025-04-01 | Stop reason: HOSPADM

## 2025-03-27 RX ORDER — SODIUM CHLORIDE 0.9 % (FLUSH) 0.9 %
10 SYRINGE (ML) INJECTION AS NEEDED
Status: DISCONTINUED | OUTPATIENT
Start: 2025-03-27 | End: 2025-04-01 | Stop reason: HOSPADM

## 2025-03-27 RX ORDER — HYDROCORTISONE 25 MG/G
CREAM TOPICAL EVERY 12 HOURS SCHEDULED
Status: DISCONTINUED | OUTPATIENT
Start: 2025-03-27 | End: 2025-04-01 | Stop reason: HOSPADM

## 2025-03-27 RX ORDER — ONDANSETRON 2 MG/ML
4 INJECTION INTRAMUSCULAR; INTRAVENOUS EVERY 4 HOURS PRN
Status: DISCONTINUED | OUTPATIENT
Start: 2025-03-27 | End: 2025-04-01

## 2025-03-27 RX ORDER — AMOXICILLIN 250 MG
2 CAPSULE ORAL 2 TIMES DAILY PRN
Status: DISCONTINUED | OUTPATIENT
Start: 2025-03-27 | End: 2025-03-27 | Stop reason: SDUPTHER

## 2025-03-27 RX ORDER — SODIUM CHLORIDE 9 MG/ML
40 INJECTION, SOLUTION INTRAVENOUS AS NEEDED
Status: DISCONTINUED | OUTPATIENT
Start: 2025-03-27 | End: 2025-04-01

## 2025-03-27 RX ORDER — BISACODYL 5 MG/1
5 TABLET, DELAYED RELEASE ORAL DAILY PRN
Status: DISCONTINUED | OUTPATIENT
Start: 2025-03-27 | End: 2025-04-01

## 2025-03-27 RX ORDER — COLCHICINE 0.6 MG/1
0.6 TABLET ORAL DAILY
Status: DISCONTINUED | OUTPATIENT
Start: 2025-03-27 | End: 2025-04-01

## 2025-03-27 RX ORDER — ACETAMINOPHEN 650 MG/1
650 SUPPOSITORY RECTAL EVERY 4 HOURS PRN
Status: DISCONTINUED | OUTPATIENT
Start: 2025-03-27 | End: 2025-04-01

## 2025-03-27 RX ORDER — ENOXAPARIN SODIUM 100 MG/ML
40 INJECTION SUBCUTANEOUS EVERY 24 HOURS
Status: DISCONTINUED | OUTPATIENT
Start: 2025-03-28 | End: 2025-04-01

## 2025-03-27 RX ORDER — FLUOXETINE 10 MG/1
10 CAPSULE ORAL DAILY
Status: DISCONTINUED | OUTPATIENT
Start: 2025-03-27 | End: 2025-04-01 | Stop reason: HOSPADM

## 2025-03-27 RX ORDER — FOLIC ACID 1 MG/1
1 TABLET ORAL DAILY
Status: DISCONTINUED | OUTPATIENT
Start: 2025-03-27 | End: 2025-04-01 | Stop reason: HOSPADM

## 2025-03-27 RX ORDER — ACETAMINOPHEN 325 MG/1
650 TABLET ORAL EVERY 4 HOURS PRN
Status: DISCONTINUED | OUTPATIENT
Start: 2025-03-27 | End: 2025-04-01 | Stop reason: HOSPADM

## 2025-03-27 RX ORDER — HYDRALAZINE HYDROCHLORIDE 25 MG/1
50 TABLET, FILM COATED ORAL DAILY
Status: DISCONTINUED | OUTPATIENT
Start: 2025-03-27 | End: 2025-04-01 | Stop reason: HOSPADM

## 2025-03-27 RX ORDER — ONDANSETRON 4 MG/1
4 TABLET, ORALLY DISINTEGRATING ORAL EVERY 4 HOURS PRN
Status: DISCONTINUED | OUTPATIENT
Start: 2025-03-27 | End: 2025-04-01

## 2025-03-27 RX ORDER — HYDRALAZINE HYDROCHLORIDE 50 MG/1
50 TABLET, FILM COATED ORAL DAILY
COMMUNITY

## 2025-03-27 RX ORDER — PANTOPRAZOLE SODIUM 40 MG/1
40 TABLET, DELAYED RELEASE ORAL DAILY
Status: DISCONTINUED | OUTPATIENT
Start: 2025-03-27 | End: 2025-04-01 | Stop reason: HOSPADM

## 2025-03-27 RX ORDER — CHOLESTYRAMINE LIGHT 4 G/5.7G
1 POWDER, FOR SUSPENSION ORAL DAILY
Status: DISCONTINUED | OUTPATIENT
Start: 2025-03-27 | End: 2025-03-29

## 2025-03-27 RX ORDER — ALLOPURINOL 100 MG/1
100 TABLET ORAL DAILY
Status: DISCONTINUED | OUTPATIENT
Start: 2025-03-27 | End: 2025-03-28

## 2025-03-27 RX ORDER — SODIUM CHLORIDE 0.9 % (FLUSH) 0.9 %
10 SYRINGE (ML) INJECTION EVERY 12 HOURS SCHEDULED
Status: DISCONTINUED | OUTPATIENT
Start: 2025-03-27 | End: 2025-04-01 | Stop reason: HOSPADM

## 2025-03-27 RX ORDER — ACETAMINOPHEN 160 MG/5ML
650 SOLUTION ORAL EVERY 4 HOURS PRN
Status: DISCONTINUED | OUTPATIENT
Start: 2025-03-27 | End: 2025-04-01

## 2025-03-27 RX ORDER — ALUMINA, MAGNESIA, AND SIMETHICONE 2400; 2400; 240 MG/30ML; MG/30ML; MG/30ML
15 SUSPENSION ORAL EVERY 6 HOURS PRN
Status: DISCONTINUED | OUTPATIENT
Start: 2025-03-27 | End: 2025-04-01

## 2025-03-27 RX ORDER — BISACODYL 10 MG
10 SUPPOSITORY, RECTAL RECTAL DAILY PRN
Status: DISCONTINUED | OUTPATIENT
Start: 2025-03-27 | End: 2025-03-27 | Stop reason: SDUPTHER

## 2025-03-27 RX ORDER — POLYETHYLENE GLYCOL 3350 17 G/17G
17 POWDER, FOR SOLUTION ORAL DAILY PRN
Status: DISCONTINUED | OUTPATIENT
Start: 2025-03-27 | End: 2025-03-27 | Stop reason: SDUPTHER

## 2025-03-27 RX ORDER — BISACODYL 5 MG/1
5 TABLET, DELAYED RELEASE ORAL DAILY PRN
Status: DISCONTINUED | OUTPATIENT
Start: 2025-03-27 | End: 2025-03-27 | Stop reason: SDUPTHER

## 2025-03-27 RX ORDER — KETOCONAZOLE 20 MG/G
CREAM TOPICAL
Status: DISCONTINUED | OUTPATIENT
Start: 2025-03-27 | End: 2025-04-01 | Stop reason: HOSPADM

## 2025-03-27 RX ADMIN — ACETAMINOPHEN 650 MG: 325 TABLET, FILM COATED ORAL at 23:23

## 2025-03-27 RX ADMIN — CEFAZOLIN 2000 MG: 2 INJECTION, POWDER, FOR SOLUTION INTRAMUSCULAR; INTRAVENOUS at 16:33

## 2025-03-27 RX ADMIN — ENOXAPARIN SODIUM 40 MG: 100 INJECTION SUBCUTANEOUS at 20:06

## 2025-03-27 RX ADMIN — MORPHINE SULFATE 4 MG: 4 INJECTION, SOLUTION INTRAMUSCULAR; INTRAVENOUS at 16:24

## 2025-03-27 RX ADMIN — HYDRALAZINE HYDROCHLORIDE 50 MG: 25 TABLET ORAL at 21:37

## 2025-03-27 RX ADMIN — Medication 10 ML: at 20:11

## 2025-03-27 RX ADMIN — Medication 250 MG: at 21:37

## 2025-03-27 RX ADMIN — COLCHICINE 0.6 MG: 0.6 TABLET ORAL at 20:06

## 2025-03-27 RX ADMIN — CEFAZOLIN 2000 MG: 2 INJECTION, POWDER, FOR SOLUTION INTRAMUSCULAR; INTRAVENOUS at 23:32

## 2025-03-27 NOTE — ED PROVIDER NOTES
Subjective   Provider in Triage Note  79 yom presents with family with c/o BLE pain and swelling. Recent admission 3 weeks ago for cellulitis that failed outpatient keflex. Family reports worsening wounds. No trauma. No fever. Denies watery stools.       Pcp:dr nakul rodarte    Chart review: 2/26-3/1/2024: cellulitis LLE with failed outpatient treatment  chronic tinea pedis          History of Present Illness  Provider in triage note reviewed    History of present illness this is an 79-year-old male multiple health problems history of recurrent cellulitis who states that Sunday he had a lot of left big toe pain and started with some redness and thought to be gout initially started on colchicine by his nephrologist but the redness and pain progressed up the patient's leg up to his knee since Sunday.  The patient denies any fever chills or trauma no recent long car ride plane ride immobilization foreign travels.  Patient was last hospitalized about a year ago in March for a cellulitis to the left leg.  She has chronic venous stasis and has chronic edema in his treated by his daughter nurse practitioner on the right leg with some wraps and compression for his chronic venous stasis.  Complains of some moderate pain to that leg.  Constitutional this is an 79-year-old awake alert no acute distress triage vital signs reviewed.  Constitutional 79-year-old awake alert no acute distress triage vital signs reviewed constitutional      Review of Systems   Constitutional:  Negative for chills and fever.   Respiratory:  Negative for chest tightness and shortness of breath.    Cardiovascular:  Positive for leg swelling. Negative for chest pain.   Gastrointestinal:  Negative for abdominal pain and vomiting.   Genitourinary:  Negative for difficulty urinating.   Skin:  Positive for wound.   Neurological:  Positive for weakness.   Psychiatric/Behavioral:  Negative for confusion.        Past Medical History:   Diagnosis Date     Alcohol abuse 07/13/2022    Essential hypertension 07/13/2022    Gait instability 07/13/2022    GERD without esophagitis 07/13/2022    Gout 07/13/2022    Hypothyroidism (acquired) 07/13/2022    Onychomycosis of multiple toenails with type 2 diabetes mellitus 07/13/2022    Sleep apnea     Stage 3a chronic kidney disease 07/13/2022    Tinea pedis of both feet 07/13/2022    Type 2 diabetes mellitus with diabetic neuropathy, without long-term current use of insulin 07/13/2022       Allergies   Allergen Reactions    Tigecycline Swelling and Rash       Past Surgical History:   Procedure Laterality Date    ORTHOPEDIC SURGERY      fractured ankle and dislocated shoulder       Family History   Family history unknown: Yes       Social History     Socioeconomic History    Marital status:    Tobacco Use    Smoking status: Never    Smokeless tobacco: Never   Vaping Use    Vaping status: Never Used   Substance and Sexual Activity    Alcohol use: Yes     Alcohol/week: 30.0 standard drinks of alcohol     Types: 30 Cans of beer per week    Drug use: Never    Sexual activity: Defer     Prior to Admission medications    Medication Sig Start Date End Date Taking? Authorizing Provider   allopurinol (ZYLOPRIM) 100 MG tablet Take 1 tablet by mouth Daily.   Yes Brian Woo MD   colchicine 0.6 MG tablet Take 1 tablet by mouth 2 (Two) Times a Day. 3/24/25 3/29/25 Yes Brian Woo MD   colestipol (COLESTID) 1 g tablet Take 1-2 tablets by mouth Daily.   Yes Brian Woo MD   FLUoxetine (PROzac) 10 MG capsule Take 1 capsule by mouth Daily.   Yes Brian Woo MD   folic acid (FOLVITE) 1 MG tablet Take 1 tablet by mouth Daily.   Yes Brian Woo MD   hydrALAZINE (APRESOLINE) 50 MG tablet Take 1 tablet by mouth Daily.   Yes Brian Woo MD   levothyroxine (SYNTHROID, LEVOTHROID) 175 MCG tablet Take 1 tablet by mouth Every Morning.   Yes Brian Woo MD   pantoprazole  (PROTONIX) 40 MG EC tablet Take 1 tablet by mouth Daily.   Yes Brian Woo MD   Thiamine HCl (Vitamin B1) 100 MG tablet tablet Take 1 tablet by mouth Daily.   Yes Provider, Historical, MD   saccharomyces boulardii (FLORASTOR) 250 MG capsule Take 1 capsule by mouth 2 (Two) Times a Day.    Brian Woo MD   acetaminophen (TYLENOL) 325 MG tablet Take 2 tablets by mouth Every 4 (Four) Hours As Needed for Mild Pain or Fever. 3/1/24 3/27/25  Montse Dawkins MD   bisacodyl (DULCOLAX) 10 MG suppository Insert 1 suppository into the rectum Daily As Needed for Constipation (Use if bisacodyl oral is ineffective). 3/1/24 3/27/25  Montse Dawkins MD   bisacodyl (DULCOLAX) 5 MG EC tablet Take 1 tablet by mouth Daily As Needed for Constipation (Use if polyethylene glycol is ineffective). 3/1/24 3/27/25  Montse Dawkins MD   Enoxaparin Sodium (LOVENOX) 40 MG/0.4ML solution prefilled syringe syringe Inject 0.4 mL under the skin into the appropriate area as directed Daily. Indications: Prevention of Unwanted Clot in Veins 3/1/24 3/27/25  Montse Dawkins MD   hydrALAZINE (APRESOLINE) 25 MG tablet Take 3 tablets by mouth 3 times a day. 3/1/24 3/27/25  Yoli Barahona MD   hydrALAZINE (APRESOLINE) 50 MG tablet Take 1 tablet by mouth 3 times a day.  Patient taking differently: Take 1 tablet by mouth Daily. 3/1/24 3/27/25  Montse Dawkins MD   hydrocortisone 2.5 % cream Apply  topically to the appropriate area as directed. 3/9/25 3/27/25  Brian Woo MD   ketoconazole (NIZORAL) 2 % cream Apply 2 % topically to the appropriate area as directed Every 12 (Twelve) Hours. 3/9/25 3/27/25  Brian Woo MD   ondansetron ODT (ZOFRAN-ODT) 4 MG disintegrating tablet Place 1 tablet on the tongue Every 4 (Four) Hours As Needed for Nausea or Vomiting. 3/1/24 3/27/25  Montse Dawkins MD   polyethylene glycol (MIRALAX) 17 g packet Take 17 g by mouth Daily As Needed (Use if senna-docusate is ineffective).  3/1/24 3/27/25  Montse Dawkins MD   sennosides-docusate (PERICOLACE) 8.6-50 MG per tablet Take 2 tablets by mouth 2 (Two) Times a Day As Needed for Constipation. 3/1/24 3/27/25  Montse Dawkins MD          Objective   Physical Exam  Constitutional 79-year-old awake alert no acute distress chronically ill-appearing triage vital signs reviewed.  HEENT unremarkable.  Neck supple no adenopathy.  Lungs clear no retraction.  Heart regular without murmur rub  Abdomen soft nontender good bowel sounds no pulsatile masses.  Extremities pulses equal upper and lower extremities chronic edema venous stasis noted to the lower extremities.  Large wrap noted to the right leg.  Patient's left leg is red and hot from the toes from foot up to the knee.  He has some petechial purpuric rash to the area compartments are soft there is no pain with passive stretching or Kaitlynn portion exam foots warm and dry good and equal dorsalis pedis and posterior tibial pulses no calf tenderness or Homans' sign or evidence of DVT toes up-and-down occluding big toe dorsiflex plantarflex out difficulty is neurovascular motor sensor intact.  No redness or swelling up in the groin or perineal area.  Neurologic awake alert and follows commands motor strength normal without focal weak  Procedures           ED Course      Results for orders placed or performed during the hospital encounter of 03/27/25   Comprehensive Metabolic Panel    Collection Time: 03/27/25  4:16 PM    Specimen: Blood   Result Value Ref Range    Glucose 128 (H) 65 - 99 mg/dL    BUN 17 8 - 23 mg/dL    Creatinine 1.50 (H) 0.76 - 1.27 mg/dL    Sodium 134 (L) 136 - 145 mmol/L    Potassium 4.8 3.5 - 5.2 mmol/L    Chloride 99 98 - 107 mmol/L    CO2 24.1 22.0 - 29.0 mmol/L    Calcium 9.1 8.6 - 10.5 mg/dL    Total Protein 6.5 6.0 - 8.5 g/dL    Albumin 3.7 3.5 - 5.2 g/dL    ALT (SGPT) 7 1 - 41 U/L    AST (SGOT) 13 1 - 40 U/L    Alkaline Phosphatase 57 39 - 117 U/L    Total Bilirubin 0.6 0.0 - 1.2  mg/dL    Globulin 2.8 gm/dL    A/G Ratio 1.3 g/dL    BUN/Creatinine Ratio 11.3 7.0 - 25.0    Anion Gap 10.9 5.0 - 15.0 mmol/L    eGFR 46.5 (L) >60.0 mL/min/1.73   C-reactive Protein    Collection Time: 03/27/25  4:16 PM    Specimen: Blood   Result Value Ref Range    C-Reactive Protein 18.70 (H) 0.00 - 0.50 mg/dL   Sedimentation Rate    Collection Time: 03/27/25  4:16 PM    Specimen: Blood   Result Value Ref Range    Sed Rate 21 (H) 0 - 20 mm/hr   Procalcitonin    Collection Time: 03/27/25  4:16 PM    Specimen: Blood   Result Value Ref Range    Procalcitonin 0.06 0.00 - 0.25 ng/mL   Magnesium    Collection Time: 03/27/25  4:16 PM    Specimen: Blood   Result Value Ref Range    Magnesium 1.9 1.6 - 2.4 mg/dL   CBC Auto Differential    Collection Time: 03/27/25  4:16 PM    Specimen: Blood   Result Value Ref Range    WBC 6.81 3.40 - 10.80 10*3/mm3    RBC 4.75 4.14 - 5.80 10*6/mm3    Hemoglobin 12.7 (L) 13.0 - 17.7 g/dL    Hematocrit 41.2 37.5 - 51.0 %    MCV 86.7 79.0 - 97.0 fL    MCH 26.7 26.6 - 33.0 pg    MCHC 30.8 (L) 31.5 - 35.7 g/dL    RDW 15.9 (H) 12.3 - 15.4 %    RDW-SD 50.7 37.0 - 54.0 fl    MPV 10.3 6.0 - 12.0 fL    Platelets 281 140 - 450 10*3/mm3    Neutrophil % 63.0 42.7 - 76.0 %    Lymphocyte % 20.6 19.6 - 45.3 %    Monocyte % 13.2 (H) 5.0 - 12.0 %    Eosinophil % 2.2 0.3 - 6.2 %    Basophil % 0.4 0.0 - 1.5 %    Immature Grans % 0.6 (H) 0.0 - 0.5 %    Neutrophils, Absolute 4.29 1.70 - 7.00 10*3/mm3    Lymphocytes, Absolute 1.40 0.70 - 3.10 10*3/mm3    Monocytes, Absolute 0.90 0.10 - 0.90 10*3/mm3    Eosinophils, Absolute 0.15 0.00 - 0.40 10*3/mm3    Basophils, Absolute 0.03 0.00 - 0.20 10*3/mm3    Immature Grans, Absolute 0.04 0.00 - 0.05 10*3/mm3    nRBC 0.0 0.0 - 0.2 /100 WBC   Uric Acid    Collection Time: 03/27/25  4:16 PM    Specimen: Blood   Result Value Ref Range    Uric Acid 7.5 (H) 3.4 - 7.0 mg/dL   Protime-INR    Collection Time: 03/27/25  4:16 PM    Specimen: Blood   Result Value Ref Range     Protime 14.8 (H) 11.7 - 14.2 Seconds    INR 1.17 (H) 0.90 - 1.10   aPTT    Collection Time: 03/27/25  4:16 PM    Specimen: Blood   Result Value Ref Range    PTT 26.5 22.7 - 35.4 seconds   Gold Top - SST    Collection Time: 03/27/25  4:16 PM   Result Value Ref Range    Extra Tube Hold for add-ons.    POC Lactate    Collection Time: 03/27/25  4:20 PM    Specimen: Blood   Result Value Ref Range    Lactate 0.7 0.3 - 2.0 mmol/L   Duplex Venous Lower Extremity - BILATERAL    Collection Time: 03/27/25  5:16 PM   Result Value Ref Range    Right Common Femoral Spont Y     Right Common Femoral Competent Y     Right Common Femoral Phasic Y     Right Common Femoral Compress C     Right Common Femoral Augment Y     Right Saphenofemoral Junction Compress C     Right Proximal Femoral Compress C     Right Mid Femoral Spont Y     Right Mid Femoral Competent Y     Right Mid Femoral Phasic Y     Right Mid Femoral Compress C     Right Mid Femoral Augment Y     Right Popliteal Spont Y     Right Popliteal Competent Y     Right Popliteal Phasic Y     Right Popliteal Compress C     Right Popliteal Augment Y     Right Posterior Tibial Compress C     Right Peroneal Compress C     Right Gastronemius Compress C     Right Greater Saph AK Compress C     Right Greater Saph BK Compress C     Left Common Femoral Spont Y     Left Common Femoral Competent Y     Left Common Femoral Phasic Y     Left Common Femoral Compress C     Left Common Femoral Augment Y     Left Saphenofemoral Junction Compress C     Left Proximal Femoral Compress C     Left Mid Femoral Spont Y     Left Mid Femoral Competent Y     Left Mid Femoral Phasic Y     Left Mid Femoral Compress C     Left Mid Femoral Augment Y     Left Distal Femoral Compress C     Left Popliteal Spont Y     Left Popliteal Competent Y     Left Popliteal Phasic Y     Left Popliteal Compress C     Left Popliteal Augment Y     Left Posterior Tibial Compress C     Left Peroneal Compress C     Left  Gastronemius Compress C     Left Greater Saph AK Compress C     Left Greater Saph BK Compress C     BH CV VAS PRELIMINARY FINDINGS SCRIPTING 1.0      No radiology results for the last day  Medications   sodium chloride 0.9 % flush 10 mL (has no administration in time range)   ceFAZolin 2000 mg IVPB in 100 mL NS (MBP) (0 mg Intravenous Stopped 3/27/25 1707)   morphine injection 4 mg (4 mg Intravenous Given 3/27/25 1624)                                                        Medical Decision Making  Medical decision making.  IV established given morphine 4 mg IV.  Ultrasound obtained report sent to me and reviewed no DVT noted.  Labs obtained by independent review comprehensive metabolic profile unremarkable and a creatinine of 1.5.  Sodium 134 CRP was 18.7 sed rate 21 Pro-Juan Jose was normal magnesium normal CBC unremarkable uric acid 7.5 lactic acid 0.7 all labs independent reviewed by me blood cultures pending.  Patient was started on Kefzol 2 g IV.  Records reviewed that he was in the hospital March 2024 with significant cellulitis and responded well to kefzol at that time.  I do not see any evidence of DVT or arterial compromise or vascular compromise I will see any evidence of sepsis or bacteremia or compartment syndrome or necrotizing fasciitis.  Not a complete list of all possibilities but based on my history and physical clinical findings.  I talked to the patient as well as his daughter I talked to Dr. Dawkins Case discussed and the patient will be admitted to hospital for further care stable otherwise unremarkable ER course    Problems Addressed:  Cellulitis of left lower extremity: complicated acute illness or injury    Amount and/or Complexity of Data Reviewed  External Data Reviewed: notes.  Labs: ordered. Decision-making details documented in ED Course.    Risk  Prescription drug management.  Decision regarding hospitalization.        Final diagnoses:   Cellulitis of left lower extremity       ED  Disposition  ED Disposition       ED Disposition   Decision to Admit    Condition   --    Comment   Level of Care: Med/Surg [1]   Admitting Physician: GONZALO DENNISON [[id number]]   Attending Physician: GONZALO DENNISON [[id number]]                 No follow-up provider specified.       Medication List      No changes were made to your prescriptions during this visit.            Skip Tripathi MD  03/27/25 [id number]

## 2025-03-27 NOTE — Clinical Note
Level of Care: Med/Surg [1]   Admitting Physician: GONZALO DENNISON [[id number]]   Attending Physician: GONZALO DENNISON [[id number]]

## 2025-03-27 NOTE — CONSULTS
Assessed pt for U/S piv. Both of pt's upper extremities are extremely edemitus and was unable to see any vessels that I could get with a PIV, midline or even a PICC line. Spoke with provider and RN. Suggested potentially a CVC or IR consult if advanced lines are needed.

## 2025-03-27 NOTE — ED NOTES
Nursing report ED to floor  Rommel Mcfarland  79 y.o.  male    HPI:   Chief Complaint   Patient presents with    Wound Check     Pt reports was seen at [facility] for wounds and swelling to BLE with worsening to LLE, pt sent here for further eval of possible cellulitis       Admitting doctor:   Montse Dawkins MD    Admitting diagnosis:   The encounter diagnosis was Cellulitis of left lower extremity.    Code status:   Current Code Status       Date Active Code Status Order ID Comments User Context       3/27/2025 1843 CPR (Attempt to Resuscitate) [id number]  Montse Dawkins MD ED        Question Answer    Code Status (Patient has no pulse and is not breathing) CPR (Attempt to Resuscitate)    Medical Interventions (Patient has pulse or is breathing) Full Support    Level Of Support Discussed With Patient                    Allergies:   Tigecycline    Isolation:  No active isolations     Fall Risk:  Fall Risk Assessment was completed, and patient is at moderate risk for falls.   Predictive Model Details         26 (Low) Factor Value    Calculated 3/27/2025 19:17 Age 79    Risk of Fall Model Active Peripheral IV Present     Respiratory Rate 18     Magnesium 1.9 mg/dL     Number of Distinct Medication Classes administered 3     Rocky Scale not on file     Chloride 99 mmol/L     Albumin 3.7 g/dL     Creatinine 1.5 mg/dL     Clinically Relevant Sex Not Female     Calcium 9.1 mg/dL     Number of administrations of Analgesic Narcotics 1     Diastolic BP 87     Total Bilirubin 0.6 mg/dL     Potassium 4.8 mmol/L     Duration of Current Encounter 0.187 days     ALT 7 U/L         Weight:       03/27/25  1443   Weight: 104 kg (230 lb)       Intake and Output  No intake or output data in the 24 hours ending 03/27/25 1917    Diet:   Dietary Orders (From admission, onward)       Start     Ordered    03/27/25 1841  Diet: Cardiac, Diabetic; Healthy Heart (2-3 Na+); Consistent Carbohydrate; Fluid Consistency: Thin (IDDSI 0)  Diet  Effective Now        References:    Diet Order Definitions   Question Answer Comment   Diets: Cardiac    Diets: Diabetic    Cardiac Diet: Healthy Heart (2-3 Na+)    Diabetic Diet: Consistent Carbohydrate    Fluid Consistency: Thin (IDDSI 0)        03/27/25 1843                     Most recent vitals:   Vitals:    03/27/25 1732 03/27/25 1746 03/27/25 1814 03/27/25 1817   BP: 158/76 148/74 169/86 123/87   BP Location:       Patient Position:       Pulse: 72 72 87 71   Resp:       Temp:       TempSrc:       SpO2: 95% 94% 96% 94%   Weight:       Height:           Active LDAs/IV Access:   Lines, Drains & Airways       Active LDAs       Name Placement date Placement time Site Days    Peripheral IV 03/27/25 1618 Right Antecubital 03/27/25 1618  Antecubital  less than 1                    Skin Condition:   Skin Assessments (last day)       None             Labs (abnormal labs have a star):   Labs Reviewed   COMPREHENSIVE METABOLIC PANEL - Abnormal; Notable for the following components:       Result Value    Glucose 128 (*)     Creatinine 1.50 (*)     Sodium 134 (*)     eGFR 46.5 (*)     All other components within normal limits    Narrative:     GFR Categories in Chronic Kidney Disease (CKD)      GFR Category          GFR (mL/min/1.73)    Interpretation  G1                     90 or greater         Normal or high (1)  G2                      60-89                Mild decrease (1)  G3a                   45-59                Mild to moderate decrease  G3b                   30-44                Moderate to severe decrease  G4                    15-29                Severe decrease  G5                    14 or less           Kidney failure          (1)In the absence of evidence of kidney disease, neither GFR category G1 or G2 fulfill the criteria for CKD.    eGFR calculation 2021 CKD-EPI creatinine equation, which does not include race as a factor   C-REACTIVE PROTEIN - Abnormal; Notable for the following components:     "C-Reactive Protein 18.70 (*)     All other components within normal limits   SEDIMENTATION RATE - Abnormal; Notable for the following components:    Sed Rate 21 (*)     All other components within normal limits   CBC WITH AUTO DIFFERENTIAL - Abnormal; Notable for the following components:    Hemoglobin 12.7 (*)     MCHC 30.8 (*)     RDW 15.9 (*)     Monocyte % 13.2 (*)     Immature Grans % 0.6 (*)     All other components within normal limits   URIC ACID - Abnormal; Notable for the following components:    Uric Acid 7.5 (*)     All other components within normal limits   PROTIME-INR - Abnormal; Notable for the following components:    Protime 14.8 (*)     INR 1.17 (*)     All other components within normal limits   PROCALCITONIN - Normal    Narrative:     As a Marker for Sepsis (Non-Neonates):    1. <0.5 ng/mL represents a low risk of severe sepsis and/or septic shock.  2. >2 ng/mL represents a high risk of severe sepsis and/or septic shock.    As a Marker for Lower Respiratory Tract Infections that require antibiotic therapy:    PCT on Admission    Antibiotic Therapy       6-12 Hrs later    >0.5                Strongly Recommended  >0.25 - <0.5        Recommended   0.1 - 0.25          Discouraged              Remeasure/reassess PCT  <0.1                Strongly Discouraged     Remeasure/reassess PCT    As 28 day mortality risk marker: \"Change in Procalcitonin Result\" (>80% or <=80%) if Day 0 (or Day 1) and Day 4 values are available. Refer to http://www.[facility]-pct-calculator.com    Change in PCT <=80%  A decrease of PCT levels below or equal to 80% defines a positive change in PCT test result representing a higher risk for 28-day all-cause mortality of patients diagnosed with severe sepsis for septic shock.    Change in PCT >80%  A decrease of PCT levels of more than 80% defines a negative change in PCT result representing a lower risk for 28-day all-cause mortality of patients diagnosed with severe sepsis or " septic shock.      MAGNESIUM - Normal   APTT - Normal   POC LACTATE - Normal   BLOOD CULTURE   BLOOD CULTURE   POCT GLUCOSE FINGERSTICK   POCT GLUCOSE FINGERSTICK   POCT GLUCOSE FINGERSTICK   POCT GLUCOSE FINGERSTICK   CBC AND DIFFERENTIAL    Narrative:     The following orders were created for panel order CBC & Differential.  Procedure                               Abnormality         Status                     ---------                               -----------         ------                     CBC Auto Differential[[id number]]        Abnormal            Final result                 Please view results for these tests on the individual orders.   EXTRA TUBES    Narrative:     The following orders were created for panel order Extra Tubes.  Procedure                               Abnormality         Status                     ---------                               -----------         ------                     Gold Top - SST[[id number]]                                   Final result                 Please view results for these tests on the individual orders.   GOLD TOP - SST       LOC: Person, Place, Time, and Situation    Telemetry:  Med/Surg    Cardiac Monitoring Ordered: no    EKG:   No orders to display       Medications Given in the ED:   Medications   sodium chloride 0.9 % flush 10 mL (has no administration in time range)   sodium chloride 0.9 % flush 10 mL (has no administration in time range)   sodium chloride 0.9 % flush 10 mL (has no administration in time range)   sodium chloride 0.9 % infusion 40 mL (has no administration in time range)   calcium carbonate (TUMS) chewable tablet 500 mg (200 mg elemental) (has no administration in time range)   aluminum-magnesium hydroxide-simethicone (MAALOX MAX) 400-400-40 MG/5ML suspension 15 mL (has no administration in time range)   ondansetron ODT (ZOFRAN-ODT) disintegrating tablet 4 mg (has no administration in time range)     Or   ondansetron (ZOFRAN) injection 4  mg (has no administration in time range)   Pharmacy to Dose enoxaparin (LOVENOX) (has no administration in time range)   acetaminophen (TYLENOL) tablet 650 mg (has no administration in time range)     Or   acetaminophen (TYLENOL) 160 MG/5ML oral solution 650 mg (has no administration in time range)     Or   acetaminophen (TYLENOL) suppository 650 mg (has no administration in time range)   sennosides-docusate (PERICOLACE) 8.6-50 MG per tablet 2 tablet (has no administration in time range)     And   polyethylene glycol (MIRALAX) packet 17 g (has no administration in time range)     And   bisacodyl (DULCOLAX) EC tablet 5 mg (has no administration in time range)     And   bisacodyl (DULCOLAX) suppository 10 mg (has no administration in time range)   pantoprazole (PROTONIX) EC tablet 40 mg (has no administration in time range)   saccharomyces boulardii (FLORASTOR) capsule 250 mg (has no administration in time range)   thiamine (VITAMIN B-1) tablet 100 mg (has no administration in time range)   colchicine tablet 0.6 mg (has no administration in time range)   hydrocortisone 2.5 % cream (has no administration in time range)   allopurinol (ZYLOPRIM) tablet 100 mg (has no administration in time range)   cholestyramine light packet 4 g (has no administration in time range)   FLUoxetine (PROzac) capsule 10 mg (has no administration in time range)   folic acid (FOLVITE) tablet 1 mg (has no administration in time range)   hydrALAZINE (APRESOLINE) tablet 50 mg (has no administration in time range)   ketoconazole (NIZORAL) 2 % cream (has no administration in time range)   levothyroxine (SYNTHROID, LEVOTHROID) tablet 175 mcg (has no administration in time range)   bisacodyl (DULCOLAX) suppository 10 mg (has no administration in time range)   sennosides-docusate (PERICOLACE) 8.6-50 MG per tablet 2 tablet (has no administration in time range)   polyethylene glycol (MIRALAX) packet 17 g (has no administration in time range)   bisacodyl  (DULCOLAX) EC tablet 5 mg (has no administration in time range)   ceFAZolin 2000 mg IVPB in 100 mL NS (MBP) (has no administration in time range)   enoxaparin sodium (LOVENOX) syringe 40 mg (has no administration in time range)   enoxaparin sodium (LOVENOX) syringe 40 mg (has no administration in time range)   ceFAZolin 2000 mg IVPB in 100 mL NS (MBP) (0 mg Intravenous Stopped 3/27/25 1707)   morphine injection 4 mg (4 mg Intravenous Given 3/27/25 1624)       Imaging results:  No radiology results for the last day    Social issues:   Social History     Socioeconomic History    Marital status:    Tobacco Use    Smoking status: Never    Smokeless tobacco: Never   Vaping Use    Vaping status: Never Used   Substance and Sexual Activity    Alcohol use: Yes     Alcohol/week: 30.0 standard drinks of alcohol     Types: 30 Cans of beer per week    Drug use: Never    Sexual activity: Defer       NIH Stroke Scale:  Interval: (not recorded)  1a. Level of Consciousness: (not recorded)  1b. LOC Questions: (not recorded)  1c. LOC Commands: (not recorded)  2. Best Gaze: (not recorded)  3. Visual: (not recorded)  4. Facial Palsy: (not recorded)  5a. Motor Arm, Left: (not recorded)  5b. Motor Arm, Right: (not recorded)  6a. Motor Leg, Left: (not recorded)  6b. Motor Leg, Right: (not recorded)  7. Limb Ataxia: (not recorded)  8. Sensory: (not recorded)  9. Best Language: (not recorded)  10. Dysarthria: (not recorded)  11. Extinction and Inattention (formerly Neglect): (not recorded)    Total (NIH Stroke Scale): (not recorded)     Additional notable assessment information:     Nursing report ED to floor:  MACK Solorio RN   03/27/25 19:17 EDT

## 2025-03-28 LAB
ALBUMIN SERPL-MCNC: 3.2 G/DL (ref 3.5–5.2)
ALBUMIN/GLOB SERPL: 1.1 G/DL
ALP SERPL-CCNC: 44 U/L (ref 39–117)
ALT SERPL W P-5'-P-CCNC: 6 U/L (ref 1–41)
ANION GAP SERPL CALCULATED.3IONS-SCNC: 10.7 MMOL/L (ref 5–15)
AST SERPL-CCNC: 12 U/L (ref 1–40)
BILIRUB SERPL-MCNC: 0.5 MG/DL (ref 0–1.2)
BUN SERPL-MCNC: 17 MG/DL (ref 8–23)
BUN/CREAT SERPL: 11.3 (ref 7–25)
CALCIUM SPEC-SCNC: 8.8 MG/DL (ref 8.6–10.5)
CHLORIDE SERPL-SCNC: 102 MMOL/L (ref 98–107)
CK SERPL-CCNC: 93 U/L (ref 20–200)
CO2 SERPL-SCNC: 23.3 MMOL/L (ref 22–29)
CREAT SERPL-MCNC: 1.5 MG/DL (ref 0.76–1.27)
EGFRCR SERPLBLD CKD-EPI 2021: 46.5 ML/MIN/1.73
FERRITIN SERPL-MCNC: 146 NG/ML (ref 30–400)
GLOBULIN UR ELPH-MCNC: 2.8 GM/DL
GLUCOSE BLDC GLUCOMTR-MCNC: 107 MG/DL (ref 70–105)
GLUCOSE BLDC GLUCOMTR-MCNC: 112 MG/DL (ref 70–105)
GLUCOSE BLDC GLUCOMTR-MCNC: 122 MG/DL (ref 70–105)
GLUCOSE BLDC GLUCOMTR-MCNC: 96 MG/DL (ref 70–105)
GLUCOSE SERPL-MCNC: 125 MG/DL (ref 65–99)
HBA1C MFR BLD: 5.22 % (ref 4.8–5.6)
IRON 24H UR-MRATE: 22 MCG/DL (ref 59–158)
IRON SATN MFR SERPL: 7 % (ref 20–50)
MAGNESIUM SERPL-MCNC: 1.8 MG/DL (ref 1.6–2.4)
POTASSIUM SERPL-SCNC: 4.2 MMOL/L (ref 3.5–5.2)
PROT SERPL-MCNC: 6 G/DL (ref 6–8.5)
SODIUM SERPL-SCNC: 136 MMOL/L (ref 136–145)
TIBC SERPL-MCNC: 307 MCG/DL (ref 298–536)
TRANSFERRIN SERPL-MCNC: 206 MG/DL (ref 200–360)
URATE SERPL-MCNC: 7.7 MG/DL (ref 3.4–7)

## 2025-03-28 PROCEDURE — 97535 SELF CARE MNGMENT TRAINING: CPT

## 2025-03-28 PROCEDURE — 82948 REAGENT STRIP/BLOOD GLUCOSE: CPT | Performed by: FAMILY MEDICINE

## 2025-03-28 PROCEDURE — 25010000002 MAGNESIUM SULFATE IN D5W 1G/100ML (PREMIX) 1-5 GM/100ML-% SOLUTION: Performed by: INTERNAL MEDICINE

## 2025-03-28 PROCEDURE — 25010000002 ENOXAPARIN PER 10 MG: Performed by: FAMILY MEDICINE

## 2025-03-28 PROCEDURE — 82948 REAGENT STRIP/BLOOD GLUCOSE: CPT

## 2025-03-28 PROCEDURE — 97162 PT EVAL MOD COMPLEX 30 MIN: CPT

## 2025-03-28 PROCEDURE — 25010000002 CEFAZOLIN PER 500 MG: Performed by: FAMILY MEDICINE

## 2025-03-28 RX ORDER — BUMETANIDE 0.25 MG/ML
1 INJECTION, SOLUTION INTRAMUSCULAR; INTRAVENOUS EVERY 12 HOURS
Status: DISCONTINUED | OUTPATIENT
Start: 2025-03-28 | End: 2025-03-28

## 2025-03-28 RX ORDER — ALLOPURINOL 100 MG/1
200 TABLET ORAL DAILY
Status: DISCONTINUED | OUTPATIENT
Start: 2025-03-29 | End: 2025-04-01 | Stop reason: HOSPADM

## 2025-03-28 RX ORDER — BUMETANIDE 1 MG/1
1 TABLET ORAL DAILY
Status: DISCONTINUED | OUTPATIENT
Start: 2025-03-29 | End: 2025-03-29

## 2025-03-28 RX ORDER — MAGNESIUM SULFATE 1 G/100ML
1 INJECTION INTRAVENOUS ONCE
Status: COMPLETED | OUTPATIENT
Start: 2025-03-28 | End: 2025-03-28

## 2025-03-28 RX ORDER — HYDROCODONE BITARTRATE AND ACETAMINOPHEN 5; 325 MG/1; MG/1
1 TABLET ORAL EVERY 4 HOURS PRN
Refills: 0 | Status: DISCONTINUED | OUTPATIENT
Start: 2025-03-28 | End: 2025-04-01

## 2025-03-28 RX ADMIN — COLCHICINE 0.6 MG: 0.6 TABLET ORAL at 08:34

## 2025-03-28 RX ADMIN — FOLIC ACID 1 MG: 1 TABLET ORAL at 08:34

## 2025-03-28 RX ADMIN — Medication 10 ML: at 22:02

## 2025-03-28 RX ADMIN — ALLOPURINOL 100 MG: 100 TABLET ORAL at 08:34

## 2025-03-28 RX ADMIN — Medication 250 MG: at 08:34

## 2025-03-28 RX ADMIN — ENOXAPARIN SODIUM 40 MG: 100 INJECTION SUBCUTANEOUS at 17:58

## 2025-03-28 RX ADMIN — Medication 10 ML: at 08:35

## 2025-03-28 RX ADMIN — Medication 250 MG: at 22:01

## 2025-03-28 RX ADMIN — CEFAZOLIN 2000 MG: 2 INJECTION, POWDER, FOR SOLUTION INTRAMUSCULAR; INTRAVENOUS at 17:45

## 2025-03-28 RX ADMIN — CHOLESTYRAMINE 4 G: 4 POWDER, FOR SUSPENSION ORAL at 08:34

## 2025-03-28 RX ADMIN — ACETAMINOPHEN 650 MG: 325 TABLET, FILM COATED ORAL at 05:31

## 2025-03-28 RX ADMIN — HYDROCODONE BITARTRATE AND ACETAMINOPHEN 1 TABLET: 5; 325 TABLET ORAL at 14:51

## 2025-03-28 RX ADMIN — KETOCONAZOLE: 20 CREAM TOPICAL at 17:44

## 2025-03-28 RX ADMIN — LEVOTHYROXINE SODIUM 175 MCG: 175 TABLET ORAL at 05:30

## 2025-03-28 RX ADMIN — Medication 100 MG: at 08:40

## 2025-03-28 RX ADMIN — MAGNESIUM SULFATE HEPTAHYDRATE 1 G: 1 INJECTION, SOLUTION INTRAVENOUS at 13:10

## 2025-03-28 RX ADMIN — HYDROCORTISONE: 25 CREAM TOPICAL at 22:03

## 2025-03-28 RX ADMIN — PANTOPRAZOLE SODIUM 40 MG: 40 TABLET, DELAYED RELEASE ORAL at 08:34

## 2025-03-28 RX ADMIN — FLUOXETINE HYDROCHLORIDE 10 MG: 10 CAPSULE ORAL at 08:34

## 2025-03-28 RX ADMIN — HYDROCORTISONE: 25 CREAM TOPICAL at 08:44

## 2025-03-28 RX ADMIN — HYDRALAZINE HYDROCHLORIDE 50 MG: 25 TABLET ORAL at 22:01

## 2025-03-28 RX ADMIN — HYDROCODONE BITARTRATE AND ACETAMINOPHEN 1 TABLET: 5; 325 TABLET ORAL at 22:01

## 2025-03-28 RX ADMIN — CEFAZOLIN 2000 MG: 2 INJECTION, POWDER, FOR SOLUTION INTRAMUSCULAR; INTRAVENOUS at 08:35

## 2025-03-28 NOTE — CASE MANAGEMENT/SOCIAL WORK
Discharge Planning Assessment   [place]     Patient Name: Rommel Mcfarland  MRN: [id number]  Today's Date: 3/28/2025    Admit Date: 3/27/2025    Plan: Return home with [facility] (pending acceptance- will need order.)   Discharge Needs Assessment       Row Name 03/28/25 1534       Living Environment    People in Home alone    Current Living Arrangements home    Potentially Unsafe Housing Conditions none    In the past 12 months has the electric, gas, oil, or water company threatened to shut off services in your home? No    Primary Care Provided by self;homecare agency    Provides Primary Care For no one    Family Caregiver if Needed child(any), adult    Family Caregiver Names dtr Yajaira    Quality of Family Relationships helpful;involved;supportive    Able to Return to Prior Arrangements yes       Resource/Environmental Concerns    Resource/Environmental Concerns none    Transportation Concerns none       Transportation Needs    In the past 12 months, has lack of transportation kept you from medical appointments or from getting medications? no    In the past 12 months, has lack of transportation kept you from meetings, work, or from getting things needed for daily living? No       Food Insecurity    Within the past 12 months, you worried that your food would run out before you got the money to buy more. Never true    Within the past 12 months, the food you bought just didn't last and you didn't have money to get more. Never true       Transition Planning    Patient/Family Anticipates Transition to home    Patient/Family Anticipated Services at Transition home health care    Transportation Anticipated family or friend will provide       Discharge Needs Assessment    Readmission Within the Last 30 Days no previous admission in last 30 days    Current Outpatient/Agency/Support Group homecare agency    Equipment Currently Used at Home walker, rolling;wheelchair;hospital bed;power chair,(recliner lift)    Concerns to  be Addressed discharge planning    Anticipated Changes Related to Illness none    Equipment Needed After Discharge none    Discharge Facility/Level of Care Needs home with home health    Current Discharge Risk dependent with mobility/activities of daily living                   Discharge Plan       Row Name 03/28/25 1536       Plan    Plan Return home with [facility] (pending acceptance- will need order.)    Patient/Family in Agreement with Plan yes    Plan Comments CM met with pt at bedside to discuss discharge needs. Pt lives at home alone, does not drive and is assist with ADLS (private paid caregivers M-F 4-6hrs daily.) PCP and pharmacy verified- [facility] declined. No issues stated affording food or medications, and home environment is safe. Current DME: lift chair, hospital bed, wheelchair and walker. HHC choices 1) [facility] referral placed and liaison contacted (pending acceptance). Family will transport at discharge.                  Continued Care and Services - Admitted Since 3/27/2025       Home Medical Care       Service Provider Request Status Services Address Phone Fax Patient Preferred    [facility] P[facility] - [facility] Sent [facility] [address] [facility] [address] [phone] --                  Demographic Summary       Row Name 03/28/25 1534       General Information    Admission Type inpatient    Arrived From emergency department    Required Notices Provided Important Message from Medicare    Referral Source admission list    Reason for Consult discharge planning    Preferred Language English       Contact Information    Permission Granted to Share Info With                    Functional Status       Row Name 03/28/25 1534       Functional Status    Usual Activity Tolerance fair    Current Activity Tolerance poor       Functional Status, IADL    Medications completely dependent    Meal Preparation completely dependent    Housekeeping completely  dependent    Laundry completely dependent    Shopping completely dependent       Mental Status    General Appearance WDL WDL       Mental Status Summary    Recent Changes in Mental Status/Cognitive Functioning no changes       Employment/    Current or Previous Occupation not applicable               Antonella Miller RN     Office phone: [phone]  Office fax: [phone]

## 2025-03-28 NOTE — H&P
DATE/TIME OF ADMISSION:  3/27/2025  2:49 PM  Patient Care Team:  Montse Dawkins MD as PCP - General (Family Medicine)  Huey Keyes MD as Consulting Physician (Nephrology)    Chief complaint LEG REDNESS AND SWELLING. HAS BEEN GOING TO THE [facility] FOR TREATMENT AND WAS SENT IN TO THE ER    Subjective     Patient is a 79 y.o. male presents with LEFT 5TH DIGIT PAIN AND LEFT HEEL PAIN WITH FOOT REDNESS. Onset of symptoms was GRADUAL IN NATURE AND FIRST THOUGHT TO BE A GOUTY FLARE AND COLCHICINE GIVEN WITHOUT IMPROVEMENT.      Review of Systems   Constitutional:  Positive for activity change and fatigue.   Cardiovascular:  Positive for leg swelling.   Musculoskeletal:  Positive for gait problem and joint swelling.   Skin:  Positive for color change.   All other systems reviewed and are negative.         History  Past Medical History:   Diagnosis Date    Alcohol abuse 07/13/2022    Essential hypertension 07/13/2022    Gait instability 07/13/2022    GERD without esophagitis 07/13/2022    Gout 07/13/2022    Hypothyroidism (acquired) 07/13/2022    Onychomycosis of multiple toenails with type 2 diabetes mellitus 07/13/2022    Sleep apnea     Stage 3a chronic kidney disease 07/13/2022    Tinea pedis of both feet 07/13/2022    Type 2 diabetes mellitus with diabetic neuropathy, without long-term current use of insulin 07/13/2022     Past Surgical History:   Procedure Laterality Date    ORTHOPEDIC SURGERY      fractured ankle and dislocated shoulder     Family History   Family history unknown: Yes     Social History     Tobacco Use    Smoking status: Never    Smokeless tobacco: Never   Vaping Use    Vaping status: Never Used   Substance Use Topics    Alcohol use: Yes     Alcohol/week: 30.0 standard drinks of alcohol     Types: 30 Cans of beer per week    Drug use: Never     Medications Prior to Admission   Medication Sig Dispense Refill Last Dose/Taking    allopurinol (ZYLOPRIM) 100 MG tablet Take 1 tablet by mouth Daily.    Taking    colchicine 0.6 MG tablet Take 1 tablet by mouth 2 (Two) Times a Day.   Taking    colestipol (COLESTID) 1 g tablet Take 1-2 tablets by mouth Daily.   Taking    FLUoxetine (PROzac) 10 MG capsule Take 1 capsule by mouth Daily.   Taking    folic acid (FOLVITE) 1 MG tablet Take 1 tablet by mouth Daily.   Taking    hydrALAZINE (APRESOLINE) 50 MG tablet Take 1 tablet by mouth Daily.   Taking    levothyroxine (SYNTHROID, LEVOTHROID) 175 MCG tablet Take 1 tablet by mouth Every Morning.   Taking    pantoprazole (PROTONIX) 40 MG EC tablet Take 1 tablet by mouth Daily.   Taking    Thiamine HCl (Vitamin B1) 100 MG tablet tablet Take 1 tablet by mouth Daily.   Taking    saccharomyces boulardii (FLORASTOR) 250 MG capsule Take 1 capsule by mouth 2 (Two) Times a Day.        Allergies:  Tigecycline    Objective     Vital Signs  Temp:  [97.3 °F (36.3 °C)-98.3 °F (36.8 °C)] 97.6 °F (36.4 °C)  Heart Rate:  [] 70  Resp:  [16-20] 19  BP: (106-238)/() 139/62     Physical Exam:      General Appearance:    Alert, cooperative, in no acute distress   Head:    Normocephalic, without obvious abnormality, atraumatic   Eyes:            Lids and lashes normal, conjunctivae and sclerae normal, no   icterus, no pallor, corneas clear, PERRLA   Ears:    Ears appear intact with no abnormalities noted   Throat:   No oral lesions, no thrush, oral mucosa moist   Neck:   No adenopathy, supple, trachea midline, no thyromegaly, no   carotid bruit, no JVD   Lungs:     Clear to auscultation,respirations regular, even and                  unlabored    Heart:    Regular rhythm and normal rate, normal S1 and S2, no            murmur, no gallop, no rub, no click   Chest Wall:    No abnormalities observed   Abdomen:     Normal bowel sounds, no masses, no organomegaly, soft        non-tender, non-distended, no guarding, no rebound                tenderness   Extremities:   Moves all extremities well, 1+ edema, no cyanosis, MINIMAL               redness TO THE LEFT DISTAL FOOT   Pulses:   Pulses palpable and equal bilaterally   Skin:   No bleeding, bruising or rash   Lymph nodes:   No palpable adenopathy   Neurologic:   Cranial nerves 2 - 12 grossly intact, sensation intact, DTR       present and equal bilaterally       I HAVE PERSONALLY EXAMINED THE PATIENT AND HAVE REVIEWED APPROPRIATE LAB AND IMAGING RESULTS.    Imaging Results (Last 24 Hours)       ** No results found for the last 24 hours. **             Lab Results (last 24 hours)       Procedure Component Value Units Date/Time    CK [[id number]] Collected: 03/27/25 2333    Specimen: Blood Updated: 03/28/25 0852    Ferritin [[id number]] Collected: 03/27/25 2333    Specimen: Blood Updated: 03/28/25 0852    Iron Profile [[id number]] Collected: 03/27/25 2333    Specimen: Blood Updated: 03/28/25 0852    Uric Acid [[id number]] Collected: 03/27/25 2333    Specimen: Blood Updated: 03/28/25 0852    POC Glucose Finger 4x Daily Before Meals & at Bedtime [[id number]]  (Normal) Collected: 03/28/25 0736    Specimen: Blood from Finger Updated: 03/28/25 0737     Glucose 96 mg/dL      Comment: Serial Number: [id number]:  [id number]       Comprehensive Metabolic Panel [[id number]]  (Abnormal) Collected: 03/27/25 2333    Specimen: Blood Updated: 03/28/25 0020     Glucose 125 mg/dL      BUN 17 mg/dL      Creatinine 1.50 mg/dL      Sodium 136 mmol/L      Potassium 4.2 mmol/L      Chloride 102 mmol/L      CO2 23.3 mmol/L      Calcium 8.8 mg/dL      Total Protein 6.0 g/dL      Albumin 3.2 g/dL      ALT (SGPT) 6 U/L      AST (SGOT) 12 U/L      Alkaline Phosphatase 44 U/L      Total Bilirubin 0.5 mg/dL      Globulin 2.8 gm/dL      A/G Ratio 1.1 g/dL      BUN/Creatinine Ratio 11.3     Anion Gap 10.7 mmol/L      eGFR 46.5 mL/min/1.73     Narrative:      GFR Categories in Chronic Kidney Disease (CKD)      GFR Category          GFR (mL/min/1.73)    Interpretation  G1                     90 or greater         Normal or high  (1)  G2                      60-89                Mild decrease (1)  G3a                   45-59                Mild to moderate decrease  G3b                   30-44                Moderate to severe decrease  G4                    15-29                Severe decrease  G5                    14 or less           Kidney failure          (1)In the absence of evidence of kidney disease, neither GFR category G1 or G2 fulfill the criteria for CKD.    eGFR calculation 2021 CKD-EPI creatinine equation, which does not include race as a factor    Magnesium [[id number]]  (Normal) Collected: 03/27/25 2333    Specimen: Blood Updated: 03/28/25 0020     Magnesium 1.8 mg/dL     Hemoglobin A1c [[id number]]  (Normal) Collected: 03/27/25 2333    Specimen: Blood Updated: 03/28/25 0020     Hemoglobin A1C 5.22 %     CBC Auto Differential [[id number]]  (Abnormal) Collected: 03/27/25 2333    Specimen: Blood Updated: 03/27/25 2354     WBC 5.39 10*3/mm3      RBC 4.41 10*6/mm3      Hemoglobin 11.8 g/dL      Hematocrit 37.9 %      MCV 85.9 fL      MCH 26.8 pg      MCHC 31.1 g/dL      RDW 15.8 %      RDW-SD 49.5 fl      MPV 10.4 fL      Platelets 259 10*3/mm3      Neutrophil % 49.5 %      Lymphocyte % 32.5 %      Monocyte % 12.4 %      Eosinophil % 4.3 %      Basophil % 0.6 %      Immature Grans % 0.7 %      Neutrophils, Absolute 2.67 10*3/mm3      Lymphocytes, Absolute 1.75 10*3/mm3      Monocytes, Absolute 0.67 10*3/mm3      Eosinophils, Absolute 0.23 10*3/mm3      Basophils, Absolute 0.03 10*3/mm3      Immature Grans, Absolute 0.04 10*3/mm3      nRBC 0.0 /100 WBC     Procalcitonin [[id number]]  (Normal) Collected: 03/27/25 1616    Specimen: Blood Updated: 03/27/25 1658     Procalcitonin 0.06 ng/mL     Narrative:      As a Marker for Sepsis (Non-Neonates):    1. <0.5 ng/mL represents a low risk of severe sepsis and/or septic shock.  2. >2 ng/mL represents a high risk of severe sepsis and/or septic shock.    As a Marker for Lower Respiratory  "Tract Infections that require antibiotic therapy:    PCT on Admission    Antibiotic Therapy       6-12 Hrs later    >0.5                Strongly Recommended  >0.25 - <0.5        Recommended   0.1 - 0.25          Discouraged              Remeasure/reassess PCT  <0.1                Strongly Discouraged     Remeasure/reassess PCT    As 28 day mortality risk marker: \"Change in Procalcitonin Result\" (>80% or <=80%) if Day 0 (or Day 1) and Day 4 values are available. Refer to http://www.[facility]-pct-calculator.com    Change in PCT <=80%  A decrease of PCT levels below or equal to 80% defines a positive change in PCT test result representing a higher risk for 28-day all-cause mortality of patients diagnosed with severe sepsis for septic shock.    Change in PCT >80%  A decrease of PCT levels of more than 80% defines a negative change in PCT result representing a lower risk for 28-day all-cause mortality of patients diagnosed with severe sepsis or septic shock.       Magnesium [[id number]]  (Normal) Collected: 03/27/25 1616    Specimen: Blood Updated: 03/27/25 1658     Magnesium 1.9 mg/dL     Comprehensive Metabolic Panel [[id number]]  (Abnormal) Collected: 03/27/25 1616    Specimen: Blood Updated: 03/27/25 1658     Glucose 128 mg/dL      BUN 17 mg/dL      Creatinine 1.50 mg/dL      Sodium 134 mmol/L      Potassium 4.8 mmol/L      Chloride 99 mmol/L      CO2 24.1 mmol/L      Calcium 9.1 mg/dL      Total Protein 6.5 g/dL      Albumin 3.7 g/dL      ALT (SGPT) 7 U/L      AST (SGOT) 13 U/L      Alkaline Phosphatase 57 U/L      Total Bilirubin 0.6 mg/dL      Globulin 2.8 gm/dL      A/G Ratio 1.3 g/dL      BUN/Creatinine Ratio 11.3     Anion Gap 10.9 mmol/L      eGFR 46.5 mL/min/1.73     Narrative:      GFR Categories in Chronic Kidney Disease (CKD)      GFR Category          GFR (mL/min/1.73)    Interpretation  G1                     90 or greater         Normal or high (1)  G2                      60-89                Mild decrease " (1)  G3a                   45-59                Mild to moderate decrease  G3b                   30-44                Moderate to severe decrease  G4                    15-29                Severe decrease  G5                    14 or less           Kidney failure          (1)In the absence of evidence of kidney disease, neither GFR category G1 or G2 fulfill the criteria for CKD.    eGFR calculation 2021 CKD-EPI creatinine equation, which does not include race as a factor    C-reactive Protein [[id number]]  (Abnormal) Collected: 03/27/25 1616    Specimen: Blood Updated: 03/27/25 1658     C-Reactive Protein 18.70 mg/dL     Uric Acid [[id number]]  (Abnormal) Collected: 03/27/25 1616    Specimen: Blood Updated: 03/27/25 1658     Uric Acid 7.5 mg/dL     Protime-INR [[id number]]  (Abnormal) Collected: 03/27/25 1616    Specimen: Blood Updated: 03/27/25 1645     Protime 14.8 Seconds      INR 1.17    aPTT [[id number]]  (Normal) Collected: 03/27/25 1616    Specimen: Blood Updated: 03/27/25 1645     PTT 26.5 seconds     Sedimentation Rate [[id number]]  (Abnormal) Collected: 03/27/25 1616    Specimen: Blood Updated: 03/27/25 1637     Sed Rate 21 mm/hr     Blood Culture - Blood, Arm, Left [[id number]] Collected: 03/27/25 1633    Specimen: Blood from Arm, Left Updated: 03/27/25 1636    Extra Tubes [[id number]] Collected: 03/27/25 1616    Specimen: Blood, Venous Line Updated: 03/27/25 1632    Narrative:      The following orders were created for panel order Extra Tubes.  Procedure                               Abnormality         Status                     ---------                               -----------         ------                     Gold Top - SST[[id number]]                                   Final result                 Please view results for these tests on the individual orders.    Gold Top - SST [[id number]] Collected: 03/27/25 1616    Specimen: Blood Updated: 03/27/25 1632     Extra Tube Hold for add-ons.     Comment: Auto  resulted.       CBC & Differential [[id number]]  (Abnormal) Collected: 03/27/25 1616    Specimen: Blood Updated: 03/27/25 1628    Narrative:      The following orders were created for panel order CBC & Differential.  Procedure                               Abnormality         Status                     ---------                               -----------         ------                     CBC Auto Differential[[id number]]        Abnormal            Final result                 Please view results for these tests on the individual orders.    CBC Auto Differential [[id number]]  (Abnormal) Collected: 03/27/25 1616    Specimen: Blood Updated: 03/27/25 1628     WBC 6.81 10*3/mm3      RBC 4.75 10*6/mm3      Hemoglobin 12.7 g/dL      Hematocrit 41.2 %      MCV 86.7 fL      MCH 26.7 pg      MCHC 30.8 g/dL      RDW 15.9 %      RDW-SD 50.7 fl      MPV 10.3 fL      Platelets 281 10*3/mm3      Neutrophil % 63.0 %      Lymphocyte % 20.6 %      Monocyte % 13.2 %      Eosinophil % 2.2 %      Basophil % 0.4 %      Immature Grans % 0.6 %      Neutrophils, Absolute 4.29 10*3/mm3      Lymphocytes, Absolute 1.40 10*3/mm3      Monocytes, Absolute 0.90 10*3/mm3      Eosinophils, Absolute 0.15 10*3/mm3      Basophils, Absolute 0.03 10*3/mm3      Immature Grans, Absolute 0.04 10*3/mm3      nRBC 0.0 /100 WBC     Blood Culture - Blood, Arm, Right [[id number]] Collected: 03/27/25 1616    Specimen: Blood from Arm, Right Updated: 03/27/25 1625    POC Lactate [[id number]]  (Normal) Collected: 03/27/25 1620    Specimen: Blood Updated: 03/27/25 1621     Lactate 0.7 mmol/L      Comment: Serial Number: [id number]:  [id number]                I reviewed the patient's new clinical results.    Assessment & Plan   CELLULITIS LOWER EXTREMITIES/CHRONIC VENOUS INSUFFICIENCY/BILATERAL PEDAL EDEMA---IV KEFZOL  CKD----DR SANCHES HELPING MANAGE  DM II---GOOD CONTROL  HYPONATREMIA---DUE TO ETOH (BEER) INTAKE  DEPRESSION---STABLE ON MEDS  CHRONIC ETOH  ABUSE---LIMITED TO 4 BEERS PER DAY ; HE DOES NOT HAVE DT'S WHEN OFF ETOH NORMALLY  DJD/GOUT---ON ALLOPURINOL AND COLCHICINE CURRENTLY; URIC ACID LEVEL ELEVATED  HTN---STABLE  HYPOTHYROIDISM---EUTHYROID  FULL CODE STATUS  Principal Problem:    Cellulitis          I discussed the patients findings and my recommendations with patient , SON IN LAW AT THE BEDSIDE , AND DAUGHTER(BY PHONE)    Montse Dawkins MD  03/28/25  08:54 EDT

## 2025-03-28 NOTE — PLAN OF CARE
Goal Outcome Evaluation:         Pt rested well this shift with minimal complaints. Tylenol was sufficient in managing pain according to pt. Family requests stronger pain meds as well as possible wound consult to evaluate cellulitis on legs. VSS at this time.

## 2025-03-28 NOTE — PLAN OF CARE
Problem: Adult Inpatient Plan of Care  Goal: Patient-Specific Goal (Individualized)  Outcome: Progressing     Problem: Adult Inpatient Plan of Care  Goal: Absence of Hospital-Acquired Illness or Injury  Outcome: Progressing  Intervention: Identify and Manage Fall Risk  Recent Flowsheet Documentation  Taken 3/28/2025 1400 by Gurmeet Tsang RN  Safety Promotion/Fall Prevention: safety round/check completed  Taken 3/28/2025 1000 by Gurmeet Tsang RN  Safety Promotion/Fall Prevention: safety round/check completed  Taken 3/28/2025 0800 by Gurmeet Tsang RN  Safety Promotion/Fall Prevention:   safety round/check completed   assistive device/personal items within reach   clutter free environment maintained  Intervention: Prevent Skin Injury  Recent Flowsheet Documentation  Taken 3/28/2025 0800 by Gurmeet Tsang RN  Body Position: supine  Intervention: Prevent Infection  Recent Flowsheet Documentation  Taken 3/28/2025 1400 by Gurmeet Tsang RN  Infection Prevention:   equipment surfaces disinfected   environmental surveillance performed   hand hygiene promoted   single patient room provided  Taken 3/28/2025 1000 by Gurmeet Tsang RN  Infection Prevention:   environmental surveillance performed   equipment surfaces disinfected   hand hygiene promoted   single patient room provided  Taken 3/28/2025 0800 by Gurmeet Tsang RN  Infection Prevention:   environmental surveillance performed   equipment surfaces disinfected   hand hygiene promoted   single patient room provided     Problem: Adult Inpatient Plan of Care  Goal: Absence of Hospital-Acquired Illness or Injury  Intervention: Prevent Skin Injury  Recent Flowsheet Documentation  Taken 3/28/2025 0800 by Gurmeet Tsang RN  Body Position: supine     Problem: Adult Inpatient Plan of Care  Goal: Absence of Hospital-Acquired Illness or Injury  Intervention: Prevent Infection  Recent Flowsheet Documentation  Taken 3/28/2025 1400 by Gurmeet Tsang RN  Infection  Prevention:   equipment surfaces disinfected   environmental surveillance performed   hand hygiene promoted   single patient room provided  Taken 3/28/2025 1000 by Gurmeet Tsang RN  Infection Prevention:   environmental surveillance performed   equipment surfaces disinfected   hand hygiene promoted   single patient room provided  Taken 3/28/2025 0800 by Gurmeet Tsang RN  Infection Prevention:   environmental surveillance performed   equipment surfaces disinfected   hand hygiene promoted   single patient room provided   Goal Outcome Evaluation:           Progress: improving

## 2025-03-28 NOTE — CONSULTS
NEPHROLOGY CONSULTATION-----[facility]    [facility]  [phone]  Ines Barahona MD    Patient Care Team:  Montse Dawkins MD as PCP - General (Family Medicine)  Huey Keyes MD as Consulting Physician (Nephrology)    CC/REASON FOR CONSULTATION: RENAL FAILURE/ELEVATED SERUM CREATININE    PHYSICIAN REQUESTING CONSULTATION: Montse Dawkins MD     History of Present Illness    Patient is a 79 y.o. WM whom I was asked to see in consultation for evaluation and management of renal failure/elevated serum creatinine. Patient was admitted after presenting to ER  with c/o bilateral LE swelling and left foot acute pain. Patient with known CRF/CKD STG 3. +EtOH abuse.  No NSAIDs or recent IV dye exposure. No known h/o hepatitis, TB, rheumatic fever, jaundice, SLE. Does bleed/bruise easily. Some urinary hesitancy.  Chronic LE edema. +Compliance with home meds. Was on diuretics in the form of Bumex prior to admission. Was not on ACE-I/ARB prior to admission. No herbal med use.     Review of Systems   Constitutional:  Positive for activity change and appetite change. Negative for chills, diaphoresis, fatigue, fever and unexpected weight change.   HENT:  Negative for congestion, dental problem, drooling, ear discharge, ear pain, facial swelling, hearing loss, mouth sores, nosebleeds, postnasal drip, rhinorrhea, sinus pressure, sinus pain, sneezing, sore throat, tinnitus, trouble swallowing and voice change.    Eyes:  Negative for photophobia, pain, discharge, redness, itching and visual disturbance.   Respiratory:  Negative for apnea, cough, choking, chest tightness, shortness of breath, wheezing and stridor.    Cardiovascular:  Positive for leg swelling. Negative for chest pain and palpitations.   Gastrointestinal:  Negative for abdominal distention, abdominal pain, anal bleeding, blood in stool, constipation, diarrhea, nausea, rectal pain and vomiting.    Endocrine: Negative for cold intolerance, heat intolerance, polydipsia, polyphagia and polyuria.   Genitourinary:  Positive for difficulty urinating. Negative for decreased urine volume, dysuria, enuresis, flank pain, frequency, genital sores, hematuria and urgency.   Musculoskeletal:  Negative for arthralgias, back pain, gait problem, joint swelling, myalgias, neck pain and neck stiffness.   Skin:  Positive for rash and wound. Negative for color change and pallor.   Allergic/Immunologic: Negative for environmental allergies, food allergies and immunocompromised state.   Neurological:  Positive for weakness. Negative for dizziness, tremors, seizures, syncope, facial asymmetry, speech difficulty, light-headedness, numbness and headaches.   Hematological:  Negative for adenopathy. Bruises/bleeds easily.   Psychiatric/Behavioral:  Negative for agitation, behavioral problems, confusion, decreased concentration, dysphoric mood, hallucinations, self-injury, sleep disturbance and suicidal ideas. The patient is not nervous/anxious and is not hyperactive.           Past Medical History:   Diagnosis Date    Alcohol abuse 07/13/2022    Essential hypertension 07/13/2022    Gait instability 07/13/2022    GERD without esophagitis 07/13/2022    Gout 07/13/2022    Hypothyroidism (acquired) 07/13/2022    Onychomycosis of multiple toenails with type 2 diabetes mellitus 07/13/2022    Sleep apnea     Stage 3a chronic kidney disease 07/13/2022    Tinea pedis of both feet 07/13/2022    Type 2 diabetes mellitus with diabetic neuropathy, without long-term current use of insulin 07/13/2022       Past Surgical History:   Procedure Laterality Date    ORTHOPEDIC SURGERY      fractured ankle and dislocated shoulder       Family History   Family history unknown: Yes       Social History     Tobacco Use    Smoking status: Never    Smokeless tobacco: Never   Vaping Use    Vaping status: Never Used   Substance Use Topics    Alcohol use: Yes      Alcohol/week: 30.0 standard drinks of alcohol     Types: 30 Cans of beer per week    Drug use: Never       Home Meds:   Medications Prior to Admission   Medication Sig Dispense Refill Last Dose/Taking    allopurinol (ZYLOPRIM) 100 MG tablet Take 1 tablet by mouth Daily.   Taking    colchicine 0.6 MG tablet Take 1 tablet by mouth 2 (Two) Times a Day.   Taking    colestipol (COLESTID) 1 g tablet Take 1-2 tablets by mouth Daily.   Taking    FLUoxetine (PROzac) 10 MG capsule Take 1 capsule by mouth Daily.   Taking    folic acid (FOLVITE) 1 MG tablet Take 1 tablet by mouth Daily.   Taking    hydrALAZINE (APRESOLINE) 50 MG tablet Take 1 tablet by mouth Daily.   Taking    levothyroxine (SYNTHROID, LEVOTHROID) 175 MCG tablet Take 1 tablet by mouth Every Morning.   Taking    pantoprazole (PROTONIX) 40 MG EC tablet Take 1 tablet by mouth Daily.   Taking    Thiamine HCl (Vitamin B1) 100 MG tablet tablet Take 1 tablet by mouth Daily.   Taking    saccharomyces boulardii (FLORASTOR) 250 MG capsule Take 1 capsule by mouth 2 (Two) Times a Day.          Scheduled Meds:  allopurinol, 100 mg, Oral, Daily  ceFAZolin, 2,000 mg, Intravenous, Q8H  cholestyramine light, 1 packet, Oral, Daily  colchicine, 0.6 mg, Oral, Daily  enoxaparin sodium, 40 mg, Subcutaneous, Q24H  FLUoxetine, 10 mg, Oral, Daily  folic acid, 1 mg, Oral, Daily  hydrALAZINE, 50 mg, Oral, Daily  hydrocortisone, , Topical, Q12H  ketoconazole, , Topical, Q24H  levothyroxine, 175 mcg, Oral, Q AM  pantoprazole, 40 mg, Oral, Daily  saccharomyces boulardii, 250 mg, Oral, BID  sodium chloride, 10 mL, Intravenous, Q12H  Vitamin B1, 100 mg, Oral, Daily        Continuous Infusions:  Pharmacy to Dose enoxaparin (LOVENOX),         PRN Meds:    acetaminophen **OR** acetaminophen **OR** acetaminophen    aluminum-magnesium hydroxide-simethicone    senna-docusate sodium **AND** polyethylene glycol **AND** bisacodyl **AND** bisacodyl    calcium carbonate    ondansetron ODT **OR**  "ondansetron    Pharmacy to Dose enoxaparin (LOVENOX)    [COMPLETED] Insert Peripheral IV **AND** sodium chloride    sodium chloride    sodium chloride    Allergies:  Tigecycline    OBJECTIVE    Vital Signs  Temp:  [97.3 °F (36.3 °C)-98.3 °F (36.8 °C)] 97.6 °F (36.4 °C)  Heart Rate:  [] 70  Resp:  [16-20] 19  BP: (106-238)/() 139/62    No intake/output data recorded.  No intake/output data recorded.    Physical Exam:  General Appearance: alert, appears stated age and cooperative  Head: normocephalic, without obvious abnormality and atraumatic   Eyes: conjunctivae and sclerae normal and no icterus  Neck: supple and no JVD  Lungs: +FEW SCATTERED RHONCHI  Heart: regular rhythm & normal rate and normal S1, S2 +BALDOMERO  Chest Wall: no abnormalities observed  Abdomen: normal bowel sounds and soft, nontender  Extremities: moves extremities well, 1+ BILAT LE EDEMA (R>L), no cyanosis  Skin: +CHRONIC VENOUS INSUFFICIENCY BILAT LE WITH BILAT LE WOUNDS (FOOT AND CALF/PRETIBLAL AREAS) +SCATTERED ECCHYMOSIS  Neurologic: Alert, and oriented. No focal deficits    Results Review:    I reviewed the patient's new clinical results.    WBC WBC   Date Value Ref Range Status   03/27/2025 5.39 3.40 - 10.80 10*3/mm3 Final   03/27/2025 6.81 3.40 - 10.80 10*3/mm3 Final      HGB Hemoglobin   Date Value Ref Range Status   03/27/2025 11.8 (L) 13.0 - 17.7 g/dL Final   03/27/2025 12.7 (L) 13.0 - 17.7 g/dL Final      HCT Hematocrit   Date Value Ref Range Status   03/27/2025 37.9 37.5 - 51.0 % Final   03/27/2025 41.2 37.5 - 51.0 % Final      Platelets No results found for: \"LABPLAT\"   MCV MCV   Date Value Ref Range Status   03/27/2025 85.9 79.0 - 97.0 fL Final   03/27/2025 86.7 79.0 - 97.0 fL Final          Sodium Sodium   Date Value Ref Range Status   03/27/2025 136 136 - 145 mmol/L Final   03/27/2025 134 (L) 136 - 145 mmol/L Final      Potassium Potassium   Date Value Ref Range Status   03/27/2025 4.2 3.5 - 5.2 mmol/L Final   03/27/2025 4.8 " "3.5 - 5.2 mmol/L Final      Chloride Chloride   Date Value Ref Range Status   03/27/2025 102 98 - 107 mmol/L Final   03/27/2025 99 98 - 107 mmol/L Final      CO2 CO2   Date Value Ref Range Status   03/27/2025 23.3 22.0 - 29.0 mmol/L Final   03/27/2025 24.1 22.0 - 29.0 mmol/L Final      BUN BUN   Date Value Ref Range Status   03/27/2025 17 8 - 23 mg/dL Final   03/27/2025 17 8 - 23 mg/dL Final      Creatinine Creatinine   Date Value Ref Range Status   03/27/2025 1.50 (H) 0.76 - 1.27 mg/dL Final   03/27/2025 1.50 (H) 0.76 - 1.27 mg/dL Final      Calcium Calcium   Date Value Ref Range Status   03/27/2025 8.8 8.6 - 10.5 mg/dL Final   03/27/2025 9.1 8.6 - 10.5 mg/dL Final      PO4 No results found for: \"CAPO4\"   Albumin Albumin   Date Value Ref Range Status   03/27/2025 3.2 (L) 3.5 - 5.2 g/dL Final   03/27/2025 3.7 3.5 - 5.2 g/dL Final      Magnesium Magnesium   Date Value Ref Range Status   03/27/2025 1.8 1.6 - 2.4 mg/dL Final   03/27/2025 1.9 1.6 - 2.4 mg/dL Final      Uric Acid Uric Acid   Date Value Ref Range Status   03/27/2025 7.5 (H) 3.4 - 7.0 mg/dL Final          Imaging Results (Last 72 Hours)       ** No results found for the last 72 hours. **              Results for orders placed during the hospital encounter of 09/15/23    XR Foot 3+ View Right    Narrative  XR FOOT 3+ VW RIGHT    Date of Exam: 9/15/2023 2:30 PM EDT    Indication: wound, infection    Comparison: 7/9/2022    Findings:  Bones are diffusely osteopenic. There is no discrete area of osseous erosion to indicate a site of osteomyelitis. Postsurgical changes of screw fixation noted at the distal tibia and fibula similar to the prior exam. Extensive small vessel vascular  calcifications. Soft tissue swelling overlying the dorsum of the foot which may relate to edema and/or cellulitis.    Impression  Impression:  1. No discrete area of osseous erosion to indicate osteomyelitis.  2. Soft tissue swelling overlying the dorsum of the foot may relate to " edema and/or cellulitis.  3. Diffuse osteopenia.  4. Extensive small vessel vascular calcifications.        Electronically Signed: Renny Johnson MD  9/15/2023 2:35 PM EDT  Workstation ID: [id number]      Results for orders placed during the hospital encounter of 07/09/22    XR Foot 2 View Right    Narrative  DATE OF EXAM:  7/9/2022 1:58 PM    PROCEDURE:  XR FOOT 2 VW RIGHT-    INDICATIONS:  Wound between first and second toe maggots present.  Cellulitis;  L03.115-Cellulitis of right lower limb; B87.9-Myiasis, unspecified    COMPARISON:  No Comparisons Available    TECHNIQUE:  A minimum of two routine standard radiographic views were obtained of  the right foot.    FINDINGS:  There is diffuse soft tissue swelling of the right foot bones are  osteopenic. There is no acute appearing fracture. Postoperative findings  noted in the ankle at the medial malleolus and the distal fibula with  fixation screws. The calcaneus is intact. Small vessel vascular  calcifications noted.    Impression  1. Extensive soft tissue swelling of the right foot may relate to edema  and/or cellulitis.  2. No discrete area of osseous erosion.  3. No acute appearing fracture.  4. Additional chronic findings above.    Electronically Signed By-Renny Johnson MD On:7/9/2022 2:09 PM  This report was finalized on [id number] by  Renny Johnson MD.        Results for orders placed during the hospital encounter of 03/27/25    Duplex Venous Lower Extremity - BILATERAL    Interpretation Summary    Normal bilateral lower extremity venous duplex scan.    Small saphenous veins not visualized bilaterally      ASSESSMENT / PLAN      Cellulitis      CRF/CKD-----Nonoliguric. CRF/CKD STG 3A secondary to HTN NS. Give little IV Bumex and follow.  Current Cr at baseline. Check few urine and serum studies and PVR. No NSAIDs or IV dye. Dose meds for CrCl 45-60 cc/min     2. GOUT/HYPERURICEMIA-----Increase Allopurinol despite renal dysfunction and follow with mild diuretic  exposure     3. BILATERAL LE EDEMA/CHRONIC VENOUS INSUFFICIENCY/WOUNDS/CELLULITIS------Wound care. Little Bumex     4. DEPRESSION-----Ok to continue SSRI for now     5. ETOH ABUSE------Counseled     6. OA/DJD ------On Allopurinol. No NSAIDs.      7. HTN WITH CKD-----BP ok. Avoid hypotension. No ACE-I/ARB/DRI     8. GERD/PUD PROPHYLAXIS------On PPI. Benefits outweigh risks despite CKD     9. HYPOTHYROIDISM------On Synthroid. TSH ok     10. DVT PROPHYLAXIS------Lovenox    11. DEPRESSION------On Prozac    12. HYPOMAGNESEMIA------Replace IV and follow    13. CHRONIC IBS------On Florastor      I discussed the patient's findings and my recommendations with patient and his family    Will follow along closely. Thank you for allowing us to see this patient in renal consultation.    [facility]  [phone]  MD Ines Orellana MD  03/28/25  08:29 EDT

## 2025-03-28 NOTE — DISCHARGE PLACEMENT REQUEST
"Janeskane Rommel C (79 y.o. Male)       Date of Birth   1945    Social Security Number       Address   [address] [place] [facility]    Home Phone   [phone]    MRN   [id number]       Judaism   Sabianist    Marital Status                               Admission Date   3/27/2025    Admission Type   Emergency    Admitting Provider   Montse Dawkins MD    Attending Provider   Montse Dawkins MD    Department, Room/Bed   [facility] INPATIENT, 303/1       Discharge Date       Discharge Disposition       Discharge Destination                                 Attending Provider: Montse Dawkins MD    Allergies: Tigecycline    Isolation: None   Infection: None   Code Status: CPR    Ht: 170.2 cm (67\")   Wt: 117 kg (258 lb 13.1 oz)    Admission Cmt: None   Principal Problem: Cellulitis [L03.90]                   Active Insurance as of 3/27/2025       Primary Coverage       Payor Plan Insurance Group Employer/Plan Group    MEDICARE MEDICARE A & B        Payor Plan Address Payor Plan Phone Number Payor Plan Fax Number Effective Dates    [facility] [id number] [phone]  10/1/2008 - None Entered    [facility] [id number]         Subscriber Name Subscriber Birth Date Member ID       ROMMEL LOVING 1945 [id number]               Secondary Coverage       Payor Plan Insurance Group Employer/Plan Group    [employer] [employer] [place]        Payor Plan Address Payor Plan Phone Number Payor Plan Fax Number Effective Dates    [address] [employer] [employer] [address] [phone]  10/1/2008 - None Entered    [facility] [id number]         Subscriber Name Subscriber Birth Date Member ID       ROMMEL LOVING 1945 [id number]                     Emergency Contacts        (Rel.) Home Phone Work Phone Mobile Phone    [employer] (Daughter) [phone] -- [phone]                "

## 2025-03-28 NOTE — THERAPY EVALUATION
Patient Name: Rommel Mcfarland  : 1945    MRN: [id number]                              Today's Date: 3/28/2025       Admit Date: 3/27/2025    Visit Dx:     ICD-10-CM ICD-9-CM   1. Cellulitis of left lower extremity  L03.116 682.6     Patient Active Problem List   Diagnosis    Cellulitis of right foot    Tinea pedis of both feet    Onychomycosis of multiple toenails with type 2 diabetes mellitus    Type 2 diabetes mellitus with diabetic neuropathy, without long-term current use of insulin    Essential hypertension    Stage 3a chronic kidney disease    Alcohol abuse    Gout    Hypothyroidism (acquired)    Major depressive disorder with current active episode    GERD without esophagitis    Gait instability    Cellulitis     Past Medical History:   Diagnosis Date    Alcohol abuse 2022    Essential hypertension 2022    Gait instability 2022    GERD without esophagitis 2022    Gout 2022    Hypothyroidism (acquired) 2022    Onychomycosis of multiple toenails with type 2 diabetes mellitus 2022    Sleep apnea     Stage 3a chronic kidney disease 2022    Tinea pedis of both feet 2022    Type 2 diabetes mellitus with diabetic neuropathy, without long-term current use of insulin 2022     Past Surgical History:   Procedure Laterality Date    ORTHOPEDIC SURGERY      fractured ankle and dislocated shoulder      General Information       Row Name 25 1239          Physical Therapy Time and Intention    Document Type evaluation  -JV     Mode of Treatment physical therapy  -JV       Row Name 25 1239          General Information    Patient Profile Reviewed yes  -JV     Prior Level of Function independent:;all household mobility;ADL's;mod assist:;bathing  -JV     Existing Precautions/Restrictions fall;other (see comments)  poor skin integrity - waffle cushion provided for sitting in chair.  -JV     Barriers to Rehab medically complex;previous functional  deficit  -JV       Row Name 03/28/25 1239          Living Environment    Current Living Arrangements home  -JV     People in Home alone;other (see comments)  pt with frequent daily sup/assist from caregivers and daughter.  -JV       Row Name 03/28/25 1239          Home Main Entrance    Number of Stairs, Main Entrance two  -JV     Stair Railings, Main Entrance railings on both sides of stairs  -JV       Row Name 03/28/25 1239          Stairs Within Home, Primary    Number of Stairs, Within Home, Primary none  -JV       Row Name 03/28/25 1239          Cognition    Orientation Status (Cognition) oriented x 4  -JV       Row Name 03/28/25 1239          Safety Issues/Impairments Affecting Functional Mobility    Safety Issues Affecting Function (Mobility) awareness of need for assistance;insight into deficits/self-awareness  -JV     Impairments Affecting Function (Mobility) balance;endurance/activity tolerance;range of motion (ROM);strength  -JV               User Key  (r) = Recorded By, (t) = Taken By, (c) = Cosigned By      Initials Name Provider Type    Krystal Wolfe Physical Therapist                   Mobility       Row Name 03/28/25 1244          Bed Mobility    Bed Mobility supine-sit-supine  -JV     Supine-Sit-Supine [place] (Bed Mobility) minimum assist (75% patient effort);verbal cues;nonverbal cues (demo/gesture)  -JV     Assistive Device (Bed Mobility) bed rails;head of bed elevated  -JV     Comment, (Bed Mobility) cues for log roll sequence for increased pt's (I) with mobility and decreased effort/valsalva  -JV       Row Name 03/28/25 1244          Transfers    Comment, (Transfers) pt requires total assist for hygiene post bowel incontinence.  -JV       Row Name 03/28/25 1244          Bed-Chair Transfer    Bed-Chair [place] (Transfers) verbal cues;nonverbal cues (demo/gesture);contact guard  -JV     Assistive Device (Bed-Chair Transfers) walker, front-wheeled  -JV       Row Name 03/28/25 1249           Sit-Stand Transfer    Sit-Stand [place] (Transfers) contact guard;verbal cues;nonverbal cues (demo/gesture)  -JV     Assistive Device (Sit-Stand Transfers) walker, front-wheeled  -JV       Row Name 03/28/25 1244          Gait/Stairs (Locomotion)    [place] Level (Gait) contact guard  -JV     Assistive Device (Gait) walker, front-wheeled  -JV     Distance in Feet (Gait) 25  -JV     Deviations/Abnormal Patterns (Gait) stride length decreased;base of support, wide;siva decreased  -JV     Bilateral Gait Deviations forward flexed posture  -JV     Comment, (Gait/Stairs) cues for proximity to AD  -JV               User Key  (r) = Recorded By, (t) = Taken By, (c) = Cosigned By      Initials Name Provider Type    Krystal Wolfe Physical Therapist                   Obj/Interventions       Row Name 03/28/25 1247          Range of Motion Comprehensive    Comment, General Range of Motion BLE AROM 75%; limited by body habitus  -JV       Row Name 03/28/25 1247          Strength Comprehensive (MMT)    Comment, General Manual Muscle Testing (MMT) Assessment BLE grossly 3+/5  -JV       Row Name 03/28/25 1247          Balance    Balance Assessment standing static balance;standing dynamic balance;sitting static balance  -JV     Static Sitting Balance modified independence  -JV     Position, Sitting Balance unsupported  -JV     Static Standing Balance contact guard  -JV     Dynamic Standing Balance contact guard  -JV     Position/Device Used, Standing Balance walker, front-wheeled  -JV       Row Name 03/28/25 1247          Sensory Assessment (Somatosensory)    Sensory Assessment (Somatosensory) LE sensation intact  -JV               User Key  (r) = Recorded By, (t) = Taken By, (c) = Cosigned By      Initials Name Provider Type    Krystal Wolfe Physical Therapist                   Goals/Plan       Row Name 03/28/25 1256          Bed Mobility Goal 1 (PT)    Activity/Assistive Device (Bed Mobility Goal  1, PT) bed mobility activities, all  -JV     [place] Level/Cues Needed (Bed Mobility Goal 1, PT) modified independence  -JV     Time Frame (Bed Mobility Goal 1, PT) long term goal (LTG);2 weeks  -JV     Strategies/Barriers (Bed Mobility Goal 1, PT) with hospital bed set-up  -JV       Row Name 03/28/25 1256          Transfer Goal 1 (PT)    Activity/Assistive Device (Transfer Goal 1, PT) sit-to-stand/stand-to-sit;bed-to-chair/chair-to-bed  -JV     [place] Level/Cues Needed (Transfer Goal 1, PT) modified independence  -JV     Time Frame (Transfer Goal 1, PT) long term goal (LTG);2 weeks  -JV       Row Name 03/28/25 1256          Gait Training Goal 1 (PT)    Activity/Assistive Device (Gait Training Goal 1, PT) gait (walking locomotion);walker, rolling  -JV     [place] Level (Gait Training Goal 1, PT) modified independence  -JV     Distance (Gait Training Goal 1, PT) 50 ft  -JV     Time Frame (Gait Training Goal 1, PT) long term goal (LTG);2 weeks  -JV       Row Name 03/28/25 1256          Therapy Assessment/Plan (PT)    Planned Therapy Interventions (PT) balance training;bed mobility training;gait training;home exercise program;joint mobilization;strengthening;stair training;patient/family education;transfer training  -JV               User Key  (r) = Recorded By, (t) = Taken By, (c) = Cosigned By      Initials Name Provider Type    Krystal Wolfe Physical Therapist                   Clinical Impression       Row Name 03/28/25 1251          Pain    Additional Documentation Pain Scale: FACES Pre/Post-Treatment (Group)  -JV       Row Name 03/28/25 1251          Pain Scale: FACES Pre/Post-Treatment    Pain: FACES Scale, Pretreatment 4-->hurts little more  -JV     Posttreatment Pain Rating 4-->hurts little more  -JV     Pre/Posttreatment Pain Comment BLE  -JV       Row Name 03/28/25 1251          Plan of Care Review    Outcome Evaluation Pt is an 80 y/o male presenting to [facility] on 3/27/25; he reports was  "seen at urgent care for wounds and swelling to BLE with worsening to LLE, pt sent here for further eval of possible cellulitis.  3/27: Normal BLE venous duplex scan.  Pt A&Ox4. Pt reports PLOF of living alone in [facility] with 2 step entry with SHERRON railings. Pt reports PLOF Mod(I) with household/community mobility/ambulation with Rwx. Pt daughter at bed side and reports distance <50 ft and reports he has caregiver assist, daily for 4-5 hours; daughter is his neighbor and checks on him every evening once home from work. Pt with assist for medication mgmt and bathing. Pt sleeps in lift chair as he has a hard time getting into/out of hospital bed, but lift chair LE no longer elevating and pt with increasing BLE edema.   At time of PT eval, pt educated on log rolling sequence with supine to/from sit for increased (I) and decreased effort - pt progressed and able to complete with MIN A with bed rail; pt completes transfers and gait x25 ft with Rwx and CGA. Pt is near his baseline, but he does have impaired motivation to mobilize, stating \"I'm lazy here\" and he will benefit from skilled PT to progress mobility to PLOF, increased household accessibility, improve (I)/safety to allow pt to sleep in hospital bed, and for decreased caregiver burden of care; HHPT follow-up recommended and IP PT while at [facility] 5x/week to allow for safe d/c home.  -JV       Row Name 03/28/25 1251          Therapy Assessment/Plan (PT)    Criteria for Skilled Interventions Met (PT) yes;meets criteria;skilled treatment is necessary  -JV     Therapy Frequency (PT) 5 times/wk  -JV     Predicted Duration of Therapy Intervention (PT) until d/c  -JV       Row Name 03/28/25 1251          Positioning and Restraints    Pre-Treatment Position in bed  -JV     Post Treatment Position chair  -JV     In Chair notified nsg;sitting;call light within reach;encouraged to call for assist;exit alarm on;with family/caregiver;waffle cushion  -JV               User Key  (r) = " Recorded By, (t) = Taken By, (c) = Cosigned By      Initials Name Provider Type    Krystal Wolfe Physical Therapist                   Outcome Measures    No documentation.                                Physical Therapy Education       Title: PT OT SLP Therapies (Done)       Topic: Physical Therapy (Done)       Point: Mobility training (Done)       Learning Progress Summary            Patient Acceptance, E,TB, VU by TIFFANY at 3/28/2025 1257                      Point: Precautions (Done)       Learning Progress Summary            Patient Acceptance, E,TB, VU by TIFFANY at 3/28/2025 1257                                      User Key       Initials Effective Dates Name Provider Type Discipline    TIFFANY 03/30/21 -  Krystal Rivera Physical Therapist PT                  PT Recommendation and Plan  Planned Therapy Interventions (PT): balance training, bed mobility training, gait training, home exercise program, joint mobilization, strengthening, stair training, patient/family education, transfer training  Outcome Evaluation: Pt is an 80 y/o male presenting to [facility] on 3/27/25; he reports was seen at urgent care for wounds and swelling to BLE with worsening to LLE, pt sent here for further eval of possible cellulitis.  3/27: Normal BLE venous duplex scan.  Pt A&Ox4. Pt reports PLOF of living alone in [facility] with 2 step entry with SHERRON railings. Pt reports PLOF Mod(I) with household/community mobility/ambulation with Rwx. Pt daughter at bed side and reports distance <50 ft and reports he has caregiver assist, daily for 4-5 hours; daughter is his neighbor and checks on him every evening once home from work. Pt with assist for medication mgmt and bathing. Pt sleeps in lift chair as he has a hard time getting into/out of hospital bed, but lift chair LE no longer elevating and pt with increasing BLE edema.   At time of PT eval, pt educated on log rolling sequence with supine to/from sit for increased (I) and decreased effort - pt  "progressed and able to complete with MIN A with bed rail; pt completes transfers and gait x25 ft with Rwx and CGA. Pt is near his baseline, but he does have impaired motivation to mobilize, stating \"I'm lazy here\" and he will benefit from skilled PT to progress mobility to PLOF, increased household accessibility, improve (I)/safety to allow pt to sleep in hospital bed, and for decreased caregiver burden of care; HHPT follow-up recommended and IP PT while at [facility] 5x/week to allow for safe d/c home.     Time Calculation:         PT Charges       Row Name 03/28/25 1257             Time Calculation    Start Time 0953  -JV      Stop Time 1040  -JV      Time Calculation (min) 47 min  -JV      PT Received On 03/28/25  -JV      PT - Next Appointment 03/30/25  -JJOANNA      PT Goal Re-Cert Due Date 04/11/25  -JJOANNA         Time Calculation- PT    Total Timed Code Minutes- PT 10 minute(s)  -JV                User Key  (r) = Recorded By, (t) = Taken By, (c) = Cosigned By      Initials Name Provider Type    Krystal Wolfe Physical Therapist                  Therapy Charges for Today       Code Description Service Date Service Provider Modifiers Qty    26537522506 HC PT EVAL MOD COMPLEXITY 4 3/28/2025 Krystal Rivera GP 1    08572298529 HC PT SELF CARE/MGMT/TRAIN EA 15 MIN 3/28/2025 Krystal Rivera GP 1            PT G-Codes  AM-PAC 6 Clicks Score (PT): 15  PT Discharge Summary  Anticipated Discharge Disposition (PT): home with assist, home with home health    Krystal Rivera  3/28/2025    "

## 2025-03-29 ENCOUNTER — APPOINTMENT (OUTPATIENT)
Dept: CT IMAGING | Facility: HOSPITAL | Age: 80
End: 2025-03-29
Payer: MEDICARE

## 2025-03-29 ENCOUNTER — APPOINTMENT (OUTPATIENT)
Dept: GENERAL RADIOLOGY | Facility: HOSPITAL | Age: 80
End: 2025-03-29
Payer: MEDICARE

## 2025-03-29 LAB
ALBUMIN SERPL-MCNC: 3.2 G/DL (ref 3.5–5.2)
ALBUMIN/GLOB SERPL: 1.2 G/DL
ALP SERPL-CCNC: 51 U/L (ref 39–117)
ALT SERPL W P-5'-P-CCNC: <5 U/L (ref 1–41)
ANION GAP SERPL CALCULATED.3IONS-SCNC: 9.4 MMOL/L (ref 5–15)
AST SERPL-CCNC: 14 U/L (ref 1–40)
BASOPHILS # BLD AUTO: 0.04 10*3/MM3 (ref 0–0.2)
BASOPHILS NFR BLD AUTO: 0.8 % (ref 0–1.5)
BILIRUB SERPL-MCNC: 0.2 MG/DL (ref 0–1.2)
BUN SERPL-MCNC: 16 MG/DL (ref 8–23)
BUN/CREAT SERPL: 10.8 (ref 7–25)
CA-I SERPL ISE-MCNC: 1.17 MMOL/L (ref 1.15–1.3)
CALCIUM SPEC-SCNC: 8.8 MG/DL (ref 8.6–10.5)
CHLORIDE SERPL-SCNC: 103 MMOL/L (ref 98–107)
CO2 SERPL-SCNC: 22.6 MMOL/L (ref 22–29)
CREAT SERPL-MCNC: 1.48 MG/DL (ref 0.76–1.27)
DEPRECATED RDW RBC AUTO: 49.9 FL (ref 37–54)
EGFRCR SERPLBLD CKD-EPI 2021: 47.2 ML/MIN/1.73
EOSINOPHIL # BLD AUTO: 0.36 10*3/MM3 (ref 0–0.4)
EOSINOPHIL NFR BLD AUTO: 6.9 % (ref 0.3–6.2)
ERYTHROCYTE [DISTWIDTH] IN BLOOD BY AUTOMATED COUNT: 16 % (ref 12.3–15.4)
GLOBULIN UR ELPH-MCNC: 2.6 GM/DL
GLUCOSE BLDC GLUCOMTR-MCNC: 101 MG/DL (ref 70–105)
GLUCOSE BLDC GLUCOMTR-MCNC: 111 MG/DL (ref 70–105)
GLUCOSE BLDC GLUCOMTR-MCNC: 121 MG/DL (ref 70–105)
GLUCOSE BLDC GLUCOMTR-MCNC: 96 MG/DL (ref 70–105)
GLUCOSE SERPL-MCNC: 107 MG/DL (ref 65–99)
HCT VFR BLD AUTO: 36.2 % (ref 37.5–51)
HGB BLD-MCNC: 11.3 G/DL (ref 13–17.7)
IMM GRANULOCYTES # BLD AUTO: 0.03 10*3/MM3 (ref 0–0.05)
IMM GRANULOCYTES NFR BLD AUTO: 0.6 % (ref 0–0.5)
LYMPHOCYTES # BLD AUTO: 1.66 10*3/MM3 (ref 0.7–3.1)
LYMPHOCYTES NFR BLD AUTO: 31.8 % (ref 19.6–45.3)
MAGNESIUM SERPL-MCNC: 2.1 MG/DL (ref 1.6–2.4)
MCH RBC QN AUTO: 26.8 PG (ref 26.6–33)
MCHC RBC AUTO-ENTMCNC: 31.2 G/DL (ref 31.5–35.7)
MCV RBC AUTO: 86 FL (ref 79–97)
MONOCYTES # BLD AUTO: 0.65 10*3/MM3 (ref 0.1–0.9)
MONOCYTES NFR BLD AUTO: 12.5 % (ref 5–12)
NEUTROPHILS NFR BLD AUTO: 2.48 10*3/MM3 (ref 1.7–7)
NEUTROPHILS NFR BLD AUTO: 47.4 % (ref 42.7–76)
NRBC BLD AUTO-RTO: 0 /100 WBC (ref 0–0.2)
PHOSPHATE SERPL-MCNC: 2.5 MG/DL (ref 2.5–4.5)
PLATELET # BLD AUTO: 253 10*3/MM3 (ref 140–450)
PMV BLD AUTO: 10.2 FL (ref 6–12)
POTASSIUM SERPL-SCNC: 4.5 MMOL/L (ref 3.5–5.2)
PROT SERPL-MCNC: 5.8 G/DL (ref 6–8.5)
RBC # BLD AUTO: 4.21 10*6/MM3 (ref 4.14–5.8)
SODIUM SERPL-SCNC: 135 MMOL/L (ref 136–145)
WBC NRBC COR # BLD AUTO: 5.22 10*3/MM3 (ref 3.4–10.8)

## 2025-03-29 PROCEDURE — 73590 X-RAY EXAM OF LOWER LEG: CPT

## 2025-03-29 PROCEDURE — 84100 ASSAY OF PHOSPHORUS: CPT | Performed by: INTERNAL MEDICINE

## 2025-03-29 PROCEDURE — 82948 REAGENT STRIP/BLOOD GLUCOSE: CPT | Performed by: FAMILY MEDICINE

## 2025-03-29 PROCEDURE — 82948 REAGENT STRIP/BLOOD GLUCOSE: CPT

## 2025-03-29 PROCEDURE — 25010000002 CEFAZOLIN PER 500 MG: Performed by: FAMILY MEDICINE

## 2025-03-29 PROCEDURE — 25010000002 NA FERRIC GLUC CPLX PER 12.5 MG: Performed by: INTERNAL MEDICINE

## 2025-03-29 PROCEDURE — 25010000002 ENOXAPARIN PER 10 MG: Performed by: FAMILY MEDICINE

## 2025-03-29 PROCEDURE — 83735 ASSAY OF MAGNESIUM: CPT | Performed by: FAMILY MEDICINE

## 2025-03-29 PROCEDURE — 73700 CT LOWER EXTREMITY W/O DYE: CPT

## 2025-03-29 PROCEDURE — 85025 COMPLETE CBC W/AUTO DIFF WBC: CPT | Performed by: FAMILY MEDICINE

## 2025-03-29 PROCEDURE — 82330 ASSAY OF CALCIUM: CPT | Performed by: INTERNAL MEDICINE

## 2025-03-29 PROCEDURE — 80053 COMPREHEN METABOLIC PANEL: CPT | Performed by: FAMILY MEDICINE

## 2025-03-29 RX ORDER — COLESTIPOL HYDROCHLORIDE 1 G/1
2 TABLET ORAL DAILY
Status: DISCONTINUED | OUTPATIENT
Start: 2025-03-29 | End: 2025-04-01 | Stop reason: HOSPADM

## 2025-03-29 RX ORDER — BUMETANIDE 1 MG/1
1 TABLET ORAL
Status: DISCONTINUED | OUTPATIENT
Start: 2025-03-29 | End: 2025-03-30

## 2025-03-29 RX ADMIN — FOLIC ACID 1 MG: 1 TABLET ORAL at 13:25

## 2025-03-29 RX ADMIN — CEFAZOLIN 2000 MG: 2 INJECTION, POWDER, FOR SOLUTION INTRAMUSCULAR; INTRAVENOUS at 08:25

## 2025-03-29 RX ADMIN — Medication 250 MG: at 21:00

## 2025-03-29 RX ADMIN — HYDRALAZINE HYDROCHLORIDE 50 MG: 25 TABLET ORAL at 21:00

## 2025-03-29 RX ADMIN — CEFAZOLIN 2000 MG: 2 INJECTION, POWDER, FOR SOLUTION INTRAMUSCULAR; INTRAVENOUS at 00:12

## 2025-03-29 RX ADMIN — KETOCONAZOLE: 20 CREAM TOPICAL at 08:26

## 2025-03-29 RX ADMIN — Medication 10 ML: at 21:00

## 2025-03-29 RX ADMIN — SODIUM CHLORIDE 125 MG: 9 INJECTION, SOLUTION INTRAVENOUS at 09:28

## 2025-03-29 RX ADMIN — HYDROCORTISONE: 25 CREAM TOPICAL at 08:26

## 2025-03-29 RX ADMIN — ALLOPURINOL 200 MG: 100 TABLET ORAL at 08:25

## 2025-03-29 RX ADMIN — COLCHICINE 0.6 MG: 0.6 TABLET ORAL at 08:25

## 2025-03-29 RX ADMIN — PANTOPRAZOLE SODIUM 40 MG: 40 TABLET, DELAYED RELEASE ORAL at 08:25

## 2025-03-29 RX ADMIN — FLUOXETINE HYDROCHLORIDE 10 MG: 10 CAPSULE ORAL at 08:25

## 2025-03-29 RX ADMIN — BUMETANIDE 1 MG: 1 TABLET ORAL at 09:28

## 2025-03-29 RX ADMIN — CEFAZOLIN 2000 MG: 2 INJECTION, POWDER, FOR SOLUTION INTRAMUSCULAR; INTRAVENOUS at 18:35

## 2025-03-29 RX ADMIN — Medication 100 MG: at 08:25

## 2025-03-29 RX ADMIN — ENOXAPARIN SODIUM 40 MG: 100 INJECTION SUBCUTANEOUS at 15:18

## 2025-03-29 RX ADMIN — BUMETANIDE 1 MG: 1 TABLET ORAL at 18:34

## 2025-03-29 RX ADMIN — Medication 250 MG: at 08:25

## 2025-03-29 RX ADMIN — Medication 10 ML: at 13:25

## 2025-03-29 RX ADMIN — LEVOTHYROXINE SODIUM 175 MCG: 175 TABLET ORAL at 06:01

## 2025-03-29 RX ADMIN — CHOLESTYRAMINE 4 G: 4 POWDER, FOR SUSPENSION ORAL at 08:25

## 2025-03-29 RX ADMIN — HYDROCODONE BITARTRATE AND ACETAMINOPHEN 1 TABLET: 5; 325 TABLET ORAL at 15:23

## 2025-03-29 NOTE — PROGRESS NOTES
"NEPHROLOGY PROGRESS NOTE------[facility]    [facility]  [phone]  Ines Barahona MD      Patient Care Team:  Montse Dawkins MD as PCP - General (Family Medicine)  Huey Keyes MD as Consulting Physician (Nephrology)      Provider:  Ines Barahona MD  Patient Name: Rommel Mcfarland  :  1945    SUBJECTIVE:    F/U CRF/CKD    Comfortable. Breathing ok. No angina. No dysuria. No gout flares    Medication:  allopurinol, 200 mg, Oral, Daily  bumetanide, 1 mg, Oral, Daily  ceFAZolin, 2,000 mg, Intravenous, Q8H  cholestyramine light, 1 packet, Oral, Daily  colchicine, 0.6 mg, Oral, Daily  enoxaparin sodium, 40 mg, Subcutaneous, Q24H  FLUoxetine, 10 mg, Oral, Daily  folic acid, 1 mg, Oral, Daily  hydrALAZINE, 50 mg, Oral, Daily  hydrocortisone, , Topical, Q12H  ketoconazole, , Topical, Q24H  levothyroxine, 175 mcg, Oral, Q AM  pantoprazole, 40 mg, Oral, Daily  saccharomyces boulardii, 250 mg, Oral, BID  sodium chloride, 10 mL, Intravenous, Q12H  Vitamin B1, 100 mg, Oral, Daily      Pharmacy to Dose enoxaparin (LOVENOX),         OBJECTIVE    Vital Sign Min/Max for last 24 hours  Temp  Min: 97.4 °F (36.3 °C)  Max: 97.9 °F (36.6 °C)   BP  Min: 133/68  Max: 183/79   Pulse  Min: 62  Max: 76   Resp  Min: 16  Max: 20   SpO2  Min: 95 %  Max: 97 %   No data recorded   Weight  Min: 118 kg (260 lb 5.8 oz)  Max: 118 kg (260 lb 5.8 oz)     Flowsheet Rows      Flowsheet Row First Filed Value   Admission Height 170.2 cm (67\") Documented at 2025 1443   Admission Weight 104 kg (230 lb) Documented at 2025 1443            No intake/output data recorded.  I/O last 3 completed shifts:  In: 540 [P.O.:540]  Out: 425 [Urine:425]    Physical Exam:  General Appearance: alert, appears stated age and cooperative  Head: normocephalic, without obvious abnormality and atraumatic   Eyes: conjunctivae and sclerae normal and no icterus  Neck: supple and " "no JVD  Lungs: +FEW SCATTERED RHONCHI  Heart: regular rhythm & normal rate and normal S1, S2 +BALDOMERO  Chest Wall: no abnormalities observed  Abdomen: normal bowel sounds and soft, nontender  Extremities: moves extremities well, 1+ BILAT LE EDEMA (R>L), no cyanosis  Skin: +CHRONIC VENOUS INSUFFICIENCY BILAT LE WITH BILAT LE WOUNDS (FOOT AND CALF/PRETIBLAL AREAS) +SCATTERED ECCHYMOSIS  Neurologic: Alert, and oriented. No focal deficits    Labs:    WBC WBC   Date Value Ref Range Status   03/29/2025 5.22 3.40 - 10.80 10*3/mm3 Final   03/27/2025 5.39 3.40 - 10.80 10*3/mm3 Final   03/27/2025 6.81 3.40 - 10.80 10*3/mm3 Final      HGB Hemoglobin   Date Value Ref Range Status   03/29/2025 11.3 (L) 13.0 - 17.7 g/dL Final   03/27/2025 11.8 (L) 13.0 - 17.7 g/dL Final   03/27/2025 12.7 (L) 13.0 - 17.7 g/dL Final      HCT Hematocrit   Date Value Ref Range Status   03/29/2025 36.2 (L) 37.5 - 51.0 % Final   03/27/2025 37.9 37.5 - 51.0 % Final   03/27/2025 41.2 37.5 - 51.0 % Final      Platelets No results found for: \"LABPLAT\"   MCV MCV   Date Value Ref Range Status   03/29/2025 86.0 79.0 - 97.0 fL Final   03/27/2025 85.9 79.0 - 97.0 fL Final   03/27/2025 86.7 79.0 - 97.0 fL Final          Sodium Sodium   Date Value Ref Range Status   03/29/2025 135 (L) 136 - 145 mmol/L Final   03/27/2025 136 136 - 145 mmol/L Final   03/27/2025 134 (L) 136 - 145 mmol/L Final      Potassium Potassium   Date Value Ref Range Status   03/29/2025 4.5 3.5 - 5.2 mmol/L Final   03/27/2025 4.2 3.5 - 5.2 mmol/L Final   03/27/2025 4.8 3.5 - 5.2 mmol/L Final      Chloride Chloride   Date Value Ref Range Status   03/29/2025 103 98 - 107 mmol/L Final   03/27/2025 102 98 - 107 mmol/L Final   03/27/2025 99 98 - 107 mmol/L Final      CO2 CO2   Date Value Ref Range Status   03/29/2025 22.6 22.0 - 29.0 mmol/L Final   03/27/2025 23.3 22.0 - 29.0 mmol/L Final   03/27/2025 24.1 22.0 - 29.0 mmol/L Final      BUN BUN   Date Value Ref Range Status   03/29/2025 16 8 - 23 mg/dL " "Final   03/27/2025 17 8 - 23 mg/dL Final   03/27/2025 17 8 - 23 mg/dL Final      Creatinine Creatinine   Date Value Ref Range Status   03/29/2025 1.48 (H) 0.76 - 1.27 mg/dL Final   03/27/2025 1.50 (H) 0.76 - 1.27 mg/dL Final   03/27/2025 1.50 (H) 0.76 - 1.27 mg/dL Final      Calcium Calcium   Date Value Ref Range Status   03/29/2025 8.8 8.6 - 10.5 mg/dL Final   03/27/2025 8.8 8.6 - 10.5 mg/dL Final   03/27/2025 9.1 8.6 - 10.5 mg/dL Final      PO4 No components found for: \"PO4\"   Albumin Albumin   Date Value Ref Range Status   03/29/2025 3.2 (L) 3.5 - 5.2 g/dL Final   03/27/2025 3.2 (L) 3.5 - 5.2 g/dL Final   03/27/2025 3.7 3.5 - 5.2 g/dL Final      Magnesium Magnesium   Date Value Ref Range Status   03/29/2025 2.1 1.6 - 2.4 mg/dL Final   03/27/2025 1.8 1.6 - 2.4 mg/dL Final   03/27/2025 1.9 1.6 - 2.4 mg/dL Final      Uric Acid No components found for: \"URIC ACID\"     Imaging Results (Last 72 Hours)       ** No results found for the last 72 hours. **            Results for orders placed during the hospital encounter of 09/15/23    XR Foot 3+ View Right    Narrative  XR FOOT 3+ VW RIGHT    Date of Exam: 9/15/2023 2:30 PM EDT    Indication: wound, infection    Comparison: 7/9/2022    Findings:  Bones are diffusely osteopenic. There is no discrete area of osseous erosion to indicate a site of osteomyelitis. Postsurgical changes of screw fixation noted at the distal tibia and fibula similar to the prior exam. Extensive small vessel vascular  calcifications. Soft tissue swelling overlying the dorsum of the foot which may relate to edema and/or cellulitis.    Impression  Impression:  1. No discrete area of osseous erosion to indicate osteomyelitis.  2. Soft tissue swelling overlying the dorsum of the foot may relate to edema and/or cellulitis.  3. Diffuse osteopenia.  4. Extensive small vessel vascular calcifications.        Electronically Signed: Renny Johnson MD  9/15/2023 2:35 PM EDT  Workstation ID: " [id number]      Results for orders placed during the hospital encounter of 07/09/22    XR Foot 2 View Right    Narrative  DATE OF EXAM:  7/9/2022 1:58 PM    PROCEDURE:  XR FOOT 2 VW RIGHT-    INDICATIONS:  Wound between first and second toe maggots present.  Cellulitis;  L03.115-Cellulitis of right lower limb; B87.9-Myiasis, unspecified    COMPARISON:  No Comparisons Available    TECHNIQUE:  A minimum of two routine standard radiographic views were obtained of  the right foot.    FINDINGS:  There is diffuse soft tissue swelling of the right foot bones are  osteopenic. There is no acute appearing fracture. Postoperative findings  noted in the ankle at the medial malleolus and the distal fibula with  fixation screws. The calcaneus is intact. Small vessel vascular  calcifications noted.    Impression  1. Extensive soft tissue swelling of the right foot may relate to edema  and/or cellulitis.  2. No discrete area of osseous erosion.  3. No acute appearing fracture.  4. Additional chronic findings above.    Electronically Signed By-Renny Johnson MD On:7/9/2022 2:09 PM  This report was finalized on [id number] by  Renny Johnson MD.      Results for orders placed during the hospital encounter of 03/27/25    Duplex Venous Lower Extremity - BILATERAL    Interpretation Summary    Normal bilateral lower extremity venous duplex scan.    Small saphenous veins not visualized bilaterally        ASSESSMENT / PLAN      Cellulitis    CRF/CKD-----Nonoliguric. CRF/CKD STG 3A secondary to HTN NS. Continue gentle diuresis. Current Cr at baseline. Check few urine and serum studies and PVR. No NSAIDs or IV dye. Dose meds for CrCl 45-60 cc/min     2. GOUT/HYPERURICEMIA-----Increased Allopurinol despite renal dysfunction and follow with mild diuretic exposure     3. BILATERAL LE EDEMA/CHRONIC VENOUS INSUFFICIENCY/WOUNDS/CELLULITIS------Wound care. Increase Bumex to BID     4. DEPRESSION-----Ok to continue SSRI for now     5. ETOH  ABUSE------Counseled     6. OA/DJD ------On Allopurinol. No NSAIDs.      7. HTN WITH CKD-----BP ok. Avoid hypotension. No ACE-I/ARB/DRI     8. GERD/PUD PROPHYLAXIS------On PPI. Benefits outweigh risks despite CKD     9. HYPOTHYROIDISM------On Synthroid. TSH ok     10. DVT PROPHYLAXIS------Lovenox     11. DEPRESSION------On Prozac     12. HYPOMAGNESEMIA------Replaced     13. CHRONIC IBS------On Florastor        Ines Barahona MD  [facility]  [phone]  03/29/25  08:10 EDT

## 2025-03-29 NOTE — PROGRESS NOTES
"DATE/TIME OF ADMISSION:  3/27/2025  2:49 PM     LOS: 2 days   Patient Care Team:  Montse Dawkins MD as PCP - General (Family Medicine)  Huey Keyes MD as Consulting Physician (Nephrology)  Consults       Date and Time Order Name Status Description    3/27/2025  6:43 PM Inpatient Nephrology Consult Completed             Subjective STILL WITH LEFT HEEL PAIN; BUT SOME BETTER  HX OF FRACTURE TO THE RIGHT LEG AND MAY HAVE PINS IN PLACE  WILL DO X RAY AND THEN EITHER MRI IF NO PINS PRESENT, OR CT IF PINS ARE THERE STILL---PINS PRESENT SO CT ORDERED    Interval History: LOOSE STOOLS---BUT NOT WATERY, X 5 YESTERDAY    Patient Complaints: LEFT HEEP PAIN    History taken from: patient    Review of Systems        Objective     Vital Signs  /90 (BP Location: Left arm, Patient Position: Lying)   Pulse 74   Temp 98 °F (36.7 °C) (Oral)   Resp 16   Ht 170.2 cm (67\")   Wt 118 kg (260 lb 5.8 oz)   SpO2 95%   BMI 40.78 kg/m²     Physical Exam:       General Appearance:    Alert, cooperative, in no acute distress   Head:    Normocephalic, without obvious abnormality, atraumatic   Eyes:            Lids and lashes normal, conjunctivae and sclerae normal, no   icterus, no pallor, corneas clear, PERRLA   Ears:    Ears appear intact with no abnormalities noted   Throat:   No oral lesions, no thrush, oral mucosa moist   Neck:   No adenopathy, supple, trachea midline, no thyromegaly, no   carotid bruit, no JVD   Lungs:     Clear to auscultation,respirations regular, even and                  unlabored    Heart:    Regular rhythm and normal rate, normal S1 and S2, no            murmur, no gallop, no rub, no click   Chest Wall:    No abnormalities observed   Abdomen:     Normal bowel sounds, no masses, no organomegaly, soft        non-tender, non-distended, no guarding, no rebound                tenderness   Extremities:   Moves all extremities well, 1+ edema, no cyanosis, MINIMAL LEG             redness ON THE LEFT; RIGHT " LEG NICELY WRAPPED WITH UNABOOT AND ACE   Pulses:   Pulses palpable and equal bilaterally   Skin:   No bleeding, bruising or rash; CHRONIC CHANGES ON THE LEGS WITH VENOUS STASIS   Lymph nodes:   No palpable adenopathy   Neurologic:   Grossly normal, alert and O x 3        I HAVE PERSONALLY EXAMINED AND REVIEWED THE PATIENT'S RECORD     Lab Results (last 48 hours)       Procedure Component Value Units Date/Time    POC Glucose Finger 4x Daily Before Meals & at Bedtime [[id number]]  (Normal) Collected: 03/29/25 0822    Specimen: Blood from Finger Updated: 03/29/25 0824     Glucose 101 mg/dL      Comment: Serial Number: [id number]:  [id number]       Comprehensive Metabolic Panel [[id number]]  (Abnormal) Collected: 03/29/25 0012    Specimen: Blood from Arm, Right Updated: 03/29/25 0224     Glucose 107 mg/dL      BUN 16 mg/dL      Creatinine 1.48 mg/dL      Sodium 135 mmol/L      Potassium 4.5 mmol/L      Chloride 103 mmol/L      CO2 22.6 mmol/L      Calcium 8.8 mg/dL      Total Protein 5.8 g/dL      Albumin 3.2 g/dL      ALT (SGPT) <5 U/L      AST (SGOT) 14 U/L      Alkaline Phosphatase 51 U/L      Total Bilirubin 0.2 mg/dL      Globulin 2.6 gm/dL      A/G Ratio 1.2 g/dL      BUN/Creatinine Ratio 10.8     Anion Gap 9.4 mmol/L      eGFR 47.2 mL/min/1.73     Narrative:      GFR Categories in Chronic Kidney Disease (CKD)      GFR Category          GFR (mL/min/1.73)    Interpretation  G1                     90 or greater         Normal or high (1)  G2                      60-89                Mild decrease (1)  G3a                   45-59                Mild to moderate decrease  G3b                   30-44                Moderate to severe decrease  G4                    15-29                Severe decrease  G5                    14 or less           Kidney failure          (1)In the absence of evidence of kidney disease, neither GFR category G1 or G2 fulfill the criteria for CKD.    eGFR calculation 2021 CKD-EPI  creatinine equation, which does not include race as a factor    Magnesium [[id number]]  (Normal) Collected: 03/29/25 0012    Specimen: Blood from Arm, Right Updated: 03/29/25 0224     Magnesium 2.1 mg/dL     Phosphorus [[id number]]  (Normal) Collected: 03/29/25 0012    Specimen: Blood from Arm, Right Updated: 03/29/25 0211     Phosphorus 2.5 mg/dL     Calcium, Ionized [[id number]]  (Normal) Collected: 03/29/25 0012    Specimen: Blood from Arm, Right Updated: 03/29/25 0150     Ionized Calcium 1.17 mmol/L     CBC Auto Differential [[id number]]  (Abnormal) Collected: 03/29/25 0012    Specimen: Blood from Arm, Right Updated: 03/29/25 0144     WBC 5.22 10*3/mm3      RBC 4.21 10*6/mm3      Hemoglobin 11.3 g/dL      Hematocrit 36.2 %      MCV 86.0 fL      MCH 26.8 pg      MCHC 31.2 g/dL      RDW 16.0 %      RDW-SD 49.9 fl      MPV 10.2 fL      Platelets 253 10*3/mm3      Neutrophil % 47.4 %      Lymphocyte % 31.8 %      Monocyte % 12.5 %      Eosinophil % 6.9 %      Basophil % 0.8 %      Immature Grans % 0.6 %      Neutrophils, Absolute 2.48 10*3/mm3      Lymphocytes, Absolute 1.66 10*3/mm3      Monocytes, Absolute 0.65 10*3/mm3      Eosinophils, Absolute 0.36 10*3/mm3      Basophils, Absolute 0.04 10*3/mm3      Immature Grans, Absolute 0.03 10*3/mm3      nRBC 0.0 /100 WBC     POC Glucose Once [[id number]]  (Abnormal) Collected: 03/28/25 2026    Specimen: Blood Updated: 03/28/25 2027     Glucose 122 mg/dL      Comment: Serial Number: [id number]:  [id number]       POC Glucose Once [[id number]]  (Abnormal) Collected: 03/28/25 1705    Specimen: Blood Updated: 03/28/25 1709     Glucose 107 mg/dL      Comment: Serial Number: [id number]:  [id number]       Blood Culture - Blood, Arm, Left [[id number]]  (Normal) Collected: 03/27/25 1633    Specimen: Blood from Arm, Left Updated: 03/28/25 1645     Blood Culture No growth at 24 hours    Blood Culture - Blood, Arm, Right [[id number]]  (Normal) Collected: 03/27/25 1613     Specimen: Blood from Arm, Right Updated: 03/28/25 1631     Blood Culture No growth at 24 hours    POC Glucose Once [[id number]]  (Abnormal) Collected: 03/28/25 1054    Specimen: Blood Updated: 03/28/25 1056     Glucose 112 mg/dL      Comment: Serial Number: [id number]:  [id number]       Ferritin [[id number]]  (Normal) Collected: 03/27/25 2333    Specimen: Blood Updated: 03/28/25 0913     Ferritin 146.00 ng/mL     Narrative:      Results may be falsely decreased if patient taking Biotin.      CK [[id number]]  (Normal) Collected: 03/27/25 2333    Specimen: Blood Updated: 03/28/25 0913     Creatine Kinase 93 U/L     Uric Acid [[id number]]  (Abnormal) Collected: 03/27/25 2333    Specimen: Blood Updated: 03/28/25 0913     Uric Acid 7.7 mg/dL     Iron Profile [[id number]]  (Abnormal) Collected: 03/27/25 2333    Specimen: Blood Updated: 03/28/25 0913     Iron 22 mcg/dL      Iron Saturation (TSAT) 7 %      Transferrin 206 mg/dL      TIBC 307 mcg/dL     POC Glucose Finger 4x Daily Before Meals & at Bedtime [[id number]]  (Normal) Collected: 03/28/25 0736    Specimen: Blood from Finger Updated: 03/28/25 0737     Glucose 96 mg/dL      Comment: Serial Number: [id number]:  [id number]       Comprehensive Metabolic Panel [[id number]]  (Abnormal) Collected: 03/27/25 2333    Specimen: Blood Updated: 03/28/25 0020     Glucose 125 mg/dL      BUN 17 mg/dL      Creatinine 1.50 mg/dL      Sodium 136 mmol/L      Potassium 4.2 mmol/L      Chloride 102 mmol/L      CO2 23.3 mmol/L      Calcium 8.8 mg/dL      Total Protein 6.0 g/dL      Albumin 3.2 g/dL      ALT (SGPT) 6 U/L      AST (SGOT) 12 U/L      Alkaline Phosphatase 44 U/L      Total Bilirubin 0.5 mg/dL      Globulin 2.8 gm/dL      A/G Ratio 1.1 g/dL      BUN/Creatinine Ratio 11.3     Anion Gap 10.7 mmol/L      eGFR 46.5 mL/min/1.73     Narrative:      GFR Categories in Chronic Kidney Disease (CKD)      GFR Category          GFR (mL/min/1.73)    Interpretation  G1                      90 or greater         Normal or high (1)  G2                      60-89                Mild decrease (1)  G3a                   45-59                Mild to moderate decrease  G3b                   30-44                Moderate to severe decrease  G4                    15-29                Severe decrease  G5                    14 or less           Kidney failure          (1)In the absence of evidence of kidney disease, neither GFR category G1 or G2 fulfill the criteria for CKD.    eGFR calculation 2021 CKD-EPI creatinine equation, which does not include race as a factor    Magnesium [[id number]]  (Normal) Collected: 03/27/25 2333    Specimen: Blood Updated: 03/28/25 0020     Magnesium 1.8 mg/dL     Hemoglobin A1c [[id number]]  (Normal) Collected: 03/27/25 2333    Specimen: Blood Updated: 03/28/25 0020     Hemoglobin A1C 5.22 %     CBC Auto Differential [[id number]]  (Abnormal) Collected: 03/27/25 2333    Specimen: Blood Updated: 03/27/25 2354     WBC 5.39 10*3/mm3      RBC 4.41 10*6/mm3      Hemoglobin 11.8 g/dL      Hematocrit 37.9 %      MCV 85.9 fL      MCH 26.8 pg      MCHC 31.1 g/dL      RDW 15.8 %      RDW-SD 49.5 fl      MPV 10.4 fL      Platelets 259 10*3/mm3      Neutrophil % 49.5 %      Lymphocyte % 32.5 %      Monocyte % 12.4 %      Eosinophil % 4.3 %      Basophil % 0.6 %      Immature Grans % 0.7 %      Neutrophils, Absolute 2.67 10*3/mm3      Lymphocytes, Absolute 1.75 10*3/mm3      Monocytes, Absolute 0.67 10*3/mm3      Eosinophils, Absolute 0.23 10*3/mm3      Basophils, Absolute 0.03 10*3/mm3      Immature Grans, Absolute 0.04 10*3/mm3      nRBC 0.0 /100 WBC     Procalcitonin [[id number]]  (Normal) Collected: 03/27/25 1616    Specimen: Blood Updated: 03/27/25 1658     Procalcitonin 0.06 ng/mL     Narrative:      As a Marker for Sepsis (Non-Neonates):    1. <0.5 ng/mL represents a low risk of severe sepsis and/or septic shock.  2. >2 ng/mL represents a high risk of severe sepsis and/or septic  "shock.    As a Marker for Lower Respiratory Tract Infections that require antibiotic therapy:    PCT on Admission    Antibiotic Therapy       6-12 Hrs later    >0.5                Strongly Recommended  >0.25 - <0.5        Recommended   0.1 - 0.25          Discouraged              Remeasure/reassess PCT  <0.1                Strongly Discouraged     Remeasure/reassess PCT    As 28 day mortality risk marker: \"Change in Procalcitonin Result\" (>80% or <=80%) if Day 0 (or Day 1) and Day 4 values are available. Refer to [email or web address]    Change in PCT <=80%  A decrease of PCT levels below or equal to 80% defines a positive change in PCT test result representing a higher risk for 28-day all-cause mortality of patients diagnosed with severe sepsis for septic shock.    Change in PCT >80%  A decrease of PCT levels of more than 80% defines a negative change in PCT result representing a lower risk for 28-day all-cause mortality of patients diagnosed with severe sepsis or septic shock.       Magnesium [[id number]]  (Normal) Collected: 03/27/25 1616    Specimen: Blood Updated: 03/27/25 1658     Magnesium 1.9 mg/dL     Comprehensive Metabolic Panel [[id number]]  (Abnormal) Collected: 03/27/25 1616    Specimen: Blood Updated: 03/27/25 1658     Glucose 128 mg/dL      BUN 17 mg/dL      Creatinine 1.50 mg/dL      Sodium 134 mmol/L      Potassium 4.8 mmol/L      Chloride 99 mmol/L      CO2 24.1 mmol/L      Calcium 9.1 mg/dL      Total Protein 6.5 g/dL      Albumin 3.7 g/dL      ALT (SGPT) 7 U/L      AST (SGOT) 13 U/L      Alkaline Phosphatase 57 U/L      Total Bilirubin 0.6 mg/dL      Globulin 2.8 gm/dL      A/G Ratio 1.3 g/dL      BUN/Creatinine Ratio 11.3     Anion Gap 10.9 mmol/L      eGFR 46.5 mL/min/1.73     Narrative:      GFR Categories in Chronic Kidney Disease (CKD)      GFR Category          GFR (mL/min/1.73)    Interpretation  G1                     90 or greater         Normal or high (1)  G2              "         60-89                Mild decrease (1)  G3a                   45-59                Mild to moderate decrease  G3b                   30-44                Moderate to severe decrease  G4                    15-29                Severe decrease  G5                    14 or less           Kidney failure          (1)In the absence of evidence of kidney disease, neither GFR category G1 or G2 fulfill the criteria for CKD.    eGFR calculation 2021 CKD-EPI creatinine equation, which does not include race as a factor    C-reactive Protein [[id number]]  (Abnormal) Collected: 03/27/25 1616    Specimen: Blood Updated: 03/27/25 1658     C-Reactive Protein 18.70 mg/dL     Uric Acid [[id number]]  (Abnormal) Collected: 03/27/25 1616    Specimen: Blood Updated: 03/27/25 1658     Uric Acid 7.5 mg/dL     Protime-INR [[id number]]  (Abnormal) Collected: 03/27/25 1616    Specimen: Blood Updated: 03/27/25 1645     Protime 14.8 Seconds      INR 1.17    aPTT [[id number]]  (Normal) Collected: 03/27/25 1616    Specimen: Blood Updated: 03/27/25 1645     PTT 26.5 seconds     Sedimentation Rate [[id number]]  (Abnormal) Collected: 03/27/25 1616    Specimen: Blood Updated: 03/27/25 1637     Sed Rate 21 mm/hr     Extra Tubes [[id number]] Collected: 03/27/25 1616    Specimen: Blood, Venous Line Updated: 03/27/25 1632    Narrative:      The following orders were created for panel order Extra Tubes.  Procedure                               Abnormality         Status                     ---------                               -----------         ------                     Gold Top - SST[[id number]]                                   Final result                 Please view results for these tests on the individual orders.    Gold Top - SST [[id number]] Collected: 03/27/25 1616    Specimen: Blood Updated: 03/27/25 1632     Extra Tube Hold for add-ons.     Comment: Auto resulted.       CBC & Differential [[id number]]  (Abnormal) Collected: 03/27/25 1616     Specimen: Blood Updated: 03/27/25 1628    Narrative:      The following orders were created for panel order CBC & Differential.  Procedure                               Abnormality         Status                     ---------                               -----------         ------                     CBC Auto Differential[[id number]]        Abnormal            Final result                 Please view results for these tests on the individual orders.    CBC Auto Differential [[id number]]  (Abnormal) Collected: 03/27/25 1616    Specimen: Blood Updated: 03/27/25 1628     WBC 6.81 10*3/mm3      RBC 4.75 10*6/mm3      Hemoglobin 12.7 g/dL      Hematocrit 41.2 %      MCV 86.7 fL      MCH 26.7 pg      MCHC 30.8 g/dL      RDW 15.9 %      RDW-SD 50.7 fl      MPV 10.3 fL      Platelets 281 10*3/mm3      Neutrophil % 63.0 %      Lymphocyte % 20.6 %      Monocyte % 13.2 %      Eosinophil % 2.2 %      Basophil % 0.4 %      Immature Grans % 0.6 %      Neutrophils, Absolute 4.29 10*3/mm3      Lymphocytes, Absolute 1.40 10*3/mm3      Monocytes, Absolute 0.90 10*3/mm3      Eosinophils, Absolute 0.15 10*3/mm3      Basophils, Absolute 0.03 10*3/mm3      Immature Grans, Absolute 0.04 10*3/mm3      nRBC 0.0 /100 WBC     POC Lactate [[id number]]  (Normal) Collected: 03/27/25 1620    Specimen: Blood Updated: 03/27/25 1621     Lactate 0.7 mmol/L      Comment: Serial Number: [id number]:  [id number]                Imaging Results (Last 48 Hours)       ** No results found for the last 48 hours. **                 I reviewed the patient's new clinical results.    Past Medical History:   Diagnosis Date    Alcohol abuse 07/13/2022    Essential hypertension 07/13/2022    Gait instability 07/13/2022    GERD without esophagitis 07/13/2022    Gout 07/13/2022    Hypothyroidism (acquired) 07/13/2022    Onychomycosis of multiple toenails with type 2 diabetes mellitus 07/13/2022    Sleep apnea     Stage 3a chronic kidney disease 07/13/2022    Tinea  pedis of both feet 07/13/2022    Type 2 diabetes mellitus with diabetic neuropathy, without long-term current use of insulin 07/13/2022     Past Surgical History:   Procedure Laterality Date    ORTHOPEDIC SURGERY      fractured ankle and dislocated shoulder         Medication Review:   Scheduled Meds:allopurinol, 200 mg, Oral, Daily  bumetanide, 1 mg, Oral, BID Diuretics  ceFAZolin, 2,000 mg, Intravenous, Q8H  cholestyramine light, 1 packet, Oral, Daily  colchicine, 0.6 mg, Oral, Daily  enoxaparin sodium, 40 mg, Subcutaneous, Q24H  ferric gluconate, 125 mg, Intravenous, Once  FLUoxetine, 10 mg, Oral, Daily  folic acid, 1 mg, Oral, Daily  hydrALAZINE, 50 mg, Oral, Daily  hydrocortisone, , Topical, Q12H  ketoconazole, , Topical, Q24H  levothyroxine, 175 mcg, Oral, Q AM  pantoprazole, 40 mg, Oral, Daily  saccharomyces boulardii, 250 mg, Oral, BID  sodium chloride, 10 mL, Intravenous, Q12H  Vitamin B1, 100 mg, Oral, Daily      Continuous Infusions:Pharmacy to Dose enoxaparin (LOVENOX),       PRN Meds:.  acetaminophen **OR** acetaminophen **OR** acetaminophen    aluminum-magnesium hydroxide-simethicone    senna-docusate sodium **AND** polyethylene glycol **AND** bisacodyl **AND** bisacodyl    calcium carbonate    HYDROcodone-acetaminophen    ondansetron ODT **OR** ondansetron    Pharmacy to Dose enoxaparin (LOVENOX)    [COMPLETED] Insert Peripheral IV **AND** sodium chloride    sodium chloride    sodium chloride     Assessment & Plan   CELLULITIS LOWER EXTREMITIES/CHRONIC VENOUS INSUFFICIENCY/BILATERAL PEDAL EDEMA---IV KEFZOL; CAMILA BOOT TO THE RIGHT LEG TODAY; WILL CHECK TO SEE IF PINS IN PLACE ON THE RIGHT LEG/ANKLE OR NOT AND WILL PROCEED WITH EITHER CT TO INVESTIGATE THE LEFT HEEL PAIN TO EXCLUDE OSTEOMYELITIS  CKD----DR SANCHES HELPING MANAGE  DM II---GOOD CONTROL  HYPONATREMIA---DUE TO ETOH (BEER) INTAKE; STABLE  DEPRESSION---STABLE ON MEDS  CHRONIC ETOH ABUSE---LIMITED TO 4 BEERS PER DAY AND HE DOES NOT NORMALLY  EXHIBIT DT'S WHEN OFF THE BEER IN THE HOSPITAL  DJD/GOUT---ON ALLOPURINOL AND COLCHICINE NOW  HTN---STABLE  HYPOTHYROIDISM---EUTHYROID  FULL CODE STATUS  Principal Problem:    Cellulitis          Plan for disposition:HOME WITH LIMITED AND REALISTIC PT; HOME HEALTH    Montse Dawkins MD  03/29/25  09:32 EDT

## 2025-03-29 NOTE — PLAN OF CARE
Goal Outcome Evaluation:  Plan of Care Reviewed With: patient   Continues IV abt/Cellulitis BLE. No adverse side effects noted. BLE edematous, red and warm to touch. Request and given Norco c/o BLE pain with effectiveness. Legs elevated as tolerated. Family member at bedside this evening. Bed alarm on.

## 2025-03-30 LAB
ALBUMIN SERPL-MCNC: 3.4 G/DL (ref 3.5–5.2)
ALBUMIN/GLOB SERPL: 1.4 G/DL
ALP SERPL-CCNC: 52 U/L (ref 39–117)
ALT SERPL W P-5'-P-CCNC: 7 U/L (ref 1–41)
ANION GAP SERPL CALCULATED.3IONS-SCNC: 10 MMOL/L (ref 5–15)
AST SERPL-CCNC: 23 U/L (ref 1–40)
BASOPHILS # BLD AUTO: 0.08 10*3/MM3 (ref 0–0.2)
BASOPHILS NFR BLD AUTO: 1.4 % (ref 0–1.5)
BILIRUB SERPL-MCNC: 0.2 MG/DL (ref 0–1.2)
BUN SERPL-MCNC: 19 MG/DL (ref 8–23)
BUN/CREAT SERPL: 10.2 (ref 7–25)
CALCIUM SPEC-SCNC: 9 MG/DL (ref 8.6–10.5)
CHLORIDE SERPL-SCNC: 101 MMOL/L (ref 98–107)
CO2 SERPL-SCNC: 25 MMOL/L (ref 22–29)
CREAT SERPL-MCNC: 1.87 MG/DL (ref 0.76–1.27)
DEPRECATED RDW RBC AUTO: 49.9 FL (ref 37–54)
EGFRCR SERPLBLD CKD-EPI 2021: 35.7 ML/MIN/1.73
EOSINOPHIL # BLD AUTO: 0.53 10*3/MM3 (ref 0–0.4)
EOSINOPHIL NFR BLD AUTO: 9 % (ref 0.3–6.2)
ERYTHROCYTE [DISTWIDTH] IN BLOOD BY AUTOMATED COUNT: 15.9 % (ref 12.3–15.4)
GLOBULIN UR ELPH-MCNC: 2.5 GM/DL
GLUCOSE BLDC GLUCOMTR-MCNC: 104 MG/DL (ref 70–105)
GLUCOSE BLDC GLUCOMTR-MCNC: 109 MG/DL (ref 70–105)
GLUCOSE BLDC GLUCOMTR-MCNC: 120 MG/DL (ref 70–105)
GLUCOSE BLDC GLUCOMTR-MCNC: 128 MG/DL (ref 70–105)
GLUCOSE SERPL-MCNC: 102 MG/DL (ref 65–99)
HCT VFR BLD AUTO: 36.8 % (ref 37.5–51)
HGB BLD-MCNC: 11.2 G/DL (ref 13–17.7)
IMM GRANULOCYTES # BLD AUTO: 0.03 10*3/MM3 (ref 0–0.05)
IMM GRANULOCYTES NFR BLD AUTO: 0.5 % (ref 0–0.5)
LYMPHOCYTES # BLD AUTO: 1.83 10*3/MM3 (ref 0.7–3.1)
LYMPHOCYTES NFR BLD AUTO: 30.9 % (ref 19.6–45.3)
MAGNESIUM SERPL-MCNC: 1.9 MG/DL (ref 1.6–2.4)
MCH RBC QN AUTO: 26.2 PG (ref 26.6–33)
MCHC RBC AUTO-ENTMCNC: 30.4 G/DL (ref 31.5–35.7)
MCV RBC AUTO: 86 FL (ref 79–97)
MONOCYTES # BLD AUTO: 0.76 10*3/MM3 (ref 0.1–0.9)
MONOCYTES NFR BLD AUTO: 12.8 % (ref 5–12)
NEUTROPHILS NFR BLD AUTO: 2.69 10*3/MM3 (ref 1.7–7)
NEUTROPHILS NFR BLD AUTO: 45.4 % (ref 42.7–76)
NRBC BLD AUTO-RTO: 0 /100 WBC (ref 0–0.2)
PLATELET # BLD AUTO: 288 10*3/MM3 (ref 140–450)
PMV BLD AUTO: 10.2 FL (ref 6–12)
POTASSIUM SERPL-SCNC: 4.6 MMOL/L (ref 3.5–5.2)
PROT SERPL-MCNC: 5.9 G/DL (ref 6–8.5)
RBC # BLD AUTO: 4.28 10*6/MM3 (ref 4.14–5.8)
SODIUM SERPL-SCNC: 136 MMOL/L (ref 136–145)
WBC NRBC COR # BLD AUTO: 5.92 10*3/MM3 (ref 3.4–10.8)

## 2025-03-30 PROCEDURE — 97112 NEUROMUSCULAR REEDUCATION: CPT

## 2025-03-30 PROCEDURE — 82948 REAGENT STRIP/BLOOD GLUCOSE: CPT

## 2025-03-30 PROCEDURE — 83735 ASSAY OF MAGNESIUM: CPT | Performed by: FAMILY MEDICINE

## 2025-03-30 PROCEDURE — 80053 COMPREHEN METABOLIC PANEL: CPT | Performed by: FAMILY MEDICINE

## 2025-03-30 PROCEDURE — 25010000002 CEFAZOLIN PER 500 MG: Performed by: FAMILY MEDICINE

## 2025-03-30 PROCEDURE — 97535 SELF CARE MNGMENT TRAINING: CPT

## 2025-03-30 PROCEDURE — 85025 COMPLETE CBC W/AUTO DIFF WBC: CPT | Performed by: FAMILY MEDICINE

## 2025-03-30 PROCEDURE — 25010000002 ENOXAPARIN PER 10 MG: Performed by: FAMILY MEDICINE

## 2025-03-30 PROCEDURE — 97116 GAIT TRAINING THERAPY: CPT

## 2025-03-30 PROCEDURE — 82948 REAGENT STRIP/BLOOD GLUCOSE: CPT | Performed by: FAMILY MEDICINE

## 2025-03-30 PROCEDURE — 25010000002 MAGNESIUM SULFATE IN D5W 1G/100ML (PREMIX) 1-5 GM/100ML-% SOLUTION: Performed by: INTERNAL MEDICINE

## 2025-03-30 RX ORDER — MAGNESIUM SULFATE 1 G/100ML
1 INJECTION INTRAVENOUS ONCE
Status: COMPLETED | OUTPATIENT
Start: 2025-03-30 | End: 2025-03-30

## 2025-03-30 RX ORDER — BUMETANIDE 1 MG/1
1 TABLET ORAL DAILY
Status: DISCONTINUED | OUTPATIENT
Start: 2025-03-31 | End: 2025-03-31

## 2025-03-30 RX ADMIN — BUMETANIDE 1 MG: 1 TABLET ORAL at 09:06

## 2025-03-30 RX ADMIN — MAGNESIUM SULFATE HEPTAHYDRATE 1 G: 1 INJECTION, SOLUTION INTRAVENOUS at 17:46

## 2025-03-30 RX ADMIN — Medication 10 ML: at 17:45

## 2025-03-30 RX ADMIN — COLCHICINE 0.6 MG: 0.6 TABLET ORAL at 09:06

## 2025-03-30 RX ADMIN — ENOXAPARIN SODIUM 40 MG: 100 INJECTION SUBCUTANEOUS at 15:37

## 2025-03-30 RX ADMIN — HYDROCORTISONE: 25 CREAM TOPICAL at 23:12

## 2025-03-30 RX ADMIN — CEFAZOLIN 2000 MG: 2 INJECTION, POWDER, FOR SOLUTION INTRAMUSCULAR; INTRAVENOUS at 17:21

## 2025-03-30 RX ADMIN — FLUOXETINE HYDROCHLORIDE 10 MG: 10 CAPSULE ORAL at 09:06

## 2025-03-30 RX ADMIN — ALLOPURINOL 200 MG: 100 TABLET ORAL at 09:06

## 2025-03-30 RX ADMIN — PANTOPRAZOLE SODIUM 40 MG: 40 TABLET, DELAYED RELEASE ORAL at 09:06

## 2025-03-30 RX ADMIN — CEFAZOLIN 2000 MG: 2 INJECTION, POWDER, FOR SOLUTION INTRAMUSCULAR; INTRAVENOUS at 15:37

## 2025-03-30 RX ADMIN — HYDROCORTISONE: 25 CREAM TOPICAL at 15:38

## 2025-03-30 RX ADMIN — Medication 250 MG: at 23:09

## 2025-03-30 RX ADMIN — Medication 10 ML: at 23:12

## 2025-03-30 RX ADMIN — COLESTIPOL HYDROCHLORIDE 2 G: 1 TABLET ORAL at 09:07

## 2025-03-30 RX ADMIN — KETOCONAZOLE: 20 CREAM TOPICAL at 15:37

## 2025-03-30 RX ADMIN — FOLIC ACID 1 MG: 1 TABLET ORAL at 09:06

## 2025-03-30 RX ADMIN — CEFAZOLIN 2000 MG: 2 INJECTION, POWDER, FOR SOLUTION INTRAMUSCULAR; INTRAVENOUS at 00:17

## 2025-03-30 RX ADMIN — HYDROCODONE BITARTRATE AND ACETAMINOPHEN 1 TABLET: 5; 325 TABLET ORAL at 23:09

## 2025-03-30 RX ADMIN — HYDRALAZINE HYDROCHLORIDE 50 MG: 25 TABLET ORAL at 23:09

## 2025-03-30 RX ADMIN — Medication 100 MG: at 09:06

## 2025-03-30 RX ADMIN — LEVOTHYROXINE SODIUM 175 MCG: 175 TABLET ORAL at 05:24

## 2025-03-30 RX ADMIN — HYDROCODONE BITARTRATE AND ACETAMINOPHEN 1 TABLET: 5; 325 TABLET ORAL at 00:17

## 2025-03-30 RX ADMIN — Medication 250 MG: at 09:06

## 2025-03-30 RX ADMIN — CEFAZOLIN 2000 MG: 2 INJECTION, POWDER, FOR SOLUTION INTRAMUSCULAR; INTRAVENOUS at 23:13

## 2025-03-30 NOTE — THERAPY TREATMENT NOTE
"Subjective: Pt agreeable to therapeutic plan of care.    Objective:     Precautions - falls, incontinent at times    Bed mobility - Supervision with use of bedrail  Transfers - CGA  Ambulation - 20x2 feet CGA and with rolling walker    Vitals: WNL    Pain: 0 VAS     Education: Provided education on the importance of mobility in the acute care setting, Verbal/Tactile Cues, ADL training, Transfer Training, and Gait Training    Assessment: Rommel Mcfarland presents with functional mobility impairments which indicate the need for skilled intervention. Tolerating session today without incident. Pt doing better with mobility. Does better when surfaces are higher to CTS and occ req vc's to make sure he backs up all the way before sitting. Plans on home with assist and HH.Will continue to follow and progress as tolerated.     Plan/Recommendations:   If medically appropriate, Low Intensity Therapy recommended post-acute care - This is recommended as therapy feels this patient would require 2-3 visits per week. OP or HH would be the best option depending on patient's home bound status. Consider, if the patient has other  \"skilled\" needs such as wounds, IV antibiotics, etc. Combined with \"low intensity\" could also equate to a SNF. If patient is medically complex, consider LTAC. Pt requires no DME at discharge.     Pt desires Home with family assist and Home Health at discharge. Pt cooperative; agreeable to therapeutic recommendations and plan of care.         Basic Mobility 6-click:  Rollin = Total, A lot = 2, A little = 3; 4 = None  Supine>Sit:   1 = Total, A lot = 2, A little = 3; 4 = None   Sit>Stand with arms:  1 = Total, A lot = 2, A little = 3; 4 = None  Bed>Chair:   1 = Total, A lot = 2, A little = 3; 4 = None  Ambulate in room:  1 = Total, A lot = 2, A little = 3; 4 = None  3-5 Steps with railin = Total, A lot = 2, A little = 3; 4 = None  Score: 18    Post-Tx Position: Supine with HOB Elevated, " Alarms activated, and Call light and personal items within reach  PPE: gloves    Therapy Charges for Today       Code Description Service Date Service Provider Modifiers Qty    32719385101 HC PT NEUROMUSC RE EDUCATION EA 15 MIN 3/30/2025 Cheyanne Estrella, PTA GP 1    34587821186 HC GAIT TRAINING EA 15 MIN 3/30/2025 Cheyanne Estrella, PTA GP 1    65750749503 HC PT SELF CARE/MGMT/TRAIN EA 15 MIN 3/30/2025 Cheyanne Estrella, PTA GP 1           PT Charges       Row Name 03/30/25 1723             Time Calculation    Start Time 1655  -      Stop Time 1723  -      Time Calculation (min) 28 min  -      PT Received On 03/30/25  -      PT - Next Appointment 03/31/25  -         Time Calculation- PT    Total Timed Code Minutes- PT 28 minute(s)  -                User Key  (r) = Recorded By, (t) = Taken By, (c) = Cosigned By      Initials Name Provider Type     Cheyanne Estrella PTA Physical Therapist Assistant

## 2025-03-30 NOTE — PROGRESS NOTES
"DATE/TIME OF ADMISSION:  3/27/2025  2:49 PM     LOS: 3 days   Patient Care Team:  Montse Dawkins MD as PCP - General (Family Medicine)  Huey Keyes MD as Consulting Physician (Nephrology)  Consults       Date and Time Order Name Status Description    3/27/2025  6:43 PM Inpatient Nephrology Consult Completed             Subjective DID OK OVERNIGHT  PAIN MANAGED WITH TYLENOL AND NORCO    Interval History: CONTINUES ON KEFZOL; CT JUST SHOWED CELLULITIS, NO HINT OF OSTEO    Patient Complaints: WANTS TO GET HOME SOON AND NEEDS A SHAVE  AND MISSING HIS 2 BEERS , ESPECIALLY WATCHING BASKETBALL    History taken from: patient    Review of Systems        Objective     Vital Signs  /83 (BP Location: Left arm, Patient Position: Lying)   Pulse 64   Temp 97.4 °F (36.3 °C) (Oral)   Resp 13   Ht 170.2 cm (67\")   Wt 114 kg (251 lb 11.5 oz)   SpO2 91%   BMI 39.43 kg/m²     Physical Exam:       General Appearance:    Alert, cooperative, in no acute distress   Head:    Normocephalic, with SQUAMOUS CELL CANCER ON THE FOREHEAD AND ACTINIC KERATOSIS;  atraumatic   Eyes:            Lids and lashes normal, conjunctivae and sclerae normal, no   icterus, no pallor, corneas clear, PERRLA   Ears:    Ears appear intact with no abnormalities noted   Throat:   No oral lesions, no thrush, oral mucosa moist   Neck:   No adenopathy, supple, trachea midline, no thyromegaly, no   carotid bruit, no JVD   Lungs:     Clear to auscultation,respirations regular, even and                  unlabored    Heart:    Regular rhythm and normal rate, normal S1 and S2, no            murmur, no gallop, no rub, no click   Chest Wall:    No abnormalities observed   Abdomen:     Normal bowel sounds, no masses, no organomegaly, soft        non-tender, non-distended, no guarding, no rebound                tenderness   Extremities:   Moves all extremities well, 1+ edema, no cyanosis, MINIMAL RESIDUAL LEFT FOOT  redness---MUCH IMPROVED   Pulses:   Pulses " palpable and equal bilaterally   Skin:   No bleeding, bruising or rash   Lymph nodes:   No palpable adenopathy   Neurologic:   Grossly normal, alert and O x 3        I HAVE PERSONALLY EXAMINED AND REVIEWED THE PATIENT'S RECORD     Lab Results (last 48 hours)       Procedure Component Value Units Date/Time    POC Glucose Finger 4x Daily Before Meals & at Bedtime [[id number]]  (Normal) Collected: 03/30/25 0754    Specimen: Blood from Finger Updated: 03/30/25 0756     Glucose 104 mg/dL      Comment: Serial Number: [id number]:  [id number]       Comprehensive Metabolic Panel [[id number]]  (Abnormal) Collected: 03/30/25 0132    Specimen: Blood from Arm, Left Updated: 03/30/25 0300     Glucose 102 mg/dL      BUN 19 mg/dL      Creatinine 1.87 mg/dL      Sodium 136 mmol/L      Potassium 4.6 mmol/L      Chloride 101 mmol/L      CO2 25.0 mmol/L      Calcium 9.0 mg/dL      Total Protein 5.9 g/dL      Albumin 3.4 g/dL      ALT (SGPT) 7 U/L      AST (SGOT) 23 U/L      Alkaline Phosphatase 52 U/L      Total Bilirubin 0.2 mg/dL      Globulin 2.5 gm/dL      A/G Ratio 1.4 g/dL      BUN/Creatinine Ratio 10.2     Anion Gap 10.0 mmol/L      eGFR 35.7 mL/min/1.73     Narrative:      GFR Categories in Chronic Kidney Disease (CKD)      GFR Category          GFR (mL/min/1.73)    Interpretation  G1                     90 or greater         Normal or high (1)  G2                      60-89                Mild decrease (1)  G3a                   45-59                Mild to moderate decrease  G3b                   30-44                Moderate to severe decrease  G4                    15-29                Severe decrease  G5                    14 or less           Kidney failure          (1)In the absence of evidence of kidney disease, neither GFR category G1 or G2 fulfill the criteria for CKD.    eGFR calculation 2021 CKD-EPI creatinine equation, which does not include race as a factor    Magnesium [[id number]]  (Normal) Collected:  03/30/25 0132    Specimen: Blood from Arm, Left Updated: 03/30/25 0300     Magnesium 1.9 mg/dL     CBC Auto Differential [[id number]]  (Abnormal) Collected: 03/30/25 0132    Specimen: Blood from Arm, Left Updated: 03/30/25 0231     WBC 5.92 10*3/mm3      RBC 4.28 10*6/mm3      Hemoglobin 11.2 g/dL      Hematocrit 36.8 %      MCV 86.0 fL      MCH 26.2 pg      MCHC 30.4 g/dL      RDW 15.9 %      RDW-SD 49.9 fl      MPV 10.2 fL      Platelets 288 10*3/mm3      Neutrophil % 45.4 %      Lymphocyte % 30.9 %      Monocyte % 12.8 %      Eosinophil % 9.0 %      Basophil % 1.4 %      Immature Grans % 0.5 %      Neutrophils, Absolute 2.69 10*3/mm3      Lymphocytes, Absolute 1.83 10*3/mm3      Monocytes, Absolute 0.76 10*3/mm3      Eosinophils, Absolute 0.53 10*3/mm3      Basophils, Absolute 0.08 10*3/mm3      Immature Grans, Absolute 0.03 10*3/mm3      nRBC 0.0 /100 WBC     POC Glucose Once [[id number]]  (Abnormal) Collected: 03/29/25 1942    Specimen: Blood Updated: 03/29/25 1944     Glucose 111 mg/dL      Comment: Serial Number: [id number]:  [id number]       POC Glucose Finger 4x Daily Before Meals & at Bedtime [[id number]]  (Normal) Collected: 03/29/25 1706    Specimen: Blood from Finger Updated: 03/29/25 1707     Glucose 96 mg/dL      Comment: Serial Number: [id number]:  [id number]       Blood Culture - Blood, Arm, Left [[id number]]  (Normal) Collected: 03/27/25 1633    Specimen: Blood from Arm, Left Updated: 03/29/25 1645     Blood Culture No growth at 2 days    Blood Culture - Blood, Arm, Right [[id number]]  (Normal) Collected: 03/27/25 1616    Specimen: Blood from Arm, Right Updated: 03/29/25 1631     Blood Culture No growth at 2 days    POC Glucose Once [[id number]]  (Abnormal) Collected: 03/29/25 1240    Specimen: Blood Updated: 03/29/25 1241     Glucose 121 mg/dL      Comment: Serial Number: [id number]:  [id number]       POC Glucose Finger 4x Daily Before Meals & at Bedtime [[id number]]  (Normal)  Collected: 03/29/25 0822    Specimen: Blood from Finger Updated: 03/29/25 0824     Glucose 101 mg/dL      Comment: Serial Number: [id number]:  [id number]       Comprehensive Metabolic Panel [[id number]]  (Abnormal) Collected: 03/29/25 0012    Specimen: Blood from Arm, Right Updated: 03/29/25 0224     Glucose 107 mg/dL      BUN 16 mg/dL      Creatinine 1.48 mg/dL      Sodium 135 mmol/L      Potassium 4.5 mmol/L      Chloride 103 mmol/L      CO2 22.6 mmol/L      Calcium 8.8 mg/dL      Total Protein 5.8 g/dL      Albumin 3.2 g/dL      ALT (SGPT) <5 U/L      AST (SGOT) 14 U/L      Alkaline Phosphatase 51 U/L      Total Bilirubin 0.2 mg/dL      Globulin 2.6 gm/dL      A/G Ratio 1.2 g/dL      BUN/Creatinine Ratio 10.8     Anion Gap 9.4 mmol/L      eGFR 47.2 mL/min/1.73     Narrative:      GFR Categories in Chronic Kidney Disease (CKD)      GFR Category          GFR (mL/min/1.73)    Interpretation  G1                     90 or greater         Normal or high (1)  G2                      60-89                Mild decrease (1)  G3a                   45-59                Mild to moderate decrease  G3b                   30-44                Moderate to severe decrease  G4                    15-29                Severe decrease  G5                    14 or less           Kidney failure          (1)In the absence of evidence of kidney disease, neither GFR category G1 or G2 fulfill the criteria for CKD.    eGFR calculation 2021 CKD-EPI creatinine equation, which does not include race as a factor    Magnesium [[id number]]  (Normal) Collected: 03/29/25 0012    Specimen: Blood from Arm, Right Updated: 03/29/25 0224     Magnesium 2.1 mg/dL     Phosphorus [[id number]]  (Normal) Collected: 03/29/25 0012    Specimen: Blood from Arm, Right Updated: 03/29/25 0211     Phosphorus 2.5 mg/dL     Calcium, Ionized [[id number]]  (Normal) Collected: 03/29/25 0012    Specimen: Blood from Arm, Right Updated: 03/29/25 0150     Ionized Calcium 1.17  mmol/L     CBC Auto Differential [[id number]]  (Abnormal) Collected: 03/29/25 0012    Specimen: Blood from Arm, Right Updated: 03/29/25 0144     WBC 5.22 10*3/mm3      RBC 4.21 10*6/mm3      Hemoglobin 11.3 g/dL      Hematocrit 36.2 %      MCV 86.0 fL      MCH 26.8 pg      MCHC 31.2 g/dL      RDW 16.0 %      RDW-SD 49.9 fl      MPV 10.2 fL      Platelets 253 10*3/mm3      Neutrophil % 47.4 %      Lymphocyte % 31.8 %      Monocyte % 12.5 %      Eosinophil % 6.9 %      Basophil % 0.8 %      Immature Grans % 0.6 %      Neutrophils, Absolute 2.48 10*3/mm3      Lymphocytes, Absolute 1.66 10*3/mm3      Monocytes, Absolute 0.65 10*3/mm3      Eosinophils, Absolute 0.36 10*3/mm3      Basophils, Absolute 0.04 10*3/mm3      Immature Grans, Absolute 0.03 10*3/mm3      nRBC 0.0 /100 WBC     POC Glucose Once [[id number]]  (Abnormal) Collected: 03/28/25 2026    Specimen: Blood Updated: 03/28/25 2027     Glucose 122 mg/dL      Comment: Serial Number: [id number]:  [id number]       POC Glucose Once [[id number]]  (Abnormal) Collected: 03/28/25 1705    Specimen: Blood Updated: 03/28/25 1709     Glucose 107 mg/dL      Comment: Serial Number: [id number]:  [id number]                Imaging Results (Last 48 Hours)       Procedure Component Value Units Date/Time    CT Lower Extremity Left Without Contrast [[id number]] Collected: 03/29/25 1711     Updated: 03/29/25 1721    Narrative:      CT LOWER EXTREMITY LEFT WO CONTRAST    Date of Exam: 3/29/2025 3:35 PM EDT    Indication: ATTENTION TO THE HEEL---PAIN; LOOKING FOR OSTEO.    Comparison: None available.    Technique: Axial CT images were obtained of the left lower extremity without contrast administration.  Sagittal and coronal reconstructions were performed.  Automated exposure control and iterative reconstruction methods were used.      Findings:  Edema is observed throughout the subcutaneous soft tissues circumferentially surrounding the distal lower leg, ankle, and hindfoot.  The findings are nonspecific but may indicate changes of soft tissue cellulitis. No well-defined focal fluid collection is   seen to suggest abscess.    There is no evidence for osseous erosion or destruction. No definitive signs of osteomyelitis are identified.      Impression:      Impression:  1.Edema is observed throughout the subcutaneous soft tissues circumferentially surrounding the distal lower leg, ankle, and hindfoot. The findings are nonspecific but may indicate changes of soft tissue cellulitis. No well-defined focal fluid collection   is seen to suggest abscess.  2.No evidence for osseous erosion or destruction to suggest osteomyelitis.        Electronically Signed: Riley Walls MD    3/29/2025 5:19 PM EDT    Workstation ID: [id number]    XR Tibia Fibula 2 View Right [[id number]] Collected: 03/29/25 1252     Updated: 03/29/25 1255    Narrative:      XR TIBIA FIBULA 2 VW RIGHT    Date of Exam: 3/29/2025 10:17 AM EDT    Indication: AND RIGHT ANKLE TO SEE IF HARDWARE IS PRESENT (PINS) FROM PRIOR FRACTURE    Comparison: None available.    Findings:  Remote posttraumatic and postoperative changes of the distal tibia and fibula are noted. 2 orthopedic fixation screws are observed. There are no signs of hardware failure or loosening. There is no acute displaced fracture or malalignment. Chronic changes   are noted. Atherosclerotic vascular calcifications are noted. Chronic soft tissue changes are also observed.      Impression:      Impression:  1.Remote posttraumatic and postoperative changes of the distal tibia and fibula. 2 orthopedic fixation screws are identified. There are no signs of hardware failure or loosening.  2.No evidence for acute displaced fracture or malalignment.        Electronically Signed: Riley Walls MD    3/29/2025 12:53 PM EDT    Workstation ID: [id number]                 I reviewed the patient's new clinical results.    Past Medical History:   Diagnosis Date    Alcohol abuse  07/13/2022    Essential hypertension 07/13/2022    Gait instability 07/13/2022    GERD without esophagitis 07/13/2022    Gout 07/13/2022    Hypothyroidism (acquired) 07/13/2022    Onychomycosis of multiple toenails with type 2 diabetes mellitus 07/13/2022    Sleep apnea     Stage 3a chronic kidney disease 07/13/2022    Tinea pedis of both feet 07/13/2022    Type 2 diabetes mellitus with diabetic neuropathy, without long-term current use of insulin 07/13/2022     Past Surgical History:   Procedure Laterality Date    ORTHOPEDIC SURGERY      fractured ankle and dislocated shoulder         Medication Review:   Scheduled Meds:allopurinol, 200 mg, Oral, Daily  [START ON 3/31/2025] bumetanide, 1 mg, Oral, Daily  ceFAZolin, 2,000 mg, Intravenous, Q8H  colchicine, 0.6 mg, Oral, Daily  colestipol, 2 g, Oral, Daily  enoxaparin sodium, 40 mg, Subcutaneous, Q24H  FLUoxetine, 10 mg, Oral, Daily  folic acid, 1 mg, Oral, Daily  hydrALAZINE, 50 mg, Oral, Daily  hydrocortisone, , Topical, Q12H  ketoconazole, , Topical, Q24H  levothyroxine, 175 mcg, Oral, Q AM  magnesium sulfate, 1 g, Intravenous, Once  pantoprazole, 40 mg, Oral, Daily  saccharomyces boulardii, 250 mg, Oral, BID  sodium chloride, 10 mL, Intravenous, Q12H  Vitamin B1, 100 mg, Oral, Daily      Continuous Infusions:Pharmacy to Dose enoxaparin (LOVENOX),       PRN Meds:.  acetaminophen **OR** acetaminophen **OR** acetaminophen    aluminum-magnesium hydroxide-simethicone    senna-docusate sodium **AND** polyethylene glycol **AND** bisacodyl **AND** bisacodyl    calcium carbonate    HYDROcodone-acetaminophen    ondansetron ODT **OR** ondansetron    Pharmacy to Dose enoxaparin (LOVENOX)    [COMPLETED] Insert Peripheral IV **AND** sodium chloride    sodium chloride    sodium chloride     Assessment & Plan   CELLULITIS LOWER EXTREMITIES/CHRONIC VENOUS INSUFFICIENCY/BILATERAL PEDAL EDEMA---IV KEFZOL; CAMILA BOOT TO THE RIGHT LEG TODAY; WILL CHECK TO SEE IF PINS IN PLACE ON THE  RIGHT LEG/ANKLE OR NOT AND WILL PROCEED WITH EITHER CT TO INVESTIGATE THE LEFT HEEL PAIN TO EXCLUDE OSTEOMYELITIS  CT NEGATIVE FOR OSTEO AND JUST SHOWED CELLULITIS  CKD----DR SANCHES HELPING MANAGE; DIURESING WELL  DM II---GOOD CONTROL  HYPONATREMIA---DUE TO ETOH (BEER) INTAKE; STABLE  DEPRESSION---STABLE ON MEDS  CHRONIC ETOH ABUSE---LIMITED TO NOW 2 BEERS PER DAY AND HE DOES NOT NORMALLY EXHIBIT DT'S WHEN OFF THE BEER IN THE HOSPITAL  DJD/GOUT---ON ALLOPURINOL AND COLCHICINE NOW  HTN---STABLE  HYPOTHYROIDISM---EUTHYROID  FULL CODE STATUS  Principal Problem:    Cellulitis          Plan for disposition:HOME WITH PT OLIVIA Dawkins MD  03/30/25  11:31 EDT

## 2025-03-30 NOTE — PLAN OF CARE
Assessment: Rommel Mcfarland presents with functional mobility impairments which indicate the need for skilled intervention. Tolerating session today without incident. Pt doing better with mobility. Does better when surfaces are higher to CTS and occ req vc's to make sure he backs up all the way before sitting. Plans on home with assist and HH.Will continue to follow and progress as tolerated.

## 2025-03-30 NOTE — PLAN OF CARE
Goal Outcome Evaluation:  Plan of Care Reviewed With: patient   Continues cefazolin IV/Cellulitis BLE. Requested and given Norco c/o BLE with effectiveness. Bed alarm on

## 2025-03-31 LAB
ALBUMIN SERPL-MCNC: 3.6 G/DL (ref 3.5–5.2)
ALBUMIN/GLOB SERPL: 1.3 G/DL
ALP SERPL-CCNC: 63 U/L (ref 39–117)
ALT SERPL W P-5'-P-CCNC: 10 U/L (ref 1–41)
ANION GAP SERPL CALCULATED.3IONS-SCNC: 11.8 MMOL/L (ref 5–15)
AST SERPL-CCNC: 43 U/L (ref 1–40)
BASOPHILS # BLD AUTO: 0.09 10*3/MM3 (ref 0–0.2)
BASOPHILS NFR BLD AUTO: 1.2 % (ref 0–1.5)
BILIRUB SERPL-MCNC: <0.2 MG/DL (ref 0–1.2)
BUN SERPL-MCNC: 24 MG/DL (ref 8–23)
BUN/CREAT SERPL: 11.6 (ref 7–25)
CALCIUM SPEC-SCNC: 9.5 MG/DL (ref 8.6–10.5)
CHLORIDE SERPL-SCNC: 99 MMOL/L (ref 98–107)
CO2 SERPL-SCNC: 25.2 MMOL/L (ref 22–29)
CREAT SERPL-MCNC: 2.07 MG/DL (ref 0.76–1.27)
DEPRECATED RDW RBC AUTO: 49.1 FL (ref 37–54)
EGFRCR SERPLBLD CKD-EPI 2021: 31.6 ML/MIN/1.73
EOSINOPHIL # BLD AUTO: 0.66 10*3/MM3 (ref 0–0.4)
EOSINOPHIL NFR BLD AUTO: 8.5 % (ref 0.3–6.2)
ERYTHROCYTE [DISTWIDTH] IN BLOOD BY AUTOMATED COUNT: 15.6 % (ref 12.3–15.4)
GLOBULIN UR ELPH-MCNC: 2.8 GM/DL
GLUCOSE BLDC GLUCOMTR-MCNC: 100 MG/DL (ref 70–105)
GLUCOSE BLDC GLUCOMTR-MCNC: 106 MG/DL (ref 70–105)
GLUCOSE BLDC GLUCOMTR-MCNC: 110 MG/DL (ref 70–105)
GLUCOSE BLDC GLUCOMTR-MCNC: 128 MG/DL (ref 70–105)
GLUCOSE SERPL-MCNC: 90 MG/DL (ref 65–99)
HCT VFR BLD AUTO: 38.7 % (ref 37.5–51)
HGB BLD-MCNC: 11.9 G/DL (ref 13–17.7)
IMM GRANULOCYTES # BLD AUTO: 0.06 10*3/MM3 (ref 0–0.05)
IMM GRANULOCYTES NFR BLD AUTO: 0.8 % (ref 0–0.5)
LYMPHOCYTES # BLD AUTO: 2.13 10*3/MM3 (ref 0.7–3.1)
LYMPHOCYTES NFR BLD AUTO: 27.5 % (ref 19.6–45.3)
MAGNESIUM SERPL-MCNC: 1.9 MG/DL (ref 1.6–2.4)
MCH RBC QN AUTO: 26.3 PG (ref 26.6–33)
MCHC RBC AUTO-ENTMCNC: 30.7 G/DL (ref 31.5–35.7)
MCV RBC AUTO: 85.6 FL (ref 79–97)
MONOCYTES # BLD AUTO: 0.8 10*3/MM3 (ref 0.1–0.9)
MONOCYTES NFR BLD AUTO: 10.3 % (ref 5–12)
NEUTROPHILS NFR BLD AUTO: 4 10*3/MM3 (ref 1.7–7)
NEUTROPHILS NFR BLD AUTO: 51.7 % (ref 42.7–76)
NRBC BLD AUTO-RTO: 0 /100 WBC (ref 0–0.2)
PHOSPHATE SERPL-MCNC: 2.6 MG/DL (ref 2.5–4.5)
PLATELET # BLD AUTO: 317 10*3/MM3 (ref 140–450)
PMV BLD AUTO: 10.2 FL (ref 6–12)
POTASSIUM SERPL-SCNC: 4.8 MMOL/L (ref 3.5–5.2)
PROT SERPL-MCNC: 6.4 G/DL (ref 6–8.5)
RBC # BLD AUTO: 4.52 10*6/MM3 (ref 4.14–5.8)
SODIUM SERPL-SCNC: 136 MMOL/L (ref 136–145)
WBC NRBC COR # BLD AUTO: 7.74 10*3/MM3 (ref 3.4–10.8)

## 2025-03-31 PROCEDURE — 82948 REAGENT STRIP/BLOOD GLUCOSE: CPT | Performed by: FAMILY MEDICINE

## 2025-03-31 PROCEDURE — 25010000002 CEFAZOLIN PER 500 MG: Performed by: FAMILY MEDICINE

## 2025-03-31 PROCEDURE — 83735 ASSAY OF MAGNESIUM: CPT | Performed by: FAMILY MEDICINE

## 2025-03-31 PROCEDURE — 25010000002 ENOXAPARIN PER 10 MG: Performed by: FAMILY MEDICINE

## 2025-03-31 PROCEDURE — 82948 REAGENT STRIP/BLOOD GLUCOSE: CPT

## 2025-03-31 PROCEDURE — 84100 ASSAY OF PHOSPHORUS: CPT | Performed by: INTERNAL MEDICINE

## 2025-03-31 PROCEDURE — 85025 COMPLETE CBC W/AUTO DIFF WBC: CPT | Performed by: FAMILY MEDICINE

## 2025-03-31 PROCEDURE — 80053 COMPREHEN METABOLIC PANEL: CPT | Performed by: FAMILY MEDICINE

## 2025-03-31 RX ORDER — TERAZOSIN 1 MG/1
1 CAPSULE ORAL EVERY 12 HOURS SCHEDULED
Status: DISCONTINUED | OUTPATIENT
Start: 2025-03-31 | End: 2025-04-01 | Stop reason: HOSPADM

## 2025-03-31 RX ADMIN — FOLIC ACID 1 MG: 1 TABLET ORAL at 10:02

## 2025-03-31 RX ADMIN — Medication 10 ML: at 10:03

## 2025-03-31 RX ADMIN — Medication 250 MG: at 10:02

## 2025-03-31 RX ADMIN — TERAZOSIN HYDROCHLORIDE 1 MG: 1 CAPSULE ORAL at 10:02

## 2025-03-31 RX ADMIN — FLUOXETINE HYDROCHLORIDE 10 MG: 10 CAPSULE ORAL at 10:02

## 2025-03-31 RX ADMIN — HYDROCORTISONE: 25 CREAM TOPICAL at 10:02

## 2025-03-31 RX ADMIN — LEVOTHYROXINE SODIUM 175 MCG: 175 TABLET ORAL at 05:11

## 2025-03-31 RX ADMIN — KETOCONAZOLE: 20 CREAM TOPICAL at 10:03

## 2025-03-31 RX ADMIN — TERAZOSIN HYDROCHLORIDE 1 MG: 1 CAPSULE ORAL at 21:34

## 2025-03-31 RX ADMIN — COLCHICINE 0.6 MG: 0.6 TABLET ORAL at 10:02

## 2025-03-31 RX ADMIN — HYDROCORTISONE: 25 CREAM TOPICAL at 21:34

## 2025-03-31 RX ADMIN — Medication 100 MG: at 10:01

## 2025-03-31 RX ADMIN — BISACODYL 5 MG: 5 TABLET, COATED ORAL at 02:00

## 2025-03-31 RX ADMIN — POLYETHYLENE GLYCOL 3350 17 G: 17 POWDER, FOR SOLUTION ORAL at 02:00

## 2025-03-31 RX ADMIN — HYDRALAZINE HYDROCHLORIDE 50 MG: 25 TABLET ORAL at 21:34

## 2025-03-31 RX ADMIN — ALLOPURINOL 200 MG: 100 TABLET ORAL at 10:01

## 2025-03-31 RX ADMIN — CEFAZOLIN 2000 MG: 2 INJECTION, POWDER, FOR SOLUTION INTRAMUSCULAR; INTRAVENOUS at 09:59

## 2025-03-31 RX ADMIN — Medication 10 ML: at 21:34

## 2025-03-31 RX ADMIN — Medication 250 MG: at 21:34

## 2025-03-31 RX ADMIN — PANTOPRAZOLE SODIUM 40 MG: 40 TABLET, DELAYED RELEASE ORAL at 10:02

## 2025-03-31 RX ADMIN — COLESTIPOL HYDROCHLORIDE 2 G: 1 TABLET ORAL at 10:02

## 2025-03-31 RX ADMIN — CEFAZOLIN 2000 MG: 2 INJECTION, POWDER, FOR SOLUTION INTRAMUSCULAR; INTRAVENOUS at 16:21

## 2025-03-31 RX ADMIN — ENOXAPARIN SODIUM 40 MG: 100 INJECTION SUBCUTANEOUS at 16:21

## 2025-03-31 NOTE — PLAN OF CARE
Goal Outcome Evaluation:              Outcome Evaluation: Patient is A&Ox4 on room air. No complaints of pain. Standby assist to bathroom. On iv abx. RLE wrapped by ace. plan to go home when d/c

## 2025-03-31 NOTE — CASE MANAGEMENT/SOCIAL WORK
Continued Stay Note  ARTUR Sánchez     Patient Name: Rommel Mcfarland  MRN: [id number]  Today's Date: 3/31/2025    Admit Date: 3/27/2025    Plan: Return home w/[facility] (accepted - will need order)   Discharge Plan       Row Name 03/31/25 0947       Plan    Plan Comments DC barriers: elevated BUN/creat, IV abx, pain control. Nephrology following                 Antonella Miller RN     Office phone: [phone]  Office fax: [phone]

## 2025-03-31 NOTE — PLAN OF CARE
Goal Outcome Evaluation:  Plan of Care Reviewed With: patient   Continues Cefazolin IV/cellulitis BLE with no adverse side effects. BLE remains edematous. Legs elevated as tolerated. (R) leg wrapped in ace wrap. Barrier cream applied to stage 2 buttock. Requires standby assist with walker to B.R. Requested and given Norco c/o BLE pain with effectiveness. Bed alarm on.

## 2025-03-31 NOTE — PROGRESS NOTES
"DATE/TIME OF ADMISSION:  3/27/2025  2:49 PM     LOS: 4 days   Patient Care Team:  Montse Dawkins MD as PCP - General (Family Medicine)  Huey Keyes MD as Consulting Physician (Nephrology)  Consults       Date and Time Order Name Status Description    3/27/2025  6:43 PM Inpatient Nephrology Consult Completed             Subjective CREATININE ELEVATED FURTHER TODAY    Interval History: DOING WELL; AFEBRILE AND LEG LOOKING MUCH IMPROVED  ANXIOUS TO GET HOME    Patient Complaints: FEELING WELL    History taken from: patient    Review of Systems        Objective     Vital Signs  /76 (BP Location: Left arm, Patient Position: Lying)   Pulse 61   Temp 97.6 °F (36.4 °C) (Oral)   Resp 20   Ht 170.2 cm (67\")   Wt 114 kg (251 lb 8.7 oz)   SpO2 94%   BMI 39.40 kg/m²     Physical Exam:        General Appearance:    Alert, cooperative, in no acute distress   Head:    Normocephalic, with SKIN CANCER ON FOREHEAD AND AK'S, atraumatic   Eyes:            Lids and lashes normal, conjunctivae and sclerae normal, no   icterus, no pallor, corneas clear, PERRLA   Ears:    Ears appear intact with no abnormalities noted   Throat:   No oral lesions, no thrush, oral mucosa moist   Neck:   No adenopathy, supple, trachea midline, no thyromegaly, no   carotid bruit, no JVD   Lungs:     Clear to auscultation,respirations regular, even and                  unlabored    Heart:    Regular rhythm and normal rate, normal S1 and S2, SYSTOLIC            murmur, no gallop, no rub, no click   Chest Wall:    No abnormalities observed   Abdomen:     Normal bowel sounds, no masses, no organomegaly, soft        non-tender, non-distended, no guarding, no rebound                tenderness   Extremities:   Moves all extremities well, 1+ edema, no cyanosis, no             redness; NO CALF TENDERNESS; RIGHT LEG WRAPPED; LEFT WITH NO RESIDUAL REDNESS OVER THE CHRONIC CHANGES   Pulses:   Pulses palpable and equal bilaterally   Skin:   No bleeding, " bruising or rash   Lymph nodes:   No palpable adenopathy   Neurologic:   Grossly normal, alert and O x 3        I HAVE PERSONALLY EXAMINED AND REVIEWED THE PATIENT'S RECORD     Lab Results (last 48 hours)       Procedure Component Value Units Date/Time    Comprehensive Metabolic Panel [[id number]]  (Abnormal) Collected: 03/31/25 0152    Specimen: Blood from Arm, Left Updated: 03/31/25 0317     Glucose 90 mg/dL      BUN 24 mg/dL      Creatinine 2.07 mg/dL      Sodium 136 mmol/L      Potassium 4.8 mmol/L      Chloride 99 mmol/L      CO2 25.2 mmol/L      Calcium 9.5 mg/dL      Total Protein 6.4 g/dL      Albumin 3.6 g/dL      ALT (SGPT) 10 U/L      AST (SGOT) 43 U/L      Alkaline Phosphatase 63 U/L      Total Bilirubin <0.2 mg/dL      Globulin 2.8 gm/dL      A/G Ratio 1.3 g/dL      BUN/Creatinine Ratio 11.6     Anion Gap 11.8 mmol/L      eGFR 31.6 mL/min/1.73     Narrative:      GFR Categories in Chronic Kidney Disease (CKD)      GFR Category          GFR (mL/min/1.73)    Interpretation  G1                     90 or greater         Normal or high (1)  G2                      60-89                Mild decrease (1)  G3a                   45-59                Mild to moderate decrease  G3b                   30-44                Moderate to severe decrease  G4                    15-29                Severe decrease  G5                    14 or less           Kidney failure          (1)In the absence of evidence of kidney disease, neither GFR category G1 or G2 fulfill the criteria for CKD.    eGFR calculation 2021 CKD-EPI creatinine equation, which does not include race as a factor    Magnesium [[id number]]  (Normal) Collected: 03/31/25 0152    Specimen: Blood from Arm, Left Updated: 03/31/25 0317     Magnesium 1.9 mg/dL     Phosphorus [[id number]]  (Normal) Collected: 03/31/25 0152    Specimen: Blood from Arm, Left Updated: 03/31/25 0317     Phosphorus 2.6 mg/dL     CBC Auto Differential [[id number]]  (Abnormal) Collected:  03/31/25 0152    Specimen: Blood from Arm, Left Updated: 03/31/25 0248     WBC 7.74 10*3/mm3      RBC 4.52 10*6/mm3      Hemoglobin 11.9 g/dL      Hematocrit 38.7 %      MCV 85.6 fL      MCH 26.3 pg      MCHC 30.7 g/dL      RDW 15.6 %      RDW-SD 49.1 fl      MPV 10.2 fL      Platelets 317 10*3/mm3      Neutrophil % 51.7 %      Lymphocyte % 27.5 %      Monocyte % 10.3 %      Eosinophil % 8.5 %      Basophil % 1.2 %      Immature Grans % 0.8 %      Neutrophils, Absolute 4.00 10*3/mm3      Lymphocytes, Absolute 2.13 10*3/mm3      Monocytes, Absolute 0.80 10*3/mm3      Eosinophils, Absolute 0.66 10*3/mm3      Basophils, Absolute 0.09 10*3/mm3      Immature Grans, Absolute 0.06 10*3/mm3      nRBC 0.0 /100 WBC     POC Glucose Once [[id number]]  (Abnormal) Collected: 03/30/25 2006    Specimen: Blood Updated: 03/30/25 2009     Glucose 128 mg/dL      Comment: Serial Number: [id number]:  [id number]       Blood Culture - Blood, Arm, Left [[id number]]  (Normal) Collected: 03/27/25 1633    Specimen: Blood from Arm, Left Updated: 03/30/25 1645     Blood Culture No growth at 3 days    Blood Culture - Blood, Arm, Right [[id number]]  (Normal) Collected: 03/27/25 1616    Specimen: Blood from Arm, Right Updated: 03/30/25 1631     Blood Culture No growth at 3 days    POC Glucose Once [[id number]]  (Abnormal) Collected: 03/30/25 1608    Specimen: Blood Updated: 03/30/25 1609     Glucose 109 mg/dL      Comment: Serial Number: [id number]:  [id number]       POC Glucose Finger 4x Daily Before Meals & at Bedtime [[id number]]  (Abnormal) Collected: 03/30/25 1212    Specimen: Blood from Finger Updated: 03/30/25 1213     Glucose 120 mg/dL      Comment: Serial Number: [id number]:  [id number]       POC Glucose Finger 4x Daily Before Meals & at Bedtime [[id number]]  (Normal) Collected: 03/30/25 0754    Specimen: Blood from Finger Updated: 03/30/25 0756     Glucose 104 mg/dL      Comment: Serial Number: [id number]:  [id number]        Comprehensive Metabolic Panel [[id number]]  (Abnormal) Collected: 03/30/25 0132    Specimen: Blood from Arm, Left Updated: 03/30/25 0300     Glucose 102 mg/dL      BUN 19 mg/dL      Creatinine 1.87 mg/dL      Sodium 136 mmol/L      Potassium 4.6 mmol/L      Chloride 101 mmol/L      CO2 25.0 mmol/L      Calcium 9.0 mg/dL      Total Protein 5.9 g/dL      Albumin 3.4 g/dL      ALT (SGPT) 7 U/L      AST (SGOT) 23 U/L      Alkaline Phosphatase 52 U/L      Total Bilirubin 0.2 mg/dL      Globulin 2.5 gm/dL      A/G Ratio 1.4 g/dL      BUN/Creatinine Ratio 10.2     Anion Gap 10.0 mmol/L      eGFR 35.7 mL/min/1.73     Narrative:      GFR Categories in Chronic Kidney Disease (CKD)      GFR Category          GFR (mL/min/1.73)    Interpretation  G1                     90 or greater         Normal or high (1)  G2                      60-89                Mild decrease (1)  G3a                   45-59                Mild to moderate decrease  G3b                   30-44                Moderate to severe decrease  G4                    15-29                Severe decrease  G5                    14 or less           Kidney failure          (1)In the absence of evidence of kidney disease, neither GFR category G1 or G2 fulfill the criteria for CKD.    eGFR calculation 2021 CKD-EPI creatinine equation, which does not include race as a factor    Magnesium [[id number]]  (Normal) Collected: 03/30/25 0132    Specimen: Blood from Arm, Left Updated: 03/30/25 0300     Magnesium 1.9 mg/dL     CBC Auto Differential [[id number]]  (Abnormal) Collected: 03/30/25 0132    Specimen: Blood from Arm, Left Updated: 03/30/25 0231     WBC 5.92 10*3/mm3      RBC 4.28 10*6/mm3      Hemoglobin 11.2 g/dL      Hematocrit 36.8 %      MCV 86.0 fL      MCH 26.2 pg      MCHC 30.4 g/dL      RDW 15.9 %      RDW-SD 49.9 fl      MPV 10.2 fL      Platelets 288 10*3/mm3      Neutrophil % 45.4 %      Lymphocyte % 30.9 %      Monocyte % 12.8 %      Eosinophil % 9.0 %       Basophil % 1.4 %      Immature Grans % 0.5 %      Neutrophils, Absolute 2.69 10*3/mm3      Lymphocytes, Absolute 1.83 10*3/mm3      Monocytes, Absolute 0.76 10*3/mm3      Eosinophils, Absolute 0.53 10*3/mm3      Basophils, Absolute 0.08 10*3/mm3      Immature Grans, Absolute 0.03 10*3/mm3      nRBC 0.0 /100 WBC     POC Glucose Once [[id number]]  (Abnormal) Collected: 03/29/25 1942    Specimen: Blood Updated: 03/29/25 1944     Glucose 111 mg/dL      Comment: Serial Number: [id number]:  [id number]       POC Glucose Finger 4x Daily Before Meals & at Bedtime [[id number]]  (Normal) Collected: 03/29/25 1706    Specimen: Blood from Finger Updated: 03/29/25 1707     Glucose 96 mg/dL      Comment: Serial Number: [id number]:  [id number]       POC Glucose Once [[id number]]  (Abnormal) Collected: 03/29/25 1240    Specimen: Blood Updated: 03/29/25 1241     Glucose 121 mg/dL      Comment: Serial Number: [id number]:  [id number]       POC Glucose Finger 4x Daily Before Meals & at Bedtime [[id number]]  (Normal) Collected: 03/29/25 0822    Specimen: Blood from Finger Updated: 03/29/25 0824     Glucose 101 mg/dL      Comment: Serial Number: [id number]:  [id number]                Imaging Results (Last 48 Hours)       Procedure Component Value Units Date/Time    CT Lower Extremity Left Without Contrast [[id number]] Collected: 03/29/25 1711     Updated: 03/29/25 1721    Narrative:      CT LOWER EXTREMITY LEFT WO CONTRAST    Date of Exam: 3/29/2025 3:35 PM EDT    Indication: ATTENTION TO THE HEEL---PAIN; LOOKING FOR OSTEO.    Comparison: None available.    Technique: Axial CT images were obtained of the left lower extremity without contrast administration.  Sagittal and coronal reconstructions were performed.  Automated exposure control and iterative reconstruction methods were used.      Findings:  Edema is observed throughout the subcutaneous soft tissues circumferentially surrounding the distal lower leg, ankle, and  hindfoot. The findings are nonspecific but may indicate changes of soft tissue cellulitis. No well-defined focal fluid collection is   seen to suggest abscess.    There is no evidence for osseous erosion or destruction. No definitive signs of osteomyelitis are identified.      Impression:      Impression:  1.Edema is observed throughout the subcutaneous soft tissues circumferentially surrounding the distal lower leg, ankle, and hindfoot. The findings are nonspecific but may indicate changes of soft tissue cellulitis. No well-defined focal fluid collection   is seen to suggest abscess.  2.No evidence for osseous erosion or destruction to suggest osteomyelitis.        Electronically Signed: Riley Walls MD    3/29/2025 5:19 PM EDT    Workstation ID: [id number]    XR Tibia Fibula 2 View Right [[id number]] Collected: 03/29/25 1252     Updated: 03/29/25 1255    Narrative:      XR TIBIA FIBULA 2 VW RIGHT    Date of Exam: 3/29/2025 10:17 AM EDT    Indication: AND RIGHT ANKLE TO SEE IF HARDWARE IS PRESENT (PINS) FROM PRIOR FRACTURE    Comparison: None available.    Findings:  Remote posttraumatic and postoperative changes of the distal tibia and fibula are noted. 2 orthopedic fixation screws are observed. There are no signs of hardware failure or loosening. There is no acute displaced fracture or malalignment. Chronic changes   are noted. Atherosclerotic vascular calcifications are noted. Chronic soft tissue changes are also observed.      Impression:      Impression:  1.Remote posttraumatic and postoperative changes of the distal tibia and fibula. 2 orthopedic fixation screws are identified. There are no signs of hardware failure or loosening.  2.No evidence for acute displaced fracture or malalignment.        Electronically Signed: Riley Walls MD    3/29/2025 12:53 PM EDT    Workstation ID: [id number]                 I reviewed the patient's new clinical results.    Past Medical History:   Diagnosis Date    Alcohol  abuse 07/13/2022    Essential hypertension 07/13/2022    Gait instability 07/13/2022    GERD without esophagitis 07/13/2022    Gout 07/13/2022    Hypothyroidism (acquired) 07/13/2022    Onychomycosis of multiple toenails with type 2 diabetes mellitus 07/13/2022    Sleep apnea     Stage 3a chronic kidney disease 07/13/2022    Tinea pedis of both feet 07/13/2022    Type 2 diabetes mellitus with diabetic neuropathy, without long-term current use of insulin 07/13/2022     Past Surgical History:   Procedure Laterality Date    ORTHOPEDIC SURGERY      fractured ankle and dislocated shoulder         Medication Review:   Scheduled Meds:allopurinol, 200 mg, Oral, Daily  bumetanide, 1 mg, Oral, Daily  ceFAZolin, 2,000 mg, Intravenous, Q8H  colchicine, 0.6 mg, Oral, Daily  colestipol, 2 g, Oral, Daily  enoxaparin sodium, 40 mg, Subcutaneous, Q24H  FLUoxetine, 10 mg, Oral, Daily  folic acid, 1 mg, Oral, Daily  hydrALAZINE, 50 mg, Oral, Daily  hydrocortisone, , Topical, Q12H  ketoconazole, , Topical, Q24H  levothyroxine, 175 mcg, Oral, Q AM  pantoprazole, 40 mg, Oral, Daily  saccharomyces boulardii, 250 mg, Oral, BID  sodium chloride, 10 mL, Intravenous, Q12H  Vitamin B1, 100 mg, Oral, Daily      Continuous Infusions:Pharmacy to Dose enoxaparin (LOVENOX),       PRN Meds:.  acetaminophen **OR** acetaminophen **OR** acetaminophen    aluminum-magnesium hydroxide-simethicone    senna-docusate sodium **AND** polyethylene glycol **AND** bisacodyl **AND** bisacodyl    calcium carbonate    HYDROcodone-acetaminophen    ondansetron ODT **OR** ondansetron    Pharmacy to Dose enoxaparin (LOVENOX)    [COMPLETED] Insert Peripheral IV **AND** sodium chloride    sodium chloride    sodium chloride     Assessment & Plan   CELLULITIS LOWER EXTREMITIES/CHRONIC VENOUS INSUFFICIENCY/BILATERAL PEDAL EDEMA---IV KEFZOL; CAMILA BOOT TO THE RIGHT LEG TODAY; WILL CHECK TO SEE IF PINS IN PLACE ON THE RIGHT LEG/ANKLE OR NOT AND WILL PROCEED WITH EITHER CT TO  INVESTIGATE THE LEFT HEEL PAIN TO EXCLUDE OSTEOMYELITIS  CT NEGATIVE FOR OSTEO AND JUST SHOWED CELLULITIS  CKD----DR SANCHES HELPING MANAGE; DIURESING WELL; CREATININE ELEVATED FURTHER; MAY NEED TO LESSEN THE DIURETIC DOSE  DM II---GOOD CONTROL  HYPONATREMIA---DUE TO ETOH (BEER) INTAKE; STABLE  DEPRESSION---STABLE ON MEDS  CHRONIC ETOH ABUSE---LIMITED TO NOW 2 BEERS PER DAY AND HE DOES NOT NORMALLY EXHIBIT DT'S WHEN OFF THE BEER IN THE HOSPITAL  DJD/GOUT---ON ALLOPURINOL AND COLCHICINE NOW  HTN---STABLE  HYPOTHYROIDISM---EUTHYROID  FULL CODE STATUS  Principal Problem:    Cellulitis          Plan for disposition:HOME WITH PT    Montse Dawkins MD  03/31/25  07:25 EDT

## 2025-03-31 NOTE — PROGRESS NOTES
"NEPHROLOGY PROGRESS NOTE------[facility]    [facility]  [phone]  Ines Barahona MD      Patient Care Team:  Montse Dawkins MD as PCP - General (Family Medicine)  Huey Keyes MD as Consulting Physician (Nephrology)      Provider:  Ines Barahona MD  Patient Name: Rommel Mcfarland  :  1945    SUBJECTIVE:    F/U ARF/SARAH/CRF/CKD    No complaints outside of mild urinary hesitancy. No angina. No dysuria.     Medication:  allopurinol, 200 mg, Oral, Daily  bumetanide, 1 mg, Oral, Daily  ceFAZolin, 2,000 mg, Intravenous, Q8H  colchicine, 0.6 mg, Oral, Daily  colestipol, 2 g, Oral, Daily  enoxaparin sodium, 40 mg, Subcutaneous, Q24H  FLUoxetine, 10 mg, Oral, Daily  folic acid, 1 mg, Oral, Daily  hydrALAZINE, 50 mg, Oral, Daily  hydrocortisone, , Topical, Q12H  ketoconazole, , Topical, Q24H  levothyroxine, 175 mcg, Oral, Q AM  pantoprazole, 40 mg, Oral, Daily  saccharomyces boulardii, 250 mg, Oral, BID  sodium chloride, 10 mL, Intravenous, Q12H  Vitamin B1, 100 mg, Oral, Daily      Pharmacy to Dose enoxaparin (LOVENOX),         OBJECTIVE    Vital Sign Min/Max for last 24 hours  Temp  Min: 97.6 °F (36.4 °C)  Max: 97.8 °F (36.6 °C)   BP  Min: 128/63  Max: 151/76   Pulse  Min: 61  Max: 69   Resp  Min: 16  Max: 20   SpO2  Min: 92 %  Max: 96 %   No data recorded   Weight  Min: 114 kg (251 lb 8.7 oz)  Max: 114 kg (251 lb 8.7 oz)     Flowsheet Rows      Flowsheet Row First Filed Value   Admission Height 170.2 cm (67\") Documented at 2025 1443   Admission Weight 104 kg (230 lb) Documented at 2025 1443            No intake/output data recorded.  I/O last 3 completed shifts:  In: 470 [P.O.:470]  Out: 0 [Urine:2150]    Physical Exam:  General Appearance: alert, appears stated age and cooperative  Head: normocephalic, without obvious abnormality and atraumatic   Eyes: conjunctivae and sclerae normal and no icterus  Neck: supple " "and no JVD  Lungs: +FEW SCATTERED RHONCHI  Heart: regular rhythm & normal rate and normal S1, S2 +BALDOMERO  Chest Wall: no abnormalities observed  Abdomen: normal bowel sounds and soft, nontender  Extremities: moves extremities well, 1+ BILAT LE EDEMA (R>L), no cyanosis  Skin: +CHRONIC VENOUS INSUFFICIENCY BILAT LE WITH BILAT LE WOUNDS (FOOT AND CALF/PRETIBLAL AREAS) +SCATTERED ECCHYMOSIS  Neurologic: Alert, and oriented. No focal deficits    Labs:    WBC WBC   Date Value Ref Range Status   03/31/2025 7.74 3.40 - 10.80 10*3/mm3 Final   03/30/2025 5.92 3.40 - 10.80 10*3/mm3 Final   03/29/2025 5.22 3.40 - 10.80 10*3/mm3 Final      HGB Hemoglobin   Date Value Ref Range Status   03/31/2025 11.9 (L) 13.0 - 17.7 g/dL Final   03/30/2025 11.2 (L) 13.0 - 17.7 g/dL Final   03/29/2025 11.3 (L) 13.0 - 17.7 g/dL Final      HCT Hematocrit   Date Value Ref Range Status   03/31/2025 38.7 37.5 - 51.0 % Final   03/30/2025 36.8 (L) 37.5 - 51.0 % Final   03/29/2025 36.2 (L) 37.5 - 51.0 % Final      Platelets No results found for: \"LABPLAT\"   MCV MCV   Date Value Ref Range Status   03/31/2025 85.6 79.0 - 97.0 fL Final   03/30/2025 86.0 79.0 - 97.0 fL Final   03/29/2025 86.0 79.0 - 97.0 fL Final          Sodium Sodium   Date Value Ref Range Status   03/31/2025 136 136 - 145 mmol/L Final   03/30/2025 136 136 - 145 mmol/L Final   03/29/2025 135 (L) 136 - 145 mmol/L Final      Potassium Potassium   Date Value Ref Range Status   03/31/2025 4.8 3.5 - 5.2 mmol/L Final   03/30/2025 4.6 3.5 - 5.2 mmol/L Final   03/29/2025 4.5 3.5 - 5.2 mmol/L Final      Chloride Chloride   Date Value Ref Range Status   03/31/2025 99 98 - 107 mmol/L Final   03/30/2025 101 98 - 107 mmol/L Final   03/29/2025 103 98 - 107 mmol/L Final      CO2 CO2   Date Value Ref Range Status   03/31/2025 25.2 22.0 - 29.0 mmol/L Final   03/30/2025 25.0 22.0 - 29.0 mmol/L Final   03/29/2025 22.6 22.0 - 29.0 mmol/L Final      BUN BUN   Date Value Ref Range Status   03/31/2025 24 (H) 8 - " "23 mg/dL Final   03/30/2025 19 8 - 23 mg/dL Final   03/29/2025 16 8 - 23 mg/dL Final      Creatinine Creatinine   Date Value Ref Range Status   03/31/2025 2.07 (H) 0.76 - 1.27 mg/dL Final   03/30/2025 1.87 (H) 0.76 - 1.27 mg/dL Final   03/29/2025 1.48 (H) 0.76 - 1.27 mg/dL Final      Calcium Calcium   Date Value Ref Range Status   03/31/2025 9.5 8.6 - 10.5 mg/dL Final   03/30/2025 9.0 8.6 - 10.5 mg/dL Final   03/29/2025 8.8 8.6 - 10.5 mg/dL Final      PO4 No components found for: \"PO4\"   Albumin Albumin   Date Value Ref Range Status   03/31/2025 3.6 3.5 - 5.2 g/dL Final   03/30/2025 3.4 (L) 3.5 - 5.2 g/dL Final   03/29/2025 3.2 (L) 3.5 - 5.2 g/dL Final      Magnesium Magnesium   Date Value Ref Range Status   03/31/2025 1.9 1.6 - 2.4 mg/dL Final   03/30/2025 1.9 1.6 - 2.4 mg/dL Final   03/29/2025 2.1 1.6 - 2.4 mg/dL Final      Uric Acid No components found for: \"URIC ACID\"     Imaging Results (Last 72 Hours)       Procedure Component Value Units Date/Time    CT Lower Extremity Left Without Contrast [[id number]] Collected: 03/29/25 1711     Updated: 03/29/25 1721    Narrative:      CT LOWER EXTREMITY LEFT WO CONTRAST    Date of Exam: 3/29/2025 3:35 PM EDT    Indication: ATTENTION TO THE HEEL---PAIN; LOOKING FOR OSTEO.    Comparison: None available.    Technique: Axial CT images were obtained of the left lower extremity without contrast administration.  Sagittal and coronal reconstructions were performed.  Automated exposure control and iterative reconstruction methods were used.      Findings:  Edema is observed throughout the subcutaneous soft tissues circumferentially surrounding the distal lower leg, ankle, and hindfoot. The findings are nonspecific but may indicate changes of soft tissue cellulitis. No well-defined focal fluid collection is   seen to suggest abscess.    There is no evidence for osseous erosion or destruction. No definitive signs of osteomyelitis are identified.      Impression:      " Impression:  1.Edema is observed throughout the subcutaneous soft tissues circumferentially surrounding the distal lower leg, ankle, and hindfoot. The findings are nonspecific but may indicate changes of soft tissue cellulitis. No well-defined focal fluid collection   is seen to suggest abscess.  2.No evidence for osseous erosion or destruction to suggest osteomyelitis.        Electronically Signed: Riley Walls MD    3/29/2025 5:19 PM EDT    Workstation ID: [id number]    XR Tibia Fibula 2 View Right [[id number]] Collected: 03/29/25 1252     Updated: 03/29/25 1255    Narrative:      XR TIBIA FIBULA 2 VW RIGHT    Date of Exam: 3/29/2025 10:17 AM EDT    Indication: AND RIGHT ANKLE TO SEE IF HARDWARE IS PRESENT (PINS) FROM PRIOR FRACTURE    Comparison: None available.    Findings:  Remote posttraumatic and postoperative changes of the distal tibia and fibula are noted. 2 orthopedic fixation screws are observed. There are no signs of hardware failure or loosening. There is no acute displaced fracture or malalignment. Chronic changes   are noted. Atherosclerotic vascular calcifications are noted. Chronic soft tissue changes are also observed.      Impression:      Impression:  1.Remote posttraumatic and postoperative changes of the distal tibia and fibula. 2 orthopedic fixation screws are identified. There are no signs of hardware failure or loosening.  2.No evidence for acute displaced fracture or malalignment.        Electronically Signed: Riley Walls MD    3/29/2025 12:53 PM EDT    Workstation ID: [id number]            Results for orders placed during the hospital encounter of 03/27/25    XR Tibia Fibula 2 View Right    Narrative  XR TIBIA FIBULA 2 VW RIGHT    Date of Exam: 3/29/2025 10:17 AM EDT    Indication: AND RIGHT ANKLE TO SEE IF HARDWARE IS PRESENT (PINS) FROM PRIOR FRACTURE    Comparison: None available.    Findings:  Remote posttraumatic and postoperative changes of the distal tibia and fibula are noted. 2  orthopedic fixation screws are observed. There are no signs of hardware failure or loosening. There is no acute displaced fracture or malalignment. Chronic changes  are noted. Atherosclerotic vascular calcifications are noted. Chronic soft tissue changes are also observed.    Impression  Impression:  1.Remote posttraumatic and postoperative changes of the distal tibia and fibula. 2 orthopedic fixation screws are identified. There are no signs of hardware failure or loosening.  2.No evidence for acute displaced fracture or malalignment.        Electronically Signed: Riley Walls MD  3/29/2025 12:53 PM EDT  Workstation ID: [id number]      Results for orders placed during the hospital encounter of 09/15/23    XR Foot 3+ View Right    Narrative  XR FOOT 3+ VW RIGHT    Date of Exam: 9/15/2023 2:30 PM EDT    Indication: wound, infection    Comparison: 7/9/2022    Findings:  Bones are diffusely osteopenic. There is no discrete area of osseous erosion to indicate a site of osteomyelitis. Postsurgical changes of screw fixation noted at the distal tibia and fibula similar to the prior exam. Extensive small vessel vascular  calcifications. Soft tissue swelling overlying the dorsum of the foot which may relate to edema and/or cellulitis.    Impression  Impression:  1. No discrete area of osseous erosion to indicate osteomyelitis.  2. Soft tissue swelling overlying the dorsum of the foot may relate to edema and/or cellulitis.  3. Diffuse osteopenia.  4. Extensive small vessel vascular calcifications.        Electronically Signed: Renny Johnson MD  9/15/2023 2:35 PM EDT  Workstation ID: [id number]      Results for orders placed during the hospital encounter of 07/09/22    XR Foot 2 View Right    Narrative  DATE OF EXAM:  7/9/2022 1:58 PM    PROCEDURE:  XR FOOT 2 VW RIGHT-    INDICATIONS:  Wound between first and second toe maggots present.  Cellulitis;  L03.115-Cellulitis of right lower limb; B87.9-Myiasis,  unspecified    COMPARISON:  No Comparisons Available    TECHNIQUE:  A minimum of two routine standard radiographic views were obtained of  the right foot.    FINDINGS:  There is diffuse soft tissue swelling of the right foot bones are  osteopenic. There is no acute appearing fracture. Postoperative findings  noted in the ankle at the medial malleolus and the distal fibula with  fixation screws. The calcaneus is intact. Small vessel vascular  calcifications noted.    Impression  1. Extensive soft tissue swelling of the right foot may relate to edema  and/or cellulitis.  2. No discrete area of osseous erosion.  3. No acute appearing fracture.  4. Additional chronic findings above.    Electronically Signed By-Renny Johnson MD On:7/9/2022 2:09 PM  This report was finalized on [id number] by  Renny Johnson MD.      Results for orders placed during the hospital encounter of 03/27/25    Duplex Venous Lower Extremity - BILATERAL    Interpretation Summary    Normal bilateral lower extremity venous duplex scan.    Small saphenous veins not visualized bilaterally        ASSESSMENT / PLAN      Cellulitis    ARF/SARAH/CRF/CKD-----Nonoliguric. BUN/Cr up more today with diuresis. D/C Bumex and follow. CRF/CKD STG 3A secondary to HTN NS. Continue gentle diuresis.  No NSAIDs or IV dye. Dose meds for CrCl 30-45 cc/min     2. GOUT/HYPERURICEMIA-----Increased Allopurinol despite renal dysfunction and follow with mild diuretic exposure     3. BILATERAL LE EDEMA/CHRONIC VENOUS INSUFFICIENCY/WOUNDS/CELLULITIS------Wound care. D/C Bumex today and follow     4. DEPRESSION-----Ok to continue SSRI for now     5. ETOH ABUSE------Counseled     6. OA/DJD ------On Allopurinol. No NSAIDs.      7. HTN WITH CKD-----BP up a little. Add little alpha blocker. Avoid hypotension. No ACE-I/ARB/DRI     8. GERD/PUD PROPHYLAXIS------On PPI. Benefits outweigh risks despite CKD     9. HYPOTHYROIDISM------On Synthroid. TSH ok     10. DVT  PROPHYLAXIS------Lovenox     11. DEPRESSION------On Prozac     12. HYPOMAGNESEMIA------Replaced     13. CHRONIC IBS------On Florastor    14. ANEMIA OF CKD-----H/H ok    15. URINARY HESITANCY-----Add little alpha blocker        Ines Barahona MD  [facility]  [phone]  03/31/25  08:06 EDT

## 2025-04-01 ENCOUNTER — READMISSION MANAGEMENT (OUTPATIENT)
Dept: CALL CENTER | Facility: HOSPITAL | Age: 80
End: 2025-04-01
Payer: MEDICARE

## 2025-04-01 VITALS
DIASTOLIC BLOOD PRESSURE: 83 MMHG | OXYGEN SATURATION: 97 % | TEMPERATURE: 97.5 F | HEART RATE: 68 BPM | RESPIRATION RATE: 16 BRPM | WEIGHT: 255.51 LBS | SYSTOLIC BLOOD PRESSURE: 152 MMHG | BODY MASS INDEX: 40.1 KG/M2 | HEIGHT: 67 IN

## 2025-04-01 LAB
ALBUMIN SERPL-MCNC: 3.4 G/DL (ref 3.5–5.2)
ALBUMIN/GLOB SERPL: 1.4 G/DL
ALP SERPL-CCNC: 52 U/L (ref 39–117)
ALT SERPL W P-5'-P-CCNC: 6 U/L (ref 1–41)
ANION GAP SERPL CALCULATED.3IONS-SCNC: 11.7 MMOL/L (ref 5–15)
AST SERPL-CCNC: 27 U/L (ref 1–40)
BACTERIA SPEC AEROBE CULT: NORMAL
BACTERIA SPEC AEROBE CULT: NORMAL
BASOPHILS # BLD AUTO: 0.09 10*3/MM3 (ref 0–0.2)
BASOPHILS NFR BLD AUTO: 1.3 % (ref 0–1.5)
BILIRUB SERPL-MCNC: <0.2 MG/DL (ref 0–1.2)
BUN SERPL-MCNC: 26 MG/DL (ref 8–23)
BUN/CREAT SERPL: 15.9 (ref 7–25)
CALCIUM SPEC-SCNC: 9 MG/DL (ref 8.6–10.5)
CHLORIDE SERPL-SCNC: 99 MMOL/L (ref 98–107)
CO2 SERPL-SCNC: 26.3 MMOL/L (ref 22–29)
CREAT SERPL-MCNC: 1.64 MG/DL (ref 0.76–1.27)
DEPRECATED RDW RBC AUTO: 48.8 FL (ref 37–54)
EGFRCR SERPLBLD CKD-EPI 2021: 41.8 ML/MIN/1.73
EOSINOPHIL # BLD AUTO: 0.6 10*3/MM3 (ref 0–0.4)
EOSINOPHIL NFR BLD AUTO: 8.7 % (ref 0.3–6.2)
ERYTHROCYTE [DISTWIDTH] IN BLOOD BY AUTOMATED COUNT: 15.4 % (ref 12.3–15.4)
GLOBULIN UR ELPH-MCNC: 2.5 GM/DL
GLUCOSE BLDC GLUCOMTR-MCNC: 110 MG/DL (ref 70–105)
GLUCOSE BLDC GLUCOMTR-MCNC: 121 MG/DL (ref 70–105)
GLUCOSE BLDC GLUCOMTR-MCNC: 124 MG/DL (ref 70–105)
GLUCOSE SERPL-MCNC: 106 MG/DL (ref 65–99)
HCT VFR BLD AUTO: 36.2 % (ref 37.5–51)
HGB BLD-MCNC: 11.1 G/DL (ref 13–17.7)
IMM GRANULOCYTES # BLD AUTO: 0.04 10*3/MM3 (ref 0–0.05)
IMM GRANULOCYTES NFR BLD AUTO: 0.6 % (ref 0–0.5)
LYMPHOCYTES # BLD AUTO: 2.17 10*3/MM3 (ref 0.7–3.1)
LYMPHOCYTES NFR BLD AUTO: 31.4 % (ref 19.6–45.3)
MAGNESIUM SERPL-MCNC: 1.8 MG/DL (ref 1.6–2.4)
MCH RBC QN AUTO: 26.4 PG (ref 26.6–33)
MCHC RBC AUTO-ENTMCNC: 30.7 G/DL (ref 31.5–35.7)
MCV RBC AUTO: 86 FL (ref 79–97)
MONOCYTES # BLD AUTO: 0.69 10*3/MM3 (ref 0.1–0.9)
MONOCYTES NFR BLD AUTO: 10 % (ref 5–12)
NEUTROPHILS NFR BLD AUTO: 3.33 10*3/MM3 (ref 1.7–7)
NEUTROPHILS NFR BLD AUTO: 48 % (ref 42.7–76)
NRBC BLD AUTO-RTO: 0 /100 WBC (ref 0–0.2)
PLATELET # BLD AUTO: 278 10*3/MM3 (ref 140–450)
PMV BLD AUTO: 10 FL (ref 6–12)
POTASSIUM SERPL-SCNC: 4.9 MMOL/L (ref 3.5–5.2)
PROT SERPL-MCNC: 5.9 G/DL (ref 6–8.5)
RBC # BLD AUTO: 4.21 10*6/MM3 (ref 4.14–5.8)
SODIUM SERPL-SCNC: 137 MMOL/L (ref 136–145)
WBC NRBC COR # BLD AUTO: 6.92 10*3/MM3 (ref 3.4–10.8)

## 2025-04-01 PROCEDURE — 83735 ASSAY OF MAGNESIUM: CPT | Performed by: FAMILY MEDICINE

## 2025-04-01 PROCEDURE — 80053 COMPREHEN METABOLIC PANEL: CPT | Performed by: FAMILY MEDICINE

## 2025-04-01 PROCEDURE — 82948 REAGENT STRIP/BLOOD GLUCOSE: CPT

## 2025-04-01 PROCEDURE — 82948 REAGENT STRIP/BLOOD GLUCOSE: CPT | Performed by: FAMILY MEDICINE

## 2025-04-01 PROCEDURE — 25010000002 CEFAZOLIN PER 500 MG: Performed by: FAMILY MEDICINE

## 2025-04-01 PROCEDURE — 85025 COMPLETE CBC W/AUTO DIFF WBC: CPT | Performed by: FAMILY MEDICINE

## 2025-04-01 RX ORDER — HYDROCORTISONE 25 MG/G
CREAM TOPICAL EVERY 12 HOURS SCHEDULED
Start: 2025-04-01

## 2025-04-01 RX ORDER — POLYETHYLENE GLYCOL 3350 17 G/17G
17 POWDER, FOR SOLUTION ORAL DAILY PRN
Start: 2025-04-01

## 2025-04-01 RX ORDER — HYDRALAZINE HYDROCHLORIDE 50 MG/1
50 TABLET, FILM COATED ORAL DAILY
Start: 2025-04-01

## 2025-04-01 RX ORDER — TERAZOSIN 1 MG/1
1 CAPSULE ORAL EVERY 12 HOURS SCHEDULED
Qty: 180 CAPSULE | Refills: 0 | Status: SHIPPED | OUTPATIENT
Start: 2025-04-01

## 2025-04-01 RX ORDER — ACETAMINOPHEN 325 MG/1
650 TABLET ORAL EVERY 4 HOURS PRN
Start: 2025-04-01

## 2025-04-01 RX ORDER — KETOCONAZOLE 20 MG/G
CREAM TOPICAL
Start: 2025-04-02

## 2025-04-01 RX ORDER — CEPHALEXIN 500 MG/1
500 CAPSULE ORAL 3 TIMES DAILY
Qty: 21 CAPSULE | Refills: 0 | Status: SHIPPED | OUTPATIENT
Start: 2025-04-01 | End: 2025-04-08

## 2025-04-01 RX ORDER — ALLOPURINOL 200 MG/1
200 TABLET ORAL DAILY
Start: 2025-04-02

## 2025-04-01 RX ADMIN — CEFAZOLIN 2000 MG: 2 INJECTION, POWDER, FOR SOLUTION INTRAMUSCULAR; INTRAVENOUS at 10:32

## 2025-04-01 RX ADMIN — FOLIC ACID 1 MG: 1 TABLET ORAL at 10:32

## 2025-04-01 RX ADMIN — LEVOTHYROXINE SODIUM 175 MCG: 175 TABLET ORAL at 06:21

## 2025-04-01 RX ADMIN — Medication 100 MG: at 10:32

## 2025-04-01 RX ADMIN — KETOCONAZOLE: 20 CREAM TOPICAL at 10:33

## 2025-04-01 RX ADMIN — CEFAZOLIN 2000 MG: 2 INJECTION, POWDER, FOR SOLUTION INTRAMUSCULAR; INTRAVENOUS at 00:47

## 2025-04-01 RX ADMIN — TERAZOSIN HYDROCHLORIDE 1 MG: 1 CAPSULE ORAL at 10:32

## 2025-04-01 RX ADMIN — FLUOXETINE HYDROCHLORIDE 10 MG: 10 CAPSULE ORAL at 10:32

## 2025-04-01 RX ADMIN — COLESTIPOL HYDROCHLORIDE 2 G: 1 TABLET ORAL at 10:34

## 2025-04-01 RX ADMIN — ALLOPURINOL 200 MG: 100 TABLET ORAL at 10:32

## 2025-04-01 RX ADMIN — Medication 10 ML: at 10:32

## 2025-04-01 RX ADMIN — PANTOPRAZOLE SODIUM 40 MG: 40 TABLET, DELAYED RELEASE ORAL at 10:32

## 2025-04-01 RX ADMIN — COLCHICINE 0.6 MG: 0.6 TABLET ORAL at 10:32

## 2025-04-01 RX ADMIN — HYDROCORTISONE: 25 CREAM TOPICAL at 10:33

## 2025-04-01 RX ADMIN — Medication 250 MG: at 10:32

## 2025-04-01 NOTE — PROGRESS NOTES
"DATE/TIME OF ADMISSION:  3/27/2025  2:49 PM     LOS: 5 days   Patient Care Team:  Montse Dawkins MD as PCP - General (Family Medicine)  Huey Keyes MD as Consulting Physician (Nephrology)  Consults       Date and Time Order Name Status Description    3/27/2025  6:43 PM Inpatient Nephrology Consult Completed             Subjective SLEPT WELL AND EAGER TO GET HOME    Interval History: CREATININE IMPROVED WITH BUMEX STOPPED    Patient Complaints: NO SOA, NO LEG PAIN    History taken from: patient    Review of Systems        Objective     Vital Signs  /74 (BP Location: Left arm, Patient Position: Lying)   Pulse 72   Temp 97.8 °F (36.6 °C) (Oral)   Resp 18   Ht 170.2 cm (67\")   Wt 116 kg (255 lb 8.2 oz)   SpO2 96%   BMI 40.02 kg/m²     Physical Exam:     General Appearance:    Alert, cooperative, in no acute distress   Head:    Normocephalic, SKIN CANCER ON FOREHEAD AND AK'S, atraumatic   Eyes:            Lids and lashes normal, conjunctivae and sclerae normal, no   icterus, no pallor, corneas clear, PERRLA   Ears:    Ears appear intact with no abnormalities noted   Throat:   No oral lesions, no thrush, oral mucosa moist   Neck:   No adenopathy, supple, trachea midline, no thyromegaly, no   carotid bruit, no JVD   Lungs:     Clear to auscultation,respirations regular, even and                  unlabored    Heart:    Regular rhythm and normal rate, normal S1 and S2, no            murmur, no gallop, no rub, no click   Chest Wall:    No abnormalities observed   Abdomen:     Normal bowel sounds, no masses, no organomegaly, soft        non-tender, non-distended, no guarding, no rebound                tenderness   Extremities:   Moves all extremities well, 1+ edema, no cyanosis, no             redness; NO CALF TENDERNESS; RIGHT LEG WRAPPED WITH CAMILA BOOT   Pulses:   Pulses palpable and equal bilaterally   Skin:   No bleeding, bruising or rash   Lymph nodes:   No palpable adenopathy   Neurologic:   Grossly " normal, alert and O x 3        I HAVE PERSONALLY EXAMINED AND REVIEWED THE PATIENT'S RECORD     Lab Results (last 48 hours)       Procedure Component Value Units Date/Time    Comprehensive Metabolic Panel [[id number]]  (Abnormal) Collected: 04/01/25 0142    Specimen: Blood from Arm, Left Updated: 04/01/25 0233     Glucose 106 mg/dL      BUN 26 mg/dL      Creatinine 1.64 mg/dL      Sodium 137 mmol/L      Potassium 4.9 mmol/L      Chloride 99 mmol/L      CO2 26.3 mmol/L      Calcium 9.0 mg/dL      Total Protein 5.9 g/dL      Albumin 3.4 g/dL      ALT (SGPT) 6 U/L      AST (SGOT) 27 U/L      Alkaline Phosphatase 52 U/L      Total Bilirubin <0.2 mg/dL      Globulin 2.5 gm/dL      A/G Ratio 1.4 g/dL      BUN/Creatinine Ratio 15.9     Anion Gap 11.7 mmol/L      eGFR 41.8 mL/min/1.73     Narrative:      GFR Categories in Chronic Kidney Disease (CKD)      GFR Category          GFR (mL/min/1.73)    Interpretation  G1                     90 or greater         Normal or high (1)  G2                      60-89                Mild decrease (1)  G3a                   45-59                Mild to moderate decrease  G3b                   30-44                Moderate to severe decrease  G4                    15-29                Severe decrease  G5                    14 or less           Kidney failure          (1)In the absence of evidence of kidney disease, neither GFR category G1 or G2 fulfill the criteria for CKD.    eGFR calculation 2021 CKD-EPI creatinine equation, which does not include race as a factor    Magnesium [[id number]]  (Normal) Collected: 04/01/25 0142    Specimen: Blood from Arm, Left Updated: 04/01/25 0233     Magnesium 1.8 mg/dL     CBC Auto Differential [[id number]]  (Abnormal) Collected: 04/01/25 0142    Specimen: Blood from Arm, Left Updated: 04/01/25 0205     WBC 6.92 10*3/mm3      RBC 4.21 10*6/mm3      Hemoglobin 11.1 g/dL      Hematocrit 36.2 %      MCV 86.0 fL      MCH 26.4 pg      MCHC 30.7 g/dL      RDW  15.4 %      RDW-SD 48.8 fl      MPV 10.0 fL      Platelets 278 10*3/mm3      Neutrophil % 48.0 %      Lymphocyte % 31.4 %      Monocyte % 10.0 %      Eosinophil % 8.7 %      Basophil % 1.3 %      Immature Grans % 0.6 %      Neutrophils, Absolute 3.33 10*3/mm3      Lymphocytes, Absolute 2.17 10*3/mm3      Monocytes, Absolute 0.69 10*3/mm3      Eosinophils, Absolute 0.60 10*3/mm3      Basophils, Absolute 0.09 10*3/mm3      Immature Grans, Absolute 0.04 10*3/mm3      nRBC 0.0 /100 WBC     POC Glucose Once [[id number]]  (Abnormal) Collected: 03/31/25 2013    Specimen: Blood Updated: 03/31/25 2014     Glucose 128 mg/dL      Comment: Serial Number: [id number]:  [id number]       POC Glucose Once [[id number]]  (Normal) Collected: 03/31/25 1646    Specimen: Blood Updated: 03/31/25 1648     Glucose 100 mg/dL      Comment: Serial Number: [id number]:  [id number]       Blood Culture - Blood, Arm, Left [[id number]]  (Normal) Collected: 03/27/25 1633    Specimen: Blood from Arm, Left Updated: 03/31/25 1645     Blood Culture No growth at 4 days    Blood Culture - Blood, Arm, Right [[id number]]  (Normal) Collected: 03/27/25 1616    Specimen: Blood from Arm, Right Updated: 03/31/25 1630     Blood Culture No growth at 4 days    POC Glucose Finger 4x Daily Before Meals & at Bedtime [[id number]]  (Abnormal) Collected: 03/31/25 1116    Specimen: Blood from Finger Updated: 03/31/25 1117     Glucose 106 mg/dL      Comment: Serial Number: [id number]:  [id number]       POC Glucose Finger 4x Daily Before Meals & at Bedtime [[id number]]  (Abnormal) Collected: 03/31/25 0822    Specimen: Blood from Finger Updated: 03/31/25 0824     Glucose 110 mg/dL      Comment: Serial Number: [id number]:  [id number]       Comprehensive Metabolic Panel [[id number]]  (Abnormal) Collected: 03/31/25 0152    Specimen: Blood from Arm, Left Updated: 03/31/25 0317     Glucose 90 mg/dL      BUN 24 mg/dL      Creatinine 2.07 mg/dL      Sodium 136 mmol/L       Potassium 4.8 mmol/L      Chloride 99 mmol/L      CO2 25.2 mmol/L      Calcium 9.5 mg/dL      Total Protein 6.4 g/dL      Albumin 3.6 g/dL      ALT (SGPT) 10 U/L      AST (SGOT) 43 U/L      Alkaline Phosphatase 63 U/L      Total Bilirubin <0.2 mg/dL      Globulin 2.8 gm/dL      A/G Ratio 1.3 g/dL      BUN/Creatinine Ratio 11.6     Anion Gap 11.8 mmol/L      eGFR 31.6 mL/min/1.73     Narrative:      GFR Categories in Chronic Kidney Disease (CKD)      GFR Category          GFR (mL/min/1.73)    Interpretation  G1                     90 or greater         Normal or high (1)  G2                      60-89                Mild decrease (1)  G3a                   45-59                Mild to moderate decrease  G3b                   30-44                Moderate to severe decrease  G4                    15-29                Severe decrease  G5                    14 or less           Kidney failure          (1)In the absence of evidence of kidney disease, neither GFR category G1 or G2 fulfill the criteria for CKD.    eGFR calculation 2021 CKD-EPI creatinine equation, which does not include race as a factor    Magnesium [[id number]]  (Normal) Collected: 03/31/25 0152    Specimen: Blood from Arm, Left Updated: 03/31/25 0317     Magnesium 1.9 mg/dL     Phosphorus [[id number]]  (Normal) Collected: 03/31/25 0152    Specimen: Blood from Arm, Left Updated: 03/31/25 0317     Phosphorus 2.6 mg/dL     CBC Auto Differential [[id number]]  (Abnormal) Collected: 03/31/25 0152    Specimen: Blood from Arm, Left Updated: 03/31/25 0248     WBC 7.74 10*3/mm3      RBC 4.52 10*6/mm3      Hemoglobin 11.9 g/dL      Hematocrit 38.7 %      MCV 85.6 fL      MCH 26.3 pg      MCHC 30.7 g/dL      RDW 15.6 %      RDW-SD 49.1 fl      MPV 10.2 fL      Platelets 317 10*3/mm3      Neutrophil % 51.7 %      Lymphocyte % 27.5 %      Monocyte % 10.3 %      Eosinophil % 8.5 %      Basophil % 1.2 %      Immature Grans % 0.8 %      Neutrophils, Absolute 4.00  10*3/mm3      Lymphocytes, Absolute 2.13 10*3/mm3      Monocytes, Absolute 0.80 10*3/mm3      Eosinophils, Absolute 0.66 10*3/mm3      Basophils, Absolute 0.09 10*3/mm3      Immature Grans, Absolute 0.06 10*3/mm3      nRBC 0.0 /100 WBC     POC Glucose Once [[id number]]  (Abnormal) Collected: 03/30/25 2006    Specimen: Blood Updated: 03/30/25 2009     Glucose 128 mg/dL      Comment: Serial Number: [id number]:  [id number]       POC Glucose Once [[id number]]  (Abnormal) Collected: 03/30/25 1608    Specimen: Blood Updated: 03/30/25 1609     Glucose 109 mg/dL      Comment: Serial Number: [id number]:  [id number]       POC Glucose Finger 4x Daily Before Meals & at Bedtime [[id number]]  (Abnormal) Collected: 03/30/25 1212    Specimen: Blood from Finger Updated: 03/30/25 1213     Glucose 120 mg/dL      Comment: Serial Number: [id number]:  [id number]       POC Glucose Finger 4x Daily Before Meals & at Bedtime [[id number]]  (Normal) Collected: 03/30/25 0754    Specimen: Blood from Finger Updated: 03/30/25 0756     Glucose 104 mg/dL      Comment: Serial Number: [id number]:  [id number]                Imaging Results (Last 48 Hours)       ** No results found for the last 48 hours. **                 I reviewed the patient's new clinical results.    Past Medical History:   Diagnosis Date    Alcohol abuse 07/13/2022    Essential hypertension 07/13/2022    Gait instability 07/13/2022    GERD without esophagitis 07/13/2022    Gout 07/13/2022    Hypothyroidism (acquired) 07/13/2022    Onychomycosis of multiple toenails with type 2 diabetes mellitus 07/13/2022    Sleep apnea     Stage 3a chronic kidney disease 07/13/2022    Tinea pedis of both feet 07/13/2022    Type 2 diabetes mellitus with diabetic neuropathy, without long-term current use of insulin 07/13/2022     Past Surgical History:   Procedure Laterality Date    ORTHOPEDIC SURGERY      fractured ankle and dislocated shoulder         Medication Review:    Scheduled Meds:allopurinol, 200 mg, Oral, Daily  ceFAZolin, 2,000 mg, Intravenous, Q8H  colchicine, 0.6 mg, Oral, Daily  colestipol, 2 g, Oral, Daily  enoxaparin sodium, 40 mg, Subcutaneous, Q24H  FLUoxetine, 10 mg, Oral, Daily  folic acid, 1 mg, Oral, Daily  hydrALAZINE, 50 mg, Oral, Daily  hydrocortisone, , Topical, Q12H  ketoconazole, , Topical, Q24H  levothyroxine, 175 mcg, Oral, Q AM  pantoprazole, 40 mg, Oral, Daily  saccharomyces boulardii, 250 mg, Oral, BID  sodium chloride, 10 mL, Intravenous, Q12H  terazosin, 1 mg, Oral, Q12H  Vitamin B1, 100 mg, Oral, Daily      Continuous Infusions:Pharmacy to Dose enoxaparin (LOVENOX),       PRN Meds:.  acetaminophen **OR** acetaminophen **OR** acetaminophen    aluminum-magnesium hydroxide-simethicone    senna-docusate sodium **AND** polyethylene glycol **AND** bisacodyl **AND** bisacodyl    calcium carbonate    HYDROcodone-acetaminophen    ondansetron ODT **OR** ondansetron    Pharmacy to Dose enoxaparin (LOVENOX)    [COMPLETED] Insert Peripheral IV **AND** sodium chloride    sodium chloride    sodium chloride     Assessment & Plan   CELLULITIS LOWER EXTREMITIES/CHRONIC VENOUS INSUFFICIENCY/BILATERAL PEDAL EDEMA---IV KEFZOL; CAMILA BOOT TO THE RIGHT LEG TODAY; WILL CHECK TO SEE IF PINS IN PLACE ON THE RIGHT LEG/ANKLE OR NOT AND WILL PROCEED WITH EITHER CT TO INVESTIGATE THE LEFT HEEL PAIN TO EXCLUDE OSTEOMYELITIS  CT NEGATIVE FOR OSTEO AND JUST SHOWED CELLULITIS; D/C KEFZOL; KEFLEX 500MG TID X 7 DAYS  CKD----DR SANCHES HELPING MANAGE; DIURESING WELL; CREATININE ELEVATED FURTHER; MAY NEED TO LESSEN THE DIURETIC DOSE; BUMEX STOPPED  DM II---GOOD CONTROL  HYPONATREMIA---DUE TO ETOH (BEER) INTAKE; STABLE  DEPRESSION---STABLE ON MEDS  CHRONIC ETOH ABUSE---LIMITED TO NOW 2 BEERS PER DAY AND HE DOES NOT NORMALLY EXHIBIT DT'S WHEN OFF THE BEER IN THE HOSPITAL  DJD/GOUT---ON ALLOPURINOL AND COLCHICINE NOW; WILL STOP COLCHICINE  HTN---STABLE  HYPOTHYROIDISM---EUTHYROID  FULL  CODE STATUS  Principal Problem:    Cellulitis          Plan for disposition:HOME WITH [facility] AND PT    Montse Dawkins MD  04/01/25  07:21 EDT

## 2025-04-01 NOTE — CASE MANAGEMENT/SOCIAL WORK
Continued Stay Note  ARTUR Sánchez     Patient Name: Rommel Mcfarland  MRN: [id number]  Today's Date: 4/1/2025    Admit Date: 3/27/2025    Plan: Return home with [facility] (accepted- will need order.)   Discharge Plan       Row Name 04/01/25 1011       Plan    Plan Return home with [facility] (accepted- will need order.)    Plan Comments CM spoke with robert Gates via phone regarding home health care choice, and she is requesting [facility] (accepted.)                     Antonella Miller RN     Office phone: [phone]  Office fax: [phone]

## 2025-04-01 NOTE — DISCHARGE SUMMARY
DATE/TIME OF ADMISSION:  3/27/2025  2:49 PM  Date of Discharge:  4/1/2025    Discharge Diagnosis: CELLULITIS LOWER EXTREMITIES/CHRONIC VENOUS INSUFFICIENCY/BILATERAL PEDAL EDEMA---IV KEFZOL; CAMILA BOOT TO THE RIGHT LEG TODAY; WILL CHECK TO SEE IF PINS IN PLACE ON THE RIGHT LEG/ANKLE OR NOT AND WILL PROCEED WITH EITHER CT TO INVESTIGATE THE LEFT HEEL PAIN TO EXCLUDE OSTEOMYELITIS  CT NEGATIVE FOR OSTEO AND JUST SHOWED CELLULITIS; D/C KEFZOL; KEFLEX 500MG TID X 7 DAYS  CKD----DR SANCHES HELPING MANAGE; DIURESING WELL; CREATININE ELEVATED FURTHER; MAY NEED TO LESSEN THE DIURETIC DOSE; BUMEX STOPPED  DM II---GOOD CONTROL  HYPONATREMIA---DUE TO ETOH (BEER) INTAKE; STABLE  DEPRESSION---STABLE ON MEDS  CHRONIC ETOH ABUSE---LIMITED TO NOW 2 BEERS PER DAY AND HE DOES NOT NORMALLY EXHIBIT DT'S WHEN OFF THE BEER IN THE HOSPITAL  DJD/GOUT---ON ALLOPURINOL AND COLCHICINE NOW; WILL STOP COLCHICINE  HTN---STABLE  HYPOTHYROIDISM---EUTHYROID  FULL CODE STATUS    Presenting Problem/History of Present Illness  Active Hospital Problems    Diagnosis  POA    **Cellulitis [L03.90]  Yes      Resolved Hospital Problems   No resolved problems to display.          Hospital Course  Rommel Mcfarland is a 79 y.o. male who presents with LEFT LEG CELLULITIS AND ACUTE GOUT FLARE. DID WELL WITH IV KEFZOL AND TOPICAL CARE. HOME ON KEFLEX WITH HOME HEALTH AND PT [facility]OUGH [facility]      Procedures Performed         Consults:   Consults       Date and Time Order Name Status Description    3/27/2025  6:43 PM Inpatient Nephrology Consult Completed             Pertinent Test Results:    Lab Results (most recent)       Procedure Component Value Units Date/Time    Blood Culture - Blood, Arm, Left [[id number]]  (Normal) Collected: 03/27/25 1633    Specimen: Blood from Arm, Left Updated: 04/01/25 1645     Blood Culture No growth at 5 days    Blood Culture - Blood, Arm, Right [[id number]]  (Normal) Collected: 03/27/25 1616    Specimen: Blood from Arm, Right Updated:  04/01/25 1630     Blood Culture No growth at 5 days    POC Glucose Once [[id number]]  (Abnormal) Collected: 04/01/25 1626    Specimen: Blood Updated: 04/01/25 1629     Glucose 124 mg/dL      Comment: Serial Number: [id number]:  [id number]       POC Glucose Finger 4x Daily Before Meals & at Bedtime [[id number]]  (Abnormal) Collected: 04/01/25 1108    Specimen: Blood from Finger Updated: 04/01/25 1110     Glucose 110 mg/dL      Comment: Serial Number: [id number]:  [id number]       Comprehensive Metabolic Panel [[id number]]  (Abnormal) Collected: 04/01/25 0142    Specimen: Blood from Arm, Left Updated: 04/01/25 0233     Glucose 106 mg/dL      BUN 26 mg/dL      Creatinine 1.64 mg/dL      Sodium 137 mmol/L      Potassium 4.9 mmol/L      Chloride 99 mmol/L      CO2 26.3 mmol/L      Calcium 9.0 mg/dL      Total Protein 5.9 g/dL      Albumin 3.4 g/dL      ALT (SGPT) 6 U/L      AST (SGOT) 27 U/L      Alkaline Phosphatase 52 U/L      Total Bilirubin <0.2 mg/dL      Globulin 2.5 gm/dL      A/G Ratio 1.4 g/dL      BUN/Creatinine Ratio 15.9     Anion Gap 11.7 mmol/L      eGFR 41.8 mL/min/1.73     Narrative:      GFR Categories in Chronic Kidney Disease (CKD)      GFR Category          GFR (mL/min/1.73)    Interpretation  G1                     90 or greater         Normal or high (1)  G2                      60-89                Mild decrease (1)  G3a                   45-59                Mild to moderate decrease  G3b                   30-44                Moderate to severe decrease  G4                    15-29                Severe decrease  G5                    14 or less           Kidney failure          (1)In the absence of evidence of kidney disease, neither GFR category G1 or G2 fulfill the criteria for CKD.    eGFR calculation 2021 CKD-EPI creatinine equation, which does not include race as a factor    Magnesium [[id number]]  (Normal) Collected: 04/01/25 0142    Specimen: Blood from Arm, Left Updated: 04/01/25  0233     Magnesium 1.8 mg/dL     CBC Auto Differential [[id number]]  (Abnormal) Collected: 04/01/25 0142    Specimen: Blood from Arm, Left Updated: 04/01/25 0205     WBC 6.92 10*3/mm3      RBC 4.21 10*6/mm3      Hemoglobin 11.1 g/dL      Hematocrit 36.2 %      MCV 86.0 fL      MCH 26.4 pg      MCHC 30.7 g/dL      RDW 15.4 %      RDW-SD 48.8 fl      MPV 10.0 fL      Platelets 278 10*3/mm3      Neutrophil % 48.0 %      Lymphocyte % 31.4 %      Monocyte % 10.0 %      Eosinophil % 8.7 %      Basophil % 1.3 %      Immature Grans % 0.6 %      Neutrophils, Absolute 3.33 10*3/mm3      Lymphocytes, Absolute 2.17 10*3/mm3      Monocytes, Absolute 0.69 10*3/mm3      Eosinophils, Absolute 0.60 10*3/mm3      Basophils, Absolute 0.09 10*3/mm3      Immature Grans, Absolute 0.04 10*3/mm3      nRBC 0.0 /100 WBC     Comprehensive Metabolic Panel [[id number]]  (Abnormal) Collected: 03/31/25 0152    Specimen: Blood from Arm, Left Updated: 03/31/25 0317     Glucose 90 mg/dL      BUN 24 mg/dL      Creatinine 2.07 mg/dL      Sodium 136 mmol/L      Potassium 4.8 mmol/L      Chloride 99 mmol/L      CO2 25.2 mmol/L      Calcium 9.5 mg/dL      Total Protein 6.4 g/dL      Albumin 3.6 g/dL      ALT (SGPT) 10 U/L      AST (SGOT) 43 U/L      Alkaline Phosphatase 63 U/L      Total Bilirubin <0.2 mg/dL      Globulin 2.8 gm/dL      A/G Ratio 1.3 g/dL      BUN/Creatinine Ratio 11.6     Anion Gap 11.8 mmol/L      eGFR 31.6 mL/min/1.73     Narrative:      GFR Categories in Chronic Kidney Disease (CKD)      GFR Category          GFR (mL/min/1.73)    Interpretation  G1                     90 or greater         Normal or high (1)  G2                      60-89                Mild decrease (1)  G3a                   45-59                Mild to moderate decrease  G3b                   30-44                Moderate to severe decrease  G4                    15-29                Severe decrease  G5                    14 or less           Kidney  failure          (1)In the absence of evidence of kidney disease, neither GFR category G1 or G2 fulfill the criteria for CKD.    eGFR calculation 2021 CKD-EPI creatinine equation, which does not include race as a factor    Magnesium [[id number]]  (Normal) Collected: 03/31/25 0152    Specimen: Blood from Arm, Left Updated: 03/31/25 0317     Magnesium 1.9 mg/dL     Phosphorus [[id number]]  (Normal) Collected: 03/31/25 0152    Specimen: Blood from Arm, Left Updated: 03/31/25 0317     Phosphorus 2.6 mg/dL     CBC Auto Differential [[id number]]  (Abnormal) Collected: 03/31/25 0152    Specimen: Blood from Arm, Left Updated: 03/31/25 0248     WBC 7.74 10*3/mm3      RBC 4.52 10*6/mm3      Hemoglobin 11.9 g/dL      Hematocrit 38.7 %      MCV 85.6 fL      MCH 26.3 pg      MCHC 30.7 g/dL      RDW 15.6 %      RDW-SD 49.1 fl      MPV 10.2 fL      Platelets 317 10*3/mm3      Neutrophil % 51.7 %      Lymphocyte % 27.5 %      Monocyte % 10.3 %      Eosinophil % 8.5 %      Basophil % 1.2 %      Immature Grans % 0.8 %      Neutrophils, Absolute 4.00 10*3/mm3      Lymphocytes, Absolute 2.13 10*3/mm3      Monocytes, Absolute 0.80 10*3/mm3      Eosinophils, Absolute 0.66 10*3/mm3      Basophils, Absolute 0.09 10*3/mm3      Immature Grans, Absolute 0.06 10*3/mm3      nRBC 0.0 /100 WBC     Phosphorus [[id number]]  (Normal) Collected: 03/29/25 0012    Specimen: Blood from Arm, Right Updated: 03/29/25 0211     Phosphorus 2.5 mg/dL     Calcium, Ionized [[id number]]  (Normal) Collected: 03/29/25 0012    Specimen: Blood from Arm, Right Updated: 03/29/25 0150     Ionized Calcium 1.17 mmol/L     Ferritin [[id number]]  (Normal) Collected: 03/27/25 2333    Specimen: Blood Updated: 03/28/25 0913     Ferritin 146.00 ng/mL     Narrative:      Results may be falsely decreased if patient taking Biotin.      CK [[id number]]  (Normal) Collected: 03/27/25 2333    Specimen: Blood Updated: 03/28/25 0913     Creatine Kinase 93 U/L     Uric Acid [[id number]]   "(Abnormal) Collected: 03/27/25 2333    Specimen: Blood Updated: 03/28/25 0913     Uric Acid 7.7 mg/dL     Iron Profile [[id number]]  (Abnormal) Collected: 03/27/25 2333    Specimen: Blood Updated: 03/28/25 0913     Iron 22 mcg/dL      Iron Saturation (TSAT) 7 %      Transferrin 206 mg/dL      TIBC 307 mcg/dL     Hemoglobin A1c [[id number]]  (Normal) Collected: 03/27/25 2333    Specimen: Blood Updated: 03/28/25 0020     Hemoglobin A1C 5.22 %     Procalcitonin [[id number]]  (Normal) Collected: 03/27/25 1616    Specimen: Blood Updated: 03/27/25 1658     Procalcitonin 0.06 ng/mL     Narrative:      As a Marker for Sepsis (Non-Neonates):    1. <0.5 ng/mL represents a low risk of severe sepsis and/or septic shock.  2. >2 ng/mL represents a high risk of severe sepsis and/or septic shock.    As a Marker for Lower Respiratory Tract Infections that require antibiotic therapy:    PCT on Admission    Antibiotic Therapy       6-12 Hrs later    >0.5                Strongly Recommended  >0.25 - <0.5        Recommended   0.1 - 0.25          Discouraged              Remeasure/reassess PCT  <0.1                Strongly Discouraged     Remeasure/reassess PCT    As 28 day mortality risk marker: \"Change in Procalcitonin Result\" (>80% or <=80%) if Day 0 (or Day 1) and Day 4 values are available. Refer to http://www.Departings-pct-calculator.com    Change in PCT <=80%  A decrease of PCT levels below or equal to 80% defines a positive change in PCT test result representing a higher risk for 28-day all-cause mortality of patients diagnosed with severe sepsis for septic shock.    Change in PCT >80%  A decrease of PCT levels of more than 80% defines a negative change in PCT result representing a lower risk for 28-day all-cause mortality of patients diagnosed with severe sepsis or septic shock.       C-reactive Protein [[id number]]  (Abnormal) Collected: 03/27/25 1616    Specimen: Blood Updated: 03/27/25 1658     C-Reactive Protein 18.70 mg/dL     " Uric Acid [[id number]]  (Abnormal) Collected: 03/27/25 1616    Specimen: Blood Updated: 03/27/25 1658     Uric Acid 7.5 mg/dL     Protime-INR [[id number]]  (Abnormal) Collected: 03/27/25 1616    Specimen: Blood Updated: 03/27/25 1645     Protime 14.8 Seconds      INR 1.17    aPTT [[id number]]  (Normal) Collected: 03/27/25 1616    Specimen: Blood Updated: 03/27/25 1645     PTT 26.5 seconds     Sedimentation Rate [[id number]]  (Abnormal) Collected: 03/27/25 1616    Specimen: Blood Updated: 03/27/25 1637     Sed Rate 21 mm/hr     Extra Tubes [[id number]] Collected: 03/27/25 1616    Specimen: Blood, Venous Line Updated: 03/27/25 1632    Narrative:      The following orders were created for panel order Extra Tubes.  Procedure                               Abnormality         Status                     ---------                               -----------         ------                     Gold Top - SST[[id number]]                                   Final result                 Please view results for these tests on the individual orders.    Gold Top - SST [[id number]] Collected: 03/27/25 1616    Specimen: Blood Updated: 03/27/25 1632     Extra Tube Hold for add-ons.     Comment: Auto resulted.       CBC & Differential [[id number]]  (Abnormal) Collected: 03/27/25 1616    Specimen: Blood Updated: 03/27/25 1628    Narrative:      The following orders were created for panel order CBC & Differential.  Procedure                               Abnormality         Status                     ---------                               -----------         ------                     CBC Auto Differential[[id number]]        Abnormal            Final result                 Please view results for these tests on the individual orders.    POC Lactate [[id number]]  (Normal) Collected: 03/27/25 1620    Specimen: Blood Updated: 03/27/25 1621     Lactate 0.7 mmol/L      Comment: Serial Number: [id number]:  [id number]                     ok        Condition on Discharge:      Vital Signs  Temp:  [97.5 °F (36.4 °C)-97.8 °F (36.6 °C)] 97.5 °F (36.4 °C)  Heart Rate:  [60-72] 68  Resp:  [16-18] 16  BP: (134-158)/(73-83) 152/83    Physical Exam:     General Appearance:  SEE PROGRESS NOTE EXAM   Head:     Eyes:             Ears:     Throat:    Neck:    Lungs:       Heart:     Chest Wall:     Abdomen:      Extremities:    Pulses:    Skin:    Lymph nodes:    Neurologic:        Discharge Disposition  Home-Health Care Svc  [facility]    Discharge Medications     Discharge Medications        New Medications        Instructions Start Date   acetaminophen 325 MG tablet  Commonly known as: TYLENOL   650 mg, Oral, Every 4 Hours PRN      cephalexin 500 MG capsule  Commonly known as: KEFLEX   500 mg, Oral, 3 Times Daily      polyethylene glycol 17 g packet  Commonly known as: MIRALAX   17 g, Oral, Daily PRN      terazosin 1 MG capsule  Commonly known as: HYTRIN   1 mg, Oral, Every 12 Hours Scheduled             Changes to Medications        Instructions Start Date   Allopurinol 200 MG tablet  What changed:   medication strength  how much to take   200 mg, Oral, Daily   Start Date: April 2, 2025     hydrocortisone 2.5 % cream  What changed: when to take this   Topical, Every 12 Hours Scheduled      ketoconazole 2 % cream  Commonly known as: NIZORAL  What changed:   how much to take  when to take this   Topical, Every 24 Hours Scheduled   Start Date: April 2, 2025            Continue These Medications        Instructions Start Date   colestipol 1 g tablet  Commonly known as: COLESTID   1-2 g, Daily      FLUoxetine 10 MG capsule  Commonly known as: PROzac   10 mg, Daily      folic acid 1 MG tablet  Commonly known as: FOLVITE   1 mg, Daily      hydrALAZINE 50 MG tablet  Commonly known as: APRESOLINE   50 mg, Oral, Daily      hydrALAZINE 50 MG tablet  Commonly known as: APRESOLINE   50 mg, Daily      levothyroxine 175 MCG tablet  Commonly known as: SYNTHROID, LEVOTHROID   175 mcg,  Every Early Morning      pantoprazole 40 MG EC tablet  Commonly known as: PROTONIX   40 mg, Daily      saccharomyces boulardii 250 MG capsule  Commonly known as: FLORASTOR   250 mg, Oral, 2 Times Daily      Vitamin B1 100 MG tablet tablet   100 mg, Daily             Stop These Medications      colchicine 0.6 MG tablet              Discharge Diet: CONSISTENT CARB    Activity at Discharge: UP WITH ASSISTANCE    Follow-up Appointments  ONE WEEK WITH DR DENNISON  No future appointments.      Test Results Pending at Discharge  Pending Results       Procedure [Order ID] Specimen - Date/Time    Urinalysis With Culture If Indicated - [[id number]]     Specimen: Urine              Montse Dennison MD  04/01/25  17:58 EDT

## 2025-04-01 NOTE — PLAN OF CARE
Goal Outcome Evaluation:              Outcome Evaluation: Patient has been sleeping throughout this shift with no complaints. RLE wrapped with ace. IV Abx. Plan to go home when discharged. Ongoing plan of care.

## 2025-04-01 NOTE — PLAN OF CARE
Problem: Adult Inpatient Plan of Care  Goal: Plan of Care Review  4/1/2025 1756 by Hien Avila RN  Outcome: Progressing  4/1/2025 1755 by Hien Avila RN  Outcome: Progressing  Goal: Patient-Specific Goal (Individualized)  4/1/2025 1756 by Hien Avila RN  Outcome: Progressing  4/1/2025 1755 by Hien Avila RN  Outcome: Progressing  Goal: Absence of Hospital-Acquired Illness or Injury  4/1/2025 1756 by Hien Avila RN  Outcome: Progressing  4/1/2025 1755 by Hien Avila RN  Outcome: Progressing  Intervention: Identify and Manage Fall Risk  Recent Flowsheet Documentation  Taken 4/1/2025 1201 by Hien Avila RN  Safety Promotion/Fall Prevention:   safety round/check completed   room organization consistent   nonskid shoes/slippers when out of bed   lighting adjusted   assistive device/personal items within reach   clutter free environment maintained  Taken 4/1/2025 1000 by Hien Avila RN  Safety Promotion/Fall Prevention:   safety round/check completed   room organization consistent   nonskid shoes/slippers when out of bed   lighting adjusted   fall prevention program maintained   clutter free environment maintained   assistive device/personal items within reach   activity supervised  Taken 4/1/2025 0756 by Hien Avila RN  Safety Promotion/Fall Prevention:   safety round/check completed   room organization consistent   nonskid shoes/slippers when out of bed   lighting adjusted   fall prevention program maintained   clutter free environment maintained   assistive device/personal items within reach   activity supervised  Intervention: Prevent Skin Injury  Recent Flowsheet Documentation  Taken 4/1/2025 1201 by Hien Avila RN  Body Position: position changed independently  Taken 4/1/2025 1000 by Hien Avila RN  Body Position: position changed independently  Taken 4/1/2025 0756 by Hien Avila RN  Body Position: sitting up in  bed  Skin Protection: incontinence pads utilized  Intervention: Prevent Infection  Recent Flowsheet Documentation  Taken 4/1/2025 1201 by Hien Avila RN  Infection Prevention:   rest/sleep promoted   hand hygiene promoted  Taken 4/1/2025 1000 by Hien Avila RN  Infection Prevention:   rest/sleep promoted   single patient room provided   personal protective equipment utilized  Goal: Optimal Comfort and Wellbeing  4/1/2025 1756 by Hien Avila RN  Outcome: Progressing  4/1/2025 1755 by Hien Avila RN  Outcome: Progressing  Intervention: Provide Person-Centered Care  Recent Flowsheet Documentation  Taken 4/1/2025 0756 by Hien Avila RN  Trust Relationship/Rapport:   choices provided   care explained   reassurance provided   questions answered   questions encouraged   thoughts/feelings acknowledged  Goal: Readiness for Transition of Care  4/1/2025 1756 by Hien Avila RN  Outcome: Progressing  4/1/2025 1755 by Hien Avila RN  Outcome: Progressing     Problem: Skin Injury Risk Increased  Goal: Skin Health and Integrity  4/1/2025 1756 by Hien Avila RN  Outcome: Progressing  4/1/2025 1755 by Hien Avila RN  Outcome: Progressing  Intervention: Optimize Skin Protection  Recent Flowsheet Documentation  Taken 4/1/2025 1201 by Hien Avila RN  Activity Management: activity encouraged  Head of Bed (HOB) Positioning: HOB elevated  Taken 4/1/2025 1000 by Hien Avila RN  Activity Management: activity encouraged  Head of Bed (HOB) Positioning: HOB elevated  Taken 4/1/2025 0756 by Hien Avila RN  Pressure Reduction Techniques: frequent weight shift encouraged  Head of Bed (HOB) Positioning: HOB elevated  Pressure Reduction Devices: pressure-redistributing mattress utilized  Skin Protection: incontinence pads utilized   Goal Outcome Evaluation:

## 2025-04-01 NOTE — PROGRESS NOTES
"NEPHROLOGY PROGRESS NOTE------[facility]    [facility]  [phone]  Huey Keyes MD      Patient Care Team:  Montse Dawkins MD as PCP - General (Family Medicine)  Huey Keyes MD as Consulting Physician (Nephrology)      Provider:  Huey Keyes MD  Patient Name: Rommel Mcfarland  :  1945    SUBJECTIVE:    F/U ARF/SARAH/CRF/CKD    No chest pain or shortness of breath    Medication:  allopurinol, 200 mg, Oral, Daily  ceFAZolin, 2,000 mg, Intravenous, Q8H  colchicine, 0.6 mg, Oral, Daily  colestipol, 2 g, Oral, Daily  enoxaparin sodium, 40 mg, Subcutaneous, Q24H  FLUoxetine, 10 mg, Oral, Daily  folic acid, 1 mg, Oral, Daily  hydrALAZINE, 50 mg, Oral, Daily  hydrocortisone, , Topical, Q12H  ketoconazole, , Topical, Q24H  levothyroxine, 175 mcg, Oral, Q AM  pantoprazole, 40 mg, Oral, Daily  saccharomyces boulardii, 250 mg, Oral, BID  sodium chloride, 10 mL, Intravenous, Q12H  terazosin, 1 mg, Oral, Q12H  Vitamin B1, 100 mg, Oral, Daily      Pharmacy to Dose enoxaparin (LOVENOX),         OBJECTIVE    Vital Sign Min/Max for last 24 hours  Temp  Min: 97.4 °F (36.3 °C)  Max: 97.8 °F (36.6 °C)   BP  Min: 126/73  Max: 162/87   Pulse  Min: 67  Max: 72   Resp  Min: 16  Max: 18   SpO2  Min: 96 %  Max: 98 %   No data recorded   Weight  Min: 116 kg (255 lb 8.2 oz)  Max: 116 kg (255 lb 8.2 oz)     Flowsheet Rows      Flowsheet Row First Filed Value   Admission Height 170.2 cm (67\") Documented at 2025 1443   Admission Weight 104 kg (230 lb) Documented at 2025 1443            I/O this shift:  In: 300 [P.O.:300]  Out: -   I/O last 3 completed shifts:  In: 840 [P.O.:840]  Out: 1750 [Urine:1750]    Physical Exam:  General Appearance: alert, appears stated age and cooperative  Head: normocephalic, without obvious abnormality and atraumatic   Eyes: conjunctivae and sclerae normal and no icterus  Neck: supple and no JVD  Lungs: +FEW SCATTERED " "RHONCHI  Heart: regular rhythm & normal rate and normal S1, S2 +BALDOMERO  Chest Wall: no abnormalities observed  Abdomen: normal bowel sounds and soft, nontender  Extremities: moves extremities well, 1+ BILAT LE EDEMA (R>L), no cyanosis  Skin: +CHRONIC VENOUS INSUFFICIENCY BILAT LE WITH BILAT LE WOUNDS (FOOT AND CALF/PRETIBLAL AREAS) +SCATTERED ECCHYMOSIS  Neurologic: Alert, and oriented. No focal deficits    Labs:    WBC WBC   Date Value Ref Range Status   04/01/2025 6.92 3.40 - 10.80 10*3/mm3 Final   03/31/2025 7.74 3.40 - 10.80 10*3/mm3 Final   03/30/2025 5.92 3.40 - 10.80 10*3/mm3 Final      HGB Hemoglobin   Date Value Ref Range Status   04/01/2025 11.1 (L) 13.0 - 17.7 g/dL Final   03/31/2025 11.9 (L) 13.0 - 17.7 g/dL Final   03/30/2025 11.2 (L) 13.0 - 17.7 g/dL Final      HCT Hematocrit   Date Value Ref Range Status   04/01/2025 36.2 (L) 37.5 - 51.0 % Final   03/31/2025 38.7 37.5 - 51.0 % Final   03/30/2025 36.8 (L) 37.5 - 51.0 % Final      Platelets No results found for: \"LABPLAT\"   MCV MCV   Date Value Ref Range Status   04/01/2025 86.0 79.0 - 97.0 fL Final   03/31/2025 85.6 79.0 - 97.0 fL Final   03/30/2025 86.0 79.0 - 97.0 fL Final          Sodium Sodium   Date Value Ref Range Status   04/01/2025 137 136 - 145 mmol/L Final   03/31/2025 136 136 - 145 mmol/L Final   03/30/2025 136 136 - 145 mmol/L Final      Potassium Potassium   Date Value Ref Range Status   04/01/2025 4.9 3.5 - 5.2 mmol/L Final   03/31/2025 4.8 3.5 - 5.2 mmol/L Final   03/30/2025 4.6 3.5 - 5.2 mmol/L Final      Chloride Chloride   Date Value Ref Range Status   04/01/2025 99 98 - 107 mmol/L Final   03/31/2025 99 98 - 107 mmol/L Final   03/30/2025 101 98 - 107 mmol/L Final      CO2 CO2   Date Value Ref Range Status   04/01/2025 26.3 22.0 - 29.0 mmol/L Final   03/31/2025 25.2 22.0 - 29.0 mmol/L Final   03/30/2025 25.0 22.0 - 29.0 mmol/L Final      BUN BUN   Date Value Ref Range Status   04/01/2025 26 (H) 8 - 23 mg/dL Final   03/31/2025 24 (H) 8 - " "23 mg/dL Final   03/30/2025 19 8 - 23 mg/dL Final      Creatinine Creatinine   Date Value Ref Range Status   04/01/2025 1.64 (H) 0.76 - 1.27 mg/dL Final   03/31/2025 2.07 (H) 0.76 - 1.27 mg/dL Final   03/30/2025 1.87 (H) 0.76 - 1.27 mg/dL Final      Calcium Calcium   Date Value Ref Range Status   04/01/2025 9.0 8.6 - 10.5 mg/dL Final   03/31/2025 9.5 8.6 - 10.5 mg/dL Final   03/30/2025 9.0 8.6 - 10.5 mg/dL Final      PO4 No components found for: \"PO4\"   Albumin Albumin   Date Value Ref Range Status   04/01/2025 3.4 (L) 3.5 - 5.2 g/dL Final   03/31/2025 3.6 3.5 - 5.2 g/dL Final   03/30/2025 3.4 (L) 3.5 - 5.2 g/dL Final      Magnesium Magnesium   Date Value Ref Range Status   04/01/2025 1.8 1.6 - 2.4 mg/dL Final   03/31/2025 1.9 1.6 - 2.4 mg/dL Final   03/30/2025 1.9 1.6 - 2.4 mg/dL Final      Uric Acid No components found for: \"URIC ACID\"     Imaging Results (Last 72 Hours)       Procedure Component Value Units Date/Time    CT Lower Extremity Left Without Contrast [[id number]] Collected: 03/29/25 1711     Updated: 03/29/25 1721    Narrative:      CT LOWER EXTREMITY LEFT WO CONTRAST    Date of Exam: 3/29/2025 3:35 PM EDT    Indication: ATTENTION TO THE HEEL---PAIN; LOOKING FOR OSTEO.    Comparison: None available.    Technique: Axial CT images were obtained of the left lower extremity without contrast administration.  Sagittal and coronal reconstructions were performed.  Automated exposure control and iterative reconstruction methods were used.      Findings:  Edema is observed throughout the subcutaneous soft tissues circumferentially surrounding the distal lower leg, ankle, and hindfoot. The findings are nonspecific but may indicate changes of soft tissue cellulitis. No well-defined focal fluid collection is   seen to suggest abscess.    There is no evidence for osseous erosion or destruction. No definitive signs of osteomyelitis are identified.      Impression:      Impression:  1.Edema is observed throughout the " subcutaneous soft tissues circumferentially surrounding the distal lower leg, ankle, and hindfoot. The findings are nonspecific but may indicate changes of soft tissue cellulitis. No well-defined focal fluid collection   is seen to suggest abscess.  2.No evidence for osseous erosion or destruction to suggest osteomyelitis.        Electronically Signed: Riley Walls MD    3/29/2025 5:19 PM EDT    Workstation ID: [id number]    XR Tibia Fibula 2 View Right [[id number]] Collected: 03/29/25 1252     Updated: 03/29/25 1255    Narrative:      XR TIBIA FIBULA 2 VW RIGHT    Date of Exam: 3/29/2025 10:17 AM EDT    Indication: AND RIGHT ANKLE TO SEE IF HARDWARE IS PRESENT (PINS) FROM PRIOR FRACTURE    Comparison: None available.    Findings:  Remote posttraumatic and postoperative changes of the distal tibia and fibula are noted. 2 orthopedic fixation screws are observed. There are no signs of hardware failure or loosening. There is no acute displaced fracture or malalignment. Chronic changes   are noted. Atherosclerotic vascular calcifications are noted. Chronic soft tissue changes are also observed.      Impression:      Impression:  1.Remote posttraumatic and postoperative changes of the distal tibia and fibula. 2 orthopedic fixation screws are identified. There are no signs of hardware failure or loosening.  2.No evidence for acute displaced fracture or malalignment.        Electronically Signed: Riley Walls MD    3/29/2025 12:53 PM EDT    Workstation ID: [id number]            Results for orders placed during the hospital encounter of 03/27/25    XR Tibia Fibula 2 View Right    Narrative  XR TIBIA FIBULA 2 VW RIGHT    Date of Exam: 3/29/2025 10:17 AM EDT    Indication: AND RIGHT ANKLE TO SEE IF HARDWARE IS PRESENT (PINS) FROM PRIOR FRACTURE    Comparison: None available.    Findings:  Remote posttraumatic and postoperative changes of the distal tibia and fibula are noted. 2 orthopedic fixation screws are observed. There  are no signs of hardware failure or loosening. There is no acute displaced fracture or malalignment. Chronic changes  are noted. Atherosclerotic vascular calcifications are noted. Chronic soft tissue changes are also observed.    Impression  Impression:  1.Remote posttraumatic and postoperative changes of the distal tibia and fibula. 2 orthopedic fixation screws are identified. There are no signs of hardware failure or loosening.  2.No evidence for acute displaced fracture or malalignment.        Electronically Signed: Riley Walls MD  3/29/2025 12:53 PM EDT  Workstation ID: [id number]      Results for orders placed during the hospital encounter of 09/15/23    XR Foot 3+ View Right    Narrative  XR FOOT 3+ VW RIGHT    Date of Exam: 9/15/2023 2:30 PM EDT    Indication: wound, infection    Comparison: 7/9/2022    Findings:  Bones are diffusely osteopenic. There is no discrete area of osseous erosion to indicate a site of osteomyelitis. Postsurgical changes of screw fixation noted at the distal tibia and fibula similar to the prior exam. Extensive small vessel vascular  calcifications. Soft tissue swelling overlying the dorsum of the foot which may relate to edema and/or cellulitis.    Impression  Impression:  1. No discrete area of osseous erosion to indicate osteomyelitis.  2. Soft tissue swelling overlying the dorsum of the foot may relate to edema and/or cellulitis.  3. Diffuse osteopenia.  4. Extensive small vessel vascular calcifications.        Electronically Signed: Renny Johnson MD  9/15/2023 2:35 PM EDT  Workstation ID: [id number]      Results for orders placed during the hospital encounter of 07/09/22    XR Foot 2 View Right    Narrative  DATE OF EXAM:  7/9/2022 1:58 PM    PROCEDURE:  XR FOOT 2 VW RIGHT-    INDICATIONS:  Wound between first and second toe maggots present.  Cellulitis;  L03.115-Cellulitis of right lower limb; B87.9-Myiasis, unspecified    COMPARISON:  No Comparisons Available    TECHNIQUE:  A  minimum of two routine standard radiographic views were obtained of  the right foot.    FINDINGS:  There is diffuse soft tissue swelling of the right foot bones are  osteopenic. There is no acute appearing fracture. Postoperative findings  noted in the ankle at the medial malleolus and the distal fibula with  fixation screws. The calcaneus is intact. Small vessel vascular  calcifications noted.    Impression  1. Extensive soft tissue swelling of the right foot may relate to edema  and/or cellulitis.  2. No discrete area of osseous erosion.  3. No acute appearing fracture.  4. Additional chronic findings above.    Electronically Signed By-Renny Johnson MD On:7/9/2022 2:09 PM  This report was finalized on [id number] by  Renny Johnson MD.      Results for orders placed during the hospital encounter of 03/27/25    Duplex Venous Lower Extremity - BILATERAL    Interpretation Summary    Normal bilateral lower extremity venous duplex scan.    Small saphenous veins not visualized bilaterally        ASSESSMENT / PLAN      Cellulitis    ARF/SARAH/CRF/CKD-----D/Michel Bumex and follow. CRF/CKD STG 3A secondary to HTN NS. .  No NSAIDs or IV dye. Dose meds for CrCl 30-45 cc/min     2. GOUT/HYPERURICEMIA-----Increased Allopurinol despite renal dysfunction and follow with mild diuretic exposure     3. BILATERAL LE EDEMA/CHRONIC VENOUS INSUFFICIENCY/WOUNDS/CELLULITIS------Wound care. D/C Bumex and follow     4. DEPRESSION-----Ok to continue SSRI for now     5. ETOH ABUSE------Counseled     6. OA/DJD ------On Allopurinol. No NSAIDs.      7. HTN WITH CKD-----BP up a little. Add little alpha blocker. Avoid hypotension. No ACE-I/ARB/DRI     8. GERD/PUD PROPHYLAXIS------On PPI. Benefits outweigh risks despite CKD     9. HYPOTHYROIDISM------On Synthroid. TSH ok     10. DVT PROPHYLAXIS------Lovenox     11. DEPRESSION------On Prozac     12. HYPOMAGNESEMIA------Replaced     13. CHRONIC IBS------On Florastor    14. ANEMIA OF CKD-----H/H  ok    15. URINARY HESITANCY-----on alpha blocker    Creatinine better  Blood pressure controlled  No recent discharge from renal standpoint    Huey Keyes MD  [facility]  [phone]  04/01/25  10:53 EDT

## 2025-04-01 NOTE — CASE MANAGEMENT/SOCIAL WORK
Continued Stay Note  ARTUR Sánchez     Patient Name: Rommel Mcfarland  MRN: [id number]  Today's Date: 4/1/2025    Admit Date: 3/27/2025    Plan: Return home with [facility] (accepted- will need order.)   Discharge Plan       Row Name 04/01/25 1011       Plan    Plan Return home with [facility] (accepted- will need order.)                 Antonella Miller RN     Office phone: [phone]  Office fax: [phone]

## 2025-04-01 NOTE — SIGNIFICANT NOTE
04/01/25 1508   Rehab Time/Intention   Session Not Performed patient/family declined treatment  (dc pending)   Recommendation   PT - Next Appointment 04/02/25

## 2025-04-02 NOTE — CASE MANAGEMENT/SOCIAL WORK
Case Management Discharge Note      Final Note: Home with [facility]         Selected Continued Care - Discharged on 4/1/2025 Admission date: 3/27/2025 - Discharge disposition: Home-Health Care c      Home Medical Care Coordination complete.      Service Provider Services Address Phone Fax Patient Preferred    [facility], Home Nursing [facility], [place] [id number] [phone] --               Transportation Services  Private: Car    Final Discharge Disposition Code: 06 - home with home health care

## 2025-04-02 NOTE — OUTREACH NOTE
Prep Survey      Flowsheet Row Responses   Religious facility patient discharged from? [facility]   Is LACE score < 7 ? No   Eligibility Readm Mgmt   Discharge diagnosis Cellulitis   Does the patient have one of the following disease processes/diagnoses(primary or secondary)? Other   Does the patient have Home health ordered? Yes   [facility]   Is there a DME ordered? No   Prep survey completed? Yes            LENARD ACEVEDO - Registered Nurse

## 2025-04-02 NOTE — CASE MANAGEMENT/SOCIAL WORK
Continued Stay Note  ARTUR Sánchez     Patient Name: Rommel Mcfarland  MRN: [id number]  Today's Date: 4/2/2025    Admit Date: 3/27/2025    Plan: Return home with [facility] (accepted- will need order.)   Discharge Plan       Row Name 04/02/25 0809       Plan    Plan Comments CM reached out to Dr. Dawkins per secure chat to request HH order.    Final Discharge Disposition Code 06 - home with home health care    Final Note Home with [facility]             Expected Discharge Date and Time       Expected Discharge Date Expected Discharge Time    Apr 1, 2025        Diandra Larios, MACK     [phone]  [email or web address]

## 2025-04-08 ENCOUNTER — READMISSION MANAGEMENT (OUTPATIENT)
Dept: CALL CENTER | Facility: HOSPITAL | Age: 80
End: 2025-04-08
Payer: MEDICARE

## 2025-04-08 ENCOUNTER — LAB REQUISITION (OUTPATIENT)
Dept: LAB | Facility: HOSPITAL | Age: 80
End: 2025-04-08
Payer: MEDICARE

## 2025-04-08 DIAGNOSIS — L03.116 CELLULITIS OF LEFT LOWER LIMB: ICD-10-CM

## 2025-04-08 LAB
ANION GAP SERPL CALCULATED.3IONS-SCNC: 8.2 MMOL/L (ref 5–15)
BUN SERPL-MCNC: 18 MG/DL (ref 8–23)
BUN/CREAT SERPL: 12.9 (ref 7–25)
CALCIUM SPEC-SCNC: 8.7 MG/DL (ref 8.6–10.5)
CHLORIDE SERPL-SCNC: 104 MMOL/L (ref 98–107)
CO2 SERPL-SCNC: 22.8 MMOL/L (ref 22–29)
CREAT SERPL-MCNC: 1.4 MG/DL (ref 0.76–1.27)
EGFRCR SERPLBLD CKD-EPI 2021: 50.5 ML/MIN/1.73
GLUCOSE SERPL-MCNC: 85 MG/DL (ref 65–99)
POTASSIUM SERPL-SCNC: 5.5 MMOL/L (ref 3.5–5.2)
SODIUM SERPL-SCNC: 135 MMOL/L (ref 136–145)
URATE SERPL-MCNC: 5.1 MG/DL (ref 3.4–7)

## 2025-04-08 PROCEDURE — 84550 ASSAY OF BLOOD/URIC ACID: CPT | Performed by: FAMILY MEDICINE

## 2025-04-08 PROCEDURE — 80048 BASIC METABOLIC PNL TOTAL CA: CPT | Performed by: FAMILY MEDICINE

## 2025-04-08 NOTE — OUTREACH NOTE
Medical Week 1 Survey      Flowsheet Row Responses   [facility] patient discharged from? [facility]   Does the patient have one of the following disease processes/diagnoses(primary or secondary)? Other   Week 1 attempt successful? Yes   Call start time 0834   Call end time 0838   Discharge diagnosis Cellulitis   Person spoke with today (if not patient) and relationship Patient.   Meds reviewed with patient/caregiver? Yes   Does the patient have all medications ordered at discharge? Yes   Prescription comments START taking:  acetaminophen (TYLENOL)  cephalexin (KEFLEX)  polyethylene glycol (MIRALAX)  terazosin (HYTRIN)  CHANGE how you take:  Allopurinol -- medication strength, how much to take  hydrocortisone -- when to take this  ketoconazole (NIZORAL) -- how much to take, when to  take this  STOP taking:  colchicine 0.6 MG tablet   Is the patient taking all medications as directed (includes completed medication regime)? Yes   Does the patient have a primary care provider?  Yes   Comments regarding PCP Daughter scheduled appt with pcp.   What is the Home health agency?  [facility]   Has home health visited the patient within 72 hours of discharge? No   Home health comments 04/04- [facility] came. Marilyn Alvarado LPMAICOL nurse going to see patient today.   Psychosocial issues? No   Did the patient receive a copy of their discharge instructions? Yes   Nursing interventions Reviewed instructions with patient   What is the patient's perception of their health status since discharge? Improving   Is the patient/caregiver able to teach back signs and symptoms related to disease process for when to call PCP? Yes   Is the patient/caregiver able to teach back signs and symptoms related to disease process for when to call 911? Yes   Is the patient/caregiver able to teach back the hierarchy of who to call/visit for symptoms/problems? PCP, Specialist, Home health nurse, Urgent Care, ED, 911 Yes   Week 1 call  completed? Yes   Graduated Yes   Wrap up additional comments Patient states he is doing well. No concerns or questions noted.   Call end time 5070            Bernie COLEMAN - Registered Nurse

## 2025-05-14 ENCOUNTER — HOSPITAL ENCOUNTER (INPATIENT)
Facility: HOSPITAL | Age: 80
LOS: 6 days | Discharge: NURSING, REHAB OR LONG-TERM CARE | End: 2025-05-21
Attending: FAMILY MEDICINE | Admitting: FAMILY MEDICINE
Payer: MEDICARE

## 2025-05-14 ENCOUNTER — APPOINTMENT (OUTPATIENT)
Dept: GENERAL RADIOLOGY | Facility: HOSPITAL | Age: 80
End: 2025-05-14
Payer: MEDICARE

## 2025-05-14 ENCOUNTER — APPOINTMENT (OUTPATIENT)
Dept: CT IMAGING | Facility: HOSPITAL | Age: 80
End: 2025-05-14
Payer: MEDICARE

## 2025-05-14 DIAGNOSIS — N30.01 ACUTE CYSTITIS WITH HEMATURIA: ICD-10-CM

## 2025-05-14 DIAGNOSIS — J96.01 ACUTE RESPIRATORY FAILURE WITH HYPOXIA: ICD-10-CM

## 2025-05-14 DIAGNOSIS — R06.00 DYSPNEA, UNSPECIFIED TYPE: Primary | ICD-10-CM

## 2025-05-14 LAB
ALBUMIN SERPL-MCNC: 3.7 G/DL (ref 3.5–5.2)
ALBUMIN/GLOB SERPL: 1.2 G/DL
ALP SERPL-CCNC: 48 U/L (ref 39–117)
ALT SERPL W P-5'-P-CCNC: 6 U/L (ref 1–41)
ANION GAP SERPL CALCULATED.3IONS-SCNC: 16.1 MMOL/L (ref 5–15)
AST SERPL-CCNC: 11 U/L (ref 1–40)
B PARAPERT DNA SPEC QL NAA+PROBE: NOT DETECTED
B PERT DNA SPEC QL NAA+PROBE: NOT DETECTED
BACTERIA UR QL AUTO: ABNORMAL /HPF
BASOPHILS # BLD AUTO: 0.04 10*3/MM3 (ref 0–0.2)
BASOPHILS NFR BLD AUTO: 0.3 % (ref 0–1.5)
BILIRUB SERPL-MCNC: 0.8 MG/DL (ref 0–1.2)
BILIRUB UR QL STRIP: NEGATIVE
BUN SERPL-MCNC: 19 MG/DL (ref 8–23)
BUN/CREAT SERPL: 10.9 (ref 7–25)
C PNEUM DNA NPH QL NAA+NON-PROBE: NOT DETECTED
CALCIUM SPEC-SCNC: 8.8 MG/DL (ref 8.6–10.5)
CHLORIDE SERPL-SCNC: 98 MMOL/L (ref 98–107)
CLARITY UR: CLEAR
CO2 SERPL-SCNC: 19.9 MMOL/L (ref 22–29)
COLOR UR: YELLOW
CREAT SERPL-MCNC: 1.75 MG/DL (ref 0.76–1.27)
D DIMER PPP FEU-MCNC: >20 MCGFEU/ML (ref 0–0.81)
DEPRECATED RDW RBC AUTO: 53.4 FL (ref 37–54)
EGFRCR SERPLBLD CKD-EPI 2021: 38.6 ML/MIN/1.73
EOSINOPHIL # BLD AUTO: 0.01 10*3/MM3 (ref 0–0.4)
EOSINOPHIL NFR BLD AUTO: 0.1 % (ref 0.3–6.2)
ERYTHROCYTE [DISTWIDTH] IN BLOOD BY AUTOMATED COUNT: 16.8 % (ref 12.3–15.4)
FLUAV SUBTYP SPEC NAA+PROBE: NOT DETECTED
FLUBV RNA ISLT QL NAA+PROBE: NOT DETECTED
GEN 5 1HR TROPONIN T REFLEX: 33 NG/L
GLOBULIN UR ELPH-MCNC: 3 GM/DL
GLUCOSE SERPL-MCNC: 127 MG/DL (ref 65–99)
GLUCOSE UR STRIP-MCNC: NEGATIVE MG/DL
HADV DNA SPEC NAA+PROBE: NOT DETECTED
HCOV 229E RNA SPEC QL NAA+PROBE: NOT DETECTED
HCOV HKU1 RNA SPEC QL NAA+PROBE: NOT DETECTED
HCOV NL63 RNA SPEC QL NAA+PROBE: NOT DETECTED
HCOV OC43 RNA SPEC QL NAA+PROBE: NOT DETECTED
HCT VFR BLD AUTO: 38.1 % (ref 37.5–51)
HGB BLD-MCNC: 11.7 G/DL (ref 13–17.7)
HGB UR QL STRIP.AUTO: ABNORMAL
HMPV RNA NPH QL NAA+NON-PROBE: NOT DETECTED
HPIV1 RNA ISLT QL NAA+PROBE: NOT DETECTED
HPIV2 RNA SPEC QL NAA+PROBE: NOT DETECTED
HPIV3 RNA NPH QL NAA+PROBE: NOT DETECTED
HPIV4 P GENE NPH QL NAA+PROBE: NOT DETECTED
HYALINE CASTS UR QL AUTO: ABNORMAL /LPF
IMM GRANULOCYTES # BLD AUTO: 0.07 10*3/MM3 (ref 0–0.05)
IMM GRANULOCYTES NFR BLD AUTO: 0.5 % (ref 0–0.5)
KETONES UR QL STRIP: ABNORMAL
LEUKOCYTE ESTERASE UR QL STRIP.AUTO: ABNORMAL
LYMPHOCYTES # BLD AUTO: 1.11 10*3/MM3 (ref 0.7–3.1)
LYMPHOCYTES NFR BLD AUTO: 8.4 % (ref 19.6–45.3)
M PNEUMO IGG SER IA-ACNC: NOT DETECTED
MCH RBC QN AUTO: 26.8 PG (ref 26.6–33)
MCHC RBC AUTO-ENTMCNC: 30.7 G/DL (ref 31.5–35.7)
MCV RBC AUTO: 87.2 FL (ref 79–97)
MONOCYTES # BLD AUTO: 1.55 10*3/MM3 (ref 0.1–0.9)
MONOCYTES NFR BLD AUTO: 11.7 % (ref 5–12)
NEUTROPHILS NFR BLD AUTO: 10.44 10*3/MM3 (ref 1.7–7)
NEUTROPHILS NFR BLD AUTO: 79 % (ref 42.7–76)
NITRITE UR QL STRIP: NEGATIVE
NRBC BLD AUTO-RTO: 0 /100 WBC (ref 0–0.2)
NT-PROBNP SERPL-MCNC: 3853 PG/ML (ref 0–1800)
PH UR STRIP.AUTO: 6 [PH] (ref 5–8)
PLATELET # BLD AUTO: 191 10*3/MM3 (ref 140–450)
PMV BLD AUTO: 10.4 FL (ref 6–12)
POTASSIUM SERPL-SCNC: 3.9 MMOL/L (ref 3.5–5.2)
PROT SERPL-MCNC: 6.7 G/DL (ref 6–8.5)
PROT UR QL STRIP: ABNORMAL
RBC # BLD AUTO: 4.37 10*6/MM3 (ref 4.14–5.8)
RBC # UR STRIP: ABNORMAL /HPF
REF LAB TEST METHOD: ABNORMAL
RHINOVIRUS RNA SPEC NAA+PROBE: NOT DETECTED
RSV RNA NPH QL NAA+NON-PROBE: NOT DETECTED
SARS-COV-2 RNA RESP QL NAA+PROBE: DETECTED
SODIUM SERPL-SCNC: 134 MMOL/L (ref 136–145)
SP GR UR STRIP: 1.02 (ref 1–1.03)
SQUAMOUS #/AREA URNS HPF: ABNORMAL /HPF
TROPONIN T % DELTA: 3
TROPONIN T NUMERIC DELTA: 1 NG/L
TROPONIN T SERPL HS-MCNC: 32 NG/L
UROBILINOGEN UR QL STRIP: ABNORMAL
WBC # UR STRIP: ABNORMAL /HPF
WBC NRBC COR # BLD AUTO: 13.22 10*3/MM3 (ref 3.4–10.8)

## 2025-05-14 PROCEDURE — 87086 URINE CULTURE/COLONY COUNT: CPT

## 2025-05-14 PROCEDURE — 85025 COMPLETE CBC W/AUTO DIFF WBC: CPT

## 2025-05-14 PROCEDURE — 93005 ELECTROCARDIOGRAM TRACING: CPT | Performed by: FAMILY MEDICINE

## 2025-05-14 PROCEDURE — 84484 ASSAY OF TROPONIN QUANT: CPT

## 2025-05-14 PROCEDURE — 85379 FIBRIN DEGRADATION QUANT: CPT

## 2025-05-14 PROCEDURE — 84300 ASSAY OF URINE SODIUM: CPT | Performed by: INTERNAL MEDICINE

## 2025-05-14 PROCEDURE — 82550 ASSAY OF CK (CPK): CPT | Performed by: INTERNAL MEDICINE

## 2025-05-14 PROCEDURE — 94799 UNLISTED PULMONARY SVC/PX: CPT

## 2025-05-14 PROCEDURE — 71045 X-RAY EXAM CHEST 1 VIEW: CPT

## 2025-05-14 PROCEDURE — 84443 ASSAY THYROID STIM HORMONE: CPT | Performed by: INTERNAL MEDICINE

## 2025-05-14 PROCEDURE — 0202U NFCT DS 22 TRGT SARS-COV-2: CPT

## 2025-05-14 PROCEDURE — 25510000001 IOPAMIDOL PER 1 ML

## 2025-05-14 PROCEDURE — 36415 COLL VENOUS BLD VENIPUNCTURE: CPT

## 2025-05-14 PROCEDURE — 82728 ASSAY OF FERRITIN: CPT | Performed by: INTERNAL MEDICINE

## 2025-05-14 PROCEDURE — 87205 SMEAR GRAM STAIN: CPT | Performed by: INTERNAL MEDICINE

## 2025-05-14 PROCEDURE — 25010000002 METHYLPREDNISOLONE PER 125 MG

## 2025-05-14 PROCEDURE — 71275 CT ANGIOGRAPHY CHEST: CPT

## 2025-05-14 PROCEDURE — 80053 COMPREHEN METABOLIC PANEL: CPT

## 2025-05-14 PROCEDURE — 83605 ASSAY OF LACTIC ACID: CPT

## 2025-05-14 PROCEDURE — 25010000002 CEFTRIAXONE PER 250 MG

## 2025-05-14 PROCEDURE — 93005 ELECTROCARDIOGRAM TRACING: CPT

## 2025-05-14 PROCEDURE — 83540 ASSAY OF IRON: CPT | Performed by: INTERNAL MEDICINE

## 2025-05-14 PROCEDURE — 99285 EMERGENCY DEPT VISIT HI MDM: CPT

## 2025-05-14 PROCEDURE — 84466 ASSAY OF TRANSFERRIN: CPT | Performed by: INTERNAL MEDICINE

## 2025-05-14 PROCEDURE — 83880 ASSAY OF NATRIURETIC PEPTIDE: CPT

## 2025-05-14 PROCEDURE — 94640 AIRWAY INHALATION TREATMENT: CPT

## 2025-05-14 PROCEDURE — 81001 URINALYSIS AUTO W/SCOPE: CPT

## 2025-05-14 PROCEDURE — 84550 ASSAY OF BLOOD/URIC ACID: CPT | Performed by: INTERNAL MEDICINE

## 2025-05-14 RX ORDER — METHYLPREDNISOLONE SODIUM SUCCINATE 125 MG/2ML
125 INJECTION, POWDER, LYOPHILIZED, FOR SOLUTION INTRAMUSCULAR; INTRAVENOUS ONCE
Status: COMPLETED | OUTPATIENT
Start: 2025-05-14 | End: 2025-05-14

## 2025-05-14 RX ORDER — IOPAMIDOL 755 MG/ML
100 INJECTION, SOLUTION INTRAVASCULAR
Status: COMPLETED | OUTPATIENT
Start: 2025-05-15 | End: 2025-05-14

## 2025-05-14 RX ORDER — IPRATROPIUM BROMIDE AND ALBUTEROL SULFATE 2.5; .5 MG/3ML; MG/3ML
3 SOLUTION RESPIRATORY (INHALATION) ONCE
Status: COMPLETED | OUTPATIENT
Start: 2025-05-14 | End: 2025-05-14

## 2025-05-14 RX ADMIN — CEFTRIAXONE 2000 MG: 2 INJECTION, POWDER, FOR SOLUTION INTRAMUSCULAR; INTRAVENOUS at 23:49

## 2025-05-14 RX ADMIN — IOPAMIDOL 100 ML: 755 INJECTION, SOLUTION INTRAVENOUS at 23:45

## 2025-05-14 RX ADMIN — METHYLPREDNISOLONE SODIUM SUCCINATE 125 MG: 125 INJECTION, POWDER, FOR SOLUTION INTRAMUSCULAR; INTRAVENOUS at 21:28

## 2025-05-14 RX ADMIN — IPRATROPIUM BROMIDE AND ALBUTEROL SULFATE 3 ML: .5; 3 SOLUTION RESPIRATORY (INHALATION) at 21:35

## 2025-05-14 NOTE — Clinical Note
Level of Care: Telemetry [5]   Admitting Physician: [facility] DENNISON [[id number]]   Attending Physician: [facility] DENNISON [[id number]]

## 2025-05-15 ENCOUNTER — APPOINTMENT (OUTPATIENT)
Dept: CARDIOLOGY | Facility: HOSPITAL | Age: 80
End: 2025-05-15
Payer: MEDICARE

## 2025-05-15 ENCOUNTER — APPOINTMENT (OUTPATIENT)
Dept: ULTRASOUND IMAGING | Facility: HOSPITAL | Age: 80
End: 2025-05-15
Payer: MEDICARE

## 2025-05-15 PROBLEM — J96.20 ACUTE AND CHRONIC RESPIRATORY FAILURE (ACUTE-ON-CHRONIC): Status: ACTIVE | Noted: 2025-05-15

## 2025-05-15 LAB
ARTERIAL PATENCY WRIST A: POSITIVE
ATMOSPHERIC PRESS: ABNORMAL MM[HG]
BASE EXCESS BLDA CALC-SCNC: -2.9 MMOL/L (ref 0–3)
BDY SITE: ABNORMAL
BH CV LOW VAS LEFT GREATER SAPH BK VESSEL: 1
BH CV LOW VAS LEFT LESSER SAPH VESSEL: 1
BH CV LOWER VASCULAR LEFT COMMON FEMORAL AUGMENT: NORMAL
BH CV LOWER VASCULAR LEFT COMMON FEMORAL COMPETENT: NORMAL
BH CV LOWER VASCULAR LEFT COMMON FEMORAL COMPRESS: NORMAL
BH CV LOWER VASCULAR LEFT COMMON FEMORAL PHASIC: NORMAL
BH CV LOWER VASCULAR LEFT COMMON FEMORAL SPONT: NORMAL
BH CV LOWER VASCULAR LEFT DISTAL FEMORAL COMPRESS: NORMAL
BH CV LOWER VASCULAR LEFT GASTRONEMIUS COMPRESS: NORMAL
BH CV LOWER VASCULAR LEFT GREATER SAPH AK COMPRESS: NORMAL
BH CV LOWER VASCULAR LEFT GREATER SAPH BK COMPRESS: NORMAL
BH CV LOWER VASCULAR LEFT GREATER SAPH BK THROMBUS: NORMAL
BH CV LOWER VASCULAR LEFT LESSER SAPH COMPRESS: NORMAL
BH CV LOWER VASCULAR LEFT LESSER SAPH THROMBUS: NORMAL
BH CV LOWER VASCULAR LEFT MID FEMORAL AUGMENT: NORMAL
BH CV LOWER VASCULAR LEFT MID FEMORAL COMPETENT: NORMAL
BH CV LOWER VASCULAR LEFT MID FEMORAL COMPRESS: NORMAL
BH CV LOWER VASCULAR LEFT MID FEMORAL PHASIC: NORMAL
BH CV LOWER VASCULAR LEFT MID FEMORAL SPONT: NORMAL
BH CV LOWER VASCULAR LEFT POPLITEAL AUGMENT: NORMAL
BH CV LOWER VASCULAR LEFT POPLITEAL COMPETENT: NORMAL
BH CV LOWER VASCULAR LEFT POPLITEAL COMPRESS: NORMAL
BH CV LOWER VASCULAR LEFT POPLITEAL PHASIC: NORMAL
BH CV LOWER VASCULAR LEFT POPLITEAL SPONT: NORMAL
BH CV LOWER VASCULAR LEFT POSTERIOR TIBIAL COMPRESS: NORMAL
BH CV LOWER VASCULAR LEFT PROXIMAL FEMORAL COMPRESS: NORMAL
BH CV LOWER VASCULAR LEFT SAPHENOFEMORAL JUNCTION COMPRESS: NORMAL
BH CV LOWER VASCULAR RIGHT COMMON FEMORAL AUGMENT: NORMAL
BH CV LOWER VASCULAR RIGHT COMMON FEMORAL COMPETENT: NORMAL
BH CV LOWER VASCULAR RIGHT COMMON FEMORAL COMPRESS: NORMAL
BH CV LOWER VASCULAR RIGHT COMMON FEMORAL PHASIC: NORMAL
BH CV LOWER VASCULAR RIGHT COMMON FEMORAL SPONT: NORMAL
BH CV LOWER VASCULAR RIGHT DISTAL FEMORAL COMPRESS: NORMAL
BH CV LOWER VASCULAR RIGHT GASTRONEMIUS COMPRESS: NORMAL
BH CV LOWER VASCULAR RIGHT GREATER SAPH AK COMPRESS: NORMAL
BH CV LOWER VASCULAR RIGHT GREATER SAPH BK COMPRESS: NORMAL
BH CV LOWER VASCULAR RIGHT LESSER SAPH COMPRESS: NORMAL
BH CV LOWER VASCULAR RIGHT MID FEMORAL AUGMENT: NORMAL
BH CV LOWER VASCULAR RIGHT MID FEMORAL COMPETENT: NORMAL
BH CV LOWER VASCULAR RIGHT MID FEMORAL COMPRESS: NORMAL
BH CV LOWER VASCULAR RIGHT MID FEMORAL PHASIC: NORMAL
BH CV LOWER VASCULAR RIGHT MID FEMORAL SPONT: NORMAL
BH CV LOWER VASCULAR RIGHT POPLITEAL AUGMENT: NORMAL
BH CV LOWER VASCULAR RIGHT POPLITEAL COMPETENT: NORMAL
BH CV LOWER VASCULAR RIGHT POPLITEAL COMPRESS: NORMAL
BH CV LOWER VASCULAR RIGHT POPLITEAL PHASIC: NORMAL
BH CV LOWER VASCULAR RIGHT POPLITEAL SPONT: NORMAL
BH CV LOWER VASCULAR RIGHT POSTERIOR TIBIAL COMPRESS: NORMAL
BH CV LOWER VASCULAR RIGHT PROXIMAL FEMORAL COMPRESS: NORMAL
BH CV LOWER VASCULAR RIGHT SAPHENOFEMORAL JUNCTION COMPRESS: NORMAL
CK SERPL-CCNC: 44 U/L (ref 20–200)
CO2 BLDA-SCNC: 23.1 MMOL/L (ref 22–29)
D-LACTATE SERPL-SCNC: 1.3 MMOL/L (ref 0.5–2)
D-LACTATE SERPL-SCNC: 2.2 MMOL/L (ref 0.5–2)
D-LACTATE SERPL-SCNC: 2.4 MMOL/L (ref 0.5–2)
D-LACTATE SERPL-SCNC: 2.6 MMOL/L (ref 0.5–2)
D-LACTATE SERPL-SCNC: 2.8 MMOL/L (ref 0.5–2)
EOSINOPHIL SPEC QL MICRO: 0 % EOS/100 CELLS (ref 0–0)
FERRITIN SERPL-MCNC: 152 NG/ML (ref 30–400)
GLUCOSE BLDC GLUCOMTR-MCNC: 155 MG/DL (ref 70–105)
GLUCOSE BLDC GLUCOMTR-MCNC: 159 MG/DL (ref 70–105)
HCO3 BLDA-SCNC: 22 MMOL/L (ref 21–28)
HEMODILUTION: NO
INHALED O2 CONCENTRATION: 28 %
IRON 24H UR-MRATE: 10 MCG/DL (ref 59–158)
IRON SATN MFR SERPL: 3 % (ref 20–50)
MAGNESIUM SERPL-MCNC: 1.6 MG/DL (ref 1.6–2.4)
MODALITY: ABNORMAL
PCO2 BLDA: 37.8 MM HG (ref 35–48)
PH BLDA: 7.37 PH UNITS (ref 7.35–7.45)
PO2 BLD: 366 MM[HG] (ref 0–500)
PO2 BLDA: 102.6 MM HG (ref 83–108)
QT INTERVAL: 372 MS
QTC INTERVAL: 455 MS
SAO2 % BLDCOA: 97.8 % (ref 94–98)
SODIUM UR-SCNC: 50 MMOL/L
TIBC SERPL-MCNC: 291 MCG/DL (ref 298–536)
TRANSFERRIN SERPL-MCNC: 195 MG/DL (ref 200–360)
TSH SERPL DL<=0.05 MIU/L-ACNC: 1.08 UIU/ML (ref 0.27–4.2)
URATE SERPL-MCNC: 5.3 MG/DL (ref 3.4–7)

## 2025-05-15 PROCEDURE — 93970 EXTREMITY STUDY: CPT

## 2025-05-15 PROCEDURE — 83605 ASSAY OF LACTIC ACID: CPT | Performed by: INTERNAL MEDICINE

## 2025-05-15 PROCEDURE — 94799 UNLISTED PULMONARY SVC/PX: CPT

## 2025-05-15 PROCEDURE — 99222 1ST HOSP IP/OBS MODERATE 55: CPT | Performed by: INTERNAL MEDICINE

## 2025-05-15 PROCEDURE — 93970 EXTREMITY STUDY: CPT | Performed by: SURGERY

## 2025-05-15 PROCEDURE — 25010000002 METHYLPREDNISOLONE PER 40 MG: Performed by: INTERNAL MEDICINE

## 2025-05-15 PROCEDURE — 93010 ELECTROCARDIOGRAM REPORT: CPT | Performed by: INTERNAL MEDICINE

## 2025-05-15 PROCEDURE — 93005 ELECTROCARDIOGRAM TRACING: CPT

## 2025-05-15 PROCEDURE — 25010000002 ENOXAPARIN PER 10 MG: Performed by: FAMILY MEDICINE

## 2025-05-15 PROCEDURE — 80053 COMPREHEN METABOLIC PANEL: CPT | Performed by: FAMILY MEDICINE

## 2025-05-15 PROCEDURE — 82803 BLOOD GASES ANY COMBINATION: CPT

## 2025-05-15 PROCEDURE — 25010000002 CEFTRIAXONE PER 250 MG: Performed by: FAMILY MEDICINE

## 2025-05-15 PROCEDURE — 25010000002 METHYLPREDNISOLONE PER 40 MG: Performed by: FAMILY MEDICINE

## 2025-05-15 PROCEDURE — 84100 ASSAY OF PHOSPHORUS: CPT | Performed by: FAMILY MEDICINE

## 2025-05-15 PROCEDURE — 83735 ASSAY OF MAGNESIUM: CPT

## 2025-05-15 PROCEDURE — 25810000003 SODIUM CHLORIDE 0.9 % SOLUTION: Performed by: INTERNAL MEDICINE

## 2025-05-15 PROCEDURE — 82948 REAGENT STRIP/BLOOD GLUCOSE: CPT | Performed by: FAMILY MEDICINE

## 2025-05-15 PROCEDURE — 94660 CPAP INITIATION&MGMT: CPT

## 2025-05-15 PROCEDURE — 94664 DEMO&/EVAL PT USE INHALER: CPT

## 2025-05-15 PROCEDURE — 82330 ASSAY OF CALCIUM: CPT | Performed by: INTERNAL MEDICINE

## 2025-05-15 PROCEDURE — 94761 N-INVAS EAR/PLS OXIMETRY MLT: CPT

## 2025-05-15 PROCEDURE — 36600 WITHDRAWAL OF ARTERIAL BLOOD: CPT

## 2025-05-15 PROCEDURE — 85025 COMPLETE CBC W/AUTO DIFF WBC: CPT | Performed by: FAMILY MEDICINE

## 2025-05-15 PROCEDURE — 25010000002 NA FERRIC GLUC CPLX PER 12.5 MG: Performed by: INTERNAL MEDICINE

## 2025-05-15 PROCEDURE — 83735 ASSAY OF MAGNESIUM: CPT | Performed by: FAMILY MEDICINE

## 2025-05-15 PROCEDURE — 76775 US EXAM ABDO BACK WALL LIM: CPT

## 2025-05-15 RX ORDER — ACETAMINOPHEN 160 MG/5ML
650 SOLUTION ORAL EVERY 4 HOURS PRN
Status: DISCONTINUED | OUTPATIENT
Start: 2025-05-15 | End: 2025-05-21

## 2025-05-15 RX ORDER — AMOXICILLIN 250 MG
2 CAPSULE ORAL 2 TIMES DAILY PRN
Status: DISCONTINUED | OUTPATIENT
Start: 2025-05-15 | End: 2025-05-21 | Stop reason: HOSPADM

## 2025-05-15 RX ORDER — ENOXAPARIN SODIUM 100 MG/ML
40 INJECTION SUBCUTANEOUS DAILY
Status: DISCONTINUED | OUTPATIENT
Start: 2025-05-15 | End: 2025-05-15

## 2025-05-15 RX ORDER — SODIUM CHLORIDE 9 MG/ML
40 INJECTION, SOLUTION INTRAVENOUS AS NEEDED
Status: DISCONTINUED | OUTPATIENT
Start: 2025-05-15 | End: 2025-05-21 | Stop reason: HOSPADM

## 2025-05-15 RX ORDER — HYDRALAZINE HYDROCHLORIDE 25 MG/1
50 TABLET, FILM COATED ORAL 2 TIMES DAILY
Status: DISCONTINUED | OUTPATIENT
Start: 2025-05-15 | End: 2025-05-19

## 2025-05-15 RX ORDER — SODIUM CHLORIDE 0.9 % (FLUSH) 0.9 %
10 SYRINGE (ML) INJECTION AS NEEDED
Status: DISCONTINUED | OUTPATIENT
Start: 2025-05-15 | End: 2025-05-21 | Stop reason: HOSPADM

## 2025-05-15 RX ORDER — NICOTINE 21 MG/24HR
1 PATCH, TRANSDERMAL 24 HOURS TRANSDERMAL EVERY 24 HOURS
Status: CANCELLED | OUTPATIENT
Start: 2025-05-15

## 2025-05-15 RX ORDER — FLUOXETINE 10 MG/1
10 CAPSULE ORAL DAILY
Status: DISCONTINUED | OUTPATIENT
Start: 2025-05-15 | End: 2025-05-21 | Stop reason: HOSPADM

## 2025-05-15 RX ORDER — SODIUM CHLORIDE 9 MG/ML
75 INJECTION, SOLUTION INTRAVENOUS CONTINUOUS
Status: DISPENSED | OUTPATIENT
Start: 2025-05-15 | End: 2025-05-16

## 2025-05-15 RX ORDER — PANTOPRAZOLE SODIUM 40 MG/1
40 TABLET, DELAYED RELEASE ORAL DAILY
Status: DISCONTINUED | OUTPATIENT
Start: 2025-05-15 | End: 2025-05-21 | Stop reason: HOSPADM

## 2025-05-15 RX ORDER — METOPROLOL SUCCINATE 25 MG/1
25 TABLET, EXTENDED RELEASE ORAL
Status: DISCONTINUED | OUTPATIENT
Start: 2025-05-15 | End: 2025-05-19

## 2025-05-15 RX ORDER — MINERAL OIL/HYDROPHIL PETROLAT
1 OINTMENT (GRAM) TOPICAL DAILY
Status: DISCONTINUED | OUTPATIENT
Start: 2025-05-15 | End: 2025-05-21 | Stop reason: HOSPADM

## 2025-05-15 RX ORDER — POLYETHYLENE GLYCOL 3350 17 G/17G
17 POWDER, FOR SOLUTION ORAL DAILY PRN
Status: DISCONTINUED | OUTPATIENT
Start: 2025-05-15 | End: 2025-05-21 | Stop reason: HOSPADM

## 2025-05-15 RX ORDER — ONDANSETRON 4 MG/1
4 TABLET, ORALLY DISINTEGRATING ORAL EVERY 4 HOURS PRN
Status: DISCONTINUED | OUTPATIENT
Start: 2025-05-15 | End: 2025-05-21 | Stop reason: HOSPADM

## 2025-05-15 RX ORDER — NITROGLYCERIN 0.4 MG/1
0.4 TABLET SUBLINGUAL
Status: DISCONTINUED | OUTPATIENT
Start: 2025-05-15 | End: 2025-05-21 | Stop reason: HOSPADM

## 2025-05-15 RX ORDER — ONDANSETRON 2 MG/ML
4 INJECTION INTRAMUSCULAR; INTRAVENOUS EVERY 4 HOURS PRN
Status: DISCONTINUED | OUTPATIENT
Start: 2025-05-15 | End: 2025-05-21

## 2025-05-15 RX ORDER — HYDRALAZINE HYDROCHLORIDE 25 MG/1
50 TABLET, FILM COATED ORAL DAILY
Status: DISCONTINUED | OUTPATIENT
Start: 2025-05-15 | End: 2025-05-15

## 2025-05-15 RX ORDER — SODIUM CHLORIDE AND POTASSIUM CHLORIDE 150; 900 MG/100ML; MG/100ML
100 INJECTION, SOLUTION INTRAVENOUS CONTINUOUS
Status: CANCELLED | OUTPATIENT
Start: 2025-05-15

## 2025-05-15 RX ORDER — CHOLESTYRAMINE LIGHT 4 G/5.7G
1 POWDER, FOR SUSPENSION ORAL DAILY
Status: DISCONTINUED | OUTPATIENT
Start: 2025-05-15 | End: 2025-05-21

## 2025-05-15 RX ORDER — FOLIC ACID 1 MG/1
1 TABLET ORAL DAILY
Status: DISCONTINUED | OUTPATIENT
Start: 2025-05-15 | End: 2025-05-21 | Stop reason: HOSPADM

## 2025-05-15 RX ORDER — METHYLPREDNISOLONE SODIUM SUCCINATE 40 MG/ML
20 INJECTION, POWDER, LYOPHILIZED, FOR SOLUTION INTRAMUSCULAR; INTRAVENOUS EVERY 12 HOURS
Status: DISCONTINUED | OUTPATIENT
Start: 2025-05-15 | End: 2025-05-16

## 2025-05-15 RX ORDER — CALCIUM CARBONATE 500 MG/1
2 TABLET, CHEWABLE ORAL 2 TIMES DAILY PRN
Status: DISCONTINUED | OUTPATIENT
Start: 2025-05-15 | End: 2025-05-21 | Stop reason: HOSPADM

## 2025-05-15 RX ORDER — SACCHAROMYCES BOULARDII 250 MG
250 CAPSULE ORAL 2 TIMES DAILY
Status: DISCONTINUED | OUTPATIENT
Start: 2025-05-15 | End: 2025-05-19 | Stop reason: SDUPTHER

## 2025-05-15 RX ORDER — SODIUM BICARBONATE 650 MG/1
1300 TABLET ORAL 3 TIMES DAILY
Status: DISCONTINUED | OUTPATIENT
Start: 2025-05-15 | End: 2025-05-21 | Stop reason: HOSPADM

## 2025-05-15 RX ORDER — TERAZOSIN 1 MG/1
1 CAPSULE ORAL DAILY
COMMUNITY

## 2025-05-15 RX ORDER — ENOXAPARIN SODIUM 150 MG/ML
1 INJECTION SUBCUTANEOUS EVERY 12 HOURS
Status: DISCONTINUED | OUTPATIENT
Start: 2025-05-15 | End: 2025-05-16

## 2025-05-15 RX ORDER — ACETAMINOPHEN 325 MG/1
650 TABLET ORAL EVERY 4 HOURS PRN
Status: DISCONTINUED | OUTPATIENT
Start: 2025-05-15 | End: 2025-05-21 | Stop reason: HOSPADM

## 2025-05-15 RX ORDER — ACETAMINOPHEN 650 MG/1
650 SUPPOSITORY RECTAL EVERY 4 HOURS PRN
Status: DISCONTINUED | OUTPATIENT
Start: 2025-05-15 | End: 2025-05-21

## 2025-05-15 RX ORDER — BISACODYL 10 MG
10 SUPPOSITORY, RECTAL RECTAL DAILY PRN
Status: DISCONTINUED | OUTPATIENT
Start: 2025-05-15 | End: 2025-05-21 | Stop reason: HOSPADM

## 2025-05-15 RX ORDER — SODIUM CHLORIDE 0.9 % (FLUSH) 0.9 %
10 SYRINGE (ML) INJECTION EVERY 12 HOURS SCHEDULED
Status: DISCONTINUED | OUTPATIENT
Start: 2025-05-15 | End: 2025-05-21 | Stop reason: HOSPADM

## 2025-05-15 RX ORDER — METHYLPREDNISOLONE SODIUM SUCCINATE 40 MG/ML
40 INJECTION, POWDER, LYOPHILIZED, FOR SOLUTION INTRAMUSCULAR; INTRAVENOUS EVERY 8 HOURS
Status: DISCONTINUED | OUTPATIENT
Start: 2025-05-15 | End: 2025-05-15

## 2025-05-15 RX ORDER — IPRATROPIUM BROMIDE AND ALBUTEROL SULFATE 2.5; .5 MG/3ML; MG/3ML
3 SOLUTION RESPIRATORY (INHALATION)
Status: DISCONTINUED | OUTPATIENT
Start: 2025-05-15 | End: 2025-05-15

## 2025-05-15 RX ORDER — ALLOPURINOL 100 MG/1
200 TABLET ORAL DAILY
Status: DISCONTINUED | OUTPATIENT
Start: 2025-05-15 | End: 2025-05-21 | Stop reason: HOSPADM

## 2025-05-15 RX ORDER — TERAZOSIN 1 MG/1
1 CAPSULE ORAL DAILY
Status: DISCONTINUED | OUTPATIENT
Start: 2025-05-15 | End: 2025-05-21 | Stop reason: HOSPADM

## 2025-05-15 RX ORDER — BISACODYL 5 MG/1
5 TABLET, DELAYED RELEASE ORAL DAILY PRN
Status: DISCONTINUED | OUTPATIENT
Start: 2025-05-15 | End: 2025-05-21 | Stop reason: HOSPADM

## 2025-05-15 RX ORDER — LEVOTHYROXINE SODIUM 175 UG/1
175 TABLET ORAL
Status: DISCONTINUED | OUTPATIENT
Start: 2025-05-15 | End: 2025-05-21 | Stop reason: HOSPADM

## 2025-05-15 RX ORDER — ALUMINA, MAGNESIA, AND SIMETHICONE 2400; 2400; 240 MG/30ML; MG/30ML; MG/30ML
15 SUSPENSION ORAL EVERY 6 HOURS PRN
Status: DISCONTINUED | OUTPATIENT
Start: 2025-05-15 | End: 2025-05-21 | Stop reason: HOSPADM

## 2025-05-15 RX ORDER — INDOMETHACIN 25 MG/1
50 CAPSULE ORAL ONCE
Status: COMPLETED | OUTPATIENT
Start: 2025-05-15 | End: 2025-05-15

## 2025-05-15 RX ADMIN — FOLIC ACID 1 MG: 1 TABLET ORAL at 08:20

## 2025-05-15 RX ADMIN — PANTOPRAZOLE SODIUM 40 MG: 40 TABLET, DELAYED RELEASE ORAL at 08:20

## 2025-05-15 RX ADMIN — FLUOXETINE HYDROCHLORIDE 10 MG: 10 CAPSULE ORAL at 08:20

## 2025-05-15 RX ADMIN — Medication 10 ML: at 21:02

## 2025-05-15 RX ADMIN — IPRATROPIUM BROMIDE AND ALBUTEROL SULFATE 3 ML: .5; 3 SOLUTION RESPIRATORY (INHALATION) at 07:35

## 2025-05-15 RX ADMIN — METHYLPREDNISOLONE SODIUM SUCCINATE 20 MG: 40 INJECTION, POWDER, FOR SOLUTION INTRAMUSCULAR; INTRAVENOUS at 20:57

## 2025-05-15 RX ADMIN — ENOXAPARIN SODIUM 120 MG: 150 INJECTION SUBCUTANEOUS at 12:32

## 2025-05-15 RX ADMIN — ALLOPURINOL 200 MG: 100 TABLET ORAL at 08:20

## 2025-05-15 RX ADMIN — METHYLPREDNISOLONE SODIUM SUCCINATE 40 MG: 40 INJECTION, POWDER, FOR SOLUTION INTRAMUSCULAR; INTRAVENOUS at 08:18

## 2025-05-15 RX ADMIN — SENNOSIDES AND DOCUSATE SODIUM 2 TABLET: 50; 8.6 TABLET ORAL at 23:02

## 2025-05-15 RX ADMIN — CEFTRIAXONE 2000 MG: 2 INJECTION, POWDER, FOR SOLUTION INTRAMUSCULAR; INTRAVENOUS at 08:19

## 2025-05-15 RX ADMIN — Medication 10 ML: at 08:40

## 2025-05-15 RX ADMIN — SODIUM BICARBONATE 1300 MG: 650 TABLET ORAL at 17:04

## 2025-05-15 RX ADMIN — SODIUM CHLORIDE 500 ML: 9 INJECTION, SOLUTION INTRAVENOUS at 12:32

## 2025-05-15 RX ADMIN — METOPROLOL SUCCINATE 25 MG: 25 TABLET, EXTENDED RELEASE ORAL at 17:04

## 2025-05-15 RX ADMIN — Medication 100 MG: at 08:20

## 2025-05-15 RX ADMIN — TERAZOSIN HYDROCHLORIDE 1 MG: 1 CAPSULE ORAL at 08:19

## 2025-05-15 RX ADMIN — HYDRALAZINE HYDROCHLORIDE 50 MG: 25 TABLET ORAL at 20:54

## 2025-05-15 RX ADMIN — SODIUM BICARBONATE 1300 MG: 650 TABLET ORAL at 20:55

## 2025-05-15 RX ADMIN — Medication 250 MG: at 08:20

## 2025-05-15 RX ADMIN — SODIUM CHLORIDE 125 MG: 9 INJECTION, SOLUTION INTRAVENOUS at 09:53

## 2025-05-15 RX ADMIN — SODIUM BICARBONATE 50 MEQ: 84 INJECTION INTRAVENOUS at 12:32

## 2025-05-15 RX ADMIN — SODIUM BICARBONATE 1300 MG: 650 TABLET ORAL at 09:19

## 2025-05-15 RX ADMIN — SODIUM CHLORIDE 75 ML/HR: 9 INJECTION, SOLUTION INTRAVENOUS at 08:41

## 2025-05-15 RX ADMIN — HYDRALAZINE HYDROCHLORIDE 50 MG: 25 TABLET ORAL at 08:20

## 2025-05-15 RX ADMIN — LEVOTHYROXINE SODIUM 175 MCG: 0.03 TABLET ORAL at 08:20

## 2025-05-15 RX ADMIN — IPRATROPIUM BROMIDE 0.5 MG: 0.5 SOLUTION RESPIRATORY (INHALATION) at 10:31

## 2025-05-15 RX ADMIN — WHITE PETROLATUM 1 APPLICATION: 1.75 OINTMENT TOPICAL at 12:32

## 2025-05-15 RX ADMIN — IPRATROPIUM BROMIDE 0.5 MG: 0.5 SOLUTION RESPIRATORY (INHALATION) at 20:29

## 2025-05-15 RX ADMIN — Medication 250 MG: at 20:56

## 2025-05-15 RX ADMIN — IPRATROPIUM BROMIDE 0.5 MG: 0.5 SOLUTION RESPIRATORY (INHALATION) at 13:18

## 2025-05-15 RX ADMIN — CHOLESTYRAMINE 4 G: 4 POWDER, FOR SUSPENSION ORAL at 10:08

## 2025-05-15 NOTE — CONSULTS
NEPHROLOGY CONSULTATION-----[facility]    [facility]  [phone]  Ines Barahona MD    Patient Care Team:  Montse Dawkins MD as PCP - General (Family Medicine)  Huey Keyes MD as Consulting Physician (Nephrology)    CC/REASON FOR CONSULTATION: RENAL FAILURE/ELEVATED SERUM CREATININE    PHYSICIAN REQUESTING CONSULTATION: Montse Dawkins MD     History of Present Illness    Patient is a 79 y.o. WM, very well known to me, whom I was asked to see in consultation for evaluation and management of renal failure/elevated serum creatinine. Patient was admitted after presenting to ER  with SOB, cough, congestion. Patient with known CRF/CKD STG 3. +EtOH abuse.  No NSAIDs. S/P IV dye exposure with CT PE protocol. No known h/o hepatitis, TB, rheumatic fever, jaundice, SLE. Does bleed/bruise easily. Some urinary hesitancy.  Chronic LE edema. +Compliance with home meds. Was on diuretics in the form of Bumex prior to admission. Was not on ACE-I/ARB prior to admission. No herbal med use.     Review of Systems   Constitutional:  Positive for activity change, appetite change and fatigue. Negative for chills, diaphoresis, fever and unexpected weight change.   HENT:  Positive for congestion. Negative for dental problem, drooling, ear discharge, ear pain, facial swelling, hearing loss, mouth sores, nosebleeds, postnasal drip, rhinorrhea, sinus pressure, sinus pain, sneezing, sore throat, tinnitus, trouble swallowing and voice change.    Eyes:  Negative for photophobia, pain, discharge, redness, itching and visual disturbance.   Respiratory:  Positive for cough and shortness of breath. Negative for apnea, choking, chest tightness, wheezing and stridor.    Cardiovascular:  Positive for leg swelling. Negative for chest pain and palpitations.   Gastrointestinal:  Negative for abdominal distention, abdominal pain, anal bleeding, blood in stool, constipation, diarrhea,  nausea, rectal pain and vomiting.   Endocrine: Negative for cold intolerance, heat intolerance, polydipsia, polyphagia and polyuria.   Genitourinary:  Negative for decreased urine volume, difficulty urinating, dysuria, enuresis, flank pain, frequency, genital sores, hematuria and urgency.   Musculoskeletal:  Positive for arthralgias and back pain. Negative for gait problem, joint swelling, myalgias, neck pain and neck stiffness.   Skin:  Negative for color change, pallor, rash and wound.   Allergic/Immunologic: Negative for environmental allergies, food allergies and immunocompromised state.   Neurological:  Positive for weakness. Negative for dizziness, tremors, seizures, syncope, facial asymmetry, speech difficulty, light-headedness, numbness and headaches.   Hematological:  Negative for adenopathy. Does not bruise/bleed easily.   Psychiatric/Behavioral:  Positive for dysphoric mood. Negative for agitation, behavioral problems, confusion, decreased concentration, hallucinations, self-injury, sleep disturbance and suicidal ideas. The patient is not nervous/anxious and is not hyperactive.           Past Medical History:   Diagnosis Date    Alcohol abuse 07/13/2022    Essential hypertension 07/13/2022    Gait instability 07/13/2022    GERD without esophagitis 07/13/2022    Gout 07/13/2022    Hypothyroidism (acquired) 07/13/2022    Onychomycosis of multiple toenails with type 2 diabetes mellitus 07/13/2022    Sleep apnea     Stage 3a chronic kidney disease 07/13/2022    Tinea pedis of both feet 07/13/2022    Type 2 diabetes mellitus with diabetic neuropathy, without long-term current use of insulin 07/13/2022       Past Surgical History:   Procedure Laterality Date    ORTHOPEDIC SURGERY      fractured ankle and dislocated shoulder       Family History   Family history unknown: Yes       Social History     Tobacco Use    Smoking status: Never    Smokeless tobacco: Never   Vaping Use    Vaping status: Never Used    Substance Use Topics    Alcohol use: Yes     Alcohol/week: 30.0 standard drinks of alcohol     Types: 30 Cans of beer per week    Drug use: Never       Home Meds: (Not in a hospital admission)      Scheduled Meds:  allopurinol, 200 mg, Oral, Daily  cefTRIAXone, 2,000 mg, Intravenous, Daily  cholestyramine light, 1 packet, Oral, Daily  FLUoxetine, 10 mg, Oral, Daily  folic acid, 1 mg, Oral, Daily  hydrALAZINE, 50 mg, Oral, Daily  ipratropium-albuterol, 3 mL, Nebulization, Q4H - RT  levothyroxine, 175 mcg, Oral, Q AM  methylPREDNISolone sodium succinate, 40 mg, Intravenous, Q8H  pantoprazole, 40 mg, Oral, Daily  saccharomyces boulardii, 250 mg, Oral, BID  sodium chloride, 10 mL, Intravenous, Q12H  terazosin, 1 mg, Oral, Daily  Vitamin B1, 100 mg, Oral, Daily        Continuous Infusions:  Pharmacy to Dose enoxaparin (LOVENOX),         PRN Meds:    acetaminophen **OR** acetaminophen **OR** acetaminophen    aluminum-magnesium hydroxide-simethicone    senna-docusate sodium **AND** polyethylene glycol **AND** bisacodyl **AND** bisacodyl    calcium carbonate    nitroglycerin    ondansetron ODT **OR** ondansetron    Pharmacy to Dose enoxaparin (LOVENOX)    sodium chloride    sodium chloride    Allergies:  Tigecycline    OBJECTIVE    Vital Signs  Temp:  [98.4 °F (36.9 °C)-99.8 °F (37.7 °C)] 98.4 °F (36.9 °C)  Heart Rate:  [64-90] 74  Resp:  [16-26] 17  BP: (122-167)/(65-80) 127/65    No intake/output data recorded.  No intake/output data recorded.    Physical Exam:  General Appearance: alert, appears stated age and cooperative  Head: normocephalic, without obvious abnormality and atraumatic   Eyes: conjunctivae and sclerae normal and no icterus  Neck: supple and no JVD  Lungs: +FEW SCATTERED RHONCHI  Heart: regular rhythm & normal rate and normal S1, S2 +BALDOMERO  Chest Wall: no abnormalities observed  Abdomen: normal bowel sounds and soft, nontender  Extremities: moves extremities well, 1+ BILAT LE EDEMA (R>L), no  "cyanosis  Skin: +CHRONIC VENOUS INSUFFICIENCY BILAT LE WITH BILAT LE WOUNDS (FOOT AND CALF/PRETIBLAL AREAS) +SCATTERED ECCHYMOSIS  Neurologic: Alert, and oriented. No focal deficits    Results Review:    I reviewed the patient's new clinical results.    WBC WBC   Date Value Ref Range Status   05/14/2025 13.22 (H) 3.40 - 10.80 10*3/mm3 Final      HGB Hemoglobin   Date Value Ref Range Status   05/14/2025 11.7 (L) 13.0 - 17.7 g/dL Final      HCT Hematocrit   Date Value Ref Range Status   05/14/2025 38.1 37.5 - 51.0 % Final      Platelets No results found for: \"LABPLAT\"   MCV MCV   Date Value Ref Range Status   05/14/2025 87.2 79.0 - 97.0 fL Final          Sodium Sodium   Date Value Ref Range Status   05/14/2025 134 (L) 136 - 145 mmol/L Final      Potassium Potassium   Date Value Ref Range Status   05/14/2025 3.9 3.5 - 5.2 mmol/L Final      Chloride Chloride   Date Value Ref Range Status   05/14/2025 98 98 - 107 mmol/L Final      CO2 CO2   Date Value Ref Range Status   05/14/2025 19.9 (L) 22.0 - 29.0 mmol/L Final      BUN BUN   Date Value Ref Range Status   05/14/2025 19 8 - 23 mg/dL Final      Creatinine Creatinine   Date Value Ref Range Status   05/14/2025 1.75 (H) 0.76 - 1.27 mg/dL Final      Calcium Calcium   Date Value Ref Range Status   05/14/2025 8.8 8.6 - 10.5 mg/dL Final      PO4 No results found for: \"CAPO4\"   Albumin Albumin   Date Value Ref Range Status   05/14/2025 3.7 3.5 - 5.2 g/dL Final      Magnesium No results found for: \"MG\"   Uric Acid No results found for: \"URICACID\"       Imaging Results (Last 72 Hours)       Procedure Component Value Units Date/Time    CT Angiogram Chest Pulmonary Embolism [[id number]] Collected: 05/15/25 0035     Updated: 05/15/25 0042    Narrative:      CT ANGIOGRAM CHEST PULMONARY EMBOLISM    Date of Exam: 5/14/2025 11:23 PM EDT    Indication: Elevated dimer, dyspnea.    Comparison: None available.    Technique: Axial CT images were obtained of the chest after the uneventful " intravenous administration of iodinated contrast utilizing pulmonary embolism protocol.  In addition, a 3-D volume rendered image was created for interpretation.  Sagittal and   coronal reconstructions were performed.  Automated exposure control and iterative reconstruction methods were used.      Findings:    Pulmonary arteries: Adequate opacification of the pulmonary arteries. No evidence of acute pulmonary embolism.    Lungs and Pleura: There is mild bilateral basilar atelectasis. There is bilateral lower lobe bronchial wall thickening with some fluid within the bronchi suggestive of infection.    Mediastinum/Inés: No mediastinal or hilar lymphadenopathy.    Lymph nodes: No axillary or supraclavicular adenopathy.    Cardiovascular: The the heart is enlarged. There is a trace pericardial effusion. There is aortic valvular calcification and coronary arterial calcific atherosclerosis.    Upper Abdomen: There are clips from cholecystectomy. There is mild fatty infiltration of the liver. Otherwise, the upper abdominal contents are unremarkable.          Bones and Soft Tissue: No suspicious osseous lesion.        Impression:      Impression:  1.No evidence of pulmonary embolism.  2.Bilateral lower lobe bronchial wall thickening with some fluid in the bronchi suggestive of infection.            Electronically Signed: Haile Velez MD    5/15/2025 12:40 AM EDT    Workstation ID: [id number]    XR Chest 1 View [[id number]] Collected: 05/14/25 2220     Updated: 05/14/25 2223    Narrative:      XR CHEST 1 VW    Date of Exam: 5/14/2025 9:36 PM EDT    Indication: Dyspnea, wheezing    Comparison: 2/1/2016    Findings:  Stable mild cardiomegaly, exaggerated by technique. Lungs without consolidation. Negative for pneumothorax or effusion. Osseous structures grossly intact. Patient's chin overlies the lung apices medially partially limiting assessment.      Impression:      Impression:  No acute process.          Electronically  Signed: Renny Johnson MD    5/14/2025 10:21 PM EDT    Workstation ID: [id number]              Results for orders placed during the hospital encounter of 05/14/25    XR Chest 1 View    Narrative  XR CHEST 1 VW    Date of Exam: 5/14/2025 9:36 PM EDT    Indication: Dyspnea, wheezing    Comparison: 2/1/2016    Findings:  Stable mild cardiomegaly, exaggerated by technique. Lungs without consolidation. Negative for pneumothorax or effusion. Osseous structures grossly intact. Patient's chin overlies the lung apices medially partially limiting assessment.    Impression  Impression:  No acute process.          Electronically Signed: Renny Johnson MD  5/14/2025 10:21 PM EDT  Workstation ID: [id number]      Results for orders placed during the hospital encounter of 03/27/25    XR Tibia Fibula 2 View Right    Narrative  XR TIBIA FIBULA 2 VW RIGHT    Date of Exam: 3/29/2025 10:17 AM EDT    Indication: AND RIGHT ANKLE TO SEE IF HARDWARE IS PRESENT (PINS) FROM PRIOR FRACTURE    Comparison: None available.    Findings:  Remote posttraumatic and postoperative changes of the distal tibia and fibula are noted. 2 orthopedic fixation screws are observed. There are no signs of hardware failure or loosening. There is no acute displaced fracture or malalignment. Chronic changes  are noted. Atherosclerotic vascular calcifications are noted. Chronic soft tissue changes are also observed.    Impression  Impression:  1.Remote posttraumatic and postoperative changes of the distal tibia and fibula. 2 orthopedic fixation screws are identified. There are no signs of hardware failure or loosening.  2.No evidence for acute displaced fracture or malalignment.        Electronically Signed: Riley Walls MD  3/29/2025 12:53 PM EDT  Workstation ID: [id number]      Results for orders placed during the hospital encounter of 09/15/23    XR Foot 3+ View Right    Narrative  XR FOOT 3+ VW RIGHT    Date of Exam: 9/15/2023 2:30 PM EDT    Indication: wound,  infection    Comparison: 7/9/2022    Findings:  Bones are diffusely osteopenic. There is no discrete area of osseous erosion to indicate a site of osteomyelitis. Postsurgical changes of screw fixation noted at the distal tibia and fibula similar to the prior exam. Extensive small vessel vascular  calcifications. Soft tissue swelling overlying the dorsum of the foot which may relate to edema and/or cellulitis.    Impression  Impression:  1. No discrete area of osseous erosion to indicate osteomyelitis.  2. Soft tissue swelling overlying the dorsum of the foot may relate to edema and/or cellulitis.  3. Diffuse osteopenia.  4. Extensive small vessel vascular calcifications.        Electronically Signed: Renny Johnson MD  9/15/2023 2:35 PM EDT  Workstation ID: [id number]        Results for orders placed during the hospital encounter of 03/27/25    Duplex Venous Lower Extremity - BILATERAL    Interpretation Summary    Normal bilateral lower extremity venous duplex scan.    Small saphenous veins not visualized bilaterally      ASSESSMENT / PLAN      Acute and chronic respiratory failure (acute-on-chronic)    RENAL FAILURE-----Nonoliguric. +ARF/SARAH on top of known CRF/CKD STG 3A secondary to HTN NS. Clinically prerenal (BNP not clinically accurate) and s/p IV dye exposure so will VERY CAUTIOUSLY give little IV NS.  Check few urine and serum studies and renal US and PVR. No NSAIDs or IV dye. Dose meds for CrCl less than 10 cc/min until ARF/SARAH is resolved    2. COVID 19 + PNA/ACUTE HYPOXIC RESPIRATORY FAILURE------On BiPap, steroids, oxygen, nebs per Pulmonary . Was initially COVID 19 + at the beginning of April     3. OA/DJD/GOUT/HYPERURICEMIA-----On Allopurinol. No NSAIDs. Check uric acid levels     4. BILATERAL LE EDEMA/CHRONIC VENOUS INSUFFICIENCY/WOUNDS/CELLULITIS------Wound care.       5. DEPRESSION-----Ok to continue SSRI in the form of Prozac for now     6. ETOH ABUSE------Counseled     7. HTN WITH CKD-----BP up  a little. Increase Hydralazine a little and follow. Avoid hypotension. No ACE-I/ARB/DRI     8. GERD/PUD PROPHYLAXIS------On PPI. Benefits outweigh risks despite CKD     9. HYPOTHYROIDISM------On Synthroid. TSH ok     10. DVT PROPHYLAXIS------Lovenox     11. DEPRESSION------On Prozac     12. HYPOMAGNESEMIA------Replace IV and follow     13. CHRONIC IBS------On Florastor    14. ANEMIA OF CKD-----IV iron for LAMIN. HgB above threshold for EPO    15. KETONURIA/DEHYDRATION-----IV NS. BNP not clinically accurate    16. ELEVATED D-DIMER-----CT PE negative. Likely elevation is from COVID. Repeat Bilateral LE venous dopplers      I discussed the patient's findings and my recommendations with patient and nursing staff and , Pulmonary    Will follow along closely. Thank you for allowing us to see this patient in renal consultation.    [facility]  [phone]  MD Ines Orellana MD  05/15/25  08:09 EDT

## 2025-05-15 NOTE — H&P
DATE/TIME OF ADMISSION:  5/14/2025  8:57 PM  Patient Care Team:  Montse Dawkins MD as PCP - General (Family Medicine)  Huey Keyes MD as Consulting Physician (Nephrology)    Chief complaint SHORTNESS OF BREATH AND FEVER    Subjective     Patient is a 79 y.o. male presents with SHORTNESS OF BREATH AND FEVER. Onset of symptoms was ON THE DAY OF ADMISSION.  FAIRLY RECENT COVID IN [place] WHICH WAS TREATED WITH PAXLOVID AND LATER WITH ZITHROMAX FOR SECONDARY INFECTION AND BRONCHITIS. HE WAS ABLE TO SUCCESSFULLY BE TREATED AS AN OUTPATIENT    Review of Systems   Constitutional:  Positive for activity change and fever.   Respiratory:  Positive for shortness of breath and wheezing.    Cardiovascular:  Positive for palpitations and leg swelling.   Endocrine: Positive for cold intolerance.   Skin:  Positive for rash.   Neurological:  Positive for weakness.   All other systems reviewed and are negative.         History  Past Medical History:   Diagnosis Date    Alcohol abuse 07/13/2022    Essential hypertension 07/13/2022    Gait instability 07/13/2022    GERD without esophagitis 07/13/2022    Gout 07/13/2022    Hypothyroidism (acquired) 07/13/2022    Onychomycosis of multiple toenails with type 2 diabetes mellitus 07/13/2022    Sleep apnea     Stage 3a chronic kidney disease 07/13/2022    Tinea pedis of both feet 07/13/2022    Type 2 diabetes mellitus with diabetic neuropathy, without long-term current use of insulin 07/13/2022 APRIL 2025 COVID 19 INFECTION AND TREATED WITH PAXLOVID    Past Surgical History:   Procedure Laterality Date    ORTHOPEDIC SURGERY      fractured ankle and dislocated shoulder     Family History   Family history unknown: Yes     Social History     Tobacco Use    Smoking status: Never    Smokeless tobacco: Never   Vaping Use    Vaping status: Never Used   Substance Use Topics    Alcohol use: Yes     Alcohol/week: 30.0 standard drinks of alcohol     Types: 30 Cans of beer per week    Drug  use: Never     (Not in a hospital admission)    Allergies:  Tigecycline    Objective     Vital Signs  Temp:  [98.4 °F (36.9 °C)-99.8 °F (37.7 °C)] 98.4 °F (36.9 °C)  Heart Rate:  [64-90] 64  Resp:  [16-26] 16  BP: (122-167)/(65-80) 127/65     Physical Exam:      General Appearance:    Alert, cooperative, in no acute distress ON O2 PER NC   Head:    Normocephalic, without obvious abnormality, atraumatic   Eyes:            Lids and lashes normal, conjunctivae and sclerae normal, no   icterus, no pallor, corneas clear, PERRLA   Ears:    Ears appear intact with no abnormalities noted   Throat:   No oral lesions, no thrush, oral mucosa moist   Neck:   No adenopathy, supple, trachea midline, no thyromegaly, no   carotid bruit, no JVD   Lungs:     WHEEZING AND RHONCHI  to auscultation,respirations regular, even and                  unlabored    Heart:    IRRegular rhythm and normal rate, normal S1 and S2, 2/6 SYSTOLIC             murmur   Chest Wall:    No abnormalities observed   Abdomen:     Normal bowel sounds, no masses, no organomegaly, soft        non-tender, non-distended, no guarding, no rebound                tenderness   Extremities:   Moves all extremities well, 1+edema, no cyanosis,             redness NOTED AND PURPURIC RASH   Pulses:   Pulses palpable and equal bilaterally   Skin:   No bleeding, bruising or rash   Lymph nodes:   No palpable adenopathy   Neurologic:   Cranial nerves 2 - 12 grossly intact, sensation intact, DTR       present and equal bilaterally       I HAVE PERSONALLY EXAMINED THE PATIENT AND HAVE REVIEWED APPROPRIATE LAB AND IMAGING RESULTS.    Imaging Results (Last 24 Hours)       Procedure Component Value Units Date/Time    CT Angiogram Chest Pulmonary Embolism [[id number]] Collected: 05/15/25 0035     Updated: 05/15/25 0042    Narrative:      CT ANGIOGRAM CHEST PULMONARY EMBOLISM    Date of Exam: 5/14/2025 11:23 PM EDT    Indication: Elevated dimer, dyspnea.    Comparison: None  available.    Technique: Axial CT images were obtained of the chest after the uneventful intravenous administration of iodinated contrast utilizing pulmonary embolism protocol.  In addition, a 3-D volume rendered image was created for interpretation.  Sagittal and   coronal reconstructions were performed.  Automated exposure control and iterative reconstruction methods were used.      Findings:    Pulmonary arteries: Adequate opacification of the pulmonary arteries. No evidence of acute pulmonary embolism.    Lungs and Pleura: There is mild bilateral basilar atelectasis. There is bilateral lower lobe bronchial wall thickening with some fluid within the bronchi suggestive of infection.    Mediastinum/Inés: No mediastinal or hilar lymphadenopathy.    Lymph nodes: No axillary or supraclavicular adenopathy.    Cardiovascular: The the heart is enlarged. There is a trace pericardial effusion. There is aortic valvular calcification and coronary arterial calcific atherosclerosis.    Upper Abdomen: There are clips from cholecystectomy. There is mild fatty infiltration of the liver. Otherwise, the upper abdominal contents are unremarkable.          Bones and Soft Tissue: No suspicious osseous lesion.        Impression:      Impression:  1.No evidence of pulmonary embolism.  2.Bilateral lower lobe bronchial wall thickening with some fluid in the bronchi suggestive of infection.            Electronically Signed: Haile Velez MD    5/15/2025 12:40 AM EDT    Workstation ID: [id number]    XR Chest 1 View [[id number]] Collected: 05/14/25 2220     Updated: 05/14/25 2223    Narrative:      XR CHEST 1 VW    Date of Exam: 5/14/2025 9:36 PM EDT    Indication: Dyspnea, wheezing    Comparison: 2/1/2016    Findings:  Stable mild cardiomegaly, exaggerated by technique. Lungs without consolidation. Negative for pneumothorax or effusion. Osseous structures grossly intact. Patient's chin overlies the lung apices medially partially limiting  assessment.      Impression:      Impression:  No acute process.          Electronically Signed: Renny Johnson MD    5/14/2025 10:21 PM EDT    Workstation ID: [id number]             Lab Results (last 24 hours)       Procedure Component Value Units Date/Time    Blood Gas, Arterial - [[id number]]  (Abnormal) Collected: 05/15/25 0121    Specimen: Arterial Blood Updated: 05/15/25 0124     Site Left Radial     Issac's Test Positive     pH, Arterial 7.372 pH units      pCO2, Arterial 37.8 mm Hg      pO2, Arterial 102.6 mm Hg      HCO3, Arterial 22.0 mmol/L      Base Excess, Arterial -2.9 mmol/L      Comment: Serial Number: [id number]:  [id number]        O2 Saturation, Arterial 97.8 %      CO2 Content 23.1 mmol/L      Barometric Pressure for Blood Gas --     Comment: N/A        Modality Cannula     FIO2 28 %      Hemodilution No     PO2/FIO2 366    Lactic Acid, Plasma [[id number]]  (Normal) Collected: 05/14/25 2354    Specimen: Blood Updated: 05/15/25 0021     Lactate 1.3 mmol/L     High Sensitivity Troponin T 1Hr [[id number]]  (Abnormal) Collected: 05/14/25 2256    Specimen: Blood Updated: 05/14/25 2322     HS Troponin T 33 ng/L      Troponin T Numeric Delta 1 ng/L      Troponin T % Delta 3    Narrative:      High Sensitive Troponin T Reference Range:  <14.0 ng/L- Negative Female for AMI  <22.0 ng/L- Negative Male for AMI  >=14 - Abnormal Female indicating possible myocardial injury.  >=22 - Abnormal Male indicating possible myocardial injury.   Clinicians would have to utilize clinical acumen, EKG, Troponin, and serial changes to determine if it is an Acute Myocardial Infarction or myocardial injury due to an underlying chronic condition.         Respiratory Panel PCR w/COVID-19(SARS-CoV-2) EROS/UNIQUE/STACIA/PAD/COR/TENA In-House, NP Swab in [facility], 2 HR TAT - Swab, Nasopharynx [[id number]]  (Abnormal) Collected: 05/14/25 2122    Specimen: Swab from Nasopharynx Updated: 05/14/25 2221     ADENOVIRUS, PCR Not Detected     Coronavirus  229E Not Detected     Coronavirus HKU1 Not Detected     Coronavirus NL63 Not Detected     Coronavirus OC43 Not Detected     COVID19 Detected     Human Metapneumovirus Not Detected     Human Rhinovirus/Enterovirus Not Detected     Influenza A PCR Not Detected     Influenza B PCR Not Detected     Parainfluenza Virus 1 Not Detected     Parainfluenza Virus 2 Not Detected     Parainfluenza Virus 3 Not Detected     Parainfluenza Virus 4 Not Detected     RSV, PCR Not Detected     Bordetella pertussis pcr Not Detected     Bordetella parapertussis PCR Not Detected     Chlamydophila pneumoniae PCR Not Detected     Mycoplasma pneumo by PCR Not Detected    Narrative:      In the setting of a positive respiratory panel with a viral infection PLUS a negative procalcitonin without other underlying concern for bacterial infection, consider observing off antibiotics or discontinuation of antibiotics and continue supportive care. If the respiratory panel is positive for atypical bacterial infection (Bordetella pertussis, Chlamydophila pneumoniae, or Mycoplasma pneumoniae), consider antibiotic de-escalation to target atypical bacterial infection.    Comprehensive Metabolic Panel [[id number]]  (Abnormal) Collected: 05/14/25 2122    Specimen: Blood Updated: 05/14/25 2209     Glucose 127 mg/dL      BUN 19 mg/dL      Creatinine 1.75 mg/dL      Sodium 134 mmol/L      Potassium 3.9 mmol/L      Chloride 98 mmol/L      CO2 19.9 mmol/L      Calcium 8.8 mg/dL      Total Protein 6.7 g/dL      Albumin 3.7 g/dL      ALT (SGPT) 6 U/L      AST (SGOT) 11 U/L      Alkaline Phosphatase 48 U/L      Total Bilirubin 0.8 mg/dL      Globulin 3.0 gm/dL      A/G Ratio 1.2 g/dL      BUN/Creatinine Ratio 10.9     Anion Gap 16.1 mmol/L      eGFR 38.6 mL/min/1.73     Narrative:      GFR Categories in Chronic Kidney Disease (CKD)              GFR Category          GFR (mL/min/1.73)    Interpretation  G1                    90 or greater        Normal or high (1)  G2  "                   60-89                Mild decrease (1)  G3a                   45-59                Mild to moderate decrease  G3b                   30-44                Moderate to severe decrease  G4                    15-29                Severe decrease  G5                    14 or less           Kidney failure    (1)In the absence of evidence of kidney disease, neither GFR category G1 or G2 fulfill the criteria for CKD.    eGFR calculation 2021 CKD-EPI creatinine equation, which does not include race as a factor    D-dimer, Quantitative [[id number]]  (Abnormal) Collected: 05/14/25 2122    Specimen: Blood Updated: 05/14/25 2156     D-Dimer, Quantitative >20.00 MCGFEU/mL     Narrative:      According to the assay 's published package insert, a normal (<0.50 MCGFEU/mL) D-dimer result in conjunction with a non-high clinical probability assessment, excludes deep vein thrombosis (DVT) and pulmonary embolism (PE) with high sensitivity.    D-dimer values increase with age and this can make VTE exclusion of an older population difficult. To address this, the [employer], based on best available evidence and recent guidelines, recommends that clinicians use age-adjusted D-dimer thresholds in patients greater than 50 years of age with: a) a low probability of PE who do not meet all Pulmonary Embolism Rule Out Criteria, or b) in those with intermediate probability of PE.   The formula for an age-adjusted D-dimer cut-off is \"age/100\".  For example, a 60 year old patient would have an age-adjusted cut-off of 0.60 MCGFEU/mL and an 80 year old 0.80 MCGFEU/mL.    High Sensitivity Troponin T [[id number]]  (Abnormal) Collected: 05/14/25 2122    Specimen: Blood Updated: 05/14/25 2152     HS Troponin T 32 ng/L     Narrative:      High Sensitive Troponin T Reference Range:  <14.0 ng/L- Negative Female for AMI  <22.0 ng/L- Negative Male for AMI  >=14 - Abnormal Female indicating possible myocardial " injury.  >=22 - Abnormal Male indicating possible myocardial injury.   Clinicians would have to utilize clinical acumen, EKG, Troponin, and serial changes to determine if it is an Acute Myocardial Infarction or myocardial injury due to an underlying chronic condition.         BNP [[id number]]  (Abnormal) Collected: 05/14/25 2122    Specimen: Blood Updated: 05/14/25 2152     proBNP 3,853.0 pg/mL     Narrative:      This assay is used as an aid in the diagnosis of individuals suspected of having heart failure. It can be used as an aid in the diagnosis of acute decompensated heart failure (ADHF) in patients presenting with signs and symptoms of ADHF to the emergency department (ED). In addition, NT-proBNP of <300 pg/mL indicates ADHF is not likely.    Age Range Result Interpretation  NT-proBNP Concentration (pg/mL:      <50             Positive            >450                   Gray                 300-450                    Negative             <300    50-75           Positive            >900                  Gray                300-900                  Negative            <300      >75             Positive            >1800                  Gray                300-1800                  Negative            <300    Urinalysis With Culture If Indicated - Urine, Clean Catch [[id number]]  (Abnormal) Collected: 05/14/25 2122    Specimen: Urine, Clean Catch Updated: 05/14/25 2143     Color, UA Yellow     Appearance, UA Clear     pH, UA 6.0     Specific Gravity, UA 1.018     Glucose, UA Negative     Ketones, UA Trace     Bilirubin, UA Negative     Blood, UA Small (1+)     Protein,  mg/dL (2+)     Leuk Esterase, UA Small (1+)     Nitrite, UA Negative     Urobilinogen, UA 1.0 E.U./dL    Narrative:      In absence of clinical symptoms, the presence of pyuria, bacteria, and/or nitrites on the urinalysis result does not correlate with infection.    Urinalysis, Microscopic Only - Urine, Clean Catch [[id number]]  (Abnormal)  Collected: 05/14/25 2122    Specimen: Urine, Clean Catch Updated: 05/14/25 2143     RBC, UA 21-50 /HPF      WBC, UA 11-20 /HPF      Bacteria, UA None Seen /HPF      Squamous Epithelial Cells, UA 0-2 /HPF      Hyaline Casts, UA 3-6 /LPF      Methodology Automated Microscopy    Urine Culture - Urine, Urine, Clean Catch [[id number]] Collected: 05/14/25 2122    Specimen: Urine, Clean Catch Updated: 05/14/25 2143    CBC & Differential [[id number]]  (Abnormal) Collected: 05/14/25 2122    Specimen: Blood Updated: 05/14/25 2130    Narrative:      The following orders were created for panel order CBC & Differential.  Procedure                               Abnormality         Status                     ---------                               -----------         ------                     CBC Auto Differential[[id number]]        Abnormal            Final result                 Please view results for these tests on the individual orders.    CBC Auto Differential [[id number]]  (Abnormal) Collected: 05/14/25 2122    Specimen: Blood Updated: 05/14/25 2130     WBC 13.22 10*3/mm3      RBC 4.37 10*6/mm3      Hemoglobin 11.7 g/dL      Hematocrit 38.1 %      MCV 87.2 fL      MCH 26.8 pg      MCHC 30.7 g/dL      RDW 16.8 %      RDW-SD 53.4 fl      MPV 10.4 fL      Platelets 191 10*3/mm3      Neutrophil % 79.0 %      Lymphocyte % 8.4 %      Monocyte % 11.7 %      Eosinophil % 0.1 %      Basophil % 0.3 %      Immature Grans % 0.5 %      Neutrophils, Absolute 10.44 10*3/mm3      Lymphocytes, Absolute 1.11 10*3/mm3      Monocytes, Absolute 1.55 10*3/mm3      Eosinophils, Absolute 0.01 10*3/mm3      Basophils, Absolute 0.04 10*3/mm3      Immature Grans, Absolute 0.07 10*3/mm3      nRBC 0.0 /100 WBC              I reviewed the patient's new clinical results.    Assessment & Plan   ACUTE ON CHRONIC CKD 3A WITH MILD DEHYDRATION---DR SANCHES TO CONSULT    [facility] [place]INUED [place]ITIVE [place]ING--ACUTE RESPIRATORY FAILURE---SUPPORTIVE CARE;  BIPAP; OXYGEN, AND DR DRAW TO CONSULT  ROCEPHIN FOR NOW  NEBS, LOW DOSE STEROIDS FOR BRONCHOSPASM; DR WALL TO CONSULT    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    CHRONIC VENOUS STASIS AND MILD CELLULITIS WITH RASH    DEPRESSION---STABLE WITH PROZAC    CHRONIC ETOH ABUSE---CONTROLLED BY DAUGHTER    HTN---STABLE    GERD---ON PPI    HYPOTHYROIDISM---EUTHYROID ON SYNTHROID    A FIB---ON LOVENOX FOR THIS AND DVT PROPHYLAXIS; DR MITTAL TO CONSULT; AS FALL RISK, WILL HAVE TO FACTOR THIS IN WITH ANTICOAGULATION PLANS; HAD A FIB YEARS AGO AND NO RECURRENCE NOTED UNTIL NOW    HYPOMAGNESEMIA---REPLACING PRN    ANEMIA OF CKD--STABLE; DR SANCHES MANAGING    ELEVATED D DIMER---NO PULM EMBOLI ON CT; SEQUELA FROM COVID INFECTION    HYPOPHOSPHATEMIA---DR BOWEN REPLACING    LACTIC ACIDOSIS---COVERED WITH ATB; AND DR WALL ADVISING    FULL CODE STATUS---VERIFIED WITH DAUGHTER ON ADMISSION    Active Problems:    * No active hospital problems. *          I discussed the patients findings and my recommendations with patient AND HIS DAUGHTER WHO IS HIS PRIMARY CAREGIVER    Montse Dawkins MD  05/15/25  06:46 EDT

## 2025-05-15 NOTE — PLAN OF CARE
Problem: Adult Inpatient Plan of Care  Goal: Absence of Hospital-Acquired Illness or Injury  Intervention: Identify and Manage Fall Risk  Recent Flowsheet Documentation  Taken 5/15/2025 0400 by Nancy Calvin RN  Safety Promotion/Fall Prevention:   safety round/check completed   assistive device/personal items within reach   clutter free environment maintained   fall prevention program maintained   nonskid shoes/slippers when out of bed  Taken 5/15/2025 0230 by Nancy Calvin RN  Safety Promotion/Fall Prevention:   safety round/check completed   assistive device/personal items within reach   clutter free environment maintained   fall prevention program maintained   nonskid shoes/slippers when out of bed   Goal Outcome Evaluation:  Plan of Care Reviewed With: family           Outcome Evaluation: Pt resting well on bipap. No confusion noted. Daughter at bedside attentive to patient. Vitals stable.

## 2025-05-15 NOTE — ED PROVIDER NOTES
Subjective   History of Present Illness  Patient is an 79-year-old male with PMH of HTN, CKD, DM2 presenting to the ED for dyspnea worsening since last night.  Patient's family states he has been having shortness of breath and wheezing worsening since last night, states he did not sleep well due to his symptoms.  They also noted increased bruising all over his lower extremities without injuries.  Patient's family reports a fever of 101 at home, states they found him covered in sweat when they arrived.  They also report diarrhea that started earlier today.  Patient reports increased urinary frequency however decreased output.  He denies any chest pain, abdominal pain, dizziness, nausea, vomiting, dysuria.        Review of Systems   Constitutional:  Positive for chills, diaphoresis and fever.   Respiratory:  Positive for shortness of breath and wheezing.    Cardiovascular:  Negative for chest pain.   Gastrointestinal:  Positive for diarrhea. Negative for abdominal pain, nausea and vomiting.   Genitourinary:  Positive for frequency. Negative for dysuria.       Past Medical History:   Diagnosis Date    Alcohol abuse 07/13/2022    Essential hypertension 07/13/2022    Gait instability 07/13/2022    GERD without esophagitis 07/13/2022    Gout 07/13/2022    Hypothyroidism (acquired) 07/13/2022    Onychomycosis of multiple toenails with type 2 diabetes mellitus 07/13/2022    Sleep apnea     Stage 3a chronic kidney disease 07/13/2022    Tinea pedis of both feet 07/13/2022    Type 2 diabetes mellitus with diabetic neuropathy, without long-term current use of insulin 07/13/2022       Allergies   Allergen Reactions    Tigecycline Swelling and Rash       Past Surgical History:   Procedure Laterality Date    ORTHOPEDIC SURGERY      fractured ankle and dislocated shoulder       Family History   Family history unknown: Yes       Social History     Socioeconomic History    Marital status:    Tobacco Use    Smoking status:  "Never    Smokeless tobacco: Never   Vaping Use    Vaping status: Never Used   Substance and Sexual Activity    Alcohol use: Yes     Alcohol/week: 30.0 standard drinks of alcohol     Types: 30 Cans of beer per week    Drug use: Never    Sexual activity: Defer           Objective   Physical Exam  Constitutional:       Appearance: He is well-developed.   HENT:      Head: Normocephalic and atraumatic.      Mouth/Throat:      Mouth: Mucous membranes are moist.   Eyes:      Extraocular Movements: Extraocular movements intact.   Cardiovascular:      Rate and Rhythm: Normal rate and regular rhythm.   Pulmonary:      Effort: Accessory muscle usage present.      Breath sounds: Examination of the right-upper field reveals wheezing. Examination of the left-upper field reveals wheezing. Wheezing present.   Abdominal:      General: Bowel sounds are normal.      Palpations: Abdomen is soft.      Tenderness: There is no abdominal tenderness.   Musculoskeletal:      Cervical back: Normal range of motion.      Comments: Bilateral lower extremity swelling, family reports at baseline.   Skin:     General: Skin is warm and dry.      Capillary Refill: Capillary refill takes less than 2 seconds.   Neurological:      General: No focal deficit present.      Mental Status: He is alert and oriented to person, place, and time.   Psychiatric:         Mood and Affect: Mood normal.         Behavior: Behavior normal.         Procedures           ED Course      /70   Pulse 82   Temp 99.8 °F (37.7 °C) (Oral)   Resp 26   Ht 170.2 cm (67\")   Wt 114 kg (251 lb 12.3 oz)   SpO2 94%   BMI 39.43 kg/m²   Labs Reviewed   RESPIRATORY PANEL PCR W/ COVID-19 (SARS-COV-2), NP SWAB IN UTM/VTP, 2 HR TAT - Abnormal; Notable for the following components:       Result Value    COVID19 Detected (*)     All other components within normal limits    Narrative:     In the setting of a positive respiratory panel with a viral infection PLUS a negative " procalcitonin without other underlying concern for bacterial infection, consider observing off antibiotics or discontinuation of antibiotics and continue supportive care. If the respiratory panel is positive for atypical bacterial infection (Bordetella pertussis, Chlamydophila pneumoniae, or Mycoplasma pneumoniae), consider antibiotic de-escalation to target atypical bacterial infection.   COMPREHENSIVE METABOLIC PANEL - Abnormal; Notable for the following components:    Glucose 127 (*)     Creatinine 1.75 (*)     Sodium 134 (*)     CO2 19.9 (*)     Anion Gap 16.1 (*)     eGFR 38.6 (*)     All other components within normal limits    Narrative:     GFR Categories in Chronic Kidney Disease (CKD)              GFR Category          GFR (mL/min/1.73)    Interpretation  G1                    90 or greater        Normal or high (1)  G2                    60-89                Mild decrease (1)  G3a                   45-59                Mild to moderate decrease  G3b                   30-44                Moderate to severe decrease  G4                    15-29                Severe decrease  G5                    14 or less           Kidney failure    (1)In the absence of evidence of kidney disease, neither GFR category G1 or G2 fulfill the criteria for CKD.    eGFR calculation 2021 CKD-EPI creatinine equation, which does not include race as a factor   URINALYSIS W/ CULTURE IF INDICATED - Abnormal; Notable for the following components:    Ketones, UA Trace (*)     Blood, UA Small (1+) (*)     Protein,  mg/dL (2+) (*)     Leuk Esterase, UA Small (1+) (*)     All other components within normal limits    Narrative:     In absence of clinical symptoms, the presence of pyuria, bacteria, and/or nitrites on the urinalysis result does not correlate with infection.   TROPONIN - Abnormal; Notable for the following components:    HS Troponin T 32 (*)     All other components within normal limits    Narrative:     High  Sensitive Troponin T Reference Range:  <14.0 ng/L- Negative Female for AMI  <22.0 ng/L- Negative Male for AMI  >=14 - Abnormal Female indicating possible myocardial injury.  >=22 - Abnormal Male indicating possible myocardial injury.   Clinicians would have to utilize clinical acumen, EKG, Troponin, and serial changes to determine if it is an Acute Myocardial Infarction or myocardial injury due to an underlying chronic condition.        BNP (IN-HOUSE) - Abnormal; Notable for the following components:    proBNP 3,853.0 (*)     All other components within normal limits    Narrative:     This assay is used as an aid in the diagnosis of individuals suspected of having heart failure. It can be used as an aid in the diagnosis of acute decompensated heart failure (ADHF) in patients presenting with signs and symptoms of ADHF to the emergency department (ED). In addition, NT-proBNP of <300 pg/mL indicates ADHF is not likely.    Age Range Result Interpretation  NT-proBNP Concentration (pg/mL:      <50             Positive            >450                   Gray                 300-450                    Negative             <300    50-75           Positive            >900                  Gray                300-900                  Negative            <300      >75             Positive            >1800                  Gray                300-1800                  Negative            <300   D-DIMER, QUANTITATIVE - Abnormal; Notable for the following components:    D-Dimer, Quantitative >20.00 (*)     All other components within normal limits    Narrative:     According to the assay 's published package insert, a normal (<0.50 MCGFEU/mL) D-dimer result in conjunction with a non-high clinical probability assessment, excludes deep vein thrombosis (DVT) and pulmonary embolism (PE) with high sensitivity.    D-dimer values increase with age and this can make VTE exclusion of an older population difficult. To address  "this, the [employer], based on best available evidence and recent guidelines, recommends that clinicians use age-adjusted D-dimer thresholds in patients greater than 50 years of age with: a) a low probability of PE who do not meet all Pulmonary Embolism Rule Out Criteria, or b) in those with intermediate probability of PE.   The formula for an age-adjusted D-dimer cut-off is \"age/100\".  For example, a 60 year old patient would have an age-adjusted cut-off of 0.60 MCGFEU/mL and an 80 year old 0.80 MCGFEU/mL.   CBC WITH AUTO DIFFERENTIAL - Abnormal; Notable for the following components:    WBC 13.22 (*)     Hemoglobin 11.7 (*)     MCHC 30.7 (*)     RDW 16.8 (*)     Neutrophil % 79.0 (*)     Lymphocyte % 8.4 (*)     Eosinophil % 0.1 (*)     Neutrophils, Absolute 10.44 (*)     Monocytes, Absolute 1.55 (*)     Immature Grans, Absolute 0.07 (*)     All other components within normal limits   URINALYSIS, MICROSCOPIC ONLY - Abnormal; Notable for the following components:    RBC, UA 21-50 (*)     WBC, UA 11-20 (*)     All other components within normal limits   HIGH SENSITIVITIY TROPONIN T 1HR - Abnormal; Notable for the following components:    HS Troponin T 33 (*)     All other components within normal limits    Narrative:     High Sensitive Troponin T Reference Range:  <14.0 ng/L- Negative Female for AMI  <22.0 ng/L- Negative Male for AMI  >=14 - Abnormal Female indicating possible myocardial injury.  >=22 - Abnormal Male indicating possible myocardial injury.   Clinicians would have to utilize clinical acumen, EKG, Troponin, and serial changes to determine if it is an Acute Myocardial Infarction or myocardial injury due to an underlying chronic condition.        BLOOD GAS, ARTERIAL - Abnormal; Notable for the following components:    Base Excess, Arterial -2.9 (*)     All other components within normal limits   LACTIC ACID, PLASMA - Normal   URINE CULTURE   CBC AND DIFFERENTIAL    Narrative:     The " following orders were created for panel order CBC & Differential.  Procedure                               Abnormality         Status                     ---------                               -----------         ------                     CBC Auto Differential[[id number]]        Abnormal            Final result                 Please view results for these tests on the individual orders.     Medications   methylPREDNISolone sodium succinate (SOLU-Medrol) injection 125 mg (125 mg Intravenous Given 5/14/25 2128)   ipratropium-albuterol (DUO-NEB) nebulizer solution 3 mL (3 mL Nebulization Given 5/14/25 2135)   cefTRIAXone (ROCEPHIN) 2,000 mg in sodium chloride 0.9 % 100 mL MBP (0 mg Intravenous Stopped 5/15/25 0010)   iopamidol (ISOVUE-370) 76 % injection 100 mL (100 mL Intravenous Given 5/14/25 2345)     CT Angiogram Chest Pulmonary Embolism  Result Date: 5/15/2025  Impression: 1.No evidence of pulmonary embolism. 2.Bilateral lower lobe bronchial wall thickening with some fluid in the bronchi suggestive of infection. Electronically Signed: Haile Velez MD  5/15/2025 12:40 AM EDT  Workstation ID: [id number]    XR Chest 1 View  Result Date: 5/14/2025  Impression: No acute process. Electronically Signed: Renny Johnson MD  5/14/2025 10:21 PM EDT  Workstation ID: [id number]                                                     Medical Decision Making  Chart review: 3/27/25 Dr. Dawkins: Rommel Mcfarland is a 79 y.o. male who presents with LEFT LEG CELLULITIS AND ACUTE GOUT FLARE. DID WELL WITH IV KEFZOL AND TOPICAL CARE. HOME ON KEFLEX WITH HOME HEALTH AND PT THROUGH [facility].    Patient presented to the ED for the above complaint.    Patient underwent the above exam and evaluation.    While in the ED patient was placed in a gown and IV was established.  EKG, chest x-ray, blood work was obtained to assess for arrhythmia, MI, electro abnormality, dehydration, infection, blood clot.  Patient was given IV Solu-Medrol and a DuoNeb.   Patient had elevated dimer, CTA of chest was obtained to assess for PE.  Patient was noted to have pyuria and hematuria in urine, was started on IV Rocephin.  Spoke with Dr. Dawkins who agreed to admit patient.  Upon reevaluation patient resting, was placed on 2 L O2 due to dropping into the 80s while sleeping, ABG ordered at this time.  Discussed findings with patient and family at bedside.  Educated them that we will be admitting him for further treatment and evaluation.  Patient's family voiced understanding, agreeable with dispo plan.    EKG independently interpreted by Dr. Tripathi showing sinus rhythm, LAD, nonspecific T abnormalities, rate 90, QTc 455, compared to previous EKG 5/12/2018 showing sinus rhythm, rate 79.  Labs were independently interpreted by myself and deemed remarkable for the following: WBC 13.22, hemoglobin stable at 11.7, glucose 127, creatinine 1.75 which is not far from baseline, sodium 134, BNP 3853, initial troponin 32, repeat troponin 33, lactate 1.3, D-dimer greater than 20, urinalysis showed small leuk esterase, pyuria, hematuria, respiratory panel positive for COVID-19, urine culture pending on admission.  ABG showed no acidosis or hypercapnia.  Chest x-ray, CTA of chest independently interpreted by Dr. Tripathi and reviewed by myself showing: Chest x-ray showed stable mild cardiomegaly, no pneumothorax or new consolidations.  CTA chest showed no PE, bilateral lower lobe bronchial wall thickening noted.    Appropriate PPE was worn during each patient encounter.    Note Disclaimer: At [facility], we believe that sharing information builds trust and better relationships. You are receiving this note because you are receiving care at [facility] or recently visited. It is possible you will see health information before a provider has talked with you about it. This kind of information can be easy to misunderstand. To help you fully understand what it means for your health, we urge you  to discuss this note with your provider.    Based on clinical findings anticipate this patient will require a 2 midnight stay.    Discussed this patient with Dr. Tripathi who agrees with plan.      Problems Addressed:  Acute cystitis with hematuria: complicated acute illness or injury  Acute respiratory failure with hypoxia: complicated acute illness or injury  Dyspnea, unspecified type: complicated acute illness or injury    Amount and/or Complexity of Data Reviewed  Labs: ordered.  Radiology: ordered.    Risk  Prescription drug management.  Decision regarding hospitalization.        Final diagnoses:   Dyspnea, unspecified type   Acute respiratory failure with hypoxia   Acute cystitis with hematuria       ED Disposition  ED Disposition       ED Disposition   Decision to Admit    Condition   --    Comment   Level of Care: Telemetry [5]   Admitting Physician: GONZALO DENNISON [[id number]]   Attending Physician: GONZALO DENNISON [[id number]]                 No follow-up provider specified.       Medication List      No changes were made to your prescriptions during this visit.            Giuliana Serna PA-C  05/15/25 0128

## 2025-05-15 NOTE — CASE MANAGEMENT/SOCIAL WORK
Case Management/Social Work    Patient Name:  Rommel Mcfarland  YOB: 1945  MRN: [id number]  Admit Date:  5/14/2025    CM attempted to call pt's daughter Yajaira for CM assessment. Not able to leave VM. Pt. Needs full access.        Pam Alvarado RN    Office: [phone]  Fax: [phone]  [email or web address]

## 2025-05-15 NOTE — CONSULTS
[facility]: [facility]         CONSULT NOTE    Patient Identification:  Rommel Mcfarland  79 y.o.  male  1945  [id number]            Requesting physician: Attending physician    Reason for Consultation: COVID-19      History of Present Illness:  79-year-old male admitted on 5/14/2025 with dyspnea  CT chest negative for PE  Respiratory panel positive for COVID-19 however it was initially diagnosed on April 5, 2025 treated with outpatient Paxlovid as well as azithromycin    Assessment:    Respiratory panel positive for COVID-19 however it was initially diagnosed on April 5, 2025 treated with outpatient Paxlovid as well as azithromycin  CT chest did not show any alveolar infiltrates  No PE  Patient is fully vaccinated   Skin Purpura  History of C. Difficile    Hypothyroidism  Gout  GERD  Hypertension  Diabetes mellitus        Recommendations:      Currently on room air  Steroids will change IV Solu-Medrol to 20 mg twice daily for bronchitis/wheeze possible postviral    Empiric antibiotic currently on Rocephin  Bronchodilator budesonide and ipratropium to avoid tachycardia  Thyroid replacement  DVT prophylaxis with Lovenox 40 mg              Review of Sytems:  Review of Systems   Respiratory:  Positive for cough and shortness of breath. Negative for wheezing and stridor.    Cardiovascular:  Positive for leg swelling. Negative for chest pain and palpitations.       Past Medical History:  Past Medical History:   Diagnosis Date    Alcohol abuse 07/13/2022    Essential hypertension 07/13/2022    Gait instability 07/13/2022    GERD without esophagitis 07/13/2022    Gout 07/13/2022    Hypothyroidism (acquired) 07/13/2022    Onychomycosis of multiple toenails with type 2 diabetes mellitus 07/13/2022    Sleep apnea     Stage 3a chronic kidney disease 07/13/2022    Tinea pedis of both feet 07/13/2022    Type 2 diabetes mellitus with diabetic neuropathy, without long-term current use of insulin 07/13/2022  "      Past Surgical History:  Past Surgical History:   Procedure Laterality Date    ORTHOPEDIC SURGERY      fractured ankle and dislocated shoulder        Home Meds:  (Not in a hospital admission)      Allergies:  Allergies   Allergen Reactions    Tigecycline Swelling and Rash       Social History:   Social History     Socioeconomic History    Marital status:    Tobacco Use    Smoking status: Never    Smokeless tobacco: Never   Vaping Use    Vaping status: Never Used   Substance and Sexual Activity    Alcohol use: Yes     Alcohol/week: 30.0 standard drinks of alcohol     Types: 30 Cans of beer per week    Drug use: Never    Sexual activity: Defer       Family History:  Family History   Family history unknown: Yes       Physical Exam:  /85 (BP Location: Right arm, Patient Position: Lying)   Pulse 88   Temp 97.4 °F (36.3 °C) (Axillary)   Resp 16   Ht 170.2 cm (67\")   Wt 114 kg (251 lb 12.3 oz)   SpO2 98%   BMI 39.43 kg/m²  Body mass index is 39.43 kg/m². 98% 114 kg (251 lb 12.3 oz)  Physical Exam  Cardiovascular:      Heart sounds: Murmur heard.      No gallop.   Pulmonary:      Effort: No respiratory distress.      Breath sounds: No stridor. Rhonchi and rales present. No wheezing.   Chest:      Chest wall: No tenderness.         LABS:  Lab Results   Component Value Date    CALCIUM 8.8 05/14/2025    PHOS 2.6 03/31/2025     Results from last 7 days   Lab Units 05/14/25 2122   SODIUM mmol/L 134*   POTASSIUM mmol/L 3.9   CHLORIDE mmol/L 98   CO2 mmol/L 19.9*   BUN mg/dL 19   CREATININE mg/dL 1.75*   GLUCOSE mg/dL 127*   CALCIUM mg/dL 8.8   WBC 10*3/mm3 13.22*   HEMOGLOBIN g/dL 11.7*   PLATELETS 10*3/mm3 191   ALT (SGPT) U/L 6   AST (SGOT) U/L 11   PROBNP pg/mL 3,853.0*     Lab Results   Component Value Date    CKTOTAL 44 05/14/2025    TROPONINT 33 (H) 05/14/2025     Results from last 7 days   Lab Units 05/14/25 2256 05/14/25 2122   CK TOTAL U/L 44  --    HSTROP T ng/L 33* 32*         Results from " last 7 days   Lab Units 05/14/25  2354   LACTATE mmol/L 1.3     Results from last 7 days   Lab Units 05/15/25  0121   PH, ARTERIAL pH units 7.372   PCO2, ARTERIAL mm Hg 37.8   PO2 ART mm Hg 102.6   O2 SATURATION ART % 97.8   MODALITY  Cannula     Results from last 7 days   Lab Units 05/14/25  2122   ADENOVIRUS DETECTION BY PCR  Not Detected   CORONAVIRUS 229E  Not Detected   CORONAVIRUS HKU1  Not Detected   CORONAVIRUS NL63  Not Detected   CORONAVIRUS OC43  Not Detected   HUMAN METAPNEUMOVIRUS  Not Detected   HUMAN RHINOVIRUS/ENTEROVIRUS  Not Detected   INFLUENZA B PCR  Not Detected   PARAINFLUENZA 1  Not Detected   PARAINFLUENZA VIRUS 2  Not Detected   PARAINFLUENZA VIRUS 3  Not Detected   PARAINFLUENZA VIRUS 4  Not Detected   BORDETELLA PERTUSSIS PCR  Not Detected   CHLAMYDOPHILA PNEUMONIAE PCR  Not Detected   MYCOPLAMA PNEUMO PCR  Not Detected   INFLUENZA A PCR  Not Detected   RSV, PCR  Not Detected             Lab Results   Component Value Date    TSH 1.080 05/14/2025     Estimated Creatinine Clearance: 39.9 mL/min (A) (by C-G formula based on SCr of 1.75 mg/dL (H)).  Results from last 7 days   Lab Units 05/14/25 2122   NITRITE UA  Negative   WBC UA /HPF 11-20*   BACTERIA UA /HPF None Seen   SQUAM EPITHEL UA /HPF 0-2        Imaging:  Imaging Results (Last 24 Hours)       Procedure Component Value Units Date/Time    CT Angiogram Chest Pulmonary Embolism [[id number]] Collected: 05/15/25 0035     Updated: 05/15/25 0042    Narrative:      CT ANGIOGRAM CHEST PULMONARY EMBOLISM    Date of Exam: 5/14/2025 11:23 PM EDT    Indication: Elevated dimer, dyspnea.    Comparison: None available.    Technique: Axial CT images were obtained of the chest after the uneventful intravenous administration of iodinated contrast utilizing pulmonary embolism protocol.  In addition, a 3-D volume rendered image was created for interpretation.  Sagittal and   coronal reconstructions were performed.  Automated exposure control and iterative  reconstruction methods were used.      Findings:    Pulmonary arteries: Adequate opacification of the pulmonary arteries. No evidence of acute pulmonary embolism.    Lungs and Pleura: There is mild bilateral basilar atelectasis. There is bilateral lower lobe bronchial wall thickening with some fluid within the bronchi suggestive of infection.    Mediastinum/Inés: No mediastinal or hilar lymphadenopathy.    Lymph nodes: No axillary or supraclavicular adenopathy.    Cardiovascular: The the heart is enlarged. There is a trace pericardial effusion. There is aortic valvular calcification and coronary arterial calcific atherosclerosis.    Upper Abdomen: There are clips from cholecystectomy. There is mild fatty infiltration of the liver. Otherwise, the upper abdominal contents are unremarkable.          Bones and Soft Tissue: No suspicious osseous lesion.        Impression:      Impression:  1.No evidence of pulmonary embolism.  2.Bilateral lower lobe bronchial wall thickening with some fluid in the bronchi suggestive of infection.            Electronically Signed: Haile Velez MD    5/15/2025 12:40 AM EDT    Workstation ID: [id number]    XR Chest 1 View [[id number]] Collected: 05/14/25 2220     Updated: 05/14/25 2223    Narrative:      XR CHEST 1 VW    Date of Exam: 5/14/2025 9:36 PM EDT    Indication: Dyspnea, wheezing    Comparison: 2/1/2016    Findings:  Stable mild cardiomegaly, exaggerated by technique. Lungs without consolidation. Negative for pneumothorax or effusion. Osseous structures grossly intact. Patient's chin overlies the lung apices medially partially limiting assessment.      Impression:      Impression:  No acute process.          Electronically Signed: Renny Johnson MD    5/14/2025 10:21 PM EDT    Workstation ID: [id number]              Current Meds:   SCHEDULE  allopurinol, 200 mg, Oral, Daily  cefTRIAXone, 2,000 mg, Intravenous, Daily  cholestyramine light, 1 packet, Oral, Daily  FLUoxetine, 10 mg, Oral,  Daily  folic acid, 1 mg, Oral, Daily  hydrALAZINE, 50 mg, Oral, Daily  ipratropium-albuterol, 3 mL, Nebulization, Q4H - RT  levothyroxine, 175 mcg, Oral, Q AM  methylPREDNISolone sodium succinate, 40 mg, Intravenous, Q8H  pantoprazole, 40 mg, Oral, Daily  saccharomyces boulardii, 250 mg, Oral, BID  sodium bicarbonate, 1,300 mg, Oral, TID  sodium chloride, 10 mL, Intravenous, Q12H  terazosin, 1 mg, Oral, Daily  Vitamin B1, 100 mg, Oral, Daily      Infusions  Pharmacy to Dose enoxaparin (LOVENOX),   sodium chloride, 75 mL/hr, Last Rate: 75 mL/hr (05/15/25 0841)      PRNs    acetaminophen **OR** acetaminophen **OR** acetaminophen    aluminum-magnesium hydroxide-simethicone    senna-docusate sodium **AND** polyethylene glycol **AND** bisacodyl **AND** bisacodyl    calcium carbonate    nitroglycerin    ondansetron ODT **OR** ondansetron    Pharmacy to Dose enoxaparin (LOVENOX)    sodium chloride    sodium chloride        Paulino Zaman MD  5/15/2025  08:42 EDT      Much of this encounter note is an electronic transcription/translation of spoken language to printed text using Dragon Software.

## 2025-05-15 NOTE — CONSULTS
Infectious Diseases Consult Note    Referring Provider: Montse Dawkins MD    Reason for Consultation: Possible pneumonia    Patient Care Team:  Montse Dawkins MD as PCP - General (Family Medicine)  Huey Keyes MD as Consulting Physician (Nephrology)    Chief complaint shortness of breath and fever    Subjective     History of present illness:      This is 79-year-old male who was admitted [facility] on May 14, 2025 after presented emergency room with complaint of fever at home.  Patient has been afebrile since.  He was also complaining of shortness of breath and is currently on room air.  Patient apparently was diagnosed with COVID-19 infection according to the notes on April 5, 2025.  I do not see the test in the chart but that was reported in the pulmonary note.  There was no family around the patient to provide me with history and the patient is pretty confused.  He was noted to have lower extremities edema and redness but this is apparently a chronic problem.  The patient had remote history of C. difficile colitis.  I told he did not note patient was treated with Paxlovid when he was diagnosed with COVID-19    Review of Systems   Review of Systems   Respiratory:  Positive for shortness of breath.        Medications  (Not in a hospital admission)      History  Past Medical History:   Diagnosis Date    Alcohol abuse 07/13/2022    Essential hypertension 07/13/2022    Gait instability 07/13/2022    GERD without esophagitis 07/13/2022    Gout 07/13/2022    Hypothyroidism (acquired) 07/13/2022    Onychomycosis of multiple toenails with type 2 diabetes mellitus 07/13/2022    Sleep apnea     Stage 3a chronic kidney disease 07/13/2022    Tinea pedis of both feet 07/13/2022    Type 2 diabetes mellitus with diabetic neuropathy, without long-term current use of insulin 07/13/2022     Past Surgical History:   Procedure Laterality Date    ORTHOPEDIC SURGERY      fractured ankle and dislocated shoulder        Family History  Family History   Family history unknown: Yes       Social History   reports that he has never smoked. He has never used smokeless tobacco. He reports current alcohol use of about 30.0 standard drinks of alcohol per week. He reports that he does not use drugs.    Allergies  Tigecycline    Objective     Vital Signs   Vital Signs (last 24 hours)         05/14 0700  05/15 0659 05/15 0700  05/15 1207   Most Recent      Temp (°F) 98.4 -  99.8    97.4 -  97.5     97.5 (36.4) 05/15 1100    Heart Rate 64 -  90    69 -  91     91 05/15 1100    Resp 16 -  26    13 -  17     16 05/15 1100    /67 -  167/72    103/62 -  160/85     103/62 05/15 1100    SpO2 (%) 90 -  96    95 -  100     96 05/15 1100    Flow (L/min) (Oxygen Therapy)   2       2 05/14 2256    Oxygen Concentration (%)   30      30     30 05/15 0747            Physical Exam:  Physical Exam  Constitutional:       Appearance: He is obese.   HENT:      Head: Normocephalic and atraumatic.   Eyes:      Pupils: Pupils are equal, round, and reactive to light.   Cardiovascular:      Rate and Rhythm: Normal rate and regular rhythm.   Pulmonary:      Comments: Diminished breathing sounds with some basilar crackles  Abdominal:      General: Abdomen is flat. Bowel sounds are normal.      Palpations: Abdomen is soft.   Musculoskeletal:      Cervical back: Neck supple.      Right lower leg: Edema present.      Left lower leg: Edema present.      Comments: Symmetrical erythema of both lower extremities involving the feet and ankle with satellite purpuric lesions proximal to that   Neurological:      Mental Status: He is alert. He is disoriented.         Microbiology  Microbiology Results (last 10 days)       Procedure Component Value - Date/Time    Respiratory Panel PCR w/COVID-19(SARS-CoV-2) EROS/UNIQUE/STACIA/PAD/COR/TENA In-House, NP Swab in UTM/VTM, 2 HR TAT - Swab, Nasopharynx [[id number]]  (Abnormal) Collected: 05/14/25 2122    Lab Status: Final result  Specimen: Swab from Nasopharynx Updated: 05/14/25 2221     ADENOVIRUS, PCR Not Detected     Coronavirus 229E Not Detected     Coronavirus HKU1 Not Detected     Coronavirus NL63 Not Detected     Coronavirus OC43 Not Detected     COVID19 Detected     Human Metapneumovirus Not Detected     Human Rhinovirus/Enterovirus Not Detected     Influenza A PCR Not Detected     Influenza B PCR Not Detected     Parainfluenza Virus 1 Not Detected     Parainfluenza Virus 2 Not Detected     Parainfluenza Virus 3 Not Detected     Parainfluenza Virus 4 Not Detected     RSV, PCR Not Detected     Bordetella pertussis pcr Not Detected     Bordetella parapertussis PCR Not Detected     Chlamydophila pneumoniae PCR Not Detected     Mycoplasma pneumo by PCR Not Detected    Narrative:      In the setting of a positive respiratory panel with a viral infection PLUS a negative procalcitonin without other underlying concern for bacterial infection, consider observing off antibiotics or discontinuation of antibiotics and continue supportive care. If the respiratory panel is positive for atypical bacterial infection (Bordetella pertussis, Chlamydophila pneumoniae, or Mycoplasma pneumoniae), consider antibiotic de-escalation to target atypical bacterial infection.    Urine Culture - Urine, Urine, Clean Catch [[id number]]  (Normal) Collected: 05/14/25 2122    Lab Status: Preliminary result Specimen: Urine, Clean Catch Updated: 05/15/25 1100     Urine Culture Culture in progress    Eosinophil Smear - Urine, Urine, Clean Catch [[id number]]  (Normal) Collected: 05/14/25 2122    Lab Status: Final result Specimen: Urine, Clean Catch Updated: 05/15/25 0856     Eosinophil Smear 0 % EOS/100 Cells             Laboratory  Results from last 7 days   Lab Units 05/14/25 2122   WBC 10*3/mm3 13.22*   HEMOGLOBIN g/dL 11.7*   HEMATOCRIT % 38.1   PLATELETS 10*3/mm3 191     Results from last 7 days   Lab Units 05/14/25 2122   SODIUM mmol/L 134*   POTASSIUM mmol/L 3.9    CHLORIDE mmol/L 98   CO2 mmol/L 19.9*   BUN mg/dL 19   CREATININE mg/dL 1.75*   GLUCOSE mg/dL 127*   CALCIUM mg/dL 8.8     Results from last 7 days   Lab Units 05/14/25  2122   SODIUM mmol/L 134*   POTASSIUM mmol/L 3.9   CHLORIDE mmol/L 98   CO2 mmol/L 19.9*   BUN mg/dL 19   CREATININE mg/dL 1.75*   GLUCOSE mg/dL 127*   CALCIUM mg/dL 8.8     Results from last 7 days   Lab Units 05/14/25  2256   CK TOTAL U/L 44               Radiology  Imaging Results (Last 72 Hours)       Procedure Component Value Units Date/Time    CT Angiogram Chest Pulmonary Embolism [[id number]] Collected: 05/15/25 0035     Updated: 05/15/25 0042    Narrative:      CT ANGIOGRAM CHEST PULMONARY EMBOLISM    Date of Exam: 5/14/2025 11:23 PM EDT    Indication: Elevated dimer, dyspnea.    Comparison: None available.    Technique: Axial CT images were obtained of the chest after the uneventful intravenous administration of iodinated contrast utilizing pulmonary embolism protocol.  In addition, a 3-D volume rendered image was created for interpretation.  Sagittal and   coronal reconstructions were performed.  Automated exposure control and iterative reconstruction methods were used.      Findings:    Pulmonary arteries: Adequate opacification of the pulmonary arteries. No evidence of acute pulmonary embolism.    Lungs and Pleura: There is mild bilateral basilar atelectasis. There is bilateral lower lobe bronchial wall thickening with some fluid within the bronchi suggestive of infection.    Mediastinum/Inés: No mediastinal or hilar lymphadenopathy.    Lymph nodes: No axillary or supraclavicular adenopathy.    Cardiovascular: The the heart is enlarged. There is a trace pericardial effusion. There is aortic valvular calcification and coronary arterial calcific atherosclerosis.    Upper Abdomen: There are clips from cholecystectomy. There is mild fatty infiltration of the liver. Otherwise, the upper abdominal contents are unremarkable.          Bones and  Soft Tissue: No suspicious osseous lesion.        Impression:      Impression:  1.No evidence of pulmonary embolism.  2.Bilateral lower lobe bronchial wall thickening with some fluid in the bronchi suggestive of infection.            Electronically Signed: Haile Velez MD    5/15/2025 12:40 AM EDT    Workstation ID: [id number]    XR Chest 1 View [[id number]] Collected: 05/14/25 2220     Updated: 05/14/25 2223    Narrative:      XR CHEST 1 VW    Date of Exam: 5/14/2025 9:36 PM EDT    Indication: Dyspnea, wheezing    Comparison: 2/1/2016    Findings:  Stable mild cardiomegaly, exaggerated by technique. Lungs without consolidation. Negative for pneumothorax or effusion. Osseous structures grossly intact. Patient's chin overlies the lung apices medially partially limiting assessment.      Impression:      Impression:  No acute process.          Electronically Signed: Renny Johnson MD    5/14/2025 10:21 PM EDT    Workstation ID: [id number]            Cardiology      Results Review:  I have reviewed all clinical data, test, lab, and imaging results.       Schedule Meds  allopurinol, 200 mg, Oral, Daily  cefTRIAXone, 2,000 mg, Intravenous, Daily  cholestyramine light, 1 packet, Oral, Daily  enoxaparin sodium, 1 mg/kg, Subcutaneous, Q12H  ferric gluconate, 125 mg, Intravenous, Daily  FLUoxetine, 10 mg, Oral, Daily  folic acid, 1 mg, Oral, Daily  hydrALAZINE, 50 mg, Oral, BID  ipratropium, 0.5 mg, Nebulization, TID - RT  levothyroxine, 175 mcg, Oral, Q AM  methylPREDNISolone sodium succinate, 20 mg, Intravenous, Q12H  mineral oil-hydrophilic petrolatum, 1 Application, Topical, Daily  pantoprazole, 40 mg, Oral, Daily  saccharomyces boulardii, 250 mg, Oral, BID  sodium bicarbonate, 50 mEq, Intravenous, Once  sodium bicarbonate, 1,300 mg, Oral, TID  sodium chloride, 500 mL, Intravenous, Once  sodium chloride, 10 mL, Intravenous, Q12H  terazosin, 1 mg, Oral, Daily  Vitamin B1, 100 mg, Oral, Daily        Infusion Meds  Pharmacy to Dose  enoxaparin (LOVENOX),   sodium chloride, 75 mL/hr, Last Rate: 75 mL/hr (05/15/25 0841)        PRN Meds    acetaminophen **OR** acetaminophen **OR** acetaminophen    aluminum-magnesium hydroxide-simethicone    senna-docusate sodium **AND** polyethylene glycol **AND** bisacodyl **AND** bisacodyl    calcium carbonate    nitroglycerin    ondansetron ODT **OR** ondansetron    Pharmacy to Dose enoxaparin (LOVENOX)    sodium chloride    sodium chloride      Assessment & Plan       Assessment    Lower respiratory tract infection with probable bronchitis based on the clinical presentation and CT scan of the chest findings.  Patient has no clear consolidation of the lungs.  Currently on room air.  Patient presented with fever at home and dyspnea.    Reported COVID-19 infection on April 5, 2025.  Treated previously with Paxlovid.  COVID-19 screen this admission was positive.  This is probably persistent positivity.  CT scan of the chest findings is not consistent with COVID-pneumonia    Lower extremities edema and erythema appears to be chronic with some satellite purpuric lesions.  I am suspecting leukocytoclastic vasculitis.  This can be confirmed by skin biopsy can be done as outpatient    History of C. difficile colitis    Obesity with BMI of 39.4    Plan    Continue IV ceftriaxone 2 g daily.  Consider short course of antibiotics probably 5 days  Can remove patient from COVID-19 isolation since diagnosis was made more than 10 days ago        Moise Liu MD  05/15/25  12:07 EDT    Note is dictated utilizing voice recognition software/Dragon

## 2025-05-15 NOTE — CONSULTS
[facility]    Subjective:     Encounter Date:05/14/2025      Patient ID: Rommel Mcfarland is a 79 y.o. male     Referring Physician: Juancho    Chief Complaint: dyspnea    Reason for Consult: Afib    HPI:  Rommel Mcfarland is a 79 y.o. male with a history of essential hypertension, paroxysmal A-fib, hypothyroidism, type 2 diabetes, recurrent cellulitis and stage IIIa chronic kidney disease who presents with dyspnea.  Daughter reports that patient was hospitalized about 1 month ago with cellulitis and gout, contracted COVID and had been recovering.  However, patient began to have worsening dyspnea on exertion/shortness of breath and wheezing.  He also had a fever of 101 and was very diaphoretic.  He had some petechiae, which he is known to get prior to any illness/infection.    Per prior records:  In December 2014, patient was admitted at [facility] with the symptoms of rhabdomyolysis and renal insufficiency.  He developed respiratory failure.  Patient was found to be volume overload. echocardiogram showed normal left ventricular size   and function and no significant valvular heart disease.  He was aggressively diuresed.  Patient went into atrial fibrillation which responded to amiodarone.  His renal function also improved.  But he still have baseline renal insufficiency.  In the hospital, he was noted to have gallstones but surgery was deferred. Patient underwent cholecystectomy and did well. On previous visit I stopped the Lopressor as well as Coumadin because of frequent fall and relapse of alcohol abuse.  Patient was last seen by Dr. Sanchez in May 2016, amiodarone was discontinued at that time due to liver function and no recurrence of A-fib.    In the ED, patient was given IV Solu-Medrol and DuoNebs.  EKG initially in ER sinus rhythm with LAD and nonspecific T wave abnormalities QTc 455.  Chest x-ray mild cardiomegaly, no consolidations.  Labs: Troponins x 2: 32, 33, WBC 13.22, UA did  reveal pyuria and hematuria and he was given IV Rocephin.  D-dimer was greater than 20, CTA chest with no PE.  Patient does have a history of sleep apnea but daughter states he has not used a CPAP since around 2013.  During the night, sats did drop in the 80s with periods of apnea and he was placed on BiPAP for sleep.    Past Medical History:   Diagnosis Date    Alcohol abuse 07/13/2022    Essential hypertension 07/13/2022    Gait instability 07/13/2022    GERD without esophagitis 07/13/2022    Gout 07/13/2022    Hypothyroidism (acquired) 07/13/2022    Onychomycosis of multiple toenails with type 2 diabetes mellitus 07/13/2022    Sleep apnea     Stage 3a chronic kidney disease 07/13/2022    Tinea pedis of both feet 07/13/2022    Type 2 diabetes mellitus with diabetic neuropathy, without long-term current use of insulin 07/13/2022       Past Surgical History:   Procedure Laterality Date    ORTHOPEDIC SURGERY      fractured ankle and dislocated shoulder       Family History   Family history unknown: Yes       Social History     Socioeconomic History    Marital status:    Tobacco Use    Smoking status: Never    Smokeless tobacco: Never   Vaping Use    Vaping status: Never Used   Substance and Sexual Activity    Alcohol use: Yes     Alcohol/week: 30.0 standard drinks of alcohol     Types: 30 Cans of beer per week    Drug use: Never    Sexual activity: Defer         Review of Systems   Constitutional: Negative for chills and fever.   HENT:  Negative for ear discharge and nosebleeds.    Eyes:  Negative for discharge and redness.   Cardiovascular:  Negative for chest pain, orthopnea, palpitations, paroxysmal nocturnal dyspnea and syncope.   Respiratory:  Positive for shortness of breath. Negative for cough and wheezing.    Endocrine: Negative for heat intolerance.   Skin:  Negative for rash.   Musculoskeletal:  Negative for arthritis and myalgias.   Gastrointestinal:  Negative for abdominal pain, melena, nausea and  "vomiting.   Genitourinary:  Negative for dysuria and hematuria.   Neurological:  Negative for dizziness, light-headedness, numbness and tremors.   Psychiatric/Behavioral:  Negative for depression. The patient is not nervous/anxious.      Negative except as recorded in HPI       Objective:         /73 (BP Location: Right arm, Patient Position: Lying)   Pulse 95   Temp 97.3 °F (36.3 °C) (Oral)   Resp 15   Ht 170.2 cm (67\")   Wt 114 kg (251 lb 12.3 oz)   SpO2 93%   BMI 39.43 kg/m²     Physical Exam:  Physical Exam    General Appearance:    Alert, cooperative, in no acute distress   Head:    Normocephalic, without obvious abnormality, atraumatic   Eyes:            PERRLA, EOM intact       Throat:   Oral mucosa moist   Neck:   No carotid bruit, no JVD, supple, trachea midline, no thyromegaly    Lungs:     wheezes, respirations regular, even and unlabored, room air    Heart:    irregular rhythm and normal rate, normal S1 and S2, no gallop, no rub, no click   Chest Wall:    No abnormalities observed   Abdomen:     Soft,  nontender, nondistended, no guarding, no rebound  tenderness   Extremities:   RLE 2 + edema, LLE 1+ edema, no cyanosis or redness   Pulses:   Pulses palpable and equal bilaterally in all extremities   Skin:   Crusting RLE distal ly, purpura BLE       Neurologic:   Normal mood, thought content and behavior           ASCVD Risk Score::  The ASCVD Risk score (Mayur DK, et al., 2019) failed to calculate for the following reasons:    The 2019 ASCVD risk score is only valid for ages 40 to 79      Lab Review:     Results from last 7 days   Lab Units 05/14/25 2122   SODIUM mmol/L 134*   POTASSIUM mmol/L 3.9   CHLORIDE mmol/L 98   CO2 mmol/L 19.9*   BUN mg/dL 19   CREATININE mg/dL 1.75*   GLUCOSE mg/dL 127*   CALCIUM mg/dL 8.8   AST (SGOT) U/L 11   ALT (SGPT) U/L 6     Results from last 7 days   Lab Units 05/14/25 2256 05/14/25 2122   CK TOTAL U/L 44  --    HSTROP T ng/L 33* 32*     Results from " "last 7 days   Lab Units 05/14/25 2122   WBC 10*3/mm3 13.22*   HEMOGLOBIN g/dL 11.7*   HEMATOCRIT % 38.1   PLATELETS 10*3/mm3 191                   Invalid input(s): \"LDLCALC\"  Results from last 7 days   Lab Units 05/14/25 2122   PROBNP pg/mL 3,853.0*     Results from last 7 days   Lab Units 05/14/25  2256   TSH uIU/mL 1.080       Recent Radiology:  Imaging Results (Most Recent)       Procedure Component Value Units Date/Time    US Renal Bilateral [[id number]] Collected: 05/15/25 1456     Updated: 05/15/25 1459    Narrative:      US RENAL BILATERAL    Date of Exam: 5/15/2025 2:25 PM EDT    Indication: ARF/SARAH/CRF/CKD.    Comparison: No comparisons available.    Technique: Grayscale and color Doppler ultrasound evaluation of the kidneys and urinary bladder was performed.      Findings:  The right kidney measures 9.8 cm and the left kidney measures 10.3 cm. Kidney echogenicity and vascularity appear within normal limits. There is no solid kidney mass.  No echogenic shadowing stone.  No hydronephrosis.      Limited visualization of the urinary bladder is unremarkable.bladder volume 245 cc        Impression:      Impression:  No definite sonographic abnormality in the kidneys        Electronically Signed: Lonny Garcia MD    5/15/2025 2:57 PM EDT    Workstation ID: [id number]    CT Angiogram Chest Pulmonary Embolism [[id number]] Collected: 05/15/25 0035     Updated: 05/15/25 0042    Narrative:      CT ANGIOGRAM CHEST PULMONARY EMBOLISM    Date of Exam: 5/14/2025 11:23 PM EDT    Indication: Elevated dimer, dyspnea.    Comparison: None available.    Technique: Axial CT images were obtained of the chest after the uneventful intravenous administration of iodinated contrast utilizing pulmonary embolism protocol.  In addition, a 3-D volume rendered image was created for interpretation.  Sagittal and   coronal reconstructions were performed.  Automated exposure control and iterative reconstruction methods were " used.      Findings:    Pulmonary arteries: Adequate opacification of the pulmonary arteries. No evidence of acute pulmonary embolism.    Lungs and Pleura: There is mild bilateral basilar atelectasis. There is bilateral lower lobe bronchial wall thickening with some fluid within the bronchi suggestive of infection.    Mediastinum/In[place]: No mediastinal or hilar lymphadenopathy.    Lymph nodes: No axillary or supraclavicular adenopathy.    Cardiovascular: The the heart is enlarged. There is a trace pericardial effusion. There is aortic valvular calcification and coronary arterial calcific atherosclerosis.    Upper Abdomen: There are clips from cholecystectomy. There is mild fatty infiltration of the liver. Otherwise, the upper abdominal contents are unremarkable.          Bones and Soft Tissue: No suspicious osseous lesion.        Impression:      Impression:  1.No evidence of pulmonary embolism.  2.Bilateral lower lobe bronchial wall thickening with some fluid in the bronchi suggestive of infection.            Electronically Signed: Haile Velez MD    5/15/2025 12:40 AM EDT    Workstation ID: [id number]    XR Chest 1 View [[id number]] Collected: 05/14/25 2220     Updated: 05/14/25 2223    Narrative:      XR CHEST 1 VW    Date of Exam: 5/14/2025 9:36 PM EDT    Indication: Dyspnea, wheezing    Comparison: 2/1/2016    Findings:  Stable mild cardiomegaly, exaggerated by technique. Lungs without consolidation. Negative for pneumothorax or effusion. Osseous structures grossly intact. Patient's chin overlies the lung apices medially partially limiting assessment.      Impression:      Impression:  No acute process.          Electronically Signed: Renny Johnson MD    5/14/2025 10:21 PM EDT    Workstation ID: [id number]              ECHOCARDIOGRAM:      Stress Test:        Cardiac Catheterization:  No results found for this or any previous visit.      Results Review:  I have personally reviewed the results from the time of this  admission to 5/15/2025 15:29 EDT and agree with these findings:  []  Laboratory  []  Microbiology  []  Radiology  []  EKG/Telemetry   []  Cardiology/Vascular   []  Pathology  []  Old records  []  Other:    Most notable findings include:     Allergies   Allergen Reactions    Tigecycline Swelling and Rash       Current Medications:   Scheduled Meds:allopurinol, 200 mg, Oral, Daily  cefTRIAXone, 2,000 mg, Intravenous, Daily  cholestyramine light, 1 packet, Oral, Daily  enoxaparin sodium, 1 mg/kg, Subcutaneous, Q12H  ferric gluconate, 125 mg, Intravenous, Daily  FLUoxetine, 10 mg, Oral, Daily  folic acid, 1 mg, Oral, Daily  hydrALAZINE, 50 mg, Oral, BID  ipratropium, 0.5 mg, Nebulization, TID - RT  levothyroxine, 175 mcg, Oral, Q AM  methylPREDNISolone sodium succinate, 20 mg, Intravenous, Q12H  mineral oil-hydrophilic petrolatum, 1 Application, Topical, Daily  pantoprazole, 40 mg, Oral, Daily  saccharomyces boulardii, 250 mg, Oral, BID  sodium bicarbonate, 1,300 mg, Oral, TID  sodium chloride, 10 mL, Intravenous, Q12H  terazosin, 1 mg, Oral, Daily  Vitamin B1, 100 mg, Oral, Daily      Continuous Infusions:Pharmacy to Dose enoxaparin (LOVENOX),   sodium chloride, 75 mL/hr, Last Rate: 75 mL/hr (05/15/25 0841)            Assessment:         Active Hospital Problems    Diagnosis  POA    **Acute and chronic respiratory failure (acute-on-chronic) [J96.20]  Yes          Plan:   Afib  Hx Afib-none since 2014, Coumadin discontinued due to alcohol abuse and frequent falls  Amiodarone discontinued in 2016  Rates 80s  Potassium 3.9, Magnesium pending, TSH 1.08, free T41.52  High-sensitivity troponins 32, 33  Repeat echo    Hypertension  Hydralazine 50 mg daily terazosin 1 mg twice daily per home meds  Avoid ACE ARB    ARF/SARAH  Dr. Barahona consulted     Dyspnea  proBNP 3853 -skewed in setting of SARAH  Dr. 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 Sanchez    Patient is seen and examined and findings are verified.  All data is reviewed by me personally.  Assessment and plan formulated by APC was done after discussion with attending.  I spent more than 50% of time in taking care of the patient.    Patient is admitted with shortness of breath.  He is positive for COVID-19.  This may be a chronic infection.    Normal S1 and S2.  Decreased breath sound no crackles    Patient is found to be in atrial fibrillation.  I will start Toprol-XL 25 mg daily.  AST ALT's are normal I will consider amiodarone tomorrow.  I would start Eliquis tomorrow.  Patient will need cardioversion after 2 months of anticoagulation.  Proceed with echocardiogram        Electronically signed by Freddy Sanchez MD, 05/15/25, 4:37 PM EDT.         NORM Haney  05/15/25  15:29 EDT

## 2025-05-15 NOTE — NURSING NOTE
WOCN note:    79 yr old male admitted 5/14/25 with worsening dyspnea and wheezing. Patient has a hx of HTN, CKD and DM. WOCN consult received for BLE assessment.     Patient presents with chronic BLE edema d/t venous insufficiency. Varicosities are noted in both lower legs as well as plaques of dry skin to the right lower leg. 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 Daughter is at the bedside and reports using multiple different compression wraps and garments at home.   There are no open wounds, blistering or weeping noted. There are foam heel off loading boots in place. Patient also noted to have a blanchable discoloration to his buttocks and upper thighs consistent with chronic pressure. There is a partial thickness skin tear to his left buttock measuring less than 1 x 1 cm. There is a foam wedge in place for sacral off-loading.     Recommend to continue ACE wraps to BLE with Aquaphor to keep the skin hydrated. We will follow as needed.                   Addendum: Patient also noted to have linear areas of ecchymosis consistent with deep tissue pressure injury to his waist and inner thigh possibly from a brief.

## 2025-05-16 ENCOUNTER — APPOINTMENT (OUTPATIENT)
Dept: CARDIOLOGY | Facility: HOSPITAL | Age: 80
End: 2025-05-16
Payer: MEDICARE

## 2025-05-16 LAB
ALBUMIN SERPL-MCNC: 3.3 G/DL (ref 3.5–5.2)
ALBUMIN/GLOB SERPL: 1.1 G/DL
ALP SERPL-CCNC: 53 U/L (ref 39–117)
ALT SERPL W P-5'-P-CCNC: 5 U/L (ref 1–41)
ANION GAP SERPL CALCULATED.3IONS-SCNC: 12.6 MMOL/L (ref 5–15)
AORTIC DIMENSIONLESS INDEX: 0.42 (DI)
AST SERPL-CCNC: 12 U/L (ref 1–40)
AV MEAN PRESS GRAD SYS DOP V1V2: 3 MMHG
AV VMAX SYS DOP: 125 CM/SEC
BACTERIA SPEC AEROBE CULT: NORMAL
BASOPHILS # BLD AUTO: 0.02 10*3/MM3 (ref 0–0.2)
BASOPHILS NFR BLD AUTO: 0.2 % (ref 0–1.5)
BH CV ECHO MEAS - AO MAX PG: 6.3 MMHG
BH CV ECHO MEAS - AO V2 VTI: 23 CM
BH CV ECHO MEAS - AVA(I,D): 1.47 CM2
BH CV ECHO MEAS - EDV(CUBED): 132.7 ML
BH CV ECHO MEAS - EDV(MOD-SP4): 103 ML
BH CV ECHO MEAS - EF(MOD-SP4): 52.4 %
BH CV ECHO MEAS - ESV(CUBED): 74.1 ML
BH CV ECHO MEAS - ESV(MOD-SP4): 49 ML
BH CV ECHO MEAS - FS: 17.6 %
BH CV ECHO MEAS - IVS/LVPW: 1 CM
BH CV ECHO MEAS - IVSD: 1.4 CM
BH CV ECHO MEAS - LA DIMENSION: 3.9 CM
BH CV ECHO MEAS - LV DIASTOLIC VOL/BSA (35-75): 46.3 CM2
BH CV ECHO MEAS - LV MASS(C)D: 300.4 GRAMS
BH CV ECHO MEAS - LV MAX PG: 1.12 MMHG
BH CV ECHO MEAS - LV MEAN PG: 1 MMHG
BH CV ECHO MEAS - LV SYSTOLIC VOL/BSA (12-30): 22 CM2
BH CV ECHO MEAS - LV V1 MAX: 52.9 CM/SEC
BH CV ECHO MEAS - LV V1 VTI: 9.8 CM
BH CV ECHO MEAS - LVIDD: 5.1 CM
BH CV ECHO MEAS - LVIDS: 4.2 CM
BH CV ECHO MEAS - LVOT AREA: 3.5 CM2
BH CV ECHO MEAS - LVOT DIAM: 2.1 CM
BH CV ECHO MEAS - LVPWD: 1.4 CM
BH CV ECHO MEAS - MV A MAX VEL: 25.7 CM/SEC
BH CV ECHO MEAS - MV DEC SLOPE: 567 CM/SEC2
BH CV ECHO MEAS - MV DEC TIME: 0.16 SEC
BH CV ECHO MEAS - MV E MAX VEL: 100 CM/SEC
BH CV ECHO MEAS - MV E/A: 3.9
BH CV ECHO MEAS - MV MAX PG: 6.1 MMHG
BH CV ECHO MEAS - MV MEAN PG: 2 MMHG
BH CV ECHO MEAS - MV P1/2T: 63.5 MSEC
BH CV ECHO MEAS - MV V2 VTI: 32.1 CM
BH CV ECHO MEAS - MVA(P1/2T): 3.5 CM2
BH CV ECHO MEAS - MVA(VTI): 1.06 CM2
BH CV ECHO MEAS - PA ACC TIME: 0.12 SEC
BH CV ECHO MEAS - PA V2 MAX: 81.4 CM/SEC
BH CV ECHO MEAS - RV MAX PG: 1.67 MMHG
BH CV ECHO MEAS - RV V1 MAX: 64.7 CM/SEC
BH CV ECHO MEAS - RV V1 VTI: 14.4 CM
BH CV ECHO MEAS - RVDD: 3 CM
BH CV ECHO MEAS - SV(LVOT): 33.9 ML
BH CV ECHO MEAS - SV(MOD-SP4): 54 ML
BH CV ECHO MEAS - SVI(LVOT): 15.2 ML/M2
BH CV ECHO MEAS - SVI(MOD-SP4): 24.2 ML/M2
BH CV ECHO MEAS - TAPSE (>1.6): 1.91 CM
BILIRUB SERPL-MCNC: 0.2 MG/DL (ref 0–1.2)
BUN SERPL-MCNC: 25 MG/DL (ref 8–23)
BUN/CREAT SERPL: 16.7 (ref 7–25)
CA-I SERPL ISE-MCNC: 1.08 MMOL/L (ref 1.15–1.3)
CALCIUM SPEC-SCNC: 8.5 MG/DL (ref 8.6–10.5)
CHLORIDE SERPL-SCNC: 104 MMOL/L (ref 98–107)
CO2 SERPL-SCNC: 19.4 MMOL/L (ref 22–29)
CREAT SERPL-MCNC: 1.5 MG/DL (ref 0.76–1.27)
DEPRECATED RDW RBC AUTO: 51.6 FL (ref 37–54)
EGFRCR SERPLBLD CKD-EPI 2021: 46.5 ML/MIN/1.73
EOSINOPHIL # BLD AUTO: 0 10*3/MM3 (ref 0–0.4)
EOSINOPHIL NFR BLD AUTO: 0 % (ref 0.3–6.2)
ERYTHROCYTE [DISTWIDTH] IN BLOOD BY AUTOMATED COUNT: 16.6 % (ref 12.3–15.4)
GLOBULIN UR ELPH-MCNC: 2.9 GM/DL
GLUCOSE BLDC GLUCOMTR-MCNC: 146 MG/DL (ref 70–105)
GLUCOSE BLDC GLUCOMTR-MCNC: 163 MG/DL (ref 70–105)
GLUCOSE BLDC GLUCOMTR-MCNC: 164 MG/DL (ref 70–105)
GLUCOSE BLDC GLUCOMTR-MCNC: 173 MG/DL (ref 70–105)
GLUCOSE SERPL-MCNC: 156 MG/DL (ref 65–99)
HCT VFR BLD AUTO: 36.6 % (ref 37.5–51)
HGB BLD-MCNC: 11.6 G/DL (ref 13–17.7)
IMM GRANULOCYTES # BLD AUTO: 0.14 10*3/MM3 (ref 0–0.05)
IMM GRANULOCYTES NFR BLD AUTO: 1.2 % (ref 0–0.5)
IVRT: 85 MS
LYMPHOCYTES # BLD AUTO: 0.78 10*3/MM3 (ref 0.7–3.1)
LYMPHOCYTES NFR BLD AUTO: 6.6 % (ref 19.6–45.3)
MAGNESIUM SERPL-MCNC: 1.8 MG/DL (ref 1.6–2.4)
MCH RBC QN AUTO: 26.8 PG (ref 26.6–33)
MCHC RBC AUTO-ENTMCNC: 31.7 G/DL (ref 31.5–35.7)
MCV RBC AUTO: 84.5 FL (ref 79–97)
MONOCYTES # BLD AUTO: 0.71 10*3/MM3 (ref 0.1–0.9)
MONOCYTES NFR BLD AUTO: 6 % (ref 5–12)
NEUTROPHILS NFR BLD AUTO: 10.15 10*3/MM3 (ref 1.7–7)
NEUTROPHILS NFR BLD AUTO: 86 % (ref 42.7–76)
NRBC BLD AUTO-RTO: 0 /100 WBC (ref 0–0.2)
PHOSPHATE SERPL-MCNC: 1.7 MG/DL (ref 2.5–4.5)
PLATELET # BLD AUTO: 212 10*3/MM3 (ref 140–450)
PMV BLD AUTO: 11.2 FL (ref 6–12)
POTASSIUM SERPL-SCNC: 3.5 MMOL/L (ref 3.5–5.2)
PROT SERPL-MCNC: 6.2 G/DL (ref 6–8.5)
QT INTERVAL: 381 MS
QTC INTERVAL: 488 MS
RBC # BLD AUTO: 4.33 10*6/MM3 (ref 4.14–5.8)
SINUS: 3.3 CM
SODIUM SERPL-SCNC: 136 MMOL/L (ref 136–145)
STJ: 3.4 CM
WBC NRBC COR # BLD AUTO: 11.8 10*3/MM3 (ref 3.4–10.8)

## 2025-05-16 PROCEDURE — 25010000002 NA FERRIC GLUC CPLX PER 12.5 MG: Performed by: INTERNAL MEDICINE

## 2025-05-16 PROCEDURE — 93306 TTE W/DOPPLER COMPLETE: CPT

## 2025-05-16 PROCEDURE — 94799 UNLISTED PULMONARY SVC/PX: CPT

## 2025-05-16 PROCEDURE — 25010000002 ENOXAPARIN PER 10 MG: Performed by: FAMILY MEDICINE

## 2025-05-16 PROCEDURE — 25010000002 SULFUR HEXAFLUORIDE MICROSPH 60.7-25 MG RECONSTITUTED SUSPENSION: Performed by: FAMILY MEDICINE

## 2025-05-16 PROCEDURE — 82948 REAGENT STRIP/BLOOD GLUCOSE: CPT | Performed by: FAMILY MEDICINE

## 2025-05-16 PROCEDURE — 25810000003 SODIUM CHLORIDE 0.9 % SOLUTION: Performed by: INTERNAL MEDICINE

## 2025-05-16 PROCEDURE — 87040 BLOOD CULTURE FOR BACTERIA: CPT | Performed by: INTERNAL MEDICINE

## 2025-05-16 PROCEDURE — 97162 PT EVAL MOD COMPLEX 30 MIN: CPT

## 2025-05-16 PROCEDURE — 97116 GAIT TRAINING THERAPY: CPT

## 2025-05-16 PROCEDURE — 25010000002 CEFTRIAXONE PER 250 MG: Performed by: FAMILY MEDICINE

## 2025-05-16 PROCEDURE — 93306 TTE W/DOPPLER COMPLETE: CPT | Performed by: INTERNAL MEDICINE

## 2025-05-16 PROCEDURE — 94761 N-INVAS EAR/PLS OXIMETRY MLT: CPT

## 2025-05-16 PROCEDURE — 97165 OT EVAL LOW COMPLEX 30 MIN: CPT

## 2025-05-16 PROCEDURE — 25010000002 MAGNESIUM SULFATE 2 GM/50ML SOLUTION: Performed by: INTERNAL MEDICINE

## 2025-05-16 PROCEDURE — 82948 REAGENT STRIP/BLOOD GLUCOSE: CPT

## 2025-05-16 PROCEDURE — 25010000002 METHYLPREDNISOLONE PER 40 MG: Performed by: INTERNAL MEDICINE

## 2025-05-16 PROCEDURE — 25010000002 CALCIUM GLUCONATE-NACL 1-0.675 GM/50ML-% SOLUTION: Performed by: INTERNAL MEDICINE

## 2025-05-16 PROCEDURE — 94664 DEMO&/EVAL PT USE INHALER: CPT

## 2025-05-16 PROCEDURE — 94660 CPAP INITIATION&MGMT: CPT

## 2025-05-16 PROCEDURE — 99232 SBSQ HOSP IP/OBS MODERATE 35: CPT | Performed by: INTERNAL MEDICINE

## 2025-05-16 RX ORDER — CALCIUM GLUCONATE 20 MG/ML
1000 INJECTION, SOLUTION INTRAVENOUS EVERY 12 HOURS
Status: COMPLETED | OUTPATIENT
Start: 2025-05-16 | End: 2025-05-16

## 2025-05-16 RX ORDER — PREDNISONE 20 MG/1
20 TABLET ORAL
Status: DISCONTINUED | OUTPATIENT
Start: 2025-05-17 | End: 2025-05-18

## 2025-05-16 RX ORDER — MAGNESIUM SULFATE HEPTAHYDRATE 40 MG/ML
2 INJECTION, SOLUTION INTRAVENOUS ONCE
Status: COMPLETED | OUTPATIENT
Start: 2025-05-16 | End: 2025-05-16

## 2025-05-16 RX ORDER — SODIUM CHLORIDE 9 MG/ML
50 INJECTION, SOLUTION INTRAVENOUS CONTINUOUS
Status: DISCONTINUED | OUTPATIENT
Start: 2025-05-16 | End: 2025-05-17

## 2025-05-16 RX ORDER — INDOMETHACIN 25 MG/1
50 CAPSULE ORAL ONCE
Status: COMPLETED | OUTPATIENT
Start: 2025-05-16 | End: 2025-05-16

## 2025-05-16 RX ORDER — FENTANYL/ROPIVACAINE/NS/PF 2-625MCG/1
15 PLASTIC BAG, INJECTION (ML) EPIDURAL ONCE
Status: COMPLETED | OUTPATIENT
Start: 2025-05-16 | End: 2025-05-16

## 2025-05-16 RX ADMIN — IPRATROPIUM BROMIDE 0.5 MG: 0.5 SOLUTION RESPIRATORY (INHALATION) at 06:35

## 2025-05-16 RX ADMIN — HYDRALAZINE HYDROCHLORIDE 50 MG: 25 TABLET ORAL at 20:18

## 2025-05-16 RX ADMIN — METOPROLOL SUCCINATE 25 MG: 25 TABLET, EXTENDED RELEASE ORAL at 09:33

## 2025-05-16 RX ADMIN — SULFUR HEXAFLUORIDE 2 ML: KIT at 04:02

## 2025-05-16 RX ADMIN — Medication 10 ML: at 20:19

## 2025-05-16 RX ADMIN — CALCIUM GLUCONATE 1000 MG: 20 INJECTION, SOLUTION INTRAVENOUS at 09:34

## 2025-05-16 RX ADMIN — PANTOPRAZOLE SODIUM 40 MG: 40 TABLET, DELAYED RELEASE ORAL at 09:33

## 2025-05-16 RX ADMIN — LEVOTHYROXINE SODIUM 175 MCG: 0.03 TABLET ORAL at 05:58

## 2025-05-16 RX ADMIN — FLUOXETINE HYDROCHLORIDE 10 MG: 10 CAPSULE ORAL at 09:33

## 2025-05-16 RX ADMIN — SENNOSIDES AND DOCUSATE SODIUM 2 TABLET: 50; 8.6 TABLET ORAL at 17:15

## 2025-05-16 RX ADMIN — MAGNESIUM SULFATE HEPTAHYDRATE 2 G: 40 INJECTION, SOLUTION INTRAVENOUS at 13:03

## 2025-05-16 RX ADMIN — Medication 10 ML: at 10:48

## 2025-05-16 RX ADMIN — POTASSIUM PHOSPHATE, MONOBASIC AND POTASSIUM PHOSPHATE, DIBASIC 15 MMOL: 224; 236 INJECTION, SOLUTION, CONCENTRATE INTRAVENOUS at 13:04

## 2025-05-16 RX ADMIN — METHYLPREDNISOLONE SODIUM SUCCINATE 20 MG: 40 INJECTION, POWDER, FOR SOLUTION INTRAMUSCULAR; INTRAVENOUS at 09:33

## 2025-05-16 RX ADMIN — APIXABAN 2.5 MG: 2.5 TABLET, FILM COATED ORAL at 13:00

## 2025-05-16 RX ADMIN — CHOLESTYRAMINE 4 G: 4 POWDER, FOR SUSPENSION ORAL at 09:33

## 2025-05-16 RX ADMIN — TERAZOSIN HYDROCHLORIDE 1 MG: 1 CAPSULE ORAL at 09:32

## 2025-05-16 RX ADMIN — SODIUM BICARBONATE 1300 MG: 650 TABLET ORAL at 09:33

## 2025-05-16 RX ADMIN — ENOXAPARIN SODIUM 120 MG: 150 INJECTION SUBCUTANEOUS at 02:07

## 2025-05-16 RX ADMIN — IPRATROPIUM BROMIDE 0.5 MG: 0.5 SOLUTION RESPIRATORY (INHALATION) at 14:30

## 2025-05-16 RX ADMIN — CEFTRIAXONE 2000 MG: 2 INJECTION, POWDER, FOR SOLUTION INTRAMUSCULAR; INTRAVENOUS at 12:20

## 2025-05-16 RX ADMIN — SODIUM CHLORIDE 50 ML/HR: 9 INJECTION, SOLUTION INTRAVENOUS at 10:47

## 2025-05-16 RX ADMIN — FOLIC ACID 1 MG: 1 TABLET ORAL at 09:33

## 2025-05-16 RX ADMIN — SODIUM BICARBONATE 50 MEQ: 84 INJECTION, SOLUTION INTRAVENOUS at 10:00

## 2025-05-16 RX ADMIN — Medication 100 MG: at 09:33

## 2025-05-16 RX ADMIN — ALLOPURINOL 200 MG: 100 TABLET ORAL at 09:33

## 2025-05-16 RX ADMIN — HYDRALAZINE HYDROCHLORIDE 50 MG: 25 TABLET ORAL at 09:33

## 2025-05-16 RX ADMIN — SODIUM BICARBONATE 1300 MG: 650 TABLET ORAL at 17:15

## 2025-05-16 RX ADMIN — CALCIUM GLUCONATE 1000 MG: 20 INJECTION, SOLUTION INTRAVENOUS at 20:29

## 2025-05-16 RX ADMIN — Medication 250 MG: at 09:33

## 2025-05-16 RX ADMIN — SODIUM CHLORIDE 125 MG: 9 INJECTION, SOLUTION INTRAVENOUS at 17:47

## 2025-05-16 RX ADMIN — WHITE PETROLATUM 1 APPLICATION: 1.75 OINTMENT TOPICAL at 12:00

## 2025-05-16 RX ADMIN — SODIUM BICARBONATE 1300 MG: 650 TABLET ORAL at 20:18

## 2025-05-16 RX ADMIN — Medication 250 MG: at 20:18

## 2025-05-16 NOTE — PROGRESS NOTES
"DATE/TIME OF ADMISSION:  5/14/2025  8:57 PM     LOS: 1 day   Patient Care Team:  Montse Dawkins MD as PCP - General (Family Medicine)  Huey Keyes MD as Consulting Physician (Nephrology)  Consults       Date and Time Order Name Status Description    5/15/2025  1:56 PM Inpatient Cardiology Consult Completed     5/15/2025  7:26 AM Inpatient Infectious Diseases Consult Completed     5/15/2025  7:12 AM Inpatient Pulmonology Consult Completed     5/15/2025  7:12 AM Inpatient Nephrology Consult Completed             Subjective DOING MUCH BETTER OVERALL; SITTING UP AT THE BEDSIDE IN A CHAIR; NO DISTRESS ON NC O2; ALERT AND TALKATIVE    Interval History: SOA AND FEVER; RECENT APRIL COVID 19 TREATED WITH PAXLOVID; ACUTE RESPIRATORY DISTRESS YESTERDAY AND CAME IN TO THE ER    Patient Complaints: \"NEEDS TO HAVE A BM\"    History taken from: patient    Review of Systems        Objective     Vital Signs  /75 (BP Location: Left arm, Patient Position: Lying)   Pulse 79   Temp 97.7 °F (36.5 °C) (Oral)   Resp 17   Ht 170.2 cm (67\")   Wt 115 kg (253 lb 15.5 oz)   SpO2 95%   BMI 39.78 kg/m²     Physical Exam:       General Appearance:    Alert, cooperative, in no acute distress   Head:    Normocephalic, without obvious abnormality, atraumatic   Eyes:            Lids and lashes normal, conjunctivae and sclerae normal, no   icterus, no pallor, corneas clear, PERRLA   Ears:    Ears appear intact with no abnormalities noted   Throat:   No oral lesions, no thrush, oral mucosa moist   Neck:   No adenopathy, supple, trachea midline, no thyromegaly, no   carotid bruit, no JVD   Lungs:     Clear to auscultation,respirations regular, even and                  unlabored    Heart:    IRRegular rhythm IN THE 90'S    Chest Wall:    No abnormalities observed   Abdomen:     Normal bowel sounds, no masses, no organomegaly, soft        non-tender, non-distended, no guarding, no rebound                tenderness   Extremities:   Moves " all extremities well, 1+edema, CHRONIC             redness and crusting of the skin; purpuric rash left >right leg   Pulses:   Pulses palpable and equal bilaterally   Skin:   No bleeding, bruising or +rash   Lymph nodes:   No palpable adenopathy   Neurologic:   Grossly normal, alert and O x 3; talkative and NAD        I HAVE PERSONALLY EXAMINED AND REVIEWED THE PATIENT'S RECORD     Lab Results (last 48 hours)       Procedure Component Value Units Date/Time    POC Glucose Finger 4x Daily Before Meals & at Bedtime [[id number]]  (Abnormal) Collected: 05/16/25 1140    Specimen: Blood from Finger Updated: 05/16/25 1142     Glucose 164 mg/dL      Comment: Serial Number: [id number]:  [id number]       POC Glucose Finger 4x Daily Before Meals & at Bedtime [[id number]]  (Abnormal) Collected: 05/16/25 0731    Specimen: Blood from Finger Updated: 05/16/25 0733     Glucose 146 mg/dL      Comment: Serial Number: [id number]:  [id number]       Urine Culture - Urine, Urine, Clean Catch [[id number]] Collected: 05/14/25 2122    Specimen: Urine, Clean Catch Updated: 05/16/25 0318     Urine Culture 25,000 CFU/mL Mixed Raquel Isolated    Narrative:      Specimen contains mixed organisms of questionable pathogenicity suggestive of contamination. If symptoms persist, suggest recollection.  Colonization of the urinary tract without infection is common. Treatment is discouraged unless the patient is symptomatic, pregnant, or undergoing an invasive urologic procedure.    Phosphorus [[id number]]  (Abnormal) Collected: 05/15/25 2314    Specimen: Blood from Hand, Right Updated: 05/16/25 0047     Phosphorus 1.7 mg/dL     Comprehensive Metabolic Panel [[id number]]  (Abnormal) Collected: 05/15/25 2314    Specimen: Blood from Hand, Right Updated: 05/16/25 0041     Glucose 156 mg/dL      BUN 25 mg/dL      Creatinine 1.50 mg/dL      Sodium 136 mmol/L      Potassium 3.5 mmol/L      Chloride 104 mmol/L      CO2 19.4 mmol/L      Calcium 8.5 mg/dL       Total Protein 6.2 g/dL      Albumin 3.3 g/dL      ALT (SGPT) 5 U/L      AST (SGOT) 12 U/L      Alkaline Phosphatase 53 U/L      Total Bilirubin 0.2 mg/dL      Globulin 2.9 gm/dL      A/G Ratio 1.1 g/dL      BUN/Creatinine Ratio 16.7     Anion Gap 12.6 mmol/L      eGFR 46.5 mL/min/1.73     Narrative:      GFR Categories in Chronic Kidney Disease (CKD)              GFR Category          GFR (mL/min/1.73)    Interpretation  G1                    90 or greater        Normal or high (1)  G2                    60-89                Mild decrease (1)  G3a                   45-59                Mild to moderate decrease  G3b                   30-44                Moderate to severe decrease  G4                    15-29                Severe decrease  G5                    14 or less           Kidney failure    (1)In the absence of evidence of kidney disease, neither GFR category G1 or G2 fulfill the criteria for CKD.    eGFR calculation 2021 CKD-EPI creatinine equation, which does not include race as a factor    Magnesium [[id number]]  (Normal) Collected: 05/15/25 2314    Specimen: Blood from Hand, Right Updated: 05/16/25 0041     Magnesium 1.8 mg/dL     Calcium, Ionized [[id number]]  (Abnormal) Collected: 05/15/25 2314    Specimen: Blood from Hand, Right Updated: 05/16/25 0019     Ionized Calcium 1.08 mmol/L     CBC Auto Differential [[id number]]  (Abnormal) Collected: 05/15/25 2314    Specimen: Blood from Hand, Right Updated: 05/16/25 0015     WBC 11.80 10*3/mm3      RBC 4.33 10*6/mm3      Hemoglobin 11.6 g/dL      Hematocrit 36.6 %      MCV 84.5 fL      MCH 26.8 pg      MCHC 31.7 g/dL      RDW 16.6 %      RDW-SD 51.6 fl      MPV 11.2 fL      Platelets 212 10*3/mm3      Neutrophil % 86.0 %      Lymphocyte % 6.6 %      Monocyte % 6.0 %      Eosinophil % 0.0 %      Basophil % 0.2 %      Immature Grans % 1.2 %      Neutrophils, Absolute 10.15 10*3/mm3      Lymphocytes, Absolute 0.78 10*3/mm3      Monocytes, Absolute 0.71  10*3/mm3      Eosinophils, Absolute 0.00 10*3/mm3      Basophils, Absolute 0.02 10*3/mm3      Immature Grans, Absolute 0.14 10*3/mm3      nRBC 0.0 /100 WBC     POC Glucose Finger 4x Daily Before Meals & at Bedtime [[id number]]  (Abnormal) Collected: 05/15/25 2305    Specimen: Blood from Finger Updated: 05/15/25 2306     Glucose 159 mg/dL      Comment: Serial Number: [id number]:  [id number]       STAT Lactic Acid, Reflex [[id number]]  (Abnormal) Collected: 05/15/25 2109    Specimen: Blood from Hand, Left Updated: 05/15/25 2145     Lactate 2.2 mmol/L     STAT Lactic Acid, Reflex [[id number]]  (Abnormal) Collected: 05/15/25 1711    Specimen: Blood Updated: 05/15/25 1739     Lactate 2.4 mmol/L     Magnesium [[id number]]  (Normal) Collected: 05/15/25 1711    Specimen: Blood Updated: 05/15/25 1736     Magnesium 1.6 mg/dL     STAT Lactic Acid, Reflex [[id number]]  (Abnormal) Collected: 05/15/25 1304    Specimen: Blood Updated: 05/15/25 1338     Lactate 2.8 mmol/L     POC Glucose Finger 4x Daily Before Meals & at Bedtime [[id number]]  (Abnormal) Collected: 05/15/25 1149    Specimen: Blood from Finger Updated: 05/15/25 1150     Glucose 155 mg/dL      Comment: Serial Number: [id number]:  [id number]       Lactic Acid, Plasma [[id number]]  (Abnormal) Collected: 05/15/25 1000    Specimen: Blood from Arm, Right Updated: 05/15/25 1028     Lactate 2.6 mmol/L     Uric Acid [[id number]]  (Normal) Collected: 05/14/25 2256    Specimen: Blood Updated: 05/15/25 0907     Uric Acid 5.3 mg/dL     Eosinophil Smear - Urine, Urine, Clean Catch [[id number]]  (Normal) Collected: 05/14/25 2122    Specimen: Urine, Clean Catch Updated: 05/15/25 0856     Eosinophil Smear 0 % EOS/100 Cells     TSH [[id number]]  (Normal) Collected: 05/14/25 2256    Specimen: Blood Updated: 05/15/25 0838     TSH 1.080 uIU/mL     CK [[id number]]  (Normal) Collected: 05/14/25 2256    Specimen: Blood Updated: 05/15/25 0838     Creatine Kinase 44 U/L     Iron Profile  [[id number]]  (Abnormal) Collected: 05/14/25 2256    Specimen: Blood Updated: 05/15/25 0838     Iron 10 mcg/dL      Iron Saturation (TSAT) 3 %      Transferrin 195 mg/dL      TIBC 291 mcg/dL     Ferritin [[id number]]  (Normal) Collected: 05/14/25 2256    Specimen: Blood Updated: 05/15/25 0838     Ferritin 152.00 ng/mL     Narrative:      Results may be falsely decreased if patient taking Biotin.      Sodium, Urine, Random - Urine, Clean Catch [[id number]] Collected: 05/14/25 2122    Specimen: Urine, Clean Catch Updated: 05/15/25 0835     Sodium, Urine 50 mmol/L     Narrative:      Reference intervals for random urine have not been established.  Clinical usage is dependent upon physician's interpretation in combination with other laboratory tests.       Blood Gas, Arterial - [[id number]]  (Abnormal) Collected: 05/15/25 0121    Specimen: Arterial Blood Updated: 05/15/25 0124     Site Left Radial     Issac's Test Positive     pH, Arterial 7.372 pH units      pCO2, Arterial 37.8 mm Hg      pO2, Arterial 102.6 mm Hg      HCO3, Arterial 22.0 mmol/L      Base Excess, Arterial -2.9 mmol/L      Comment: Serial Number: [id number]:  [id number]        O2 Saturation, Arterial 97.8 %      CO2 Content 23.1 mmol/L      Barometric Pressure for Blood Gas --     Comment: N/A        Modality Cannula     FIO2 28 %      Hemodilution No     PO2/FIO2 366    Lactic Acid, Plasma [[id number]]  (Normal) Collected: 05/14/25 2354    Specimen: Blood Updated: 05/15/25 0021     Lactate 1.3 mmol/L     High Sensitivity Troponin T 1Hr [[id number]]  (Abnormal) Collected: 05/14/25 2256    Specimen: Blood Updated: 05/14/25 2322     HS Troponin T 33 ng/L      Troponin T Numeric Delta 1 ng/L      Troponin T % Delta 3    Narrative:      High Sensitive Troponin T Reference Range:  <14.0 ng/L- Negative Female for AMI  <22.0 ng/L- Negative Male for AMI  >=14 - Abnormal Female indicating possible myocardial injury.  >=22 - Abnormal Male indicating possible myocardial  injury.   Clinicians would have to utilize clinical acumen, EKG, Troponin, and serial changes to determine if it is an Acute Myocardial Infarction or myocardial injury due to an underlying chronic condition.         Respiratory Panel PCR w/COVID-19(SARS-CoV-2) EROS/UNIQUE/STACIA/PAD/COR/TENA In-House, NP Swab in UTM/VTM, 2 HR TAT - Swab, Nasopharynx [[id number]]  (Abnormal) Collected: 05/14/25 2122    Specimen: Swab from Nasopharynx Updated: 05/14/25 2221     ADENOVIRUS, PCR Not Detected     Coronavirus 229E Not Detected     Coronavirus HKU1 Not Detected     Coronavirus NL63 Not Detected     Coronavirus OC43 Not Detected     COVID19 Detected     Human Metapneumovirus Not Detected     Human Rhinovirus/Enterovirus Not Detected     Influenza A PCR Not Detected     Influenza B PCR Not Detected     Parainfluenza Virus 1 Not Detected     Parainfluenza Virus 2 Not Detected     Parainfluenza Virus 3 Not Detected     Parainfluenza Virus 4 Not Detected     RSV, PCR Not Detected     Bordetella pertussis pcr Not Detected     Bordetella parapertussis PCR Not Detected     Chlamydophila pneumoniae PCR Not Detected     Mycoplasma pneumo by PCR Not Detected    Narrative:      In the setting of a positive respiratory panel with a viral infection PLUS a negative procalcitonin without other underlying concern for bacterial infection, consider observing off antibiotics or discontinuation of antibiotics and continue supportive care. If the respiratory panel is positive for atypical bacterial infection (Bordetella pertussis, Chlamydophila pneumoniae, or Mycoplasma pneumoniae), consider antibiotic de-escalation to target atypical bacterial infection.    Comprehensive Metabolic Panel [[id number]]  (Abnormal) Collected: 05/14/25 2122    Specimen: Blood Updated: 05/14/25 2209     Glucose 127 mg/dL      BUN 19 mg/dL      Creatinine 1.75 mg/dL      Sodium 134 mmol/L      Potassium 3.9 mmol/L      Chloride 98 mmol/L      CO2 19.9 mmol/L      Calcium 8.8  mg/dL      Total Protein 6.7 g/dL      Albumin 3.7 g/dL      ALT (SGPT) 6 U/L      AST (SGOT) 11 U/L      Alkaline Phosphatase 48 U/L      Total Bilirubin 0.8 mg/dL      Globulin 3.0 gm/dL      A/G Ratio 1.2 g/dL      BUN/Creatinine Ratio 10.9     Anion Gap 16.1 mmol/L      eGFR 38.6 mL/min/1.73     Narrative:      GFR Categories in Chronic Kidney Disease (CKD)              GFR Category          GFR (mL/min/1.73)    Interpretation  G1                    90 or greater        Normal or high (1)  G2                    60-89                Mild decrease (1)  G3a                   45-59                Mild to moderate decrease  G3b                   30-44                Moderate to severe decrease  G4                    15-29                Severe decrease  G5                    14 or less           Kidney failure    (1)In the absence of evidence of kidney disease, neither GFR category G1 or G2 fulfill the criteria for CKD.    eGFR calculation 2021 CKD-EPI creatinine equation, which does not include race as a factor    D-dimer, Quantitative [[id number]]  (Abnormal) Collected: 05/14/25 2122    Specimen: Blood Updated: 05/14/25 2156     D-Dimer, Quantitative >20.00 MCGFEU/mL     Narrative:      According to the assay 's published package insert, a normal (<0.50 MCGFEU/mL) D-dimer result in conjunction with a non-high clinical probability assessment, excludes deep vein thrombosis (DVT) and pulmonary embolism (PE) with high sensitivity.    D-dimer values increase with age and this can make VTE exclusion of an older population difficult. To address this, the [employer], based on best available evidence and recent guidelines, recommends that clinicians use age-adjusted D-dimer thresholds in patients greater than 50 years of age with: a) a low probability of PE who do not meet all Pulmonary Embolism Rule Out Criteria, or b) in those with intermediate probability of PE.   The formula for an  "age-adjusted D-dimer cut-off is \"age/100\".  For example, a 60 year old patient would have an age-adjusted cut-off of 0.60 MCGFEU/mL and an 80 year old 0.80 MCGFEU/mL.    High Sensitivity Troponin T [[id number]]  (Abnormal) Collected: 05/14/25 2122    Specimen: Blood Updated: 05/14/25 2152     HS Troponin T 32 ng/L     Narrative:      High Sensitive Troponin T Reference Range:  <14.0 ng/L- Negative Female for AMI  <22.0 ng/L- Negative Male for AMI  >=14 - Abnormal Female indicating possible myocardial injury.  >=22 - Abnormal Male indicating possible myocardial injury.   Clinicians would have to utilize clinical acumen, EKG, Troponin, and serial changes to determine if it is an Acute Myocardial Infarction or myocardial injury due to an underlying chronic condition.         BNP [[id number]]  (Abnormal) Collected: 05/14/25 2122    Specimen: Blood Updated: 05/14/25 2152     proBNP 3,853.0 pg/mL     Narrative:      This assay is used as an aid in the diagnosis of individuals suspected of having heart failure. It can be used as an aid in the diagnosis of acute decompensated heart failure (ADHF) in patients presenting with signs and symptoms of ADHF to the emergency department (ED). In addition, NT-proBNP of <300 pg/mL indicates ADHF is not likely.    Age Range Result Interpretation  NT-proBNP Concentration (pg/mL:      <50             Positive            >450                   Gray                 300-450                    Negative             <300    50-75           Positive            >900                  Gray                300-900                  Negative            <300      >75             Positive            >1800                  Gray                300-1800                  Negative            <300    Urinalysis With Culture If Indicated - Urine, Clean Catch [[id number]]  (Abnormal) Collected: 05/14/25 2122    Specimen: Urine, Clean Catch Updated: 05/14/25 2143     Color, UA Yellow     Appearance, UA Clear     " pH, UA 6.0     Specific Gravity, UA 1.018     Glucose, UA Negative     Ketones, UA Trace     Bilirubin, UA Negative     Blood, UA Small (1+)     Protein,  mg/dL (2+)     Leuk Esterase, UA Small (1+)     Nitrite, UA Negative     Urobilinogen, UA 1.0 E.U./dL    Narrative:      In absence of clinical symptoms, the presence of pyuria, bacteria, and/or nitrites on the urinalysis result does not correlate with infection.    Urinalysis, Microscopic Only - Urine, Clean Catch [[id number]]  (Abnormal) Collected: 05/14/25 2122    Specimen: Urine, Clean Catch Updated: 05/14/25 2143     RBC, UA 21-50 /HPF      WBC, UA 11-20 /HPF      Bacteria, UA None Seen /HPF      Squamous Epithelial Cells, UA 0-2 /HPF      Hyaline Casts, UA 3-6 /LPF      Methodology Automated Microscopy    CBC & Differential [[id number]]  (Abnormal) Collected: 05/14/25 2122    Specimen: Blood Updated: 05/14/25 2130    Narrative:      The following orders were created for panel order CBC & Differential.  Procedure                               Abnormality         Status                     ---------                               -----------         ------                     CBC Auto Differential[[id number]]        Abnormal            Final result                 Please view results for these tests on the individual orders.    CBC Auto Differential [[id number]]  (Abnormal) Collected: 05/14/25 2122    Specimen: Blood Updated: 05/14/25 2130     WBC 13.22 10*3/mm3      RBC 4.37 10*6/mm3      Hemoglobin 11.7 g/dL      Hematocrit 38.1 %      MCV 87.2 fL      MCH 26.8 pg      MCHC 30.7 g/dL      RDW 16.8 %      RDW-SD 53.4 fl      MPV 10.4 fL      Platelets 191 10*3/mm3      Neutrophil % 79.0 %      Lymphocyte % 8.4 %      Monocyte % 11.7 %      Eosinophil % 0.1 %      Basophil % 0.3 %      Immature Grans % 0.5 %      Neutrophils, Absolute 10.44 10*3/mm3      Lymphocytes, Absolute 1.11 10*3/mm3      Monocytes, Absolute 1.55 10*3/mm3      Eosinophils, Absolute 0.01  10*3/mm3      Basophils, Absolute 0.04 10*3/mm3      Immature Grans, Absolute 0.07 10*3/mm3      nRBC 0.0 /100 WBC              Imaging Results (Last 48 Hours)       Procedure Component Value Units Date/Time    US Renal Bilateral [[id number]] Collected: 05/15/25 1456     Updated: 05/15/25 1459    Narrative:      US RENAL BILATERAL    Date of Exam: 5/15/2025 2:25 PM EDT    Indication: ARF/SARAH/CRF/CKD.    Comparison: No comparisons available.    Technique: Grayscale and color Doppler ultrasound evaluation of the kidneys and urinary bladder was performed.      Findings:  The right kidney measures 9.8 cm and the left kidney measures 10.3 cm. Kidney echogenicity and vascularity appear within normal limits. There is no solid kidney mass.  No echogenic shadowing stone.  No hydronephrosis.      Limited visualization of the urinary bladder is unremarkable.bladder volume 245 cc        Impression:      Impression:  No definite sonographic abnormality in the kidneys        Electronically Signed: Lonny Garcia MD    5/15/2025 2:57 PM EDT    Workstation ID: [id number]    CT Angiogram Chest Pulmonary Embolism [[id number]] Collected: 05/15/25 0035     Updated: 05/15/25 0042    Narrative:      CT ANGIOGRAM CHEST PULMONARY EMBOLISM    Date of Exam: 5/14/2025 11:23 PM EDT    Indication: Elevated dimer, dyspnea.    Comparison: None available.    Technique: Axial CT images were obtained of the chest after the uneventful intravenous administration of iodinated contrast utilizing pulmonary embolism protocol.  In addition, a 3-D volume rendered image was created for interpretation.  Sagittal and   coronal reconstructions were performed.  Automated exposure control and iterative reconstruction methods were used.      Findings:    Pulmonary arteries: Adequate opacification of the pulmonary arteries. No evidence of acute pulmonary embolism.    Lungs and Pleura: There is mild bilateral basilar atelectasis. There is bilateral lower lobe bronchial  wall thickening with some fluid within the bronchi suggestive of infection.    Mediastinum/Inés: No mediastinal or hilar lymphadenopathy.    Lymph nodes: No axillary or supraclavicular adenopathy.    Cardiovascular: The the heart is enlarged. There is a trace pericardial effusion. There is aortic valvular calcification and coronary arterial calcific atherosclerosis.    Upper Abdomen: There are clips from cholecystectomy. There is mild fatty infiltration of the liver. Otherwise, the upper abdominal contents are unremarkable.          Bones and Soft Tissue: No suspicious osseous lesion.        Impression:      Impression:  1.No evidence of pulmonary embolism.  2.Bilateral lower lobe bronchial wall thickening with some fluid in the bronchi suggestive of infection.            Electronically Signed: Haile Velez MD    5/15/2025 12:40 AM EDT    Workstation ID: [id number]    XR Chest 1 View [[id number]] Collected: 05/14/25 2220     Updated: 05/14/25 2223    Narrative:      XR CHEST 1 VW    Date of Exam: 5/14/2025 9:36 PM EDT    Indication: Dyspnea, wheezing    Comparison: 2/1/2016    Findings:  Stable mild cardiomegaly, exaggerated by technique. Lungs without consolidation. Negative for pneumothorax or effusion. Osseous structures grossly intact. Patient's chin overlies the lung apices medially partially limiting assessment.      Impression:      Impression:  No acute process.          Electronically Signed: Renny Johnson MD    5/14/2025 10:21 PM EDT    Workstation ID: [id number]                 I reviewed the patient's new clinical results.    Past Medical History:   Diagnosis Date    Alcohol abuse 07/13/2022    Essential hypertension 07/13/2022    Gait instability 07/13/2022    GERD without esophagitis 07/13/2022    Gout 07/13/2022    Hypothyroidism (acquired) 07/13/2022    Onychomycosis of multiple toenails with type 2 diabetes mellitus 07/13/2022    Sleep apnea     Stage 3a chronic kidney disease 07/13/2022    Tinea pedis of  both feet 07/13/2022    Type 2 diabetes mellitus with diabetic neuropathy, without long-term current use of insulin 07/13/2022     Past Surgical History:   Procedure Laterality Date    ORTHOPEDIC SURGERY      fractured ankle and dislocated shoulder         Medication Review:   Scheduled Meds:allopurinol, 200 mg, Oral, Daily  apixaban, 2.5 mg, Oral, Q12H  calcium gluconate, 1,000 mg, Intravenous, Q12H  cefTRIAXone, 2,000 mg, Intravenous, Daily  cholestyramine light, 1 packet, Oral, Daily  ferric gluconate, 125 mg, Intravenous, Daily  FLUoxetine, 10 mg, Oral, Daily  folic acid, 1 mg, Oral, Daily  hydrALAZINE, 50 mg, Oral, BID  ipratropium, 0.5 mg, Nebulization, TID - RT  levothyroxine, 175 mcg, Oral, Q AM  magnesium sulfate, 2 g, Intravenous, Once  methylPREDNISolone sodium succinate, 20 mg, Intravenous, Q12H  metoprolol succinate XL, 25 mg, Oral, Q24H  mineral oil-hydrophilic petrolatum, 1 Application, Topical, Daily  pantoprazole, 40 mg, Oral, Daily  potassium phosphate, 15 mmol, Intravenous, Once  saccharomyces boulardii, 250 mg, Oral, BID  sodium bicarbonate, 1,300 mg, Oral, TID  sodium chloride, 10 mL, Intravenous, Q12H  terazosin, 1 mg, Oral, Daily  Vitamin B1, 100 mg, Oral, Daily      Continuous Infusions:Pharmacy to Dose enoxaparin (LOVENOX),   sodium chloride, 50 mL/hr, Last Rate: 50 mL/hr (05/16/25 1047)      PRN Meds:.  acetaminophen **OR** acetaminophen **OR** acetaminophen    aluminum-magnesium hydroxide-simethicone    senna-docusate sodium **AND** polyethylene glycol **AND** bisacodyl **AND** bisacodyl    calcium carbonate    nitroglycerin    ondansetron ODT **OR** ondansetron    Pharmacy to Dose enoxaparin (LOVENOX)    sodium chloride    sodium chloride     Assessment & Plan   ACUTE ON CHRONIC CKD 3A WITH MILD DEHYDRATION---DR SANCHES TO CONSULT; CREATININE IMPROVED    COVID 19 WITH CONTINUED POSITIVE TESTING--ACUTE RESPIRATORY FAILURE---SUPPORTIVE CARE; BIPAP; OXYGEN, AND DR DRAW TO CONSULT  ROCEPHIN  FOR NOW  NEBS, LOW DOSE STEROIDS FOR BRONCHOSPASM; DR WALL TO CONSULT; IMPROVED    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; STABLE    CHRONIC VENOUS STASIS AND MILD CELLULITIS WITH RASH; WILL REFER FOR BIOPSY LATER ON    DEPRESSION---STABLE WITH PROZAC    CHRONIC ETOH ABUSE---CONTROLLED BY DAUGHTER    HTN---STABLE    GERD---ON PPI    HYPOTHYROIDISM---EUTHYROID ON SYNTHROID    A FIB---ON LOVENOX FOR THIS AND DVT PROPHYLAXIS; DR MITTAL TO CONSULT; AS FALL RISK, WILL HAVE TO FACTOR THIS IN WITH ANTICOAGULATION PLANS; HAD A FIB YEARS AGO AND NO RECURRENCE NOTED UNTIL NOW; RATE CONTROLLED    HYPOMAGNESEMIA---REPLACING PRN    ANEMIA OF CKD--STABLE; DR SANCHES MANAGING    ELEVATED D DIMER---NO PULM EMBOLI ON CT; SEQUELA FROM COVID INFECTION    HYPOPHOSPHATEMIA---DR BOWEN REPLACING    LACTIC ACIDOSIS---COVERED WITH ATB; AND DR WALL ADVISING    GENERAL WEAKNESS POST COVID AND WITH CURRENT ILLNESS---PT/OT; [facility] FOR DISCHARGE UNTIL READY TO GET BACK HOME WITH TREMENDOUS FAMILY CAREGIVER SUPPORT    FULL CODE STATUS---VERIFIED WITH DAUGHTER ON ADMISSION  Principal Problem:    Acute and chronic respiratory failure (acute-on-chronic)          Plan for disposition:TO [facility] BEFORE DISCHARGE HOME AGAIN    Montse Dawkins MD  05/16/25  11:44 EDT

## 2025-05-16 NOTE — CASE MANAGEMENT/SOCIAL WORK
Discharge Planning Assessment   [facility]     Patient Name: Rommel Mcfarland  MRN: [id number]  Today's Date: 5/16/2025    Admit Date: 5/14/2025    Plan: [facility] ([facility]us undecided). Precert not required. PASRR not required. From home alone with private caregivers.   Discharge Needs Assessment       Row Name 05/16/25 1437       Living Environment    People in Home alone;other (see comments)  Daughter lives next door    Current Living Arrangements home    Potentially Unsafe Housing Conditions none    In the past 12 months has the electric, gas, oil, or water company threatened to shut off services in your home? No    Primary Care Provided by self    Provides Primary Care For no one, unable/limited ability to care for self    Family Caregiver if Needed other (see comments);child(any), adult    Family Caregiver Names Daughter Yajaira and has 2 private caregivers throughout the week    Quality of Family Relationships helpful;involved;supportive    Able to Return to Prior Arrangements yes       Resource/Environmental Concerns    Resource/Environmental Concerns none    Transportation Concerns none       Transportation Needs    In the past 12 months, has lack of transportation kept you from medical appointments or from getting medications? no    In the past 12 months, has lack of transportation kept you from meetings, work, or from getting things needed for daily living? No       Food Insecurity    Within the past 12 months, you worried that your food would run out before you got the money to buy more. Never true    Within the past 12 months, the food you bought just didn't last and you didn't have money to get more. Never true       Transition Planning    Patient/Family Anticipates Transition to inpatient rehabilitation facility    Patient/Family Anticipated Services at Transition     Transportation Anticipated health plan transportation       Discharge Needs Assessment    Readmission Within the Last 30  Days no previous admission in last 30 days    Equipment Currently Used at Home nebulizer;walker, standard;wheelchair;lift device    Concerns to be Addressed discharge planning    Anticipated Changes Related to Illness none    Equipment Needed After Discharge none                   Discharge Plan       Row Name 05/16/25 1887       Plan    Plan [facility] ([facility]us undecided). Precert not required. PASRR not required. From home alone with private caregivers.    Patient/Family in Agreement with Plan yes    Plan Comments Patient lives at home alone but his daughter lives next door and checks on him mulitple times throughout the day and stays with him on the weekends. Patient does have two private caregivers that stay from 8A-1P, they cook his meals and help around the house. Patient does not drive and he reslies on his daughter to take him to medical appointments and grocery shopping. Patient does do ADL. PCP (Juancho) and pharmacy ([facility]) confirmed. Denies MTB at this time. Denies finanical assistance needs with medications and/or food. Daughter and patient agreeable to go to [facility] rehab. CM did send referral to [facility], patient accepted. CM did update daughter that Eliquis montly will be $83, daughter stated that would be fine. Daughter did ask for Eliquis Medicare finanical aid packet, CM provided daughter at the bedside with packet. Discharge barriers: mulitple IV push, IV steriods, 2L NC, IV fluids, increased BUN/CRE, electrolyte replacement's, cardiac monitoring, increased WBC, nephro/cardio/ID/pulm following.                  Continued Care and Services - Admitted Since 5/14/2025       Destination       Service Provider Request Status Services Address Phone Fax Patient Preferred    [facility] [place] Accepted -- [address] [address] [place] [address] [phone] --    [facility] Accepted -- [address] [facility] [facility] [place] [address]  [phone] --               Expected Discharge Date and Time       Expected Discharge Date Expected Discharge Time    May 18, 2025            Demographic Summary       Row Name 05/16/25 1404       General Information    Admission Type inpatient    Arrived From emergency department    Required Notices Provided Important Message from Medicare    Referral Source admission list    Reason for Consult discharge planning    Preferred Language English       Contact Information    Permission Granted to Share Info With                    Functional Status       Row Name 05/16/25 1404       Functional Status    Usual Activity Tolerance fair    Current Activity Tolerance fair       Functional Status, IADL    Medications assistive person    Meal Preparation assistive person    Housekeeping assistive person    Laundry assistive person    Shopping assistive person                Patient Forms       Row Name 05/16/25 1445       Patient Forms    Important Message from Medicare (IMM) Delivered  IMM given by reg.                    Met with patient in room wearing PPE: mask.    Maintained distance greater than six feet and spent less than 15 minutes in the room.     Aminta Crenshaw RN

## 2025-05-16 NOTE — PROGRESS NOTES
"NEPHROLOGY PROGRESS NOTE------[facility]    [facility]  [phone]  Ines Barahona MD      Patient Care Team:  Montse Dawkins MD as PCP - General (Family Medicine)  Huey Keyes MD as Consulting Physician (Nephrology)      Provider:  Ines Barahona MD  Patient Name: Rommel Mcfarland  :  1945    SUBJECTIVE:    F/U ARF/SARAH/CRF/CKD    Up in chair. Off of BiPap. No angina. No dysuria.     Medication:  allopurinol, 200 mg, Oral, Daily  cefTRIAXone, 2,000 mg, Intravenous, Daily  cholestyramine light, 1 packet, Oral, Daily  enoxaparin sodium, 1 mg/kg, Subcutaneous, Q12H  ferric gluconate, 125 mg, Intravenous, Daily  FLUoxetine, 10 mg, Oral, Daily  folic acid, 1 mg, Oral, Daily  hydrALAZINE, 50 mg, Oral, BID  ipratropium, 0.5 mg, Nebulization, TID - RT  levothyroxine, 175 mcg, Oral, Q AM  methylPREDNISolone sodium succinate, 20 mg, Intravenous, Q12H  metoprolol succinate XL, 25 mg, Oral, Q24H  mineral oil-hydrophilic petrolatum, 1 Application, Topical, Daily  pantoprazole, 40 mg, Oral, Daily  saccharomyces boulardii, 250 mg, Oral, BID  sodium bicarbonate, 1,300 mg, Oral, TID  sodium chloride, 10 mL, Intravenous, Q12H  terazosin, 1 mg, Oral, Daily  Vitamin B1, 100 mg, Oral, Daily      Pharmacy to Dose enoxaparin (LOVENOX),   sodium chloride, 75 mL/hr, Last Rate: 75 mL/hr (05/15/25 0841)        OBJECTIVE    Vital Sign Min/Max for last 24 hours  Temp  Min: 97.3 °F (36.3 °C)  Max: 98.9 °F (37.2 °C)   BP  Min: 103/62  Max: 160/85   Pulse  Min: 81  Max: 101   Resp  Min: 13  Max: 26   SpO2  Min: 92 %  Max: 99 %   No data recorded   Weight  Min: 115 kg (253 lb 15.5 oz)  Max: 115 kg (253 lb 15.5 oz)     Flowsheet Rows      Flowsheet Row First Filed Value   Admission Height 170.2 cm (67\") Documented at 2025   Admission Weight 114 kg (251 lb 12.3 oz) Documented at 2025            No intake/output data recorded.  I/O last 3 " "completed shifts:  In: 240 [P.O.:240]  Out: 850 [Urine:850]    Physical Exam:  General Appearance: alert, appears stated age and cooperative  Head: normocephalic, without obvious abnormality and atraumatic  Eyes: conjunctivae and sclerae normal and no icterus  Neck: supple and no JVD  Lungs: SCATTERED RHONCHI  Heart: regular rhythm & normal rate and normal S1, S2 +BALDOMERO  Chest: Wall no abnormalities observed  Abdomen: normal bowel sounds and soft, nontender  Extremities: moves extremities well, no edema, no cyanosis and no redness  Skin: no bleeding, bruising or rash, turgor normal, color normal and no lesions noted  Neurologic: Alert, and oriented. No focal deficits    Labs:    WBC WBC   Date Value Ref Range Status   05/15/2025 11.80 (H) 3.40 - 10.80 10*3/mm3 Final   05/14/2025 13.22 (H) 3.40 - 10.80 10*3/mm3 Final      HGB Hemoglobin   Date Value Ref Range Status   05/15/2025 11.6 (L) 13.0 - 17.7 g/dL Final   05/14/2025 11.7 (L) 13.0 - 17.7 g/dL Final      HCT Hematocrit   Date Value Ref Range Status   05/15/2025 36.6 (L) 37.5 - 51.0 % Final   05/14/2025 38.1 37.5 - 51.0 % Final      Platelets No results found for: \"LABPLAT\"   MCV MCV   Date Value Ref Range Status   05/15/2025 84.5 79.0 - 97.0 fL Final   05/14/2025 87.2 79.0 - 97.0 fL Final          Sodium Sodium   Date Value Ref Range Status   05/15/2025 136 136 - 145 mmol/L Final   05/14/2025 134 (L) 136 - 145 mmol/L Final      Potassium Potassium   Date Value Ref Range Status   05/15/2025 3.5 3.5 - 5.2 mmol/L Final   05/14/2025 3.9 3.5 - 5.2 mmol/L Final      Chloride Chloride   Date Value Ref Range Status   05/15/2025 104 98 - 107 mmol/L Final   05/14/2025 98 98 - 107 mmol/L Final      CO2 CO2   Date Value Ref Range Status   05/15/2025 19.4 (L) 22.0 - 29.0 mmol/L Final   05/14/2025 19.9 (L) 22.0 - 29.0 mmol/L Final      BUN BUN   Date Value Ref Range Status   05/15/2025 25 (H) 8 - 23 mg/dL Final   05/14/2025 19 8 - 23 mg/dL Final      Creatinine Creatinine " "  Date Value Ref Range Status   05/15/2025 1.50 (H) 0.76 - 1.27 mg/dL Final   05/14/2025 1.75 (H) 0.76 - 1.27 mg/dL Final      Calcium Calcium   Date Value Ref Range Status   05/15/2025 8.5 (L) 8.6 - 10.5 mg/dL Final   05/14/2025 8.8 8.6 - 10.5 mg/dL Final      PO4 No components found for: \"PO4\"   Albumin Albumin   Date Value Ref Range Status   05/15/2025 3.3 (L) 3.5 - 5.2 g/dL Final   05/14/2025 3.7 3.5 - 5.2 g/dL Final      Magnesium Magnesium   Date Value Ref Range Status   05/15/2025 1.8 1.6 - 2.4 mg/dL Final   05/15/2025 1.6 1.6 - 2.4 mg/dL Final      Uric Acid No components found for: \"URIC ACID\"     Imaging Results (Last 72 Hours)       Procedure Component Value Units Date/Time    US Renal Bilateral [[id number]] Collected: 05/15/25 1456     Updated: 05/15/25 1459    Narrative:      US RENAL BILATERAL    Date of Exam: 5/15/2025 2:25 PM EDT    Indication: ARF/SARAH/CRF/CKD.    Comparison: No comparisons available.    Technique: Grayscale and color Doppler ultrasound evaluation of the kidneys and urinary bladder was performed.      Findings:  The right kidney measures 9.8 cm and the left kidney measures 10.3 cm. Kidney echogenicity and vascularity appear within normal limits. There is no solid kidney mass.  No echogenic shadowing stone.  No hydronephrosis.      Limited visualization of the urinary bladder is unremarkable.bladder volume 245 cc        Impression:      Impression:  No definite sonographic abnormality in the kidneys        Electronically Signed: Lonny Garcia MD    5/15/2025 2:57 PM EDT    Workstation ID: [id number]    CT Angiogram Chest Pulmonary Embolism [[id number]] Collected: 05/15/25 0035     Updated: 05/15/25 0042    Narrative:      CT ANGIOGRAM CHEST PULMONARY EMBOLISM    Date of Exam: 5/14/2025 11:23 PM EDT    Indication: Elevated dimer, dyspnea.    Comparison: None available.    Technique: Axial CT images were obtained of the chest after the uneventful intravenous administration of iodinated " contrast utilizing pulmonary embolism protocol.  In addition, a 3-D volume rendered image was created for interpretation.  Sagittal and   coronal reconstructions were performed.  Automated exposure control and iterative reconstruction methods were used.      Findings:    Pulmonary arteries: Adequate opacification of the pulmonary arteries. No evidence of acute pulmonary embolism.    Lungs and Pleura: There is mild bilateral basilar atelectasis. There is bilateral lower lobe bronchial wall thickening with some fluid within the bronchi suggestive of infection.    Mediastinum/Inés: No mediastinal or hilar lymphadenopathy.    Lymph nodes: No axillary or supraclavicular adenopathy.    Cardiovascular: The the heart is enlarged. There is a trace pericardial effusion. There is aortic valvular calcification and coronary arterial calcific atherosclerosis.    Upper Abdomen: There are clips from cholecystectomy. There is mild fatty infiltration of the liver. Otherwise, the upper abdominal contents are unremarkable.          Bones and Soft Tissue: No suspicious osseous lesion.        Impression:      Impression:  1.No evidence of pulmonary embolism.  2.Bilateral lower lobe bronchial wall thickening with some fluid in the bronchi suggestive of infection.            Electronically Signed: Haile Velez MD    5/15/2025 12:40 AM EDT    Workstation ID: [id number]    XR Chest 1 View [[id number]] Collected: 05/14/25 2220     Updated: 05/14/25 2223    Narrative:      XR CHEST 1 VW    Date of Exam: 5/14/2025 9:36 PM EDT    Indication: Dyspnea, wheezing    Comparison: 2/1/2016    Findings:  Stable mild cardiomegaly, exaggerated by technique. Lungs without consolidation. Negative for pneumothorax or effusion. Osseous structures grossly intact. Patient's chin overlies the lung apices medially partially limiting assessment.      Impression:      Impression:  No acute process.          Electronically Signed: Renny Johnson MD    5/14/2025 10:21 PM  EDT    Workstation ID: [id number]            Results for orders placed during the hospital encounter of 05/14/25    XR Chest 1 View    Narrative  XR CHEST 1 VW    Date of Exam: 5/14/2025 9:36 PM EDT    Indication: Dyspnea, wheezing    Comparison: 2/1/2016    Findings:  Stable mild cardiomegaly, exaggerated by technique. Lungs without consolidation. Negative for pneumothorax or effusion. Osseous structures grossly intact. Patient's chin overlies the lung apices medially partially limiting assessment.    Impression  Impression:  No acute process.          Electronically Signed: Renny Johnson MD  5/14/2025 10:21 PM EDT  Workstation ID: [id number]      Results for orders placed during the hospital encounter of 03/27/25    XR Tibia Fibula 2 View Right    Narrative  XR TIBIA FIBULA 2 VW RIGHT    Date of Exam: 3/29/2025 10:17 AM EDT    Indication: AND RIGHT ANKLE TO SEE IF HARDWARE IS PRESENT (PINS) FROM PRIOR FRACTURE    Comparison: None available.    Findings:  Remote posttraumatic and postoperative changes of the distal tibia and fibula are noted. 2 orthopedic fixation screws are observed. There are no signs of hardware failure or loosening. There is no acute displaced fracture or malalignment. Chronic changes  are noted. Atherosclerotic vascular calcifications are noted. Chronic soft tissue changes are also observed.    Impression  Impression:  1.Remote posttraumatic and postoperative changes of the distal tibia and fibula. 2 orthopedic fixation screws are identified. There are no signs of hardware failure or loosening.  2.No evidence for acute displaced fracture or malalignment.        Electronically Signed: Riley Walls MD  3/29/2025 12:53 PM EDT  Workstation ID: [id number]      Results for orders placed during the hospital encounter of 09/15/23    XR Foot 3+ View Right    Narrative  XR FOOT 3+ VW RIGHT    Date of Exam: 9/15/2023 2:30 PM EDT    Indication: wound, infection    Comparison: 7/9/2022    Findings:  Bones are  diffusely osteopenic. There is no discrete area of osseous erosion to indicate a site of osteomyelitis. Postsurgical changes of screw fixation noted at the distal tibia and fibula similar to the prior exam. Extensive small vessel vascular  calcifications. Soft tissue swelling overlying the dorsum of the foot which may relate to edema and/or cellulitis.    Impression  Impression:  1. No discrete area of osseous erosion to indicate osteomyelitis.  2. Soft tissue swelling overlying the dorsum of the foot may relate to edema and/or cellulitis.  3. Diffuse osteopenia.  4. Extensive small vessel vascular calcifications.        Electronically Signed: Renny Johnson MD  9/15/2023 2:35 PM EDT  Workstation ID: [id number]      Results for orders placed during the hospital encounter of 05/14/25    Duplex Venous Lower Extremity - Bilateral CAR    Interpretation Summary    Chronic left lower extremity superficial thrombophlebitis noted in the great saphenous (below knee) and small saphenous.    All other veins appeared normal bilaterally.        ASSESSMENT / PLAN      Acute and chronic respiratory failure (acute-on-chronic)      ARF/SARAH/CRF/CKD----Nonoliguric. +ARF/SARHA on top of known CRF/CKD STG 3A secondary to HTN NS. Clinically prerenal (BNP not clinically accurate) and s/p IV dye exposure so will VERY CAUTIOUSLY give little IV NS.  No NSAIDs or IV dye. Dose meds for CrCl less than 10 cc/min until ARF/SARAH is resolved     2. COVID 19 + PNA/ACUTE HYPOXIC RESPIRATORY FAILURE------On BiPap, steroids, oxygen, nebs per Pulmonary . Was initially COVID 19 + at the beginning of April     3. OA/DJD/GOUT/HYPERURICEMIA-----On Allopurinol. No NSAIDs. Uric ok     4. BILATERAL LE EDEMA/CHRONIC VENOUS INSUFFICIENCY/WOUNDS/CELLULITIS------Wound care.       5. DEPRESSION-----Ok to continue SSRI in the form of Prozac for now     6. ETOH ABUSE------Counseled     7. HTN WITH CKD-----BP up a little. Increase Hydralazine a little and follow.  Avoid hypotension. No ACE-I/ARB/DRI     8. GERD/PUD PROPHYLAXIS------On PPI. Benefits outweigh risks despite CKD     9. HYPOTHYROIDISM------On Synthroid. TSH ok     10. DVT PROPHYLAXIS------Lovenox     11. DEPRESSION------On Prozac     12. HYPOMAGNESEMIA------Replace IV and follow     13. CHRONIC IBS------On Florastor     14. ANEMIA OF CKD-----IV iron for LAMIN. HgB above threshold for EPO     15. KETONURIA/DEHYDRATION-----IV NS. BNP not clinically accurate     16. ELEVATED D-DIMER-----CT PE negative. Likely elevation is from COVID. Repeat Bilateral LE venous dopplers    17. HYPOPHOSPHATEMIA-------Replace IV    18. LACTIC ACIDOSIS--------Avoid hypotension. IV and po NaHCO3 and follow. 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

## 2025-05-16 NOTE — THERAPY EVALUATION
Patient Name: Rommel Mcfarland  : 1945    MRN: [id number]                              Today's Date: 2025       Admit Date: 2025    Visit Dx:     ICD-10-CM ICD-9-CM   1. Dyspnea, unspecified type  R06.00 786.09   2. Acute respiratory failure with hypoxia  J96.01 518.81   3. Acute cystitis with hematuria  N30.01 595.0     Patient Active Problem List   Diagnosis    Cellulitis of right foot    Tinea pedis of both feet    Onychomycosis of multiple toenails with type 2 diabetes mellitus    Type 2 diabetes mellitus with diabetic neuropathy, without long-term current use of insulin    Essential hypertension    Stage 3a chronic kidney disease    Alcohol abuse    Gout    Hypothyroidism (acquired)    Major depressive disorder with current active episode    GERD without esophagitis    Gait instability    Cellulitis    Acute and chronic respiratory failure (acute-on-chronic)     Past Medical History:   Diagnosis Date    Alcohol abuse 2022    Essential hypertension 2022    Gait instability 2022    GERD without esophagitis 2022    Gout 2022    Hypothyroidism (acquired) 2022    Onychomycosis of multiple toenails with type 2 diabetes mellitus 2022    Sleep apnea     Stage 3a chronic kidney disease 2022    Tinea pedis of both feet 2022    Type 2 diabetes mellitus with diabetic neuropathy, without long-term current use of insulin 2022     Past Surgical History:   Procedure Laterality Date    ORTHOPEDIC SURGERY      fractured ankle and dislocated shoulder      General Information       Row Name 25 1616          OT Time and Intention    Document Type evaluation  -MP     Mode of Treatment occupational therapy  -MP     Patient Effort excellent  -MP       Row Name 25 1616          General Information    Patient Profile Reviewed yes  -MP     Prior Level of Function independent:;ADL's  -MP     Existing Precautions/Restrictions oxygen therapy device  and L/min;fall  -       Row Name 05/16/25 1616          Living Environment    Current Living Arrangements home  -     People in Home alone  -       Row Name 05/16/25 1616          Cognition    Orientation Status (Cognition) oriented x 4  -       Row Name 05/16/25 1616          Safety Issues/Impairments Affecting Functional Mobility    Impairments Affecting Function (Mobility) endurance/activity tolerance;balance;shortness of breath;strength  -MP               User Key  (r) = Recorded By, (t) = Taken By, (c) = Cosigned By      Initials Name Provider Type    Kenneth Downey OT Occupational Therapist                     Mobility/ADL's       Row Name 05/16/25 1616          Bed Mobility    Bed Mobility supine-sit  -MP     Supine-Sit [place] (Bed Mobility) minimum assist (75% patient effort)  -MP     Assistive Device (Bed Mobility) head of bed elevated;bed rails  -       Row Name 05/16/25 1616          Sit-Stand Transfer    Sit-Stand [place] (Transfers) minimum assist (75% patient effort)  -MP     Assistive Device (Sit-Stand Transfers) walker, front-wheeled  -       Row Name 05/16/25 1616          Activities of Daily Living    BADL Assessment/Intervention lower body dressing  -       Row Name 05/16/25 1616          Lower Body Dressing Assessment/Training    [place] Level (Lower Body Dressing) lower body dressing skills;maximum assist (25% patient effort)  -               User Key  (r) = Recorded By, (t) = Taken By, (c) = Cosigned By      Initials Name Provider Type    Kenneth Downey OT Occupational Therapist                   Obj/Interventions       Row Name 05/16/25 1617          Range of Motion Comprehensive    Comment, General Range of Motion BUE WFL  -MP       Row Name 05/16/25 1617          Strength Comprehensive (MMT)    Comment, General Manual Muscle Testing (MMT) Assessment BUE 4-/5  -MP       Row Name 05/16/25 1617          Balance    Balance Interventions  standing;sitting;sit to stand;supported;static;dynamic  -MP               User Key  (r) = Recorded By, (t) = Taken By, (c) = Cosigned By      Initials Name Provider Type    Kenneth Downey OT Occupational Therapist                   Goals/Plan       Row Name 05/16/25 1619          Bed Mobility Goal 1 (OT)    Activity/Assistive Device (Bed Mobility Goal 1, OT) bed mobility activities, all  -MP     [place] Level/Cues Needed (Bed Mobility Goal 1, OT) minimum assist (75% or more patient effort)  -MP     Time Frame (Bed Mobility Goal 1, OT) long term goal (LTG);2 weeks  -MP       Row Name 05/16/25 1619          Transfer Goal 1 (OT)    Activity/Assistive Device (Transfer Goal 1, OT) sit-to-stand/stand-to-sit;toilet  -MP     [place] Level/Cues Needed (Transfer Goal 1, OT) contact guard required  -MP     Time Frame (Transfer Goal 1, OT) long term goal (LTG);2 weeks  -MP       Row Name 05/16/25 1619          Dressing Goal 1 (OT)    Activity/Device (Dressing Goal 1, OT) lower body dressing  -MP     [place]/Cues Needed (Dressing Goal 1, OT) minimum assist (75% or more patient effort)  -MP     Time Frame (Dressing Goal 1, OT) long term goal (LTG);2 weeks  -MP               User Key  (r) = Recorded By, (t) = Taken By, (c) = Cosigned By      Initials Name Provider Type    Kenneth Downey OT Occupational Therapist                   Clinical Impression       Row Name 05/16/25 1618          Pain Assessment    Pretreatment Pain Rating 0/10 - no pain  -MP     Posttreatment Pain Rating 0/10 - no pain  -MP       Row Name 05/16/25 1618          Plan of Care Review    Plan of Care Reviewed With patient  -MP     Progress no change  -MP     Outcome Evaluation Patient is an 78-year-old male PMH significant for alcohol abuse, hypertension, GERD, gout, hypothyroidism, CKD, diabetes presented to the [facility] ED with complaints of left calf pain and left leg swelling over the past month. States the pain has increased over  the past several days. Patient did scratch his leg a few days ago and was started on a 10-day course of Levaquin which he completed on 2/22 with minimal improvement of symptoms. Duplex BLE Chronic left lower extremity superficial thrombophlebitis CT chest negative for PE. Respiratory panel positive for COVID-19 however it was initially diagnosed on April 5, 2025 treated with outpatient Paxlovid as well as azithromycin. XR chest (-). Pt. states he lives at home alone at baseline and maintains ADL independence at baseline, has caregivers daily for IADL support. Pt. requires increased min-mod A for functional transfers this date, increased max A for all LB ADLs. Pt. w/ limited activity tolerance and increased O2 demand. Pt. not safe to d/c home alone at this time and will require IP Rehab at d/c to address aforementioned deficits.  -MP       Row Name 05/16/25 1618          Therapy Assessment/Plan (OT)    Rehab Potential (OT) good  -MP     Criteria for Skilled Therapeutic Interventions Met (OT) yes;meets criteria;skilled treatment is necessary  -MP     Therapy Frequency (OT) 5 times/wk  -MP       Row Name 05/16/25 1618          Therapy Plan Review/Discharge Plan (OT)    Anticipated Discharge Disposition (OT) inpatient rehabilitation facility  -MP       Row Name 05/16/25 1618          Vital Signs    Pre Patient Position Sitting  -MP     Intra Patient Position Standing  -MP     Post Patient Position Sitting  -MP       Row Name 05/16/25 1618          Positioning and Restraints    Pre-Treatment Position sitting in chair/recliner  -MP     Post Treatment Position chair  -MP     In Chair sitting;call light within reach;encouraged to call for assist;exit alarm on  -MP               User Key  (r) = Recorded By, (t) = Taken By, (c) = Cosigned By      Initials Name Provider Type    Kenneth Downey, SANCHEZ Occupational Therapist                   Outcome Measures       Row Name 05/16/25 1244 05/16/25 0815       How much help from  another person do you currently need...    Turning from your back to your side while in flat bed without using bedrails? 3  -BR 3  -EP    Moving from lying on back to sitting on the side of a flat bed without bedrails? 3  -BR 3  -EP    Moving to and from a bed to a chair (including a wheelchair)? 3  -BR 3  -EP    Standing up from a chair using your arms (e.g., wheelchair, bedside chair)? 3  -BR 3  -EP    Climbing 3-5 steps with a railing? 2  -BR 2  -EP    To walk in hospital room? 3  -BR 3  -EP    AM-PAC 6 Clicks Score (PT) 17  -BR 17  -EP    Highest Level of Mobility Goal Stand (1 or More Minutes)-5  -BR Stand (1 or More Minutes)-5  -EP      Row Name 05/16/25 1244          Functional Assessment    Outcome Measure Options AM-PAC 6 Clicks Basic Mobility (PT)  -BR               User Key  (r) = Recorded By, (t) = Taken By, (c) = Cosigned By      Initials Name Provider Type    EP Keeley Restrepo RN Registered Nurse    Angie Mcintosh PT Physical Therapist                    Occupational Therapy Education       Title: PT OT SLP Therapies (In Progress)       Topic: Occupational Therapy (In Progress)       Point: Body mechanics (Done)       Learning Progress Summary            Patient Acceptance, E,TB, VU by  at 5/16/2025 1620                                      User Key       Initials Effective Dates Name Provider Type Discipline     06/16/21 -  Kenneth Ardon OT Occupational Therapist OT                  OT Recommendation and Plan  Therapy Frequency (OT): 5 times/wk  Plan of Care Review  Plan of Care Reviewed With: patient  Progress: no change  Outcome Evaluation: Patient is an 78-year-old male PMH significant for alcohol abuse, hypertension, GERD, gout, hypothyroidism, CKD, diabetes presented to the [facility] ED with complaints of left calf pain and left leg swelling over the past month. States the pain has increased over the past several days. Patient did scratch his leg a few days ago and was started on  a 10-day course of Levaquin which he completed on 2/22 with minimal improvement of symptoms. Duplex BLE Chronic left lower extremity superficial thrombophlebitis CT chest negative for PE. Respiratory panel positive for COVID-19 however it was initially diagnosed on April 5, 2025 treated with outpatient Paxlovid as well as azithromycin. XR chest (-). Pt. states he lives at home alone at baseline and maintains ADL independence at baseline, has caregivers daily for IADL support. Pt. requires increased min-mod A for functional transfers this date, increased max A for all LB ADLs. Pt. w/ limited activity tolerance and increased O2 demand. Pt. not safe to d/c home alone at this time and will require IP Rehab at d/c to address aforementioned deficits.     Time Calculation:         Time Calculation- OT       Row Name 05/16/25 1620             Time Calculation- OT    OT Start Time 1415  -MP      OT Stop Time 1430  -      OT Time Calculation (min) 15 min  -MP      Total Timed Code Minutes- OT 0 minute(s)  -MP      OT Received On 05/16/25  -      OT - Next Appointment 05/19/25  -      OT Goal Re-Cert Due Date 05/30/25  -                User Key  (r) = Recorded By, (t) = Taken By, (c) = Cosigned By      Initials Name Provider Type    Kenneth Downey OT Occupational Therapist                  Therapy Charges for Today       Code Description Service Date Service Provider Modifiers Qty    [id number]  OT EVAL LOW COMPLEXITY 3 5/16/2025 Kenneth Ardon OT GO 1                 Kenneth Ardon OT  5/16/2025

## 2025-05-16 NOTE — PHARMACY PATIENT ASSISTANCE
Transitions of Care (test claim):    Drug Eliquis:   Covered/PA Required: Covered without PA  Copay Per Month: $83.81  Is the medication eligible for a trial card/copay card? Free trial available from     Please note that when it states medication is eligible for a trail card that this only means that the medication has a free trial available. This does not assess if the patient has already utilized the once in a lifetime free trial.     This test claim coverage is only valid on the date the note is published. Copay/coverage could vary depending on patient coverage at a later date.  Additionally this test claim is for the sole purpose of a price check not a clinical recommendation.     For billing questions please call [facility] Pharmacist at ext: [phone]  For M2B questions please call [facility] at ext: [phone]    Tesha Whitley PharmD

## 2025-05-16 NOTE — PROGRESS NOTES
Daily Progress Note          Assessment    Respiratory panel positive for COVID-19 however it was initially diagnosed on April 5, 2025 treated with outpatient Paxlovid as well as azithromycin  CT chest did not show any alveolar infiltrates  No PE  Patient is fully vaccinated   Skin Purpura  History of C. Difficile     Hypothyroidism  Gout  GERD  Hypertension  Diabetes mellitus           Recommendations:        Currently on room air  Steroids : predisone 20 mg  daily for bronchitis/wheeze possible postviral     Empiric antibiotic currently on Rocephin  Bronchodilator budesonide and ipratropium to avoid tachycardia  Thyroid replacement  DVT prophylaxis with Lovenox 40 mg                      LOS: 1 day     Subjective     Improving Cough and shortness of breath    Objective     Vital signs for last 24 hours:  Vitals:    05/15/25 2310 05/16/25 0250 05/16/25 0424 05/16/25 0711   BP: 120/62  134/97    BP Location: Left arm  Left arm    Patient Position: Lying  Lying    Pulse: 99 81 83    Resp: 20 20 19    Temp: 97.7 °F (36.5 °C)  98.9 °F (37.2 °C)    TempSrc: Oral  Oral    SpO2: 98% 98% 98%    Weight:    115 kg (253 lb 15.5 oz)   Height:           Intake/Output last 3 shifts:  I/O last 3 completed shifts:  In: 240 [P.O.:240]  Out: 850 [Urine:850]  Intake/Output this shift:  No intake/output data recorded.      Radiology  Imaging Results (Last 24 Hours)       Procedure Component Value Units Date/Time    US Renal Bilateral [[id number]] Collected: 05/15/25 1456     Updated: 05/15/25 1459    Narrative:      US RENAL BILATERAL    Date of Exam: 5/15/2025 2:25 PM EDT    Indication: ARF/SARAH/CRF/CKD.    Comparison: No comparisons available.    Technique: Grayscale and color Doppler ultrasound evaluation of the kidneys and urinary bladder was performed.      Findings:  The right kidney measures 9.8 cm and the left kidney measures 10.3 cm. Kidney echogenicity and vascularity appear within normal limits. There is no solid kidney mass.   No echogenic shadowing stone.  No hydronephrosis.      Limited visualization of the urinary bladder is unremarkable.bladder volume 245 cc        Impression:      Impression:  No definite sonographic abnormality in the kidneys        Electronically Signed: Lonny Garcia MD    5/15/2025 2:57 PM EDT    Workstation ID: [id number]            Labs:  Results from last 7 days   Lab Units 05/15/25  2314   WBC 10*3/mm3 11.80*   HEMOGLOBIN g/dL 11.6*   HEMATOCRIT % 36.6*   PLATELETS 10*3/mm3 212     Results from last 7 days   Lab Units 05/15/25  2314   SODIUM mmol/L 136   POTASSIUM mmol/L 3.5   CHLORIDE mmol/L 104   CO2 mmol/L 19.4*   BUN mg/dL 25*   CREATININE mg/dL 1.50*   CALCIUM mg/dL 8.5*   BILIRUBIN mg/dL 0.2   ALK PHOS U/L 53   ALT (SGPT) U/L 5   AST (SGOT) U/L 12   GLUCOSE mg/dL 156*     Results from last 7 days   Lab Units 05/15/25  0121   PH, ARTERIAL pH units 7.372   PO2 ART mm Hg 102.6   PCO2, ARTERIAL mm Hg 37.8   HCO3 ART mmol/L 22.0     Results from last 7 days   Lab Units 05/15/25  2314 05/14/25  2122   ALBUMIN g/dL 3.3* 3.7     Results from last 7 days   Lab Units 05/14/25 2256 05/14/25 2122   CK TOTAL U/L 44  --    HSTROP T ng/L 33* 32*         Results from last 7 days   Lab Units 05/15/25  2314   MAGNESIUM mg/dL 1.8         Results from last 7 days   Lab Units 05/14/25  2256   TSH uIU/mL 1.080           Meds:   SCHEDULE  allopurinol, 200 mg, Oral, Daily  cefTRIAXone, 2,000 mg, Intravenous, Daily  cholestyramine light, 1 packet, Oral, Daily  enoxaparin sodium, 1 mg/kg, Subcutaneous, Q12H  ferric gluconate, 125 mg, Intravenous, Daily  FLUoxetine, 10 mg, Oral, Daily  folic acid, 1 mg, Oral, Daily  hydrALAZINE, 50 mg, Oral, BID  ipratropium, 0.5 mg, Nebulization, TID - RT  levothyroxine, 175 mcg, Oral, Q AM  methylPREDNISolone sodium succinate, 20 mg, Intravenous, Q12H  metoprolol succinate XL, 25 mg, Oral, Q24H  mineral oil-hydrophilic petrolatum, 1 Application, Topical, Daily  pantoprazole, 40 mg, Oral,  Daily  saccharomyces boulardii, 250 mg, Oral, BID  sodium bicarbonate, 1,300 mg, Oral, TID  sodium chloride, 10 mL, Intravenous, Q12H  terazosin, 1 mg, Oral, Daily  Vitamin B1, 100 mg, Oral, Daily      Infusions  Pharmacy to Dose enoxaparin (LOVENOX),   sodium chloride, 75 mL/hr, Last Rate: 75 mL/hr (05/15/25 0841)      PRNs    acetaminophen **OR** acetaminophen **OR** acetaminophen    aluminum-magnesium hydroxide-simethicone    senna-docusate sodium **AND** polyethylene glycol **AND** bisacodyl **AND** bisacodyl    calcium carbonate    nitroglycerin    ondansetron ODT **OR** ondansetron    Pharmacy to Dose enoxaparin (LOVENOX)    sodium chloride    sodium chloride    Physical Exam:  General Appearance:  Alert   HEENT:  Normocephalic, without obvious abnormality, Conjunctiva/corneas clear,.   Nares normal, no drainage     Neck:  Supple, symmetrical, trachea midline.   Lungs /Chest wall:   Bilateral basal rhonchi, respirations unlabored, symmetrical wall movement.     Heart:  Regular rate and rhythm, S1 S2 normal  Abdomen: Soft, non-tender, no masses, no organomegaly.    Extremities: No edema, no clubbing or cyanosis     ROS  Constitutional: Negative for chills, fever and malaise/fatigue.   HENT: Negative.    Eyes: Negative.    Cardiovascular: Negative.    Respiratory: Positive for cough and shortness of breath.    Skin: Negative.    Musculoskeletal: Negative.    Gastrointestinal: Negative.    Genitourinary: Negative.    Neurological: Negative.    Psychiatric/Behavioral: Negative.      I reviewed the recent clinical results  I personally reviewed the latest radiological studies    Part of this note may be an electronic transcription/translation of spoken language to printed text using the Dragon Dictation System.

## 2025-05-16 NOTE — DISCHARGE PLACEMENT REQUEST
"Rommel Mcfarland (79 y.o. Male)       Date of Birth   1945    Social Security Number       Address   [address] [place] [employer]    Home Phone   [phone]    MRN   [id number]       [place]   P[employer]    Marital Status                               Admission Date   5/14/2025    Admission Type   Emergency    Admitting Provider   Montse Dawkins MD    Attending Provider   Montse Dawkins MD    Department, Room/Bed   [facility] 2D, 259/1       Discharge Date       Discharge Disposition       Discharge Destination                                 Attending Provider: Montse Dawkins MD    Allergies: Tigecycline    Isolation: None   Infection: COVID (History) (05/16/25)   Code Status: CPR    Ht: 170.2 cm (67\")   Wt: 115 kg (253 lb 15.5 oz)    Admission Cmt: None   Principal Problem: Acute and chronic respiratory failure (acute-on-chronic) [J96.20]                   Active Insurance as of 5/14/2025       Primary Coverage       Payor Plan Insurance Group Employer/Plan Group    MEDICARE MEDICARE A & B        Payor Plan Address Payor Plan Phone Number Payor Plan Fax Number Effective Dates    [address] [phone]  10/1/2008 - None Entered    [facility] [id number]         Subscriber Name Subscriber Birth Date Member ID       ROMMEL MCFARLAND 1945 [id number]               Secondary Coverage       Payor Plan Insurance Group Employer/Plan Group    [employer] [place] [employer] [place]        Payor Plan Address Payor Plan Phone Number Payor Plan Fax Number Effective Dates    [address] [place] [address] [phone]  10/1/2008 - None Entered    [facility] [id number]         Subscriber Name Subscriber Birth Date Member ID       ROMMEL MCFARLAND 1945 [id number]                     Emergency Contacts        (Rel.) Home Phone Work Phone Mobile Phone    [employer] (Daughter) [phone] -- [phone]            "

## 2025-05-16 NOTE — PROGRESS NOTES
"Cardiology Umpire      Patient Care Team:  Montse Dawkins MD as PCP - General (Family Medicine)  Huey Keyes MD as Consulting Physician (Nephrology)        Chief Complaint: Shortness of breath    Subjective    Patient states he is doing very well this morning, he denies chest pain, shortness of breath, palpitations or fatigue.  He states no issues with chest pain shortness of breath or dizziness when he transferred from bed to the recliner    Interval History and ROS:     Patient remains in A-fib, rates controlled, blood pressure stable on Toprol.  Will start Eliquis    Review of Systems   Constitutional: Negative for chills and fever.   HENT:  Negative for ear discharge and nosebleeds.    Eyes:  Negative for discharge and redness.   Cardiovascular:  Negative for chest pain, orthopnea, palpitations, paroxysmal nocturnal dyspnea and syncope.   Respiratory:  Positive for shortness of breath. Negative for cough and wheezing.    Endocrine: Negative for heat intolerance.   Skin:  Negative for rash.   Musculoskeletal:  Negative for arthritis and myalgias.   Gastrointestinal:  Negative for abdominal pain, melena, nausea and vomiting.   Genitourinary:  Negative for dysuria and hematuria.   Neurological:  Negative for dizziness, light-headedness, numbness and tremors.   Psychiatric/Behavioral:  Negative for depression. The patient is not nervous/anxious.        Patient has no complaints    Objective    Vital Signs  Temp:  [97.3 °F (36.3 °C)-98.9 °F (37.2 °C)] 97.7 °F (36.5 °C)  Heart Rate:  [] 79  Resp:  [15-26] 17  BP: (114-140)/(62-97) 139/75  Oxygen Therapy  SpO2: 95 %  Pulse Oximetry Type: Continuous  Device (Oxygen Therapy): nasal cannula  Flow (L/min) (Oxygen Therapy): 2  Oxygen Concentration (%): 28}  Flowsheet Rows      Flowsheet Row First Filed Value   Admission Height 170.2 cm (67\") Documented at 05/14/2025 2050   Admission Weight 114 kg (251 lb 12.3 oz) Documented at 05/14/2025 2050      "       Physical Exam  TELE:  General Appearance:  A-fib controlled rates  Alert, cooperative, in no acute distress   Head:    Normocephalic, without obvious abnormality, atraumatic   Eyes:            PERRLA, EOM intact       Throat:   Oral mucosa moist, No oral lesions, no thrush   Neck:   No carotid bruit, no JVD, supple, trachea midline, no thyromegaly    Lungs:     Clear to auscultation,respirations regular, even and   unlabored    Heart:  Irregularly irregular rhythm and normal rate, normal S1 and S2, no gallop, no rub, no click   Chest Wall:    No abnormalities observed   Abdomen:     Soft, obese, nontender, nondistended, no guarding, no rebound  tenderness,   Extremities: Right lower extremity 2+, left lower extremity 1+    Pulses:   Pulses palpable and equal bilaterally in all extremities   Skin: Crusting right lower extremity distally, purpura bilateral lower extremities       Neurologic:   Normal mood, thought content and behavior     Results Review:     I reviewed the patient's new clinical results.    Lab Results (last 24 hours)       Procedure Component Value Units Date/Time    POC Glucose Finger 4x Daily Before Meals & at Bedtime [[id number]]  (Abnormal) Collected: 05/16/25 1140    Specimen: Blood from Finger Updated: 05/16/25 1142     Glucose 164 mg/dL      Comment: Serial Number: [id number]:  [id number]       POC Glucose Finger 4x Daily Before Meals & at Bedtime [[id number]]  (Abnormal) Collected: 05/16/25 0731    Specimen: Blood from Finger Updated: 05/16/25 0733     Glucose 146 mg/dL      Comment: Serial Number: [id number]:  [id number]       Urine Culture - Urine, Urine, Clean Catch [[id number]] Collected: 05/14/25 2122    Specimen: Urine, Clean Catch Updated: 05/16/25 0318     Urine Culture 25,000 CFU/mL Mixed Raquel Isolated    Narrative:      Specimen contains mixed organisms of questionable pathogenicity suggestive of contamination. If symptoms persist, suggest recollection.  Colonization  of the urinary tract without infection is common. Treatment is discouraged unless the patient is symptomatic, pregnant, or undergoing an invasive urologic procedure.    Phosphorus [[id number]]  (Abnormal) Collected: 05/15/25 2314    Specimen: Blood from Hand, Right Updated: 05/16/25 0047     Phosphorus 1.7 mg/dL     Comprehensive Metabolic Panel [[id number]]  (Abnormal) Collected: 05/15/25 2314    Specimen: Blood from Hand, Right Updated: 05/16/25 0041     Glucose 156 mg/dL      BUN 25 mg/dL      Creatinine 1.50 mg/dL      Sodium 136 mmol/L      Potassium 3.5 mmol/L      Chloride 104 mmol/L      CO2 19.4 mmol/L      Calcium 8.5 mg/dL      Total Protein 6.2 g/dL      Albumin 3.3 g/dL      ALT (SGPT) 5 U/L      AST (SGOT) 12 U/L      Alkaline Phosphatase 53 U/L      Total Bilirubin 0.2 mg/dL      Globulin 2.9 gm/dL      A/G Ratio 1.1 g/dL      BUN/Creatinine Ratio 16.7     Anion Gap 12.6 mmol/L      eGFR 46.5 mL/min/1.73     Narrative:      GFR Categories in Chronic Kidney Disease (CKD)              GFR Category          GFR (mL/min/1.73)    Interpretation  G1                    90 or greater        Normal or high (1)  G2                    60-89                Mild decrease (1)  G3a                   45-59                Mild to moderate decrease  G3b                   30-44                Moderate to severe decrease  G4                    15-29                Severe decrease  G5                    14 or less           Kidney failure    (1)In the absence of evidence of kidney disease, neither GFR category G1 or G2 fulfill the criteria for CKD.    eGFR calculation 2021 CKD-EPI creatinine equation, which does not include race as a factor    Magnesium [[id number]]  (Normal) Collected: 05/15/25 2314    Specimen: Blood from Hand, Right Updated: 05/16/25 0041     Magnesium 1.8 mg/dL     Calcium, Ionized [[id number]]  (Abnormal) Collected: 05/15/25 2314    Specimen: Blood from Hand, Right Updated: 05/16/25 0019     Ionized  Calcium 1.08 mmol/L     CBC Auto Differential [[id number]]  (Abnormal) Collected: 05/15/25 2314    Specimen: Blood from Hand, Right Updated: 05/16/25 0015     WBC 11.80 10*3/mm3      RBC 4.33 10*6/mm3      Hemoglobin 11.6 g/dL      Hematocrit 36.6 %      MCV 84.5 fL      MCH 26.8 pg      MCHC 31.7 g/dL      RDW 16.6 %      RDW-SD 51.6 fl      MPV 11.2 fL      Platelets 212 10*3/mm3      Neutrophil % 86.0 %      Lymphocyte % 6.6 %      Monocyte % 6.0 %      Eosinophil % 0.0 %      Basophil % 0.2 %      Immature Grans % 1.2 %      Neutrophils, Absolute 10.15 10*3/mm3      Lymphocytes, Absolute 0.78 10*3/mm3      Monocytes, Absolute 0.71 10*3/mm3      Eosinophils, Absolute 0.00 10*3/mm3      Basophils, Absolute 0.02 10*3/mm3      Immature Grans, Absolute 0.14 10*3/mm3      nRBC 0.0 /100 WBC     POC Glucose Finger 4x Daily Before Meals & at Bedtime [[id number]]  (Abnormal) Collected: 05/15/25 2305    Specimen: Blood from Finger Updated: 05/15/25 2306     Glucose 159 mg/dL      Comment: Serial Number: [id number]:  [id number]       STAT Lactic Acid, Reflex [[id number]]  (Abnormal) Collected: 05/15/25 2109    Specimen: Blood from Hand, Left Updated: 05/15/25 2145     Lactate 2.2 mmol/L     STAT Lactic Acid, Reflex [[id number]]  (Abnormal) Collected: 05/15/25 1711    Specimen: Blood Updated: 05/15/25 1739     Lactate 2.4 mmol/L     Magnesium [[id number]]  (Normal) Collected: 05/15/25 1711    Specimen: Blood Updated: 05/15/25 1736     Magnesium 1.6 mg/dL     STAT Lactic Acid, Reflex [[id number]]  (Abnormal) Collected: 05/15/25 1304    Specimen: Blood Updated: 05/15/25 1338     Lactate 2.8 mmol/L             Imaging Results (Last 24 Hours)       Procedure Component Value Units Date/Time    US Renal Bilateral [[id number]] Collected: 05/15/25 1456     Updated: 05/15/25 1459    Narrative:      US RENAL BILATERAL    Date of Exam: 5/15/2025 2:25 PM EDT    Indication: ARF/SARAH/CRF/CKD.    Comparison: No comparisons  available.    Technique: Grayscale and color Doppler ultrasound evaluation of the kidneys and urinary bladder was performed.      Findings:  The right kidney measures 9.8 cm and the left kidney measures 10.3 cm. Kidney echogenicity and vascularity appear within normal limits. There is no solid kidney mass.  No echogenic shadowing stone.  No hydronephrosis.      Limited visualization of the urinary bladder is unremarkable.bladder volume 245 cc        Impression:      Impression:  No definite sonographic abnormality in the kidneys        Electronically Signed: Lonny Garcia MD    5/15/2025 2:57 PM EDT    Workstation ID: [id number]            ECG/EMG Results (most recent)       Procedure Component Value Units Date/Time    Telemetry Scan [[id number]] Resulted: 05/14/25     Updated: 05/14/25 2258    ECG 12 Lead Dyspnea [[id number]] Collected: 05/14/25 2055     Updated: 05/15/25 0817     QT Interval 372 ms      QTC Interval 455 ms     Narrative:      HEART RATE=90  bpm  RR Tptgxdkb=669  ms  MI Interval=  ms  P Horizontal Axis=  deg  P Front Axis=  deg  QRSD Interval=96  ms  QT Qqflnzsm=825  ms  DMgK=452  ms  QRS Axis=-35  deg  T Wave Axis=46  deg  - ABNORMAL ECG -  Left axis deviation  Low voltage, precordial leads  Nonspecific  T abnormalities, anterior leads  When compared with ECG of 12-May-2018 12:11:53,  Significant change in rhythm: previously sinus  Significant repolarization change  Significant axis, voltage or hypertrophy change  Electronically Signed By: Skip Tripathi (STACIA) 2025-05-15 08:17:33  Date and Time of Study:2025-05-14 20:55:47    Telemetry Scan [[id number]] Resulted: 05/14/25     Updated: 05/15/25 2246    Telemetry Scan [[id number]] Resulted: 05/14/25     Updated: 05/15/25 2342    Telemetry Scan [[id number]] Resulted: 05/14/25     Updated: 05/16/25 0406    Telemetry Scan [[id number]] Resulted: 05/14/25     Updated: 05/16/25 0817    Adult Transthoracic Echo Complete W/ Cont if Necessary Per Protocol  [[id number]] Resulted: 05/16/25 0846     Updated: 05/16/25 0846     LVIDd 5.1 cm      LVIDs 4.2 cm      IVSd 1.40 cm      LVPWd 1.40 cm      FS 17.6 %      IVS/LVPW 1.00 cm      ESV(cubed) 74.1 ml      LV Sys Vol (BSA corrected) 22.0 cm2      EDV(cubed) 132.7 ml      LV Marcial Vol (BSA corrected) 46.3 cm2      LV mass(C)d 300.4 grams      LVOT area 3.5 cm2      LVOT diam 2.10 cm      EDV(MOD-sp4) 103.0 ml      ESV(MOD-sp4) 49.0 ml      SV(MOD-sp4) 54.0 ml      SVi(MOD-SP4) 24.2 ml/m2      SVi (LVOT) 15.2 ml/m2      EF(MOD-sp4) 52.4 %      MV E max maryjane 100.0 cm/sec      MV A max maryjane 25.7 cm/sec      MV dec time 0.16 sec      MV E/A 3.9     IVRT 85.0 ms      SV(LVOT) 33.9 ml      RVIDd 3.0 cm      TAPSE (>1.6) 1.91 cm      LA dimension (2D)  3.9 cm      LV V1 max 52.9 cm/sec      LV V1 max PG 1.12 mmHg      LV V1 mean PG 1.00 mmHg      LV V1 VTI 9.8 cm      Ao pk maryjane 125.0 cm/sec      Ao max PG 6.3 mmHg      Ao mean PG 3.0 mmHg      Ao V2 VTI 23.0 cm      TRUNG(I,D) 1.47 cm2      Dimensionless Index 0.42 (DI)      MV max PG 6.1 mmHg      MV mean PG 2.00 mmHg      MV V2 VTI 32.1 cm      MV P1/2t 63.5 msec      MVA(P1/2t) 3.5 cm2      MVA(VTI) 1.06 cm2      MV dec slope 567.0 cm/sec2      RV V1 max PG 1.67 mmHg      RV V1 max 64.7 cm/sec      RV V1 VTI 14.4 cm      PA V2 max 81.4 cm/sec      PA acc time 0.12 sec      Sinus 3.3 cm      STJ 3.4 cm     Narrative:        Left ventricular ejection fraction appears to be 46 - 50%.    The right ventricular cavity is mildly dilated.      ECG 12 Lead QT Measurement [[id number]] Collected: 05/15/25 1635     Updated: 05/16/25 0921     QT Interval 381 ms      QTC Interval 488 ms     Narrative:      HEART RATE=99  bpm  RR Zmfnqtfp=174  ms  DC Interval=  ms  P Horizontal Axis=  deg  P Front Axis=  deg  QRSD Lglpluui=742  ms  QT Rlwskejy=942  ms  AUvK=086  ms  QRS Axis=-29  deg  T Wave Axis=174  deg  - ABNORMAL ECG -  Atrial fibrillation  Incomplete RBBB and LAFB  Low voltage, precordial  leads  Nonspecific  T abnormalities, lateral leads  When compared with ECG of 14-May-2025 20:55:47,  Significant change in rhythm  Electronically Signed By: Amadeo Chandra (STACIA) 2025-05-16 09:20:31  Date and Time of Study:2025-05-15 16:35:57    Telemetry Scan [[id number]] Resulted: 05/14/25     Updated: 05/16/25 1200            Medication Review:   I have reviewed the patient's current medication list    Current Facility-Administered Medications:     acetaminophen (TYLENOL) tablet 650 mg, 650 mg, Oral, Q4H PRN **OR** acetaminophen (TYLENOL) 160 MG/5ML oral solution 650 mg, 650 mg, Oral, Q4H PRN **OR** acetaminophen (TYLENOL) suppository 650 mg, 650 mg, Rectal, Q4H PRN, Montse Dawkins MD    allopurinol (ZYLOPRIM) tablet 200 mg, 200 mg, Oral, Daily, Montse Dawkins MD, 200 mg at 05/16/25 0933    aluminum-magnesium hydroxide-simethicone (MAALOX MAX) 400-400-40 MG/5ML suspension 15 mL, 15 mL, Oral, Q6H PRN, Montse Dawkins MD    apixaban (ELIQUIS) tablet 2.5 mg, 2.5 mg, Oral, Q12H, Kailee Triplett APRN    sennosides-docusate (PERICOLACE) 8.6-50 MG per tablet 2 tablet, 2 tablet, Oral, BID PRN, 2 tablet at 05/15/25 2302 **AND** polyethylene glycol (MIRALAX) packet 17 g, 17 g, Oral, Daily PRN **AND** bisacodyl (DULCOLAX) EC tablet 5 mg, 5 mg, Oral, Daily PRN **AND** bisacodyl (DULCOLAX) suppository 10 mg, 10 mg, Rectal, Daily PRN, Montse Dawkins MD    calcium carbonate (TUMS) chewable tablet 500 mg (200 mg elemental), 2 tablet, Oral, BID PRN, Montse Dawkins MD    calcium gluconate 1000 Mg/50ml 0.675% NaCl IV SOLN, 1,000 mg, Intravenous, Q12H, Yoli Barahona MD, 1,000 mg at 05/16/25 0934    cefTRIAXone (ROCEPHIN) 2,000 mg in sodium chloride 0.9 % 100 mL MBP, 2,000 mg, Intravenous, Daily, Montse Dawkins MD, Last Rate: 200 mL/hr at 05/16/25 1220, 2,000 mg at 05/16/25 1220    cholestyramine light packet 4 g, 1 packet, Oral, Daily, Montse Dawkins MD, 4 g at 05/16/25 0933    ferric gluconate (FERRLECIT)125  MG in sodium chloride 0.9 % 100 mL IVPB, 125 mg, Intravenous, Daily, Yoli Barahona MD, Last Rate: 100 mL/hr at 05/15/25 0953, 125 mg at 05/15/25 0953    FLUoxetine (PROzac) capsule 10 mg, 10 mg, Oral, Daily, Montse Dawkins MD, 10 mg at 05/16/25 0933    folic acid (FOLVITE) tablet 1 mg, 1 mg, Oral, Daily, Montse Dawkins MD, 1 mg at 05/16/25 0933    hydrALAZINE (APRESOLINE) tablet 50 mg, 50 mg, Oral, BID, Yoli Barahona MD, 50 mg at 05/16/25 0933    ipratropium (ATROVENT) nebulizer solution 0.5 mg, 0.5 mg, Nebulization, TID - RT, Paulino Zaman MD, 0.5 mg at 05/16/25 0635    levothyroxine (SYNTHROID, LEVOTHROID) tablet 175 mcg, 175 mcg, Oral, Q AM, Montse Dawkins MD, 175 mcg at 05/16/25 0558    magnesium sulfate 2g/50 mL (PREMIX) infusion, 2 g, Intravenous, Once, Yoli Barahona MD    methylPREDNISolone sodium succinate (SOLU-Medrol) injection 20 mg, 20 mg, Intravenous, Q12H, Paulino Zaman MD, 20 mg at 05/16/25 0933    metoprolol succinate XL (TOPROL-XL) 24 hr tablet 25 mg, 25 mg, Oral, Q24H, Freddy Sanchez MD, 25 mg at 05/16/25 0933    mineral oil-hydrophilic petrolatum (AQUAPHOR) ointment 1 Application, 1 Application, Topical, Daily, Montse Dawkins MD, 1 Application at 05/15/25 1232    nitroglycerin (NITROSTAT) SL tablet 0.4 mg, 0.4 mg, Sublingual, Q5 Min PRN, Montse Dawkins MD    ondansetron ODT (ZOFRAN-ODT) disintegrating tablet 4 mg, 4 mg, Oral, Q4H PRN **OR** ondansetron (ZOFRAN) injection 4 mg, 4 mg, Intravenous, Q4H PRN, Montse Dawkins MD    pantoprazole (PROTONIX) EC tablet 40 mg, 40 mg, Oral, Daily, Montse Dawkins MD, 40 mg at 05/16/25 0933    Pharmacy to Dose enoxaparin (LOVENOX), , Not Applicable, Continuous PRN, Montse Dawkins MD    potassium phosphate 15 mmol in 0.9% normal saline 250 mL IVPB, 15 mmol, Intravenous, Once, Yoli Barahona MD    saccharomyces boulardii (FLORASTOR) capsule 250 mg, 250 mg, Oral, BID, Montse Dawkins MD, 250 mg at 05/16/25  0933    sodium bicarbonate tablet 1,300 mg, 1,300 mg, Oral, TID, Yoli Barahona MD, 1,300 mg at 05/16/25 0933    sodium chloride 0.9 % flush 10 mL, 10 mL, Intravenous, Q12H, Montse Dawkins MD, 10 mL at 05/16/25 1048    sodium chloride 0.9 % flush 10 mL, 10 mL, Intravenous, PRN, Montse Dawkins MD    sodium chloride 0.9 % infusion 40 mL, 40 mL, Intravenous, PRN, Montse Dawkins MD    sodium chloride 0.9 % infusion, 50 mL/hr, Intravenous, Continuous, Yoli Barahona MD, Last Rate: 50 mL/hr at 05/16/25 1047, 50 mL/hr at 05/16/25 1047    terazosin (HYTRIN) capsule 1 mg, 1 mg, Oral, Daily, Montse Dawkins MD, 1 mg at 05/16/25 0932    thiamine (VITAMIN B-1) tablet 100 mg, 100 mg, Oral, Daily, Montse Dawkins MD, 100 mg at 05/16/25 0933    Assessment & Plan     Afib  Hx Afib-none since 2014, Coumadin discontinued due to alcohol abuse and frequent falls  Amiodarone discontinued in 2016-stopped due to liver function and no A-fib recurrence  On admit-potassium 3.9, Magnesium pending, TSH 1.08, free T41.52  High-sensitivity troponins 32, 33  Repeat echo     Hypertension  Hydralazine 50 mg daily terazosin 1 mg twice daily per home meds  Avoid ACE ARB     ARF/SARAH  Dr. Barahona consulted      Dyspnea  proBNP 3853 -skewed in setting of SARAH  Dr. 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 Sanchez noted liver enzymes normal, will consider amiodarone    Blood pressures stable systolic 115-140 rates 80-90    Labs today: Sodium 136, CO2 19.4, potassium 3.5, magnesium 1.8, creatinine 1.5, BUN  25, Hemoglobin 11.6    Echocardiogram this admission EF 46-50%, mild dilation of right ventricular cavity, bilateral atrium normal size, no significant valvular dysfunction reported.    Plan for outpatient cardioversion in approximately 2 months, follow-up with our office for hospital follow-up appointment, will arrange at that time    Further cardiac testing planned    Patient is seen and examined and findings are verified.  All data is reviewed by me personally.  Assessment and plan formulated by APC was done after discussion with attending.  I spent more than 50% of time in taking care of the patient.    Patient is overall doing well.  Shortness of breath is better.    Normal S1 and S2.  No pericardial rub or murmur    Continue Eliquis.  Patient is on Toprol blood pressure and heart rate is stable.  I would consider cardioversion after 6 to 8 weeks of anticoagulation as a outpatient.    Electronically signed by Freddy Sanchez MD, 05/16/25, 5:14 PM EDT.      Kailee Triplett, NORM  05/16/25  12:50 EDT

## 2025-05-16 NOTE — PLAN OF CARE
Goal Outcome Evaluation:           Progress: no change        Pt resting comfortably throughout the night but unable to sleep. Pt was unable to wear BIPAP through the night so switched to nasal cannula at 3 liters of oxygen. Pt on continuous fluid. Safety precautions in place. Cardiology, Nephrology and Infectious Disease following patient. Care plan ongoing.

## 2025-05-16 NOTE — PLAN OF CARE
Goal Outcome Evaluation:  Plan of Care Reviewed With: patient        Progress: no change  Outcome Evaluation: Patient is an 78-year-old male [facility] significant for alcohol abuse, hypertension, GERD, gout, hypothyroidism, CKD, diabetes presented to the [facility] ED with complaints of left calf pain and left leg swelling over the past month. States the pain has increased over the past several days. Patient did scratch his leg a few days ago and was started on a 10-day course of Levaquin which he completed on 2/22 with minimal improvement of symptoms. Duplex BLE Chronic left lower extremity superficial thrombophlebitis CT chest negative for PE. Respiratory panel positive for COVID-19 however it was initially diagnosed on April 5, 2025 treated with outpatient Paxlovid as well as azithromycin. XR chest (-). Pt. states he lives at home alone at baseline and maintains ADL independence at baseline, has caregivers daily for IADL support. Pt. requires increased min-mod A for functional transfers this date, increased max A for all LB ADLs. Pt. w/ limited activity tolerance and increased O2 demand. Pt. not safe to d/c home alone at this time and will require IP Rehab at d/c to address aforementioned deficits.    Anticipated Discharge Disposition (OT): inpatient rehabilitation facility

## 2025-05-16 NOTE — THERAPY EVALUATION
Patient Name: Rommel Mcfarland  : 1945    MRN: [id number]                              Today's Date: 2025       Admit Date: 2025    Visit Dx:     ICD-10-CM ICD-9-CM   1. Dyspnea, unspecified type  R06.00 786.09   2. Acute respiratory failure with hypoxia  J96.01 518.81   3. Acute cystitis with hematuria  N30.01 595.0     Patient Active Problem List   Diagnosis    Cellulitis of right foot    Tinea pedis of both feet    Onychomycosis of multiple toenails with type 2 diabetes mellitus    Type 2 diabetes mellitus with diabetic neuropathy, without long-term current use of insulin    Essential hypertension    Stage 3a chronic kidney disease    Alcohol abuse    Gout    Hypothyroidism (acquired)    Major depressive disorder with current active episode    GERD without esophagitis    Gait instability    Cellulitis    Acute and chronic respiratory failure (acute-on-chronic)     Past Medical History:   Diagnosis Date    Alcohol abuse 2022    Essential hypertension 2022    Gait instability 2022    GERD without esophagitis 2022    Gout 2022    Hypothyroidism (acquired) 2022    Onychomycosis of multiple toenails with type 2 diabetes mellitus 2022    Sleep apnea     Stage 3a chronic kidney disease 2022    Tinea pedis of both feet 2022    Type 2 diabetes mellitus with diabetic neuropathy, without long-term current use of insulin 2022     Past Surgical History:   Procedure Laterality Date    ORTHOPEDIC SURGERY      fractured ankle and dislocated shoulder      General Information       Row Name 25 1230          Physical Therapy Time and Intention    Document Type evaluation  -BR     Mode of Treatment physical therapy  -BR       Row Name 25 1230          General Information    Patient Profile Reviewed yes  -BR     Prior Level of Function independent:;all household mobility;gait;transfer  Pt uses a rolling walker at baseline.  -BR     Existing  Precautions/Restrictions oxygen therapy device and L/min;fall  -BR     Barriers to Rehab medically complex  -BR       Row Name 05/16/25 1230          Living Environment    Current Living Arrangements home  -BR     People in Home alone;other (see comments)  Pt has a daily caregiver 4-5 hrs each day. Pt's daughter is a neighbor and checks on pt daily. Pt sleeps in his lift chair.  -BR       Row Name 05/16/25 1230          Home Main Entrance    Number of Stairs, Main Entrance two  -BR     Stair Railings, Main Entrance railings safe and in good condition  -BR       Row Name 05/16/25 1230          Stairs Within Home, Primary    Number of Stairs, Within Home, Primary none  -BR       Row Name 05/16/25 1230          Cognition    Orientation Status (Cognition) oriented x 4  -BR       Row Name 05/16/25 1230          Safety Issues/Impairments Affecting Functional Mobility    Impairments Affecting Function (Mobility) endurance/activity tolerance;balance;shortness of breath;strength  -BR               User Key  (r) = Recorded By, (t) = Taken By, (c) = Cosigned By      Initials Name Provider Type    BR Angie Hager, PT Physical Therapist                   Mobility       Row Name 05/16/25 1232          Bed Mobility    Bed Mobility supine-sit  -BR     Supine-Sit [place] (Bed Mobility) minimum assist (75% patient effort)  -BR     Assistive Device (Bed Mobility) head of bed elevated;bed rails  -BR       Row Name 05/16/25 1232          Sit-Stand Transfer    Sit-Stand [place] (Transfers) minimum assist (75% patient effort)  -BR     Assistive Device (Sit-Stand Transfers) walker, front-wheeled  -BR     Comment, (Sit-Stand Transfer) Height of bed elevated.  -BR       Row Name 05/16/25 1232          Gait/Stairs (Locomotion)    [place] Level (Gait) contact guard  -BR     Patient was able to Ambulate yes  -BR     Distance in Feet (Gait) 30  15 feet x 2 to and from bathroom  -BR     Deviations/Abnormal Patterns (Gait)  siva decreased;base of support, wide  -BR     Bilateral Gait Deviations heel strike decreased;forward flexed posture  -BR               User Key  (r) = Recorded By, (t) = Taken By, (c) = Cosigned By      Initials Name Provider Type    Angie Mcintosh PT Physical Therapist                   Obj/Interventions       Row Name 05/16/25 1235          Range of Motion Comprehensive    General Range of Motion bilateral lower extremity ROM WFL  -BR       Row Name 05/16/25 1235          Strength Comprehensive (MMT)    Comment, General Manual Muscle Testing (MMT) Assessment BLE strength grossly 3+/5  -BR       Row Name 05/16/25 1235          Balance    Balance Assessment sitting static balance;sitting dynamic balance;standing static balance  -BR     Static Sitting Balance supervision  -BR     Dynamic Sitting Balance contact guard  -BR     Position, Sitting Balance unsupported;sitting edge of bed  -BR     Static Standing Balance contact guard  -BR     Position/Device Used, Standing Balance supported;walker, rolling  -BR       Row Name 05/16/25 1235          Sensory Assessment (Somatosensory)    Sensory Assessment (Somatosensory) sensation intact  -BR               User Key  (r) = Recorded By, (t) = Taken By, (c) = Cosigned By      Initials Name Provider Type    Angie Mcintosh PT Physical Therapist                   Goals/Plan       Row Name 05/16/25 1244          Bed Mobility Goal 1 (PT)    Activity/Assistive Device (Bed Mobility Goal 1, PT) bed mobility activities, all  -BR     [place] Level/Cues Needed (Bed Mobility Goal 1, PT) independent  -BR     Time Frame (Bed Mobility Goal 1, PT) long term goal (LTG);2 weeks  -BR       Row Name 05/16/25 1244          Transfer Goal 1 (PT)    Activity/Assistive Device (Transfer Goal 1, PT) transfers, all  -BR     [place] Level/Cues Needed (Transfer Goal 1, PT) independent  -BR     Time Frame (Transfer Goal 1, PT) long term goal (LTG);2 weeks  -BR       Row Name  05/16/25 1244          Gait Training Goal 1 (PT)    Activity/Assistive Device (Gait Training Goal 1, PT) gait (walking locomotion);assistive device use  -BR     [place] Level (Gait Training Goal 1, PT) modified independence  -BR     Distance (Gait Training Goal 1, PT) 100  -BR     Time Frame (Gait Training Goal 1, PT) long term goal (LTG);2 weeks  -BR       Row Name 05/16/25 1244          Therapy Assessment/Plan (PT)    Planned Therapy Interventions (PT) balance training;bed mobility training;gait training;patient/family education;transfer training;ROM (range of motion);stair training;strengthening;neuromuscular re-education  -BR               User Key  (r) = Recorded By, (t) = Taken By, (c) = Cosigned By      Initials Name Provider Type    BR Angie Hager, PT Physical Therapist                   Clinical Impression       Row Name 05/16/25 1235          Pain    Pretreatment Pain Rating 0/10 - no pain  -BR     Posttreatment Pain Rating 0/10 - no pain  -BR       Row Name 05/16/25 1243 05/16/25 1235       Plan of Care Review    Plan of Care Reviewed With -- patient  -BR    Outcome Evaluation Patient is an 78-year-old male [facility] significant for alcohol abuse, hypertension, GERD, gout, hypothyroidism, CKD, diabetes presented to the [facility] ED with complaints of left calf pain and left leg swelling over the past month.  States the pain has increased over the past several days.  Patient did scratch his leg a few days ago and was started on a 10-day course of Levaquin which he completed on 2/22 with minimal improvement of symptoms. Duplex BLE Chronic left lower extremity superficial thrombophlebitis CT chest negative for PE. Respiratory panel positive for COVID-19 however it was initially diagnosed on April 5, 2025 treated with outpatient Paxlovid as well as azithromycin. XR chest (-). Pt A and O x 4 with O2 at 2 L ( pt does not use O2 at baseline.) Pt lives alone and has a daily caregiver  for 4-5 hours daily. Pt has a  daughter who is a neighbor and checks on pt daily. Pt sleeps in a lift chair and uses a rolling walker independently at baseline. This date, pt required min assist for supine>sit and sit<>stand from elevated bed height and toilet. Pt ambulated 15 feet x 2 with rolling walker with CGA of 1 with wide base of support, slow siva and forward flexed posture. Pt has decreased righting reactions in standing. PT will follow pt for standing balance and strength deficits and low functional activity tolerance. PT recommendation is In-patient Rehabilitation Facility.  -BR --      Row Name 05/16/25 1235          Therapy Assessment/Plan (PT)    Rehab Potential (PT) good  -BR     Criteria for Skilled Interventions Met (PT) yes;meets criteria;skilled treatment is necessary  -BR     Therapy Frequency (PT) 5 times/wk  -BR     Predicted Duration of Therapy Intervention (PT) until D/C  -BR       Row Name 05/16/25 1235          Vital Signs    Pre Systolic BP Rehab 134  -BR     Pre Treatment Diastolic BP 97  -BR     Intra Systolic BP Rehab 144  -BR     Intra Treatment Diastolic BP 91  -BR     Post Systolic BP Rehab 151  -BR     Post Treatment Diastolic BP 87  -BR     Pretreatment Heart Rate (beats/min) 84  -BR     Intratreatment Heart Rate (beats/min) 104  -BR     Posttreatment Heart Rate (beats/min) 96  -BR     Pre SpO2 (%) 99  -BR     O2 Delivery Pre Treatment nasal cannula  2L  -BR     Post SpO2 (%) 97  -BR     O2 Delivery Post Treatment nasal cannula  2L  -BR     Pre Patient Position Supine  -BR     Intra Patient Position Standing  -BR     Post Patient Position Sitting  -BR       Row Name 05/16/25 1233          Positioning and Restraints    Pre-Treatment Position in bed  -BR     Post Treatment Position chair  -BR     In Chair notified nsg;reclined;call light within reach;encouraged to call for assist;exit alarm on;legs elevated  -BR               User Key  (r) = Recorded By, (t) = Taken By, (c) = Cosigned By      Initials Name  Provider Type    Angie Mcintosh PT Physical Therapist                   Outcome Measures       Row Name 05/16/25 1244          How much help from another person do you currently need...    Turning from your back to your side while in flat bed without using bedrails? 3  -BR     Moving from lying on back to sitting on the side of a flat bed without bedrails? 3  -BR     Moving to and from a bed to a chair (including a wheelchair)? 3  -BR     Standing up from a chair using your arms (e.g., wheelchair, bedside chair)? 3  -BR     Climbing 3-5 steps with a railing? 2  -BR     To walk in hospital room? 3  -BR     AM-PAC 6 Clicks Score (PT) 17  -BR     Highest Level of Mobility Goal Stand (1 or More Minutes)-5  -BR       Row Name 05/16/25 1244          Functional Assessment    Outcome Measure Options AM-PAC 6 Clicks Basic Mobility (PT)  -BR               User Key  (r) = Recorded By, (t) = Taken By, (c) = Cosigned By      Initials Name Provider Type    BR Angie Hager PT Physical Therapist                                 Physical Therapy Education       Title: PT OT SLP Therapies (Done)       Topic: Physical Therapy (Done)       Point: Mobility training (Done)       Learning Progress Summary            Patient Acceptance, E,D, VU,DU by MIMA at 5/16/2025 1244                      Point: Body mechanics (Done)       Learning Progress Summary            Patient Acceptance, E,D, VU,DU by BR at 5/16/2025 1244                      Point: Precautions (Done)       Learning Progress Summary            Patient Acceptance, E,D, VU,DU by BR at 5/16/2025 1244                                      User Key       Initials Effective Dates Name Provider Type Discipline    MIMA 02/01/22 -  Angie Hager PT Physical Therapist PT                  PT Recommendation and Plan  Planned Therapy Interventions (PT): balance training, bed mobility training, gait training, patient/family education, transfer training, ROM (range of motion),  stair training, strengthening, neuromuscular re-education  Outcome Evaluation: Patient is an 78-year-old male [facility] significant for alcohol abuse, hypertension, GERD, gout, hypothyroidism, CKD, diabetes presented to the [facility] ED with complaints of left calf pain and left leg swelling over the past month.  States the pain has increased over the past several days.  Patient did scratch his leg a few days ago and was started on a 10-day course of Levaquin which he completed on 2/22 with minimal improvement of symptoms. Duplex BLE Chronic left lower extremity superficial thrombophlebitis CT chest negative for PE. Respiratory panel positive for COVID-19 however it was initially diagnosed on April 5, 2025 treated with outpatient Paxlovid as well as azithromycin. XR chest (-). Pt A and O x 4 with O2 at 2 L ( pt does not use O2 at baseline.) Pt lives alone and has a daily caregiver  for 4-5 hours daily. Pt has a daughter who is a neighbor and checks on pt daily. Pt sleeps in a lift chair and uses a rolling walker independently at baseline. This date, pt required min assist for supine>sit and sit<>stand from elevated bed height and toilet. Pt ambulated 15 feet x 2 with rolling walker with CGA of 1 with wide base of support, slow siva and forward flexed posture. Pt has decreased righting reactions in standing. PT will follow pt for standing balance and strength deficits and low functional activity tolerance. PT recommendation is In-patient Rehabilitation Facility.     Time Calculation:         PT Charges       Row Name 05/16/25 1245             Time Calculation    Start Time 0820  -BR      Stop Time 0908  -BR      Time Calculation (min) 48 min  -BR      PT Received On 05/16/25  -BR      PT - Next Appointment 05/17/25  -BR      PT Goal Re-Cert Due Date 05/30/25  -BR         Time Calculation- PT    Total Timed Code Minutes- PT 15 minute(s)  -BR                User Key  (r) = Recorded By, (t) = Taken By, (c) = Cosigned By       Initials Name Provider Type    BR Angie Hager, [place] Physical Therapist                  Therapy Charges for Today       Code Description Service Date Service Provider Modifiers Qty    85617764988 HC PT EVAL MOD COMPLEXITY 4 5/16/2025 Angie Hager, PT GP 1    67582808833 HC GAIT TRAINING EA 15 MIN 5/16/2025 Angie Hager, PT GP 1            PT G-Codes  Outcome Measure Options: AM-PAC 6 Clicks Basic Mobility (PT)  AM-PAC 6 Clicks Score (PT): 17  PT Discharge Summary  Anticipated Discharge Disposition (PT): inpatient rehabilitation facility    Angie Hager, [place]  5/16/2025

## 2025-05-16 NOTE — PROGRESS NOTES
Infectious Diseases Progress Note      LOS: 1 day   Patient Care Team:  Montse Dawkins MD as PCP - General (Family Medicine)  Huey Keyes MD as Consulting Physician (Nephrology)    Chief Complaint: Fatigue    Subjective     The patient had no fever during the last 24 hours.  He remained medically stable.  Currently on 2 L of oxygen via nasal cannula    Review of Systems:   Review of Systems   Constitutional:  Positive for fever.   HENT: Negative.     Eyes: Negative.    Respiratory:  Positive for shortness of breath.    Cardiovascular: Negative.    Gastrointestinal: Negative.    Genitourinary: Negative.    Musculoskeletal: Negative.    Skin: Negative.    Neurological: Negative.    Hematological: Negative.    Psychiatric/Behavioral: Negative.          Objective     Vital Signs  Temp:  [97.3 °F (36.3 °C)-98.9 °F (37.2 °C)] 97.7 °F (36.5 °C)  Heart Rate:  [] 79  Resp:  [15-26] 17  BP: (114-140)/(62-97) 139/75    Physical Exam:  Physical Exam  Vitals and nursing note reviewed.   Constitutional:       Appearance: He is well-developed.   HENT:      Head: Normocephalic and atraumatic.   Eyes:      Pupils: Pupils are equal, round, and reactive to light.   Cardiovascular:      Rate and Rhythm: Normal rate and regular rhythm.      Heart sounds: Normal heart sounds.   Pulmonary:      Effort: Pulmonary effort is normal. No respiratory distress.      Breath sounds: Normal breath sounds. No wheezing or rales.      Comments: Diminished breathing sounds  Abdominal:      General: Bowel sounds are normal. There is no distension.      Palpations: Abdomen is soft. There is no mass.      Tenderness: There is no abdominal tenderness. There is no guarding or rebound.   Musculoskeletal:         General: No deformity. Normal range of motion.      Cervical back: Normal range of motion and neck supple.      Right lower leg: Edema present.      Left lower leg: Edema present.   Skin:     General: Skin is warm.      Findings: Rash  present. No erythema.      Comments: Erythematous rash involving both feet and ankles with satellite petechial lesions   Neurological:      Mental Status: He is alert and oriented to person, place, and time.      Cranial Nerves: No cranial nerve deficit.          Results Review:    I have reviewed all clinical data, test, lab, and imaging results.     Radiology  Adult Transthoracic Echo Complete W/ Cont if Necessary Per Protocol  Result Date: 5/16/2025    Left ventricular ejection fraction appears to be 46 - 50%.   The right ventricular cavity is mildly dilated.       Cardiology    Laboratory    Results from last 7 days   Lab Units 05/15/25  2314 05/14/25  2122   WBC 10*3/mm3 11.80* 13.22*   HEMOGLOBIN g/dL 11.6* 11.7*   HEMATOCRIT % 36.6* 38.1   PLATELETS 10*3/mm3 212 191     Results from last 7 days   Lab Units 05/15/25  2314 05/14/25 2122   SODIUM mmol/L 136 134*   POTASSIUM mmol/L 3.5 3.9   CHLORIDE mmol/L 104 98   CO2 mmol/L 19.4* 19.9*   BUN mg/dL 25* 19   CREATININE mg/dL 1.50* 1.75*   GLUCOSE mg/dL 156* 127*   ALBUMIN g/dL 3.3* 3.7   BILIRUBIN mg/dL 0.2 0.8   ALK PHOS U/L 53 48   AST (SGOT) U/L 12 11   ALT (SGPT) U/L 5 6   CALCIUM mg/dL 8.5* 8.8     Results from last 7 days   Lab Units 05/14/25  2256   CK TOTAL U/L 44             Microbiology   Microbiology Results (last 10 days)       Procedure Component Value - Date/Time    Respiratory Panel PCR w/COVID-19(SARS-CoV-2) EROS/UNIQUE/STACIA/PAD/COR/TENA In-House, NP Swab in UTM/VTM, 2 HR TAT - Swab, Nasopharynx [[id number]]  (Abnormal) Collected: 05/14/25 2122    Lab Status: Final result Specimen: Swab from Nasopharynx Updated: 05/14/25 2221     ADENOVIRUS, PCR Not Detected     Coronavirus 229E Not Detected     Coronavirus HKU1 Not Detected     Coronavirus NL63 Not Detected     Coronavirus OC43 Not Detected     COVID19 Detected     Human Metapneumovirus Not Detected     Human Rhinovirus/Enterovirus Not Detected     Influenza A PCR Not Detected     Influenza B PCR Not  Detected     Parainfluenza Virus 1 Not Detected     Parainfluenza Virus 2 Not Detected     Parainfluenza Virus 3 Not Detected     Parainfluenza Virus 4 Not Detected     RSV, PCR Not Detected     Bordetella pertussis pcr Not Detected     Bordetella parapertussis PCR Not Detected     Chlamydophila pneumoniae PCR Not Detected     Mycoplasma pneumo by PCR Not Detected    Narrative:      In the setting of a positive respiratory panel with a viral infection PLUS a negative procalcitonin without other underlying concern for bacterial infection, consider observing off antibiotics or discontinuation of antibiotics and continue supportive care. If the respiratory panel is positive for atypical bacterial infection (Bordetella pertussis, Chlamydophila pneumoniae, or Mycoplasma pneumoniae), consider antibiotic de-escalation to target atypical bacterial infection.    Urine Culture - Urine, Urine, Clean Catch [[id number]] Collected: 05/14/25 2122    Lab Status: Final result Specimen: Urine, Clean Catch Updated: 05/16/25 0318     Urine Culture 25,000 CFU/mL Mixed Raquel Isolated    Narrative:      Specimen contains mixed organisms of questionable pathogenicity suggestive of contamination. If symptoms persist, suggest recollection.  Colonization of the urinary tract without infection is common. Treatment is discouraged unless the patient is symptomatic, pregnant, or undergoing an invasive urologic procedure.    Eosinophil Smear - Urine, Urine, Clean Catch [[id number]]  (Normal) Collected: 05/14/25 2122    Lab Status: Final result Specimen: Urine, Clean Catch Updated: 05/15/25 0856     Eosinophil Smear 0 % EOS/100 Cells             Medication Review:       Schedule Meds  allopurinol, 200 mg, Oral, Daily  apixaban, 2.5 mg, Oral, Q12H  calcium gluconate, 1,000 mg, Intravenous, Q12H  cefTRIAXone, 2,000 mg, Intravenous, Daily  cholestyramine light, 1 packet, Oral, Daily  ferric gluconate, 125 mg, Intravenous, Daily  FLUoxetine, 10 mg, Oral,  Daily  folic acid, 1 mg, Oral, Daily  hydrALAZINE, 50 mg, Oral, BID  ipratropium, 0.5 mg, Nebulization, TID - RT  levothyroxine, 175 mcg, Oral, Q AM  metoprolol succinate XL, 25 mg, Oral, Q24H  mineral oil-hydrophilic petrolatum, 1 Application, Topical, Daily  pantoprazole, 40 mg, Oral, Daily  potassium phosphate, 15 mmol, Intravenous, Once  [START ON 5/17/2025] predniSONE, 20 mg, Oral, Daily With Breakfast  saccharomyces boulardii, 250 mg, Oral, BID  sodium bicarbonate, 1,300 mg, Oral, TID  sodium chloride, 10 mL, Intravenous, Q12H  terazosin, 1 mg, Oral, Daily  Vitamin B1, 100 mg, Oral, Daily        Infusion Meds  Pharmacy to Dose enoxaparin (LOVENOX),   sodium chloride, 50 mL/hr, Last Rate: 50 mL/hr (05/16/25 1047)        PRN Meds    acetaminophen **OR** acetaminophen **OR** acetaminophen    aluminum-magnesium hydroxide-simethicone    senna-docusate sodium **AND** polyethylene glycol **AND** bisacodyl **AND** bisacodyl    calcium carbonate    nitroglycerin    ondansetron ODT **OR** ondansetron    Pharmacy to Dose enoxaparin (LOVENOX)    sodium chloride    sodium chloride        Assessment & Plan       Antimicrobial Therapy   1.  IV ceftriaxone        2.        3.        4.        5.          Assessment     Lower respiratory tract infection with probable bronchitis based on the clinical presentation and CT scan of the chest findings.  Patient has no clear consolidation of the lungs.  Currently on room air.  Patient presented with fever at home and dyspnea.     Reported COVID-19 infection on April 5, 2025.  Treated previously with Paxlovid.  COVID-19 screen this admission was positive.  This is probably persistent positivity.  CT scan of the chest findings is not consistent with COVID-pneumonia     Lower extremities edema and erythema appears to be chronic with some satellite purpuric lesions.  I am suspecting leukocytoclastic vasculitis.  This can be confirmed by skin biopsy can be done as outpatient     History  of C. difficile colitis     Obesity with BMI of 39.4     Plan     Continue IV ceftriaxone 2 g daily.  Consider short course of antibiotics probably 5 days.  May de-escalate to p.o. soon  Can remove patient from [facility]-19 isolation since diagnosis was made more than 10 days ago  Request 2 sets of blood cultures      Moise Liu MD  05/16/25  15:12 EDT    Note is dictated utilizing voice recognition software/Dragon

## 2025-05-16 NOTE — PLAN OF CARE
Goal Outcome Evaluation:         Patient alert and oriented. Cooperative with care. Electrolytes replaced. Legs dressed.

## 2025-05-16 NOTE — PLAN OF CARE
Goal Outcome Evaluation:  Plan of Care Reviewed With: patient  Patient is an 78-year-old male [facility] significant for alcohol abuse, hypertension, GERD, gout, hypothyroidism, CKD, diabetes presented to the [facility] ED with complaints of left calf pain and left leg swelling over the past month.  States the pain has increased over the past several days.  Patient did scratch his leg a few days ago and was started on a 10-day course of Levaquin which he completed on 2/22 with minimal improvement of symptoms. Duplex BLE Chronic left lower extremity superficial thrombophlebitis CT chest negative for PE. Respiratory panel positive for COVID-19 however it was initially diagnosed on April 5, 2025 treated with outpatient Paxlovid as well as azithromycin. XR chest (-). Pt A and O x 4 with O2 at 2 L ( pt does not use O2 at baseline.) Pt lives alone and has a daily caregiver  for 4-5 hours daily. Pt has a daughter who is a neighbor and checks on pt daily. Pt sleeps in a lift chair and uses a rolling walker independently at baseline. This date, pt required min assist for supine>sit and sit<>stand from elevated bed height and toilet. Pt ambulated 15 feet x 2 with rolling walker with CGA of 1 with wide base of support, slow siva and forward flexed posture. Pt has decreased righting reactions in standing. PT will follow pt for standing balance and strength deficits and low functional activity tolerance. PT recommendation is In-patient Rehabilitation Facility.              Anticipated Discharge Disposition (PT): inpatient rehabilitation facility

## 2025-05-17 LAB
ALBUMIN SERPL-MCNC: 3.3 G/DL (ref 3.5–5.2)
ALBUMIN/GLOB SERPL: 1.2 G/DL
ALP SERPL-CCNC: 44 U/L (ref 39–117)
ALT SERPL W P-5'-P-CCNC: 5 U/L (ref 1–41)
ANION GAP SERPL CALCULATED.3IONS-SCNC: 10.6 MMOL/L (ref 5–15)
AST SERPL-CCNC: 9 U/L (ref 1–40)
BASOPHILS # BLD AUTO: 0.02 10*3/MM3 (ref 0–0.2)
BASOPHILS NFR BLD AUTO: 0.2 % (ref 0–1.5)
BILIRUB SERPL-MCNC: <0.2 MG/DL (ref 0–1.2)
BUN SERPL-MCNC: 30 MG/DL (ref 8–23)
BUN/CREAT SERPL: 20 (ref 7–25)
CA-I SERPL ISE-MCNC: 1.13 MMOL/L (ref 1.15–1.3)
CALCIUM SPEC-SCNC: 8.7 MG/DL (ref 8.6–10.5)
CHLORIDE SERPL-SCNC: 104 MMOL/L (ref 98–107)
CO2 SERPL-SCNC: 25.4 MMOL/L (ref 22–29)
CREAT SERPL-MCNC: 1.5 MG/DL (ref 0.76–1.27)
D-LACTATE SERPL-SCNC: 1.4 MMOL/L (ref 0.5–2)
DEPRECATED RDW RBC AUTO: 54.4 FL (ref 37–54)
EGFRCR SERPLBLD CKD-EPI 2021: 46.5 ML/MIN/1.73
EOSINOPHIL # BLD AUTO: 0 10*3/MM3 (ref 0–0.4)
EOSINOPHIL NFR BLD AUTO: 0 % (ref 0.3–6.2)
ERYTHROCYTE [DISTWIDTH] IN BLOOD BY AUTOMATED COUNT: 17 % (ref 12.3–15.4)
GLOBULIN UR ELPH-MCNC: 2.7 GM/DL
GLUCOSE BLDC GLUCOMTR-MCNC: 127 MG/DL (ref 70–105)
GLUCOSE BLDC GLUCOMTR-MCNC: 132 MG/DL (ref 70–105)
GLUCOSE BLDC GLUCOMTR-MCNC: 135 MG/DL (ref 70–105)
GLUCOSE BLDC GLUCOMTR-MCNC: 143 MG/DL (ref 70–105)
GLUCOSE SERPL-MCNC: 128 MG/DL (ref 65–99)
HCT VFR BLD AUTO: 35.2 % (ref 37.5–51)
HGB BLD-MCNC: 10.8 G/DL (ref 13–17.7)
IMM GRANULOCYTES # BLD AUTO: 0.13 10*3/MM3 (ref 0–0.05)
IMM GRANULOCYTES NFR BLD AUTO: 1.6 % (ref 0–0.5)
LYMPHOCYTES # BLD AUTO: 0.88 10*3/MM3 (ref 0.7–3.1)
LYMPHOCYTES NFR BLD AUTO: 10.7 % (ref 19.6–45.3)
MAGNESIUM SERPL-MCNC: 2.5 MG/DL (ref 1.6–2.4)
MCH RBC QN AUTO: 26.9 PG (ref 26.6–33)
MCHC RBC AUTO-ENTMCNC: 30.7 G/DL (ref 31.5–35.7)
MCV RBC AUTO: 87.6 FL (ref 79–97)
MONOCYTES # BLD AUTO: 0.6 10*3/MM3 (ref 0.1–0.9)
MONOCYTES NFR BLD AUTO: 7.3 % (ref 5–12)
NEUTROPHILS NFR BLD AUTO: 6.56 10*3/MM3 (ref 1.7–7)
NEUTROPHILS NFR BLD AUTO: 80.2 % (ref 42.7–76)
NRBC BLD AUTO-RTO: 0 /100 WBC (ref 0–0.2)
PHOSPHATE SERPL-MCNC: 2.8 MG/DL (ref 2.5–4.5)
PLATELET # BLD AUTO: 220 10*3/MM3 (ref 140–450)
PMV BLD AUTO: 11.8 FL (ref 6–12)
POTASSIUM SERPL-SCNC: 4.5 MMOL/L (ref 3.5–5.2)
PROT SERPL-MCNC: 6 G/DL (ref 6–8.5)
RBC # BLD AUTO: 4.02 10*6/MM3 (ref 4.14–5.8)
SODIUM SERPL-SCNC: 140 MMOL/L (ref 136–145)
WBC NRBC COR # BLD AUTO: 8.19 10*3/MM3 (ref 3.4–10.8)

## 2025-05-17 PROCEDURE — 83605 ASSAY OF LACTIC ACID: CPT | Performed by: INTERNAL MEDICINE

## 2025-05-17 PROCEDURE — 25010000002 NA FERRIC GLUC CPLX PER 12.5 MG: Performed by: INTERNAL MEDICINE

## 2025-05-17 PROCEDURE — 94799 UNLISTED PULMONARY SVC/PX: CPT

## 2025-05-17 PROCEDURE — 80053 COMPREHEN METABOLIC PANEL: CPT | Performed by: FAMILY MEDICINE

## 2025-05-17 PROCEDURE — 82330 ASSAY OF CALCIUM: CPT | Performed by: INTERNAL MEDICINE

## 2025-05-17 PROCEDURE — 82948 REAGENT STRIP/BLOOD GLUCOSE: CPT

## 2025-05-17 PROCEDURE — 83735 ASSAY OF MAGNESIUM: CPT | Performed by: FAMILY MEDICINE

## 2025-05-17 PROCEDURE — 25010000002 CEFTRIAXONE PER 250 MG: Performed by: FAMILY MEDICINE

## 2025-05-17 PROCEDURE — 63710000001 PREDNISONE PER 1 MG: Performed by: INTERNAL MEDICINE

## 2025-05-17 PROCEDURE — 85025 COMPLETE CBC W/AUTO DIFF WBC: CPT | Performed by: FAMILY MEDICINE

## 2025-05-17 PROCEDURE — 82948 REAGENT STRIP/BLOOD GLUCOSE: CPT | Performed by: FAMILY MEDICINE

## 2025-05-17 PROCEDURE — 84100 ASSAY OF PHOSPHORUS: CPT | Performed by: FAMILY MEDICINE

## 2025-05-17 PROCEDURE — 94664 DEMO&/EVAL PT USE INHALER: CPT

## 2025-05-17 PROCEDURE — 99232 SBSQ HOSP IP/OBS MODERATE 35: CPT | Performed by: INTERNAL MEDICINE

## 2025-05-17 PROCEDURE — 94761 N-INVAS EAR/PLS OXIMETRY MLT: CPT

## 2025-05-17 RX ADMIN — IPRATROPIUM BROMIDE 0.5 MG: 0.5 SOLUTION RESPIRATORY (INHALATION) at 10:09

## 2025-05-17 RX ADMIN — Medication 100 MG: at 08:15

## 2025-05-17 RX ADMIN — HYDRALAZINE HYDROCHLORIDE 50 MG: 25 TABLET ORAL at 20:13

## 2025-05-17 RX ADMIN — FOLIC ACID 1 MG: 1 TABLET ORAL at 10:02

## 2025-05-17 RX ADMIN — PANTOPRAZOLE SODIUM 40 MG: 40 TABLET, DELAYED RELEASE ORAL at 08:15

## 2025-05-17 RX ADMIN — CEFTRIAXONE 2000 MG: 2 INJECTION, POWDER, FOR SOLUTION INTRAMUSCULAR; INTRAVENOUS at 08:12

## 2025-05-17 RX ADMIN — SODIUM BICARBONATE 1300 MG: 650 TABLET ORAL at 20:13

## 2025-05-17 RX ADMIN — SODIUM BICARBONATE 1300 MG: 650 TABLET ORAL at 15:52

## 2025-05-17 RX ADMIN — TERAZOSIN HYDROCHLORIDE 1 MG: 1 CAPSULE ORAL at 08:15

## 2025-05-17 RX ADMIN — Medication 250 MG: at 08:16

## 2025-05-17 RX ADMIN — CHOLESTYRAMINE 4 G: 4 POWDER, FOR SUSPENSION ORAL at 08:15

## 2025-05-17 RX ADMIN — IPRATROPIUM BROMIDE 0.5 MG: 0.5 SOLUTION RESPIRATORY (INHALATION) at 19:58

## 2025-05-17 RX ADMIN — PREDNISONE 20 MG: 20 TABLET ORAL at 08:16

## 2025-05-17 RX ADMIN — HYDRALAZINE HYDROCHLORIDE 50 MG: 25 TABLET ORAL at 08:18

## 2025-05-17 RX ADMIN — APIXABAN 2.5 MG: 2.5 TABLET, FILM COATED ORAL at 08:16

## 2025-05-17 RX ADMIN — SODIUM CHLORIDE 125 MG: 9 INJECTION, SOLUTION INTRAVENOUS at 10:02

## 2025-05-17 RX ADMIN — Medication 250 MG: at 20:13

## 2025-05-17 RX ADMIN — FLUOXETINE HYDROCHLORIDE 10 MG: 10 CAPSULE ORAL at 08:15

## 2025-05-17 RX ADMIN — SODIUM BICARBONATE 1300 MG: 650 TABLET ORAL at 08:15

## 2025-05-17 RX ADMIN — METOPROLOL SUCCINATE 25 MG: 25 TABLET, EXTENDED RELEASE ORAL at 08:16

## 2025-05-17 RX ADMIN — Medication 10 ML: at 08:12

## 2025-05-17 RX ADMIN — WHITE PETROLATUM 1 APPLICATION: 1.75 OINTMENT TOPICAL at 12:00

## 2025-05-17 RX ADMIN — Medication 10 ML: at 20:13

## 2025-05-17 RX ADMIN — IPRATROPIUM BROMIDE 0.5 MG: 0.5 SOLUTION RESPIRATORY (INHALATION) at 15:13

## 2025-05-17 RX ADMIN — ALLOPURINOL 200 MG: 100 TABLET ORAL at 08:16

## 2025-05-17 RX ADMIN — LEVOTHYROXINE SODIUM 175 MCG: 0.03 TABLET ORAL at 05:28

## 2025-05-17 RX ADMIN — SENNOSIDES AND DOCUSATE SODIUM 2 TABLET: 50; 8.6 TABLET ORAL at 20:13

## 2025-05-17 RX ADMIN — APIXABAN 2.5 MG: 2.5 TABLET, FILM COATED ORAL at 20:13

## 2025-05-17 NOTE — PROGRESS NOTES
Infectious Diseases Progress Note      LOS: 2 days   Patient Care Team:  Montse Dawkins MD as PCP - General (Family Medicine)  Huey Keyes MD as Consulting Physician (Nephrology)    Chief Complaint: Fatigue    Subjective     The patient had no fever during the last 24 hours.  He remained medically stable.  Currently on 2 L of oxygen via nasal cannula    Review of Systems:   Review of Systems   Constitutional:  Positive for fatigue.   HENT: Negative.     Eyes: Negative.    Respiratory:  Positive for shortness of breath.    Cardiovascular: Negative.    Gastrointestinal: Negative.    Genitourinary: Negative.    Musculoskeletal: Negative.    Skin: Negative.    Neurological: Negative.    Hematological: Negative.    Psychiatric/Behavioral: Negative.          Objective     Vital Signs  Temp:  [97.3 °F (36.3 °C)-97.9 °F (36.6 °C)] 97.3 °F (36.3 °C)  Heart Rate:  [71-89] 78  Resp:  [12-25] 21  BP: (116-154)/(69-85) 153/83    Physical Exam:  Physical Exam  Vitals and nursing note reviewed.   Constitutional:       Appearance: He is well-developed. He is obese. He is ill-appearing.   HENT:      Head: Normocephalic and atraumatic.   Eyes:      Pupils: Pupils are equal, round, and reactive to light.   Cardiovascular:      Rate and Rhythm: Normal rate and regular rhythm.      Heart sounds: Normal heart sounds.   Pulmonary:      Effort: Pulmonary effort is normal. No respiratory distress.      Breath sounds: Normal breath sounds. No wheezing or rales.      Comments: Diminished breathing sounds  Abdominal:      General: Bowel sounds are normal. There is no distension.      Palpations: Abdomen is soft. There is no mass.      Tenderness: There is no abdominal tenderness. There is no guarding or rebound.   Musculoskeletal:         General: No deformity. Normal range of motion.      Cervical back: Normal range of motion and neck supple.      Right lower leg: Edema present.      Left lower leg: Edema present.   Skin:     General:  Skin is warm.      Findings: Rash present. No erythema.      Comments: Erythematous rash involving both feet and ankles with satellite petechial lesions   Neurological:      Mental Status: He is alert and oriented to person, place, and time.      Cranial Nerves: No cranial nerve deficit.          Results Review:    I have reviewed all clinical data, test, lab, and imaging results.     Radiology  No Radiology Exams Resulted Within Past 24 Hours      Cardiology    Laboratory    Results from last 7 days   Lab Units 05/17/25  0330 05/15/25  2314 05/14/25  2122   WBC 10*3/mm3 8.19 11.80* 13.22*   HEMOGLOBIN g/dL 10.8* 11.6* 11.7*   HEMATOCRIT % 35.2* 36.6* 38.1   PLATELETS 10*3/mm3 220 212 191     Results from last 7 days   Lab Units 05/17/25  0330 05/15/25  2314 05/14/25  2122   SODIUM mmol/L 140 136 134*   POTASSIUM mmol/L 4.5 3.5 3.9   CHLORIDE mmol/L 104 104 98   CO2 mmol/L 25.4 19.4* 19.9*   BUN mg/dL 30* 25* 19   CREATININE mg/dL 1.50* 1.50* 1.75*   GLUCOSE mg/dL 128* 156* 127*   ALBUMIN g/dL 3.3* 3.3* 3.7   BILIRUBIN mg/dL <0.2 0.2 0.8   ALK PHOS U/L 44 53 48   AST (SGOT) U/L 9 12 11   ALT (SGPT) U/L 5 5 6   CALCIUM mg/dL 8.7 8.5* 8.8     Results from last 7 days   Lab Units 05/14/25  2256   CK TOTAL U/L 44             Microbiology   Microbiology Results (last 10 days)       Procedure Component Value - Date/Time    Respiratory Panel PCR w/COVID-19(SARS-CoV-2) [facility]QUE/STACIA/[facility]TE[facility] In-House, NP Swab in UTM/VTM, 2 HR TAT - Swab, Nasopharynx [[id number]]  (Abnormal) Collected: 05/14/25 2122    Lab Status: Final result Specimen: Swab from Nasopharynx Updated: 05/14/25 2221     ADENOVIRUS, PCR Not Detected     Coronavirus 229E Not Detected     Coronavirus HKU1 Not Detected     Coronavirus NL63 Not Detected     Coronavirus OC43 Not Detected     COVID19 Detected     Human Metapneumovirus Not Detected     Human Rhinovirus/Enterovirus Not Detected     Influenza A PCR Not Detected     Influenza B PCR Not Detected      Parainfluenza Virus 1 Not Detected     Parainfluenza Virus 2 Not Detected     Parainfluenza Virus 3 Not Detected     Parainfluenza Virus 4 Not Detected     RSV, PCR Not Detected     Bordetella pertussis pcr Not Detected     Bordetella parapertussis PCR Not Detected     Chlamydophila pneumoniae PCR Not Detected     Mycoplasma pneumo by PCR Not Detected    Narrative:      In the setting of a positive respiratory panel with a viral infection PLUS a negative procalcitonin without other underlying concern for bacterial infection, consider observing off antibiotics or discontinuation of antibiotics and continue supportive care. If the respiratory panel is positive for atypical bacterial infection (Bordetella pertussis, Chlamydophila pneumoniae, or Mycoplasma pneumoniae), consider antibiotic de-escalation to target atypical bacterial infection.    Urine Culture - Urine, Urine, Clean Catch [[id number]] Collected: 05/14/25 2122    Lab Status: Final result Specimen: Urine, Clean Catch Updated: 05/16/25 0318     Urine Culture 25,000 CFU/mL Mixed Raquel Isolated    Narrative:      Specimen contains mixed organisms of questionable pathogenicity suggestive of contamination. If symptoms persist, suggest recollection.  Colonization of the urinary tract without infection is common. Treatment is discouraged unless the patient is symptomatic, pregnant, or undergoing an invasive urologic procedure.    Eosinophil Smear - Urine, Urine, Clean Catch [[id number]]  (Normal) Collected: 05/14/25 2122    Lab Status: Final result Specimen: Urine, Clean Catch Updated: 05/15/25 0856     Eosinophil Smear 0 % EOS/100 Cells             Medication Review:       Schedule Meds  allopurinol, 200 mg, Oral, Daily  apixaban, 2.5 mg, Oral, Q12H  cefTRIAXone, 2,000 mg, Intravenous, Daily  cholestyramine light, 1 packet, Oral, Daily  FLUoxetine, 10 mg, Oral, Daily  folic acid, 1 mg, Oral, Daily  hydrALAZINE, 50 mg, Oral, BID  ipratropium, 0.5 mg, Nebulization,  TID - RT  levothyroxine, 175 mcg, Oral, Q AM  metoprolol succinate XL, 25 mg, Oral, Q24H  mineral oil-hydrophilic petrolatum, 1 Application, Topical, Daily  pantoprazole, 40 mg, Oral, Daily  predniSONE, 20 mg, Oral, Daily With Breakfast  saccharomyces boulardii, 250 mg, Oral, BID  sodium bicarbonate, 1,300 mg, Oral, TID  sodium chloride, 10 mL, Intravenous, Q12H  terazosin, 1 mg, Oral, Daily  Vitamin B1, 100 mg, Oral, Daily        Infusion Meds  Pharmacy to Dose enoxaparin (LOVENOX),         PRN Meds    acetaminophen **OR** acetaminophen **OR** acetaminophen    aluminum-magnesium hydroxide-simethicone    senna-docusate sodium **AND** polyethylene glycol **AND** bisacodyl **AND** bisacodyl    calcium carbonate    nitroglycerin    ondansetron ODT **OR** ondansetron    Pharmacy to Dose enoxaparin (LOVENOX)    sodium chloride    sodium chloride        Assessment & Plan       Antimicrobial Therapy   1.  IV ceftriaxone        2.        3.        4.        5.          Assessment     Lower respiratory tract infection with probable bronchitis based on the clinical presentation and CT scan of the chest findings.  Patient has no clear consolidation of the lungs.  Currently on room air.  Patient presented with fever at home and dyspnea.  Fever has resolved     Reported COVID-19 infection on April 5, 2025.  Treated previously with Paxlovid.  COVID-19 screen this admission was positive.  This is probably persistent positivity.  CT scan of the chest findings is not consistent with COVID-pneumonia     Lower extremities edema and erythema appears to be chronic with some satellite purpuric lesions.  I am suspecting leukocytoclastic vasculitis.  This can be confirmed by skin biopsy can be done as outpatient     History of C. difficile colitis     Obesity with BMI of 39.4     Plan     Continue IV ceftriaxone 2 g daily.  Consider short course of antibiotics probably 5 days.  May de-escalate to p.o. soon  Waiting on blood cultures  Can  remove patient from COVID-19 isolation since diagnosis was made more than 10 days ago  Request 2 sets of blood cultures  Continue supportive  A.m. labs    Marleny Sanders, APRN  05/17/25  14:43 EDT    Note is dictated utilizing voice recognition software/Dragon

## 2025-05-17 NOTE — PROGRESS NOTES
"DATE/TIME OF ADMISSION:  5/14/2025  8:57 PM     LOS: 2 days   Patient Care Team:  Montse Dawkins MD as PCP - General (Family Medicine)  Huey Keyes MD as Consulting Physician (Nephrology)  Consults       Date and Time Order Name Status Description    5/15/2025  1:56 PM Inpatient Cardiology Consult Completed     5/15/2025  7:26 AM Inpatient Infectious Diseases Consult Completed     5/15/2025  7:12 AM Inpatient Pulmonology Consult Completed     5/15/2025  7:12 AM Inpatient Nephrology Consult Completed             Subjective DOING WELL OVERALL AND IMPROVED  PLAN IS FOR [facility] FIRST OF THE WEEK IF ALL GOES WELL    Interval History: ON ROCEPHIN, NEBS, WITH IMPROVEMENT. BLOOD CULTURES NEGATIVE SO FAR AND LABS STABLE    Patient Complaints: STILL NEEDS TO HAVE A BM BUT HE FEELS HE NEEDS TO GO SOON  ATE A NICE LUNCH  UP CURRENTLY IN THE RECLINER AT THE BEDSIDE    History taken from: patient    Review of Systems        Objective     Vital Signs  /84 (BP Location: Left arm, Patient Position: Lying)   Pulse 89   Temp 97.9 °F (36.6 °C) (Oral)   Resp 22   Ht 170.2 cm (67\")   Wt 116 kg (256 lb 2.8 oz)   SpO2 94%   BMI 40.12 kg/m²     Physical Exam:       General Appearance:    Alert, cooperative, in no acute distress   Head:    Normocephalic, without obvious abnormality, atraumatic   Eyes:            Lids and lashes normal, conjunctivae and sclerae normal, no   icterus, no pallor, corneas clear, PERRLA   Ears:    Ears appear intact with no abnormalities noted   Throat:   No oral lesions, no thrush, oral mucosa moist   Neck:   No adenopathy, supple, trachea midline, no thyromegaly, no   carotid bruit, no JVD   Lungs:     Clear to auscultation--DECREASED SOMEWHAT IN THE BASES ,respirations regular, even and                  unlabored    Heart:    IRRegular rhythm and CONTROLLED  rate IN THE 80-90'S, normal S1 and S2, SOFT 2/6 SYSTOLIC           murmur   Chest Wall:    No abnormalities observed   Abdomen:     " Normal bowel sounds, no masses, no organomegaly, soft        non-tender, non-distended, no guarding, no rebound                tenderness   Extremities:   Moves all extremities well, 1+ edema, LEGS WRAPPED WITH ACE WRAP;  CHRONIC              redness   Pulses:   Pulses palpable and equal bilaterally   Skin:   No bleeding, bruising or +rash ON LEGS   Lymph nodes:   No palpable adenopathy   Neurologic:   Grossly normal, alert and O x 3        I HAVE PERSONALLY EXAMINED AND REVIEWED THE PATIENT'S RECORD     Lab Results (last 48 hours)       Procedure Component Value Units Date/Time    POC Glucose Finger 4x Daily Before Meals & at Bedtime [[id number]]  (Abnormal) Collected: 05/17/25 0809    Specimen: Blood from Finger Updated: 05/17/25 0811     Glucose 135 mg/dL      Comment: Serial Number: [id number]:  [id number]       Phosphorus [[id number]]  (Normal) Collected: 05/17/25 0330    Specimen: Blood from Arm, Left Updated: 05/17/25 0542     Phosphorus 2.8 mg/dL     Magnesium [[id number]]  (Abnormal) Collected: 05/17/25 0330    Specimen: Blood from Arm, Left Updated: 05/17/25 0542     Magnesium 2.5 mg/dL     Comprehensive Metabolic Panel [[id number]]  (Abnormal) Collected: 05/17/25 0330    Specimen: Blood from Arm, Left Updated: 05/17/25 0542     Glucose 128 mg/dL      BUN 30 mg/dL      Creatinine 1.50 mg/dL      Sodium 140 mmol/L      Potassium 4.5 mmol/L      Comment: Result checked          Chloride 104 mmol/L      CO2 25.4 mmol/L      Calcium 8.7 mg/dL      Total Protein 6.0 g/dL      Albumin 3.3 g/dL      ALT (SGPT) 5 U/L      Comment: Result checked          AST (SGOT) 9 U/L      Alkaline Phosphatase 44 U/L      Total Bilirubin <0.2 mg/dL      Globulin 2.7 gm/dL      A/G Ratio 1.2 g/dL      BUN/Creatinine Ratio 20.0     Anion Gap 10.6 mmol/L      eGFR 46.5 mL/min/1.73     Narrative:      GFR Categories in Chronic Kidney Disease (CKD)              GFR Category          GFR (mL/min/1.73)    Interpretation  G1                     90 or greater        Normal or high (1)  G2                    60-89                Mild decrease (1)  G3a                   45-59                Mild to moderate decrease  G3b                   30-44                Moderate to severe decrease  G4                    15-29                Severe decrease  G5                    14 or less           Kidney failure    (1)In the absence of evidence of kidney disease, neither GFR category G1 or G2 fulfill the criteria for CKD.    eGFR calculation 2021 CKD-EPI creatinine equation, which does not include race as a factor    Lactic Acid, Plasma [[id number]]  (Normal) Collected: 05/17/25 0330    Specimen: Blood from Arm, Left Updated: 05/17/25 0536     Lactate 1.4 mmol/L     CBC Auto Differential [[id number]]  (Abnormal) Collected: 05/17/25 0330    Specimen: Blood from Arm, Left Updated: 05/17/25 0515     WBC 8.19 10*3/mm3      RBC 4.02 10*6/mm3      Hemoglobin 10.8 g/dL      Hematocrit 35.2 %      MCV 87.6 fL      MCH 26.9 pg      MCHC 30.7 g/dL      RDW 17.0 %      RDW-SD 54.4 fl      MPV 11.8 fL      Platelets 220 10*3/mm3      Neutrophil % 80.2 %      Lymphocyte % 10.7 %      Monocyte % 7.3 %      Eosinophil % 0.0 %      Basophil % 0.2 %      Immature Grans % 1.6 %      Neutrophils, Absolute 6.56 10*3/mm3      Lymphocytes, Absolute 0.88 10*3/mm3      Monocytes, Absolute 0.60 10*3/mm3      Eosinophils, Absolute 0.00 10*3/mm3      Basophils, Absolute 0.02 10*3/mm3      Immature Grans, Absolute 0.13 10*3/mm3      nRBC 0.0 /100 WBC     Calcium, Ionized [[id number]]  (Abnormal) Collected: 05/17/25 0330    Specimen: Blood from Arm, Left Updated: 05/17/25 0512     Ionized Calcium 1.13 mmol/L     POC Glucose Once [[id number]]  (Abnormal) Collected: 05/16/25 2018    Specimen: Blood Updated: 05/16/25 2021     Glucose 173 mg/dL      Comment: Serial Number: [id number]:  [id number]       Blood Culture - Blood, Arm, Left [[id number]] Collected: 05/16/25 1622    Specimen:  Blood from Arm, Left Updated: 05/16/25 1643    Blood Culture - Blood, Arm, Right [[id number]] Collected: 05/16/25 1629    Specimen: Blood from Arm, Right Updated: 05/16/25 1643    POC Glucose Once [[id number]]  (Abnormal) Collected: 05/16/25 1538    Specimen: Blood Updated: 05/16/25 1540     Glucose 163 mg/dL      Comment: Serial Number: [id number]:  [id number]       POC Glucose Finger 4x Daily Before Meals & at Bedtime [[id number]]  (Abnormal) Collected: 05/16/25 1140    Specimen: Blood from Finger Updated: 05/16/25 1142     Glucose 164 mg/dL      Comment: Serial Number: [id number]:  [id number]       POC Glucose Finger 4x Daily Before Meals & at Bedtime [[id number]]  (Abnormal) Collected: 05/16/25 0731    Specimen: Blood from Finger Updated: 05/16/25 0733     Glucose 146 mg/dL      Comment: Serial Number: [id number]:  [id number]       Urine Culture - Urine, Urine, Clean Catch [[id number]] Collected: 05/14/25 2122    Specimen: Urine, Clean Catch Updated: 05/16/25 0318     Urine Culture 25,000 CFU/mL Mixed Raquel Isolated    Narrative:      Specimen contains mixed organisms of questionable pathogenicity suggestive of contamination. If symptoms persist, suggest recollection.  Colonization of the urinary tract without infection is common. Treatment is discouraged unless the patient is symptomatic, pregnant, or undergoing an invasive urologic procedure.    Phosphorus [[id number]]  (Abnormal) Collected: 05/15/25 2314    Specimen: Blood from Hand, Right Updated: 05/16/25 0047     Phosphorus 1.7 mg/dL     Comprehensive Metabolic Panel [[id number]]  (Abnormal) Collected: 05/15/25 2314    Specimen: Blood from Hand, Right Updated: 05/16/25 0041     Glucose 156 mg/dL      BUN 25 mg/dL      Creatinine 1.50 mg/dL      Sodium 136 mmol/L      Potassium 3.5 mmol/L      Chloride 104 mmol/L      CO2 19.4 mmol/L      Calcium 8.5 mg/dL      Total Protein 6.2 g/dL      Albumin 3.3 g/dL      ALT (SGPT) 5 U/L      AST (SGOT) 12  U/L      Alkaline Phosphatase 53 U/L      Total Bilirubin 0.2 mg/dL      Globulin 2.9 gm/dL      A/G Ratio 1.1 g/dL      BUN/Creatinine Ratio 16.7     Anion Gap 12.6 mmol/L      eGFR 46.5 mL/min/1.73     Narrative:      GFR Categories in Chronic Kidney Disease (CKD)              GFR Category          GFR (mL/min/1.73)    Interpretation  G1                    90 or greater        Normal or high (1)  G2                    60-89                Mild decrease (1)  G3a                   45-59                Mild to moderate decrease  G3b                   30-44                Moderate to severe decrease  G4                    15-29                Severe decrease  G5                    14 or less           Kidney failure    (1)In the absence of evidence of kidney disease, neither GFR category G1 or G2 fulfill the criteria for CKD.    eGFR calculation 2021 CKD-EPI creatinine equation, which does not include race as a factor    Magnesium [[id number]]  (Normal) Collected: 05/15/25 2314    Specimen: Blood from Hand, Right Updated: 05/16/25 0041     Magnesium 1.8 mg/dL     Calcium, Ionized [[id number]]  (Abnormal) Collected: 05/15/25 2314    Specimen: Blood from Hand, Right Updated: 05/16/25 0019     Ionized Calcium 1.08 mmol/L     CBC Auto Differential [[id number]]  (Abnormal) Collected: 05/15/25 2314    Specimen: Blood from Hand, Right Updated: 05/16/25 0015     WBC 11.80 10*3/mm3      RBC 4.33 10*6/mm3      Hemoglobin 11.6 g/dL      Hematocrit 36.6 %      MCV 84.5 fL      MCH 26.8 pg      MCHC 31.7 g/dL      RDW 16.6 %      RDW-SD 51.6 fl      MPV 11.2 fL      Platelets 212 10*3/mm3      Neutrophil % 86.0 %      Lymphocyte % 6.6 %      Monocyte % 6.0 %      Eosinophil % 0.0 %      Basophil % 0.2 %      Immature Grans % 1.2 %      Neutrophils, Absolute 10.15 10*3/mm3      Lymphocytes, Absolute 0.78 10*3/mm3      Monocytes, Absolute 0.71 10*3/mm3      Eosinophils, Absolute 0.00 10*3/mm3      Basophils, Absolute 0.02 10*3/mm3       Immature Grans, Absolute 0.14 10*3/mm3      nRBC 0.0 /100 WBC     POC Glucose Finger 4x Daily Before Meals & at Bedtime [[id number]]  (Abnormal) Collected: 05/15/25 2305    Specimen: Blood from Finger Updated: 05/15/25 2306     Glucose 159 mg/dL      Comment: Serial Number: [id number]:  [id number]       STAT Lactic Acid, Reflex [[id number]]  (Abnormal) Collected: 05/15/25 2109    Specimen: Blood from Hand, Left Updated: 05/15/25 2145     Lactate 2.2 mmol/L     STAT Lactic Acid, Reflex [[id number]]  (Abnormal) Collected: 05/15/25 1711    Specimen: Blood Updated: 05/15/25 1739     Lactate 2.4 mmol/L     Magnesium [[id number]]  (Normal) Collected: 05/15/25 1711    Specimen: Blood Updated: 05/15/25 1736     Magnesium 1.6 mg/dL     STAT Lactic Acid, Reflex [[id number]]  (Abnormal) Collected: 05/15/25 1304    Specimen: Blood Updated: 05/15/25 1338     Lactate 2.8 mmol/L     POC Glucose Finger 4x Daily Before Meals & at Bedtime [[id number]]  (Abnormal) Collected: 05/15/25 1149    Specimen: Blood from Finger Updated: 05/15/25 1150     Glucose 155 mg/dL      Comment: Serial Number: [id number]:  [id number]       Lactic Acid, Plasma [[id number]]  (Abnormal) Collected: 05/15/25 1000    Specimen: Blood from Arm, Right Updated: 05/15/25 1028     Lactate 2.6 mmol/L     Uric Acid [[id number]]  (Normal) Collected: 05/14/25 2256    Specimen: Blood Updated: 05/15/25 0907     Uric Acid 5.3 mg/dL     Eosinophil Smear - Urine, Urine, Clean Catch [[id number]]  (Normal) Collected: 05/14/25 2122    Specimen: Urine, Clean Catch Updated: 05/15/25 0856     Eosinophil Smear 0 % EOS/100 Cells              Imaging Results (Last 48 Hours)       Procedure Component Value Units Date/Time    US Renal Bilateral [[id number]] Collected: 05/15/25 1456     Updated: 05/15/25 1459    Narrative:      US RENAL BILATERAL    Date of Exam: 5/15/2025 2:25 PM EDT    Indication: ARF/SARAH/CRF/CKD.    Comparison: No comparisons available.    Technique: Grayscale and  color Doppler ultrasound evaluation of the kidneys and urinary bladder was performed.      Findings:  The right kidney measures 9.8 cm and the left kidney measures 10.3 cm. Kidney echogenicity and vascularity appear within normal limits. There is no solid kidney mass.  No echogenic shadowing stone.  No hydronephrosis.      Limited visualization of the urinary bladder is unremarkable.bladder volume 245 cc        Impression:      Impression:  No definite sonographic abnormality in the kidneys        Electronically Signed: Lonny Garcia MD    5/15/2025 2:57 PM EDT    Workstation ID: [id number]                 I reviewed the patient's new clinical results.    Past Medical History:   Diagnosis Date    Alcohol abuse 07/13/2022    Essential hypertension 07/13/2022    Gait instability 07/13/2022    GERD without esophagitis 07/13/2022    Gout 07/13/2022    Hypothyroidism (acquired) 07/13/2022    Onychomycosis of multiple toenails with type 2 diabetes mellitus 07/13/2022    Sleep apnea     Stage 3a chronic kidney disease 07/13/2022    Tinea pedis of both feet 07/13/2022    Type 2 diabetes mellitus with diabetic neuropathy, without long-term current use of insulin 07/13/2022     Past Surgical History:   Procedure Laterality Date    ORTHOPEDIC SURGERY      fractured ankle and dislocated shoulder         Medication Review:   Scheduled Meds:allopurinol, 200 mg, Oral, Daily  apixaban, 2.5 mg, Oral, Q12H  cefTRIAXone, 2,000 mg, Intravenous, Daily  cholestyramine light, 1 packet, Oral, Daily  ferric gluconate, 125 mg, Intravenous, Daily  FLUoxetine, 10 mg, Oral, Daily  folic acid, 1 mg, Oral, Daily  hydrALAZINE, 50 mg, Oral, BID  ipratropium, 0.5 mg, Nebulization, TID - RT  levothyroxine, 175 mcg, Oral, Q AM  metoprolol succinate XL, 25 mg, Oral, Q24H  mineral oil-hydrophilic petrolatum, 1 Application, Topical, Daily  pantoprazole, 40 mg, Oral, Daily  predniSONE, 20 mg, Oral, Daily With Breakfast  saccharomyces boulardii, 250 mg,  Oral, BID  sodium bicarbonate, 1,300 mg, Oral, TID  sodium chloride, 10 mL, Intravenous, Q12H  terazosin, 1 mg, Oral, Daily  Vitamin B1, 100 mg, Oral, Daily      Continuous Infusions:Pharmacy to Dose enoxaparin (LOVENOX),   sodium chloride, 50 mL/hr, Last Rate: 50 mL/hr (05/16/25 1047)      PRN Meds:.  acetaminophen **OR** acetaminophen **OR** acetaminophen    aluminum-magnesium hydroxide-simethicone    senna-docusate sodium **AND** polyethylene glycol **AND** bisacodyl **AND** bisacodyl    calcium carbonate    nitroglycerin    ondansetron ODT **OR** ondansetron    Pharmacy to Dose enoxaparin (LOVENOX)    sodium chloride    sodium chloride     Assessment & Plan   ACUTE ON CHRONIC CKD 3A WITH MILD DEHYDRATION---DR SANCHES TO CONSULT; CREATININE IMPROVED     COVID 19 WITH CONTINUED POSITIVE TESTING--ACUTE RESPIRATORY FAILURE---SUPPORTIVE CARE; BIPAP; OXYGEN, AND DR DRAW TO CONSULT  ROCEPHIN FOR NOW  NEBS, LOW DOSE STEROIDS FOR BRONCHOSPASM; DR WALL TO CONSULT; IMPROVED; WILL CHANGE TO PO ATB SOON     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; STABLE     CHRONIC VENOUS STASIS AND MILD CELLULITIS WITH RASH; WILL REFER FOR BIOPSY LATER ON     DEPRESSION---STABLE WITH PROZAC     CHRONIC ETOH ABUSE---CONTROLLED BY DAUGHTER     HTN---STABLE     GERD---ON PPI     HYPOTHYROIDISM---EUTHYROID ON SYNTHROID     A FIB---ON LOVENOX FOR THIS AND DVT PROPHYLAXIS; DR MITTAL TO CONSULT; AS FALL RISK, WILL HAVE TO FACTOR THIS IN WITH ANTICOAGULATION PLANS; HAD A FIB YEARS AGO AND NO RECURRENCE NOTED UNTIL NOW; RATE CONTROLLED; CONSIDERING AMIODARONE AND CARDIOVERSION IN 2 MONTHS     HYPOMAGNESEMIA---REPLACING PRN     ANEMIA OF CKD--STABLE; DR SANCHES MANAGING     ELEVATED D DIMER---NO PULM EMBOLI ON CT; SEQUELA FROM COVID INFECTION     HYPOPHOSPHATEMIA---DR BOWEN REPLACING     LACTIC ACIDOSIS---COVERED WITH ATB; AND DR WALL ADVISING     GENERAL WEAKNESS POST COVID AND WITH CURRENT ILLNESS---PT/OT; [facility] FOR DISCHARGE UNTIL READY TO GET  BACK HOME WITH TREMENDOUS FAMILY CAREGIVER SUPPORT     FULL CODE STATUS---VERIFIED WITH DAUGHTER ON ADMISSION  Principal Problem:    Acute and chronic respiratory failure (acute-on-chronic)          Plan for disposition:TO [facility] FIRST OF THE WEEK IF ALL GOES WELL    Montse Dawkins MD  05/17/25  08:43 EDT

## 2025-05-17 NOTE — PROGRESS NOTES
"NEPHROLOGY PROGRESS NOTE------[facility]    [facility]  [phone]  Huey Keyes MD      Patient Care Team:  Montse Dawkins MD as PCP - General (Family Medicine)  Huey Keyes MD as Consulting Physician (Nephrology)      Provider:  Huey Keyes MD  Patient Name: Rommel Mcfarland  :  1945    SUBJECTIVE:    F/U ARF/SARAH/CRF/CKD  Up in chair. Off of BiPap. No angina. No dysuria.  Complains of constipation    Medication:  allopurinol, 200 mg, Oral, Daily  apixaban, 2.5 mg, Oral, Q12H  cefTRIAXone, 2,000 mg, Intravenous, Daily  cholestyramine light, 1 packet, Oral, Daily  FLUoxetine, 10 mg, Oral, Daily  folic acid, 1 mg, Oral, Daily  hydrALAZINE, 50 mg, Oral, BID  ipratropium, 0.5 mg, Nebulization, TID - RT  levothyroxine, 175 mcg, Oral, Q AM  metoprolol succinate XL, 25 mg, Oral, Q24H  mineral oil-hydrophilic petrolatum, 1 Application, Topical, Daily  pantoprazole, 40 mg, Oral, Daily  predniSONE, 20 mg, Oral, Daily With Breakfast  saccharomyces boulardii, 250 mg, Oral, BID  sodium bicarbonate, 1,300 mg, Oral, TID  sodium chloride, 10 mL, Intravenous, Q12H  terazosin, 1 mg, Oral, Daily  Vitamin B1, 100 mg, Oral, Daily      Pharmacy to Dose enoxaparin (LOVENOX),         OBJECTIVE    Vital Sign Min/Max for last 24 hours  Temp  Min: 97.3 °F (36.3 °C)  Max: 97.9 °F (36.6 °C)   BP  Min: 116/71  Max: 154/85   Pulse  Min: 71  Max: 89   Resp  Min: 12  Max: 25   SpO2  Min: 94 %  Max: 99 %   No data recorded   Weight  Min: 116 kg (256 lb 2.8 oz)  Max: 116 kg (256 lb 2.8 oz)     Flowsheet Rows      Flowsheet Row First Filed Value   Admission Height 170.2 cm (67\") Documented at 2025   Admission Weight 114 kg (251 lb 12.3 oz) Documented at 2025            I/O this shift:  In: 240 [P.O.:240]  Out: -   I/O last 3 completed shifts:  In: 3360 [P.O.:1080; I.V.:2280]  Out: 700 [Urine:700]    Physical Exam:  General Appearance: alert, " "appears stated age and cooperative  Head: normocephalic, without obvious abnormality and atraumatic  Eyes: conjunctivae and sclerae normal and no icterus  Neck: supple and no JVD  Lungs: SCATTERED RHONCHI  Heart: regular rhythm & normal rate and normal S1, S2 +BALDOMERO  Chest: Wall no abnormalities observed  Abdomen: normal bowel sounds and soft, nontender  Extremities: moves extremities well, no edema, no cyanosis and no redness  Skin: no bleeding, bruising or rash, turgor normal, color normal and no lesions noted  Neurologic: Alert, and oriented. No focal deficits    Labs:    WBC WBC   Date Value Ref Range Status   05/17/2025 8.19 3.40 - 10.80 10*3/mm3 Final   05/15/2025 11.80 (H) 3.40 - 10.80 10*3/mm3 Final   05/14/2025 13.22 (H) 3.40 - 10.80 10*3/mm3 Final      HGB Hemoglobin   Date Value Ref Range Status   05/17/2025 10.8 (L) 13.0 - 17.7 g/dL Final   05/15/2025 11.6 (L) 13.0 - 17.7 g/dL Final   05/14/2025 11.7 (L) 13.0 - 17.7 g/dL Final      HCT Hematocrit   Date Value Ref Range Status   05/17/2025 35.2 (L) 37.5 - 51.0 % Final   05/15/2025 36.6 (L) 37.5 - 51.0 % Final   05/14/2025 38.1 37.5 - 51.0 % Final      Platelets No results found for: \"LABPLAT\"   MCV MCV   Date Value Ref Range Status   05/17/2025 87.6 79.0 - 97.0 fL Final   05/15/2025 84.5 79.0 - 97.0 fL Final   05/14/2025 87.2 79.0 - 97.0 fL Final          Sodium Sodium   Date Value Ref Range Status   05/17/2025 140 136 - 145 mmol/L Final   05/15/2025 136 136 - 145 mmol/L Final   05/14/2025 134 (L) 136 - 145 mmol/L Final      Potassium Potassium   Date Value Ref Range Status   05/17/2025 4.5 3.5 - 5.2 mmol/L Final     Comment:     Result checked     05/15/2025 3.5 3.5 - 5.2 mmol/L Final   05/14/2025 3.9 3.5 - 5.2 mmol/L Final      Chloride Chloride   Date Value Ref Range Status   05/17/2025 104 98 - 107 mmol/L Final   05/15/2025 104 98 - 107 mmol/L Final   05/14/2025 98 98 - 107 mmol/L Final      CO2 CO2   Date Value Ref Range Status   05/17/2025 25.4 22.0 - " "29.0 mmol/L Final   05/15/2025 19.4 (L) 22.0 - 29.0 mmol/L Final   05/14/2025 19.9 (L) 22.0 - 29.0 mmol/L Final      BUN BUN   Date Value Ref Range Status   05/17/2025 30 (H) 8 - 23 mg/dL Final   05/15/2025 25 (H) 8 - 23 mg/dL Final   05/14/2025 19 8 - 23 mg/dL Final      Creatinine Creatinine   Date Value Ref Range Status   05/17/2025 1.50 (H) 0.76 - 1.27 mg/dL Final   05/15/2025 1.50 (H) 0.76 - 1.27 mg/dL Final   05/14/2025 1.75 (H) 0.76 - 1.27 mg/dL Final      Calcium Calcium   Date Value Ref Range Status   05/17/2025 8.7 8.6 - 10.5 mg/dL Final   05/15/2025 8.5 (L) 8.6 - 10.5 mg/dL Final   05/14/2025 8.8 8.6 - 10.5 mg/dL Final      PO4 No components found for: \"PO4\"   Albumin Albumin   Date Value Ref Range Status   05/17/2025 3.3 (L) 3.5 - 5.2 g/dL Final   05/15/2025 3.3 (L) 3.5 - 5.2 g/dL Final   05/14/2025 3.7 3.5 - 5.2 g/dL Final      Magnesium Magnesium   Date Value Ref Range Status   05/17/2025 2.5 (H) 1.6 - 2.4 mg/dL Final   05/15/2025 1.8 1.6 - 2.4 mg/dL Final   05/15/2025 1.6 1.6 - 2.4 mg/dL Final      Uric Acid No components found for: \"URIC ACID\"     Imaging Results (Last 72 Hours)       Procedure Component Value Units Date/Time    US Renal Bilateral [[id number]] Collected: 05/15/25 1456     Updated: 05/15/25 1459    Narrative:      US RENAL BILATERAL    Date of Exam: 5/15/2025 2:25 PM EDT    Indication: ARF/SARAH/CRF/CKD.    Comparison: No comparisons available.    Technique: Grayscale and color Doppler ultrasound evaluation of the kidneys and urinary bladder was performed.      Findings:  The right kidney measures 9.8 cm and the left kidney measures 10.3 cm. Kidney echogenicity and vascularity appear within normal limits. There is no solid kidney mass.  No echogenic shadowing stone.  No hydronephrosis.      Limited visualization of the urinary bladder is unremarkable.bladder volume 245 cc        Impression:      Impression:  No definite sonographic abnormality in the kidneys        Electronically Signed: " Lonny Garcia MD    5/15/2025 2:57 PM EDT    Workstation ID: [id number]    CT Angiogram Chest Pulmonary Embolism [[id number]] Collected: 05/15/25 0035     Updated: 05/15/25 0042    Narrative:      CT ANGIOGRAM CHEST PULMONARY EMBOLISM    Date of Exam: 5/14/2025 11:23 PM EDT    Indication: Elevated dimer, dyspnea.    Comparison: None available.    Technique: Axial CT images were obtained of the chest after the uneventful intravenous administration of iodinated contrast utilizing pulmonary embolism protocol.  In addition, a 3-D volume rendered image was created for interpretation.  Sagittal and   coronal reconstructions were performed.  Automated exposure control and iterative reconstruction methods were used.      Findings:    Pulmonary arteries: Adequate opacification of the pulmonary arteries. No evidence of acute pulmonary embolism.    Lungs and Pleura: There is mild bilateral basilar atelectasis. There is bilateral lower lobe bronchial wall thickening with some fluid within the bronchi suggestive of infection.    Mediastinum/Inés: No mediastinal or hilar lymphadenopathy.    Lymph nodes: No axillary or supraclavicular adenopathy.    Cardiovascular: The the heart is enlarged. There is a trace pericardial effusion. There is aortic valvular calcification and coronary arterial calcific atherosclerosis.    Upper Abdomen: There are clips from cholecystectomy. There is mild fatty infiltration of the liver. Otherwise, the upper abdominal contents are unremarkable.          Bones and Soft Tissue: No suspicious osseous lesion.        Impression:      Impression:  1.No evidence of pulmonary embolism.  2.Bilateral lower lobe bronchial wall thickening with some fluid in the bronchi suggestive of infection.            Electronically Signed: Haile Velez MD    5/15/2025 12:40 AM EDT    Workstation ID: [id number]    XR Chest 1 View [[id number]] Collected: 05/14/25 2220     Updated: 05/14/25 2223    Narrative:      XR CHEST 1  VW    Date of Exam: 5/14/2025 9:36 PM EDT    Indication: Dyspnea, wheezing    Comparison: 2/1/2016    Findings:  Stable mild cardiomegaly, exaggerated by technique. Lungs without consolidation. Negative for pneumothorax or effusion. Osseous structures grossly intact. Patient's chin overlies the lung apices medially partially limiting assessment.      Impression:      Impression:  No acute process.          Electronically Signed: Renny Johnson MD    5/14/2025 10:21 PM EDT    Workstation ID: [id number]            Results for orders placed during the hospital encounter of 05/14/25    XR Chest 1 View    Narrative  XR CHEST 1 VW    Date of Exam: 5/14/2025 9:36 PM EDT    Indication: Dyspnea, wheezing    Comparison: 2/1/2016    Findings:  Stable mild cardiomegaly, exaggerated by technique. Lungs without consolidation. Negative for pneumothorax or effusion. Osseous structures grossly intact. Patient's chin overlies the lung apices medially partially limiting assessment.    Impression  Impression:  No acute process.          Electronically Signed: Renny Johnson MD  5/14/2025 10:21 PM EDT  Workstation ID: [id number]      Results for orders placed during the hospital encounter of 03/27/25    XR Tibia Fibula 2 View Right    Narrative  XR TIBIA FIBULA 2 VW RIGHT    Date of Exam: 3/29/2025 10:17 AM EDT    Indication: AND RIGHT ANKLE TO SEE IF HARDWARE IS PRESENT (PINS) FROM PRIOR FRACTURE    Comparison: None available.    Findings:  Remote posttraumatic and postoperative changes of the distal tibia and fibula are noted. 2 orthopedic fixation screws are observed. There are no signs of hardware failure or loosening. There is no acute displaced fracture or malalignment. Chronic changes  are noted. Atherosclerotic vascular calcifications are noted. Chronic soft tissue changes are also observed.    Impression  Impression:  1.Remote posttraumatic and postoperative changes of the distal tibia and fibula. 2 orthopedic fixation screws are  identified. There are no signs of hardware failure or loosening.  2.No evidence for acute displaced fracture or malalignment.        Electronically Signed: Riley Walls MD  3/29/2025 12:53 PM EDT  Workstation ID: [id number]      Results for orders placed during the hospital encounter of 09/15/23    XR Foot 3+ View Right    Narrative  XR FOOT 3+ VW RIGHT    Date of Exam: 9/15/2023 2:30 PM EDT    Indication: wound, infection    Comparison: 7/9/2022    Findings:  Bones are diffusely osteopenic. There is no discrete area of osseous erosion to indicate a site of osteomyelitis. Postsurgical changes of screw fixation noted at the distal tibia and fibula similar to the prior exam. Extensive small vessel vascular  calcifications. Soft tissue swelling overlying the dorsum of the foot which may relate to edema and/or cellulitis.    Impression  Impression:  1. No discrete area of osseous erosion to indicate osteomyelitis.  2. Soft tissue swelling overlying the dorsum of the foot may relate to edema and/or cellulitis.  3. Diffuse osteopenia.  4. Extensive small vessel vascular calcifications.        Electronically Signed: Renny Johnson MD  9/15/2023 2:35 PM EDT  Workstation ID: [id number]      Results for orders placed during the hospital encounter of 05/14/25    Duplex Venous Lower Extremity - Bilateral CAR    Interpretation Summary    Chronic left lower extremity superficial thrombophlebitis noted in the great saphenous (below knee) and small saphenous.    All other veins appeared normal bilaterally.        ASSESSMENT / PLAN      Acute and chronic respiratory failure (acute-on-chronic)      ARF/SARAH/CRF/CKD----Nonoliguric. +ARF/SARAH on top of known CRF/CKD STG 3A secondary to HTN NS. Clinically prerenal (BNP not clinically accurate) and s/p IV dye exposure . No NSAIDs or IV dye. Dose meds for CrCl less than 10 cc/min until ARF/SARAH is resolved     2. COVID 19 + PNA/ACUTE HYPOXIC RESPIRATORY FAILURE------On BiPap, steroids, oxygen,  nebs per Pulmonary . Was initially COVID 19 + at the beginning of April     3. OA/DJD/GOUT/HYPERURICEMIA-----On Allopurinol. No NSAIDs. Uric ok     4. BILATERAL LE EDEMA/CHRONIC VENOUS INSUFFICIENCY/WOUNDS/CELLULITIS------Wound care.       5. DEPRESSION-----Ok to continue SSRI in the form of Prozac for now     6. ETOH ABUSE------Counseled     7. HTN WITH CKD-----BP up a little. Increase Hydralazine a little and follow. Avoid hypotension. No ACE-I/ARB/DRI     8. GERD/PUD PROPHYLAXIS------On PPI. Benefits outweigh risks despite CKD     9. HYPOTHYROIDISM------On Synthroid. TSH ok     10. DVT PROPHYLAXIS------Lovenox     11. DEPRESSION------On Prozac     12. HYPOMAGNESEMIA------Replace IV and follow     13. CHRONIC IBS------On Florastor     14. ANEMIA OF CKD-----IV iron for LAMIN. HgB above threshold for EPO     15. KETONURIA/DEHYDRATION-----IV NS. BNP not clinically accurate     16. ELEVATED D-DIMER-----CT PE negative. Likely elevation is from COVID. Repeat Bilateral LE venous dopplers    17. HYPOPHOSPHATEMIA-------Replace IV    18. LACTIC ACIDOSIS--------Avoid hypotension. IV and po NaHCO3 and follow. Also with some contribution to acidosis from Type 4 RTA    Creatinine stable  Stop IV fluids  Monitor renal function, fluid status and electrolytes    Huey Keyes MD  [facility]  [phone]  05/17/25  14:14 EDT

## 2025-05-17 NOTE — PROGRESS NOTES
Daily Progress Note          Assessment    Respiratory panel positive for COVID-19 however it was initially diagnosed on April 5, 2025 treated with outpatient Paxlovid as well as azithromycin  CT chest did not show any alveolar infiltrates  No PE  Patient is fully vaccinated   Skin Purpura  History of C. Difficile     Hypothyroidism  Gout  GERD  Hypertension  Diabetes mellitus           Recommendations:     Oxygen supplement and titration to maintain saturation 90-95%: Currently on 2 L per nasal cannula alternating with BiPAP at nighttime    Steroids : predisone 20 mg  daily for bronchitis/wheeze possible postviral     Empiric antibiotic currently on Rocephin  Bronchodilator budesonide and ipratropium to avoid tachycardia  Thyroid replacement  Anticoagulation: Eliquis    HR control as per cardiology: Currently on metoprolol                      LOS: 2 days     Subjective      shortness of breath    Objective     Vital signs for last 24 hours:  Vitals:    05/17/25 0315 05/17/25 0807 05/17/25 1009 05/17/25 1123   BP: 154/85 142/84  153/83   BP Location:  Left arm  Left arm   Patient Position:  Lying  Sitting   Pulse: 72 89 78    Resp: 21 22 22 21   Temp: 97.5 °F (36.4 °C) 97.9 °F (36.6 °C)  97.3 °F (36.3 °C)   TempSrc:  Oral  Oral   SpO2: 99% 94% 98%    Weight: 116 kg (256 lb 2.8 oz)      Height:           Intake/Output last 3 shifts:  I/O last 3 completed shifts:  In: 3360 [P.O.:1080; I.V.:2280]  Out: 700 [Urine:700]  Intake/Output this shift:  I/O this shift:  In: 240 [P.O.:240]  Out: -       Radiology  Imaging Results (Last 24 Hours)       ** No results found for the last 24 hours. **            Labs:  Results from last 7 days   Lab Units 05/17/25  0330   WBC 10*3/mm3 8.19   HEMOGLOBIN g/dL 10.8*   HEMATOCRIT % 35.2*   PLATELETS 10*3/mm3 220     Results from last 7 days   Lab Units 05/17/25  0330   SODIUM mmol/L 140   POTASSIUM mmol/L 4.5   CHLORIDE mmol/L 104   CO2 mmol/L 25.4   BUN mg/dL 30*   CREATININE mg/dL  1.50*   CALCIUM mg/dL 8.7   BILIRUBIN mg/dL <0.2   ALK PHOS U/L 44   ALT (SGPT) U/L 5   AST (SGOT) U/L 9   GLUCOSE mg/dL 128*     Results from last 7 days   Lab Units 05/15/25  0121   PH, ARTERIAL pH units 7.372   PO2 ART mm Hg 102.6   PCO2, ARTERIAL mm Hg 37.8   HCO3 ART mmol/L 22.0     Results from last 7 days   Lab Units 05/17/25  0330 05/15/25  2314 05/14/25  2122   ALBUMIN g/dL 3.3* 3.3* 3.7     Results from last 7 days   Lab Units 05/14/25  2256 05/14/25 2122   CK TOTAL U/L 44  --    HSTROP T ng/L 33* 32*         Results from last 7 days   Lab Units 05/17/25  0330   MAGNESIUM mg/dL 2.5*         Results from last 7 days   Lab Units 05/14/25  2256   TSH uIU/mL 1.080           Meds:   SCHEDULE  allopurinol, 200 mg, Oral, Daily  apixaban, 2.5 mg, Oral, Q12H  cefTRIAXone, 2,000 mg, Intravenous, Daily  cholestyramine light, 1 packet, Oral, Daily  FLUoxetine, 10 mg, Oral, Daily  folic acid, 1 mg, Oral, Daily  hydrALAZINE, 50 mg, Oral, BID  ipratropium, 0.5 mg, Nebulization, TID - RT  levothyroxine, 175 mcg, Oral, Q AM  metoprolol succinate XL, 25 mg, Oral, Q24H  mineral oil-hydrophilic petrolatum, 1 Application, Topical, Daily  pantoprazole, 40 mg, Oral, Daily  predniSONE, 20 mg, Oral, Daily With Breakfast  saccharomyces boulardii, 250 mg, Oral, BID  sodium bicarbonate, 1,300 mg, Oral, TID  sodium chloride, 10 mL, Intravenous, Q12H  terazosin, 1 mg, Oral, Daily  Vitamin B1, 100 mg, Oral, Daily      Infusions  Pharmacy to Dose enoxaparin (LOVENOX),       PRNs    acetaminophen **OR** acetaminophen **OR** acetaminophen    aluminum-magnesium hydroxide-simethicone    senna-docusate sodium **AND** polyethylene glycol **AND** bisacodyl **AND** bisacodyl    calcium carbonate    nitroglycerin    ondansetron ODT **OR** ondansetron    Pharmacy to Dose enoxaparin (LOVENOX)    sodium chloride    sodium chloride    Physical Exam:  General Appearance:  Alert   HEENT:  Normocephalic, without obvious abnormality, Conjunctiva/corneas  clear,.   Nares normal, no drainage     Neck:  Supple, symmetrical, trachea midline.   Lungs /Chest wall:   Bilateral basal rhonchi, respirations unlabored, symmetrical wall movement.     Heart: Heart rate controlled, S1 S2 normal  Abdomen: Soft, non-tender, no masses, no organomegaly.    Extremities: Trace edema, no clubbing or cyanosis     ROS  Constitutional: Negative for chills, fever and malaise/fatigue.   HENT: Negative.    Eyes: Negative.    Cardiovascular: Negative.    Respiratory: Positive for cough and shortness of breath.    Skin: Negative.    Musculoskeletal: Negative.    Gastrointestinal: Negative.    Genitourinary: Negative.    Neurological: Generalized weakness      I reviewed the recent clinical results  I personally reviewed the latest radiological studies    Part of this note may be an electronic transcription/translation of spoken language to printed text using the Dragon Dictation System.

## 2025-05-17 NOTE — PLAN OF CARE
Problem: Adult Inpatient Plan of Care  Goal: Plan of Care Review  Outcome: Progressing  Flowsheets (Taken 5/17/2025 0251)  Progress: no change  Plan of Care Reviewed With: patient  Goal: Patient-Specific Goal (Individualized)  Outcome: Progressing  Goal: Absence of Hospital-Acquired Illness or Injury  Outcome: Progressing  Intervention: Identify and Manage Fall Risk  Recent Flowsheet Documentation  Taken 5/17/2025 0042 by Macy Laird RN  Safety Promotion/Fall Prevention: safety round/check completed  Taken 5/16/2025 2300 by Macy Laird RN  Safety Promotion/Fall Prevention: safety round/check completed  Taken 5/16/2025 2200 by Macy Laird RN  Safety Promotion/Fall Prevention: safety round/check completed  Taken 5/16/2025 2100 by Macy Laird RN  Safety Promotion/Fall Prevention: safety round/check completed  Taken 5/16/2025 2019 by Macy Laird RN  Safety Promotion/Fall Prevention: safety round/check completed  Taken 5/16/2025 1901 by Macy Laird RN  Safety Promotion/Fall Prevention: safety round/check completed  Intervention: Prevent Skin Injury  Recent Flowsheet Documentation  Taken 5/16/2025 2019 by Macy Laird RN  Skin Protection:   incontinence pads utilized   skin sealant/moisture barrier applied  Intervention: Prevent and Manage VTE (Venous Thromboembolism) Risk  Recent Flowsheet Documentation  Taken 5/16/2025 2019 by Macy Laird RN  VTE Prevention/Management: patient refused intervention  Intervention: Prevent Infection  Recent Flowsheet Documentation  Taken 5/16/2025 2019 by Macy Laird RN  Infection Prevention:   single patient room provided   rest/sleep promoted   personal protective equipment utilized   hand hygiene promoted  Goal: Optimal Comfort and Wellbeing  Outcome: Progressing  Goal: Readiness for Transition of Care  Outcome: Progressing     Problem: Adult Inpatient Plan of Care  Goal: Absence of Hospital-Acquired Illness or  Injury  Intervention: Prevent Skin Injury  Recent Flowsheet Documentation  Taken 5/16/2025 2019 by Macy Laird RN  Skin Protection:   incontinence pads utilized   skin sealant/moisture barrier applied     Problem: Skin Injury Risk Increased  Goal: Skin Health and Integrity  Outcome: Progressing  Intervention: Optimize Skin Protection  Recent Flowsheet Documentation  Taken 5/16/2025 2300 by Macy Laird RN  Activity Management: back to bed  Taken 5/16/2025 2019 by Macy Laird RN  Pressure Reduction Techniques:   frequent weight shift encouraged   weight shift assistance provided  Pressure Reduction Devices:   pressure-redistributing mattress utilized   chair cushion utilized  Skin Protection:   incontinence pads utilized   skin sealant/moisture barrier applied     Problem: Fall Injury Risk  Goal: Absence of Fall and Fall-Related Injury  Outcome: Progressing  Intervention: Identify and Manage Contributors  Recent Flowsheet Documentation  Taken 5/16/2025 2019 by Macy Laird RN  Medication Review/Management: medications reviewed  Intervention: Promote Injury-Free Environment  Recent Flowsheet Documentation  Taken 5/17/2025 0042 by Macy Laird RN  Safety Promotion/Fall Prevention: safety round/check completed  Taken 5/16/2025 2300 by Macy Laird RN  Safety Promotion/Fall Prevention: safety round/check completed  Taken 5/16/2025 2200 by Macy Laird RN  Safety Promotion/Fall Prevention: safety round/check completed  Taken 5/16/2025 2100 by Macy Laird RN  Safety Promotion/Fall Prevention: safety round/check completed  Taken 5/16/2025 2019 by Macy Laird RN  Safety Promotion/Fall Prevention: safety round/check completed  Taken 5/16/2025 1901 by Macy Laird RN  Safety Promotion/Fall Prevention: safety round/check completed   Goal Outcome Evaluation:  Plan of Care Reviewed With: patient        Progress: no change

## 2025-05-17 NOTE — PROGRESS NOTES
"[facility] [place]      Patient Care Team:  Montse Dawkins MD as PCP - General (Family Medicine)  Huey Keyes MD as Consulting Physician (Nephrology)        Chief Complaint: Shortness of breath    Subjective  No distress  Up with PT today  A-fib is rate controlled  Daughter at bedside  Volume stable    Pending outpatient cardioversion    Interval History and ROS:     Patient remains in A-fib, rates controlled, blood pressure stable on Toprol.  Will start Eliquis    Review of Systems   Constitutional: Negative for chills and fever.   HENT:  Negative for ear discharge and nosebleeds.    Eyes:  Negative for discharge and redness.   Cardiovascular:  Negative for chest pain, orthopnea, palpitations, paroxysmal nocturnal dyspnea and syncope.   Respiratory:  Positive for shortness of breath. Negative for cough and wheezing.    Endocrine: Negative for heat intolerance.   Skin:  Negative for rash.   Musculoskeletal:  Negative for arthritis and myalgias.   Gastrointestinal:  Negative for abdominal pain, melena, nausea and vomiting.   Genitourinary:  Negative for dysuria and hematuria.   Neurological:  Negative for dizziness, light-headedness, numbness and tremors.   Psychiatric/Behavioral:  Negative for depression. The patient is not nervous/anxious.        Patient has no complaints    Objective    Vital Signs  Temp:  [97.3 °F (36.3 °C)-97.9 °F (36.6 °C)] 97.3 °F (36.3 °C)  Heart Rate:  [71-89] 78  Resp:  [12-25] 21  BP: (116-154)/(69-85) 153/83  Oxygen Therapy  SpO2: 98 %  Pulse Oximetry Type: Continuous  Device (Oxygen Therapy): nasal cannula, humidified  Flow (L/min) (Oxygen Therapy): 2  Oxygen Concentration (%): 40}  Flowsheet Rows      Flowsheet Row First Filed Value   Admission Height 170.2 cm (67\") Documented at 05/14/2025 2050   Admission Weight 114 kg (251 lb 12.3 oz) Documented at 05/14/2025 2050            Physical Exam  TELE:  General Appearance:  A-fib controlled rates  Alert, cooperative, in no acute " distress   Head:    Normocephalic, without obvious abnormality, atraumatic   Eyes:            PERRLA, EOM intact       Throat:   Oral mucosa moist, No oral lesions, no thrush   Neck:   No carotid bruit, no JVD, supple, trachea midline, no thyromegaly    Lungs:     Clear to auscultation,respirations regular, even and   unlabored    Heart:  Irregularly irregular rhythm and normal rate, normal S1 and S2, no gallop, no rub, no click   Chest Wall:    No abnormalities observed   Abdomen:     Soft, obese, nontender, nondistended, no guarding, no rebound  tenderness,   Extremities: Right lower extremity 2+, left lower extremity 1+    Pulses:   Pulses palpable and equal bilaterally in all extremities   Skin: Crusting right lower extremity distally, purpura bilateral lower extremities       Neurologic:   Normal mood, thought content and behavior    Unchanged from prior     Results Review:     I reviewed the patient's new clinical results.    Lab Results (last 24 hours)       Procedure Component Value Units Date/Time    POC Glucose Finger 4x Daily Before Meals & at Bedtime [[id number]]  (Abnormal) Collected: 05/17/25 1120    Specimen: Blood from Finger Updated: 05/17/25 1122     Glucose 127 mg/dL      Comment: Serial Number: [id number]:  [id number]       POC Glucose Finger 4x Daily Before Meals & at Bedtime [[id number]]  (Abnormal) Collected: 05/17/25 0809    Specimen: Blood from Finger Updated: 05/17/25 0811     Glucose 135 mg/dL      Comment: Serial Number: [id number]:  [id number]       Phosphorus [[id number]]  (Normal) Collected: 05/17/25 0330    Specimen: Blood from Arm, Left Updated: 05/17/25 0542     Phosphorus 2.8 mg/dL     Magnesium [[id number]]  (Abnormal) Collected: 05/17/25 0330    Specimen: Blood from Arm, Left Updated: 05/17/25 0542     Magnesium 2.5 mg/dL     Comprehensive Metabolic Panel [[id number]]  (Abnormal) Collected: 05/17/25 0330    Specimen: Blood from Arm, Left Updated: 05/17/25 0542     Glucose  128 mg/dL      BUN 30 mg/dL      Creatinine 1.50 mg/dL      Sodium 140 mmol/L      Potassium 4.5 mmol/L      Comment: Result checked          Chloride 104 mmol/L      CO2 25.4 mmol/L      Calcium 8.7 mg/dL      Total Protein 6.0 g/dL      Albumin 3.3 g/dL      ALT (SGPT) 5 U/L      Comment: Result checked          AST (SGOT) 9 U/L      Alkaline Phosphatase 44 U/L      Total Bilirubin <0.2 mg/dL      Globulin 2.7 gm/dL      A/G Ratio 1.2 g/dL      BUN/Creatinine Ratio 20.0     Anion Gap 10.6 mmol/L      eGFR 46.5 mL/min/1.73     Narrative:      GFR Categories in Chronic Kidney Disease (CKD)              GFR Category          GFR (mL/min/1.73)    Interpretation  G1                    90 or greater        Normal or high (1)  G2                    60-89                Mild decrease (1)  G3a                   45-59                Mild to moderate decrease  G3b                   30-44                Moderate to severe decrease  G4                    15-29                Severe decrease  G5                    14 or less           Kidney failure    (1)In the absence of evidence of kidney disease, neither GFR category G1 or G2 fulfill the criteria for CKD.    eGFR calculation 2021 CKD-EPI creatinine equation, which does not include race as a factor    Lactic Acid, Plasma [[id number]]  (Normal) Collected: 05/17/25 0330    Specimen: Blood from Arm, Left Updated: 05/17/25 0536     Lactate 1.4 mmol/L     CBC Auto Differential [[id number]]  (Abnormal) Collected: 05/17/25 0330    Specimen: Blood from Arm, Left Updated: 05/17/25 0515     WBC 8.19 10*3/mm3      RBC 4.02 10*6/mm3      Hemoglobin 10.8 g/dL      Hematocrit 35.2 %      MCV 87.6 fL      MCH 26.9 pg      MCHC 30.7 g/dL      RDW 17.0 %      RDW-SD 54.4 fl      MPV 11.8 fL      Platelets 220 10*3/mm3      Neutrophil % 80.2 %      Lymphocyte % 10.7 %      Monocyte % 7.3 %      Eosinophil % 0.0 %      Basophil % 0.2 %      Immature Grans % 1.6 %      Neutrophils, Absolute 6.56  10*3/mm3      Lymphocytes, Absolute 0.88 10*3/mm3      Monocytes, Absolute 0.60 10*3/mm3      Eosinophils, Absolute 0.00 10*3/mm3      Basophils, Absolute 0.02 10*3/mm3      Immature Grans, Absolute 0.13 10*3/mm3      nRBC 0.0 /100 WBC     Calcium, Ionized [[id number]]  (Abnormal) Collected: 05/17/25 0330    Specimen: Blood from Arm, Left Updated: 05/17/25 0512     Ionized Calcium 1.13 mmol/L     POC Glucose Once [[id number]]  (Abnormal) Collected: 05/16/25 2018    Specimen: Blood Updated: 05/16/25 2021     Glucose 173 mg/dL      Comment: Serial Number: [id number]:  [id number]       Blood Culture - Blood, Arm, Left [[id number]] Collected: 05/16/25 1622    Specimen: Blood from Arm, Left Updated: 05/16/25 1643    Blood Culture - Blood, Arm, Right [[id number]] Collected: 05/16/25 1629    Specimen: Blood from Arm, Right Updated: 05/16/25 1643    POC Glucose Once [[id number]]  (Abnormal) Collected: 05/16/25 1538    Specimen: Blood Updated: 05/16/25 1540     Glucose 163 mg/dL      Comment: Serial Number: [id number]:  [id number]               Imaging Results (Last 24 Hours)       ** No results found for the last 24 hours. **            ECG/EMG Results (most recent)       Procedure Component Value Units Date/Time    Telemetry Scan [[id number]] Resulted: 05/14/25     Updated: 05/14/25 2258    ECG 12 Lead Dyspnea [[id number]] Collected: 05/14/25 2055     Updated: 05/15/25 0817     QT Interval 372 ms      QTC Interval 455 ms     Narrative:      HEART RATE=90  bpm  RR Qqvfbgef=928  ms  OR Interval=  ms  P Horizontal Axis=  deg  P Front Axis=  deg  QRSD Interval=96  ms  QT Qelmvqzi=365  ms  RMuZ=093  ms  QRS Axis=-35  deg  T Wave Axis=46  deg  - ABNORMAL ECG -  Left axis deviation  Low voltage, precordial leads  Nonspecific  T abnormalities, anterior leads  When compared with ECG of 12-May-2018 12:11:53,  Significant change in rhythm: previously sinus  Significant repolarization change  Significant axis, voltage or  hypertrophy change  Electronically Signed By: Skip Tripathi ([facility]) 2025-05-15 08:17:33  Date and Time of Study:2025-05-14 20:55:47    Telemetry Scan [[id number]] Resulted: 05/14/25     Updated: 05/15/25 2246    Telemetry Scan [[id number]] Resulted: 05/14/25     Updated: 05/15/25 2342    Telemetry Scan [[id number]] Resulted: 05/14/25     Updated: 05/16/25 0406    Telemetry Scan [[id number]] Resulted: 05/14/25     Updated: 05/16/25 0817    Adult Transthoracic Echo Complete W/ Cont if Necessary Per Protocol [[id number]] Resulted: 05/16/25 0846     Updated: 05/16/25 0846     LVIDd 5.1 cm      LVIDs 4.2 cm      IVSd 1.40 cm      LVPWd 1.40 cm      FS 17.6 %      IVS/LVPW 1.00 cm      ESV(cubed) 74.1 ml      LV Sys Vol (BSA corrected) 22.0 cm2      EDV(cubed) 132.7 ml      LV Marcial Vol (BSA corrected) 46.3 cm2      LV mass(C)d 300.4 grams      LVOT area 3.5 cm2      LVOT diam 2.10 cm      EDV(MOD-sp4) 103.0 ml      ESV(MOD-sp4) 49.0 ml      SV(MOD-sp4) 54.0 ml      SVi(MOD-SP4) 24.2 ml/m2      SVi (LVOT) 15.2 ml/m2      EF(MOD-sp4) 52.4 %      MV E max maryjane 100.0 cm/sec      MV A max maryjane 25.7 cm/sec      MV dec time 0.16 sec      MV E/A 3.9     IVRT 85.0 ms      SV(LVOT) 33.9 ml      RVIDd 3.0 cm      TAPSE (>1.6) 1.91 cm      LA dimension (2D)  3.9 cm      LV V1 max 52.9 cm/sec      LV V1 max PG 1.12 mmHg      LV V1 mean PG 1.00 mmHg      LV V1 VTI 9.8 cm      Ao pk maryjane 125.0 cm/sec      Ao max PG 6.3 mmHg      Ao mean PG 3.0 mmHg      Ao V2 VTI 23.0 cm      TRUNG(I,D) 1.47 cm2      Dimensionless Index 0.42 (DI)      MV max PG 6.1 mmHg      MV mean PG 2.00 mmHg      MV V2 VTI 32.1 cm      MV P1/2t 63.5 msec      MVA(P1/2t) 3.5 cm2      MVA(VTI) 1.06 cm2      MV dec slope 567.0 cm/sec2      RV V1 max PG 1.67 mmHg      RV V1 max 64.7 cm/sec      RV V1 VTI 14.4 cm      PA V2 max 81.4 cm/sec      PA acc time 0.12 sec      Sinus 3.3 cm      STJ 3.4 cm     Narrative:        Left ventricular ejection fraction appears to be 46 - 50%.     The right ventricular cavity is mildly dilated.      ECG 12 Lead QT Measurement [[id number]] Collected: 05/15/25 1635     Updated: 05/16/25 0921     QT Interval 381 ms      QTC Interval 488 ms     Narrative:      HEART RATE=99  bpm  RR Znssysir=887  ms  NV Interval=  ms  P Horizontal Axis=  deg  P Front Axis=  deg  QRSD Ldapimey=096  ms  QT Bmtnoypy=053  ms  EDbT=055  ms  QRS Axis=-29  deg  T Wave Axis=174  deg  - ABNORMAL ECG -  Atrial fibrillation  Incomplete RBBB and LAFB  Low voltage, precordial leads  Nonspecific  T abnormalities, lateral leads  When compared with ECG of 14-May-2025 20:55:47,  Significant change in rhythm  Electronically Signed By: Amadeo Chandra ([facility]) 2025-05-16 09:20:31  Date and Time of Study:2025-05-15 16:35:57    Telemetry Scan [[id number]] Resulted: 05/14/25     Updated: 05/16/25 1200    Telemetry Scan [[id number]] Resulted: 05/14/25     Updated: 05/16/25 1540    Telemetry Scan [[id number]] Resulted: 05/14/25     Updated: 05/16/25 2223    Telemetry Scan [[id number]] Resulted: 05/14/25     Updated: 05/17/25 0447            Medication Review:   I have reviewed the patient's current medication list    Current Facility-Administered Medications:     acetaminophen (TYLENOL) tablet 650 mg, 650 mg, Oral, Q4H PRN **OR** acetaminophen (TYLENOL) 160 MG/5ML oral solution 650 mg, 650 mg, Oral, Q4H PRN **OR** acetaminophen (TYLENOL) suppository 650 mg, 650 mg, Rectal, Q4H PRN, Montse Dawkins MD    allopurinol (ZYLOPRIM) tablet 200 mg, 200 mg, Oral, Daily, Montse Dawkins MD, 200 mg at 05/17/25 0816    aluminum-magnesium hydroxide-simethicone (MAALOX MAX) 400-400-40 MG/5ML suspension 15 mL, 15 mL, Oral, Q6H PRN, Montse Dawkins MD    apixaban (ELIQUIS) tablet 2.5 mg, 2.5 mg, Oral, Q12H, Kailee Triplett APRN, 2.5 mg at 05/17/25 0816    sennosides-docusate (PERICOLACE) 8.6-50 MG per tablet 2 tablet, 2 tablet, Oral, BID PRN, 2 tablet at 05/16/25 1715 **AND** polyethylene glycol (MIRALAX) packet 17 g, 17 g,  Oral, Daily PRN **AND** bisacodyl (DULCOLAX) EC tablet 5 mg, 5 mg, Oral, Daily PRN **AND** bisacodyl (DULCOLAX) suppository 10 mg, 10 mg, Rectal, Daily PRN, Montse Dawkins MD    calcium carbonate (TUMS) chewable tablet 500 mg (200 mg elemental), 2 tablet, Oral, BID PRN, Montse Dawkins MD    cefTRIAXone (ROCEPHIN) 2,000 mg in sodium chloride 0.9 % 100 mL MBP, 2,000 mg, Intravenous, Daily, Montse Dawkins MD, Last Rate: 200 mL/hr at 05/17/25 0812, 2,000 mg at 05/17/25 0812    cholestyramine light packet 4 g, 1 packet, Oral, Daily, Montse Dawkins MD, 4 g at 05/17/25 0815    FLUoxetine (PROzac) capsule 10 mg, 10 mg, Oral, Daily, Montse Dawkins MD, 10 mg at 05/17/25 0815    folic acid (FOLVITE) tablet 1 mg, 1 mg, Oral, Daily, Montse Dawkins MD, 1 mg at 05/17/25 1002    hydrALAZINE (APRESOLINE) tablet 50 mg, 50 mg, Oral, BID, Yoli Barahona MD, 50 mg at 05/17/25 0818    ipratropium (ATROVENT) nebulizer solution 0.5 mg, 0.5 mg, Nebulization, TID - RT, Paulino Zaman MD, 0.5 mg at 05/17/25 1009    levothyroxine (SYNTHROID, LEVOTHROID) tablet 175 mcg, 175 mcg, Oral, Q AM, Montse Dawkins MD, 175 mcg at 05/17/25 0528    metoprolol succinate XL (TOPROL-XL) 24 hr tablet 25 mg, 25 mg, Oral, Q24H, Freddy Sanchez MD, 25 mg at 05/17/25 0816    mineral oil-hydrophilic petrolatum (AQUAPHOR) ointment 1 Application, 1 Application, Topical, Daily, Montse Dawknis MD, 1 Application at 05/16/25 1200    nitroglycerin (NITROSTAT) SL tablet 0.4 mg, 0.4 mg, Sublingual, Q5 Min PRN, Montse Dawkins MD    ondansetron ODT (ZOFRAN-ODT) disintegrating tablet 4 mg, 4 mg, Oral, Q4H PRN **OR** ondansetron (ZOFRAN) injection 4 mg, 4 mg, Intravenous, Q4H PRN, Montse Dawkins MD    pantoprazole (PROTONIX) EC tablet 40 mg, 40 mg, Oral, Daily, Montse Dawkins MD, 40 mg at 05/17/25 [facility]    Pharmacy to Dose enoxaparin (LOVENOX), , Not Applicable, Continuous PRN, Montse Dawkins MD    predniSONE (DELTASONE) tablet 20 mg, 20 mg, Oral, Daily With  Breakfast, Cici Murillo MD, 20 mg at 05/17/25 0816    saccharomyces boulardii (FLORASTOR) capsule 250 mg, 250 mg, Oral, BID, Montse Dawkins MD, 250 mg at 05/17/25 0816    sodium bicarbonate tablet 1,300 mg, 1,300 mg, Oral, TID, Yoli Barahona MD, 1,300 mg at 05/17/25 0815    sodium chloride 0.9 % flush 10 mL, 10 mL, Intravenous, Q12H, Montse Dawkins MD, 10 mL at 05/17/25 0812    sodium chloride 0.9 % flush 10 mL, 10 mL, Intravenous, PRN, Montse Dawkins MD    sodium chloride 0.9 % infusion 40 mL, 40 mL, Intravenous, PRN, Montse Dawkins MD    terazosin (HYTRIN) capsule 1 mg, 1 mg, Oral, Daily, Montse Dawkins MD, 1 mg at 05/17/25 0815    thiamine (VITAMIN B-1) tablet 100 mg, 100 mg, Oral, Daily, Montse Dawkins MD, 100 mg at 05/17/25 0815    Assessment & Plan     Afib  Hx Afib-none since 2014, Coumadin discontinued due to alcohol abuse and frequent falls  Amiodarone discontinued in 2016-stopped due to liver function and no A-fib recurrence  On admit-potassium 3.9, Magnesium pending, TSH 1.08, free T41.52  High-sensitivity troponins 32, 33  Repeat echo     Hypertension  Hydralazine 50 mg daily terazosin 1 mg twice daily per home meds  Avoid ACE ARB     ARF/SARAH  Dr. Barahona consulted      Dyspnea  proBNP 3853 -skewed in setting of SARAH  Dr. 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 Sanchez noted liver enzymes normal, will consider amiodarone loading at a later date prior to cardioversion in 6 to 8 weeks of anticoagulation on Eliquis  Echocardiogram this admission EF 46-50%, mild dilation of right ventricular cavity, bilateral atrium  normal size, no significant valvular dysfunction reported.    Per primary cardiologist, plan for outpatient cardioversion in approximately 2 months, follow-up with our office for hospital follow-up appointment, will arrange at that time      Okay to discharge from cardiac standpoint  Jeffery Alvarenga MD, PhD    Jeffery Alvarenga MD  05/17/25  11:54 EDT

## 2025-05-18 LAB
ALBUMIN SERPL-MCNC: 3.2 G/DL (ref 3.5–5.2)
ALBUMIN SERPL-MCNC: 3.4 G/DL (ref 3.5–5.2)
ALBUMIN/GLOB SERPL: 1.3 G/DL
ALBUMIN/GLOB SERPL: 1.4 G/DL
ALP SERPL-CCNC: 44 U/L (ref 39–117)
ALP SERPL-CCNC: 54 U/L (ref 39–117)
ALT SERPL W P-5'-P-CCNC: 10 U/L (ref 1–41)
ALT SERPL W P-5'-P-CCNC: 17 U/L (ref 1–41)
ANION GAP SERPL CALCULATED.3IONS-SCNC: 5.3 MMOL/L (ref 5–15)
ANION GAP SERPL CALCULATED.3IONS-SCNC: 6 MMOL/L (ref 5–15)
AST SERPL-CCNC: 14 U/L (ref 1–40)
AST SERPL-CCNC: 21 U/L (ref 1–40)
BASOPHILS # BLD AUTO: 0.03 10*3/MM3 (ref 0–0.2)
BASOPHILS # BLD AUTO: 0.04 10*3/MM3 (ref 0–0.2)
BASOPHILS NFR BLD AUTO: 0.3 % (ref 0–1.5)
BASOPHILS NFR BLD AUTO: 0.3 % (ref 0–1.5)
BILIRUB SERPL-MCNC: <0.2 MG/DL (ref 0–1.2)
BILIRUB SERPL-MCNC: <0.2 MG/DL (ref 0–1.2)
BUN SERPL-MCNC: 35 MG/DL (ref 8–23)
BUN SERPL-MCNC: 35 MG/DL (ref 8–23)
BUN/CREAT SERPL: 18.9 (ref 7–25)
BUN/CREAT SERPL: 21.7 (ref 7–25)
CALCIUM SPEC-SCNC: 8.4 MG/DL (ref 8.6–10.5)
CALCIUM SPEC-SCNC: 8.6 MG/DL (ref 8.6–10.5)
CHLORIDE SERPL-SCNC: 105 MMOL/L (ref 98–107)
CHLORIDE SERPL-SCNC: 107 MMOL/L (ref 98–107)
CO2 SERPL-SCNC: 25.7 MMOL/L (ref 22–29)
CO2 SERPL-SCNC: 26 MMOL/L (ref 22–29)
CREAT SERPL-MCNC: 1.61 MG/DL (ref 0.76–1.27)
CREAT SERPL-MCNC: 1.85 MG/DL (ref 0.76–1.27)
DEPRECATED RDW RBC AUTO: 54.2 FL (ref 37–54)
DEPRECATED RDW RBC AUTO: 54.9 FL (ref 37–54)
EGFRCR SERPLBLD CKD-EPI 2021: 36.1 ML/MIN/1.73
EGFRCR SERPLBLD CKD-EPI 2021: 42.7 ML/MIN/1.73
EOSINOPHIL # BLD AUTO: 0.01 10*3/MM3 (ref 0–0.4)
EOSINOPHIL # BLD AUTO: 0.04 10*3/MM3 (ref 0–0.4)
EOSINOPHIL NFR BLD AUTO: 0.1 % (ref 0.3–6.2)
EOSINOPHIL NFR BLD AUTO: 0.3 % (ref 0.3–6.2)
ERYTHROCYTE [DISTWIDTH] IN BLOOD BY AUTOMATED COUNT: 17.1 % (ref 12.3–15.4)
ERYTHROCYTE [DISTWIDTH] IN BLOOD BY AUTOMATED COUNT: 17.2 % (ref 12.3–15.4)
GLOBULIN UR ELPH-MCNC: 2.4 GM/DL
GLOBULIN UR ELPH-MCNC: 2.5 GM/DL
GLUCOSE BLDC GLUCOMTR-MCNC: 132 MG/DL (ref 70–105)
GLUCOSE BLDC GLUCOMTR-MCNC: 157 MG/DL (ref 70–105)
GLUCOSE BLDC GLUCOMTR-MCNC: 166 MG/DL (ref 70–105)
GLUCOSE SERPL-MCNC: 108 MG/DL (ref 65–99)
GLUCOSE SERPL-MCNC: 135 MG/DL (ref 65–99)
HCT VFR BLD AUTO: 32.4 % (ref 37.5–51)
HCT VFR BLD AUTO: 35.2 % (ref 37.5–51)
HGB BLD-MCNC: 10.8 G/DL (ref 13–17.7)
HGB BLD-MCNC: 9.9 G/DL (ref 13–17.7)
IMM GRANULOCYTES # BLD AUTO: 0.43 10*3/MM3 (ref 0–0.05)
IMM GRANULOCYTES # BLD AUTO: 0.54 10*3/MM3 (ref 0–0.05)
IMM GRANULOCYTES NFR BLD AUTO: 3.4 % (ref 0–0.5)
IMM GRANULOCYTES NFR BLD AUTO: 4.6 % (ref 0–0.5)
LYMPHOCYTES # BLD AUTO: 1.33 10*3/MM3 (ref 0.7–3.1)
LYMPHOCYTES # BLD AUTO: 1.56 10*3/MM3 (ref 0.7–3.1)
LYMPHOCYTES NFR BLD AUTO: 11.4 % (ref 19.6–45.3)
LYMPHOCYTES NFR BLD AUTO: 12.3 % (ref 19.6–45.3)
MAGNESIUM SERPL-MCNC: 2.3 MG/DL (ref 1.6–2.4)
MAGNESIUM SERPL-MCNC: 2.4 MG/DL (ref 1.6–2.4)
MCH RBC QN AUTO: 26.7 PG (ref 26.6–33)
MCH RBC QN AUTO: 26.9 PG (ref 26.6–33)
MCHC RBC AUTO-ENTMCNC: 30.6 G/DL (ref 31.5–35.7)
MCHC RBC AUTO-ENTMCNC: 30.7 G/DL (ref 31.5–35.7)
MCV RBC AUTO: 87.3 FL (ref 79–97)
MCV RBC AUTO: 87.8 FL (ref 79–97)
MONOCYTES # BLD AUTO: 1.13 10*3/MM3 (ref 0.1–0.9)
MONOCYTES # BLD AUTO: 1.49 10*3/MM3 (ref 0.1–0.9)
MONOCYTES NFR BLD AUTO: 12.8 % (ref 5–12)
MONOCYTES NFR BLD AUTO: 8.9 % (ref 5–12)
NEUTROPHILS NFR BLD AUTO: 70.6 % (ref 42.7–76)
NEUTROPHILS NFR BLD AUTO: 75 % (ref 42.7–76)
NEUTROPHILS NFR BLD AUTO: 8.23 10*3/MM3 (ref 1.7–7)
NEUTROPHILS NFR BLD AUTO: 9.55 10*3/MM3 (ref 1.7–7)
NRBC BLD AUTO-RTO: 0 /100 WBC (ref 0–0.2)
NRBC BLD AUTO-RTO: 0 /100 WBC (ref 0–0.2)
PHOSPHATE SERPL-MCNC: 2.2 MG/DL (ref 2.5–4.5)
PHOSPHATE SERPL-MCNC: 2.6 MG/DL (ref 2.5–4.5)
PLATELET # BLD AUTO: 210 10*3/MM3 (ref 140–450)
PLATELET # BLD AUTO: 228 10*3/MM3 (ref 140–450)
PMV BLD AUTO: 10.7 FL (ref 6–12)
PMV BLD AUTO: 11.1 FL (ref 6–12)
POTASSIUM SERPL-SCNC: 4.6 MMOL/L (ref 3.5–5.2)
POTASSIUM SERPL-SCNC: 4.7 MMOL/L (ref 3.5–5.2)
PROT SERPL-MCNC: 5.7 G/DL (ref 6–8.5)
PROT SERPL-MCNC: 5.8 G/DL (ref 6–8.5)
RBC # BLD AUTO: 3.71 10*6/MM3 (ref 4.14–5.8)
RBC # BLD AUTO: 4.01 10*6/MM3 (ref 4.14–5.8)
SODIUM SERPL-SCNC: 136 MMOL/L (ref 136–145)
SODIUM SERPL-SCNC: 139 MMOL/L (ref 136–145)
WBC NRBC COR # BLD AUTO: 11.66 10*3/MM3 (ref 3.4–10.8)
WBC NRBC COR # BLD AUTO: 12.72 10*3/MM3 (ref 3.4–10.8)

## 2025-05-18 PROCEDURE — 94660 CPAP INITIATION&MGMT: CPT

## 2025-05-18 PROCEDURE — 97116 GAIT TRAINING THERAPY: CPT

## 2025-05-18 PROCEDURE — 25010000002 CEFTRIAXONE PER 250 MG: Performed by: FAMILY MEDICINE

## 2025-05-18 PROCEDURE — 94799 UNLISTED PULMONARY SVC/PX: CPT

## 2025-05-18 PROCEDURE — 94761 N-INVAS EAR/PLS OXIMETRY MLT: CPT

## 2025-05-18 PROCEDURE — 63710000001 PREDNISONE PER 1 MG: Performed by: INTERNAL MEDICINE

## 2025-05-18 PROCEDURE — 80053 COMPREHEN METABOLIC PANEL: CPT | Performed by: FAMILY MEDICINE

## 2025-05-18 PROCEDURE — 94664 DEMO&/EVAL PT USE INHALER: CPT

## 2025-05-18 PROCEDURE — 97112 NEUROMUSCULAR REEDUCATION: CPT

## 2025-05-18 PROCEDURE — 83735 ASSAY OF MAGNESIUM: CPT | Performed by: FAMILY MEDICINE

## 2025-05-18 PROCEDURE — 85025 COMPLETE CBC W/AUTO DIFF WBC: CPT | Performed by: FAMILY MEDICINE

## 2025-05-18 PROCEDURE — 99232 SBSQ HOSP IP/OBS MODERATE 35: CPT | Performed by: INTERNAL MEDICINE

## 2025-05-18 PROCEDURE — 84100 ASSAY OF PHOSPHORUS: CPT | Performed by: FAMILY MEDICINE

## 2025-05-18 PROCEDURE — 82948 REAGENT STRIP/BLOOD GLUCOSE: CPT

## 2025-05-18 PROCEDURE — 82948 REAGENT STRIP/BLOOD GLUCOSE: CPT | Performed by: FAMILY MEDICINE

## 2025-05-18 RX ORDER — CEFDINIR 300 MG/1
300 CAPSULE ORAL EVERY 12 HOURS SCHEDULED
Status: DISCONTINUED | OUTPATIENT
Start: 2025-05-19 | End: 2025-05-21 | Stop reason: HOSPADM

## 2025-05-18 RX ORDER — BISACODYL 10 MG
10 SUPPOSITORY, RECTAL RECTAL ONCE
Status: COMPLETED | OUTPATIENT
Start: 2025-05-18 | End: 2025-05-18

## 2025-05-18 RX ADMIN — Medication 100 MG: at 08:35

## 2025-05-18 RX ADMIN — ALLOPURINOL 200 MG: 100 TABLET ORAL at 08:35

## 2025-05-18 RX ADMIN — PANTOPRAZOLE SODIUM 40 MG: 40 TABLET, DELAYED RELEASE ORAL at 08:36

## 2025-05-18 RX ADMIN — SODIUM BICARBONATE 1300 MG: 650 TABLET ORAL at 16:03

## 2025-05-18 RX ADMIN — TERAZOSIN HYDROCHLORIDE 1 MG: 1 CAPSULE ORAL at 08:34

## 2025-05-18 RX ADMIN — Medication 250 MG: at 08:36

## 2025-05-18 RX ADMIN — CEFTRIAXONE 2000 MG: 2 INJECTION, POWDER, FOR SOLUTION INTRAMUSCULAR; INTRAVENOUS at 08:31

## 2025-05-18 RX ADMIN — IPRATROPIUM BROMIDE 0.5 MG: 0.5 SOLUTION RESPIRATORY (INHALATION) at 19:20

## 2025-05-18 RX ADMIN — APIXABAN 2.5 MG: 2.5 TABLET, FILM COATED ORAL at 08:35

## 2025-05-18 RX ADMIN — IPRATROPIUM BROMIDE 0.5 MG: 0.5 SOLUTION RESPIRATORY (INHALATION) at 15:25

## 2025-05-18 RX ADMIN — WHITE PETROLATUM 1 APPLICATION: 1.75 OINTMENT TOPICAL at 08:31

## 2025-05-18 RX ADMIN — SODIUM BICARBONATE 1300 MG: 650 TABLET ORAL at 20:00

## 2025-05-18 RX ADMIN — CHOLESTYRAMINE 4 G: 4 POWDER, FOR SUSPENSION ORAL at 08:33

## 2025-05-18 RX ADMIN — LEVOTHYROXINE SODIUM 175 MCG: 0.03 TABLET ORAL at 05:03

## 2025-05-18 RX ADMIN — FLUOXETINE HYDROCHLORIDE 10 MG: 10 CAPSULE ORAL at 08:35

## 2025-05-18 RX ADMIN — PREDNISONE 20 MG: 20 TABLET ORAL at 08:36

## 2025-05-18 RX ADMIN — Medication 10 ML: at 08:31

## 2025-05-18 RX ADMIN — SODIUM BICARBONATE 1300 MG: 650 TABLET ORAL at 08:35

## 2025-05-18 RX ADMIN — Medication 250 MG: at 20:00

## 2025-05-18 RX ADMIN — METOPROLOL SUCCINATE 25 MG: 25 TABLET, EXTENDED RELEASE ORAL at 08:39

## 2025-05-18 RX ADMIN — HYDRALAZINE HYDROCHLORIDE 50 MG: 25 TABLET ORAL at 08:35

## 2025-05-18 RX ADMIN — Medication 10 ML: at 20:00

## 2025-05-18 RX ADMIN — HYDRALAZINE HYDROCHLORIDE 50 MG: 25 TABLET ORAL at 20:00

## 2025-05-18 RX ADMIN — IPRATROPIUM BROMIDE 0.5 MG: 0.5 SOLUTION RESPIRATORY (INHALATION) at 07:23

## 2025-05-18 RX ADMIN — APIXABAN 2.5 MG: 2.5 TABLET, FILM COATED ORAL at 20:00

## 2025-05-18 RX ADMIN — BISACODYL 10 MG: 10 SUPPOSITORY RECTAL at 16:06

## 2025-05-18 RX ADMIN — FOLIC ACID 1 MG: 1 TABLET ORAL at 08:35

## 2025-05-18 NOTE — THERAPY TREATMENT NOTE
Subjective: Pt agreeable to therapeutic plan of care. Pt agreed to get into chair    Objective:     Precautions - falls, incontinent of urine    Bed mobility - Min-A  Transfers - CGA and with rolling walker  Ambulation - 20 feet CGA and with rolling walker    Vitals: WNL    Pain: 0 VAS     Education: Provided education on the importance of mobility in the acute care setting, Verbal/Tactile Cues, Transfer Training, and Gait Training    Assessment: Rommel Mcfarland presents with functional mobility impairments which indicate the need for skilled intervention. Tolerating session today without incident. Pt presents with lat sway and WBOS. Unaware of his balance deficits and falls risk. Plans on acute rehab at [place].Will continue to follow and progress as tolerated.     Plan/Recommendations:   If medically appropriate, High Intensity Therapy recommended post-acute care. This is recommended as therapy feels the patient would require 5-6 days per week, 2-3 hours per day. At this time, inpatient rehabilitation (acute rehab) would be the first choice and SNF would be second. Pt requires no DME at discharge.     Pt desires Inpatient Rehabilitation placement at discharge. Pt cooperative; agreeable to therapeutic recommendations and plan of care.         Basic Mobility 6-click:  Rollin = Total, A lot = 2, A little = 3; 4 = None  Supine>Sit:   1 = Total, A lot = 2, A little = 3; 4 = None   Sit>Stand with arms:  1 = Total, A lot = 2, A little = 3; 4 = None  Bed>Chair:   1 = Total, A lot = 2, A little = 3; 4 = None  Ambulate in room:  1 = Total, A lot = 2, A little = 3; 4 = None  3-5 Steps with railin = Total, A lot = 2, A little = 3; 4 = None  Score: 17    Post-Tx Position: Up in Chair, Alarms activated, and Call light and personal items within reach  PPE: gloves    Therapy Charges for Today       Code Description Service Date Service Provider Modifiers Qty    81318001657  GAIT TRAINING EA 15 MIN 2025  Sameer, Cheyanne L, PTA GP 1    99536979624 HC PT NEUROMUSC RE EDUCATION EA 15 MIN 5/18/2025 Cheyanne Estrella, BERNARDO GP 1           PT Charges       Row Name 05/18/25 1329             Time Calculation    Start Time 1044  -      Stop Time 1100  -      Time Calculation (min) 16 min  -      PT Received On 05/18/25  -      PT - Next Appointment 05/19/25  -         Time Calculation- PT    Total Timed Code Minutes- PT 16 minute(s)  -                User Key  (r) = Recorded By, (t) = Taken By, (c) = Cosigned By      Initials Name Provider Type     Cheyanne Estrella PTA Physical Therapist Assistant

## 2025-05-18 NOTE — PLAN OF CARE
Problem: Adult Inpatient Plan of Care  Goal: Plan of Care Review  Outcome: Progressing  Flowsheets (Taken 5/18/2025 0438)  Progress: no change  Plan of Care Reviewed With: patient  Goal: Patient-Specific Goal (Individualized)  Outcome: Progressing  Goal: Absence of Hospital-Acquired Illness or Injury  Outcome: Progressing  Intervention: Identify and Manage Fall Risk  Recent Flowsheet Documentation  Taken 5/18/2025 0437 by Macy Laird RN  Safety Promotion/Fall Prevention: safety round/check completed  Taken 5/18/2025 0030 by Macy Laird RN  Safety Promotion/Fall Prevention: safety round/check completed  Taken 5/17/2025 2300 by Macy Laird RN  Safety Promotion/Fall Prevention: safety round/check completed  Taken 5/17/2025 2200 by Macy Laird RN  Safety Promotion/Fall Prevention: safety round/check completed  Taken 5/17/2025 2100 by Macy Laird RN  Safety Promotion/Fall Prevention: safety round/check completed  Taken 5/17/2025 2003 by Macy Laird RN  Safety Promotion/Fall Prevention: safety round/check completed  Taken 5/17/2025 2000 by Macy Laird RN  Safety Promotion/Fall Prevention: safety round/check completed  Taken 5/17/2025 1901 by Macy Laird RN  Safety Promotion/Fall Prevention: safety round/check completed  Intervention: Prevent Skin Injury  Recent Flowsheet Documentation  Taken 5/17/2025 2000 by Macy Laird RN  Body Position:   weight shifting   position changed independently  Intervention: Prevent Infection  Recent Flowsheet Documentation  Taken 5/17/2025 2000 by Macy Laird RN  Infection Prevention:   personal protective equipment utilized   rest/sleep promoted   hand hygiene promoted   single patient room provided  Goal: Optimal Comfort and Wellbeing  Outcome: Progressing  Goal: Readiness for Transition of Care  Outcome: Progressing     Problem: Fall Injury Risk  Goal: Absence of Fall and Fall-Related Injury  Outcome:  Progressing  Intervention: Identify and Manage Contributors  Recent Flowsheet Documentation  Taken 5/17/2025 2000 by Macy Laird RN  Medication Review/Management: medications reviewed  Intervention: Promote Injury-Free Environment  Recent Flowsheet Documentation  Taken 5/18/2025 0437 by Macy Laird RN  Safety Promotion/Fall Prevention: safety round/check completed  Taken 5/18/2025 0030 by Macy Laird RN  Safety Promotion/Fall Prevention: safety round/check completed  Taken 5/17/2025 2300 by Macy Laird RN  Safety Promotion/Fall Prevention: safety round/check completed  Taken 5/17/2025 2200 by Macy Laird RN  Safety Promotion/Fall Prevention: safety round/check completed  Taken 5/17/2025 2100 by Macy Laird RN  Safety Promotion/Fall Prevention: safety round/check completed  Taken 5/17/2025 2003 by Macy Laird RN  Safety Promotion/Fall Prevention: safety round/check completed  Taken 5/17/2025 2000 by Macy Laird RN  Safety Promotion/Fall Prevention: safety round/check completed  Taken 5/17/2025 1901 by Macy Laird RN  Safety Promotion/Fall Prevention: safety round/check completed   Goal Outcome Evaluation:  Plan of Care Reviewed With: patient        Progress: no change

## 2025-05-18 NOTE — PROGRESS NOTES
"[facility]      Patient Care Team:  Montse Dawkins MD as PCP - General (Family Medicine)  Huey Keyes MD as Consulting Physician (Nephrology)        Chief Complaint: Shortness of breath    Subjective  No distress  Up with PT today  A-fib is rate controlled  Oxygen utilization is improved    Pending discharge today  Follow-up with outpatient primary cardiologist within 30 to 90 days, will receive outpatient cardioversion for restoration of sinus rhythm, he will start antiarrhythmic loading as an outpatient likely 1 to 2 weeks prior to cardioversion        Interval History and ROS:     Patient remains in A-fib, rates controlled, blood pressure stable on Toprol.  Will start Eliquis    Review of Systems   Constitutional: Negative for chills and fever.   HENT:  Negative for ear discharge and nosebleeds.    Eyes:  Negative for discharge and redness.   Cardiovascular:  Negative for chest pain, orthopnea, palpitations, paroxysmal nocturnal dyspnea and syncope.   Respiratory:  Positive for shortness of breath. Negative for cough and wheezing.    Endocrine: Negative for heat intolerance.   Skin:  Negative for rash.   Musculoskeletal:  Negative for arthritis and myalgias.   Gastrointestinal:  Negative for abdominal pain, melena, nausea and vomiting.   Genitourinary:  Negative for dysuria and hematuria.   Neurological:  Negative for dizziness, light-headedness, numbness and tremors.   Psychiatric/Behavioral:  Negative for depression. The patient is not nervous/anxious.        Patient has no complaints    Objective    Vital Signs  Temp:  [97.4 °F (36.3 °C)-98.2 °F (36.8 °C)] 98.1 °F (36.7 °C)  Heart Rate:  [59-90] 86  Resp:  [12-25] 22  BP: (108-168)/() 165/91  Oxygen Therapy  SpO2: 97 %  Pulse Oximetry Type: Continuous  Device (Oxygen Therapy): room air  Flow (L/min) (Oxygen Therapy): 2  Oxygen Concentration (%): 28}  Flowsheet Rows      Flowsheet Row First Filed Value   Admission Height 170.2 cm (67\") " Documented at 05/14/2025 2050   Admission Weight 114 kg (251 lb 12.3 oz) Documented at 05/14/2025 2050            Physical Exam  TELE:  General Appearance:  A-fib controlled rates  Alert, cooperative, in no acute distress   Head:    Normocephalic, without obvious abnormality, atraumatic   Eyes:            PERRLA, EOM intact       Throat:   Oral mucosa moist, No oral lesions, no thrush   Neck:   No carotid bruit, no JVD, supple, trachea midline, no thyromegaly    Lungs:     Clear to auscultation,respirations regular, even and   unlabored    Heart:  Irregularly irregular rhythm and normal rate, normal S1 and S2, no gallop, no rub, no click   Chest Wall:    No abnormalities observed   Abdomen:     Soft, obese, nontender, nondistended, no guarding, no rebound  tenderness,   Extremities: Right lower extremity 2+, left lower extremity 1+    Pulses:   Pulses palpable and equal bilaterally in all extremities   Skin: Crusting right lower extremity distally, purpura bilateral lower extremities       Neurologic:   Normal mood, thought content and behavior    Unchanged from prior     Results Review:     I reviewed the patient's new clinical results.    Lab Results (last 24 hours)       Procedure Component Value Units Date/Time    POC Glucose Finger 4x Daily Before Meals & at Bedtime [[id number]]  (Abnormal) Collected: 05/18/25 1125    Specimen: Blood from Finger Updated: 05/18/25 1126     Glucose 157 mg/dL      Comment: Serial Number: [id number]:  [id number]       POC Glucose Finger 4x Daily Before Meals & at Bedtime [[id number]]  (Abnormal) Collected: 05/18/25 0829    Specimen: Blood from Finger Updated: 05/18/25 0832     Glucose 132 mg/dL      Comment: Serial Number: [id number]:  [id number]       Comprehensive Metabolic Panel [[id number]]  (Abnormal) Collected: 05/18/25 0409    Specimen: Blood from Hand, Left Updated: 05/18/25 0501     Glucose 108 mg/dL      BUN 35 mg/dL      Creatinine 1.61 mg/dL      Sodium 136  mmol/L      Potassium 4.6 mmol/L      Chloride 105 mmol/L      CO2 25.7 mmol/L      Calcium 8.6 mg/dL      Total Protein 5.8 g/dL      Albumin 3.4 g/dL      ALT (SGPT) 10 U/L      AST (SGOT) 14 U/L      Alkaline Phosphatase 44 U/L      Total Bilirubin <0.2 mg/dL      Globulin 2.4 gm/dL      A/G Ratio 1.4 g/dL      BUN/Creatinine Ratio 21.7     Anion Gap 5.3 mmol/L      eGFR 42.7 mL/min/1.73     Narrative:      GFR Categories in Chronic Kidney Disease (CKD)              GFR Category          GFR (mL/min/1.73)    Interpretation  G1                    90 or greater        Normal or high (1)  G2                    60-89                Mild decrease (1)  G3a                   45-59                Mild to moderate decrease  G3b                   30-44                Moderate to severe decrease  G4                    15-29                Severe decrease  G5                    14 or less           Kidney failure    (1)In the absence of evidence of kidney disease, neither GFR category G1 or G2 fulfill the criteria for CKD.    eGFR calculation 2021 CKD-EPI creatinine equation, which does not include race as a factor    Magnesium [[id number]]  (Normal) Collected: 05/18/25 0409    Specimen: Blood from Hand, Left Updated: 05/18/25 0501     Magnesium 2.4 mg/dL     Phosphorus [[id number]]  (Normal) Collected: 05/18/25 0409    Specimen: Blood from Hand, Left Updated: 05/18/25 0501     Phosphorus 2.6 mg/dL     CBC Auto Differential [[id number]]  (Abnormal) Collected: 05/18/25 0409    Specimen: Blood from Hand, Left Updated: 05/18/25 0444     WBC 12.72 10*3/mm3      RBC 4.01 10*6/mm3      Hemoglobin 10.8 g/dL      Hematocrit 35.2 %      MCV 87.8 fL      MCH 26.9 pg      MCHC 30.7 g/dL      RDW 17.1 %      RDW-SD 54.9 fl      MPV 11.1 fL      Platelets 228 10*3/mm3      Neutrophil % 75.0 %      Lymphocyte % 12.3 %      Monocyte % 8.9 %      Eosinophil % 0.1 %      Basophil % 0.3 %      Immature Grans % 3.4 %      Neutrophils, Absolute  9.55 10*3/mm3      Lymphocytes, Absolute 1.56 10*3/mm3      Monocytes, Absolute 1.13 10*3/mm3      Eosinophils, Absolute 0.01 10*3/mm3      Basophils, Absolute 0.04 10*3/mm3      Immature Grans, Absolute 0.43 10*3/mm3      nRBC 0.0 /100 WBC     POC Glucose Once [[id number]]  (Abnormal) Collected: 05/17/25 2117    Specimen: Blood Updated: 05/17/25 2119     Glucose 143 mg/dL      Comment: Serial Number: [id number]:  [id number]       POC Glucose Finger 4x Daily Before Meals & at Bedtime [[id number]]  (Abnormal) Collected: 05/17/25 1817    Specimen: Blood from Finger Updated: 05/17/25 1818     Glucose 132 mg/dL      Comment: Serial Number: [id number]:  [id number]       Blood Culture - Blood, Arm, Right [[id number]]  (Normal) Collected: 05/16/25 1629    Specimen: Blood from Arm, Right Updated: 05/17/25 1646     Blood Culture No growth at 24 hours    Blood Culture - Blood, Arm, Left [[id number]]  (Normal) Collected: 05/16/25 1622    Specimen: Blood from Arm, Left Updated: 05/17/25 1646     Blood Culture No growth at 24 hours            Imaging Results (Last 24 Hours)       ** No results found for the last 24 hours. **            ECG/EMG Results (most recent)       Procedure Component Value Units Date/Time    Telemetry Scan [[id number]] Resulted: 05/14/25     Updated: 05/14/25 2258    ECG 12 Lead Dyspnea [[id number]] Collected: 05/14/25 2055     Updated: 05/15/25 0817     QT Interval 372 ms      QTC Interval 455 ms     Narrative:      HEART RATE=90  bpm  RR Sfqdmice=908  ms  NC Interval=  ms  P Horizontal Axis=  deg  P Front Axis=  deg  QRSD Interval=96  ms  QT Itdrjrjm=635  ms  FXjH=103  ms  QRS Axis=-35  deg  T Wave Axis=46  deg  - ABNORMAL ECG -  Left axis deviation  Low voltage, precordial leads  Nonspecific  T abnormalities, anterior leads  When compared with ECG of 12-May-2018 12:11:53,  Significant change in rhythm: previously sinus  Significant repolarization change  Significant axis, voltage or hypertrophy  change  Electronically Signed By: Skip Tripathi) 2025-05-15 08:17:33  Date and Time of Study:2025-05-14 20:55:47    Telemetry Scan [[id number]] Resulted: 05/14/25     Updated: 05/15/25 2246    Telemetry Scan [[id number]] Resulted: 05/14/25     Updated: 05/15/25 2342    Telemetry Scan [[id number]] Resulted: 05/14/25     Updated: 05/16/25 0406    Telemetry Scan [[id number]] Resulted: 05/14/25     Updated: 05/16/25 0817    Adult Transthoracic Echo Complete W/ Cont if Necessary Per Protocol [[id number]] Resulted: 05/16/25 0846     Updated: 05/16/25 0846     LVIDd 5.1 cm      LVIDs 4.2 cm      IVSd 1.40 cm      LVPWd 1.40 cm      FS 17.6 %      IVS/LVPW 1.00 cm      ESV(cubed) 74.1 ml      LV Sys Vol (BSA corrected) 22.0 cm2      EDV(cubed) 132.7 ml      LV Marcial Vol (BSA corrected) 46.3 cm2      LV mass(C)d 300.4 grams      LVOT area 3.5 cm2      LVOT diam 2.10 cm      EDV(MOD-sp4) 103.0 ml      ESV(MOD-sp4) 49.0 ml      SV(MOD-sp4) 54.0 ml      SVi(MOD-SP4) 24.2 ml/m2      SVi (LVOT) 15.2 ml/m2      EF(MOD-sp4) 52.4 %      MV E max maryjane 100.0 cm/sec      MV A max maryjane 25.7 cm/sec      MV dec time 0.16 sec      MV E/A 3.9     IVRT 85.0 ms      SV(LVOT) 33.9 ml      RVIDd 3.0 cm      TAPSE (>1.6) 1.91 cm      LA dimension (2D)  3.9 cm      LV V1 max 52.9 cm/sec      LV V1 max PG 1.12 mmHg      LV V1 mean PG 1.00 mmHg      LV V1 VTI 9.8 cm      Ao pk maryjane 125.0 cm/sec      Ao max PG 6.3 mmHg      Ao mean PG 3.0 mmHg      Ao V2 VTI 23.0 cm      TRUNG(I,D) 1.47 cm2      Dimensionless Index 0.42 (DI)      MV max PG 6.1 mmHg      MV mean PG 2.00 mmHg      MV V2 VTI 32.1 cm      MV P1/2t 63.5 msec      MVA(P1/2t) 3.5 cm2      MVA(VTI) 1.06 cm2      MV dec slope 567.0 cm/sec2      RV V1 max PG 1.67 mmHg      RV V1 max 64.7 cm/sec      RV V1 VTI 14.4 cm      PA V2 max 81.4 cm/sec      PA acc time 0.12 sec      Sinus 3.3 cm      STJ 3.4 cm     Narrative:        Left ventricular ejection fraction appears to be 46 - 50%.    The right  ventricular cavity is mildly dilated.      ECG 12 Lead QT Measurement [[id number]] Collected: 05/15/25 1635     Updated: 05/16/25 0921     QT Interval 381 ms      QTC Interval 488 ms     Narrative:      HEART RATE=99  bpm  RR Foqvnfac=072  ms  NV Interval=  ms  P Horizontal Axis=  deg  P Front Axis=  deg  QRSD Bdgmyuhm=097  ms  QT Aqynsfct=537  ms  MYpS=551  ms  QRS Axis=-29  deg  T Wave Axis=174  deg  - ABNORMAL ECG -  Atrial fibrillation  Incomplete RBBB and LAFB  Low voltage, precordial leads  Nonspecific  T abnormalities, lateral leads  When compared with ECG of 14-May-2025 20:55:47,  Significant change in rhythm  Electronically Signed By: Amadeo Chandra ([facility]) 2025-05-16 09:20:31  Date and Time of Study:2025-05-15 16:35:57    Telemetry Scan [[id number]] Resulted: 05/14/25     Updated: 05/16/25 1200    Telemetry Scan [[id number]] Resulted: 05/14/25     Updated: 05/16/25 1540    Telemetry Scan [[id number]] Resulted: 05/14/25     Updated: 05/16/25 2223    Telemetry Scan [[id number]] Resulted: 05/14/25     Updated: 05/17/25 0447    Telemetry Scan [[id number]] Resulted: 05/14/25     Updated: 05/17/25 1610    Telemetry Scan [[id number]] Resulted: 05/14/25     Updated: 05/17/25 2005            Medication Review:   I have reviewed the patient's current medication list    Current Facility-Administered Medications:     acetaminophen (TYLENOL) tablet 650 mg, 650 mg, Oral, Q4H PRN **OR** acetaminophen (TYLENOL) 160 MG/5ML oral solution 650 mg, 650 mg, Oral, Q4H PRN **OR** acetaminophen (TYLENOL) suppository 650 mg, 650 mg, Rectal, Q4H PRN, Montse Dawkins MD    allopurinol (ZYLOPRIM) tablet 200 mg, 200 mg, Oral, Daily, Montse Dawkins MD, 200 mg at 05/18/25 0835    aluminum-magnesium hydroxide-simethicone (MAALOX MAX) 400-400-40 MG/5ML suspension 15 mL, 15 mL, Oral, Q6H PRN, Montse Dawkins MD    apixaban (ELIQUIS) tablet 2.5 mg, 2.5 mg, Oral, Q12H, Kailee Triplett, APRN, 2.5 mg at 05/18/25 0835    sennosides-docusate  (PERICOLACE) 8.6-50 MG per tablet 2 tablet, 2 tablet, Oral, BID PRN, 2 tablet at 05/17/25 2013 **AND** polyethylene glycol (MIRALAX) packet 17 g, 17 g, Oral, Daily PRN **AND** bisacodyl (DULCOLAX) EC tablet 5 mg, 5 mg, Oral, Daily PRN **AND** bisacodyl (DULCOLAX) suppository 10 mg, 10 mg, Rectal, Daily PRN, Montse Dawkins MD    calcium carbonate (TUMS) chewable tablet 500 mg (200 mg elemental), 2 tablet, Oral, BID PRN, Montse Dawkins MD    cefTRIAXone (ROCEPHIN) 2,000 mg in sodium chloride 0.9 % 100 mL MBP, 2,000 mg, Intravenous, Daily, Montse Dawkins MD, Last Rate: 200 mL/hr at 05/18/25 0831, 2,000 mg at 05/18/25 0831    cholestyramine light packet 4 g, 1 packet, Oral, Daily, Montse Dawkins MD, 4 g at 05/18/25 0833    FLUoxetine (PROzac) capsule 10 mg, 10 mg, Oral, Daily, Montse Dawkins MD, 10 mg at 05/18/25 0835    folic acid (FOLVITE) tablet 1 mg, 1 mg, Oral, Daily, Montse Dawkins MD, 1 mg at 05/18/25 0835    hydrALAZINE (APRESOLINE) tablet 50 mg, 50 mg, Oral, BID, Yoli Barahona MD, 50 mg at 05/18/25 0835    ipratropium (ATROVENT) nebulizer solution 0.5 mg, 0.5 mg, Nebulization, TID - RT, Paulino Zaman MD, 0.5 mg at 05/18/25 0723    levothyroxine (SYNTHROID, LEVOTHROID) tablet 175 mcg, 175 mcg, Oral, Q AM, Montse Dawkins MD, 175 mcg at 05/18/25 0503    metoprolol succinate XL (TOPROL-XL) 24 hr tablet 25 mg, 25 mg, Oral, Q24H, Freddy Sanchez MD, 25 mg at 05/18/25 0839    mineral oil-hydrophilic petrolatum (AQUAPHOR) ointment 1 Application, 1 Application, Topical, Daily, Montse Dawkins MD, 1 Application at 05/18/25 0831    nitroglycerin (NITROSTAT) SL tablet 0.4 mg, 0.4 mg, Sublingual, Q5 Min PRN, Montse Dawkins MD    ondansetron ODT (ZOFRAN-ODT) disintegrating tablet 4 mg, 4 mg, Oral, Q4H PRN **OR** ondansetron (ZOFRAN) injection 4 mg, 4 mg, Intravenous, Q4H PRN, Montse Dawkins MD    pantoprazole (PROTONIX) EC tablet 40 mg, 40 mg, Oral, Daily, Montse Dakwins MD, 40 mg at 05/18/25 0836     saccharomyces boulardii (FLORASTOR) capsule 250 mg, 250 mg, Oral, BID, Montse Dawkins MD, 250 mg at 05/18/25 0836    sodium bicarbonate tablet 1,300 mg, 1,300 mg, Oral, TID, Yoli Barahona MD, 1,300 mg at 05/18/25 0835    sodium chloride 0.9 % flush 10 mL, 10 mL, Intravenous, Q12H, Montse Dawkins MD, 10 mL at 05/18/25 0831    sodium chloride 0.9 % flush 10 mL, 10 mL, Intravenous, PRN, Montse Dawkins MD    sodium chloride 0.9 % infusion 40 mL, 40 mL, Intravenous, PRN, Montse Dawkins MD    terazosin (HYTRIN) capsule 1 mg, 1 mg, Oral, Daily, Montse Dawkins MD, 1 mg at 05/18/25 0834    thiamine (VITAMIN B-1) tablet 100 mg, 100 mg, Oral, Daily, Montse Dawkins MD, 100 mg at 05/18/25 0835    Assessment & Plan     Afib  Hx Afib-none since 2014, Coumadin discontinued due to alcohol abuse and frequent falls  Amiodarone discontinued in 2016-stopped due to liver function and no A-fib recurrence  On admit-potassium 3.9, Magnesium pending, TSH 1.08, free T41.52  High-sensitivity troponins 32, 33  Repeat echo     Hypertension  Hydralazine 50 mg daily terazosin 1 mg twice daily per home meds  Avoid ACE ARB     ARF/SARAH  Dr. Barahona consulted      Dyspnea  proBNP 3853 -skewed in setting of SARAH  Dr. 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 Sanchez noted liver enzymes normal, will consider amiodarone loading at a later date prior to cardioversion in 6 to 8 weeks of anticoagulation on Eliquis  Echocardiogram this admission EF 46-50%, mild dilation of right ventricular cavity, bilateral atrium normal size, no significant valvular dysfunction  reported.    Per primary cardiologist, plan for outpatient cardioversion in approximately 2 months, follow-up with our office for hospital follow-up appointment, will arrange at that time      Okay to discharge from cardiac standpoint      See Dr. Sanchez as an outpatient for further planning of cardioversion    Jeffery Alvarenga MD, PhD    Jeffery Alvarenga MD  05/18/25  13:42 EDT

## 2025-05-18 NOTE — PROGRESS NOTES
Daily Progress Note          Assessment    Respiratory panel positive for COVID-19 however it was initially diagnosed on April 5, 2025 treated with outpatient Paxlovid as well as azithromycin  CT chest did not show any alveolar infiltrates  No PE  Patient is fully vaccinated   Skin Purpura  History of C. Difficile     Hypothyroidism  Gout  GERD  Hypertension  Diabetes mellitus           Recommendations:     The oxygen status has improved significantly, patient can be discharged from pulmonary standpoint     oxygen supplement and titration to maintain saturation 90-95%: Currently on room air      Steroids : Discontinue predisone      Empiric antibiotic currently on Rocephin  Bronchodilator budesonide and ipratropium to avoid tachycardia  Thyroid replacement  Anticoagulation: Eliquis    HR control as per cardiology: Currently on metoprolol                      LOS: 3 days     Subjective      shortness of breath    Objective     Vital signs for last 24 hours:  Vitals:    05/18/25 0723 05/18/25 0728 05/18/25 0828 05/18/25 1123   BP:   108/67 165/91   BP Location:   Left arm Left arm   Patient Position:   Lying Sitting   Pulse: 75 75 86    Resp: 12 19 17 22   Temp:   98.1 °F (36.7 °C) 98.1 °F (36.7 °C)   TempSrc:   Oral Oral   SpO2: 92% 95% 97%    Weight:       Height:           Intake/Output last 3 shifts:  I/O last 3 completed shifts:  In: 2880 [P.O.:600; I.V.:2280]  Out: 475 [Urine:475]  Intake/Output this shift:  I/O this shift:  In: 480 [P.O.:480]  Out: -       Radiology  Imaging Results (Last 24 Hours)       ** No results found for the last 24 hours. **            Labs:  Results from last 7 days   Lab Units 05/18/25  0409   WBC 10*3/mm3 12.72*   HEMOGLOBIN g/dL 10.8*   HEMATOCRIT % 35.2*   PLATELETS 10*3/mm3 228     Results from last 7 days   Lab Units 05/18/25  0409   SODIUM mmol/L 136   POTASSIUM mmol/L 4.6   CHLORIDE mmol/L 105   CO2 mmol/L 25.7   BUN mg/dL 35*   CREATININE mg/dL 1.61*   CALCIUM mg/dL 8.6    BILIRUBIN mg/dL <0.2   ALK PHOS U/L 44   ALT (SGPT) U/L 10   AST (SGOT) U/L 14   GLUCOSE mg/dL 108*     Results from last 7 days   Lab Units 05/15/25  0121   PH, ARTERIAL pH units 7.372   PO2 ART mm Hg 102.6   PCO2, ARTERIAL mm Hg 37.8   HCO3 ART mmol/L 22.0     Results from last 7 days   Lab Units 05/18/25  0409 05/17/25  0330 05/15/25  2314   ALBUMIN g/dL 3.4* 3.3* 3.3*     Results from last 7 days   Lab Units 05/14/25  2256 05/14/25  2122   CK TOTAL U/L 44  --    HSTROP T ng/L 33* 32*         Results from last 7 days   Lab Units 05/18/25  0409   MAGNESIUM mg/dL 2.4         Results from last 7 days   Lab Units 05/14/25  2256   TSH uIU/mL 1.080           Meds:   SCHEDULE  allopurinol, 200 mg, Oral, Daily  apixaban, 2.5 mg, Oral, Q12H  cefTRIAXone, 2,000 mg, Intravenous, Daily  cholestyramine light, 1 packet, Oral, Daily  FLUoxetine, 10 mg, Oral, Daily  folic acid, 1 mg, Oral, Daily  hydrALAZINE, 50 mg, Oral, BID  ipratropium, 0.5 mg, Nebulization, TID - RT  levothyroxine, 175 mcg, Oral, Q AM  metoprolol succinate XL, 25 mg, Oral, Q24H  mineral oil-hydrophilic petrolatum, 1 Application, Topical, Daily  pantoprazole, 40 mg, Oral, Daily  predniSONE, 20 mg, Oral, Daily With Breakfast  saccharomyces boulardii, 250 mg, Oral, BID  sodium bicarbonate, 1,300 mg, Oral, TID  sodium chloride, 10 mL, Intravenous, Q12H  terazosin, 1 mg, Oral, Daily  Vitamin B1, 100 mg, Oral, Daily      Infusions       PRNs    acetaminophen **OR** acetaminophen **OR** acetaminophen    aluminum-magnesium hydroxide-simethicone    senna-docusate sodium **AND** polyethylene glycol **AND** bisacodyl **AND** bisacodyl    calcium carbonate    nitroglycerin    ondansetron ODT **OR** ondansetron    sodium chloride    sodium chloride    Physical Exam:  General Appearance:  Alert   HEENT:  Normocephalic, without obvious abnormality, Conjunctiva/corneas clear,.   Nares normal, no drainage     Neck:  Supple, symmetrical, trachea midline.   Lungs /Chest  wall:   Improving air entry with minimal bilateral basal rhonchi, respirations unlabored, symmetrical wall movement.     Heart: Heart rate controlled, S1 S2 normal  Abdomen: Soft, non-tender, no masses, no organomegaly.    Extremities: Trace edema, no clubbing or cyanosis     ROS  Constitutional: Negative for chills, fever and malaise/fatigue.   HENT: Negative.    Eyes: Negative.    Cardiovascular: Negative.    Respiratory: Positive for improving cough and shortness of breath.    Skin: Negative.    Musculoskeletal: Negative.    Gastrointestinal: Negative.    Genitourinary: Negative.    Neurological: Generalized weakness      I reviewed the recent clinical results  I personally reviewed the latest radiological studies    Part of this note may be an electronic transcription/translation of spoken language to printed text using the Dragon Dictation System.

## 2025-05-18 NOTE — PROGRESS NOTES
"DATE/TIME OF ADMISSION:  5/14/2025  8:57 PM     LOS: 3 days   Patient Care Team:  Monste Dawkins MD as PCP - General (Family Medicine)  Huey Keyes MD as Consulting Physician (Nephrology)  Consults       Date and Time Order Name Status Description    5/15/2025  1:56 PM Inpatient Cardiology Consult Completed     5/15/2025  7:26 AM Inpatient Infectious Diseases Consult Completed     5/15/2025  7:12 AM Inpatient Pulmonology Consult Completed     5/15/2025  7:12 AM Inpatient Nephrology Consult Completed             Subjective SHOWING GRADUAL IMPROVEMENT. EATING WELL    Interval History: CREATININE ELEVATED TODAY    Patient Complaints: NONE; LOOKING FORWARD TO [facility] THEN HOME    History taken from: patient    Review of Systems        Objective     Vital Signs  /89   Pulse 75   Temp 98.2 °F (36.8 °C)   Resp 19   Ht 170.2 cm (67\")   Wt 118 kg (259 lb 0.7 oz)   SpO2 95%   BMI 40.57 kg/m²     Physical Exam:       General Appearance:    Alert, cooperative, in no acute distress SITTING IN RECLINER AT THE BEDSIDE   Head:    Normocephalic, without obvious abnormality, atraumatic   Eyes:            Lids and lashes normal, conjunctivae and sclerae normal, no   icterus, no pallor, corneas clear, PERRLA   Ears:    Ears appear intact with no abnormalities noted   Throat:   No oral lesions, no thrush, oral mucosa moist   Neck:   No adenopathy, supple, trachea midline, no thyromegaly, no   carotid bruit, no JVD   Lungs:     Clear to auscultation,respirations regular, even and                  unlabored    Heart:    IRRegular rhythm and  rate IN THE HIGH 90'S, normal S1 and S2, SOFT 2/6            murmur   Chest Wall:    No abnormalities observed   Abdomen:     Normal bowel sounds, no masses, no organomegaly, soft        non-tender, non-distended, no guarding, no rebound                tenderness   Extremities:   Moves all extremities well, 1+ edema, no cyanosis, BASELINE              redness; ACE WRAPPED " BILATERALLY   Pulses:   Pulses palpable and equal bilaterally   Skin:   No bleeding, bruising or +rash ON LEGS   Lymph nodes:   No palpable adenopathy   Neurologic:   Grossly normal, alert and O x 3        I HAVE PERSONALLY EXAMINED AND REVIEWED THE PATIENT'S RECORD     Lab Results (last 48 hours)       Procedure Component Value Units Date/Time    Comprehensive Metabolic Panel [[id number]]  (Abnormal) Collected: 05/18/25 0409    Specimen: Blood from Hand, Left Updated: 05/18/25 0501     Glucose 108 mg/dL      BUN 35 mg/dL      Creatinine 1.61 mg/dL      Sodium 136 mmol/L      Potassium 4.6 mmol/L      Chloride 105 mmol/L      CO2 25.7 mmol/L      Calcium 8.6 mg/dL      Total Protein 5.8 g/dL      Albumin 3.4 g/dL      ALT (SGPT) 10 U/L      AST (SGOT) 14 U/L      Alkaline Phosphatase 44 U/L      Total Bilirubin <0.2 mg/dL      Globulin 2.4 gm/dL      A/G Ratio 1.4 g/dL      BUN/Creatinine Ratio 21.7     Anion Gap 5.3 mmol/L      eGFR 42.7 mL/min/1.73     Narrative:      GFR Categories in Chronic Kidney Disease (CKD)              GFR Category          GFR (mL/min/1.73)    Interpretation  G1                    90 or greater        Normal or high (1)  G2                    60-89                Mild decrease (1)  G3a                   45-59                Mild to moderate decrease  G3b                   30-44                Moderate to severe decrease  G4                    15-29                Severe decrease  G5                    14 or less           Kidney failure    (1)In the absence of evidence of kidney disease, neither GFR category G1 or G2 fulfill the criteria for CKD.    eGFR calculation 2021 CKD-EPI creatinine equation, which does not include race as a factor    Magnesium [[id number]]  (Normal) Collected: 05/18/25 0409    Specimen: Blood from Hand, Left Updated: 05/18/25 0501     Magnesium 2.4 mg/dL     Phosphorus [[id number]]  (Normal) Collected: 05/18/25 0409    Specimen: Blood from Hand, Left Updated:  05/18/25 0501     Phosphorus 2.6 mg/dL     CBC Auto Differential [[id number]]  (Abnormal) Collected: 05/18/25 0409    Specimen: Blood from Hand, Left Updated: 05/18/25 0444     WBC 12.72 10*3/mm3      RBC 4.01 10*6/mm3      Hemoglobin 10.8 g/dL      Hematocrit 35.2 %      MCV 87.8 fL      MCH 26.9 pg      MCHC 30.7 g/dL      RDW 17.1 %      RDW-SD 54.9 fl      MPV 11.1 fL      Platelets 228 10*3/mm3      Neutrophil % 75.0 %      Lymphocyte % 12.3 %      Monocyte % 8.9 %      Eosinophil % 0.1 %      Basophil % 0.3 %      Immature Grans % 3.4 %      Neutrophils, Absolute 9.55 10*3/mm3      Lymphocytes, Absolute 1.56 10*3/mm3      Monocytes, Absolute 1.13 10*3/mm3      Eosinophils, Absolute 0.01 10*3/mm3      Basophils, Absolute 0.04 10*3/mm3      Immature Grans, Absolute 0.43 10*3/mm3      nRBC 0.0 /100 WBC     POC Glucose Once [[id number]]  (Abnormal) Collected: 05/17/25 2117    Specimen: Blood Updated: 05/17/25 2119     Glucose 143 mg/dL      Comment: Serial Number: [id number]:  [id number]       POC Glucose Finger 4x Daily Before Meals & at Bedtime [[id number]]  (Abnormal) Collected: 05/17/25 1817    Specimen: Blood from Finger Updated: 05/17/25 1818     Glucose 132 mg/dL      Comment: Serial Number: [id number]:  [id number]       Blood Culture - Blood, Arm, Right [[id number]]  (Normal) Collected: 05/16/25 1629    Specimen: Blood from Arm, Right Updated: 05/17/25 1646     Blood Culture No growth at 24 hours    Blood Culture - Blood, Arm, Left [[id number]]  (Normal) Collected: 05/16/25 1622    Specimen: Blood from Arm, Left Updated: 05/17/25 1646     Blood Culture No growth at 24 hours    POC Glucose Finger 4x Daily Before Meals & at Bedtime [[id number]]  (Abnormal) Collected: 05/17/25 1120    Specimen: Blood from Finger Updated: 05/17/25 1122     Glucose 127 mg/dL      Comment: Serial Number: [id number]:  [id number]       POC Glucose Finger 4x Daily Before Meals & at Bedtime [[id number]]  (Abnormal)  Collected: 05/17/25 0809    Specimen: Blood from Finger Updated: 05/17/25 0811     Glucose 135 mg/dL      Comment: Serial Number: [id number]:  [id number]       Phosphorus [[id number]]  (Normal) Collected: 05/17/25 0330    Specimen: Blood from Arm, Left Updated: 05/17/25 0542     Phosphorus 2.8 mg/dL     Magnesium [[id number]]  (Abnormal) Collected: 05/17/25 0330    Specimen: Blood from Arm, Left Updated: 05/17/25 0542     Magnesium 2.5 mg/dL     Comprehensive Metabolic Panel [[id number]]  (Abnormal) Collected: 05/17/25 0330    Specimen: Blood from Arm, Left Updated: 05/17/25 0542     Glucose 128 mg/dL      BUN 30 mg/dL      Creatinine 1.50 mg/dL      Sodium 140 mmol/L      Potassium 4.5 mmol/L      Comment: Result checked          Chloride 104 mmol/L      CO2 25.4 mmol/L      Calcium 8.7 mg/dL      Total Protein 6.0 g/dL      Albumin 3.3 g/dL      ALT (SGPT) 5 U/L      Comment: Result checked          AST (SGOT) 9 U/L      Alkaline Phosphatase 44 U/L      Total Bilirubin <0.2 mg/dL      Globulin 2.7 gm/dL      A/G Ratio 1.2 g/dL      BUN/Creatinine Ratio 20.0     Anion Gap 10.6 mmol/L      eGFR 46.5 mL/min/1.73     Narrative:      GFR Categories in Chronic Kidney Disease (CKD)              GFR Category          GFR (mL/min/1.73)    Interpretation  G1                    90 or greater        Normal or high (1)  G2                    60-89                Mild decrease (1)  G3a                   45-59                Mild to moderate decrease  G3b                   30-44                Moderate to severe decrease  G4                    15-29                Severe decrease  G5                    14 or less           Kidney failure    (1)In the absence of evidence of kidney disease, neither GFR category G1 or G2 fulfill the criteria for CKD.    eGFR calculation 2021 CKD-EPI creatinine equation, which does not include race as a factor    Lactic Acid, Plasma [[id number]]  (Normal) Collected: 05/17/25 0330    Specimen:  Blood from Arm, Left Updated: 05/17/25 0536     Lactate 1.4 mmol/L     CBC Auto Differential [[id number]]  (Abnormal) Collected: 05/17/25 0330    Specimen: Blood from Arm, Left Updated: 05/17/25 0515     WBC 8.19 10*3/mm3      RBC 4.02 10*6/mm3      Hemoglobin 10.8 g/dL      Hematocrit 35.2 %      MCV 87.6 fL      MCH 26.9 pg      MCHC 30.7 g/dL      RDW 17.0 %      RDW-SD 54.4 fl      MPV 11.8 fL      Platelets 220 10*3/mm3      Neutrophil % 80.2 %      Lymphocyte % 10.7 %      Monocyte % 7.3 %      Eosinophil % 0.0 %      Basophil % 0.2 %      Immature Grans % 1.6 %      Neutrophils, Absolute 6.56 10*3/mm3      Lymphocytes, Absolute 0.88 10*3/mm3      Monocytes, Absolute 0.60 10*3/mm3      Eosinophils, Absolute 0.00 10*3/mm3      Basophils, Absolute 0.02 10*3/mm3      Immature Grans, Absolute 0.13 10*3/mm3      nRBC 0.0 /100 WBC     Calcium, Ionized [[id number]]  (Abnormal) Collected: 05/17/25 0330    Specimen: Blood from Arm, Left Updated: 05/17/25 0512     Ionized Calcium 1.13 mmol/L     POC Glucose Once [[id number]]  (Abnormal) Collected: 05/16/25 2018    Specimen: Blood Updated: 05/16/25 2021     Glucose 173 mg/dL      Comment: Serial Number: [id number]:  [id number]       POC Glucose Once [[id number]]  (Abnormal) Collected: 05/16/25 1538    Specimen: Blood Updated: 05/16/25 1540     Glucose 163 mg/dL      Comment: Serial Number: [id number]:  [id number]       POC Glucose Finger 4x Daily Before Meals & at Bedtime [[id number]]  (Abnormal) Collected: 05/16/25 1140    Specimen: Blood from Finger Updated: 05/16/25 1142     Glucose 164 mg/dL      Comment: Serial Number: [id number]:  [id number]                Imaging Results (Last 48 Hours)       ** No results found for the last 48 hours. **                 I reviewed the patient's new clinical results.    Past Medical History:   Diagnosis Date    Alcohol abuse 07/13/2022    Essential hypertension 07/13/2022    Gait instability 07/13/2022    GERD without  esophagitis 07/13/2022    Gout 07/13/2022    Hypothyroidism (acquired) 07/13/2022    Onychomycosis of multiple toenails with type 2 diabetes mellitus 07/13/2022    Sleep apnea     Stage 3a chronic kidney disease 07/13/2022    Tinea pedis of both feet 07/13/2022    Type 2 diabetes mellitus with diabetic neuropathy, without long-term current use of insulin 07/13/2022     Past Surgical History:   Procedure Laterality Date    ORTHOPEDIC SURGERY      fractured ankle and dislocated shoulder         Medication Review:   Scheduled Meds:allopurinol, 200 mg, Oral, Daily  apixaban, 2.5 mg, Oral, Q12H  cefTRIAXone, 2,000 mg, Intravenous, Daily  cholestyramine light, 1 packet, Oral, Daily  FLUoxetine, 10 mg, Oral, Daily  folic acid, 1 mg, Oral, Daily  hydrALAZINE, 50 mg, Oral, BID  ipratropium, 0.5 mg, Nebulization, TID - RT  levothyroxine, 175 mcg, Oral, Q AM  metoprolol succinate XL, 25 mg, Oral, Q24H  mineral oil-hydrophilic petrolatum, 1 Application, Topical, Daily  pantoprazole, 40 mg, Oral, Daily  predniSONE, 20 mg, Oral, Daily With Breakfast  saccharomyces boulardii, 250 mg, Oral, BID  sodium bicarbonate, 1,300 mg, Oral, TID  sodium chloride, 10 mL, Intravenous, Q12H  terazosin, 1 mg, Oral, Daily  Vitamin B1, 100 mg, Oral, Daily      Continuous Infusions:   PRN Meds:.  acetaminophen **OR** acetaminophen **OR** acetaminophen    aluminum-magnesium hydroxide-simethicone    senna-docusate sodium **AND** polyethylene glycol **AND** bisacodyl **AND** bisacodyl    calcium carbonate    nitroglycerin    ondansetron ODT **OR** ondansetron    sodium chloride    sodium chloride     Assessment & Plan   ACUTE ON CHRONIC CKD 3A WITH MILD DEHYDRATION---DR SANCHES TO CONSULT; CREATININE UP A BIT THIS AM     COVID 19 WITH CONTINUED POSITIVE TESTING--ACUTE RESPIRATORY FAILURE---SUPPORTIVE CARE; BIPAP; OXYGEN, AND DR DRAW TO CONSULT  ROCEPHIN FOR NOW  NEBS, LOW DOSE STEROIDS FOR BRONCHOSPASM; DR WALL TO CONSULT; IMPROVED; WILL CHANGE TO  PO ATB SOON     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; STABLE     CHRONIC VENOUS STASIS AND MILD CELLULITIS WITH RASH; WILL REFER FOR BIOPSY LATER ON     DEPRESSION---STABLE WITH PROZAC     CHRONIC ETOH ABUSE---CONTROLLED BY DAUGHTER     HTN---STABLE     GERD---ON PPI     HYPOTHYROIDISM---EUTHYROID ON SYNTHROID     A FIB---ON LOVENOX FOR THIS AND DVT PROPHYLAXIS; DR MITTAL TO CONSULT; AS FALL RISK, WILL HAVE TO FACTOR THIS IN WITH ANTICOAGULATION PLANS; HAD A FIB YEARS AGO AND NO RECURRENCE NOTED UNTIL NOW; RATE CONTROLLED; CONSIDERING AMIODARONE AND CARDIOVERSION IN 2 MONTHS     HYPOMAGNESEMIA---REPLACING PRN     ANEMIA OF CKD--STABLE; DR SANCHES MANAGING     ELEVATED D DIMER---NO PULM EMBOLI ON CT; SEQUELA FROM COVID INFECTION     HYPOPHOSPHATEMIA---DR BOWEN REPLACING     LACTIC ACIDOSIS---COVERED WITH ATB; AND DR WALL ADVISING     GENERAL WEAKNESS POST COVID AND WITH CURRENT ILLNESS---PT/OT; [facility] FOR DISCHARGE UNTIL READY TO GET BACK HOME WITH TREMENDOUS FAMILY CAREGIVER SUPPORT     FULL CODE STATUS---VERIFIED WITH DAUGHTER ON ADMISSION  Principal Problem:    Acute and chronic respiratory failure (acute-on-chronic)          Plan for disposition:TO [facility] HOPEFULLY MON OR TUESDAY    Montse Dawkins MD  05/18/25  07:39 EDT

## 2025-05-18 NOTE — PLAN OF CARE
Assessment: Rommel Mcfarland presents with functional mobility impairments which indicate the need for skilled intervention. Tolerating session today without incident. Pt presents with lat sway and WBOS. Unaware of his balance deficits and falls risk. Plans on acute rehab at [facility].Will continue to follow and progress as tolerated.

## 2025-05-18 NOTE — PLAN OF CARE
Goal Outcome Evaluation:   Pt got up to chair, pt possibly DC tomorrow.

## 2025-05-18 NOTE — PROGRESS NOTES
Infectious Diseases Progress Note      LOS: 3 days   Patient Care Team:  Montse Dawkins MD as PCP - General (Family Medicine)  Huey Keyes MD as Consulting Physician (Nephrology)    Chief Complaint: Fatigue    Subjective     The patient had no fever during the last 24 hours.  He remained medically stable.  Currently on room air and in the chair with no new complaints    Review of Systems:   Review of Systems   Constitutional:  Positive for fatigue.   HENT: Negative.     Eyes: Negative.    Respiratory: Negative.     Cardiovascular: Negative.    Gastrointestinal: Negative.    Genitourinary: Negative.    Musculoskeletal: Negative.    Skin: Negative.    Neurological: Negative.    Hematological: Negative.    Psychiatric/Behavioral: Negative.          Objective     Vital Signs  Temp:  [97.4 °F (36.3 °C)-98.2 °F (36.8 °C)] 98.1 °F (36.7 °C)  Heart Rate:  [59-90] 86  Resp:  [12-25] 22  BP: (108-168)/() 165/91    Physical Exam:  Physical Exam  Vitals and nursing note reviewed.   Constitutional:       Appearance: He is well-developed. He is obese. He is ill-appearing.   HENT:      Head: Normocephalic and atraumatic.   Eyes:      Pupils: Pupils are equal, round, and reactive to light.   Cardiovascular:      Rate and Rhythm: Normal rate and regular rhythm.      Heart sounds: Normal heart sounds.   Pulmonary:      Effort: Pulmonary effort is normal. No respiratory distress.      Breath sounds: Normal breath sounds. No wheezing or rales.      Comments: Diminished breathing sounds  Abdominal:      General: Bowel sounds are normal. There is no distension.      Palpations: Abdomen is soft. There is no mass.      Tenderness: There is no abdominal tenderness. There is no guarding or rebound.   Musculoskeletal:         General: No deformity. Normal range of motion.      Cervical back: Normal range of motion and neck supple.      Right lower leg: Edema present.      Left lower leg: Edema present.   Skin:     General: Skin  is warm.      Findings: Rash present. No erythema.      Comments: Erythematous rash involving both feet and ankles with satellite petechial lesions   Neurological:      Mental Status: He is alert and oriented to person, place, and time.      Cranial Nerves: No cranial nerve deficit.          Results Review:    I have reviewed all clinical data, test, lab, and imaging results.     Radiology  No Radiology Exams Resulted Within Past 24 Hours      Cardiology    Laboratory    Results from last 7 days   Lab Units 05/18/25  0409 05/17/25  0330 05/15/25  2314 05/14/25  2122   WBC 10*3/mm3 12.72* 8.19 11.80* 13.22*   HEMOGLOBIN g/dL 10.8* 10.8* 11.6* 11.7*   HEMATOCRIT % 35.2* 35.2* 36.6* 38.1   PLATELETS 10*3/mm3 228 220 212 191     Results from last 7 days   Lab Units 05/18/25  0409 05/17/25  0330 05/15/25  2314 05/14/25  2122   SODIUM mmol/L 136 140 136 134*   POTASSIUM mmol/L 4.6 4.5 3.5 3.9   CHLORIDE mmol/L 105 104 104 98   CO2 mmol/L 25.7 25.4 19.4* 19.9*   BUN mg/dL 35* 30* 25* 19   CREATININE mg/dL 1.61* 1.50* 1.50* 1.75*   GLUCOSE mg/dL 108* 128* 156* 127*   ALBUMIN g/dL 3.4* 3.3* 3.3* 3.7   BILIRUBIN mg/dL <0.2 <0.2 0.2 0.8   ALK PHOS U/L 44 44 53 48   AST (SGOT) U/L 14 9 12 11   ALT (SGPT) U/L 10 5 5 6   CALCIUM mg/dL 8.6 8.7 8.5* 8.8     Results from last 7 days   Lab Units 05/14/25  2256   CK TOTAL U/L 44             Microbiology   Microbiology Results (last 10 days)       Procedure Component Value - Date/Time    Blood Culture - Blood, Arm, Right [[id number]]  (Normal) Collected: 05/16/25 1629    Lab Status: Preliminary result Specimen: Blood from Arm, Right Updated: 05/17/25 1646     Blood Culture No growth at 24 hours    Blood Culture - Blood, Arm, Left [[id number]]  (Normal) Collected: 05/16/25 1622    Lab Status: Preliminary result Specimen: Blood from Arm, Left Updated: 05/17/25 1646     Blood Culture No growth at 24 hours    Respiratory Panel PCR w/COVID-19(SARS-CoV-2) EROS/UNIQUE/STACIA/PAD/COR/TENA In-House, NP  Swab in UTM/VTM, 2 HR TAT - Swab, Nasopharynx [[id number]]  (Abnormal) Collected: 05/14/25 2122    Lab Status: Final result Specimen: Swab from Nasopharynx Updated: 05/14/25 2221     ADENOVIRUS, PCR Not Detected     Coronavirus 229E Not Detected     Coronavirus HKU1 Not Detected     Coronavirus NL63 Not Detected     Coronavirus OC43 Not Detected     COVID19 Detected     Human Metapneumovirus Not Detected     Human Rhinovirus/Enterovirus Not Detected     Influenza A PCR Not Detected     Influenza B PCR Not Detected     Parainfluenza Virus 1 Not Detected     Parainfluenza Virus 2 Not Detected     Parainfluenza Virus 3 Not Detected     Parainfluenza Virus 4 Not Detected     RSV, PCR Not Detected     Bordetella pertussis pcr Not Detected     Bordetella parapertussis PCR Not Detected     Chlamydophila pneumoniae PCR Not Detected     Mycoplasma pneumo by PCR Not Detected    Narrative:      In the setting of a positive respiratory panel with a viral infection PLUS a negative procalcitonin without other underlying concern for bacterial infection, consider observing off antibiotics or discontinuation of antibiotics and continue supportive care. If the respiratory panel is positive for atypical bacterial infection (Bordetella pertussis, Chlamydophila pneumoniae, or Mycoplasma pneumoniae), consider antibiotic de-escalation to target atypical bacterial infection.    Urine Culture - Urine, Urine, Clean Catch [[id number]] Collected: 05/14/25 2122    Lab Status: Final result Specimen: Urine, Clean Catch Updated: 05/16/25 0318     Urine Culture 25,000 CFU/mL Mixed Raquel Isolated    Narrative:      Specimen contains mixed organisms of questionable pathogenicity suggestive of contamination. If symptoms persist, suggest recollection.  Colonization of the urinary tract without infection is common. Treatment is discouraged unless the patient is symptomatic, pregnant, or undergoing an invasive urologic procedure.    Eosinophil Smear -  Urine, Urine, Clean Catch [[id number]]  (Normal) Collected: 05/14/25 2122    Lab Status: Final result Specimen: Urine, Clean Catch Updated: 05/15/25 0856     Eosinophil Smear 0 % EOS/100 Cells             Medication Review:       Schedule Meds  allopurinol, 200 mg, Oral, Daily  apixaban, 2.5 mg, Oral, Q12H  cefTRIAXone, 2,000 mg, Intravenous, Daily  cholestyramine light, 1 packet, Oral, Daily  FLUoxetine, 10 mg, Oral, Daily  folic acid, 1 mg, Oral, Daily  hydrALAZINE, 50 mg, Oral, BID  ipratropium, 0.5 mg, Nebulization, TID - RT  levothyroxine, 175 mcg, Oral, Q AM  metoprolol succinate XL, 25 mg, Oral, Q24H  mineral oil-hydrophilic petrolatum, 1 Application, Topical, Daily  pantoprazole, 40 mg, Oral, Daily  saccharomyces boulardii, 250 mg, Oral, BID  sodium bicarbonate, 1,300 mg, Oral, TID  sodium chloride, 10 mL, Intravenous, Q12H  terazosin, 1 mg, Oral, Daily  Vitamin B1, 100 mg, Oral, Daily        Infusion Meds         PRN Meds    acetaminophen **OR** acetaminophen **OR** acetaminophen    aluminum-magnesium hydroxide-simethicone    senna-docusate sodium **AND** polyethylene glycol **AND** bisacodyl **AND** bisacodyl    calcium carbonate    nitroglycerin    ondansetron ODT **OR** ondansetron    sodium chloride    sodium chloride        Assessment & Plan       Antimicrobial Therapy   1.  P.o. Omnicef        2.        3.        4.        5.          Assessment     Lower respiratory tract infection with probable bronchitis based on the clinical presentation and CT scan of the chest findings.  Patient has no clear consolidation of the lungs.  Currently on room air.  Patient presented with fever at home and dyspnea.  Fever has resolved     Reported COVID-19 infection on April 5, 2025.  Treated previously with Paxlovid.  COVID-19 screen this admission was positive.  This is probably persistent positivity.  CT scan of the chest findings is not consistent with COVID-pneumonia     Lower extremities edema and erythema  appears to be chronic with some satellite purpuric lesions.  I am suspecting leukocytoclastic vasculitis.  This can be confirmed by skin biopsy can be done as outpatient.  Blood cultures are negative so far making rash less likely to be related to endocarditis     History of C. difficile colitis     Obesity with BMI of 39.4     Plan     Patient received 5 days of IV ceftriaxone   Start p.o. Omnicef 300 mg twice daily for 2 days for 7 days of treatment for the bronchitis  Can remove patient from COVID-19 isolation since diagnosis was made more than 10 days ago  Continue supportive  Okay to discharge from Infectious Disease standpoint  Not much more to add from infectious disease standpoint-we will sign off at this time-please call with any questions.    Marleny Sanders, APRN  05/18/25  13:40 EDT    Note is dictated utilizing voice recognition software/Dragon

## 2025-05-18 NOTE — PROGRESS NOTES
"NEPHROLOGY PROGRESS NOTE------[facility]    [facility]  [phone]  Huey Keyes MD      Patient Care Team:  Montse Dawkins MD as PCP - General (Family Medicine)  Huey Keyes MD as Consulting Physician (Nephrology)      Provider:  Huey Keyes MD  Patient Name: Rommel Mcfarland  :  1945    SUBJECTIVE:    F/U ARF/SARAH/CRF/CKD  No chest pain, vomiting  Reports being constipated    Medication:  allopurinol, 200 mg, Oral, Daily  apixaban, 2.5 mg, Oral, Q12H  [START ON 2025] cefdinir, 300 mg, Oral, Q12H  cholestyramine light, 1 packet, Oral, Daily  FLUoxetine, 10 mg, Oral, Daily  folic acid, 1 mg, Oral, Daily  hydrALAZINE, 50 mg, Oral, BID  ipratropium, 0.5 mg, Nebulization, TID - RT  levothyroxine, 175 mcg, Oral, Q AM  metoprolol succinate XL, 25 mg, Oral, Q24H  mineral oil-hydrophilic petrolatum, 1 Application, Topical, Daily  pantoprazole, 40 mg, Oral, Daily  saccharomyces boulardii, 250 mg, Oral, BID  sodium bicarbonate, 1,300 mg, Oral, TID  sodium chloride, 10 mL, Intravenous, Q12H  terazosin, 1 mg, Oral, Daily  Vitamin B1, 100 mg, Oral, Daily             OBJECTIVE    Vital Sign Min/Max for last 24 hours  Temp  Min: 97.4 °F (36.3 °C)  Max: 98.2 °F (36.8 °C)   BP  Min: 108/67  Max: 168/100   Pulse  Min: 59  Max: 90   Resp  Min: 12  Max: 25   SpO2  Min: 92 %  Max: 98 %   No data recorded   Weight  Min: 118 kg (259 lb 0.7 oz)  Max: 118 kg (259 lb 0.7 oz)     Flowsheet Rows      Flowsheet Row First Filed Value   Admission Height 170.2 cm (67\") Documented at 2025   Admission Weight 114 kg (251 lb 12.3 oz) Documented at 2025            I/O this shift:  In: 480 [P.O.:480]  Out: -   I/O last 3 completed shifts:  In: 2880 [P.O.:600; I.V.:2280]  Out: 475 [Urine:475]    Physical Exam:  General Appearance: alert, appears stated age and cooperative  Head: normocephalic, without obvious abnormality and atraumatic  Eyes: " "conjunctivae and sclerae normal and no icterus  Neck: supple and no JVD  Lungs: SCATTERED RHONCHI  Heart: regular rhythm & normal rate and normal S1, S2 +BALDOMERO  Chest: Wall no abnormalities observed  Abdomen: normal bowel sounds and soft, nontender  Extremities: moves extremities well, no edema, no cyanosis and no redness  Skin: no bleeding, bruising or rash, turgor normal, color normal and no lesions noted  Neurologic: Alert, and oriented. No focal deficits    Labs:    WBC WBC   Date Value Ref Range Status   05/18/2025 12.72 (H) 3.40 - 10.80 10*3/mm3 Final   05/17/2025 8.19 3.40 - 10.80 10*3/mm3 Final   05/15/2025 11.80 (H) 3.40 - 10.80 10*3/mm3 Final      HGB Hemoglobin   Date Value Ref Range Status   05/18/2025 10.8 (L) 13.0 - 17.7 g/dL Final   05/17/2025 10.8 (L) 13.0 - 17.7 g/dL Final   05/15/2025 11.6 (L) 13.0 - 17.7 g/dL Final      HCT Hematocrit   Date Value Ref Range Status   05/18/2025 35.2 (L) 37.5 - 51.0 % Final   05/17/2025 35.2 (L) 37.5 - 51.0 % Final   05/15/2025 36.6 (L) 37.5 - 51.0 % Final      Platelets No results found for: \"LABPLAT\"   MCV MCV   Date Value Ref Range Status   05/18/2025 87.8 79.0 - 97.0 fL Final   05/17/2025 87.6 79.0 - 97.0 fL Final   05/15/2025 84.5 79.0 - 97.0 fL Final          Sodium Sodium   Date Value Ref Range Status   05/18/2025 136 136 - 145 mmol/L Final   05/17/2025 140 136 - 145 mmol/L Final   05/15/2025 136 136 - 145 mmol/L Final      Potassium Potassium   Date Value Ref Range Status   05/18/2025 4.6 3.5 - 5.2 mmol/L Final   05/17/2025 4.5 3.5 - 5.2 mmol/L Final     Comment:     Result checked     05/15/2025 3.5 3.5 - 5.2 mmol/L Final      Chloride Chloride   Date Value Ref Range Status   05/18/2025 105 98 - 107 mmol/L Final   05/17/2025 104 98 - 107 mmol/L Final   05/15/2025 104 98 - 107 mmol/L Final      CO2 CO2   Date Value Ref Range Status   05/18/2025 25.7 22.0 - 29.0 mmol/L Final   05/17/2025 25.4 22.0 - 29.0 mmol/L Final   05/15/2025 19.4 (L) 22.0 - 29.0 mmol/L " "Final      BUN BUN   Date Value Ref Range Status   05/18/2025 35 (H) 8 - 23 mg/dL Final   05/17/2025 30 (H) 8 - 23 mg/dL Final   05/15/2025 25 (H) 8 - 23 mg/dL Final      Creatinine Creatinine   Date Value Ref Range Status   05/18/2025 1.61 (H) 0.76 - 1.27 mg/dL Final   05/17/2025 1.50 (H) 0.76 - 1.27 mg/dL Final   05/15/2025 1.50 (H) 0.76 - 1.27 mg/dL Final      Calcium Calcium   Date Value Ref Range Status   05/18/2025 8.6 8.6 - 10.5 mg/dL Final   05/17/2025 8.7 8.6 - 10.5 mg/dL Final   05/15/2025 8.5 (L) 8.6 - 10.5 mg/dL Final      PO4 No components found for: \"PO4\"   Albumin Albumin   Date Value Ref Range Status   05/18/2025 3.4 (L) 3.5 - 5.2 g/dL Final   05/17/2025 3.3 (L) 3.5 - 5.2 g/dL Final   05/15/2025 3.3 (L) 3.5 - 5.2 g/dL Final      Magnesium Magnesium   Date Value Ref Range Status   05/18/2025 2.4 1.6 - 2.4 mg/dL Final   05/17/2025 2.5 (H) 1.6 - 2.4 mg/dL Final   05/15/2025 1.8 1.6 - 2.4 mg/dL Final   05/15/2025 1.6 1.6 - 2.4 mg/dL Final      Uric Acid No components found for: \"URIC ACID\"     Imaging Results (Last 72 Hours)       Procedure Component Value Units Date/Time    US Renal Bilateral [[id number]] Collected: 05/15/25 1456     Updated: 05/15/25 1459    Narrative:      US RENAL BILATERAL    Date of Exam: 5/15/2025 2:25 PM EDT    Indication: ARF/SARAH/CRF/CKD.    Comparison: No comparisons available.    Technique: Grayscale and color Doppler ultrasound evaluation of the kidneys and urinary bladder was performed.      Findings:  The right kidney measures 9.8 cm and the left kidney measures 10.3 cm. Kidney echogenicity and vascularity appear within normal limits. There is no solid kidney mass.  No echogenic shadowing stone.  No hydronephrosis.      Limited visualization of the urinary bladder is unremarkable.bladder volume 245 cc        Impression:      Impression:  No definite sonographic abnormality in the kidneys        Electronically Signed: Lonny Garcia MD    5/15/2025 2:57 PM EDT    Workstation " ID: [id number]            Results for orders placed during the hospital encounter of 05/14/25    XR Chest 1 View    Narrative  XR CHEST 1 VW    Date of Exam: 5/14/2025 9:36 PM EDT    Indication: Dyspnea, wheezing    Comparison: 2/1/2016    Findings:  Stable mild cardiomegaly, exaggerated by technique. Lungs without consolidation. Negative for pneumothorax or effusion. Osseous structures grossly intact. Patient's chin overlies the lung apices medially partially limiting assessment.    Impression  Impression:  No acute process.          Electronically Signed: Renny Johnson MD  5/14/2025 10:21 PM EDT  Workstation ID: [id number]      Results for orders placed during the hospital encounter of 03/27/25    XR Tibia Fibula 2 View Right    Narrative  XR TIBIA FIBULA 2 VW RIGHT    Date of Exam: 3/29/2025 10:17 AM EDT    Indication: AND RIGHT ANKLE TO SEE IF HARDWARE IS PRESENT (PINS) FROM PRIOR FRACTURE    Comparison: None available.    Findings:  Remote posttraumatic and postoperative changes of the distal tibia and fibula are noted. 2 orthopedic fixation screws are observed. There are no signs of hardware failure or loosening. There is no acute displaced fracture or malalignment. Chronic changes  are noted. Atherosclerotic vascular calcifications are noted. Chronic soft tissue changes are also observed.    Impression  Impression:  1.Remote posttraumatic and postoperative changes of the distal tibia and fibula. 2 orthopedic fixation screws are identified. There are no signs of hardware failure or loosening.  2.No evidence for acute displaced fracture or malalignment.        Electronically Signed: Riley Walls MD  3/29/2025 12:53 PM EDT  Workstation ID: [id number]      Results for orders placed during the hospital encounter of 09/15/23    XR Foot 3+ View Right    Narrative  XR FOOT 3+ VW RIGHT    Date of Exam: 9/15/2023 2:30 PM EDT    Indication: wound, infection    Comparison: 7/9/2022    Findings:  Bones are diffusely  osteopenic. There is no discrete area of osseous erosion to indicate a site of osteomyelitis. Postsurgical changes of screw fixation noted at the distal tibia and fibula similar to the prior exam. Extensive small vessel vascular  calcifications. Soft tissue swelling overlying the dorsum of the foot which may relate to edema and/or cellulitis.    Impression  Impression:  1. No discrete area of osseous erosion to indicate osteomyelitis.  2. Soft tissue swelling overlying the dorsum of the foot may relate to edema and/or cellulitis.  3. Diffuse osteopenia.  4. Extensive small vessel vascular calcifications.        Electronically Signed: Renny Johnson MD  9/15/2023 2:35 PM EDT  Workstation ID: [id number]      Results for orders placed during the hospital encounter of 05/14/25    Duplex Venous Lower Extremity - Bilateral CAR    Interpretation Summary    Chronic left lower extremity superficial thrombophlebitis noted in the great saphenous (below knee) and small saphenous.    All other veins appeared normal bilaterally.        ASSESSMENT / PLAN      Acute and chronic respiratory failure (acute-on-chronic)      ARF/SARAH/CRF/CKD----Nonoliguric. +ARF/SARAH on top of known CRF/CKD STG 3A secondary to HTN NS. Clinically prerenal (BNP not clinically accurate) and s/p IV dye exposure . No NSAIDs or IV dye. Dose meds for CrCl less than 10 cc/min until ARF/SARAH is resolved     2. COVID 19 + PNA/ACUTE HYPOXIC RESPIRATORY FAILURE------On BiPap, steroids, oxygen, nebs per Pulmonary . Was initially COVID 19 + at the beginning of April     3. OA/DJD/GOUT/HYPERURICEMIA-----On Allopurinol. No NSAIDs. Uric ok     4. BILATERAL LE EDEMA/CHRONIC VENOUS INSUFFICIENCY/WOUNDS/CELLULITIS------Wound care.       5. DEPRESSION-----Ok to continue SSRI in the form of Prozac for now     6. ETOH ABUSE------Counseled     7. HTN WITH CKD-----BP up a little. Increase Hydralazine a little and follow. Avoid hypotension. No ACE-I/ARB/DRI     8. GERD/PUD  PROPHYLAXIS------On PPI. Benefits outweigh risks despite CKD     9. HYPOTHYROIDISM------On Synthroid. TSH ok     10. DVT PROPHYLAXIS------Lovenox     11. DEPRESSION------On Prozac     12. HYPOMAGNESEMIA------Replace IV and follow     13. CHRONIC IBS------On Florastor     14. ANEMIA OF CKD-----IV iron for LAMIN. HgB above threshold for EPO     15. KETONURIA/DEHYDRATION-----IV NS. BNP not clinically accurate     16. ELEVATED D-DIMER-----CT PE negative. Likely elevation is from COVID. Repeat Bilateral LE venous dopplers    17. HYPOPHOSPHATEMIA-------Replace IV    18. LACTIC ACIDOSIS--------Avoid hypotension. IV and po NaHCO3 and follow. Also with some contribution to acidosis from Type 4 RTA    Creatinine stable  Off IVF  Ordered bisacodyl suppository for constipation  Awaits rehab  Monitor renal function, fluid status and electrolytes    Huey Keyes MD  [facility]  [phone]  05/18/25  14:13 EDT

## 2025-05-19 LAB
ALBUMIN SERPL-MCNC: 3.3 G/DL (ref 3.5–5.2)
ALBUMIN/GLOB SERPL: 1.4 G/DL
ALP SERPL-CCNC: 59 U/L (ref 39–117)
ALT SERPL W P-5'-P-CCNC: 29 U/L (ref 1–41)
ANION GAP SERPL CALCULATED.3IONS-SCNC: 6.9 MMOL/L (ref 5–15)
AST SERPL-CCNC: 21 U/L (ref 1–40)
BASOPHILS # BLD AUTO: 0.06 10*3/MM3 (ref 0–0.2)
BASOPHILS NFR BLD AUTO: 0.5 % (ref 0–1.5)
BILIRUB SERPL-MCNC: 0.2 MG/DL (ref 0–1.2)
BUN SERPL-MCNC: 31 MG/DL (ref 8–23)
BUN/CREAT SERPL: 20.8 (ref 7–25)
CA-I SERPL ISE-MCNC: 1.12 MMOL/L (ref 1.15–1.3)
CALCIUM SPEC-SCNC: 8.6 MG/DL (ref 8.6–10.5)
CHLORIDE SERPL-SCNC: 105 MMOL/L (ref 98–107)
CO2 SERPL-SCNC: 27.1 MMOL/L (ref 22–29)
CREAT SERPL-MCNC: 1.49 MG/DL (ref 0.76–1.27)
DEPRECATED RDW RBC AUTO: 53.4 FL (ref 37–54)
EGFRCR SERPLBLD CKD-EPI 2021: 46.9 ML/MIN/1.73
EOSINOPHIL # BLD AUTO: 0.55 10*3/MM3 (ref 0–0.4)
EOSINOPHIL NFR BLD AUTO: 5 % (ref 0.3–6.2)
ERYTHROCYTE [DISTWIDTH] IN BLOOD BY AUTOMATED COUNT: 16.9 % (ref 12.3–15.4)
GLOBULIN UR ELPH-MCNC: 2.3 GM/DL
GLUCOSE BLDC GLUCOMTR-MCNC: 103 MG/DL (ref 70–105)
GLUCOSE BLDC GLUCOMTR-MCNC: 142 MG/DL (ref 70–105)
GLUCOSE BLDC GLUCOMTR-MCNC: 142 MG/DL (ref 70–105)
GLUCOSE BLDC GLUCOMTR-MCNC: 153 MG/DL (ref 70–105)
GLUCOSE SERPL-MCNC: 134 MG/DL (ref 65–99)
HCT VFR BLD AUTO: 35 % (ref 37.5–51)
HGB BLD-MCNC: 10.8 G/DL (ref 13–17.7)
IMM GRANULOCYTES # BLD AUTO: 0.73 10*3/MM3 (ref 0–0.05)
IMM GRANULOCYTES NFR BLD AUTO: 6.6 % (ref 0–0.5)
LYMPHOCYTES # BLD AUTO: 1.64 10*3/MM3 (ref 0.7–3.1)
LYMPHOCYTES NFR BLD AUTO: 14.8 % (ref 19.6–45.3)
MAGNESIUM SERPL-MCNC: 2.2 MG/DL (ref 1.6–2.4)
MCH RBC QN AUTO: 26.9 PG (ref 26.6–33)
MCHC RBC AUTO-ENTMCNC: 30.9 G/DL (ref 31.5–35.7)
MCV RBC AUTO: 87.3 FL (ref 79–97)
MONOCYTES # BLD AUTO: 1.19 10*3/MM3 (ref 0.1–0.9)
MONOCYTES NFR BLD AUTO: 10.7 % (ref 5–12)
NEUTROPHILS NFR BLD AUTO: 6.92 10*3/MM3 (ref 1.7–7)
NEUTROPHILS NFR BLD AUTO: 62.4 % (ref 42.7–76)
NRBC BLD AUTO-RTO: 0.2 /100 WBC (ref 0–0.2)
PHOSPHATE SERPL-MCNC: 3.5 MG/DL (ref 2.5–4.5)
PLATELET # BLD AUTO: 203 10*3/MM3 (ref 140–450)
PMV BLD AUTO: 10.8 FL (ref 6–12)
POTASSIUM SERPL-SCNC: 4.6 MMOL/L (ref 3.5–5.2)
PROT SERPL-MCNC: 5.6 G/DL (ref 6–8.5)
RBC # BLD AUTO: 4.01 10*6/MM3 (ref 4.14–5.8)
SODIUM SERPL-SCNC: 139 MMOL/L (ref 136–145)
WBC NRBC COR # BLD AUTO: 11.09 10*3/MM3 (ref 3.4–10.8)

## 2025-05-19 PROCEDURE — 85025 COMPLETE CBC W/AUTO DIFF WBC: CPT | Performed by: FAMILY MEDICINE

## 2025-05-19 PROCEDURE — 99232 SBSQ HOSP IP/OBS MODERATE 35: CPT | Performed by: INTERNAL MEDICINE

## 2025-05-19 PROCEDURE — 94761 N-INVAS EAR/PLS OXIMETRY MLT: CPT

## 2025-05-19 PROCEDURE — 94660 CPAP INITIATION&MGMT: CPT

## 2025-05-19 PROCEDURE — 25810000003 SODIUM CHLORIDE 0.9 % SOLUTION: Performed by: INTERNAL MEDICINE

## 2025-05-19 PROCEDURE — 82330 ASSAY OF CALCIUM: CPT | Performed by: INTERNAL MEDICINE

## 2025-05-19 PROCEDURE — 83735 ASSAY OF MAGNESIUM: CPT | Performed by: FAMILY MEDICINE

## 2025-05-19 PROCEDURE — 94664 DEMO&/EVAL PT USE INHALER: CPT

## 2025-05-19 PROCEDURE — 94799 UNLISTED PULMONARY SVC/PX: CPT

## 2025-05-19 PROCEDURE — 82948 REAGENT STRIP/BLOOD GLUCOSE: CPT

## 2025-05-19 PROCEDURE — 84100 ASSAY OF PHOSPHORUS: CPT | Performed by: FAMILY MEDICINE

## 2025-05-19 PROCEDURE — 80053 COMPREHEN METABOLIC PANEL: CPT | Performed by: FAMILY MEDICINE

## 2025-05-19 RX ORDER — SODIUM CHLORIDE 9 MG/ML
60 INJECTION, SOLUTION INTRAVENOUS CONTINUOUS
Status: DISCONTINUED | OUTPATIENT
Start: 2025-05-19 | End: 2025-05-20

## 2025-05-19 RX ORDER — SACCHAROMYCES BOULARDII 250 MG
250 CAPSULE ORAL 2 TIMES DAILY
Status: DISCONTINUED | OUTPATIENT
Start: 2025-05-19 | End: 2025-05-21 | Stop reason: HOSPADM

## 2025-05-19 RX ORDER — METOPROLOL SUCCINATE 25 MG/1
12.5 TABLET, EXTENDED RELEASE ORAL
Status: DISCONTINUED | OUTPATIENT
Start: 2025-05-20 | End: 2025-05-21 | Stop reason: HOSPADM

## 2025-05-19 RX ORDER — HYDRALAZINE HYDROCHLORIDE 25 MG/1
100 TABLET, FILM COATED ORAL 3 TIMES DAILY
Status: DISCONTINUED | OUTPATIENT
Start: 2025-05-19 | End: 2025-05-21 | Stop reason: HOSPADM

## 2025-05-19 RX ORDER — FENTANYL/ROPIVACAINE/NS/PF 2-625MCG/1
15 PLASTIC BAG, INJECTION (ML) EPIDURAL ONCE
Status: COMPLETED | OUTPATIENT
Start: 2025-05-19 | End: 2025-05-19

## 2025-05-19 RX ORDER — DILTIAZEM HYDROCHLORIDE 120 MG/1
120 CAPSULE, COATED, EXTENDED RELEASE ORAL
Status: DISCONTINUED | OUTPATIENT
Start: 2025-05-19 | End: 2025-05-19

## 2025-05-19 RX ADMIN — APIXABAN 2.5 MG: 2.5 TABLET, FILM COATED ORAL at 07:21

## 2025-05-19 RX ADMIN — SODIUM CHLORIDE 60 ML/HR: 9 INJECTION, SOLUTION INTRAVENOUS at 07:57

## 2025-05-19 RX ADMIN — CEFDINIR 300 MG: 300 CAPSULE ORAL at 07:21

## 2025-05-19 RX ADMIN — FLUOXETINE HYDROCHLORIDE 10 MG: 10 CAPSULE ORAL at 07:21

## 2025-05-19 RX ADMIN — Medication 250 MG: at 20:00

## 2025-05-19 RX ADMIN — PANTOPRAZOLE SODIUM 40 MG: 40 TABLET, DELAYED RELEASE ORAL at 07:21

## 2025-05-19 RX ADMIN — ALLOPURINOL 200 MG: 100 TABLET ORAL at 07:21

## 2025-05-19 RX ADMIN — IPRATROPIUM BROMIDE 0.5 MG: 0.5 SOLUTION RESPIRATORY (INHALATION) at 19:00

## 2025-05-19 RX ADMIN — FOLIC ACID 1 MG: 1 TABLET ORAL at 07:21

## 2025-05-19 RX ADMIN — SENNOSIDES AND DOCUSATE SODIUM 2 TABLET: 50; 8.6 TABLET ORAL at 07:58

## 2025-05-19 RX ADMIN — IPRATROPIUM BROMIDE 0.5 MG: 0.5 SOLUTION RESPIRATORY (INHALATION) at 14:56

## 2025-05-19 RX ADMIN — APIXABAN 2.5 MG: 2.5 TABLET, FILM COATED ORAL at 20:00

## 2025-05-19 RX ADMIN — WHITE PETROLATUM 1 APPLICATION: 1.75 OINTMENT TOPICAL at 07:22

## 2025-05-19 RX ADMIN — Medication 1 APPLICATION: at 20:01

## 2025-05-19 RX ADMIN — CEFDINIR 300 MG: 300 CAPSULE ORAL at 20:00

## 2025-05-19 RX ADMIN — Medication 10 ML: at 07:22

## 2025-05-19 RX ADMIN — TERAZOSIN HYDROCHLORIDE 1 MG: 1 CAPSULE ORAL at 07:22

## 2025-05-19 RX ADMIN — Medication 500 ML: at 14:03

## 2025-05-19 RX ADMIN — POTASSIUM PHOSPHATE, MONOBASIC AND POTASSIUM PHOSPHATE, DIBASIC 15 MMOL: 224; 236 INJECTION, SOLUTION, CONCENTRATE INTRAVENOUS at 09:51

## 2025-05-19 RX ADMIN — SODIUM BICARBONATE 1300 MG: 650 TABLET ORAL at 07:21

## 2025-05-19 RX ADMIN — HYDRALAZINE HYDROCHLORIDE 100 MG: 25 TABLET ORAL at 15:20

## 2025-05-19 RX ADMIN — Medication 1 APPLICATION: at 14:03

## 2025-05-19 RX ADMIN — IPRATROPIUM BROMIDE 0.5 MG: 0.5 SOLUTION RESPIRATORY (INHALATION) at 08:16

## 2025-05-19 RX ADMIN — SODIUM BICARBONATE 1300 MG: 650 TABLET ORAL at 16:55

## 2025-05-19 RX ADMIN — Medication 250 MG: at 07:21

## 2025-05-19 RX ADMIN — DILTIAZEM HYDROCHLORIDE 120 MG: 120 CAPSULE, EXTENDED RELEASE ORAL at 09:51

## 2025-05-19 RX ADMIN — Medication 100 MG: at 07:21

## 2025-05-19 RX ADMIN — CHOLESTYRAMINE 4 G: 4 POWDER, FOR SUSPENSION ORAL at 07:22

## 2025-05-19 RX ADMIN — LEVOTHYROXINE SODIUM 175 MCG: 0.03 TABLET ORAL at 05:10

## 2025-05-19 RX ADMIN — Medication 500 ML: at 20:00

## 2025-05-19 RX ADMIN — SODIUM BICARBONATE 1300 MG: 650 TABLET ORAL at 20:00

## 2025-05-19 RX ADMIN — HYDRALAZINE HYDROCHLORIDE 50 MG: 25 TABLET ORAL at 07:22

## 2025-05-19 RX ADMIN — METOPROLOL SUCCINATE 25 MG: 25 TABLET, EXTENDED RELEASE ORAL at 07:21

## 2025-05-19 RX ADMIN — Medication 10 ML: at 20:00

## 2025-05-19 RX ADMIN — HYDRALAZINE HYDROCHLORIDE 100 MG: 25 TABLET ORAL at 20:00

## 2025-05-19 NOTE — PROGRESS NOTES
"[facility]      Patient Care Team:  Montse Dawkins MD as PCP - General (Family Medicine)  Huey Keyes MD as Consulting Physician (Nephrology)        Chief Complaint: Shortness of breath    Subjective  Pt states he is tired, didn't sleep well last night. Daughter notes she feels he has been more fatigued since beta blocker started and concerned metoprolol may be contributing to this    Interval History and ROS:     Patient remains in A-fib, rates controlled, 70-80s blood pressure stable on Toprol.   Started eliquis-No bleeding reported  Pending discharge to [facility] rehab, however Cr up to 1.85 and IVF were started    Review of Systems   Respiratory:  Positive for shortness of breath.        Objective    Vital Signs  Temp:  [97.5 °F (36.4 °C)-98.2 °F (36.8 °C)] 97.9 °F (36.6 °C)  Heart Rate:  [63-81] 81  Resp:  [17-23] 22  BP: (138-177)/() 164/78  Oxygen Therapy  SpO2: 94 %  Pulse Oximetry Type: Intermittent  Device (Oxygen Therapy): room air  Flow (L/min) (Oxygen Therapy): 2  Oxygen Concentration (%): 28}  Flowsheet Rows      Flowsheet Row First Filed Value   Admission Height 170.2 cm (67\") Documented at 05/14/2025 2050   Admission Weight 114 kg (251 lb 12.3 oz) Documented at 05/14/2025 2050            Physical Exam  TELE:  General Appearance:  A-fib controlled rates  Alert, cooperative, in no acute distress   Head:    Normocephalic, without obvious abnormality, atraumatic   Eyes:            PERRLA, EOM intact       Throat:   Oral mucosa moist   Neck:   No carotid bruit, no JVD, supple, trachea midline, no thyromegaly    Lungs:     Respirations regular, even and   unlabored    Heart:  Irregularly irregular rhythm and normal rate, normal S1 and S2, no gallop, no rub, no click   Chest Wall:    No abnormalities observed   Abdomen:     Soft, obese, nontender, nondistended, no guarding, no rebound  tenderness,   Extremities: Right lower extremity 2+, left lower extremity 1+    Pulses:   Pulses palpable " and equal bilaterally in all extremities   Skin: Crusting right lower extremity distally, purpura bilateral lower extremities       Neurologic:   Normal mood, thought content and behavior     Results Review:     I reviewed the patient's new clinical results.    Lab Results (last 24 hours)       Procedure Component Value Units Date/Time    POC Glucose Once [[id number]]  (Normal) Collected: 05/19/25 0736    Specimen: Blood Updated: 05/19/25 0737     Glucose 103 mg/dL      Comment: Serial Number: [id number]:  [id number]       Comprehensive Metabolic Panel [[id number]]  (Abnormal) Collected: 05/18/25 2233    Specimen: Blood from Arm, Right Updated: 05/18/25 2317     Glucose 135 mg/dL      BUN 35 mg/dL      Creatinine 1.85 mg/dL      Sodium 139 mmol/L      Potassium 4.7 mmol/L      Chloride 107 mmol/L      CO2 26.0 mmol/L      Calcium 8.4 mg/dL      Total Protein 5.7 g/dL      Albumin 3.2 g/dL      ALT (SGPT) 17 U/L      AST (SGOT) 21 U/L      Alkaline Phosphatase 54 U/L      Total Bilirubin <0.2 mg/dL      Globulin 2.5 gm/dL      A/G Ratio 1.3 g/dL      BUN/Creatinine Ratio 18.9     Anion Gap 6.0 mmol/L      eGFR 36.1 mL/min/1.73     Narrative:      GFR Categories in Chronic Kidney Disease (CKD)              GFR Category          GFR (mL/min/1.73)    Interpretation  G1                    90 or greater        Normal or high (1)  G2                    60-89                Mild decrease (1)  G3a                   45-59                Mild to moderate decrease  G3b                   30-44                Moderate to severe decrease  G4                    15-29                Severe decrease  G5                    14 or less           Kidney failure    (1)In the absence of evidence of kidney disease, neither GFR category G1 or G2 fulfill the criteria for CKD.    eGFR calculation 2021 CKD-EPI creatinine equation, which does not include race as a factor    Magnesium [[id number]]  (Normal) Collected: 05/18/25 2233     Specimen: Blood from Arm, Right Updated: 05/18/25 2317     Magnesium 2.3 mg/dL     Phosphorus [[id number]]  (Abnormal) Collected: 05/18/25 2233    Specimen: Blood from Arm, Right Updated: 05/18/25 2317     Phosphorus 2.2 mg/dL     CBC Auto Differential [[id number]]  (Abnormal) Collected: 05/18/25 2233    Specimen: Blood from Arm, Right Updated: 05/18/25 2255     WBC 11.66 10*3/mm3      RBC 3.71 10*6/mm3      Hemoglobin 9.9 g/dL      Hematocrit 32.4 %      MCV 87.3 fL      MCH 26.7 pg      MCHC 30.6 g/dL      RDW 17.2 %      RDW-SD 54.2 fl      MPV 10.7 fL      Platelets 210 10*3/mm3      Neutrophil % 70.6 %      Lymphocyte % 11.4 %      Monocyte % 12.8 %      Eosinophil % 0.3 %      Basophil % 0.3 %      Immature Grans % 4.6 %      Neutrophils, Absolute 8.23 10*3/mm3      Lymphocytes, Absolute 1.33 10*3/mm3      Monocytes, Absolute 1.49 10*3/mm3      Eosinophils, Absolute 0.04 10*3/mm3      Basophils, Absolute 0.03 10*3/mm3      Immature Grans, Absolute 0.54 10*3/mm3      nRBC 0.0 /100 WBC     POC Glucose Once [[id number]]  (Abnormal) Collected: 05/18/25 1951    Specimen: Blood Updated: 05/18/25 1952     Glucose 166 mg/dL      Comment: Serial Number: [id number]:  [id number]       Blood Culture - Blood, Arm, Left [[id number]]  (Normal) Collected: 05/16/25 1622    Specimen: Blood from Arm, Left Updated: 05/18/25 1645     Blood Culture No growth at 2 days    Blood Culture - Blood, Arm, Right [[id number]]  (Normal) Collected: 05/16/25 1629    Specimen: Blood from Arm, Right Updated: 05/18/25 1645     Blood Culture No growth at 2 days            Imaging Results (Last 24 Hours)       ** No results found for the last 24 hours. **            ECG/EMG Results (most recent)       Procedure Component Value Units Date/Time    Telemetry Scan [[id number]] Resulted: 05/14/25     Updated: 05/14/25 2258    ECG 12 Lead Dyspnea [[id number]] Collected: 05/14/25 2055     Updated: 05/15/25 0817     QT Interval 372 ms      QTC Interval 455  ms     Narrative:      HEART RATE=90  bpm  RR Qihkcwqi=558  ms  MT Interval=  ms  P Horizontal Axis=  deg  P Front Axis=  deg  QRSD Interval=96  ms  QT Djkgllse=261  ms  FVpO=823  ms  QRS Axis=-35  deg  T Wave Axis=46  deg  - ABNORMAL ECG -  Left axis deviation  Low voltage, precordial leads  Nonspecific  T abnormalities, anterior leads  When compared with ECG of 12-May-2018 12:11:53,  Significant change in rhythm: previously sinus  Significant repolarization change  Significant axis, voltage or hypertrophy change  Electronically Signed By: Skip Tripathi (STACIA) 2025-05-15 08:17:33  Date and Time of Study:2025-05-14 20:55:47    Telemetry Scan [[id number]] Resulted: 05/14/25     Updated: 05/15/25 2246    Telemetry Scan [[id number]] Resulted: 05/14/25     Updated: 05/15/25 2342    Telemetry Scan [[id number]] Resulted: 05/14/25     Updated: 05/16/25 0406    Telemetry Scan [[id number]] Resulted: 05/14/25     Updated: 05/16/25 0817    Adult Transthoracic Echo Complete W/ Cont if Necessary Per Protocol [[id number]] Resulted: 05/16/25 0846     Updated: 05/16/25 0846     LVIDd 5.1 cm      LVIDs 4.2 cm      IVSd 1.40 cm      LVPWd 1.40 cm      FS 17.6 %      IVS/LVPW 1.00 cm      ESV(cubed) 74.1 ml      LV Sys Vol (BSA corrected) 22.0 cm2      EDV(cubed) 132.7 ml      LV Marcial Vol (BSA corrected) 46.3 cm2      LV mass(C)d 300.4 grams      LVOT area 3.5 cm2      LVOT diam 2.10 cm      EDV(MOD-sp4) 103.0 ml      ESV(MOD-sp4) 49.0 ml      SV(MOD-sp4) 54.0 ml      SVi(MOD-SP4) 24.2 ml/m2      SVi (LVOT) 15.2 ml/m2      EF(MOD-sp4) 52.4 %      MV E max maryjane 100.0 cm/sec      MV A max maryjane 25.7 cm/sec      MV dec time 0.16 sec      MV E/A 3.9     IVRT 85.0 ms      SV(LVOT) 33.9 ml      RVIDd 3.0 cm      TAPSE (>1.6) 1.91 cm      LA dimension (2D)  3.9 cm      LV V1 max 52.9 cm/sec      LV V1 max PG 1.12 mmHg      LV V1 mean PG 1.00 mmHg      LV V1 VTI 9.8 cm      Ao pk maryjane 125.0 cm/sec      Ao max PG 6.3 mmHg      Ao mean PG 3.0 mmHg      Ao  V2 VTI 23.0 cm      TRUNG(I,D) 1.47 cm2      Dimensionless Index 0.42 (DI)      MV max PG 6.1 mmHg      MV mean PG 2.00 mmHg      MV V2 VTI 32.1 cm      MV P1/2t 63.5 msec      MVA(P1/2t) 3.5 cm2      MVA(VTI) 1.06 cm2      MV dec slope 567.0 cm/sec2      RV V1 max PG 1.67 mmHg      RV V1 max 64.7 cm/sec      RV V1 VTI 14.4 cm      PA V2 max 81.4 cm/sec      PA acc time 0.12 sec      Sinus 3.3 cm      STJ 3.4 cm     Narrative:        Left ventricular ejection fraction appears to be 46 - 50%.    The right ventricular cavity is mildly dilated.      ECG 12 Lead QT Measurement [[id number]] Collected: 05/15/25 1635     Updated: 05/16/25 0921     QT Interval 381 ms      QTC Interval 488 ms     Narrative:      HEART RATE=99  bpm  RR Moccjysz=529  ms  AK Interval=  ms  P Horizontal Axis=  deg  P Front Axis=  deg  QRSD Pvtpbltz=710  ms  QT Gnwqxwzd=200  ms  SPbF=007  ms  QRS Axis=-29  deg  T Wave Axis=174  deg  - ABNORMAL ECG -  Atrial fibrillation  Incomplete RBBB and LAFB  Low voltage, precordial leads  Nonspecific  T abnormalities, lateral leads  When compared with ECG of 14-May-2025 20:55:47,  Significant change in rhythm  Electronically Signed By: Amadeo Chandra ([facility]) 2025-05-16 09:20:31  Date and Time of Study:2025-05-15 16:35:57    Telemetry Scan [[id number]] Resulted: 05/14/25     Updated: 05/16/25 1200    Telemetry Scan [[id number]] Resulted: 05/14/25     Updated: 05/16/25 1540    Telemetry Scan [[id number]] Resulted: 05/14/25     Updated: 05/16/25 2223    Telemetry Scan [[id number]] Resulted: 05/14/25     Updated: 05/17/25 0447    Telemetry Scan [[id number]] Resulted: 05/14/25     Updated: 05/17/25 1610    Telemetry Scan [[id number]] Resulted: 05/14/25     Updated: 05/17/25 2005    Telemetry Scan [[id number]] Resulted: 05/14/25     Updated: 05/18/25 2242    Telemetry Scan [[id number]] Resulted: 05/14/25     Updated: 05/18/25 2246    Telemetry Scan [[id number]] Resulted: 05/14/25     Updated: 05/18/25 2318    Telemetry Scan  [[id number]] Resulted: 05/14/25     Updated: 05/19/25 0001    Telemetry Scan [[id number]] Resulted: 05/14/25     Updated: 05/19/25 0025    Telemetry Scan [[id number]] Resulted: 05/14/25     Updated: 05/19/25 0142    Telemetry Scan [[id number]] Resulted: 05/14/25     Updated: 05/19/25 0143    Telemetry Scan [[id number]] Resulted: 05/14/25     Updated: 05/19/25 0154    Telemetry Scan [[id number]] Resulted: 05/14/25     Updated: 05/19/25 0202    Telemetry Scan [[id number]] Resulted: 05/14/25     Updated: 05/19/25 0424    Telemetry Scan [[id number]] Resulted: 05/14/25     Updated: 05/19/25 0526    Telemetry Scan [[id number]] Resulted: 05/14/25     Updated: 05/19/25 0754            Medication Review:   I have reviewed the patient's current medication list    Current Facility-Administered Medications:     acetaminophen (TYLENOL) tablet 650 mg, 650 mg, Oral, Q4H PRN **OR** acetaminophen (TYLENOL) 160 MG/5ML oral solution 650 mg, 650 mg, Oral, Q4H PRN **OR** acetaminophen (TYLENOL) suppository 650 mg, 650 mg, Rectal, Q4H PRN, Montse Dawkins MD    allopurinol (ZYLOPRIM) tablet 200 mg, 200 mg, Oral, Daily, Montse Dawkins MD, 200 mg at 05/19/25 0721    aluminum-magnesium hydroxide-simethicone (MAALOX MAX) 400-400-40 MG/5ML suspension 15 mL, 15 mL, Oral, Q6H PRN, Montse Dawkins MD    apixaban (ELIQUIS) tablet 2.5 mg, 2.5 mg, Oral, Q12H, Kailee Triplett, APRN, 2.5 mg at 05/19/25 0721    sennosides-docusate (PERICOLACE) 8.6-50 MG per tablet 2 tablet, 2 tablet, Oral, BID PRN, 2 tablet at 05/19/25 0758 **AND** polyethylene glycol (MIRALAX) packet 17 g, 17 g, Oral, Daily PRN **AND** bisacodyl (DULCOLAX) EC tablet 5 mg, 5 mg, Oral, Daily PRN **AND** bisacodyl (DULCOLAX) suppository 10 mg, 10 mg, Rectal, Daily PRN, Montse Dawkins MD    calcium carbonate (TUMS) chewable tablet 500 mg (200 mg elemental), 2 tablet, Oral, BID PRN, Montse Dawkins MD    cefdinir (OMNICEF) capsule 300 mg, 300 mg, Oral, Q12H, Montse Dawkins MD, 300 mg at  05/19/25 0721    cholestyramine light packet 4 g, 1 packet, Oral, Daily, Montse Dawkins MD, 4 g at 05/19/25 0722    dilTIAZem CD (CARDIZEM CD) 24 hr capsule 120 mg, 120 mg, Oral, Q24H, Freddy Sanchez MD, 120 mg at 05/19/25 0951    FLUoxetine (PROzac) capsule 10 mg, 10 mg, Oral, Daily, Montse Dawkins MD, 10 mg at 05/19/25 0721    folic acid (FOLVITE) tablet 1 mg, 1 mg, Oral, Daily, Montse Dawkins MD, 1 mg at 05/19/25 0721    hydrALAZINE (APRESOLINE) tablet 100 mg, 100 mg, Oral, TID, Yoli Barahona MD    ipratropium (ATROVENT) nebulizer solution 0.5 mg, 0.5 mg, Nebulization, TID - RT, Paulino Zaman MD, 0.5 mg at 05/19/25 0816    levothyroxine (SYNTHROID, LEVOTHROID) tablet 175 mcg, 175 mcg, Oral, Q AM, Montse Dawkins MD, 175 mcg at 05/19/25 0510    metoprolol succinate XL (TOPROL-XL) 24 hr tablet 25 mg, 25 mg, Oral, Q24H, Freddy Sanchez MD, 25 mg at 05/19/25 0721    mineral oil-hydrophilic petrolatum (AQUAPHOR) ointment 1 Application, 1 Application, Topical, Daily, Montse Dawkins MD, 1 Application at 05/19/25 0722    nitroglycerin (NITROSTAT) SL tablet 0.4 mg, 0.4 mg, Sublingual, Q5 Min PRN, Montse Dawkins MD    ondansetron ODT (ZOFRAN-ODT) disintegrating tablet 4 mg, 4 mg, Oral, Q4H PRN **OR** ondansetron (ZOFRAN) injection 4 mg, 4 mg, Intravenous, Q4H PRN, Montse Dawkins MD    pantoprazole (PROTONIX) EC tablet 40 mg, 40 mg, Oral, Daily, Montse Dawkins MD, 40 mg at 05/19/25 0721    potassium phosphate 15 mmol in 0.9% normal saline 250 mL IVPB, 15 mmol, Intravenous, Once, Yoli Barahona MD, 15 mmol at 05/19/25 0951    saccharomyces boulardii (FLORASTOR) capsule 250 mg, 250 mg, Oral, BID, Montse Dawkins MD, 250 mg at 05/19/25 0721    sodium bicarbonate tablet 1,300 mg, 1,300 mg, Oral, TID, Yoli Barahona MD, 1,300 mg at 05/19/25 0721    sodium chloride 0.9 % flush 10 mL, 10 mL, Intravenous, Q12H, Montse Dawkins MD, 10 mL at 05/19/25 0722    sodium chloride 0.9 % flush 10 mL,  10 mL, Intravenous, PRN, Montse Dawkins MD    sodium chloride 0.9 % infusion 40 mL, 40 mL, Intravenous, PRN, Montse Dawkins MD    sodium chloride 0.9 % infusion, 60 mL/hr, Intravenous, Continuous, Yoli Barahona MD, Last Rate: 60 mL/hr at 05/19/25 0757, 60 mL/hr at 05/19/25 0757    terazosin (HYTRIN) capsule 1 mg, 1 mg, Oral, Daily, Montse Dawkins MD, 1 mg at 05/19/25 0722    thiamine (VITAMIN B-1) tablet 100 mg, 100 mg, Oral, Daily, Montse Dawkins MD, 100 mg at 05/19/25 0721    Assessment & Plan     Afib  Hx Afib-none since 2014, Coumadin discontinued due to alcohol abuse and frequent falls  Amiodarone discontinued in 2016-stopped due to liver function and no A-fib recurrence  On admit-potassium 3.9, Magnesium pending, TSH 1.08, free T41.52  High-sensitivity troponins 32, 33  Repeat echo     Hypertension  Hydralazine 50 mg daily terazosin 1 mg twice daily per home meds  Avoid ACE ARB     ARF/SARHA  Dr. Barahona consulted      Dyspnea  proBNP 3853 -skewed in setting of SARAH  Dr. 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 Sanchez noted liver enzymes normal, will consider amiodarone loading at a later date prior to cardioversion in 6 to 8 weeks of anticoagulation on Eliquis    Echocardiogram this admission EF 46-50%, mild dilation of right ventricular cavity, bilateral atrium normal size, no significant valvular dysfunction reported.    Cr up today-renal has started IVF and increased hydralazine    Per primary cardiologist, plan for  outpatient cardioversion in approximately 2 months, follow-up with our office for hospital follow-up appointment, will arrange at that time, will start antiarrhythmic loading as an outpatient likely 1 to 2 weeks prior to cardioversion    Further orders per Dr Sanchez    Patient is seen and examined and findings are verified.  All data is reviewed by me personally.  Assessment and plan formulated by APC was done after discussion with attending.  I spent more than 50% of time in taking care of the patient.    Patient is laying in the bed.  No respiratory distress.    Normal S1 and S2.  No pericardial rub or murmur abdominal exam is benign no leg edema noted.  Patient is in atrial fibrillation    Continue Toprol-XL and Eliquis.  Patient would need cardioversion after 6 weeks to 8 weeks of anticoagulation.  Current treatment can be continued.    Electronically signed by Freddy Sanchez MD, 05/19/25, 6:36 PM EDT.      Kailee Triplett, NORM  05/19/25  11:28 EDT

## 2025-05-19 NOTE — PROGRESS NOTES
Daily Progress Note          Assessment    Respiratory panel positive for COVID-19 however it was initially diagnosed on April 5, 2025 treated with outpatient Paxlovid as well as azithromycin  CT chest did not show any alveolar infiltrates  No PE  Patient is fully vaccinated   Skin Purpura  History of C. Difficile     Hypothyroidism  Gout  GERD  Hypertension  Diabetes mellitus           Recommendations:     The oxygen status has improved significantly, patient can be discharged from pulmonary standpoint     oxygen supplement and titration to maintain saturation 90-95%: Currently on room air      Completed predisone      Empiric antibiotic currently on Rocephin  Bronchodilator budesonide and ipratropium to avoid tachycardia  Thyroid replacement  Anticoagulation: Eliquis    HR control as per cardiology: Currently on metoprolol     Fluid and electrolyte management as per nephrology                LOS: 4 days     Subjective     Generalized weakness and mild shortness of breath    Objective     Vital signs for last 24 hours:  Vitals:    05/19/25 0804 05/19/25 0816 05/19/25 0820 05/19/25 1132   BP: 164/78   101/55   BP Location:    Right arm   Patient Position:    Lying   Pulse: 79 81 81 87   Resp:  22 22 16   Temp:    98.2 °F (36.8 °C)   TempSrc:    Axillary   SpO2:  94% 94%    Weight:       Height:           Intake/Output last 3 shifts:  I/O last 3 completed shifts:  In: 1620 [P.O.:1620]  Out: 300 [Urine:300]  Intake/Output this shift:  I/O this shift:  In: 240 [P.O.:240]  Out: -       Radiology  Imaging Results (Last 24 Hours)       ** No results found for the last 24 hours. **            Labs:  Results from last 7 days   Lab Units 05/18/25  2233   WBC 10*3/mm3 11.66*   HEMOGLOBIN g/dL 9.9*   HEMATOCRIT % 32.4*   PLATELETS 10*3/mm3 210     Results from last 7 days   Lab Units 05/18/25  2233   SODIUM mmol/L 139   POTASSIUM mmol/L 4.7   CHLORIDE mmol/L 107   CO2 mmol/L 26.0   BUN mg/dL 35*   CREATININE mg/dL 1.85*    CALCIUM mg/dL 8.4*   BILIRUBIN mg/dL <0.2   ALK PHOS U/L 54   ALT (SGPT) U/L 17   AST (SGOT) U/L 21   GLUCOSE mg/dL 135*     Results from last 7 days   Lab Units 05/15/25  0121   PH, ARTERIAL pH units 7.372   PO2 ART mm Hg 102.6   PCO2, ARTERIAL mm Hg 37.8   HCO3 ART mmol/L 22.0     Results from last 7 days   Lab Units 05/18/25  2233 05/18/25  0409 05/17/25  0330   ALBUMIN g/dL 3.2* 3.4* 3.3*     Results from last 7 days   Lab Units 05/14/25  2256 05/14/25  2122   CK TOTAL U/L 44  --    HSTROP T ng/L 33* 32*         Results from last 7 days   Lab Units 05/18/25  2233   MAGNESIUM mg/dL 2.3         Results from last 7 days   Lab Units 05/14/25  2256   TSH uIU/mL 1.080           Meds:   SCHEDULE  allopurinol, 200 mg, Oral, Daily  apixaban, 2.5 mg, Oral, Q12H  cefdinir, 300 mg, Oral, Q12H  cholestyramine light, 1 packet, Oral, Daily  dilTIAZem CD, 120 mg, Oral, Q24H  FLUoxetine, 10 mg, Oral, Daily  folic acid, 1 mg, Oral, Daily  hydrALAZINE, 100 mg, Oral, TID  ipratropium, 0.5 mg, Nebulization, TID - RT  levothyroxine, 175 mcg, Oral, Q AM  metoprolol succinate XL, 25 mg, Oral, Q24H  mineral oil-hydrophilic petrolatum, 1 Application, Topical, Daily  pantoprazole, 40 mg, Oral, Daily  potassium phosphate, 15 mmol, Intravenous, Once  saccharomyces boulardii, 250 mg, Oral, BID  sodium bicarbonate, 1,300 mg, Oral, TID  sodium chloride, 10 mL, Intravenous, Q12H  terazosin, 1 mg, Oral, Daily  Vitamin B1, 100 mg, Oral, Daily      Infusions  sodium chloride, 60 mL/hr, Last Rate: 60 mL/hr (05/19/25 0757)        PRNs    acetaminophen **OR** acetaminophen **OR** acetaminophen    aluminum-magnesium hydroxide-simethicone    senna-docusate sodium **AND** polyethylene glycol **AND** bisacodyl **AND** bisacodyl    calcium carbonate    nitroglycerin    ondansetron ODT **OR** ondansetron    sodium chloride    sodium chloride    Physical Exam:  General Appearance:  Alert   HEENT:  Normocephalic, without obvious abnormality,  Conjunctiva/corneas clear,.   Nares normal, no drainage     Neck:  Supple, symmetrical, trachea midline.   Lungs /Chest wall:   I minimal bilateral basal rhonchi, respirations unlabored, symmetrical wall movement.     Heart: Heart rate controlled, S1 S2 normal  Abdomen: Soft, non-tender, no masses, no organomegaly.    Extremities: Trace edema, no clubbing or cyanosis     ROS  Constitutional: Negative for chills, fever and malaise/fatigue.   HENT: Negative.    Eyes: Negative.    Cardiovascular: Negative.    Respiratory: Positive for mild cough and shortness of breath.    Skin: Negative.    Musculoskeletal: Negative.    Gastrointestinal: Negative.    Genitourinary: Negative.    Neurological: Generalized weakness      I reviewed the recent clinical results  I personally reviewed the latest radiological studies    Part of this note may be an electronic transcription/translation of spoken language to printed text using the Dragon Dictation System.

## 2025-05-19 NOTE — PLAN OF CARE
Goal Outcome Evaluation:  Plan of Care Reviewed With: patient        Progress: improving  Outcome Evaluation: Slept at intervals. Refused BIPAP. Remains on room air. No c/o discomfort. Ambulated to BR with walker and stand-by assist.                              NOTES FOR VISIT:   · Discussed with the patient the treatment options for the cyst.  He will return at a future date to have it excised.  This would need to be in a 30 minute excision slot.  He has had this done in the past he is aware what to expect.    IMAGES:  No images are attached to the encounter.    FOLLOW-UP: No follow-ups on file.    Anant was seen today for derm problem and office visit.    Diagnoses and all orders for this visit:    Skin cyst-scrotum         Medications Discontinued During This Encounter   Medication Reason   • TIROSINT 125 MCG Cap Therapy Completed   • rabeprazole (ACIPHEX) 20 MG tablet Therapy Completed        Chief Complaint   Patient presents with   • Derm Problem     Groin Concerns   • Office Visit       HISTORY: Anant is here today for:  · Presents for evaluation treatment of a lesion on the right scrotum near the base of the penis.  This has been present for about 2 years.  He does not feel it is changing any.    PAST DERMATOLOGY-SPECIFIC HISTORY:  Verruca vulgaris   Solar lentigo   Dermatofibroma  Calcified cyst on the scrotum      ACTIVE DERMATOLOGY CONDITIONS AND PRESCRIPTIONS:  N/A    PAST MEDICAL HISTORY, PAST SURGICAL HISTORY, SOCIAL HISTORY, AND FAMILY HISTORY WERE REVIEWED.    PHYSICAL EXAMINATION: CONSTITUTIONAL:  Anant  is a 45 year old male who is well developed, well nourished, in no acute distress.  There were no vitals taken for this visit.. NEUROLOGIC AND PSYCHIATRIC: He has a normal mood and affect. SKIN: Complete examination, including palpation and careful visual examination of the area of interest revealed no other significant abnormality or eruption. I noted:    · Moves about the room with minimal difficulty.  Appears approximately his stated age.  He does not wear glasses.    · Examination of the scrotum reveals a small cystic eruption on the right scrotum near the base of the penis.  There is no obvious open punctum today.  If palpates as a  small cyst.  He has a small right next to it as well    Abdullahi Molina, LAURENCE, NP, DCNP

## 2025-05-19 NOTE — CASE MANAGEMENT/SOCIAL WORK
Continued Stay Note  ARTUR Sánchez     Patient Name: Rommel Mcfarland  MRN: [id number]  Today's Date: 5/19/2025    Admit Date: 5/14/2025    Plan: [facility] (accepted, bed ready) No precert required. No PASRR required. From home alone with private caregivers.   Discharge Plan       Row Name 05/19/25 1551       Plan    Plan [facility] (accepted, bed ready) No precert required. No PASRR required. From home alone with private caregivers.    Plan Comments CM notified by [facility] wanting update on anticipated discharge date. Updated [facility] liasons of hopeful discharge tomorrow, [facility] saving bed for patient. D/C Barriers: nephrology to see for elevated renal function and elevated blood pressure, IVF.               Zee Servin RN     Office: [phone]

## 2025-05-19 NOTE — PROGRESS NOTES
"NEPHROLOGY PROGRESS NOTE------[facility]    [facility]  [phone]  Ines Barahona MD      Patient Care Team:  Montse Dawkins MD as PCP - General (Family Medicine)  Huey Keyes MD as Consulting Physician (Nephrology)      Provider:  Ines Barahona MD  Patient Name: Rommel Mcfarland  :  1945    SUBJECTIVE:    F/U ARF/SARAH/CRF/CKD    Didn't sleep well last night. No angina. No dysuria. No edema.     Medication:  allopurinol, 200 mg, Oral, Daily  apixaban, 2.5 mg, Oral, Q12H  cefdinir, 300 mg, Oral, Q12H  cholestyramine light, 1 packet, Oral, Daily  FLUoxetine, 10 mg, Oral, Daily  folic acid, 1 mg, Oral, Daily  hydrALAZINE, 50 mg, Oral, BID  ipratropium, 0.5 mg, Nebulization, TID - RT  levothyroxine, 175 mcg, Oral, Q AM  metoprolol succinate XL, 25 mg, Oral, Q24H  mineral oil-hydrophilic petrolatum, 1 Application, Topical, Daily  pantoprazole, 40 mg, Oral, Daily  saccharomyces boulardii, 250 mg, Oral, BID  sodium bicarbonate, 1,300 mg, Oral, TID  sodium chloride, 10 mL, Intravenous, Q12H  terazosin, 1 mg, Oral, Daily  Vitamin B1, 100 mg, Oral, Daily             OBJECTIVE    Vital Sign Min/Max for last 24 hours  Temp  Min: 97.5 °F (36.4 °C)  Max: 98.2 °F (36.8 °C)   BP  Min: 108/67  Max: 177/100   Pulse  Min: 63  Max: 86   Resp  Min: 17  Max: 23   SpO2  Min: 87 %  Max: 99 %   No data recorded   Weight  Min: 117 kg (257 lb 15 oz)  Max: 117 kg (257 lb 15 oz)     Flowsheet Rows      Flowsheet Row First Filed Value   Admission Height 170.2 cm (67\") Documented at 2025   Admission Weight 114 kg (251 lb 12.3 oz) Documented at 2025            No intake/output data recorded.  I/O last 3 completed shifts:  In: 1620 [P.O.:1620]  Out: 300 [Urine:300]    Physical Exam:  General Appearance: alert, appears stated age and cooperative  Head: normocephalic, without obvious abnormality and atraumatic  Eyes: conjunctivae and " "sclerae normal and no icterus  Neck: supple and no JVD  Lungs: SCATTERED RHONCHI  Heart: regular rhythm & normal rate and normal S1, S2 +BALDOMERO  Chest: Wall no abnormalities observed  Abdomen: normal bowel sounds and soft, nontender  Extremities: moves extremities well, no edema, no cyanosis and no redness  Skin: no bleeding, bruising or rash, turgor normal, color normal and no lesions noted  Neurologic: Alert, and oriented. No focal deficits    Labs:    WBC WBC   Date Value Ref Range Status   05/18/2025 11.66 (H) 3.40 - 10.80 10*3/mm3 Final   05/18/2025 12.72 (H) 3.40 - 10.80 10*3/mm3 Final   05/17/2025 8.19 3.40 - 10.80 10*3/mm3 Final      HGB Hemoglobin   Date Value Ref Range Status   05/18/2025 9.9 (L) 13.0 - 17.7 g/dL Final   05/18/2025 10.8 (L) 13.0 - 17.7 g/dL Final   05/17/2025 10.8 (L) 13.0 - 17.7 g/dL Final      HCT Hematocrit   Date Value Ref Range Status   05/18/2025 32.4 (L) 37.5 - 51.0 % Final   05/18/2025 35.2 (L) 37.5 - 51.0 % Final   05/17/2025 35.2 (L) 37.5 - 51.0 % Final      Platelets No results found for: \"LABPLAT\"   MCV MCV   Date Value Ref Range Status   05/18/2025 87.3 79.0 - 97.0 fL Final   05/18/2025 87.8 79.0 - 97.0 fL Final   05/17/2025 87.6 79.0 - 97.0 fL Final          Sodium Sodium   Date Value Ref Range Status   05/18/2025 139 136 - 145 mmol/L Final   05/18/2025 136 136 - 145 mmol/L Final   05/17/2025 140 136 - 145 mmol/L Final      Potassium Potassium   Date Value Ref Range Status   05/18/2025 4.7 3.5 - 5.2 mmol/L Final   05/18/2025 4.6 3.5 - 5.2 mmol/L Final   05/17/2025 4.5 3.5 - 5.2 mmol/L Final     Comment:     Result checked        Chloride Chloride   Date Value Ref Range Status   05/18/2025 107 98 - 107 mmol/L Final   05/18/2025 105 98 - 107 mmol/L Final   05/17/2025 104 98 - 107 mmol/L Final      CO2 CO2   Date Value Ref Range Status   05/18/2025 26.0 22.0 - 29.0 mmol/L Final   05/18/2025 25.7 22.0 - 29.0 mmol/L Final   05/17/2025 25.4 22.0 - 29.0 mmol/L Final      BUN BUN   Date " "Value Ref Range Status   05/18/2025 35 (H) 8 - 23 mg/dL Final   05/18/2025 35 (H) 8 - 23 mg/dL Final   05/17/2025 30 (H) 8 - 23 mg/dL Final      Creatinine Creatinine   Date Value Ref Range Status   05/18/2025 1.85 (H) 0.76 - 1.27 mg/dL Final   05/18/2025 1.61 (H) 0.76 - 1.27 mg/dL Final   05/17/2025 1.50 (H) 0.76 - 1.27 mg/dL Final      Calcium Calcium   Date Value Ref Range Status   05/18/2025 8.4 (L) 8.6 - 10.5 mg/dL Final   05/18/2025 8.6 8.6 - 10.5 mg/dL Final   05/17/2025 8.7 8.6 - 10.5 mg/dL Final      PO4 No components found for: \"PO4\"   Albumin Albumin   Date Value Ref Range Status   05/18/2025 3.2 (L) 3.5 - 5.2 g/dL Final   05/18/2025 3.4 (L) 3.5 - 5.2 g/dL Final   05/17/2025 3.3 (L) 3.5 - 5.2 g/dL Final      Magnesium Magnesium   Date Value Ref Range Status   05/18/2025 2.3 1.6 - 2.4 mg/dL Final   05/18/2025 2.4 1.6 - 2.4 mg/dL Final   05/17/2025 2.5 (H) 1.6 - 2.4 mg/dL Final      Uric Acid No components found for: \"URIC ACID\"     Imaging Results (Last 72 Hours)       ** No results found for the last 72 hours. **            Results for orders placed during the hospital encounter of 05/14/25    XR Chest 1 View    Narrative  XR CHEST 1 VW    Date of Exam: 5/14/2025 9:36 PM EDT    Indication: Dyspnea, wheezing    Comparison: 2/1/2016    Findings:  Stable mild cardiomegaly, exaggerated by technique. Lungs without consolidation. Negative for pneumothorax or effusion. Osseous structures grossly intact. Patient's chin overlies the lung apices medially partially limiting assessment.    Impression  Impression:  No acute process.          Electronically Signed: Renny Johnson MD  5/14/2025 10:21 PM EDT  Workstation ID: [id number]      Results for orders placed during the hospital encounter of 03/27/25    XR Tibia Fibula 2 View Right    Narrative  XR TIBIA FIBULA 2 VW RIGHT    Date of Exam: 3/29/2025 10:17 AM EDT    Indication: AND RIGHT ANKLE TO SEE IF HARDWARE IS PRESENT (PINS) FROM PRIOR FRACTURE    Comparison: None " available.    Findings:  Remote posttraumatic and postoperative changes of the distal tibia and fibula are noted. 2 orthopedic fixation screws are observed. There are no signs of hardware failure or loosening. There is no acute displaced fracture or malalignment. Chronic changes  are noted. Atherosclerotic vascular calcifications are noted. Chronic soft tissue changes are also observed.    Impression  Impression:  1.Remote posttraumatic and postoperative changes of the distal tibia and fibula. 2 orthopedic fixation screws are identified. There are no signs of hardware failure or loosening.  2.No evidence for acute displaced fracture or malalignment.        Electronically Signed: Riley Walls MD  3/29/2025 12:53 PM EDT  Workstation ID: [id number]      Results for orders placed during the hospital encounter of 09/15/23    XR Foot 3+ View Right    Narrative  XR FOOT 3+ VW RIGHT    Date of Exam: 9/15/2023 2:30 PM EDT    Indication: wound, infection    Comparison: 7/9/2022    Findings:  Bones are diffusely osteopenic. There is no discrete area of osseous erosion to indicate a site of osteomyelitis. Postsurgical changes of screw fixation noted at the distal tibia and fibula similar to the prior exam. Extensive small vessel vascular  calcifications. Soft tissue swelling overlying the dorsum of the foot which may relate to edema and/or cellulitis.    Impression  Impression:  1. No discrete area of osseous erosion to indicate osteomyelitis.  2. Soft tissue swelling overlying the dorsum of the foot may relate to edema and/or cellulitis.  3. Diffuse osteopenia.  4. Extensive small vessel vascular calcifications.        Electronically Signed: Renny Johnson MD  9/15/2023 2:35 PM EDT  Workstation ID: [id number]      Results for orders placed during the hospital encounter of 05/14/25    Duplex Venous Lower Extremity - Bilateral CAR    Interpretation Summary    Chronic left lower extremity superficial thrombophlebitis noted in the  great saphenous (below knee) and small saphenous.    All other veins appeared normal bilaterally.        ASSESSMENT / PLAN      Acute and chronic respiratory failure (acute-on-chronic)      ARF/SARAH/CRF/CKD----Nonoliguric. +ARF/SARAH on top of known CRF/CKD STG 3A secondary to HTN NS. Clinically prerenal (BNP not clinically accurate) and s/p IV dye exposure . No NSAIDs or IV dye. Dose meds for CrCl less than 10 cc/min until ARF/SARAH is resolved. Restart little IVFs     2. COVID 19 + PNA/ACUTE HYPOXIC RESPIRATORY FAILURE------On BiPap, steroids, oxygen, nebs per Pulmonary . Was initially COVID 19 + at the beginning of April     3. OA/DJD/GOUT/HYPERURICEMIA-----On Allopurinol. No NSAIDs. Uric ok     4. BILATERAL LE EDEMA/CHRONIC VENOUS INSUFFICIENCY/WOUNDS/CELLULITIS------Wound care.       5. DEPRESSION-----Ok to continue SSRI in the form of Prozac for now     6. ETOH ABUSE------Counseled     7. HTN WITH CKD-----BP up. Increase Hydralazine a little and follow. Avoid hypotension. No ACE-I/ARB/DRI     8. GERD/PUD PROPHYLAXIS------On PPI. Benefits outweigh risks despite CKD     9. HYPOTHYROIDISM------On Synthroid. TSH ok     10. DVT PROPHYLAXIS------Lovenox     11. DEPRESSION------On Prozac     12. HYPOMAGNESEMIA------Replaced     13. CHRONIC IBS------On Florastor     14. ANEMIA OF CKD-----IV iron for LAMIN. HgB above threshold for EPO     15. KETONURIA/DEHYDRATION-----IV NS. BNP not clinically accurate     16. ELEVATED D-DIMER-----CT PE negative. Likely elevation is from COVID. Repeat Bilateral LE venous dopplers negative    17. HYPOPHOSPHATEMIA-------Replaced    18. LACTIC ACIDOSIS--------Better    19. REHAB PLACEMENT PENDING       Ines Barahona MD  [facility]  [phone]  05/19/25  07:34 EDT

## 2025-05-19 NOTE — SIGNIFICANT NOTE
05/19/25 1519   OTHER   Discipline occupational therapist   Rehab Time/Intention   Session Not Performed patient/family declined treatment  (wanting to stay in bed. OT tried to encourage pt toward EOB or OOB activity & pt continued to decline. Pt reporting he was too comfortable to move.)   Recommendation   OT - Next Appointment 05/20/25

## 2025-05-19 NOTE — PROGRESS NOTES
"DATE/TIME OF ADMISSION:  5/14/2025  8:57 PM     LOS: 4 days   Patient Care Team:  Montse Dawkins MD as PCP - General (Family Medicine)  Huey Keyes MD as Consulting Physician (Nephrology)  Consults       Date and Time Order Name Status Description    5/15/2025  1:56 PM Inpatient Cardiology Consult Completed     5/15/2025  7:26 AM Inpatient Infectious Diseases Consult Completed     5/15/2025  7:12 AM Inpatient Pulmonology Consult Completed     5/15/2025  7:12 AM Inpatient Nephrology Consult Completed             Subjective DOING OK. CREATININE INCREASING, ANEMIC    Interval History: INCREASING TREND FOR CREATININE AND BP ELEVATED; WILL PUT TRANSFER TO [facility] [place] [facility] HOLD FOR TODAY    Patient Complaints: LOOSE STOOLS X 5 TODAY      History taken from: patient    Review of Systems        Objective     Vital Signs  /100 (BP Location: Left arm, Patient Position: Lying)   Pulse 69   Temp 97.9 °F (36.6 °C) (Oral)   Resp 19   Ht 170.2 cm (67\")   Wt 117 kg (257 lb 15 oz)   SpO2 96%   BMI 40.40 kg/m²     Physical Exam:      General Appearance:    Alert, cooperative, in no acute distress   Head:    Normocephalic, without obvious abnormality, atraumatic   Eyes:            Lids and lashes normal, conjunctivae and sclerae normal, no   icterus, no pallor, corneas clear, PERRLA   Ears:    Ears appear intact with no abnormalities noted   Throat:   No oral lesions, no thrush, oral mucosa moist   Neck:   No adenopathy, supple, trachea midline, no thyromegaly, no   carotid bruit, no JVD   Lungs:     Clear to auscultation,respirations regular, even and                  unlabored    Heart:    IRRegular rhythm and normal rate, normal S1 and S2, SOFT 2/6 SYSTOLIC            murmur   Chest Wall:    No abnormalities observed   Abdomen:     Normal bowel sounds, no masses, no organomegaly, soft        non-tender, non-distended, no guarding, no rebound                tenderness   Extremities:   Moves all extremities well, NO edema, " no cyanosis, BASELINE    redness; LEGS WRAPPED WITH ACE WRAPS   Pulses:   Pulses palpable and equal bilaterally   Skin:   No bleeding, +bruising or +rash   Lymph nodes:   No palpable adenopathy   Neurologic:   Grossly normal, alert and O x 2                       ---SOME EXCORIATED AT THE SCROTUM AND MOIST     I HAVE PERSONALLY EXAMINED AND REVIEWED THE PATIENT'S RECORD     Lab Results (last 48 hours)       Procedure Component Value Units Date/Time    Comprehensive Metabolic Panel [[id number]]  (Abnormal) Collected: 05/18/25 2233    Specimen: Blood from Arm, Right Updated: 05/18/25 2317     Glucose 135 mg/dL      BUN 35 mg/dL      Creatinine 1.85 mg/dL      Sodium 139 mmol/L      Potassium 4.7 mmol/L      Chloride 107 mmol/L      CO2 26.0 mmol/L      Calcium 8.4 mg/dL      Total Protein 5.7 g/dL      Albumin 3.2 g/dL      ALT (SGPT) 17 U/L      AST (SGOT) 21 U/L      Alkaline Phosphatase 54 U/L      Total Bilirubin <0.2 mg/dL      Globulin 2.5 gm/dL      A/G Ratio 1.3 g/dL      BUN/Creatinine Ratio 18.9     Anion Gap 6.0 mmol/L      eGFR 36.1 mL/min/1.73     Narrative:      GFR Categories in Chronic Kidney Disease (CKD)              GFR Category          GFR (mL/min/1.73)    Interpretation  G1                    90 or greater        Normal or high (1)  G2                    60-89                Mild decrease (1)  G3a                   45-59                Mild to moderate decrease  G3b                   30-44                Moderate to severe decrease  G4                    15-29                Severe decrease  G5                    14 or less           Kidney failure    (1)In the absence of evidence of kidney disease, neither GFR category G1 or G2 fulfill the criteria for CKD.    eGFR calculation 2021 CKD-EPI creatinine equation, which does not include race as a factor    Magnesium [[id number]]  (Normal) Collected: 05/18/25 2233    Specimen: Blood from Arm, Right Updated: 05/18/25 2317     Magnesium 2.3 mg/dL      Phosphorus [[id number]]  (Abnormal) Collected: 05/18/25 2233    Specimen: Blood from Arm, Right Updated: 05/18/25 2317     Phosphorus 2.2 mg/dL     CBC Auto Differential [[id number]]  (Abnormal) Collected: 05/18/25 2233    Specimen: Blood from Arm, Right Updated: 05/18/25 2255     WBC 11.66 10*3/mm3      RBC 3.71 10*6/mm3      Hemoglobin 9.9 g/dL      Hematocrit 32.4 %      MCV 87.3 fL      MCH 26.7 pg      MCHC 30.6 g/dL      RDW 17.2 %      RDW-SD 54.2 fl      MPV 10.7 fL      Platelets 210 10*3/mm3      Neutrophil % 70.6 %      Lymphocyte % 11.4 %      Monocyte % 12.8 %      Eosinophil % 0.3 %      Basophil % 0.3 %      Immature Grans % 4.6 %      Neutrophils, Absolute 8.23 10*3/mm3      Lymphocytes, Absolute 1.33 10*3/mm3      Monocytes, Absolute 1.49 10*3/mm3      Eosinophils, Absolute 0.04 10*3/mm3      Basophils, Absolute 0.03 10*3/mm3      Immature Grans, Absolute 0.54 10*3/mm3      nRBC 0.0 /100 WBC     POC Glucose Once [[id number]]  (Abnormal) Collected: 05/18/25 1951    Specimen: Blood Updated: 05/18/25 1952     Glucose 166 mg/dL      Comment: Serial Number: [id number]:  [id number]       Blood Culture - Blood, Arm, Left [[id number]]  (Normal) Collected: 05/16/25 1622    Specimen: Blood from Arm, Left Updated: 05/18/25 1645     Blood Culture No growth at 2 days    Blood Culture - Blood, Arm, Right [[id number]]  (Normal) Collected: 05/16/25 1629    Specimen: Blood from Arm, Right Updated: 05/18/25 1645     Blood Culture No growth at 2 days    POC Glucose Finger 4x Daily Before Meals & at Bedtime [[id number]]  (Abnormal) Collected: 05/18/25 1125    Specimen: Blood from Finger Updated: 05/18/25 1126     Glucose 157 mg/dL      Comment: Serial Number: [id number]:  [id number]       POC Glucose Finger 4x Daily Before Meals & at Bedtime [[id number]]  (Abnormal) Collected: 05/18/25 0829    Specimen: Blood from Finger Updated: 05/18/25 0832     Glucose 132 mg/dL      Comment: Serial Number:  [id number]:  [id number]       Comprehensive Metabolic Panel [[id number]]  (Abnormal) Collected: 05/18/25 0409    Specimen: Blood from Hand, Left Updated: 05/18/25 0501     Glucose 108 mg/dL      BUN 35 mg/dL      Creatinine 1.61 mg/dL      Sodium 136 mmol/L      Potassium 4.6 mmol/L      Chloride 105 mmol/L      CO2 25.7 mmol/L      Calcium 8.6 mg/dL      Total Protein 5.8 g/dL      Albumin 3.4 g/dL      ALT (SGPT) 10 U/L      AST (SGOT) 14 U/L      Alkaline Phosphatase 44 U/L      Total Bilirubin <0.2 mg/dL      Globulin 2.4 gm/dL      A/G Ratio 1.4 g/dL      BUN/Creatinine Ratio 21.7     Anion Gap 5.3 mmol/L      eGFR 42.7 mL/min/1.73     Narrative:      GFR Categories in Chronic Kidney Disease (CKD)              GFR Category          GFR (mL/min/1.73)    Interpretation  G1                    90 or greater        Normal or high (1)  G2                    60-89                Mild decrease (1)  G3a                   45-59                Mild to moderate decrease  G3b                   30-44                Moderate to severe decrease  G4                    15-29                Severe decrease  G5                    14 or less           Kidney failure    (1)In the absence of evidence of kidney disease, neither GFR category G1 or G2 fulfill the criteria for CKD.    eGFR calculation 2021 CKD-EPI creatinine equation, which does not include race as a factor    Magnesium [[id number]]  (Normal) Collected: 05/18/25 0409    Specimen: Blood from Hand, Left Updated: 05/18/25 0501     Magnesium 2.4 mg/dL     Phosphorus [[id number]]  (Normal) Collected: 05/18/25 0409    Specimen: Blood from Hand, Left Updated: 05/18/25 0501     Phosphorus 2.6 mg/dL     CBC Auto Differential [[id number]]  (Abnormal) Collected: 05/18/25 0409    Specimen: Blood from Hand, Left Updated: 05/18/25 0444     WBC 12.72 10*3/mm3      RBC 4.01 10*6/mm3      Hemoglobin 10.8 g/dL      Hematocrit 35.2 %      MCV 87.8 fL      MCH 26.9 pg      MCHC 30.7 g/dL       RDW 17.1 %      RDW-SD 54.9 fl      MPV 11.1 fL      Platelets 228 10*3/mm3      Neutrophil % 75.0 %      Lymphocyte % 12.3 %      Monocyte % 8.9 %      Eosinophil % 0.1 %      Basophil % 0.3 %      Immature Grans % 3.4 %      Neutrophils, Absolute 9.55 10*3/mm3      Lymphocytes, Absolute 1.56 10*3/mm3      Monocytes, Absolute 1.13 10*3/mm3      Eosinophils, Absolute 0.01 10*3/mm3      Basophils, Absolute 0.04 10*3/mm3      Immature Grans, Absolute 0.43 10*3/mm3      nRBC 0.0 /100 WBC     POC Glucose Once [[id number]]  (Abnormal) Collected: 05/17/25 2117    Specimen: Blood Updated: 05/17/25 2119     Glucose 143 mg/dL      Comment: Serial Number: [id number]:  [id number]       POC Glucose Finger 4x Daily Before Meals & at Bedtime [[id number]]  (Abnormal) Collected: 05/17/25 1817    Specimen: Blood from Finger Updated: 05/17/25 1818     Glucose 132 mg/dL      Comment: Serial Number: [id number]:  [id number]       POC Glucose Finger 4x Daily Before Meals & at Bedtime [[id number]]  (Abnormal) Collected: 05/17/25 1120    Specimen: Blood from Finger Updated: 05/17/25 1122     Glucose 127 mg/dL      Comment: Serial Number: [id number]:  [id number]       POC Glucose Finger 4x Daily Before Meals & at Bedtime [[id number]]  (Abnormal) Collected: 05/17/25 0809    Specimen: Blood from Finger Updated: 05/17/25 0811     Glucose 135 mg/dL      Comment: Serial Number: [id number]:  [id number]                Imaging Results (Last 48 Hours)       ** No results found for the last 48 hours. **                 I reviewed the patient's new clinical results.    Past Medical History:   Diagnosis Date    Alcohol abuse 07/13/2022    Essential hypertension 07/13/2022    Gait instability 07/13/2022    GERD without esophagitis 07/13/2022    Gout 07/13/2022    Hypothyroidism (acquired) 07/13/2022    Onychomycosis of multiple toenails with type 2 diabetes mellitus 07/13/2022    Sleep apnea     Stage 3a chronic kidney disease  07/13/2022    Tinea pedis of both feet 07/13/2022    Type 2 diabetes mellitus with diabetic neuropathy, without long-term current use of insulin 07/13/2022     Past Surgical History:   Procedure Laterality Date    ORTHOPEDIC SURGERY      fractured ankle and dislocated shoulder         Medication Review:   Scheduled Meds:allopurinol, 200 mg, Oral, Daily  apixaban, 2.5 mg, Oral, Q12H  cefdinir, 300 mg, Oral, Q12H  cholestyramine light, 1 packet, Oral, Daily  FLUoxetine, 10 mg, Oral, Daily  folic acid, 1 mg, Oral, Daily  hydrALAZINE, 50 mg, Oral, BID  ipratropium, 0.5 mg, Nebulization, TID - RT  levothyroxine, 175 mcg, Oral, Q AM  metoprolol succinate XL, 25 mg, Oral, Q24H  mineral oil-hydrophilic petrolatum, 1 Application, Topical, Daily  pantoprazole, 40 mg, Oral, Daily  saccharomyces boulardii, 250 mg, Oral, BID  sodium bicarbonate, 1,300 mg, Oral, TID  sodium chloride, 10 mL, Intravenous, Q12H  terazosin, 1 mg, Oral, Daily  Vitamin B1, 100 mg, Oral, Daily      Continuous Infusions:   PRN Meds:.  acetaminophen **OR** acetaminophen **OR** acetaminophen    aluminum-magnesium hydroxide-simethicone    senna-docusate sodium **AND** polyethylene glycol **AND** bisacodyl **AND** bisacodyl    calcium carbonate    nitroglycerin    ondansetron ODT **OR** ondansetron    sodium chloride    sodium chloride     Assessment & Plan   ACUTE ON CHRONIC CKD 3A WITH MILD DEHYDRATION---DR SANCHES TO CONSULT; CREATININE UP A BIT FURTHER THIS AM; HOLDING REHAB PLACEMENT WITH THIS AND BP ELEVATION; SLIGHTLY DRY AND ON 60CC/HR NS; BP DROPPED TOO LOW WITH CARDIAZEM AND IT WAS DISCONTINUED BY ME. HEART RATE DROPPED TO 50'S WITH THE METOPROLOL ER 25MG AND I DECREASED THE DOSE TO 12.5MG DAILY     COVID 19 WITH CONTINUED POSITIVE TESTING--ACUTE RESPIRATORY FAILURE---SUPPORTIVE CARE; BIPAP; OXYGEN, AND DR DRAW TO CONSULT  ROCEPHIN DISCONTINUED AND NOW ON 7 DAYS OF CEFDINIR  NEBS, LOW DOSE STEROIDS FOR BRONCHOSPASM; DR WALL TO CONSULT;  IMPROVED     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; STABLE     CHRONIC VENOUS STASIS AND MILD CELLULITIS WITH RASH; WILL REFER FOR BIOPSY LATER ON     DEPRESSION---STABLE WITH PROZAC     CHRONIC ETOH ABUSE---CONTROLLED BY DAUGHTER     HTN---STABLE     GERD---ON PPI     HYPOTHYROIDISM---EUTHYROID ON SYNTHROID     A FIB---ON LOVENOX FOR THIS AND DVT PROPHYLAXIS; DR MITTAL TO CONSULT; AS FALL RISK, WILL HAVE TO FACTOR THIS IN WITH ANTICOAGULATION PLANS; HAD A FIB YEARS AGO AND NO RECURRENCE NOTED UNTIL NOW; RATE CONTROLLED; CONSIDERING AMIODARONE AND CARDIOVERSION IN 2 MONTHS; NOW ON REDUCED DOSE OF THE METOPROLOL ER TO 12.5MG DAILY    HTN---CARDIOLOGY TO ADDRESS TODAY; BP TOO LOW WITH ALL MEDS ORDERED BETWEEN CARDIOLOGY AND NEPHROLOGY; NOW ON HYDRALAZINE TID, LOWER DOSE METOPROLOL ER BEGINNING IN THE AM AND NO CARDIAZEM     HYPOMAGNESEMIA---REPLACING PRN     ANEMIA OF CKD--STABLE BUT INCREASED ; DR SANCHES MANAGING     ELEVATED D DIMER---NO PULM EMBOLI ON CT; SEQUELA FROM COVID INFECTION     HYPOPHOSPHATEMIA---DR BOWEN REPLACING     LACTIC ACIDOSIS---COVERED WITH ATB; AND DR WALL ADVISING     GENERAL WEAKNESS POST COVID AND WITH CURRENT ILLNESS---PT/OT; [facility] FOR DISCHARGE UNTIL READY TO GET BACK HOME WITH TREMENDOUS FAMILY CAREGIVER SUPPORT     FULL CODE STATUS---VERIFIED WITH DAUGHTER ON ADMISSION  Principal Problem:    Acute and chronic respiratory failure (acute-on-chronic)          Plan for disposition:TO [facility] THIS WEEK, JUST NOT TODAY; LOOKING AT WEDNESDAY TO BE SURE CREATININE, BP, AND HR STABLE ON CURRENT MEDS    Montse Dawkins MD  05/19/25  07:28 EDT

## 2025-05-20 LAB
GLUCOSE BLDC GLUCOMTR-MCNC: 111 MG/DL (ref 70–105)
GLUCOSE BLDC GLUCOMTR-MCNC: 135 MG/DL (ref 70–105)
GLUCOSE BLDC GLUCOMTR-MCNC: 167 MG/DL (ref 70–105)

## 2025-05-20 PROCEDURE — 82948 REAGENT STRIP/BLOOD GLUCOSE: CPT | Performed by: FAMILY MEDICINE

## 2025-05-20 PROCEDURE — 94799 UNLISTED PULMONARY SVC/PX: CPT

## 2025-05-20 PROCEDURE — 99232 SBSQ HOSP IP/OBS MODERATE 35: CPT | Performed by: INTERNAL MEDICINE

## 2025-05-20 PROCEDURE — 25810000003 SODIUM CHLORIDE 0.9 % SOLUTION: Performed by: INTERNAL MEDICINE

## 2025-05-20 PROCEDURE — 25010000002 CALCIUM GLUCONATE-NACL 1-0.675 GM/50ML-% SOLUTION: Performed by: INTERNAL MEDICINE

## 2025-05-20 PROCEDURE — 82948 REAGENT STRIP/BLOOD GLUCOSE: CPT

## 2025-05-20 PROCEDURE — 94664 DEMO&/EVAL PT USE INHALER: CPT

## 2025-05-20 PROCEDURE — 94761 N-INVAS EAR/PLS OXIMETRY MLT: CPT

## 2025-05-20 RX ORDER — CALCIUM GLUCONATE 20 MG/ML
1000 INJECTION, SOLUTION INTRAVENOUS ONCE
Status: COMPLETED | OUTPATIENT
Start: 2025-05-20 | End: 2025-05-20

## 2025-05-20 RX ADMIN — APIXABAN 2.5 MG: 2.5 TABLET, FILM COATED ORAL at 20:49

## 2025-05-20 RX ADMIN — LEVOTHYROXINE SODIUM 175 MCG: 0.03 TABLET ORAL at 05:16

## 2025-05-20 RX ADMIN — SODIUM BICARBONATE 1300 MG: 650 TABLET ORAL at 07:32

## 2025-05-20 RX ADMIN — WHITE PETROLATUM 1 APPLICATION: 1.75 OINTMENT TOPICAL at 07:39

## 2025-05-20 RX ADMIN — Medication 1 APPLICATION: at 20:50

## 2025-05-20 RX ADMIN — CEFDINIR 300 MG: 300 CAPSULE ORAL at 07:33

## 2025-05-20 RX ADMIN — FLUOXETINE HYDROCHLORIDE 10 MG: 10 CAPSULE ORAL at 07:34

## 2025-05-20 RX ADMIN — SODIUM BICARBONATE 1300 MG: 650 TABLET ORAL at 20:49

## 2025-05-20 RX ADMIN — HYDRALAZINE HYDROCHLORIDE 100 MG: 25 TABLET ORAL at 20:49

## 2025-05-20 RX ADMIN — CEFDINIR 300 MG: 300 CAPSULE ORAL at 20:49

## 2025-05-20 RX ADMIN — ALLOPURINOL 200 MG: 100 TABLET ORAL at 07:33

## 2025-05-20 RX ADMIN — SODIUM CHLORIDE 60 ML/HR: 9 INJECTION, SOLUTION INTRAVENOUS at 02:22

## 2025-05-20 RX ADMIN — Medication 100 MG: at 07:33

## 2025-05-20 RX ADMIN — FOLIC ACID 1 MG: 1 TABLET ORAL at 07:32

## 2025-05-20 RX ADMIN — TERAZOSIN HYDROCHLORIDE 1 MG: 1 CAPSULE ORAL at 07:34

## 2025-05-20 RX ADMIN — IPRATROPIUM BROMIDE 0.5 MG: 0.5 SOLUTION RESPIRATORY (INHALATION) at 08:06

## 2025-05-20 RX ADMIN — Medication 500 ML: at 05:16

## 2025-05-20 RX ADMIN — METOPROLOL SUCCINATE 12.5 MG: 25 TABLET, EXTENDED RELEASE ORAL at 07:32

## 2025-05-20 RX ADMIN — IPRATROPIUM BROMIDE 0.5 MG: 0.5 SOLUTION RESPIRATORY (INHALATION) at 20:07

## 2025-05-20 RX ADMIN — Medication 1 APPLICATION: at 15:09

## 2025-05-20 RX ADMIN — PANTOPRAZOLE SODIUM 40 MG: 40 TABLET, DELAYED RELEASE ORAL at 07:34

## 2025-05-20 RX ADMIN — Medication 250 MG: at 20:49

## 2025-05-20 RX ADMIN — Medication 10 ML: at 07:32

## 2025-05-20 RX ADMIN — Medication 10 ML: at 20:49

## 2025-05-20 RX ADMIN — HYDRALAZINE HYDROCHLORIDE 100 MG: 25 TABLET ORAL at 15:11

## 2025-05-20 RX ADMIN — Medication 1 APPLICATION: at 05:17

## 2025-05-20 RX ADMIN — Medication 250 MG: at 07:33

## 2025-05-20 RX ADMIN — CALCIUM GLUCONATE 1000 MG: 20 INJECTION, SOLUTION INTRAVENOUS at 09:07

## 2025-05-20 RX ADMIN — Medication 500 ML: at 15:09

## 2025-05-20 RX ADMIN — HYDRALAZINE HYDROCHLORIDE 100 MG: 25 TABLET ORAL at 07:33

## 2025-05-20 RX ADMIN — IPRATROPIUM BROMIDE 0.5 MG: 0.5 SOLUTION RESPIRATORY (INHALATION) at 15:34

## 2025-05-20 RX ADMIN — APIXABAN 2.5 MG: 2.5 TABLET, FILM COATED ORAL at 07:32

## 2025-05-20 RX ADMIN — SODIUM BICARBONATE 1300 MG: 650 TABLET ORAL at 15:11

## 2025-05-20 NOTE — PROGRESS NOTES
Daily Progress Note          Assessment    Respiratory panel positive for COVID-19 however it was initially diagnosed on April 5, 2025 treated with outpatient Paxlovid as well as azithromycin  CT chest did not show any alveolar infiltrates  No PE  Patient is fully vaccinated   Skin Purpura  History of C. Difficile     Hypothyroidism  Gout  GERD  Hypertension  Diabetes mellitus           Recommendations:     The oxygen status has improved significantly, patient can be discharged from pulmonary standpoint to rehab    oxygen supplement and titration to maintain saturation 90-95%: Currently on room air      Completed predisone      Empiric antibiotic currently on cefdinir  Bronchodilator budesonide and ipratropium to avoid tachycardia  Thyroid replacement  Anticoagulation: Eliquis    HR control as per cardiology: Currently on metoprolol     Fluid and electrolyte management as per nephrology                LOS: 5 days     Subjective     Generalized weakness and mild shortness of breath    Objective     Vital signs for last 24 hours:  Vitals:    05/20/25 0354 05/20/25 0500 05/20/25 0806 05/20/25 0810   BP: 160/87      BP Location: Left arm      Patient Position: Lying      Pulse: 62  73 76   Resp: 27  22 23   Temp: 97.6 °F (36.4 °C)      TempSrc: Temporal      SpO2: 94%  95% 94%   Weight:  125 kg (274 lb 14.6 oz)     Height:           Intake/Output last 3 shifts:  I/O last 3 completed shifts:  In: 1260 [P.O.:1260]  Out: 1300 [Urine:1300]  Intake/Output this shift:  I/O this shift:  In: 240 [P.O.:240]  Out: -       Radiology  Imaging Results (Last 24 Hours)       ** No results found for the last 24 hours. **            Labs:  Results from last 7 days   Lab Units 05/19/25  2210   WBC 10*3/mm3 11.09*   HEMOGLOBIN g/dL 10.8*   HEMATOCRIT % 35.0*   PLATELETS 10*3/mm3 203     Results from last 7 days   Lab Units 05/19/25  2210   SODIUM mmol/L 139   POTASSIUM mmol/L 4.6   CHLORIDE mmol/L 105   CO2 mmol/L 27.1   BUN mg/dL 31*    CREATININE mg/dL 1.49*   CALCIUM mg/dL 8.6   BILIRUBIN mg/dL 0.2   ALK PHOS U/L 59   ALT (SGPT) U/L 29   AST (SGOT) U/L 21   GLUCOSE mg/dL 134*     Results from last 7 days   Lab Units 05/15/25  0121   PH, ARTERIAL pH units 7.372   PO2 ART mm Hg 102.6   PCO2, ARTERIAL mm Hg 37.8   HCO3 ART mmol/L 22.0     Results from last 7 days   Lab Units 05/19/25  2210 05/18/25  2233 05/18/25  0409   ALBUMIN g/dL 3.3* 3.2* 3.4*     Results from last 7 days   Lab Units 05/14/25  2256 05/14/25  2122   CK TOTAL U/L 44  --    HSTROP T ng/L 33* 32*         Results from last 7 days   Lab Units 05/19/25  2210   MAGNESIUM mg/dL 2.2         Results from last 7 days   Lab Units 05/14/25  2256   TSH uIU/mL 1.080           Meds:   SCHEDULE  allopurinol, 200 mg, Oral, Daily  aluminum sulfate-calcium acetate 1:20, 500 mL, Topical, Q8H  apixaban, 2.5 mg, Oral, Q12H  cefdinir, 300 mg, Oral, Q12H  cholestyramine light, 1 packet, Oral, Daily  FLUoxetine, 10 mg, Oral, Daily  folic acid, 1 mg, Oral, Daily  hydrALAZINE, 100 mg, Oral, TID  hydrocortisone-bacitracin-zinc oxide-nystatin, 1 Application, Topical, Q8H  ipratropium, 0.5 mg, Nebulization, TID - RT  levothyroxine, 175 mcg, Oral, Q AM  metoprolol succinate XL, 12.5 mg, Oral, Q24H  mineral oil-hydrophilic petrolatum, 1 Application, Topical, Daily  pantoprazole, 40 mg, Oral, Daily  saccharomyces boulardii, 250 mg, Oral, BID  sodium bicarbonate, 1,300 mg, Oral, TID  sodium chloride, 10 mL, Intravenous, Q12H  terazosin, 1 mg, Oral, Daily  Vitamin B1, 100 mg, Oral, Daily      Infusions         PRNs    acetaminophen **OR** acetaminophen **OR** acetaminophen    aluminum-magnesium hydroxide-simethicone    senna-docusate sodium **AND** polyethylene glycol **AND** bisacodyl **AND** bisacodyl    calcium carbonate    nitroglycerin    ondansetron ODT **OR** ondansetron    sodium chloride    sodium chloride    Physical Exam:  General Appearance:  Alert   HEENT:  Normocephalic, without obvious  abnormality, Conjunctiva/corneas clear,.   Nares normal, no drainage     Neck:  Supple, symmetrical, trachea midline.   Lungs /Chest wall:   minimal bilateral basal rhonchi, respirations unlabored, symmetrical wall movement.     Heart: Heart rate controlled, S1 S2 normal  Abdomen: Soft, non-tender, no masses, no organomegaly.    Extremities: Trace edema, no clubbing or cyanosis     ROS  Constitutional: Negative for chills, fever and malaise/fatigue.   HENT: Negative.    Eyes: Negative.    Cardiovascular: Negative.    Respiratory: Positive for mild cough and shortness of breath.    Skin: Negative.    Musculoskeletal: Negative.    Gastrointestinal: Negative.    Genitourinary: Negative.    Neurological: Generalized weakness      I reviewed the recent clinical results  I personally reviewed the latest radiological studies    Part of this note may be an electronic transcription/translation of spoken language to printed text using the Dragon Dictation System.

## 2025-05-20 NOTE — SIGNIFICANT NOTE
05/20/25 1653   OTHER   Discipline physical therapist   Rehab Time/Intention   Session Not Performed patient/family declined treatment  (declines therapy due to fatigue, dyspneic at rest)   Recommendation   PT - Next Appointment 05/21/25

## 2025-05-20 NOTE — PROGRESS NOTES
"NEPHROLOGY PROGRESS NOTE------[facility]    [facility]  [phone]  Ines Barahona MD      Patient Care Team:  Montse Dawkins MD as PCP - General (Family Medicine)  Huey Keyes MD as Consulting Physician (Nephrology)      Provider:  Ines Barahona MD  Patient Name: Rommel Mcfarland  :  1945    SUBJECTIVE:    F/U ARF/SARAH/CRF/CKD    Weak. No more bradycardia. No dysuria. No angina.     Medication:  allopurinol, 200 mg, Oral, Daily  aluminum sulfate-calcium acetate 1:20, 500 mL, Topical, Q8H  apixaban, 2.5 mg, Oral, Q12H  cefdinir, 300 mg, Oral, Q12H  cholestyramine light, 1 packet, Oral, Daily  FLUoxetine, 10 mg, Oral, Daily  folic acid, 1 mg, Oral, Daily  hydrALAZINE, 100 mg, Oral, TID  hydrocortisone-bacitracin-zinc oxide-nystatin, 1 Application, Topical, Q8H  ipratropium, 0.5 mg, Nebulization, TID - RT  levothyroxine, 175 mcg, Oral, Q AM  metoprolol succinate XL, 12.5 mg, Oral, Q24H  mineral oil-hydrophilic petrolatum, 1 Application, Topical, Daily  pantoprazole, 40 mg, Oral, Daily  saccharomyces boulardii, 250 mg, Oral, BID  sodium bicarbonate, 1,300 mg, Oral, TID  sodium chloride, 10 mL, Intravenous, Q12H  terazosin, 1 mg, Oral, Daily  Vitamin B1, 100 mg, Oral, Daily      sodium chloride, 60 mL/hr, Last Rate: 60 mL/hr (25)          OBJECTIVE    Vital Sign Min/Max for last 24 hours  Temp  Min: 97.4 °F (36.3 °C)  Max: 98.2 °F (36.8 °C)   BP  Min: 101/55  Max: 164/78   Pulse  Min: 52  Max: 87   Resp  Min: 10  Max: 27   SpO2  Min: 94 %  Max: 97 %   No data recorded   Weight  Min: 125 kg (274 lb 14.6 oz)  Max: 125 kg (274 lb 14.6 oz)     Flowsheet Rows      Flowsheet Row First Filed Value   Admission Height 170.2 cm (67\") Documented at 2025   Admission Weight 114 kg (251 lb 12.3 oz) Documented at 2025            No intake/output data recorded.  I/O last 3 completed shifts:  In: 1260 " "[P.O.:1260]  Out: 1300 [Urine:1300]    Physical Exam:  General Appearance: alert, appears stated age and cooperative  Head: normocephalic, without obvious abnormality and atraumatic  Eyes: conjunctivae and sclerae normal and no icterus  Neck: supple and no JVD  Lungs: SCATTERED RHONCHI  Heart: regular rhythm & normal rate and normal S1, S2 +BALDOMERO  Chest: Wall no abnormalities observed  Abdomen: normal bowel sounds and soft, nontender  Extremities: moves extremities well, no edema, no cyanosis and no redness  Skin: no bleeding, bruising or rash, turgor normal, color normal and no lesions noted  Neurologic: Alert, and oriented. No focal deficits    Labs:    WBC WBC   Date Value Ref Range Status   05/19/2025 11.09 (H) 3.40 - 10.80 10*3/mm3 Final   05/18/2025 11.66 (H) 3.40 - 10.80 10*3/mm3 Final   05/18/2025 12.72 (H) 3.40 - 10.80 10*3/mm3 Final      HGB Hemoglobin   Date Value Ref Range Status   05/19/2025 10.8 (L) 13.0 - 17.7 g/dL Final   05/18/2025 9.9 (L) 13.0 - 17.7 g/dL Final   05/18/2025 10.8 (L) 13.0 - 17.7 g/dL Final      HCT Hematocrit   Date Value Ref Range Status   05/19/2025 35.0 (L) 37.5 - 51.0 % Final   05/18/2025 32.4 (L) 37.5 - 51.0 % Final   05/18/2025 35.2 (L) 37.5 - 51.0 % Final      Platelets No results found for: \"LABPLAT\"   MCV MCV   Date Value Ref Range Status   05/19/2025 87.3 79.0 - 97.0 fL Final   05/18/2025 87.3 79.0 - 97.0 fL Final   05/18/2025 87.8 79.0 - 97.0 fL Final          Sodium Sodium   Date Value Ref Range Status   05/19/2025 139 136 - 145 mmol/L Final   05/18/2025 139 136 - 145 mmol/L Final   05/18/2025 136 136 - 145 mmol/L Final      Potassium Potassium   Date Value Ref Range Status   05/19/2025 4.6 3.5 - 5.2 mmol/L Final   05/18/2025 4.7 3.5 - 5.2 mmol/L Final   05/18/2025 4.6 3.5 - 5.2 mmol/L Final      Chloride Chloride   Date Value Ref Range Status   05/19/2025 105 98 - 107 mmol/L Final   05/18/2025 107 98 - 107 mmol/L Final   05/18/2025 105 98 - 107 mmol/L Final      CO2 CO2 " "  Date Value Ref Range Status   05/19/2025 27.1 22.0 - 29.0 mmol/L Final   05/18/2025 26.0 22.0 - 29.0 mmol/L Final   05/18/2025 25.7 22.0 - 29.0 mmol/L Final      BUN BUN   Date Value Ref Range Status   05/19/2025 31 (H) 8 - 23 mg/dL Final   05/18/2025 35 (H) 8 - 23 mg/dL Final   05/18/2025 35 (H) 8 - 23 mg/dL Final      Creatinine Creatinine   Date Value Ref Range Status   05/19/2025 1.49 (H) 0.76 - 1.27 mg/dL Final   05/18/2025 1.85 (H) 0.76 - 1.27 mg/dL Final   05/18/2025 1.61 (H) 0.76 - 1.27 mg/dL Final      Calcium Calcium   Date Value Ref Range Status   05/19/2025 8.6 8.6 - 10.5 mg/dL Final   05/18/2025 8.4 (L) 8.6 - 10.5 mg/dL Final   05/18/2025 8.6 8.6 - 10.5 mg/dL Final      PO4 No components found for: \"PO4\"   Albumin Albumin   Date Value Ref Range Status   05/19/2025 3.3 (L) 3.5 - 5.2 g/dL Final   05/18/2025 3.2 (L) 3.5 - 5.2 g/dL Final   05/18/2025 3.4 (L) 3.5 - 5.2 g/dL Final      Magnesium Magnesium   Date Value Ref Range Status   05/19/2025 2.2 1.6 - 2.4 mg/dL Final   05/18/2025 2.3 1.6 - 2.4 mg/dL Final   05/18/2025 2.4 1.6 - 2.4 mg/dL Final      Uric Acid No components found for: \"URIC ACID\"     Imaging Results (Last 72 Hours)       ** No results found for the last 72 hours. **            Results for orders placed during the hospital encounter of 05/14/25    XR Chest 1 View    Narrative  XR CHEST 1 VW    Date of Exam: 5/14/2025 9:36 PM EDT    Indication: Dyspnea, wheezing    Comparison: 2/1/2016    Findings:  Stable mild cardiomegaly, exaggerated by technique. Lungs without consolidation. Negative for pneumothorax or effusion. Osseous structures grossly intact. Patient's chin overlies the lung apices medially partially limiting assessment.    Impression  Impression:  No acute process.          Electronically Signed: Renny Johnson MD  5/14/2025 10:21 PM EDT  Workstation ID: [id number]      Results for orders placed during the hospital encounter of 03/27/25    XR Tibia Fibula 2 View " Right    Narrative  XR TIBIA FIBULA 2 VW RIGHT    Date of Exam: 3/29/2025 10:17 AM EDT    Indication: AND RIGHT ANKLE TO SEE IF HARDWARE IS PRESENT (PINS) FROM PRIOR FRACTURE    Comparison: None available.    Findings:  Remote posttraumatic and postoperative changes of the distal tibia and fibula are noted. 2 orthopedic fixation screws are observed. There are no signs of hardware failure or loosening. There is no acute displaced fracture or malalignment. Chronic changes  are noted. Atherosclerotic vascular calcifications are noted. Chronic soft tissue changes are also observed.    Impression  Impression:  1.Remote posttraumatic and postoperative changes of the distal tibia and fibula. 2 orthopedic fixation screws are identified. There are no signs of hardware failure or loosening.  2.No evidence for acute displaced fracture or malalignment.        Electronically Signed: Riley Walls MD  3/29/2025 12:53 PM EDT  Workstation ID: [id number]      Results for orders placed during the hospital encounter of 09/15/23    XR Foot 3+ View Right    Narrative  XR FOOT 3+ VW RIGHT    Date of Exam: 9/15/2023 2:30 PM EDT    Indication: wound, infection    Comparison: 7/9/2022    Findings:  Bones are diffusely osteopenic. There is no discrete area of osseous erosion to indicate a site of osteomyelitis. Postsurgical changes of screw fixation noted at the distal tibia and fibula similar to the prior exam. Extensive small vessel vascular  calcifications. Soft tissue swelling overlying the dorsum of the foot which may relate to edema and/or cellulitis.    Impression  Impression:  1. No discrete area of osseous erosion to indicate osteomyelitis.  2. Soft tissue swelling overlying the dorsum of the foot may relate to edema and/or cellulitis.  3. Diffuse osteopenia.  4. Extensive small vessel vascular calcifications.        Electronically Signed: Renny Johnson MD  9/15/2023 2:35 PM EDT  Workstation ID: [id number]      Results for orders  placed during the hospital encounter of 05/14/25    Duplex Venous Lower Extremity - Bilateral CAR    Interpretation Summary    Chronic left lower extremity superficial thrombophlebitis noted in the great saphenous (below knee) and small saphenous.    All other veins appeared normal bilaterally.        ASSESSMENT / PLAN      Acute and chronic respiratory failure (acute-on-chronic)      ARF/SARAH/CRF/CKD----Nonoliguric. +ARF/SARAH on top of known CRF/CKD STG 3A secondary to HTN NS. Clinically prerenal (BNP not clinically accurate) and s/p IV dye exposure . No NSAIDs or IV dye. Dose meds for CrCl less than 10 cc/min until ARF/SARAH is resolved. Follow off of IVFs     2. COVID 19 + PNA/ACUTE HYPOXIC RESPIRATORY FAILURE------On BiPap, steroids, oxygen, nebs per Pulmonary . Was initially COVID 19 + at the beginning of April     3. OA/DJD/GOUT/HYPERURICEMIA-----On Allopurinol. No NSAIDs. Uric ok     4. BILATERAL LE EDEMA/CHRONIC VENOUS INSUFFICIENCY/WOUNDS/CELLULITIS------Wound care.       5. DEPRESSION-----Ok to continue SSRI in the form of Prozac for now     6. ETOH ABUSE------Counseled     7. HTN WITH CKD-----BP better with increased Hydralazine a little and follow. Avoid hypotension. No ACE-I/ARB/DRI     8. GERD/PUD PROPHYLAXIS------On PPI. Benefits outweigh risks despite CKD     9. HYPOTHYROIDISM------On Synthroid. TSH ok     10. DVT PROPHYLAXIS------Lovenox     11. DEPRESSION------On Prozac     12. HYPOMAGNESEMIA------Replaced     13. CHRONIC IBS------On Florastor     14. ANEMIA OF CKD-----IV iron for LAMNI. HgB above threshold for EPO     15. KETONURIA/DEHYDRATION-----IV NS. BNP not clinically accurate     16. ELEVATED D-DIMER-----CT PE negative. Likely elevation is from COVID. Repeat Bilateral LE venous dopplers negative    17. HYPOPHOSPHATEMIA-------Replaced    18. LACTIC ACIDOSIS--------Better    19. REHAB PLACEMENT     20. HYPOCALCEMIA------Replace IV       Ines Barahoan MD  [facility]  [phone]  05/20/25  07:48 EDT

## 2025-05-20 NOTE — PROGRESS NOTES
Cardiology [place]        LOS:  LOS: 5 days   Patient Name: Rommel Mcfarland  Age/Sex: 79 y.o. male  : 1945  MRN: [id number]    Day of Service: 25   Length of Stay: 5  Encounter Provider: NORM Hess  Place of Service: [facility] CARDIOLOGY  Patient Care Team:  Montse Dawkins MD as PCP - General (Family Medicine)  Huey Keyes MD as Consulting Physician (Nephrology)    Subjective:     Chief Complaint: f/u afib / shortness of breath    Subjective: patient awaiting d/c to [facility] rehab. Afib with controlled rates, creatinine improved    Current Medications:   Scheduled Meds:allopurinol, 200 mg, Oral, Daily  aluminum sulfate-calcium acetate 1:20, 500 mL, Topical, Q8H  apixaban, 2.5 mg, Oral, Q12H  cefdinir, 300 mg, Oral, Q12H  cholestyramine light, 1 packet, Oral, Daily  FLUoxetine, 10 mg, Oral, Daily  folic acid, 1 mg, Oral, Daily  hydrALAZINE, 100 mg, Oral, TID  hydrocortisone-bacitracin-zinc oxide-nystatin, 1 Application, Topical, Q8H  ipratropium, 0.5 mg, Nebulization, TID - RT  levothyroxine, 175 mcg, Oral, Q AM  metoprolol succinate XL, 12.5 mg, Oral, Q24H  mineral oil-hydrophilic petrolatum, 1 Application, Topical, Daily  pantoprazole, 40 mg, Oral, Daily  saccharomyces boulardii, 250 mg, Oral, BID  sodium bicarbonate, 1,300 mg, Oral, TID  sodium chloride, 10 mL, Intravenous, Q12H  terazosin, 1 mg, Oral, Daily  Vitamin B1, 100 mg, Oral, Daily      Continuous Infusions:     Allergies:  Allergies   Allergen Reactions    Tigecycline Swelling and Rash       Review of Systems   Constitutional: Negative for chills and fever.   HENT:  Negative for ear discharge and nosebleeds.    Eyes:  Negative for discharge and redness.   Cardiovascular:  Negative for chest pain, orthopnea, palpitations, paroxysmal nocturnal dyspnea and syncope.   Respiratory:  Positive for shortness of breath. Negative for cough and wheezing.    Endocrine: Negative for heat intolerance.    Skin:  Negative for rash.   Musculoskeletal:  Negative for arthritis and myalgias.   Gastrointestinal:  Negative for abdominal pain, melena, nausea and vomiting.   Genitourinary:  Negative for dysuria and hematuria.   Neurological:  Negative for dizziness, light-headedness, numbness and tremors.   Psychiatric/Behavioral:  Negative for depression. The patient is not nervous/anxious.        Objective:     Temp:  [97.4 °F (36.3 °C)-98.2 °F (36.8 °C)] 97.6 °F (36.4 °C)  Heart Rate:  [52-87] 76  Resp:  [10-27] 23  BP: (101-160)/(55-87) 160/87     Intake/Output Summary (Last 24 hours) at 5/20/2025 1053  Last data filed at 5/20/2025 0900  Gross per 24 hour   Intake 480 ml   Output 800 ml   Net -320 ml     Body mass index is 43.06 kg/m².      05/18/25  0350 05/19/25  0348 05/20/25  0500   Weight: 118 kg (259 lb 0.7 oz) 117 kg (257 lb 15 oz) 125 kg (274 lb 14.6 oz)         General Appearance:    Alert, cooperative, in no acute distress                                Head: Atraumatic, normocephalic, PERRLA               Neck:   supple, no JVD   Lungs:     respirations regular, even and unlabored    Heart:    irregular rhythm and normal rate, normal S1 and S2, no       murmur, no gallop, no rub, no click   Abdomen:     Normal bowel sounds, no masses, no organomegaly, soft  nontender, nondistended, no guarding, no rebound  tenderness   Extremities:   Moves all extremities well,  edema, no cyanosis, no  redness   Pulses:   Pulses palpable and equal bilaterally   Skin:   No bleeding, bruising or rash   Neurologic:   Awake, alert, oriented x3         Lab Review:   Results from last 7 days   Lab Units 05/19/25  2210 05/18/25  2233   SODIUM mmol/L 139 139   POTASSIUM mmol/L 4.6 4.7   CHLORIDE mmol/L 105 107   CO2 mmol/L 27.1 26.0   BUN mg/dL 31* 35*   CREATININE mg/dL 1.49* 1.85*   GLUCOSE mg/dL 134* 135*   CALCIUM mg/dL 8.6 8.4*   AST (SGOT) U/L 21 21   ALT (SGPT) U/L 29 17     Results from last 7 days   Lab Units 05/14/25  6400  "05/14/25 2122   CK TOTAL U/L 44  --    HSTROP T ng/L 33* 32*     Results from last 7 days   Lab Units 05/19/25  2210 05/18/25  2233   WBC 10*3/mm3 11.09* 11.66*   HEMOGLOBIN g/dL 10.8* 9.9*   HEMATOCRIT % 35.0* 32.4*   PLATELETS 10*3/mm3 203 210         Results from last 7 days   Lab Units 05/19/25  2210 05/18/25  2233   MAGNESIUM mg/dL 2.2 2.3           Invalid input(s): \"LDLCALC\"  Results from last 7 days   Lab Units 05/14/25  2122   PROBNP pg/mL 3,853.0*     Results from last 7 days   Lab Units 05/14/25  2256   TSH uIU/mL 1.080       Recent Radiology:  Imaging Results (Most Recent)       Procedure Component Value Units Date/Time    US Renal Bilateral [[id number]] Collected: 05/15/25 1456     Updated: 05/15/25 1459    Narrative:      US RENAL BILATERAL    Date of Exam: 5/15/2025 2:25 PM EDT    Indication: ARF/SARAH/CRF/CKD.    Comparison: No comparisons available.    Technique: Grayscale and color Doppler ultrasound evaluation of the kidneys and urinary bladder was performed.      Findings:  The right kidney measures 9.8 cm and the left kidney measures 10.3 cm. Kidney echogenicity and vascularity appear within normal limits. There is no solid kidney mass.  No echogenic shadowing stone.  No hydronephrosis.      Limited visualization of the urinary bladder is unremarkable.bladder volume 245 cc        Impression:      Impression:  No definite sonographic abnormality in the kidneys        Electronically Signed: Lonny Garcia MD    5/15/2025 2:57 PM EDT    Workstation ID: [id number]    CT Angiogram Chest Pulmonary Embolism [[id number]] Collected: 05/15/25 0035     Updated: 05/15/25 0042    Narrative:      CT ANGIOGRAM CHEST PULMONARY EMBOLISM    Date of Exam: 5/14/2025 11:23 PM EDT    Indication: Elevated dimer, dyspnea.    Comparison: None available.    Technique: Axial CT images were obtained of the chest after the uneventful intravenous administration of iodinated contrast utilizing pulmonary embolism protocol.  In " addition, a 3-D volume rendered image was created for interpretation.  Sagittal and   coronal reconstructions were performed.  Automated exposure control and iterative reconstruction methods were used.      Findings:    Pulmonary arteries: Adequate opacification of the pulmonary arteries. No evidence of acute pulmonary embolism.    Lungs and Pleura: There is mild bilateral basilar atelectasis. There is bilateral lower lobe bronchial wall thickening with some fluid within the bronchi suggestive of infection.    Mediastinum/Inés: No mediastinal or hilar lymphadenopathy.    Lymph nodes: No axillary or supraclavicular adenopathy.    Cardiovascular: The the heart is enlarged. There is a trace pericardial effusion. There is aortic valvular calcification and coronary arterial calcific atherosclerosis.    Upper Abdomen: There are clips from cholecystectomy. There is mild fatty infiltration of the liver. Otherwise, the upper abdominal contents are unremarkable.          Bones and Soft Tissue: No suspicious osseous lesion.        Impression:      Impression:  1.No evidence of pulmonary embolism.  2.Bilateral lower lobe bronchial wall thickening with some fluid in the bronchi suggestive of infection.            Electronically Signed: Haile Velez MD    5/15/2025 12:40 AM EDT    Workstation ID: [id number]    XR Chest 1 View [[id number]] Collected: 05/14/25 2220     Updated: 05/14/25 2223    Narrative:      XR CHEST 1 VW    Date of Exam: 5/14/2025 9:36 PM EDT    Indication: Dyspnea, wheezing    Comparison: 2/1/2016    Findings:  Stable mild cardiomegaly, exaggerated by technique. Lungs without consolidation. Negative for pneumothorax or effusion. Osseous structures grossly intact. Patient's chin overlies the lung apices medially partially limiting assessment.      Impression:      Impression:  No acute process.          Electronically Signed: Renny Johnson MD    5/14/2025 10:21 PM EDT    Workstation ID: [id number]             ECHOCARDIOGRAM:    Results for orders placed during the hospital encounter of 05/14/25    Adult Transthoracic Echo Complete W/ Cont if Necessary Per Protocol    Interpretation Summary    Left ventricular ejection fraction appears to be 46 - 50%.    The right ventricular cavity is mildly dilated.        I reviewed the patient's new clinical results.    EKG:      Assessment:       Acute and chronic respiratory failure (acute-on-chronic)    Afib  Hx Afib-none since 2014, Coumadin discontinued due to alcohol abuse and frequent falls  Amiodarone discontinued in 2016-stopped due to liver function and no A-fib recurrence  On admit-potassium 3.9, Magnesium pending, TSH 1.08, free T41.52  High-sensitivity troponins 32, 33  Repeat echo shows LVEF 46-50%  Now on low dose Eliquis     Hypertension  Hydralazine 50 mg daily terazosin 1 mg twice daily per home meds  Avoid ACE ARB     ARF/SARAH  Dr. Barahona consulted   Creatinine improved with IVF     Dyspnea  proBNP 3853 -skewed in setting of SARAH  Dr. 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       Plan:   Patient remains in Afib with controlled rates  Continue low dose beta blockers  On low dose Eliquis  Consider cardioversion in 6-8 weeks after anticoagulation  GDMT on Toprol XL, no ACEi /ARB due to renal dysfunction      Patient is seen and examined and findings are verified.  All data is reviewed by me personally.  Assessment and plan formulated by APC was done after discussion with attending.  I spent more than 50% of time in taking care of the  patient.    Patient is lying comfortably in bed.  No complaints    Normal S1 and S2.  No pericardial rub or murmur abdominal exam is benign leg edema is absent    Patient is still in atrial fibrillation.  Patient is on Eliquis and beta-blockers.  Ventricular rates are desirable.  Continue current treatment.  Patient would need cardioversion in 6 to 8 weeks after the anticoagulation    Patient can be discharged from cardiac standpoint.    Electronically signed by Freddy Sanchez MD, 05/20/25, 7:38 PM EDT.    NORM Hess  05/20/25  10:53 EDT

## 2025-05-20 NOTE — PLAN OF CARE
Goal Outcome Evaluation:  Plan of Care Reviewed With: patient        Progress: improving  Outcome Evaluation: Slept at intervals. IV fluids infusing. Remains on room air. No c/o discomfort. Possible discharge to [facility] rehab.

## 2025-05-20 NOTE — PROGRESS NOTES
"DATE/TIME OF ADMISSION:  5/14/2025  8:57 PM     LOS: 5 days   Patient Care Team:  Montse Dawkins MD as PCP - General (Family Medicine)  Huey Keyes MD as Consulting Physician (Nephrology)  Consults       Date and Time Order Name Status Description    5/15/2025  1:56 PM Inpatient Cardiology Consult Completed     5/15/2025  7:26 AM Inpatient Infectious Diseases Consult Completed     5/15/2025  7:12 AM Inpatient Pulmonology Consult Completed     5/15/2025  7:12 AM Inpatient Nephrology Consult Completed             Subjective LABS IMPROVED THIS AM. NOW ON LOWER DOSE OF BETA BLOCKER AS HR WENT TO LOW 50'S WHEN AWAKE YESTERDAY AND BP TOO LOW WITH CARDIAZEM SO IT WAS DISCONTINUED. HAD IVF YESTERDAY AND CREATININE IMPROVED AS WELL    Interval History: A FIB, RATE CONTROLLED    Patient Complaints: \"REALLY TIRED TODAY\" AND HE INDEED LOOKS VERY TIRED TODAY; NOT AT ALL PERKY AND NOT AS CONVERSANT    History taken from: patient    Review of Systems        Objective     Vital Signs  /87 (BP Location: Left arm, Patient Position: Lying)   Pulse 62   Temp 97.6 °F (36.4 °C) (Temporal)   Resp 27   Ht 170.2 cm (67\")   Wt 125 kg (274 lb 14.6 oz)   SpO2 94%   BMI 43.06 kg/m²     Physical Exam:      General Appearance:    Alert, cooperative, in no acute distress BUT JUST LAYING IN THE BED; DID EAT BREAKFAST   Head:    Normocephalic, without obvious abnormality, atraumatic   Eyes:            Lids and lashes normal, conjunctivae and sclerae normal, no   icterus, no pallor, corneas clear, PERRLA   Ears:    Ears appear intact with no abnormalities noted   Throat:   No oral lesions, no thrush, oral mucosa moist   Neck:   No adenopathy, supple, trachea midline, no thyromegaly, no   carotid bruit, no JVD   Lungs:     Clear to auscultation,respirations regular, even and                  unlabored    Heart:    IRRegular rhythm and rate IN THE 50-60'S, normal S1 and S2, SOFT 2/6 SYSTOLIC            murmur   Chest Wall:    No " abnormalities observed   Abdomen:     Normal bowel sounds, no masses, no organomegaly, soft        non-tender, non-distended, no guarding, no rebound                tenderness   Extremities:   Moves all extremities well, no edema, no cyanosis, no             redness ;LEGS WRAPPED AND SMALLER IN SIZE THAN I HAVE SEEN IN A LONG WHILE   Pulses:   Pulses palpable and equal bilaterally   Skin:   No bleeding, +bruising or +rash   Lymph nodes:   No palpable adenopathy   Neurologic:   Grossly normal, alert and O x 3        I HAVE PERSONALLY EXAMINED AND REVIEWED THE PATIENT'S RECORD     Lab Results (last 48 hours)       Procedure Component Value Units Date/Time    Phosphorus [[id number]]  (Normal) Collected: 05/19/25 2210    Specimen: Blood from Arm, Right Updated: 05/19/25 2314     Phosphorus 3.5 mg/dL     Comprehensive Metabolic Panel [[id number]]  (Abnormal) Collected: 05/19/25 2210    Specimen: Blood from Arm, Right Updated: 05/19/25 2307     Glucose 134 mg/dL      BUN 31 mg/dL      Creatinine 1.49 mg/dL      Sodium 139 mmol/L      Potassium 4.6 mmol/L      Chloride 105 mmol/L      CO2 27.1 mmol/L      Calcium 8.6 mg/dL      Total Protein 5.6 g/dL      Albumin 3.3 g/dL      ALT (SGPT) 29 U/L      AST (SGOT) 21 U/L      Alkaline Phosphatase 59 U/L      Total Bilirubin 0.2 mg/dL      Globulin 2.3 gm/dL      A/G Ratio 1.4 g/dL      BUN/Creatinine Ratio 20.8     Anion Gap 6.9 mmol/L      eGFR 46.9 mL/min/1.73     Narrative:      GFR Categories in Chronic Kidney Disease (CKD)              GFR Category          GFR (mL/min/1.73)    Interpretation  G1                    90 or greater        Normal or high (1)  G2                    60-89                Mild decrease (1)  G3a                   45-59                Mild to moderate decrease  G3b                   30-44                Moderate to severe decrease  G4                    15-29                Severe decrease  G5                    14 or less           Kidney  failure    (1)In the absence of evidence of kidney disease, neither GFR category G1 or G2 fulfill the criteria for CKD.    eGFR calculation 2021 CKD-EPI creatinine equation, which does not include race as a factor    Magnesium [[id number]]  (Normal) Collected: 05/19/25 2210    Specimen: Blood from Arm, Right Updated: 05/19/25 2307     Magnesium 2.2 mg/dL     CBC Auto Differential [[id number]]  (Abnormal) Collected: 05/19/25 2210    Specimen: Blood from Arm, Right Updated: 05/19/25 2248     WBC 11.09 10*3/mm3      RBC 4.01 10*6/mm3      Hemoglobin 10.8 g/dL      Hematocrit 35.0 %      MCV 87.3 fL      MCH 26.9 pg      MCHC 30.9 g/dL      RDW 16.9 %      RDW-SD 53.4 fl      MPV 10.8 fL      Platelets 203 10*3/mm3      Neutrophil % 62.4 %      Lymphocyte % 14.8 %      Monocyte % 10.7 %      Eosinophil % 5.0 %      Basophil % 0.5 %      Immature Grans % 6.6 %      Neutrophils, Absolute 6.92 10*3/mm3      Lymphocytes, Absolute 1.64 10*3/mm3      Monocytes, Absolute 1.19 10*3/mm3      Eosinophils, Absolute 0.55 10*3/mm3      Basophils, Absolute 0.06 10*3/mm3      Immature Grans, Absolute 0.73 10*3/mm3      nRBC 0.2 /100 WBC     Calcium, Ionized [[id number]]  (Abnormal) Collected: 05/19/25 2210    Specimen: Blood from Arm, Right Updated: 05/19/25 2247     Ionized Calcium 1.12 mmol/L     POC Glucose Once [[id number]]  (Abnormal) Collected: 05/19/25 1952    Specimen: Blood Updated: 05/19/25 1954     Glucose 142 mg/dL      Comment: Serial Number: [id number]:  [id number]       Blood Culture - Blood, Arm, Right [[id number]]  (Normal) Collected: 05/16/25 1629    Specimen: Blood from Arm, Right Updated: 05/19/25 1645     Blood Culture No growth at 3 days    Blood Culture - Blood, Arm, Left [[id number]]  (Normal) Collected: 05/16/25 1622    Specimen: Blood from Arm, Left Updated: 05/19/25 1645     Blood Culture No growth at 3 days    POC Glucose Once [[id number]]  (Abnormal) Collected: 05/19/25 1554    Specimen: Blood Updated:  05/19/25 1556     Glucose 153 mg/dL      Comment: Serial Number: [id number]:  [id number]       POC Glucose Once [[id number]]  (Abnormal) Collected: 05/19/25 1148    Specimen: Blood Updated: 05/19/25 1150     Glucose 142 mg/dL      Comment: Serial Number: [id number]:  [id number]       POC Glucose Once [[id number]]  (Normal) Collected: 05/19/25 0736    Specimen: Blood Updated: 05/19/25 0737     Glucose 103 mg/dL      Comment: Serial Number: [id number]:  [id number]       Comprehensive Metabolic Panel [[id number]]  (Abnormal) Collected: 05/18/25 2233    Specimen: Blood from Arm, Right Updated: 05/18/25 2317     Glucose 135 mg/dL      BUN 35 mg/dL      Creatinine 1.85 mg/dL      Sodium 139 mmol/L      Potassium 4.7 mmol/L      Chloride 107 mmol/L      CO2 26.0 mmol/L      Calcium 8.4 mg/dL      Total Protein 5.7 g/dL      Albumin 3.2 g/dL      ALT (SGPT) 17 U/L      AST (SGOT) 21 U/L      Alkaline Phosphatase 54 U/L      Total Bilirubin <0.2 mg/dL      Globulin 2.5 gm/dL      A/G Ratio 1.3 g/dL      BUN/Creatinine Ratio 18.9     Anion Gap 6.0 mmol/L      eGFR 36.1 mL/min/1.73     Narrative:      GFR Categories in Chronic Kidney Disease (CKD)              GFR Category          GFR (mL/min/1.73)    Interpretation  G1                    90 or greater        Normal or high (1)  G2                    60-89                Mild decrease (1)  G3a                   45-59                Mild to moderate decrease  G3b                   30-44                Moderate to severe decrease  G4                    15-29                Severe decrease  G5                    14 or less           Kidney failure    (1)In the absence of evidence of kidney disease, neither GFR category G1 or G2 fulfill the criteria for CKD.    eGFR calculation 2021 CKD-EPI creatinine equation, which does not include race as a factor    Magnesium [[id number]]  (Normal) Collected: 05/18/25 2233    Specimen: Blood from Arm, Right Updated: 05/18/25  2317     Magnesium 2.3 mg/dL     Phosphorus [[id number]]  (Abnormal) Collected: 05/18/25 2233    Specimen: Blood from Arm, Right Updated: 05/18/25 2317     Phosphorus 2.2 mg/dL     CBC Auto Differential [[id number]]  (Abnormal) Collected: 05/18/25 2233    Specimen: Blood from Arm, Right Updated: 05/18/25 2255     WBC 11.66 10*3/mm3      RBC 3.71 10*6/mm3      Hemoglobin 9.9 g/dL      Hematocrit 32.4 %      MCV 87.3 fL      MCH 26.7 pg      MCHC 30.6 g/dL      RDW 17.2 %      RDW-SD 54.2 fl      MPV 10.7 fL      Platelets 210 10*3/mm3      Neutrophil % 70.6 %      Lymphocyte % 11.4 %      Monocyte % 12.8 %      Eosinophil % 0.3 %      Basophil % 0.3 %      Immature Grans % 4.6 %      Neutrophils, Absolute 8.23 10*3/mm3      Lymphocytes, Absolute 1.33 10*3/mm3      Monocytes, Absolute 1.49 10*3/mm3      Eosinophils, Absolute 0.04 10*3/mm3      Basophils, Absolute 0.03 10*3/mm3      Immature Grans, Absolute 0.54 10*3/mm3      nRBC 0.0 /100 WBC     POC Glucose Once [[id number]]  (Abnormal) Collected: 05/18/25 1951    Specimen: Blood Updated: 05/18/25 1952     Glucose 166 mg/dL      Comment: Serial Number: [id number]:  [id number]       POC Glucose Finger 4x Daily Before Meals & at Bedtime [[id number]]  (Abnormal) Collected: 05/18/25 1125    Specimen: Blood from Finger Updated: 05/18/25 1126     Glucose 157 mg/dL      Comment: Serial Number: [id number]:  [id number]       POC Glucose Finger 4x Daily Before Meals & at Bedtime [[id number]]  (Abnormal) Collected: 05/18/25 0829    Specimen: Blood from Finger Updated: 05/18/25 0832     Glucose 132 mg/dL      Comment: Serial Number: [id number]:  [id number]                Imaging Results (Last 48 Hours)       ** No results found for the last 48 hours. **                 I reviewed the patient's new clinical results.    Past Medical History:   Diagnosis Date    Alcohol abuse 07/13/2022    Essential hypertension 07/13/2022    Gait instability 07/13/2022    GERD without  esophagitis 07/13/2022    Gout 07/13/2022    Hypothyroidism (acquired) 07/13/2022    Onychomycosis of multiple toenails with type 2 diabetes mellitus 07/13/2022    Sleep apnea     Stage 3a chronic kidney disease 07/13/2022    Tinea pedis of both feet 07/13/2022    Type 2 diabetes mellitus with diabetic neuropathy, without long-term current use of insulin 07/13/2022     Past Surgical History:   Procedure Laterality Date    ORTHOPEDIC SURGERY      fractured ankle and dislocated shoulder         Medication Review:   Scheduled Meds:allopurinol, 200 mg, Oral, Daily  aluminum sulfate-calcium acetate 1:20, 500 mL, Topical, Q8H  apixaban, 2.5 mg, Oral, Q12H  cefdinir, 300 mg, Oral, Q12H  cholestyramine light, 1 packet, Oral, Daily  FLUoxetine, 10 mg, Oral, Daily  folic acid, 1 mg, Oral, Daily  hydrALAZINE, 100 mg, Oral, TID  hydrocortisone-bacitracin-zinc oxide-nystatin, 1 Application, Topical, Q8H  ipratropium, 0.5 mg, Nebulization, TID - RT  levothyroxine, 175 mcg, Oral, Q AM  metoprolol succinate XL, 12.5 mg, Oral, Q24H  mineral oil-hydrophilic petrolatum, 1 Application, Topical, Daily  pantoprazole, 40 mg, Oral, Daily  saccharomyces boulardii, 250 mg, Oral, BID  sodium bicarbonate, 1,300 mg, Oral, TID  sodium chloride, 10 mL, Intravenous, Q12H  terazosin, 1 mg, Oral, Daily  Vitamin B1, 100 mg, Oral, Daily      Continuous Infusions:sodium chloride, 60 mL/hr, Last Rate: 60 mL/hr (05/20/25 0222)      PRN Meds:.  acetaminophen **OR** acetaminophen **OR** acetaminophen    aluminum-magnesium hydroxide-simethicone    senna-docusate sodium **AND** polyethylene glycol **AND** bisacodyl **AND** bisacodyl    calcium carbonate    nitroglycerin    ondansetron ODT **OR** ondansetron    sodium chloride    sodium chloride     Assessment & Plan   ACUTE ON CHRONIC CKD 3A WITH MILD DEHYDRATION---DR SANCHES TO CONSULT; CREATININE UP A BIT FURTHER THIS AM; HOLDING REHAB PLACEMENT WITH THIS AND BP ELEVATION; SLIGHTLY DRY AND ON 60CC/HR NS;  BP DROPPED TOO LOW WITH CARDIAZEM AND IT WAS DISCONTINUED BY ME. HEART RATE DROPPED TO 50'S WITH THE METOPROLOL ER 25MG AND I DECREASED THE DOSE TO 12.5MG DAILY. IMPROVED. WILL WATCH BP AND HR TODAY     COVID 19 WITH CONTINUED POSITIVE TESTING--ACUTE RESPIRATORY FAILURE---SUPPORTIVE CARE; BIPAP; OXYGEN, AND DR DRAW TO CONSULT  ROCEPHIN DISCONTINUED AND NOW ON 7 DAYS OF CEFDINIR  NEBS, LOW DOSE STEROIDS FOR BRONCHOSPASM; DR WALL TO CONSULT; IMPROVED     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; STABLE     CHRONIC VENOUS STASIS AND MILD CELLULITIS WITH RASH; WILL REFER FOR BIOPSY LATER ON     DEPRESSION---STABLE WITH PROZAC     CHRONIC ETOH ABUSE---CONTROLLED BY DAUGHTER     HTN---STABLE     GERD---ON PPI     HYPOTHYROIDISM---EUTHYROID ON SYNTHROID     A FIB---ON LOVENOX FOR THIS AND DVT PROPHYLAXIS; DR MITTAL TO CONSULT; AS FALL RISK, WILL HAVE TO FACTOR THIS IN WITH ANTICOAGULATION PLANS; HAD A FIB YEARS AGO AND NO RECURRENCE NOTED UNTIL NOW; RATE CONTROLLED; CONSIDERING AMIODARONE AND CARDIOVERSION IN 2 MONTHS; NOW ON REDUCED DOSE OF THE METOPROLOL ER TO 12.5MG DAILY; BP AND HR IMPROVED     HTN---CARDIOLOGY TO ADDRESS TODAY; BP TOO LOW WITH ALL MEDS ORDERED BETWEEN CARDIOLOGY AND NEPHROLOGY; NOW ON HYDRALAZINE TID, LOWER DOSE METOPROLOL ER BEGINNING IN THE AM AND NO CARDIAZEM; BP AND HR IMPROVED WITH CHANGES     HYPOMAGNESEMIA---REPLACING PRN     ANEMIA OF CKD--STABLE BUT INCREASED ; DR SANCHES MANAGING     ELEVATED D DIMER---NO PULM EMBOLI ON CT; SEQUELA FROM COVID INFECTION     HYPOPHOSPHATEMIA---DR BOWEN REPLACING     LACTIC ACIDOSIS---COVERED WITH ATB; AND DR WALL ADVISING     GENERAL WEAKNESS POST COVID AND WITH CURRENT ILLNESS---PT/OT; [facility] FOR DISCHARGE UNTIL READY TO GET BACK HOME WITH TREMENDOUS FAMILY CAREGIVER SUPPORT     FULL CODE STATUS---VERIFIED WITH DAUGHTER ON ADMISSION  Principal Problem:    Acute and chronic respiratory failure (acute-on-chronic)          Plan for disposition:HOPEFULLY TO [facility] TOMORROW IF LABS REMAIN STABLE    Montse Dawkins MD  05/20/25  06:24 EDT

## 2025-05-20 NOTE — ACP (ADVANCE CARE PLANNING)
Informed patient that we no longer have his AD form on file. Patient reports to have completed POA paperwork. Requested a copy be brought and applied to chart. Patient reports that his daughter Yajaira Richardson is POA.     Chaplain Michael Nicole   Primary Care/Behavioral Health Integration- Program Introduction     Call #2      Patient referred to behavioral health by her primary care provider, Mary Ellen Mazariegos DO, for concerns related to Anxiety .  Second attempt to contact patient to discuss the integration of behavioral health services into the primary care clinic and the provision of care coordination and/or short-term, brief interventions to improve overall health and functioning.     Left a brief message with contact information and requested a callback at her earliest convenience. Mychart letter was sent.      Plan:     Will await call back for one week if no call back will close case.       BARB Claudio  Behavioral Health Coordinator, [facility]   [address] [place] [address]

## 2025-05-21 VITALS
TEMPERATURE: 96.9 F | HEIGHT: 67 IN | RESPIRATION RATE: 21 BRPM | DIASTOLIC BLOOD PRESSURE: 77 MMHG | OXYGEN SATURATION: 96 % | SYSTOLIC BLOOD PRESSURE: 151 MMHG | HEART RATE: 63 BPM | WEIGHT: 279.54 LBS | BODY MASS INDEX: 43.88 KG/M2

## 2025-05-21 PROBLEM — I48.19 PERSISTENT ATRIAL FIBRILLATION: Status: ACTIVE | Noted: 2025-05-21

## 2025-05-21 LAB
ALBUMIN SERPL-MCNC: 3.3 G/DL (ref 3.5–5.2)
ALBUMIN/GLOB SERPL: 1.4 G/DL
ALP SERPL-CCNC: 53 U/L (ref 39–117)
ALT SERPL W P-5'-P-CCNC: 26 U/L (ref 1–41)
ANION GAP SERPL CALCULATED.3IONS-SCNC: 8.5 MMOL/L (ref 5–15)
AST SERPL-CCNC: 14 U/L (ref 1–40)
BACTERIA SPEC AEROBE CULT: NORMAL
BACTERIA SPEC AEROBE CULT: NORMAL
BASOPHILS # BLD AUTO: 0.04 10*3/MM3 (ref 0–0.2)
BASOPHILS NFR BLD AUTO: 0.3 % (ref 0–1.5)
BILIRUB SERPL-MCNC: 0.3 MG/DL (ref 0–1.2)
BUN SERPL-MCNC: 27 MG/DL (ref 8–23)
BUN/CREAT SERPL: 19.3 (ref 7–25)
CALCIUM SPEC-SCNC: 8.7 MG/DL (ref 8.6–10.5)
CHLORIDE SERPL-SCNC: 106 MMOL/L (ref 98–107)
CO2 SERPL-SCNC: 26.5 MMOL/L (ref 22–29)
CREAT SERPL-MCNC: 1.4 MG/DL (ref 0.76–1.27)
DEPRECATED RDW RBC AUTO: 54.7 FL (ref 37–54)
EGFRCR SERPLBLD CKD-EPI 2021: 50.5 ML/MIN/1.73
EOSINOPHIL # BLD AUTO: 0.58 10*3/MM3 (ref 0–0.4)
EOSINOPHIL NFR BLD AUTO: 4.6 % (ref 0.3–6.2)
ERYTHROCYTE [DISTWIDTH] IN BLOOD BY AUTOMATED COUNT: 17.4 % (ref 12.3–15.4)
GLOBULIN UR ELPH-MCNC: 2.3 GM/DL
GLUCOSE BLDC GLUCOMTR-MCNC: 113 MG/DL (ref 70–105)
GLUCOSE SERPL-MCNC: 122 MG/DL (ref 65–99)
HCT VFR BLD AUTO: 34.4 % (ref 37.5–51)
HGB BLD-MCNC: 10.4 G/DL (ref 13–17.7)
IMM GRANULOCYTES # BLD AUTO: 0.74 10*3/MM3 (ref 0–0.05)
IMM GRANULOCYTES NFR BLD AUTO: 5.8 % (ref 0–0.5)
LYMPHOCYTES # BLD AUTO: 2.21 10*3/MM3 (ref 0.7–3.1)
LYMPHOCYTES NFR BLD AUTO: 17.4 % (ref 19.6–45.3)
MAGNESIUM SERPL-MCNC: 2.3 MG/DL (ref 1.6–2.4)
MCH RBC QN AUTO: 26.3 PG (ref 26.6–33)
MCHC RBC AUTO-ENTMCNC: 30.2 G/DL (ref 31.5–35.7)
MCV RBC AUTO: 87.1 FL (ref 79–97)
MONOCYTES # BLD AUTO: 1.36 10*3/MM3 (ref 0.1–0.9)
MONOCYTES NFR BLD AUTO: 10.7 % (ref 5–12)
NEUTROPHILS NFR BLD AUTO: 61.2 % (ref 42.7–76)
NEUTROPHILS NFR BLD AUTO: 7.75 10*3/MM3 (ref 1.7–7)
NRBC BLD AUTO-RTO: 0 /100 WBC (ref 0–0.2)
PHOSPHATE SERPL-MCNC: 3.1 MG/DL (ref 2.5–4.5)
PLATELET # BLD AUTO: 216 10*3/MM3 (ref 140–450)
PMV BLD AUTO: 11.2 FL (ref 6–12)
POTASSIUM SERPL-SCNC: 4.7 MMOL/L (ref 3.5–5.2)
PROT SERPL-MCNC: 5.6 G/DL (ref 6–8.5)
RBC # BLD AUTO: 3.95 10*6/MM3 (ref 4.14–5.8)
SODIUM SERPL-SCNC: 141 MMOL/L (ref 136–145)
WBC NRBC COR # BLD AUTO: 12.68 10*3/MM3 (ref 3.4–10.8)

## 2025-05-21 PROCEDURE — 82948 REAGENT STRIP/BLOOD GLUCOSE: CPT

## 2025-05-21 PROCEDURE — 94799 UNLISTED PULMONARY SVC/PX: CPT

## 2025-05-21 PROCEDURE — 94761 N-INVAS EAR/PLS OXIMETRY MLT: CPT

## 2025-05-21 PROCEDURE — 94664 DEMO&/EVAL PT USE INHALER: CPT

## 2025-05-21 PROCEDURE — 83735 ASSAY OF MAGNESIUM: CPT | Performed by: FAMILY MEDICINE

## 2025-05-21 PROCEDURE — 85025 COMPLETE CBC W/AUTO DIFF WBC: CPT | Performed by: FAMILY MEDICINE

## 2025-05-21 PROCEDURE — 80053 COMPREHEN METABOLIC PANEL: CPT | Performed by: FAMILY MEDICINE

## 2025-05-21 PROCEDURE — 84100 ASSAY OF PHOSPHORUS: CPT | Performed by: FAMILY MEDICINE

## 2025-05-21 PROCEDURE — 99232 SBSQ HOSP IP/OBS MODERATE 35: CPT | Performed by: INTERNAL MEDICINE

## 2025-05-21 RX ORDER — CALCIUM CARBONATE 500 MG/1
2 TABLET, CHEWABLE ORAL 2 TIMES DAILY PRN
Start: 2025-05-21

## 2025-05-21 RX ORDER — BISACODYL 10 MG
10 SUPPOSITORY, RECTAL RECTAL DAILY PRN
Start: 2025-05-21

## 2025-05-21 RX ORDER — POLYETHYLENE GLYCOL 3350 17 G/17G
17 POWDER, FOR SOLUTION ORAL DAILY PRN
Start: 2025-05-21

## 2025-05-21 RX ORDER — CEFDINIR 300 MG/1
300 CAPSULE ORAL EVERY 12 HOURS SCHEDULED
Qty: 10 CAPSULE | Refills: 0 | Status: SHIPPED | OUTPATIENT
Start: 2025-05-21 | End: 2025-05-26

## 2025-05-21 RX ORDER — NITROGLYCERIN 0.4 MG/1
0.4 TABLET SUBLINGUAL
Start: 2025-05-21

## 2025-05-21 RX ORDER — ALUMINA, MAGNESIA, AND SIMETHICONE 2400; 2400; 240 MG/30ML; MG/30ML; MG/30ML
15 SUSPENSION ORAL EVERY 6 HOURS PRN
Start: 2025-05-21

## 2025-05-21 RX ORDER — SODIUM BICARBONATE 650 MG/1
1300 TABLET ORAL 3 TIMES DAILY
Start: 2025-05-21

## 2025-05-21 RX ORDER — ACETAMINOPHEN 325 MG/1
650 TABLET ORAL EVERY 4 HOURS PRN
Start: 2025-05-21

## 2025-05-21 RX ORDER — MINERAL OIL/HYDROPHIL PETROLAT
1 OINTMENT (GRAM) TOPICAL DAILY
Start: 2025-05-21

## 2025-05-21 RX ORDER — AMOXICILLIN 250 MG
2 CAPSULE ORAL 2 TIMES DAILY PRN
Start: 2025-05-21

## 2025-05-21 RX ORDER — METOPROLOL SUCCINATE 25 MG/1
12.5 TABLET, EXTENDED RELEASE ORAL
Start: 2025-05-21

## 2025-05-21 RX ORDER — HYDRALAZINE HYDROCHLORIDE 100 MG/1
100 TABLET, FILM COATED ORAL 3 TIMES DAILY
Start: 2025-05-21

## 2025-05-21 RX ORDER — ONDANSETRON 4 MG/1
4 TABLET, ORALLY DISINTEGRATING ORAL EVERY 4 HOURS PRN
Start: 2025-05-21

## 2025-05-21 RX ORDER — BISACODYL 5 MG/1
5 TABLET, DELAYED RELEASE ORAL DAILY PRN
Start: 2025-05-21

## 2025-05-21 RX ADMIN — IPRATROPIUM BROMIDE 0.5 MG: 0.5 SOLUTION RESPIRATORY (INHALATION) at 08:52

## 2025-05-21 RX ADMIN — APIXABAN 2.5 MG: 2.5 TABLET, FILM COATED ORAL at 08:01

## 2025-05-21 RX ADMIN — Medication 10 ML: at 08:07

## 2025-05-21 RX ADMIN — SODIUM BICARBONATE 1300 MG: 650 TABLET ORAL at 08:02

## 2025-05-21 RX ADMIN — LEVOTHYROXINE SODIUM 175 MCG: 0.03 TABLET ORAL at 05:55

## 2025-05-21 RX ADMIN — FLUOXETINE HYDROCHLORIDE 10 MG: 10 CAPSULE ORAL at 08:01

## 2025-05-21 RX ADMIN — CEFDINIR 300 MG: 300 CAPSULE ORAL at 08:02

## 2025-05-21 RX ADMIN — TERAZOSIN HYDROCHLORIDE 1 MG: 1 CAPSULE ORAL at 08:02

## 2025-05-21 RX ADMIN — WHITE PETROLATUM 1 APPLICATION: 1.75 OINTMENT TOPICAL at 12:24

## 2025-05-21 RX ADMIN — HYDRALAZINE HYDROCHLORIDE 100 MG: 25 TABLET ORAL at 08:02

## 2025-05-21 RX ADMIN — FOLIC ACID 1 MG: 1 TABLET ORAL at 08:01

## 2025-05-21 RX ADMIN — PANTOPRAZOLE SODIUM 40 MG: 40 TABLET, DELAYED RELEASE ORAL at 08:02

## 2025-05-21 RX ADMIN — METOPROLOL SUCCINATE 12.5 MG: 25 TABLET, EXTENDED RELEASE ORAL at 08:02

## 2025-05-21 RX ADMIN — Medication 250 MG: at 08:02

## 2025-05-21 RX ADMIN — ALLOPURINOL 200 MG: 100 TABLET ORAL at 08:01

## 2025-05-21 RX ADMIN — ACETAMINOPHEN 650 MG: 325 TABLET ORAL at 01:20

## 2025-05-21 RX ADMIN — Medication 1 APPLICATION: at 12:23

## 2025-05-21 RX ADMIN — Medication 1 APPLICATION: at 05:55

## 2025-05-21 RX ADMIN — SODIUM BICARBONATE 1300 MG: 650 TABLET ORAL at 16:04

## 2025-05-21 RX ADMIN — HYDRALAZINE HYDROCHLORIDE 100 MG: 25 TABLET ORAL at 16:04

## 2025-05-21 RX ADMIN — Medication 100 MG: at 08:02

## 2025-05-21 NOTE — DISCHARGE SUMMARY
DATE/TIME OF ADMISSION:  5/14/2025  8:57 PM  Date of Discharge:  5/21/2025    Discharge Diagnosis: ACUTE ON CHRONIC CKD 3A WITH MILD DEHYDRATION---DR SANCHES TO CONSULT; CREATININE UP A BIT FURTHER THIS AM; HOLDING REHAB PLACEMENT WITH THIS AND BP ELEVATION; SLIGHTLY DRY AND ON 60CC/HR NS; BP DROPPED TOO LOW WITH CARDIAZEM AND IT WAS DISCONTINUED BY ME. HEART RATE DROPPED TO 50'S WITH THE METOPROLOL ER 25MG AND I DECREASED THE DOSE TO 12.5MG DAILY. IMPROVED. WILL WATCH BP AND HR TODAY--STABLE WITH HR IN THE 60'S MOSTLY; DR SANCHES TO FOLLOW AT [facility]     COVID 19 WITH CONTINUED POSITIVE TESTING--ACUTE RESPIRATORY FAILURE---SUPPORTIVE CARE; BIPAP; OXYGEN, AND DR DRAW TO CONSULT  ROCEPHIN DISCONTINUED AND NOW ON 7 DAYS OF CEFDINIR  NEBS, LOW DOSE STEROIDS FOR BRONCHOSPASM; DR WALL TO CONSULT; IMPROVED; STEROIDS COMPLETED     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; STABLE     CHRONIC VENOUS STASIS AND MILD CELLULITIS WITH RASH; WILL REFER FOR BIOPSY LATER ON     DEPRESSION---STABLE WITH PROZAC     CHRONIC ETOH ABUSE---CONTROLLED BY DAUGHTER; URGED NOT >2 BEERS PER DAY AND IDEALLY NONE WHEN ON ELIQUIS DUE TO FALL RISK     HTN---STABLE     GERD---ON PPI     HYPOTHYROIDISM---EUTHYROID ON SYNTHROID     A FIB---ON LOVENOX FOR THIS AND DVT PROPHYLAXIS; DR MITTAL TO CONSULT; AS FALL RISK, WILL HAVE TO FACTOR THIS IN WITH ANTICOAGULATION PLANS; HAD A FIB YEARS AGO AND NO RECURRENCE NOTED UNTIL NOW; RATE CONTROLLED; CONSIDERING AMIODARONE AND CARDIOVERSION IN 2 MONTHS; NOW ON REDUCED DOSE OF THE METOPROLOL ER TO 12.5MG DAILY; BP AND HR IMPROVED     HTN---CARDIOLOGY TO ADDRESS TODAY; BP TOO LOW WITH ALL MEDS ORDERED BETWEEN CARDIOLOGY AND NEPHROLOGY; NOW ON HYDRALAZINE TID, LOWER DOSE METOPROLOL ER BEGINNING IN THE AM AND NO CARDIAZEM; BP AND HR IMPROVED WITH CHANGES; STABLE     HYPOMAGNESEMIA---REPLACING PRN     ANEMIA OF CKD--STABLE BUT INCREASED ; DR SANCHES MANAGING     ELEVATED D DIMER---NO PULM EMBOLI ON CT; SEQUELA FROM COVID  INFECTION     HYPOPHOSPHATEMIA---DR BOWEN REPLACING     LACTIC ACIDOSIS---COVERED WITH ATB; AND DR WALL ADVISING; RESOLVED     GENERAL WEAKNESS POST COVID AND WITH CURRENT ILLNESS---PT/OT; [facility] REH[facility] FOR DISCHARGE UNTIL READY TO GET BACK HOME WITH TREMENDOUS FAMILY CAREGIVER SUPPORT     FULL CODE STATUS---VERIFIED WITH DAUGHTER ON ADMISSION    Presenting Problem/History of Present Illness  Active Hospital Problems    Diagnosis  POA    **Acute and chronic respiratory failure (acute-on-chronic) [J96.20]  Yes    Persistent atrial fibrillation [I48.19]  Yes      Resolved Hospital Problems   No resolved problems to display.          Hospital Course  Rommel Mcfarland is a 79 y.o. male who presents with ACUTE RESPIRATORY DISTRESS FOLLOWING COVID IN APRIL. WAS TREATED THEN WITH PAXLOVID AND ZITHROMAX. POSITIVE TESTING LINGERED THOUGH HE WAS NOT APPROPRIATE FOR ANY ISOLATION THIS ADMISSION. PULMONARY AND ID WERE CONSULTED THIS ADMISSION AND HE IMPROVED GREATLY WITH IV ROCEPHIN AND A BRIEF COURSE OF IV STEROIDS WITH NEB TREATMENTS  GENERALLY HE IS WEAKENED AND CAN BENEFIT GREATLY FROM REHAB  UPON PRESENTATION, HE WAS FOUND TO HAVE A FIB. HE HAS BEEN PLACED ON LOW DOSE METOPROLOL ER AND ELIQUIS AND IT IS PLANNED TO ADD AMIODARONE IN ABOUT 6 WEEKS AND THEN 2 WEEKS LATER TO TRY CARDIOVERSION. WE WOULD IDEALLY LIKE TO DISCONTINUE ELIQUIS AS QUICKLY AS IS POSSIBLE WITH FALL RISK.   DR SANCHES WILL BE FOLLOWING HIM FOR HIS CKD AT [facility] AND HE WILL SEE ME ONE WEEK AFTER RELEASE FROM REHAB      Procedures Performed         Consults:   Consults       Date and Time Order Name Status Description    5/15/2025  1:56 PM Inpatient Cardiology Consult Completed     5/15/2025  7:26 AM Inpatient Infectious Diseases Consult Completed     5/15/2025  7:12 AM Inpatient Pulmonology Consult Completed     5/15/2025  7:12 AM Inpatient Nephrology Consult Completed             Pertinent Test Results:    Lab Results (most recent)        Procedure Component Value Units Date/Time    Comprehensive Metabolic Panel [[id number]]  (Abnormal) Collected: 05/21/25 0052    Specimen: Blood Updated: 05/21/25 0245     Glucose 122 mg/dL      BUN 27 mg/dL      Creatinine 1.40 mg/dL      Sodium 141 mmol/L      Potassium 4.7 mmol/L      Chloride 106 mmol/L      CO2 26.5 mmol/L      Calcium 8.7 mg/dL      Total Protein 5.6 g/dL      Albumin 3.3 g/dL      ALT (SGPT) 26 U/L      AST (SGOT) 14 U/L      Alkaline Phosphatase 53 U/L      Total Bilirubin 0.3 mg/dL      Globulin 2.3 gm/dL      A/G Ratio 1.4 g/dL      BUN/Creatinine Ratio 19.3     Anion Gap 8.5 mmol/L      eGFR 50.5 mL/min/1.73     Narrative:      GFR Categories in Chronic Kidney Disease (CKD)              GFR Category          GFR (mL/min/1.73)    Interpretation  G1                    90 or greater        Normal or high (1)  G2                    60-89                Mild decrease (1)  G3a                   45-59                Mild to moderate decrease  G3b                   30-44                Moderate to severe decrease  G4                    15-29                Severe decrease  G5                    14 or less           Kidney failure    (1)In the absence of evidence of kidney disease, neither GFR category G1 or G2 fulfill the criteria for CKD.    eGFR calculation 2021 CKD-EPI creatinine equation, which does not include race as a factor    Magnesium [[id number]]  (Normal) Collected: 05/21/25 0052    Specimen: Blood Updated: 05/21/25 0245     Magnesium 2.3 mg/dL     Phosphorus [[id number]]  (Normal) Collected: 05/21/25 0052    Specimen: Blood Updated: 05/21/25 0245     Phosphorus 3.1 mg/dL     CBC Auto Differential [[id number]]  (Abnormal) Collected: 05/21/25 0052    Specimen: Blood Updated: 05/21/25 0221     WBC 12.68 10*3/mm3      RBC 3.95 10*6/mm3      Hemoglobin 10.4 g/dL      Hematocrit 34.4 %      MCV 87.1 fL      MCH 26.3 pg      MCHC 30.2 g/dL      RDW 17.4 %      RDW-SD 54.7 fl      MPV 11.2 fL       Platelets 216 10*3/mm3      Neutrophil % 61.2 %      Lymphocyte % 17.4 %      Monocyte % 10.7 %      Eosinophil % 4.6 %      Basophil % 0.3 %      Immature Grans % 5.8 %      Neutrophils, Absolute 7.75 10*3/mm3      Lymphocytes, Absolute 2.21 10*3/mm3      Monocytes, Absolute 1.36 10*3/mm3      Eosinophils, Absolute 0.58 10*3/mm3      Basophils, Absolute 0.04 10*3/mm3      Immature Grans, Absolute 0.74 10*3/mm3      nRBC 0.0 /100 WBC     Blood Culture - Blood, Arm, Right [[id number]]  (Normal) Collected: 05/16/25 1629    Specimen: Blood from Arm, Right Updated: 05/20/25 1645     Blood Culture No growth at 4 days    Blood Culture - Blood, Arm, Left [[id number]]  (Normal) Collected: 05/16/25 1622    Specimen: Blood from Arm, Left Updated: 05/20/25 1645     Blood Culture No growth at 4 days    POC Glucose Once [[id number]]  (Abnormal) Collected: 05/20/25 1546    Specimen: Blood Updated: 05/20/25 1548     Glucose 167 mg/dL      Comment: Serial Number: [id number]:  [id number]       POC Glucose Finger 4x Daily Before Meals & at Bedtime [[id number]]  (Abnormal) Collected: 05/20/25 1205    Specimen: Blood from Finger Updated: 05/20/25 1208     Glucose 111 mg/dL      Comment: Serial Number: [id number]:  [id number]       Phosphorus [[id number]]  (Normal) Collected: 05/19/25 2210    Specimen: Blood from Arm, Right Updated: 05/19/25 2314     Phosphorus 3.5 mg/dL     Comprehensive Metabolic Panel [[id number]]  (Abnormal) Collected: 05/19/25 2210    Specimen: Blood from Arm, Right Updated: 05/19/25 2307     Glucose 134 mg/dL      BUN 31 mg/dL      Creatinine 1.49 mg/dL      Sodium 139 mmol/L      Potassium 4.6 mmol/L      Chloride 105 mmol/L      CO2 27.1 mmol/L      Calcium 8.6 mg/dL      Total Protein 5.6 g/dL      Albumin 3.3 g/dL      ALT (SGPT) 29 U/L      AST (SGOT) 21 U/L      Alkaline Phosphatase 59 U/L      Total Bilirubin 0.2 mg/dL      Globulin 2.3 gm/dL      A/G Ratio 1.4 g/dL      BUN/Creatinine Ratio 20.8      Anion Gap 6.9 mmol/L      eGFR 46.9 mL/min/1.73     Narrative:      GFR Categories in Chronic Kidney Disease (CKD)              GFR Category          GFR (mL/min/1.73)    Interpretation  G1                    90 or greater        Normal or high (1)  G2                    60-89                Mild decrease (1)  G3a                   45-59                Mild to moderate decrease  G3b                   30-44                Moderate to severe decrease  G4                    15-29                Severe decrease  G5                    14 or less           Kidney failure    (1)In the absence of evidence of kidney disease, neither GFR category G1 or G2 fulfill the criteria for CKD.    eGFR calculation 2021 CKD-EPI creatinine equation, which does not include race as a factor    Magnesium [[id number]]  (Normal) Collected: 05/19/25 2210    Specimen: Blood from Arm, Right Updated: 05/19/25 2307     Magnesium 2.2 mg/dL     CBC Auto Differential [[id number]]  (Abnormal) Collected: 05/19/25 2210    Specimen: Blood from Arm, Right Updated: 05/19/25 2248     WBC 11.09 10*3/mm3      RBC 4.01 10*6/mm3      Hemoglobin 10.8 g/dL      Hematocrit 35.0 %      MCV 87.3 fL      MCH 26.9 pg      MCHC 30.9 g/dL      RDW 16.9 %      RDW-SD 53.4 fl      MPV 10.8 fL      Platelets 203 10*3/mm3      Neutrophil % 62.4 %      Lymphocyte % 14.8 %      Monocyte % 10.7 %      Eosinophil % 5.0 %      Basophil % 0.5 %      Immature Grans % 6.6 %      Neutrophils, Absolute 6.92 10*3/mm3      Lymphocytes, Absolute 1.64 10*3/mm3      Monocytes, Absolute 1.19 10*3/mm3      Eosinophils, Absolute 0.55 10*3/mm3      Basophils, Absolute 0.06 10*3/mm3      Immature Grans, Absolute 0.73 10*3/mm3      nRBC 0.2 /100 WBC     Calcium, Ionized [[id number]]  (Abnormal) Collected: 05/19/25 2210    Specimen: Blood from Arm, Right Updated: 05/19/25 2247     Ionized Calcium 1.12 mmol/L     Lactic Acid, Plasma [[id number]]  (Normal) Collected: 05/17/25 0330    Specimen: Blood  from Arm, Left Updated: 05/17/25 0536     Lactate 1.4 mmol/L     Calcium, Ionized [[id number]]  (Abnormal) Collected: 05/17/25 0330    Specimen: Blood from Arm, Left Updated: 05/17/25 0512     Ionized Calcium 1.13 mmol/L     Urine Culture - Urine, Urine, Clean Catch [[id number]] Collected: 05/14/25 2122    Specimen: Urine, Clean Catch Updated: 05/16/25 0318     Urine Culture 25,000 CFU/mL Mixed Raquel Isolated    Narrative:      Specimen contains mixed organisms of questionable pathogenicity suggestive of contamination. If symptoms persist, suggest recollection.  Colonization of the urinary tract without infection is common. Treatment is discouraged unless the patient is symptomatic, pregnant, or undergoing an invasive urologic procedure.    STAT Lactic Acid, Reflex [[id number]]  (Abnormal) Collected: 05/15/25 2109    Specimen: Blood from Hand, Left Updated: 05/15/25 2145     Lactate 2.2 mmol/L     STAT Lactic Acid, Reflex [[id number]]  (Abnormal) Collected: 05/15/25 1711    Specimen: Blood Updated: 05/15/25 1739     Lactate 2.4 mmol/L     Lactic Acid, Plasma [[id number]]  (Abnormal) Collected: 05/15/25 1000    Specimen: Blood from Arm, Right Updated: 05/15/25 1028     Lactate 2.6 mmol/L     Uric Acid [[id number]]  (Normal) Collected: 05/14/25 2256    Specimen: Blood Updated: 05/15/25 0907     Uric Acid 5.3 mg/dL     Eosinophil Smear - Urine, Urine, Clean Catch [[id number]]  (Normal) Collected: 05/14/25 2122    Specimen: Urine, Clean Catch Updated: 05/15/25 0856     Eosinophil Smear 0 % EOS/100 Cells     TSH [[id number]]  (Normal) Collected: 05/14/25 2256    Specimen: Blood Updated: 05/15/25 0838     TSH 1.080 uIU/mL     CK [[id number]]  (Normal) Collected: 05/14/25 2256    Specimen: Blood Updated: 05/15/25 0838     Creatine Kinase 44 U/L     Iron Profile [[id number]]  (Abnormal) Collected: 05/14/25 2256    Specimen: Blood Updated: 05/15/25 0838     Iron 10 mcg/dL      Iron Saturation (TSAT) 3 %      Transferrin 195 mg/dL       TIBC 291 mcg/dL     Ferritin [[id number]]  (Normal) Collected: 05/14/25 2256    Specimen: Blood Updated: 05/15/25 0838     Ferritin 152.00 ng/mL     Narrative:      Results may be falsely decreased if patient taking Biotin.      Sodium, Urine, Random - Urine, Clean Catch [[id number]] Collected: 05/14/25 2122    Specimen: Urine, Clean Catch Updated: 05/15/25 0835     Sodium, Urine 50 mmol/L     Narrative:      Reference intervals for random urine have not been established.  Clinical usage is dependent upon physician's interpretation in combination with other laboratory tests.       Blood Gas, Arterial - [[id number]]  (Abnormal) Collected: 05/15/25 0121    Specimen: Arterial Blood Updated: 05/15/25 0124     Site Left Radial     Issac's Test Positive     pH, Arterial 7.372 pH units      pCO2, Arterial 37.8 mm Hg      pO2, Arterial 102.6 mm Hg      HCO3, Arterial 22.0 mmol/L      Base Excess, Arterial -2.9 mmol/L      Comment: Serial Number: [id number]:  [id number]        O2 Saturation, Arterial 97.8 %      CO2 Content 23.1 mmol/L      Barometric Pressure for Blood Gas --     Comment: N/A        Modality Cannula     FIO2 28 %      Hemodilution No     PO2/FIO2 366    High Sensitivity Troponin T 1Hr [[id number]]  (Abnormal) Collected: 05/14/25 2256    Specimen: Blood Updated: 05/14/25 2322     HS Troponin T 33 ng/L      Troponin T Numeric Delta 1 ng/L      Troponin T % Delta 3    Narrative:      High Sensitive Troponin T Reference Range:  <14.0 ng/L- Negative Female for AMI  <22.0 ng/L- Negative Male for AMI  >=14 - Abnormal Female indicating possible myocardial injury.  >=22 - Abnormal Male indicating possible myocardial injury.   Clinicians would have to utilize clinical acumen, EKG, Troponin, and serial changes to determine if it is an Acute Myocardial Infarction or myocardial injury due to an underlying chronic condition.         Respiratory Panel PCR w/COVID-19(SARS-CoV-2) EROS/UNIQUE/STACIA/PAD/COR/TENA In-House, NP Swab in  UTM/VTM, 2 HR TAT - Swab, Nasopharynx [[id number]]  (Abnormal) Collected: 05/14/25 2122    Specimen: Swab from Nasopharynx Updated: 05/14/25 2221     ADENOVIRUS, PCR Not Detected     Coronavirus 229E Not Detected     Coronavirus HKU1 Not Detected     Coronavirus NL63 Not Detected     Coronavirus OC43 Not Detected     COVID19 Detected     Human Metapneumovirus Not Detected     Human Rhinovirus/Enterovirus Not Detected     Influenza A PCR Not Detected     Influenza B PCR Not Detected     Parainfluenza Virus 1 Not Detected     Parainfluenza Virus 2 Not Detected     Parainfluenza Virus 3 Not Detected     Parainfluenza Virus 4 Not Detected     RSV, PCR Not Detected     Bordetella pertussis pcr Not Detected     Bordetella parapertussis PCR Not Detected     Chlamydophila pneumoniae PCR Not Detected     Mycoplasma pneumo by PCR Not Detected    Narrative:      In the setting of a positive respiratory panel with a viral infection PLUS a negative procalcitonin without other underlying concern for bacterial infection, consider observing off antibiotics or discontinuation of antibiotics and continue supportive care. If the respiratory panel is positive for atypical bacterial infection (Bordetella pertussis, Chlamydophila pneumoniae, or Mycoplasma pneumoniae), consider antibiotic de-escalation to target atypical bacterial infection.    D-dimer, Quantitative [[id number]]  (Abnormal) Collected: 05/14/25 2122    Specimen: Blood Updated: 05/14/25 2156     D-Dimer, Quantitative >20.00 MCGFEU/mL     Narrative:      According to the assay 's published package insert, a normal (<0.50 MCGFEU/mL) D-dimer result in conjunction with a non-high clinical probability assessment, excludes deep vein thrombosis (DVT) and pulmonary embolism (PE) with high sensitivity.    D-dimer values increase with age and this can make VTE exclusion of an older population difficult. To address this, the [employer], based on best  "available evidence and recent guidelines, recommends that clinicians use age-adjusted D-dimer thresholds in patients greater than 50 years of age with: a) a low probability of PE who do not meet all Pulmonary Embolism Rule Out Criteria, or b) in those with intermediate probability of PE.   The formula for an age-adjusted D-dimer cut-off is \"age/100\".  For example, a 60 year old patient would have an age-adjusted cut-off of 0.60 MCGFEU/mL and an 80 year old 0.80 MCGFEU/mL.    High Sensitivity Troponin T [[id number]]  (Abnormal) Collected: 05/14/25 2122    Specimen: Blood Updated: 05/14/25 2152     HS Troponin T 32 ng/L     Narrative:      High Sensitive Troponin T Reference Range:  <14.0 ng/L- Negative Female for AMI  <22.0 ng/L- Negative Male for AMI  >=14 - Abnormal Female indicating possible myocardial injury.  >=22 - Abnormal Male indicating possible myocardial injury.   Clinicians would have to utilize clinical acumen, EKG, Troponin, and serial changes to determine if it is an Acute Myocardial Infarction or myocardial injury due to an underlying chronic condition.         BNP [[id number]]  (Abnormal) Collected: 05/14/25 2122    Specimen: Blood Updated: 05/14/25 2152     proBNP 3,853.0 pg/mL     Narrative:      This assay is used as an aid in the diagnosis of individuals suspected of having heart failure. It can be used as an aid in the diagnosis of acute decompensated heart failure (ADHF) in patients presenting with signs and symptoms of ADHF to the emergency department (ED). In addition, NT-proBNP of <300 pg/mL indicates ADHF is not likely.    Age Range Result Interpretation  NT-proBNP Concentration (pg/mL:      <50             Positive            >450                   Gray                 300-450                    Negative             <300    50-75           Positive            >900                  Gray                300-900                  Negative            <300      >75             Positive            " >1800                  Yao                300-1800                  Negative            <300    Urinalysis With Culture If Indicated - Urine, Clean Catch [[id number]]  (Abnormal) Collected: 05/14/25 2122    Specimen: Urine, Clean Catch Updated: 05/14/25 2143     Color, UA Yellow     Appearance, UA Clear     pH, UA 6.0     Specific Gravity, UA 1.018     Glucose, UA Negative     Ketones, UA Trace     Bilirubin, UA Negative     Blood, UA Small (1+)     Protein,  mg/dL (2+)     Leuk Esterase, UA Small (1+)     Nitrite, UA Negative     Urobilinogen, UA 1.0 E.U./dL    Narrative:      In absence of clinical symptoms, the presence of pyuria, bacteria, and/or nitrites on the urinalysis result does not correlate with infection.    Urinalysis, Microscopic Only - Urine, Clean Catch [[id number]]  (Abnormal) Collected: 05/14/25 2122    Specimen: Urine, Clean Catch Updated: 05/14/25 2143     RBC, UA 21-50 /HPF      WBC, UA 11-20 /HPF      Bacteria, UA None Seen /HPF      Squamous Epithelial Cells, UA 0-2 /HPF      Hyaline Casts, UA 3-6 /LPF      Methodology Automated Microscopy    CBC & Differential [[id number]]  (Abnormal) Collected: 05/14/25 2122    Specimen: Blood Updated: 05/14/25 2130    Narrative:      The following orders were created for panel order CBC & Differential.  Procedure                               Abnormality         Status                     ---------                               -----------         ------                     CBC Auto Differential[[id number]]        Abnormal            Final result                 Please view results for these tests on the individual orders.             Results for orders placed during the hospital encounter of 05/14/25    Adult Transthoracic Echo Complete W/ Cont if Necessary Per Protocol    Interpretation Summary    Left ventricular ejection fraction appears to be 46 - 50%.    The right ventricular cavity is mildly dilated.       OK       Condition on Discharge:       Vital Signs  Temp:  [96.9 °F (36.1 °C)-98.2 °F (36.8 °C)] 97.4 °F (36.3 °C)  Heart Rate:  [55-76] 59  Resp:  [18-25] 18  BP: (133-160)/(60-78) 151/77    Physical Exam:     General Appearance:    Alert, cooperative, in no acute distress; LOOKS BETTER TODAY AND HAS HAD A SHAVE AND IS CLEANED UP   Head:    Normocephalic, without obvious abnormality, atraumatic   Eyes:            Lids and lashes normal, conjunctivae and sclerae normal, no   icterus, no pallor, corneas clear, PERRLA   Ears:    Ears appear intact with no abnormalities noted   Throat:   No oral lesions, no thrush, oral mucosa moist   Neck:   No adenopathy, supple, trachea midline, no thyromegaly, no   carotid bruit, no JVD   Lungs:     Clear to auscultation,respirations regular, even and                  unlabored    Heart:    IRRegular rhythm and  rate IN THE 60'S WITH A FIB, normal S1 and S2, SOFT 2/6 SYSTOLIC             murmur   Chest Wall:    No abnormalities observed   Abdomen:     Normal bowel sounds, no masses, no organomegaly, soft        non-tender, non-distended, no guarding, no rebound                tenderness   Extremities:   Moves all extremities well, NO edema, no cyanosis, BASELINE              redness; LEGS WRAPPED WITH ACE WRAPS   Pulses:   Pulses palpable and equal bilaterally   Skin:   No bleeding, +bruising or +rash   Lymph nodes:   No palpable adenopathy   Neurologic:   Cranial nerves 2 - 12 grossly intact, sensation intact, DTR       present and equal bilaterally       Discharge Disposition  Rehab Facility or Unit (DC - External)    Discharge Medications     Discharge Medications        New Medications        Instructions Start Date   acetaminophen 325 MG tablet  Commonly known as: TYLENOL   650 mg, Oral, Every 4 Hours PRN      aluminum-magnesium hydroxide-simethicone 400-400-40 MG/5ML suspension  Commonly known as: MAALOX MAX   15 mL, Oral, Every 6 Hours PRN      apixaban 2.5 MG tablet tablet  Commonly known as: ELIQUIS   2.5  mg, Oral, Every 12 Hours Scheduled      bisacodyl 5 MG EC tablet  Commonly known as: DULCOLAX   5 mg, Oral, Daily PRN      bisacodyl 10 MG suppository  Commonly known as: DULCOLAX   10 mg, Rectal, Daily PRN      calcium carbonate 500 MG chewable tablet  Commonly known as: TUMS   2 tablets, Oral, 2 Times Daily PRN      cefdinir 300 MG capsule  Commonly known as: OMNICEF   300 mg, Oral, Every 12 Hours Scheduled      hydrocortisone-bacitracin-zinc oxide-nystatin  Commonly known as: MAGIC BARRIER   1 Application, Topical, Every 8 Hours      ipratropium 0.02 % nebulizer solution  Commonly known as: ATROVENT   0.5 mg, Nebulization, 3 Times Daily - RT      metoprolol succinate XL 25 MG 24 hr tablet  Commonly known as: TOPROL-XL   12.5 mg, Oral, Every 24 Hours Scheduled      mineral oil-hydrophilic petrolatum ointment   1 Application, Topical, Daily      nitroglycerin 0.4 MG SL tablet  Commonly known as: NITROSTAT   0.4 mg, Sublingual, Every 5 Minutes PRN, Take no more than 3 doses in 15 minutes.      ondansetron ODT 4 MG disintegrating tablet  Commonly known as: ZOFRAN-ODT   4 mg, Oral, Every 4 Hours PRN      polyethylene glycol 17 g packet  Commonly known as: MIRALAX   17 g, Oral, Daily PRN      sennosides-docusate 8.6-50 MG per tablet  Commonly known as: PERICOLACE   2 tablets, Oral, 2 Times Daily PRN      sodium bicarbonate 650 MG tablet   1,300 mg, Oral, 3 Times Daily             Changes to Medications        Instructions Start Date   hydrALAZINE 100 MG tablet  Commonly known as: APRESOLINE  What changed:   medication strength  how much to take  when to take this   100 mg, Oral, 3 times daily             Continue These Medications        Instructions Start Date   Allopurinol 200 MG tablet   200 mg, Oral, Daily      colestipol 1 g tablet  Commonly known as: COLESTID   2 g, Daily      FLUoxetine 10 MG capsule  Commonly known as: PROzac   10 mg, Daily      folic acid 1 MG tablet  Commonly known as: FOLVITE   1 mg, Daily       levothyroxine 175 MCG tablet  Commonly known as: SYNTHROID, LEVOTHROID   175 mcg, Every Early Morning      pantoprazole 40 MG EC tablet  Commonly known as: PROTONIX   40 mg, Daily      saccharomyces boulardii 250 MG capsule  Commonly known as: FLORASTOR   250 mg, Oral, 2 Times Daily      terazosin 1 MG capsule  Commonly known as: HYTRIN   1 mg, Daily      Vitamin B1 100 MG tablet tablet   100 mg, Daily               Discharge Diet: NO ADDED SALT, CONSISTENT CARB    Activity at Discharge: UP WITH ASSISTANCE [facility]    Follow-up Appointments   6 WEEKS WITH DR MITTAL, 1 WEEK AFTER DISCHARGE WITH DR DENNISON [facility]  No future appointments.      Test Results Pending at Discharge  Pending Results       None             Montse Dennison MD  05/21/25  07:06 EDT

## 2025-05-21 NOTE — PROGRESS NOTES
Daily Progress Note          Assessment    Respiratory panel positive for COVID-19 however it was initially diagnosed on April 5, 2025 treated with outpatient Paxlovid as well as azithromycin  CT chest did not show any alveolar infiltrates  No PE  Patient is fully vaccinated   Skin Purpura  History of C. Difficile     Hypothyroidism  Gout  GERD  Hypertension  Diabetes mellitus           Recommendations:     The oxygen status has improved significantly, patient can be discharged from pulmonary standpoint to rehab    oxygen supplement and titration to maintain saturation 90-95%: Currently on room air      Completed predisone      Empiric antibiotic currently on cefdinir  Bronchodilator budesonide and ipratropium to avoid tachycardia  Thyroid replacement  Anticoagulation: Eliquis    HR control as per cardiology: Currently on metoprolol     Fluid and electrolyte management as per nephrology                LOS: 6 days     Subjective     Generalized weakness and mild shortness of breath    Objective     Vital signs for last 24 hours:  Vitals:    05/20/25 2011 05/20/25 2343 05/21/25 0300 05/21/25 0658   BP:  153/78 142/73 151/77   BP Location:  Left arm Left arm Left arm   Patient Position:  Lying Lying Lying   Pulse: 59 62 61 59   Resp: 25 25 20 18   Temp:  96.9 °F (36.1 °C) 97 °F (36.1 °C) 97.4 °F (36.3 °C)   TempSrc:  Temporal Temporal Oral   SpO2: 98% 92% (!) 88% 93%   Weight:   127 kg (279 lb 8.7 oz)    Height:           Intake/Output last 3 shifts:  I/O last 3 completed shifts:  In: 840 [P.O.:840]  Out: 700 [Urine:700]  Intake/Output this shift:  No intake/output data recorded.      Radiology  Imaging Results (Last 24 Hours)       ** No results found for the last 24 hours. **            Labs:  Results from last 7 days   Lab Units 05/21/25  0052   WBC 10*3/mm3 12.68*   HEMOGLOBIN g/dL 10.4*   HEMATOCRIT % 34.4*   PLATELETS 10*3/mm3 216     Results from last 7 days   Lab Units 05/21/25  0052   SODIUM mmol/L 141    POTASSIUM mmol/L 4.7   CHLORIDE mmol/L 106   CO2 mmol/L 26.5   BUN mg/dL 27*   CREATININE mg/dL 1.40*   CALCIUM mg/dL 8.7   BILIRUBIN mg/dL 0.3   ALK PHOS U/L 53   ALT (SGPT) U/L 26   AST (SGOT) U/L 14   GLUCOSE mg/dL 122*     Results from last 7 days   Lab Units 05/15/25  0121   PH, ARTERIAL pH units 7.372   PO2 ART mm Hg 102.6   PCO2, ARTERIAL mm Hg 37.8   HCO3 ART mmol/L 22.0     Results from last 7 days   Lab Units 05/21/25  0052 05/19/25  2210 05/18/25  2233   ALBUMIN g/dL 3.3* 3.3* 3.2*     Results from last 7 days   Lab Units 05/14/25  2256 05/14/25  2122   CK TOTAL U/L 44  --    HSTROP T ng/L 33* 32*         Results from last 7 days   Lab Units 05/21/25  0052   MAGNESIUM mg/dL 2.3         Results from last 7 days   Lab Units 05/14/25  2256   TSH uIU/mL 1.080           Meds:   SCHEDULE  allopurinol, 200 mg, Oral, Daily  apixaban, 2.5 mg, Oral, Q12H  cefdinir, 300 mg, Oral, Q12H  FLUoxetine, 10 mg, Oral, Daily  folic acid, 1 mg, Oral, Daily  hydrALAZINE, 100 mg, Oral, TID  hydrocortisone-bacitracin-zinc oxide-nystatin, 1 Application, Topical, Q8H  ipratropium, 0.5 mg, Nebulization, TID - RT  levothyroxine, 175 mcg, Oral, Q AM  metoprolol succinate XL, 12.5 mg, Oral, Q24H  mineral oil-hydrophilic petrolatum, 1 Application, Topical, Daily  pantoprazole, 40 mg, Oral, Daily  saccharomyces boulardii, 250 mg, Oral, BID  sodium bicarbonate, 1,300 mg, Oral, TID  sodium chloride, 10 mL, Intravenous, Q12H  terazosin, 1 mg, Oral, Daily  Vitamin B1, 100 mg, Oral, Daily      Infusions         PRNs    acetaminophen **OR** [DISCONTINUED] acetaminophen **OR** [DISCONTINUED] acetaminophen    aluminum-magnesium hydroxide-simethicone    senna-docusate sodium **AND** polyethylene glycol **AND** bisacodyl **AND** bisacodyl    calcium carbonate    nitroglycerin    ondansetron ODT **OR** [DISCONTINUED] ondansetron    sodium chloride    sodium chloride    Physical Exam:  General Appearance:  Alert   HEENT:  Normocephalic, without  obvious abnormality, Conjunctiva/corneas clear,.   Nares normal, no drainage     Neck:  Supple, symmetrical, trachea midline.   Lungs /Chest wall:   minimal bilateral basal rhonchi, respirations unlabored, symmetrical wall movement.     Heart: Heart rate controlled, S1 S2 normal  Abdomen: Soft, non-tender, no masses, no organomegaly.    Extremities: Trace edema, no clubbing or cyanosis     ROS  Constitutional: Negative for chills, fever and malaise/fatigue.   HENT: Negative.    Eyes: Negative.    Cardiovascular: Negative.    Respiratory: Positive for mild cough and shortness of breath.    Skin: Negative.    Musculoskeletal: Negative.    Gastrointestinal: Negative.    Genitourinary: Negative.    Neurological: Generalized weakness      I reviewed the recent clinical results  I personally reviewed the latest radiological studies    Part of this note may be an electronic transcription/translation of spoken language to printed text using the Dragon Dictation System.

## 2025-05-21 NOTE — PROGRESS NOTES
"NEPHROLOGY PROGRESS NOTE------[facility]    [facility]  [phone]  Ines Barahona MD      Patient Care Team:  Montse Dawkins MD as PCP - General (Family Medicine)  Huey Keyes MD as Consulting Physician (Nephrology)      Provider:  Ines Barahona MD  Patient Name: Rommel Mcfarland  :  1945    SUBJECTIVE:    F/U ARF/SARAH/CRF/CKD    Weak but otherwise without complaints. No angina. No dysuria.     Medication:  allopurinol, 200 mg, Oral, Daily  apixaban, 2.5 mg, Oral, Q12H  cefdinir, 300 mg, Oral, Q12H  FLUoxetine, 10 mg, Oral, Daily  folic acid, 1 mg, Oral, Daily  hydrALAZINE, 100 mg, Oral, TID  hydrocortisone-bacitracin-zinc oxide-nystatin, 1 Application, Topical, Q8H  ipratropium, 0.5 mg, Nebulization, TID - RT  levothyroxine, 175 mcg, Oral, Q AM  metoprolol succinate XL, 12.5 mg, Oral, Q24H  mineral oil-hydrophilic petrolatum, 1 Application, Topical, Daily  pantoprazole, 40 mg, Oral, Daily  saccharomyces boulardii, 250 mg, Oral, BID  sodium bicarbonate, 1,300 mg, Oral, TID  sodium chloride, 10 mL, Intravenous, Q12H  terazosin, 1 mg, Oral, Daily  Vitamin B1, 100 mg, Oral, Daily               OBJECTIVE    Vital Sign Min/Max for last 24 hours  Temp  Min: 96.9 °F (36.1 °C)  Max: 98.2 °F (36.8 °C)   BP  Min: 133/60  Max: 160/78   Pulse  Min: 55  Max: 76   Resp  Min: 18  Max: 25   SpO2  Min: 88 %  Max: 98 %   No data recorded   Weight  Min: 127 kg (279 lb 8.7 oz)  Max: 127 kg (279 lb 8.7 oz)     Flowsheet Rows      Flowsheet Row First Filed Value   Admission Height 170.2 cm (67\") Documented at 2025   Admission Weight 114 kg (251 lb 12.3 oz) Documented at 2025            No intake/output data recorded.  I/O last 3 completed shifts:  In: 840 [P.O.:840]  Out: 700 [Urine:700]    Physical Exam:  General Appearance: alert, appears stated age and cooperative  Head: normocephalic, without obvious abnormality and " "atraumatic  Eyes: conjunctivae and sclerae normal and no icterus  Neck: supple and no JVD  Lungs: SCATTERED RHONCHI  Heart: regular rhythm & normal rate and normal S1, S2 +BALDOMERO  Chest: Wall no abnormalities observed  Abdomen: normal bowel sounds and soft, nontender  Extremities: moves extremities well, no edema, no cyanosis and no redness  Skin: no bleeding, bruising or rash, turgor normal, color normal and no lesions noted  Neurologic: Alert, and oriented. No focal deficits    Labs:    WBC WBC   Date Value Ref Range Status   05/21/2025 12.68 (H) 3.40 - 10.80 10*3/mm3 Final   05/19/2025 11.09 (H) 3.40 - 10.80 10*3/mm3 Final   05/18/2025 11.66 (H) 3.40 - 10.80 10*3/mm3 Final      HGB Hemoglobin   Date Value Ref Range Status   05/21/2025 10.4 (L) 13.0 - 17.7 g/dL Final   05/19/2025 10.8 (L) 13.0 - 17.7 g/dL Final   05/18/2025 9.9 (L) 13.0 - 17.7 g/dL Final      HCT Hematocrit   Date Value Ref Range Status   05/21/2025 34.4 (L) 37.5 - 51.0 % Final   05/19/2025 35.0 (L) 37.5 - 51.0 % Final   05/18/2025 32.4 (L) 37.5 - 51.0 % Final      Platelets No results found for: \"LABPLAT\"   MCV MCV   Date Value Ref Range Status   05/21/2025 87.1 79.0 - 97.0 fL Final   05/19/2025 87.3 79.0 - 97.0 fL Final   05/18/2025 87.3 79.0 - 97.0 fL Final          Sodium Sodium   Date Value Ref Range Status   05/21/2025 141 136 - 145 mmol/L Final   05/19/2025 139 136 - 145 mmol/L Final   05/18/2025 139 136 - 145 mmol/L Final      Potassium Potassium   Date Value Ref Range Status   05/21/2025 4.7 3.5 - 5.2 mmol/L Final   05/19/2025 4.6 3.5 - 5.2 mmol/L Final   05/18/2025 4.7 3.5 - 5.2 mmol/L Final      Chloride Chloride   Date Value Ref Range Status   05/21/2025 106 98 - 107 mmol/L Final   05/19/2025 105 98 - 107 mmol/L Final   05/18/2025 107 98 - 107 mmol/L Final      CO2 CO2   Date Value Ref Range Status   05/21/2025 26.5 22.0 - 29.0 mmol/L Final   05/19/2025 27.1 22.0 - 29.0 mmol/L Final   05/18/2025 26.0 22.0 - 29.0 mmol/L Final      BUN BUN " "  Date Value Ref Range Status   05/21/2025 27 (H) 8 - 23 mg/dL Final   05/19/2025 31 (H) 8 - 23 mg/dL Final   05/18/2025 35 (H) 8 - 23 mg/dL Final      Creatinine Creatinine   Date Value Ref Range Status   05/21/2025 1.40 (H) 0.76 - 1.27 mg/dL Final   05/19/2025 1.49 (H) 0.76 - 1.27 mg/dL Final   05/18/2025 1.85 (H) 0.76 - 1.27 mg/dL Final      Calcium Calcium   Date Value Ref Range Status   05/21/2025 8.7 8.6 - 10.5 mg/dL Final   05/19/2025 8.6 8.6 - 10.5 mg/dL Final   05/18/2025 8.4 (L) 8.6 - 10.5 mg/dL Final      PO4 No components found for: \"PO4\"   Albumin Albumin   Date Value Ref Range Status   05/21/2025 3.3 (L) 3.5 - 5.2 g/dL Final   05/19/2025 3.3 (L) 3.5 - 5.2 g/dL Final   05/18/2025 3.2 (L) 3.5 - 5.2 g/dL Final      Magnesium Magnesium   Date Value Ref Range Status   05/21/2025 2.3 1.6 - 2.4 mg/dL Final   05/19/2025 2.2 1.6 - 2.4 mg/dL Final   05/18/2025 2.3 1.6 - 2.4 mg/dL Final      Uric Acid No components found for: \"URIC ACID\"     Imaging Results (Last 72 Hours)       ** No results found for the last 72 hours. **            Results for orders placed during the hospital encounter of 05/14/25    XR Chest 1 View    Narrative  XR CHEST 1 VW    Date of Exam: 5/14/2025 9:36 PM EDT    Indication: Dyspnea, wheezing    Comparison: 2/1/2016    Findings:  Stable mild cardiomegaly, exaggerated by technique. Lungs without consolidation. Negative for pneumothorax or effusion. Osseous structures grossly intact. Patient's chin overlies the lung apices medially partially limiting assessment.    Impression  Impression:  No acute process.          Electronically Signed: Renny Johnson MD  5/14/2025 10:21 PM EDT  Workstation ID: [id number]      Results for orders placed during the hospital encounter of 03/27/25    XR Tibia Fibula 2 View Right    Narrative  XR TIBIA FIBULA 2 VW RIGHT    Date of Exam: 3/29/2025 10:17 AM EDT    Indication: AND RIGHT ANKLE TO SEE IF HARDWARE IS PRESENT (PINS) FROM PRIOR FRACTURE    Comparison: " None available.    Findings:  Remote posttraumatic and postoperative changes of the distal tibia and fibula are noted. 2 orthopedic fixation screws are observed. There are no signs of hardware failure or loosening. There is no acute displaced fracture or malalignment. Chronic changes  are noted. Atherosclerotic vascular calcifications are noted. Chronic soft tissue changes are also observed.    Impression  Impression:  1.Remote posttraumatic and postoperative changes of the distal tibia and fibula. 2 orthopedic fixation screws are identified. There are no signs of hardware failure or loosening.  2.No evidence for acute displaced fracture or malalignment.        Electronically Signed: Riley Walls MD  3/29/2025 12:53 PM EDT  Workstation ID: [id number]      Results for orders placed during the hospital encounter of 09/15/23    XR Foot 3+ View Right    Narrative  XR FOOT 3+ VW RIGHT    Date of Exam: 9/15/2023 2:30 PM EDT    Indication: wound, infection    Comparison: 7/9/2022    Findings:  Bones are diffusely osteopenic. There is no discrete area of osseous erosion to indicate a site of osteomyelitis. Postsurgical changes of screw fixation noted at the distal tibia and fibula similar to the prior exam. Extensive small vessel vascular  calcifications. Soft tissue swelling overlying the dorsum of the foot which may relate to edema and/or cellulitis.    Impression  Impression:  1. No discrete area of osseous erosion to indicate osteomyelitis.  2. Soft tissue swelling overlying the dorsum of the foot may relate to edema and/or cellulitis.  3. Diffuse osteopenia.  4. Extensive small vessel vascular calcifications.        Electronically Signed: Renny Johnson MD  9/15/2023 2:35 PM EDT  Workstation ID: [id number]      Results for orders placed during the hospital encounter of 05/14/25    Duplex Venous Lower Extremity - Bilateral CAR    Interpretation Summary    Chronic left lower extremity superficial thrombophlebitis noted in  the great saphenous (below knee) and small saphenous.    All other veins appeared normal bilaterally.        ASSESSMENT / PLAN      Acute and chronic respiratory failure (acute-on-chronic)    Persistent atrial fibrillation      ARF/SARAH/CRF/CKD----Nonoliguric. +ARF/SARAH on top of known CRF/CKD STG 3A secondary to HTN NS. Clinically prerenal (BNP not clinically accurate) and s/p IV dye exposure . No NSAIDs or IV dye. Dose meds for CrCl less than 10 cc/min until ARF/SARAH is resolved. Stable off of IVFs and diuretics     2. COVID 19 + PNA/ACUTE HYPOXIC RESPIRATORY FAILURE------On BiPap, steroids, oxygen, nebs per Pulmonary . Was initially COVID 19 + at the beginning of April     3. OA/DJD/GOUT/HYPERURICEMIA-----On Allopurinol. No NSAIDs. Uric ok     4. BILATERAL LE EDEMA/CHRONIC VENOUS INSUFFICIENCY/WOUNDS/CELLULITIS------Wound care.       5. DEPRESSION-----Ok to continue SSRI in the form of Prozac for now     6. ETOH ABUSE------Counseled     7. HTN WITH CKD-----BP better with increased Hydralazine a little and follow. Avoid hypotension. No ACE-I/ARB/DRI     8. GERD/PUD PROPHYLAXIS------On PPI. Benefits outweigh risks despite CKD     9. HYPOTHYROIDISM------On Synthroid. TSH ok     10. DVT PROPHYLAXIS------Lovenox     11. DEPRESSION------On Prozac     12. HYPOMAGNESEMIA------Replaced     13. CHRONIC IBS------On Florastor     14. ANEMIA OF CKD-----IV iron for LAMIN. HgB above threshold for EPO     15. KETONURIA/DEHYDRATION-----IV NS. BNP not clinically accurate     16. ELEVATED D-DIMER-----CT PE negative. Likely elevation is from COVID. Repeat Bilateral LE venous dopplers negative    17. HYPOPHOSPHATEMIA-------Replaced    18. LACTIC ACIDOSIS--------Better    19. REHAB PLACEMENT     20. HYPOCALCEMIA------Replaced    Okay from RENAL standpoint to d/c to [facility] today.        Ines Barahona MD  [facility]  [phone]  05/21/25  07:12 EDT

## 2025-05-21 NOTE — PLAN OF CARE
Goal Outcome Evaluation:         Discharging to [facility] rehab this afternoon.

## 2025-05-21 NOTE — PLAN OF CARE
Goal Outcome Evaluation:         No complaints from patient during the night.  Vitals WNL.  Possible discharge to [facility] today.

## 2025-05-21 NOTE — CASE MANAGEMENT/SOCIAL WORK
Continued Stay Note   [place]     Patient Name: Rommel Mcfarland  MRN: [id number]  Today's Date: 5/21/2025    Admit Date: 5/14/2025    Plan: DC PLAN: [facility]. [facility] to transport today at 1630       Discharge Plan       Row Name 05/21/25 1406       Plan    Plan DC PLAN: [facility]. [facility] to transport today at 1630        Patient/Family in Agreement with Plan yes    Plan Comments [facility] accepted. [facility] to transport today at 1630.                         Expected Discharge Date and Time       Expected Discharge Date Expected Discharge Time    May 21, 2025           Joy Emmanuel RN   Case Management  [phone]

## 2025-05-21 NOTE — PROGRESS NOTES
Cardiology [place]        LOS:  LOS: 6 days   Patient Name: Rommel Mcfarland  Age/Sex: 79 y.o. male  : 1945  MRN: [id number]    Day of Service: 25   Length of Stay: 6  Encounter Provider: Freddy Sanchez MD  Place of Service: [facility] CARDIOLOGY  Patient Care Team:  Montse Dawkins MD as PCP - General (Family Medicine)  Huey Keyes MD as Consulting Physician (Nephrology)    Subjective:     Chief Complaint: f/u afib / shortness of breath    Subjective: patient awaiting d/c to [facility] rehab. Afib with controlled rates, creatinine improved    Current Medications:   Scheduled Meds:allopurinol, 200 mg, Oral, Daily  apixaban, 2.5 mg, Oral, Q12H  cefdinir, 300 mg, Oral, Q12H  FLUoxetine, 10 mg, Oral, Daily  folic acid, 1 mg, Oral, Daily  hydrALAZINE, 100 mg, Oral, TID  hydrocortisone-bacitracin-zinc oxide-nystatin, 1 Application, Topical, Q8H  ipratropium, 0.5 mg, Nebulization, TID - RT  levothyroxine, 175 mcg, Oral, Q AM  metoprolol succinate XL, 12.5 mg, Oral, Q24H  mineral oil-hydrophilic petrolatum, 1 Application, Topical, Daily  pantoprazole, 40 mg, Oral, Daily  saccharomyces boulardii, 250 mg, Oral, BID  sodium bicarbonate, 1,300 mg, Oral, TID  sodium chloride, 10 mL, Intravenous, Q12H  terazosin, 1 mg, Oral, Daily  Vitamin B1, 100 mg, Oral, Daily      Continuous Infusions:     Allergies:  Allergies   Allergen Reactions    Tigecycline Swelling and Rash       Review of Systems   Constitutional: Negative for chills and fever.   HENT:  Negative for ear discharge and nosebleeds.    Eyes:  Negative for discharge and redness.   Cardiovascular:  Negative for chest pain, orthopnea, palpitations, paroxysmal nocturnal dyspnea and syncope.   Respiratory:  Negative for cough, shortness of breath and wheezing.    Endocrine: Negative for heat intolerance.   Skin:  Negative for rash.   Musculoskeletal:  Negative for arthritis and myalgias.   Gastrointestinal:  Negative for  abdominal pain, melena, nausea and vomiting.   Genitourinary:  Negative for dysuria and hematuria.   Neurological:  Negative for dizziness, light-headedness, numbness and tremors.   Psychiatric/Behavioral:  Negative for depression. The patient is not nervous/anxious.        Objective:     Temp:  [96.9 °F (36.1 °C)-98 °F (36.7 °C)] 96.9 °F (36.1 °C)  Heart Rate:  [55-63] 63  Resp:  [18-25] 21  BP: (137-153)/(71-78) 151/77     Intake/Output Summary (Last 24 hours) at 5/21/2025 1625  Last data filed at 5/21/2025 1603  Gross per 24 hour   Intake 120 ml   Output 725 ml   Net -605 ml     Body mass index is 43.78 kg/m².      05/19/25  0348 05/20/25  0500 05/21/25  0300   Weight: 117 kg (257 lb 15 oz) 125 kg (274 lb 14.6 oz) 127 kg (279 lb 8.7 oz)         General Appearance:    Alert, cooperative, in no acute distress                                Head: Atraumatic, normocephalic, PERRLA               Neck:   supple, no JVD   Lungs:     respirations regular, even and unlabored    Heart:    irregular rhythm and normal rate, normal S1 and S2, no       murmur, no gallop, no rub, no click   Abdomen:     Normal bowel sounds, no masses, no organomegaly, soft  nontender, nondistended, no guarding, no rebound  tenderness   Extremities:   Moves all extremities well,  edema, no cyanosis, no  redness   Pulses:   Pulses palpable and equal bilaterally   Skin:   No bleeding, bruising or rash   Neurologic:   Awake, alert, oriented x3         Lab Review:   Results from last 7 days   Lab Units 05/21/25  0052 05/19/25  2210   SODIUM mmol/L 141 139   POTASSIUM mmol/L 4.7 4.6   CHLORIDE mmol/L 106 105   CO2 mmol/L 26.5 27.1   BUN mg/dL 27* 31*   CREATININE mg/dL 1.40* 1.49*   GLUCOSE mg/dL 122* 134*   CALCIUM mg/dL 8.7 8.6   AST (SGOT) U/L 14 21   ALT (SGPT) U/L 26 29     Results from last 7 days   Lab Units 05/14/25  2256 05/14/25 2122   CK TOTAL U/L 44  --    HSTROP T ng/L 33* 32*     Results from last 7 days   Lab Units 05/21/25  0059  "05/19/25  2210   WBC 10*3/mm3 12.68* 11.09*   HEMOGLOBIN g/dL 10.4* 10.8*   HEMATOCRIT % 34.4* 35.0*   PLATELETS 10*3/mm3 216 203         Results from last 7 days   Lab Units 05/21/25  0052 05/19/25  2210   MAGNESIUM mg/dL 2.3 2.2           Invalid input(s): \"LDLCALC\"  Results from last 7 days   Lab Units 05/14/25  2122   PROBNP pg/mL 3,853.0*     Results from last 7 days   Lab Units 05/14/25  2256   TSH uIU/mL 1.080       Recent Radiology:  Imaging Results (Most Recent)       Procedure Component Value Units Date/Time    US Renal Bilateral [[id number]] Collected: 05/15/25 1456     Updated: 05/15/25 1459    Narrative:      US RENAL BILATERAL    Date of Exam: 5/15/2025 2:25 PM EDT    Indication: ARF/SARAH/CRF/CKD.    Comparison: No comparisons available.    Technique: Grayscale and color Doppler ultrasound evaluation of the kidneys and urinary bladder was performed.      Findings:  The right kidney measures 9.8 cm and the left kidney measures 10.3 cm. Kidney echogenicity and vascularity appear within normal limits. There is no solid kidney mass.  No echogenic shadowing stone.  No hydronephrosis.      Limited visualization of the urinary bladder is unremarkable.bladder volume 245 cc        Impression:      Impression:  No definite sonographic abnormality in the kidneys        Electronically Signed: Lonny Garcia MD    5/15/2025 2:57 PM EDT    Workstation ID: [id number]    CT Angiogram Chest Pulmonary Embolism [[id number]] Collected: 05/15/25 0035     Updated: 05/15/25 0042    Narrative:      CT ANGIOGRAM CHEST PULMONARY EMBOLISM    Date of Exam: 5/14/2025 11:23 PM EDT    Indication: Elevated dimer, dyspnea.    Comparison: None available.    Technique: Axial CT images were obtained of the chest after the uneventful intravenous administration of iodinated contrast utilizing pulmonary embolism protocol.  In addition, a 3-D volume rendered image was created for interpretation.  Sagittal and   coronal reconstructions were " performed.  Automated exposure control and iterative reconstruction methods were used.      Findings:    Pulmonary arteries: Adequate opacification of the pulmonary arteries. No evidence of acute pulmonary embolism.    Lungs and Pleura: There is mild bilateral basilar atelectasis. There is bilateral lower lobe bronchial wall thickening with some fluid within the bronchi suggestive of infection.    Mediastinum/Inés: No mediastinal or hilar lymphadenopathy.    Lymph nodes: No axillary or supraclavicular adenopathy.    Cardiovascular: The the heart is enlarged. There is a trace pericardial effusion. There is aortic valvular calcification and coronary arterial calcific atherosclerosis.    Upper Abdomen: There are clips from cholecystectomy. There is mild fatty infiltration of the liver. Otherwise, the upper abdominal contents are unremarkable.          Bones and Soft Tissue: No suspicious osseous lesion.        Impression:      Impression:  1.No evidence of pulmonary embolism.  2.Bilateral lower lobe bronchial wall thickening with some fluid in the bronchi suggestive of infection.            Electronically Signed: Haile Velez MD    5/15/2025 12:40 AM EDT    Workstation ID: [id number]    XR Chest 1 View [[id number]] Collected: 05/14/25 2220     Updated: 05/14/25 2223    Narrative:      XR CHEST 1 VW    Date of Exam: 5/14/2025 9:36 PM EDT    Indication: Dyspnea, wheezing    Comparison: 2/1/2016    Findings:  Stable mild cardiomegaly, exaggerated by technique. Lungs without consolidation. Negative for pneumothorax or effusion. Osseous structures grossly intact. Patient's chin overlies the lung apices medially partially limiting assessment.      Impression:      Impression:  No acute process.          Electronically Signed: Renny Johnson MD    5/14/2025 10:21 PM EDT    Workstation ID: [id number]            ECHOCARDIOGRAM:    Results for orders placed during the hospital encounter of 05/14/25    Adult Transthoracic Echo Complete  W/ Cont if Necessary Per Protocol    Interpretation Summary    Left ventricular ejection fraction appears to be 46 - 50%.    The right ventricular cavity is mildly dilated.        I reviewed the patient's new clinical results.    EKG:      Assessment:       Acute and chronic respiratory failure (acute-on-chronic)    Persistent atrial fibrillation    Afib  Hx Afib-none since 2014, Coumadin discontinued due to alcohol abuse and frequent falls  Amiodarone discontinued in 2016-stopped due to liver function and no A-fib recurrence  On admit-potassium 3.9, Magnesium pending, TSH 1.08, free T41.52  High-sensitivity troponins 32, 33  Repeat echo shows LVEF 46-50%  Now on low dose Eliquis     Hypertension  Hydralazine 50 mg daily terazosin 1 mg twice daily per home meds  Avoid ACE ARB     ARF/SARAH  Dr. Barahona consulted   Creatinine improved with IVF     Dyspnea  proBNP 3853 -skewed in setting of SARAH  Dr. 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       Plan:   Patient remains in Afib with controlled rates  Continue low dose beta blockers  On low dose Eliquis  Consider cardioversion in 6-8 weeks after anticoagulation  GDMT on Toprol XL, no ACEi /ARB due to renal dysfunction      Patient is seen and examined and findings are verified.  All data is reviewed by me personally.  Assessment and plan formulated by APC was done after discussion with attending.  I spent more than 50% of time in taking care of the patient.    Patient is lying comfortably in bed.  No respiratory distress.    Hemodynamics are stable heart  rate is controlled    Normal S1 and S2.  No pericardial rub or murmur abdominal exam is benign no leg edema noted.    Patient can be discharged from cardiac standpoint.  Rate is controlled continue Eliquis.  I would consider cardioversion after 6 weeks of anticoagulation.  Patient is advised to follow-up with me in 6 weeks    Electronically signed by Freddy Sanchez MD, 05/21/25, 4:26 PM EDT.      Freddy Sanchez MD  05/21/25  16:25 EDT

## 2025-05-22 ENCOUNTER — TELEPHONE (OUTPATIENT)
Dept: CARDIOLOGY | Facility: CLINIC | Age: 80
End: 2025-05-22
Payer: MEDICARE

## 2025-05-22 NOTE — TELEPHONE ENCOUNTER
Caller: [employer]    Relationship to patient: Emergency Contact    Best call back number: [phone]    Chief complaint: AFIB    Type of visit: HOSPITAL FU    Requested date: 06.18.25     Additional notes:PATIENT'S DAUGHTER CALLED TO SCHEDULE A HOSPITAL FU APPOINT FOR FOUR WEEKS PATIENT HAS CARDIOVERSION IN SIX WEEKS PLEASE CALL AND ADVISE. NO AVAILABLE APPOINTMENTS WITHIN TIME FRAME. PATIENT WANTS TO GO TO [facility] IF POSSIBLE IF NOT YULI NAIK IS FINE.

## 2025-05-22 NOTE — CASE MANAGEMENT/SOCIAL WORK
Case Management Discharge Note      Final Note: [facility] Continued Care - Discharged on 5/21/2025 Admission date: 5/14/2025 - Discharge disposition: Rehab Facility or Unit (DC - External)      Destination Coordination complete.      Service Provider Services Address Phone Fax Patient Preferred    [facility] [address] [place] [address] [place] [address] [phone] --                    Transportation Services  W/C Van: Other (WINDY Novak)    Final Discharge Disposition Code: 62 - inpatient rehab facility

## 2025-06-26 NOTE — PROGRESS NOTES
Date of Office Visit: 2025  Encounter Provider: Dr. Freddy Sanchez  Place of Service: [facility] CARDIOLOGY [place]  Patient Name: Rommel Mcfarland  :1945  Montse Dawkins MD    Chief Complaint   Patient presents with    Atrial Fibrillation    Hypertension    Follow-up     History of Present Illness    I am pleased to see Mr. Mcfarland in my office today as a followup.    As you know, patient is 79-year-old white gentleman whose past medical history significant for obesity, hypertension, renal failure, who  came today for followUp.    In 2014, patient was admitted at [facility] with the symptoms of rhabdomyolysis and renal insufficiency.  He developed respiratory failure.  Patient was found to be volume overload..  Echocardiogram showed normal left ventricular size and function and no significant valvular heart disease.  He was aggressively diuresed.  Patient went into atrial fibrillation which responded to amiodarone.  His renal function also improved.  But he still have baseline renal insufficiency.  In the hospital, he was noted to have gallstones but surgery was deferred. Patient underwent cholecystectomy and did well. On previous visit I stopped the Lopressor as well as Coumadin because of frequent fall and relapse of alcohol abuse.    In May 2025, patient was admitted in [facility] with symptom of shortness of breath.  He was volume overloaded with bilateral leg edema.  Patient was also noted to have atrial fibrillation.  Patient was started on Eliquis and Toprol-XL.  Patient was diuresed and subsequently discharged.    Patient is not very active.  He is wheelchair-bound.  Patient denies any shortness of breath.  Right leg edema noted.  No orthopnea or PND no syncope or presyncope.    EKG showed atrial fibrillation.  Heart rate is in the 80s.    Patient is on Eliquis.  I will continue anticoagulation for another 4 to 6 weeks.  I would proceed with  cardioversion on August 5, 2025.  I would avoid amiodarone for now.  Risk and benefit and alternative options were explained with the patient and his daughter.      Past Medical History:   Diagnosis Date    Alcohol abuse 07/13/2022    Essential hypertension 07/13/2022    Gait instability 07/13/2022    GERD without esophagitis 07/13/2022    Gout 07/13/2022    Hypothyroidism (acquired) 07/13/2022    Onychomycosis of multiple toenails with type 2 diabetes mellitus 07/13/2022    Persistent atrial fibrillation 05/21/2025    Sleep apnea     Stage 3a chronic kidney disease 07/13/2022    Tinea pedis of both feet 07/13/2022    Type 2 diabetes mellitus with diabetic neuropathy, without long-term current use of insulin 07/13/2022         Past Surgical History:   Procedure Laterality Date    ORTHOPEDIC SURGERY      fractured ankle and dislocated shoulder           Current Outpatient Medications:     allopurinol 200 MG tablet, Take 200 mg by mouth Daily., Disp: , Rfl:     apixaban (ELIQUIS) 2.5 MG tablet tablet, Take 1 tablet by mouth Every 12 (Twelve) Hours. Indications: Atrial Fibrillation, Disp: , Rfl:     bisacodyl (DULCOLAX) 10 MG suppository, Insert 1 suppository into the rectum Daily As Needed for Constipation (Use if bisacodyl oral is ineffective)., Disp: , Rfl:     colestipol (COLESTID) 1 g tablet, Take 2 tablets by mouth Daily., Disp: , Rfl:     FLUoxetine (PROzac) 10 MG capsule, Take 1 capsule by mouth Daily., Disp: , Rfl:     folic acid (FOLVITE) 1 MG tablet, Take 1 tablet by mouth Daily., Disp: , Rfl:     hydrALAZINE (APRESOLINE) 100 MG tablet, Take 1 tablet by mouth 3 times a day., Disp: , Rfl:     hydrocortisone-bacitracin-zinc oxide-nystatin (MAGIC BARRIER), Apply 1 Application topically to the appropriate area as directed Every 8 (Eight) Hours., Disp: , Rfl:     ipratropium (ATROVENT) 0.02 % nebulizer solution, Take 2.5 mL by nebulization 3 (Three) Times a Day., Disp: , Rfl:     levothyroxine (SYNTHROID,  LEVOTHROID) 175 MCG tablet, Take 1 tablet by mouth Every Morning., Disp: , Rfl:     metoprolol succinate XL (TOPROL-XL) 25 MG 24 hr tablet, Take 0.5 tablets by mouth Daily., Disp: , Rfl:     pantoprazole (PROTONIX) 40 MG EC tablet, Take 1 tablet by mouth Daily., Disp: , Rfl:     saccharomyces boulardii (FLORASTOR) 250 MG capsule, Take 1 capsule by mouth 2 (Two) Times a Day., Disp: , Rfl:     sodium bicarbonate 650 MG tablet, Take 2 tablets by mouth 3 (Three) Times a Day., Disp: , Rfl:     Thiamine HCl (Vitamin B1) 100 MG tablet tablet, Take 1 tablet by mouth Daily., Disp: , Rfl:       Social History     Socioeconomic History    Marital status:    Tobacco Use    Smoking status: Never    Smokeless tobacco: Never   Vaping Use    Vaping status: Never Used   Substance and Sexual Activity    Alcohol use: Yes     Alcohol/week: 30.0 standard drinks of alcohol     Types: 30 Cans of beer per week    Drug use: Never    Sexual activity: Defer         Review of Systems   Constitutional: Negative for chills and fever.   HENT:  Negative for ear discharge and nosebleeds.    Eyes:  Negative for discharge and redness.   Cardiovascular:  Positive for leg swelling. Negative for chest pain, orthopnea, palpitations, paroxysmal nocturnal dyspnea and syncope.   Respiratory:  Positive for shortness of breath. Negative for cough and wheezing.    Endocrine: Negative for heat intolerance.   Skin:  Negative for rash.   Musculoskeletal:  Negative for arthritis and myalgias.   Gastrointestinal:  Negative for abdominal pain, melena, nausea and vomiting.   Genitourinary:  Negative for dysuria and hematuria.   Neurological:  Negative for dizziness, light-headedness, numbness and tremors.   Psychiatric/Behavioral:  Negative for depression. The patient is not nervous/anxious.        Procedures      ECG 12 Lead    Date/Time: 6/27/2025 1:43 PM  Performed by: Freddy Sanchez MD    Authorized by: Freddy Sanchez MD  Comparison: compared with previous  "ECG   Similar to previous ECG  Rhythm: atrial fibrillation    Clinical impression: abnormal EKG          ECG 12 Lead    (Results Pending)           Objective:    /80 (BP Location: Right arm, Patient Position: Sitting, Cuff Size: Large Adult)   Pulse (!) 134   Resp 16   Ht 170 cm (66.93\")   Wt 127 kg (280 lb)   SpO2 95%   BMI 43.95 kg/m²         Constitutional:       Appearance: Well-developed.   Eyes:      General: No scleral icterus.        Right eye: No discharge.   HENT:      Head: Normocephalic and atraumatic.   Neck:      Thyroid: No thyromegaly.      Lymphadenopathy: No cervical adenopathy.   Pulmonary:      Effort: Pulmonary effort is normal. No respiratory distress.      Breath sounds: Normal breath sounds. No wheezing. No rales.   Cardiovascular:      Normal rate. Regular rhythm.      No gallop.    Edema:     Peripheral edema absent.   Abdominal:      Tenderness: There is no abdominal tenderness.   Skin:     Findings: No erythema or rash.   Neurological:      Mental Status: Alert and oriented to person, place, and time.             Assessment:       Diagnosis Plan   1. Essential hypertension  Cardioversion External in Cardiology Department    CBC (No Diff)    Basic Metabolic Panel    Protime-INR    aPTT      2. Persistent atrial fibrillation  Cardioversion External in Cardiology Department    CBC (No Diff)    Basic Metabolic Panel    Protime-INR    aPTT      3. Stage 3a chronic kidney disease  Cardioversion External in Cardiology Department    CBC (No Diff)    Basic Metabolic Panel    Protime-INR    aPTT               Plan:       MDM:    1.  Persistent atrial fibrillation:    I would recommend to proceed with cardioversion continue Eliquis    2.  Hypertension:    Blood pressure at home is desirable recommend observation change hydralazine to 50 mg twice daily    3.  CKD:    Continue to observe.    4.  Bilateral leg edema:    Patient volume status has improved but still slightly volume overload.  " Patient is on Lasix.

## 2025-06-27 ENCOUNTER — OFFICE VISIT (OUTPATIENT)
Dept: CARDIOLOGY | Facility: CLINIC | Age: 80
End: 2025-06-27
Payer: MEDICARE

## 2025-06-27 VITALS
HEIGHT: 67 IN | BODY MASS INDEX: 43.95 KG/M2 | HEART RATE: 134 BPM | WEIGHT: 280 LBS | DIASTOLIC BLOOD PRESSURE: 80 MMHG | RESPIRATION RATE: 16 BRPM | SYSTOLIC BLOOD PRESSURE: 146 MMHG | OXYGEN SATURATION: 95 %

## 2025-06-27 DIAGNOSIS — I48.19 PERSISTENT ATRIAL FIBRILLATION: ICD-10-CM

## 2025-06-27 DIAGNOSIS — N18.31 STAGE 3A CHRONIC KIDNEY DISEASE: Chronic | ICD-10-CM

## 2025-06-27 DIAGNOSIS — I10 ESSENTIAL HYPERTENSION: Primary | Chronic | ICD-10-CM

## 2025-06-30 ENCOUNTER — TELEPHONE (OUTPATIENT)
Dept: CARDIOLOGY | Facility: CLINIC | Age: 80
End: 2025-06-30
Payer: MEDICARE

## 2025-06-30 RX ORDER — METOPROLOL SUCCINATE 25 MG/1
12.5 TABLET, EXTENDED RELEASE ORAL
Qty: 45 TABLET | Refills: 1 | Status: SHIPPED | OUTPATIENT
Start: 2025-06-30

## 2025-06-30 NOTE — TELEPHONE ENCOUNTER
Caller: [employer]    Relationship: Emergency Contact    Best call back number: [phone]    Requested Prescriptions:   Requested Prescriptions     Pending Prescriptions Disp Refills    metoprolol succinate XL (TOPROL-XL) 25 MG 24 hr tablet       Sig: Take 0.5 tablets by mouth Daily.        Pharmacy where request should be sent: [facility] [id number]  [place], [place]  [address] [place] [address] - [phone] [facility] [phone] FX     Last office visit with prescribing clinician: 6/27/2025   Last telemedicine visit with prescribing clinician: Visit date not found   Next office visit with prescribing clinician: Visit date not found     Additional details provided by patient: PT'S DAUGHTER ASKED IF WE COULD TEXT HER ALSO TO LET HER KNOW IT WAS SENT IN, PT IS OUT OF MEDICATION .    Does the patient have less than a 3 day supply:  [x] Yes  [] No    Would you like a call back once the refill request has been completed: [x] Yes [] No    If the office needs to give you a call back, can they leave a voicemail: [x] Yes [] No    Sherly Valentine Rep   06/30/25 12:53 EDT

## 2025-07-17 ENCOUNTER — LAB (OUTPATIENT)
Dept: LAB | Facility: HOSPITAL | Age: 80
End: 2025-07-17
Payer: MEDICARE

## 2025-07-17 ENCOUNTER — TRANSCRIBE ORDERS (OUTPATIENT)
Dept: ADMINISTRATIVE | Facility: HOSPITAL | Age: 80
End: 2025-07-17
Payer: MEDICARE

## 2025-07-17 DIAGNOSIS — N17.9 ACUTE RENAL FAILURE, UNSPECIFIED ACUTE RENAL FAILURE TYPE: Primary | ICD-10-CM

## 2025-07-17 DIAGNOSIS — N17.9 ACUTE RENAL FAILURE, UNSPECIFIED ACUTE RENAL FAILURE TYPE: ICD-10-CM

## 2025-07-17 LAB
ALBUMIN SERPL-MCNC: 4 G/DL (ref 3.5–5.2)
ALBUMIN/GLOB SERPL: 1.4 G/DL
ALP SERPL-CCNC: 67 U/L (ref 39–117)
ALT SERPL W P-5'-P-CCNC: 9 U/L (ref 1–41)
ANION GAP SERPL CALCULATED.3IONS-SCNC: 10.1 MMOL/L (ref 5–15)
AST SERPL-CCNC: 12 U/L (ref 1–40)
BILIRUB SERPL-MCNC: 0.4 MG/DL (ref 0–1.2)
BUN SERPL-MCNC: 19.7 MG/DL (ref 8–23)
BUN/CREAT SERPL: 12.4 (ref 7–25)
CALCIUM SPEC-SCNC: 9.8 MG/DL (ref 8.6–10.5)
CHLORIDE SERPL-SCNC: 103 MMOL/L (ref 98–107)
CO2 SERPL-SCNC: 25.9 MMOL/L (ref 22–29)
CREAT SERPL-MCNC: 1.59 MG/DL (ref 0.76–1.27)
EGFRCR SERPLBLD CKD-EPI 2021: 43.3 ML/MIN/1.73
GLOBULIN UR ELPH-MCNC: 2.9 GM/DL
GLUCOSE SERPL-MCNC: 139 MG/DL (ref 65–99)
POTASSIUM SERPL-SCNC: 4.7 MMOL/L (ref 3.5–5.2)
PROT SERPL-MCNC: 6.9 G/DL (ref 6–8.5)
SODIUM SERPL-SCNC: 139 MMOL/L (ref 136–145)

## 2025-07-17 PROCEDURE — 80053 COMPREHEN METABOLIC PANEL: CPT

## 2025-07-17 PROCEDURE — 36415 COLL VENOUS BLD VENIPUNCTURE: CPT

## 2025-07-25 ENCOUNTER — TELEPHONE (OUTPATIENT)
Dept: CARDIOLOGY | Facility: CLINIC | Age: 80
End: 2025-07-25
Payer: MEDICARE

## 2025-07-25 NOTE — TELEPHONE ENCOUNTER
Caller: [facility]    Relationship:     Best call back number:       WANTING TO KNOW IF ORDERS CAN BE FAXED FOR THEM TO DO LABS FRIDAY INSTEAD OF THE PATIENT COMING ON SATURDAY, SCHEDULED 8.5 FOR CARDIOVERSION

## 2025-07-31 ENCOUNTER — LAB REQUISITION (OUTPATIENT)
Dept: LAB | Facility: HOSPITAL | Age: 80
End: 2025-07-31
Payer: MEDICARE

## 2025-07-31 DIAGNOSIS — I12.9 HYPERTENSIVE CHRONIC KIDNEY DISEASE WITH STAGE 1 THROUGH STAGE 4 CHRONIC KIDNEY DISEASE, OR UNSPECIFIED CHRONIC KIDNEY DISEASE: ICD-10-CM

## 2025-07-31 DIAGNOSIS — N18.30 CHRONIC KIDNEY DISEASE, STAGE 3 UNSPECIFIED: ICD-10-CM

## 2025-07-31 LAB
ANION GAP SERPL CALCULATED.3IONS-SCNC: 14 MMOL/L (ref 5–15)
BASOPHILS # BLD AUTO: 0.13 10*3/MM3 (ref 0–0.2)
BASOPHILS NFR BLD AUTO: 1.5 % (ref 0–1.5)
BUN SERPL-MCNC: 24 MG/DL (ref 8–23)
BUN/CREAT SERPL: 14.7 (ref 7–25)
CALCIUM SPEC-SCNC: 9.6 MG/DL (ref 8.6–10.5)
CHLORIDE SERPL-SCNC: 101 MMOL/L (ref 98–107)
CO2 SERPL-SCNC: 24 MMOL/L (ref 22–29)
CREAT SERPL-MCNC: 1.63 MG/DL (ref 0.76–1.27)
DEPRECATED RDW RBC AUTO: 48.1 FL (ref 37–54)
EGFRCR SERPLBLD CKD-EPI 2021: 42.1 ML/MIN/1.73
EOSINOPHIL # BLD AUTO: 0.48 10*3/MM3 (ref 0–0.4)
EOSINOPHIL NFR BLD AUTO: 5.7 % (ref 0.3–6.2)
ERYTHROCYTE [DISTWIDTH] IN BLOOD BY AUTOMATED COUNT: 15.3 % (ref 12.3–15.4)
GLUCOSE SERPL-MCNC: 115 MG/DL (ref 65–99)
HCT VFR BLD AUTO: 39.3 % (ref 37.5–51)
HGB BLD-MCNC: 12.8 G/DL (ref 13–17.7)
IMM GRANULOCYTES # BLD AUTO: 0.03 10*3/MM3 (ref 0–0.05)
IMM GRANULOCYTES NFR BLD AUTO: 0.4 % (ref 0–0.5)
LYMPHOCYTES # BLD AUTO: 2.43 10*3/MM3 (ref 0.7–3.1)
LYMPHOCYTES NFR BLD AUTO: 29 % (ref 19.6–45.3)
MCH RBC QN AUTO: 28.2 PG (ref 26.6–33)
MCHC RBC AUTO-ENTMCNC: 32.6 G/DL (ref 31.5–35.7)
MCV RBC AUTO: 86.6 FL (ref 79–97)
MONOCYTES # BLD AUTO: 0.97 10*3/MM3 (ref 0.1–0.9)
MONOCYTES NFR BLD AUTO: 11.6 % (ref 5–12)
NEUTROPHILS NFR BLD AUTO: 4.35 10*3/MM3 (ref 1.7–7)
NEUTROPHILS NFR BLD AUTO: 51.8 % (ref 42.7–76)
NRBC BLD AUTO-RTO: 0 /100 WBC (ref 0–0.2)
PLATELET # BLD AUTO: 242 10*3/MM3 (ref 140–450)
PMV BLD AUTO: 11.3 FL (ref 6–12)
POTASSIUM SERPL-SCNC: 4 MMOL/L (ref 3.5–5.2)
RBC # BLD AUTO: 4.54 10*6/MM3 (ref 4.14–5.8)
SODIUM SERPL-SCNC: 139 MMOL/L (ref 136–145)
WBC NRBC COR # BLD AUTO: 8.39 10*3/MM3 (ref 3.4–10.8)

## 2025-07-31 PROCEDURE — 85025 COMPLETE CBC W/AUTO DIFF WBC: CPT | Performed by: FAMILY MEDICINE

## 2025-07-31 PROCEDURE — 80048 BASIC METABOLIC PNL TOTAL CA: CPT | Performed by: FAMILY MEDICINE

## 2025-08-02 ENCOUNTER — TRANSCRIBE ORDERS (OUTPATIENT)
Dept: ADMINISTRATIVE | Facility: HOSPITAL | Age: 80
End: 2025-08-02
Payer: MEDICARE

## 2025-08-02 ENCOUNTER — LAB (OUTPATIENT)
Dept: LAB | Facility: HOSPITAL | Age: 80
End: 2025-08-02
Payer: MEDICARE

## 2025-08-02 DIAGNOSIS — E11.22 TYPE 2 DIABETES MELLITUS WITH DIABETIC CHRONIC KIDNEY DISEASE, UNSPECIFIED CKD STAGE, UNSPECIFIED WHETHER LONG TERM INSULIN USE: ICD-10-CM

## 2025-08-02 DIAGNOSIS — I48.19 PERSISTENT ATRIAL FIBRILLATION: ICD-10-CM

## 2025-08-02 DIAGNOSIS — N18.30 STAGE 3 CHRONIC KIDNEY DISEASE, UNSPECIFIED WHETHER STAGE 3A OR 3B CKD: ICD-10-CM

## 2025-08-02 DIAGNOSIS — E05.90 PRETIBIAL MYXEDEMA: ICD-10-CM

## 2025-08-02 DIAGNOSIS — I10 ESSENTIAL HYPERTENSION: Chronic | ICD-10-CM

## 2025-08-02 DIAGNOSIS — N18.31 STAGE 3A CHRONIC KIDNEY DISEASE: Chronic | ICD-10-CM

## 2025-08-02 DIAGNOSIS — N18.30 STAGE 3 CHRONIC KIDNEY DISEASE, UNSPECIFIED WHETHER STAGE 3A OR 3B CKD: Primary | ICD-10-CM

## 2025-08-02 LAB
25(OH)D3 SERPL-MCNC: 25.4 NG/ML (ref 30–100)
ANION GAP SERPL CALCULATED.3IONS-SCNC: 12.2 MMOL/L (ref 5–15)
APTT PPP: 29.4 SECONDS (ref 22.7–35.4)
BACTERIA UR QL AUTO: NORMAL /HPF
BASOPHILS # BLD AUTO: 0.09 10*3/MM3 (ref 0–0.2)
BASOPHILS NFR BLD AUTO: 1 % (ref 0–1.5)
BILIRUB UR QL STRIP: NEGATIVE
BUN SERPL-MCNC: 24.6 MG/DL (ref 8–23)
BUN/CREAT SERPL: 15.4 (ref 7–25)
CALCIUM SPEC-SCNC: 10.1 MG/DL (ref 8.6–10.5)
CHLORIDE SERPL-SCNC: 103 MMOL/L (ref 98–107)
CK SERPL-CCNC: 27 U/L (ref 20–200)
CLARITY UR: CLEAR
CO2 SERPL-SCNC: 27.8 MMOL/L (ref 22–29)
COLOR UR: YELLOW
CREAT SERPL-MCNC: 1.6 MG/DL (ref 0.76–1.27)
CREAT UR-MCNC: 65.3 MG/DL
DEPRECATED RDW RBC AUTO: 53.7 FL (ref 37–54)
EGFRCR SERPLBLD CKD-EPI 2021: 43 ML/MIN/1.73
EOSINOPHIL # BLD AUTO: 0.35 10*3/MM3 (ref 0–0.4)
EOSINOPHIL NFR BLD AUTO: 4 % (ref 0.3–6.2)
ERYTHROCYTE [DISTWIDTH] IN BLOOD BY AUTOMATED COUNT: 16.4 % (ref 12.3–15.4)
GLUCOSE SERPL-MCNC: 108 MG/DL (ref 65–99)
GLUCOSE UR STRIP-MCNC: NEGATIVE MG/DL
HBA1C MFR BLD: 5.6 % (ref 4.8–5.6)
HCT VFR BLD AUTO: 42.7 % (ref 37.5–51)
HGB BLD-MCNC: 12.8 G/DL (ref 13–17.7)
HGB UR QL STRIP.AUTO: NEGATIVE
HYALINE CASTS UR QL AUTO: NORMAL /LPF
IMM GRANULOCYTES # BLD AUTO: 0.03 10*3/MM3 (ref 0–0.05)
IMM GRANULOCYTES NFR BLD AUTO: 0.3 % (ref 0–0.5)
INR PPP: 1.37 (ref 0.9–1.1)
KETONES UR QL STRIP: NEGATIVE
LEUKOCYTE ESTERASE UR QL STRIP.AUTO: ABNORMAL
LYMPHOCYTES # BLD AUTO: 2.27 10*3/MM3 (ref 0.7–3.1)
LYMPHOCYTES NFR BLD AUTO: 26.1 % (ref 19.6–45.3)
MAGNESIUM SERPL-MCNC: 2.2 MG/DL (ref 1.6–2.4)
MCH RBC QN AUTO: 26.6 PG (ref 26.6–33)
MCHC RBC AUTO-ENTMCNC: 30 G/DL (ref 31.5–35.7)
MCV RBC AUTO: 88.8 FL (ref 79–97)
MONOCYTES # BLD AUTO: 0.8 10*3/MM3 (ref 0.1–0.9)
MONOCYTES NFR BLD AUTO: 9.2 % (ref 5–12)
NEUTROPHILS NFR BLD AUTO: 5.17 10*3/MM3 (ref 1.7–7)
NEUTROPHILS NFR BLD AUTO: 59.4 % (ref 42.7–76)
NITRITE UR QL STRIP: NEGATIVE
NRBC BLD AUTO-RTO: 0 /100 WBC (ref 0–0.2)
PH UR STRIP.AUTO: 7 [PH] (ref 5–8)
PHOSPHATE SERPL-MCNC: 3.8 MG/DL (ref 2.5–4.5)
PLATELET # BLD AUTO: 237 10*3/MM3 (ref 140–450)
PMV BLD AUTO: 10.8 FL (ref 6–12)
POTASSIUM SERPL-SCNC: 4.6 MMOL/L (ref 3.5–5.2)
PROT ?TM UR-MCNC: 23.9 MG/DL
PROT UR QL STRIP: ABNORMAL
PROT/CREAT UR: 366 MG/G CREA (ref 0–200)
PROTHROMBIN TIME: 16.8 SECONDS (ref 11.7–14.2)
PTH-INTACT SERPL-MCNC: 123 PG/ML (ref 15–65)
RBC # BLD AUTO: 4.81 10*6/MM3 (ref 4.14–5.8)
RBC # UR STRIP: NORMAL /HPF
REF LAB TEST METHOD: NORMAL
SODIUM SERPL-SCNC: 143 MMOL/L (ref 136–145)
SP GR UR STRIP: 1.01 (ref 1–1.03)
SQUAMOUS #/AREA URNS HPF: NORMAL /HPF
TSH SERPL DL<=0.05 MIU/L-ACNC: 0.05 UIU/ML (ref 0.27–4.2)
URATE SERPL-MCNC: 5.7 MG/DL (ref 3.4–7)
UROBILINOGEN UR QL STRIP: ABNORMAL
WBC # UR STRIP: NORMAL /HPF
WBC NRBC COR # BLD AUTO: 8.71 10*3/MM3 (ref 3.4–10.8)

## 2025-08-02 PROCEDURE — 82550 ASSAY OF CK (CPK): CPT

## 2025-08-02 PROCEDURE — 82306 VITAMIN D 25 HYDROXY: CPT

## 2025-08-02 PROCEDURE — 84156 ASSAY OF PROTEIN URINE: CPT

## 2025-08-02 PROCEDURE — 83735 ASSAY OF MAGNESIUM: CPT

## 2025-08-02 PROCEDURE — 83970 ASSAY OF PARATHORMONE: CPT

## 2025-08-02 PROCEDURE — 84550 ASSAY OF BLOOD/URIC ACID: CPT

## 2025-08-02 PROCEDURE — 85610 PROTHROMBIN TIME: CPT

## 2025-08-02 PROCEDURE — 84443 ASSAY THYROID STIM HORMONE: CPT

## 2025-08-02 PROCEDURE — 82570 ASSAY OF URINE CREATININE: CPT

## 2025-08-02 PROCEDURE — 84100 ASSAY OF PHOSPHORUS: CPT

## 2025-08-02 PROCEDURE — 36415 COLL VENOUS BLD VENIPUNCTURE: CPT

## 2025-08-02 PROCEDURE — 81001 URINALYSIS AUTO W/SCOPE: CPT

## 2025-08-02 PROCEDURE — 85730 THROMBOPLASTIN TIME PARTIAL: CPT

## 2025-08-02 PROCEDURE — 85025 COMPLETE CBC W/AUTO DIFF WBC: CPT

## 2025-08-02 PROCEDURE — 83036 HEMOGLOBIN GLYCOSYLATED A1C: CPT

## 2025-08-02 PROCEDURE — 80048 BASIC METABOLIC PNL TOTAL CA: CPT

## 2025-08-05 ENCOUNTER — APPOINTMENT (OUTPATIENT)
Dept: RESPIRATORY THERAPY | Facility: HOSPITAL | Age: 80
DRG: 243 | End: 2025-08-05
Payer: MEDICARE

## 2025-08-05 ENCOUNTER — ANESTHESIA EVENT (OUTPATIENT)
Dept: CARDIOLOGY | Facility: HOSPITAL | Age: 80
End: 2025-08-05
Payer: MEDICARE

## 2025-08-05 ENCOUNTER — ANESTHESIA (OUTPATIENT)
Dept: CARDIOLOGY | Facility: HOSPITAL | Age: 80
End: 2025-08-05
Payer: MEDICARE

## 2025-08-05 ENCOUNTER — HOSPITAL ENCOUNTER (INPATIENT)
Dept: CARDIOLOGY | Facility: HOSPITAL | Age: 80
LOS: 2 days | Discharge: HOME HEALTH | DRG: 243 | End: 2025-08-07
Attending: INTERNAL MEDICINE | Admitting: FAMILY MEDICINE
Payer: MEDICARE

## 2025-08-05 DIAGNOSIS — N18.31 STAGE 3A CHRONIC KIDNEY DISEASE: Chronic | ICD-10-CM

## 2025-08-05 DIAGNOSIS — I48.19 PERSISTENT ATRIAL FIBRILLATION: ICD-10-CM

## 2025-08-05 DIAGNOSIS — I10 ESSENTIAL HYPERTENSION: Chronic | ICD-10-CM

## 2025-08-05 DIAGNOSIS — I49.5 SICK SINUS SYNDROME: Primary | ICD-10-CM

## 2025-08-05 DIAGNOSIS — R00.1 BRADYCARDIA: ICD-10-CM

## 2025-08-05 DIAGNOSIS — R00.1 BRADYCARDIA, UNSPECIFIED: ICD-10-CM

## 2025-08-05 PROCEDURE — 92960 CARDIOVERSION ELECTRIC EXT: CPT | Performed by: INTERNAL MEDICINE

## 2025-08-05 PROCEDURE — 25010000002 ATROPINE SULFATE 1 MG/10ML SOLUTION PREFILLED SYRINGE: Performed by: NURSE ANESTHETIST, CERTIFIED REGISTERED

## 2025-08-05 PROCEDURE — 92960 CARDIOVERSION ELECTRIC EXT: CPT

## 2025-08-05 PROCEDURE — 25010000002 GLYCOPYRROLATE 0.2 MG/ML SOLUTION: Performed by: NURSE ANESTHETIST, CERTIFIED REGISTERED

## 2025-08-05 PROCEDURE — 25010000002 LIDOCAINE PF 2% 2 % SOLUTION: Performed by: NURSE ANESTHETIST, CERTIFIED REGISTERED

## 2025-08-05 PROCEDURE — 99222 1ST HOSP IP/OBS MODERATE 55: CPT | Performed by: INTERNAL MEDICINE

## 2025-08-05 PROCEDURE — 5A2204Z RESTORATION OF CARDIAC RHYTHM, SINGLE: ICD-10-PCS | Performed by: ANESTHESIOLOGY

## 2025-08-05 PROCEDURE — 25010000002 PROPOFOL 10 MG/ML EMULSION: Performed by: NURSE ANESTHETIST, CERTIFIED REGISTERED

## 2025-08-05 RX ORDER — ALLOPURINOL 100 MG/1
200 TABLET ORAL DAILY
Status: DISCONTINUED | OUTPATIENT
Start: 2025-08-05 | End: 2025-08-07 | Stop reason: HOSPADM

## 2025-08-05 RX ORDER — FOLIC ACID 1 MG/1
1 TABLET ORAL DAILY
Status: DISCONTINUED | OUTPATIENT
Start: 2025-08-05 | End: 2025-08-07 | Stop reason: HOSPADM

## 2025-08-05 RX ORDER — FUROSEMIDE 20 MG/1
20 TABLET ORAL EVERY MORNING
Status: DISCONTINUED | OUTPATIENT
Start: 2025-08-06 | End: 2025-08-07 | Stop reason: HOSPADM

## 2025-08-05 RX ORDER — SODIUM BICARBONATE 650 MG/1
1300 TABLET ORAL 2 TIMES DAILY
Status: DISCONTINUED | OUTPATIENT
Start: 2025-08-05 | End: 2025-08-07 | Stop reason: HOSPADM

## 2025-08-05 RX ORDER — GLYCOPYRROLATE 0.2 MG/ML
INJECTION INTRAMUSCULAR; INTRAVENOUS AS NEEDED
Status: DISCONTINUED | OUTPATIENT
Start: 2025-08-05 | End: 2025-08-05 | Stop reason: SURG

## 2025-08-05 RX ORDER — LIDOCAINE HYDROCHLORIDE 20 MG/ML
INJECTION, SOLUTION EPIDURAL; INFILTRATION; INTRACAUDAL; PERINEURAL AS NEEDED
Status: DISCONTINUED | OUTPATIENT
Start: 2025-08-05 | End: 2025-08-05 | Stop reason: SURG

## 2025-08-05 RX ORDER — PROPOFOL 10 MG/ML
VIAL (ML) INTRAVENOUS AS NEEDED
Status: DISCONTINUED | OUTPATIENT
Start: 2025-08-05 | End: 2025-08-05 | Stop reason: SURG

## 2025-08-05 RX ORDER — ATROPINE SULFATE 0.1 MG/ML
INJECTION INTRAVENOUS AS NEEDED
Status: DISCONTINUED | OUTPATIENT
Start: 2025-08-05 | End: 2025-08-05 | Stop reason: SURG

## 2025-08-05 RX ORDER — LEVOTHYROXINE SODIUM 150 UG/1
150 TABLET ORAL
Status: DISCONTINUED | OUTPATIENT
Start: 2025-08-06 | End: 2025-08-05

## 2025-08-05 RX ORDER — FLUOXETINE 10 MG/1
10 CAPSULE ORAL DAILY
Status: DISCONTINUED | OUTPATIENT
Start: 2025-08-05 | End: 2025-08-07 | Stop reason: HOSPADM

## 2025-08-05 RX ORDER — HYDRALAZINE HYDROCHLORIDE 25 MG/1
100 TABLET, FILM COATED ORAL 3 TIMES DAILY
Status: DISCONTINUED | OUTPATIENT
Start: 2025-08-05 | End: 2025-08-05

## 2025-08-05 RX ORDER — SODIUM BICARBONATE 650 MG/1
1300 TABLET ORAL 3 TIMES DAILY
Status: DISCONTINUED | OUTPATIENT
Start: 2025-08-05 | End: 2025-08-05

## 2025-08-05 RX ORDER — PANTOPRAZOLE SODIUM 40 MG/1
40 TABLET, DELAYED RELEASE ORAL DAILY
Status: DISCONTINUED | OUTPATIENT
Start: 2025-08-06 | End: 2025-08-07 | Stop reason: HOSPADM

## 2025-08-05 RX ORDER — HYDRALAZINE HYDROCHLORIDE 25 MG/1
50 TABLET, FILM COATED ORAL EVERY 12 HOURS SCHEDULED
Status: DISCONTINUED | OUTPATIENT
Start: 2025-08-05 | End: 2025-08-07 | Stop reason: HOSPADM

## 2025-08-05 RX ORDER — SODIUM CHLORIDE 9 MG/ML
INJECTION, SOLUTION INTRAVENOUS CONTINUOUS PRN
Status: DISCONTINUED | OUTPATIENT
Start: 2025-08-05 | End: 2025-08-05 | Stop reason: SURG

## 2025-08-05 RX ADMIN — PROPOFOL 60 MG: 10 INJECTION, EMULSION INTRAVENOUS at 12:00

## 2025-08-05 RX ADMIN — GLYCOPYRROLATE 0.1 MG: 0.2 INJECTION, SOLUTION INTRAMUSCULAR; INTRAVENOUS at 12:05

## 2025-08-05 RX ADMIN — SODIUM BICARBONATE 1300 MG: 650 TABLET ORAL at 20:00

## 2025-08-05 RX ADMIN — SODIUM CHLORIDE: 9 INJECTION, SOLUTION INTRAVENOUS at 11:57

## 2025-08-05 RX ADMIN — GLYCOPYRROLATE 0.1 MG: 0.2 INJECTION, SOLUTION INTRAMUSCULAR; INTRAVENOUS at 12:03

## 2025-08-05 RX ADMIN — LIDOCAINE HYDROCHLORIDE 70 MG: 20 INJECTION, SOLUTION EPIDURAL; INFILTRATION; INTRACAUDAL; PERINEURAL at 12:00

## 2025-08-05 RX ADMIN — ATROPINE SULFATE 0.4 MG: 0.1 INJECTION PARENTERAL at 12:06

## 2025-08-05 RX ADMIN — ALLOPURINOL 200 MG: 100 TABLET ORAL at 17:30

## 2025-08-05 RX ADMIN — FLUOXETINE HYDROCHLORIDE 10 MG: 10 CAPSULE ORAL at 17:30

## 2025-08-05 RX ADMIN — Medication 100 MG: at 17:30

## 2025-08-05 RX ADMIN — FOLIC ACID 1 MG: 1 TABLET ORAL at 17:30

## 2025-08-06 ENCOUNTER — ANESTHESIA EVENT (OUTPATIENT)
Dept: CARDIOLOGY | Facility: HOSPITAL | Age: 80
End: 2025-08-06
Payer: MEDICARE

## 2025-08-06 ENCOUNTER — ANESTHESIA (OUTPATIENT)
Dept: CARDIOLOGY | Facility: HOSPITAL | Age: 80
End: 2025-08-06
Payer: MEDICARE

## 2025-08-06 ENCOUNTER — APPOINTMENT (OUTPATIENT)
Dept: GENERAL RADIOLOGY | Facility: HOSPITAL | Age: 80
DRG: 243 | End: 2025-08-06
Payer: MEDICARE

## 2025-08-06 PROBLEM — I49.5 SICK SINUS SYNDROME: Status: ACTIVE | Noted: 2025-08-05

## 2025-08-06 PROCEDURE — 02H63JZ INSERTION OF PACEMAKER LEAD INTO RIGHT ATRIUM, PERCUTANEOUS APPROACH: ICD-10-PCS | Performed by: INTERNAL MEDICINE

## 2025-08-06 PROCEDURE — 25810000003 SODIUM CHLORIDE 0.9 % SOLUTION 250 ML FLEX CONT: Performed by: NURSE ANESTHETIST, CERTIFIED REGISTERED

## 2025-08-06 PROCEDURE — 25010000002 CEFAZOLIN PER 500 MG: Performed by: INTERNAL MEDICINE

## 2025-08-06 PROCEDURE — 33208 INSRT HEART PM ATRIAL & VENT: CPT | Performed by: INTERNAL MEDICINE

## 2025-08-06 PROCEDURE — 25010000002 PHENYLEPHRINE 10 MG/ML SOLUTION 5 ML VIAL: Performed by: NURSE ANESTHETIST, CERTIFIED REGISTERED

## 2025-08-06 PROCEDURE — C1894 INTRO/SHEATH, NON-LASER: HCPCS | Performed by: INTERNAL MEDICINE

## 2025-08-06 PROCEDURE — 25010000002 LIDOCAINE (CARDIAC): Performed by: NURSE ANESTHETIST, CERTIFIED REGISTERED

## 2025-08-06 PROCEDURE — 25010000002 FENTANYL CITRATE (PF) 100 MCG/2ML SOLUTION: Performed by: NURSE ANESTHETIST, CERTIFIED REGISTERED

## 2025-08-06 PROCEDURE — 25010000002 PROPOFOL 1000 MG/100ML EMULSION: Performed by: NURSE ANESTHETIST, CERTIFIED REGISTERED

## 2025-08-06 PROCEDURE — 99232 SBSQ HOSP IP/OBS MODERATE 35: CPT | Performed by: INTERNAL MEDICINE

## 2025-08-06 PROCEDURE — C1898 LEAD, PMKR, OTHER THAN TRANS: HCPCS | Performed by: INTERNAL MEDICINE

## 2025-08-06 PROCEDURE — 0JH606Z INSERTION OF PACEMAKER, DUAL CHAMBER INTO CHEST SUBCUTANEOUS TISSUE AND FASCIA, OPEN APPROACH: ICD-10-PCS | Performed by: INTERNAL MEDICINE

## 2025-08-06 PROCEDURE — 25810000003 SODIUM CHLORIDE 0.9 % SOLUTION: Performed by: NURSE ANESTHETIST, CERTIFIED REGISTERED

## 2025-08-06 PROCEDURE — 71045 X-RAY EXAM CHEST 1 VIEW: CPT

## 2025-08-06 PROCEDURE — C1785 PMKR, DUAL, RATE-RESP: HCPCS | Performed by: INTERNAL MEDICINE

## 2025-08-06 PROCEDURE — 25010000002 GLYCOPYRROLATE 0.2 MG/ML SOLUTION: Performed by: NURSE ANESTHETIST, CERTIFIED REGISTERED

## 2025-08-06 PROCEDURE — 25510000001 IOPAMIDOL PER 1 ML: Performed by: INTERNAL MEDICINE

## 2025-08-06 PROCEDURE — 02HK3JZ INSERTION OF PACEMAKER LEAD INTO RIGHT VENTRICLE, PERCUTANEOUS APPROACH: ICD-10-PCS | Performed by: INTERNAL MEDICINE

## 2025-08-06 PROCEDURE — 25010000002 LIDOCAINE-EPINEPHRINE 2 %-1:100000 SOLUTION: Performed by: INTERNAL MEDICINE

## 2025-08-06 DEVICE — LD PM TENDRIL STS 6F58CM 2088TC58: Type: IMPLANTABLE DEVICE | Site: HEART | Status: FUNCTIONAL

## 2025-08-06 DEVICE — GEN PM ASSURITY MRI DR RF PM2272: Type: IMPLANTABLE DEVICE | Site: HEART | Status: FUNCTIONAL

## 2025-08-06 DEVICE — LD PM TENDRIL STS 6F52CM 2088TC52: Type: IMPLANTABLE DEVICE | Site: HEART | Status: FUNCTIONAL

## 2025-08-06 RX ORDER — ONDANSETRON 2 MG/ML
4 INJECTION INTRAMUSCULAR; INTRAVENOUS EVERY 6 HOURS PRN
Status: DISCONTINUED | OUTPATIENT
Start: 2025-08-06 | End: 2025-08-07 | Stop reason: HOSPADM

## 2025-08-06 RX ORDER — LIDOCAINE HYDROCHLORIDE AND EPINEPHRINE BITARTRATE 20; .01 MG/ML; MG/ML
INJECTION, SOLUTION SUBCUTANEOUS
Status: DISCONTINUED | OUTPATIENT
Start: 2025-08-06 | End: 2025-08-06 | Stop reason: HOSPADM

## 2025-08-06 RX ORDER — METOPROLOL SUCCINATE 50 MG/1
50 TABLET, EXTENDED RELEASE ORAL
Status: DISCONTINUED | OUTPATIENT
Start: 2025-08-06 | End: 2025-08-07 | Stop reason: HOSPADM

## 2025-08-06 RX ORDER — HYDROCODONE BITARTRATE AND ACETAMINOPHEN 5; 325 MG/1; MG/1
1 TABLET ORAL EVERY 6 HOURS PRN
Status: DISCONTINUED | OUTPATIENT
Start: 2025-08-06 | End: 2025-08-07 | Stop reason: HOSPADM

## 2025-08-06 RX ORDER — ACETAMINOPHEN 160 MG/5ML
650 SOLUTION ORAL EVERY 8 HOURS
Status: DISCONTINUED | OUTPATIENT
Start: 2025-08-06 | End: 2025-08-07 | Stop reason: HOSPADM

## 2025-08-06 RX ORDER — SODIUM CHLORIDE 9 MG/ML
INJECTION, SOLUTION INTRAVENOUS CONTINUOUS PRN
Status: DISCONTINUED | OUTPATIENT
Start: 2025-08-06 | End: 2025-08-06 | Stop reason: SURG

## 2025-08-06 RX ORDER — PROPOFOL 10 MG/ML
INJECTION, EMULSION INTRAVENOUS AS NEEDED
Status: DISCONTINUED | OUTPATIENT
Start: 2025-08-06 | End: 2025-08-06 | Stop reason: SURG

## 2025-08-06 RX ORDER — NALOXONE HCL 0.4 MG/ML
0.4 VIAL (ML) INJECTION
Status: DISCONTINUED | OUTPATIENT
Start: 2025-08-06 | End: 2025-08-07 | Stop reason: HOSPADM

## 2025-08-06 RX ORDER — EPHEDRINE SULFATE 5 MG/ML
INJECTION INTRAVENOUS AS NEEDED
Status: DISCONTINUED | OUTPATIENT
Start: 2025-08-06 | End: 2025-08-06 | Stop reason: SURG

## 2025-08-06 RX ORDER — IOPAMIDOL 755 MG/ML
INJECTION, SOLUTION INTRAVASCULAR
Status: DISCONTINUED | OUTPATIENT
Start: 2025-08-06 | End: 2025-08-06 | Stop reason: HOSPADM

## 2025-08-06 RX ORDER — ACETAMINOPHEN 325 MG/1
650 TABLET ORAL EVERY 8 HOURS
Status: DISCONTINUED | OUTPATIENT
Start: 2025-08-06 | End: 2025-08-07 | Stop reason: HOSPADM

## 2025-08-06 RX ORDER — ACETAMINOPHEN 650 MG/1
650 SUPPOSITORY RECTAL EVERY 8 HOURS
Status: DISCONTINUED | OUTPATIENT
Start: 2025-08-06 | End: 2025-08-07 | Stop reason: HOSPADM

## 2025-08-06 RX ORDER — MORPHINE SULFATE 2 MG/ML
1 INJECTION, SOLUTION INTRAMUSCULAR; INTRAVENOUS EVERY 4 HOURS PRN
Status: DISCONTINUED | OUTPATIENT
Start: 2025-08-06 | End: 2025-08-07 | Stop reason: HOSPADM

## 2025-08-06 RX ORDER — CEFAZOLIN SODIUM 1 G/3ML
2 INJECTION, POWDER, FOR SOLUTION INTRAMUSCULAR; INTRAVENOUS ONCE
Status: DISCONTINUED | OUTPATIENT
Start: 2025-08-06 | End: 2025-08-06

## 2025-08-06 RX ORDER — GLYCOPYRROLATE 0.2 MG/ML
INJECTION INTRAMUSCULAR; INTRAVENOUS AS NEEDED
Status: DISCONTINUED | OUTPATIENT
Start: 2025-08-06 | End: 2025-08-06 | Stop reason: SURG

## 2025-08-06 RX ORDER — FENTANYL CITRATE 50 UG/ML
INJECTION, SOLUTION INTRAMUSCULAR; INTRAVENOUS AS NEEDED
Status: DISCONTINUED | OUTPATIENT
Start: 2025-08-06 | End: 2025-08-06 | Stop reason: SURG

## 2025-08-06 RX ADMIN — FENTANYL CITRATE 25 MCG: 50 INJECTION, SOLUTION INTRAMUSCULAR; INTRAVENOUS at 15:11

## 2025-08-06 RX ADMIN — FENTANYL CITRATE 25 MCG: 50 INJECTION, SOLUTION INTRAMUSCULAR; INTRAVENOUS at 15:05

## 2025-08-06 RX ADMIN — SODIUM BICARBONATE 1300 MG: 650 TABLET ORAL at 20:24

## 2025-08-06 RX ADMIN — FOLIC ACID 1 MG: 1 TABLET ORAL at 09:38

## 2025-08-06 RX ADMIN — Medication 100 MG: at 09:38

## 2025-08-06 RX ADMIN — EPHEDRINE SULFATE 5 MG: 5 INJECTION INTRAVENOUS at 15:21

## 2025-08-06 RX ADMIN — MUPIROCIN 1 APPLICATION: 20 OINTMENT TOPICAL at 09:38

## 2025-08-06 RX ADMIN — SODIUM BICARBONATE 1300 MG: 650 TABLET ORAL at 09:39

## 2025-08-06 RX ADMIN — DEXMEDETOMIDINE 1 MCG: 200 INJECTION, SOLUTION INTRAVENOUS at 15:38

## 2025-08-06 RX ADMIN — ACETAMINOPHEN 650 MG: 325 TABLET ORAL at 20:24

## 2025-08-06 RX ADMIN — FLUOXETINE HYDROCHLORIDE 10 MG: 10 CAPSULE ORAL at 09:37

## 2025-08-06 RX ADMIN — PHENYLEPHRINE HYDROCHLORIDE 0.4 MCG/KG/MIN: 10 INJECTION INTRAVENOUS at 15:17

## 2025-08-06 RX ADMIN — HYDRALAZINE HYDROCHLORIDE 50 MG: 25 TABLET ORAL at 09:38

## 2025-08-06 RX ADMIN — DEXMEDETOMIDINE 1 MCG: 200 INJECTION, SOLUTION INTRAVENOUS at 15:17

## 2025-08-06 RX ADMIN — METOPROLOL SUCCINATE 50 MG: 50 TABLET, EXTENDED RELEASE ORAL at 20:24

## 2025-08-06 RX ADMIN — LIDOCAINE HYDROCHLORIDE 60 MG: 20 INJECTION, SOLUTION INTRAVENOUS at 15:11

## 2025-08-06 RX ADMIN — CEFAZOLIN 2000 MG: 2 INJECTION, POWDER, FOR SOLUTION INTRAMUSCULAR; INTRAVENOUS at 21:38

## 2025-08-06 RX ADMIN — PROPOFOL 75 MCG/KG/MIN: 10 INJECTION, EMULSION INTRAVENOUS at 15:13

## 2025-08-06 RX ADMIN — PHENYLEPHRINE HYDROCHLORIDE 0.6 MCG/KG/MIN: 10 INJECTION INTRAVENOUS at 15:24

## 2025-08-06 RX ADMIN — DEXMEDETOMIDINE 1 MCG: 200 INJECTION, SOLUTION INTRAVENOUS at 15:14

## 2025-08-06 RX ADMIN — FENTANYL CITRATE 25 MCG: 50 INJECTION, SOLUTION INTRAMUSCULAR; INTRAVENOUS at 15:46

## 2025-08-06 RX ADMIN — EPHEDRINE SULFATE 5 MG: 5 INJECTION INTRAVENOUS at 15:27

## 2025-08-06 RX ADMIN — MUPIROCIN 1 APPLICATION: 20 OINTMENT TOPICAL at 20:24

## 2025-08-06 RX ADMIN — LEVOTHYROXINE SODIUM 175 MCG: 0.03 TABLET ORAL at 09:37

## 2025-08-06 RX ADMIN — PROPOFOL 50 MG: 10 INJECTION, EMULSION INTRAVENOUS at 15:11

## 2025-08-06 RX ADMIN — PANTOPRAZOLE SODIUM 40 MG: 40 TABLET, DELAYED RELEASE ORAL at 09:38

## 2025-08-06 RX ADMIN — CEFAZOLIN 2000 MG: 2 INJECTION, POWDER, FOR SOLUTION INTRAMUSCULAR; INTRAVENOUS at 15:14

## 2025-08-06 RX ADMIN — DEXMEDETOMIDINE 1 MCG: 200 INJECTION, SOLUTION INTRAVENOUS at 15:29

## 2025-08-06 RX ADMIN — ALLOPURINOL 200 MG: 100 TABLET ORAL at 09:37

## 2025-08-06 RX ADMIN — FENTANYL CITRATE 25 MCG: 50 INJECTION, SOLUTION INTRAMUSCULAR; INTRAVENOUS at 15:37

## 2025-08-06 RX ADMIN — GLYCOPYRROLATE 0.2 MG: 0.2 SOLUTION INTRAMUSCULAR; INTRAVENOUS at 15:20

## 2025-08-06 RX ADMIN — HYDRALAZINE HYDROCHLORIDE 50 MG: 25 TABLET ORAL at 20:24

## 2025-08-06 RX ADMIN — DEXMEDETOMIDINE 1 MCG: 200 INJECTION, SOLUTION INTRAVENOUS at 15:21

## 2025-08-06 RX ADMIN — SODIUM CHLORIDE: 9 INJECTION, SOLUTION INTRAVENOUS at 15:05

## 2025-08-06 RX ADMIN — EPHEDRINE SULFATE 5 MG: 5 INJECTION INTRAVENOUS at 15:50

## 2025-08-07 ENCOUNTER — READMISSION MANAGEMENT (OUTPATIENT)
Dept: CALL CENTER | Facility: HOSPITAL | Age: 80
End: 2025-08-07
Payer: MEDICARE

## 2025-08-07 VITALS
HEIGHT: 63 IN | TEMPERATURE: 97.8 F | HEART RATE: 73 BPM | WEIGHT: 237.44 LBS | RESPIRATION RATE: 17 BRPM | SYSTOLIC BLOOD PRESSURE: 132 MMHG | BODY MASS INDEX: 42.07 KG/M2 | OXYGEN SATURATION: 94 % | DIASTOLIC BLOOD PRESSURE: 82 MMHG

## 2025-08-07 PROBLEM — Z95.0 PACEMAKER: Chronic | Status: ACTIVE | Noted: 2025-08-07

## 2025-08-07 PROCEDURE — 25010000002 CEFAZOLIN PER 500 MG: Performed by: INTERNAL MEDICINE

## 2025-08-07 PROCEDURE — 99232 SBSQ HOSP IP/OBS MODERATE 35: CPT | Performed by: INTERNAL MEDICINE

## 2025-08-07 RX ORDER — METOPROLOL SUCCINATE 50 MG/1
50 TABLET, EXTENDED RELEASE ORAL
Qty: 90 TABLET | Refills: 1 | Status: SHIPPED | OUTPATIENT
Start: 2025-08-08

## 2025-08-07 RX ORDER — HYDRALAZINE HYDROCHLORIDE 50 MG/1
50 TABLET, FILM COATED ORAL EVERY 12 HOURS SCHEDULED
Start: 2025-08-07

## 2025-08-07 RX ORDER — SODIUM BICARBONATE 650 MG/1
1300 TABLET ORAL 2 TIMES DAILY
Start: 2025-08-07

## 2025-08-07 RX ORDER — CEPHALEXIN 500 MG/1
500 CAPSULE ORAL 2 TIMES DAILY
Qty: 10 CAPSULE | Refills: 0 | Status: SHIPPED | OUTPATIENT
Start: 2025-08-07 | End: 2025-08-12

## 2025-08-07 RX ORDER — ACETAMINOPHEN 325 MG/1
650 TABLET ORAL EVERY 8 HOURS
Qty: 10 TABLET | Refills: 0 | Status: SHIPPED | OUTPATIENT
Start: 2025-08-07 | End: 2025-08-09

## 2025-08-07 RX ORDER — FUROSEMIDE 20 MG/1
20 TABLET ORAL EVERY MORNING
Start: 2025-08-07

## 2025-08-07 RX ADMIN — HYDRALAZINE HYDROCHLORIDE 50 MG: 25 TABLET ORAL at 09:01

## 2025-08-07 RX ADMIN — PANTOPRAZOLE SODIUM 40 MG: 40 TABLET, DELAYED RELEASE ORAL at 09:02

## 2025-08-07 RX ADMIN — CEFAZOLIN 2000 MG: 2 INJECTION, POWDER, FOR SOLUTION INTRAMUSCULAR; INTRAVENOUS at 05:30

## 2025-08-07 RX ADMIN — ALLOPURINOL 200 MG: 100 TABLET ORAL at 09:01

## 2025-08-07 RX ADMIN — FOLIC ACID 1 MG: 1 TABLET ORAL at 09:01

## 2025-08-07 RX ADMIN — SODIUM BICARBONATE 1300 MG: 650 TABLET ORAL at 09:02

## 2025-08-07 RX ADMIN — MUPIROCIN 1 APPLICATION: 20 OINTMENT TOPICAL at 09:02

## 2025-08-07 RX ADMIN — LEVOTHYROXINE SODIUM 175 MCG: 0.03 TABLET ORAL at 05:00

## 2025-08-07 RX ADMIN — Medication 100 MG: at 09:01

## 2025-08-07 RX ADMIN — FLUOXETINE HYDROCHLORIDE 10 MG: 10 CAPSULE ORAL at 09:01

## 2025-08-07 RX ADMIN — METOPROLOL SUCCINATE 50 MG: 50 TABLET, EXTENDED RELEASE ORAL at 09:01

## 2025-08-14 ENCOUNTER — OFFICE VISIT (OUTPATIENT)
Dept: CARDIOLOGY | Facility: CLINIC | Age: 80
End: 2025-08-14
Payer: MEDICARE

## 2025-08-14 ENCOUNTER — CLINICAL SUPPORT NO REQUIREMENTS (OUTPATIENT)
Dept: CARDIOLOGY | Facility: CLINIC | Age: 80
End: 2025-08-14
Payer: MEDICARE

## 2025-08-14 ENCOUNTER — READMISSION MANAGEMENT (OUTPATIENT)
Dept: CALL CENTER | Facility: HOSPITAL | Age: 80
End: 2025-08-14
Payer: MEDICARE

## 2025-08-14 VITALS
WEIGHT: 236 LBS | HEIGHT: 63 IN | SYSTOLIC BLOOD PRESSURE: 135 MMHG | HEART RATE: 71 BPM | BODY MASS INDEX: 41.82 KG/M2 | DIASTOLIC BLOOD PRESSURE: 77 MMHG | OXYGEN SATURATION: 97 %

## 2025-08-14 DIAGNOSIS — Z95.0 PACEMAKER: Chronic | ICD-10-CM

## 2025-08-14 DIAGNOSIS — I48.19 PERSISTENT ATRIAL FIBRILLATION: ICD-10-CM

## 2025-08-14 DIAGNOSIS — I10 ESSENTIAL HYPERTENSION: Chronic | ICD-10-CM

## 2025-08-14 DIAGNOSIS — I49.5 SICK SINUS SYNDROME: Primary | ICD-10-CM

## 2025-08-15 ENCOUNTER — LAB REQUISITION (OUTPATIENT)
Dept: LAB | Facility: HOSPITAL | Age: 80
End: 2025-08-15
Payer: MEDICARE

## 2025-08-15 DIAGNOSIS — I87.2 VENOUS INSUFFICIENCY (CHRONIC) (PERIPHERAL): ICD-10-CM

## 2025-08-15 LAB
ANION GAP SERPL CALCULATED.3IONS-SCNC: 13.5 MMOL/L (ref 5–15)
BASOPHILS # BLD AUTO: 0.1 10*3/MM3 (ref 0–0.2)
BASOPHILS NFR BLD AUTO: 1.2 % (ref 0–1.5)
BUN SERPL-MCNC: 17 MG/DL (ref 8–23)
BUN/CREAT SERPL: 12.2 (ref 7–25)
CALCIUM SPEC-SCNC: 9.5 MG/DL (ref 8.6–10.5)
CHLORIDE SERPL-SCNC: 104 MMOL/L (ref 98–107)
CO2 SERPL-SCNC: 22.5 MMOL/L (ref 22–29)
CREAT SERPL-MCNC: 1.39 MG/DL (ref 0.76–1.27)
DEPRECATED RDW RBC AUTO: 47.2 FL (ref 37–54)
EGFRCR SERPLBLD CKD-EPI 2021: 50.9 ML/MIN/1.73
EOSINOPHIL # BLD AUTO: 0.6 10*3/MM3 (ref 0–0.4)
EOSINOPHIL NFR BLD AUTO: 7.3 % (ref 0.3–6.2)
ERYTHROCYTE [DISTWIDTH] IN BLOOD BY AUTOMATED COUNT: 15.1 % (ref 12.3–15.4)
GLUCOSE SERPL-MCNC: 108 MG/DL (ref 65–99)
HCT VFR BLD AUTO: 39.7 % (ref 37.5–51)
HGB BLD-MCNC: 12.6 G/DL (ref 13–17.7)
IMM GRANULOCYTES # BLD AUTO: 0.03 10*3/MM3 (ref 0–0.05)
IMM GRANULOCYTES NFR BLD AUTO: 0.4 % (ref 0–0.5)
LYMPHOCYTES # BLD AUTO: 2.18 10*3/MM3 (ref 0.7–3.1)
LYMPHOCYTES NFR BLD AUTO: 26.7 % (ref 19.6–45.3)
MCH RBC QN AUTO: 27.5 PG (ref 26.6–33)
MCHC RBC AUTO-ENTMCNC: 31.7 G/DL (ref 31.5–35.7)
MCV RBC AUTO: 86.7 FL (ref 79–97)
MDC_IDC_MSMT_BATTERY_VOLTAGE: 3.01
MDC_IDC_MSMT_LEADCHNL_RA_DTM: NORMAL
MDC_IDC_MSMT_LEADCHNL_RA_IMPEDANCE_VALUE: 488
MDC_IDC_MSMT_LEADCHNL_RA_PACING_THRESHOLD_AMPLITUDE: 0.75
MDC_IDC_MSMT_LEADCHNL_RA_SENSING_INTR_AMPL: 1.3
MDC_IDC_MSMT_LEADCHNL_RV_DTM: NORMAL
MDC_IDC_MSMT_LEADCHNL_RV_IMPEDANCE_VALUE: 638
MDC_IDC_MSMT_LEADCHNL_RV_PACING_THRESHOLD_AMPLITUDE: 0.75
MDC_IDC_MSMT_LEADCHNL_RV_SENSING_INTR_AMPL: 12
MDC_IDC_PG_IMPLANT_DTM: NORMAL
MDC_IDC_PG_MFG: NORMAL
MDC_IDC_PG_MODEL: NORMAL
MDC_IDC_PG_SERIAL: NORMAL
MDC_IDC_PG_TYPE: NORMAL
MDC_IDC_SESS_DTM: NORMAL
MDC_IDC_SET_BRADY_AT_MODE_SWITCH_RATE: 180
MDC_IDC_SET_BRADY_LOWRATE: 70
MDC_IDC_SET_BRADY_MAX_SENSOR_RATE: 130
MDC_IDC_SET_BRADY_MODE: NORMAL
MDC_IDC_SET_BRADY_PAV_DELAY: 200
MDC_IDC_SET_LEADCHNL_RA_PACING_AMPLITUDE: 3.5
MDC_IDC_SET_LEADCHNL_RA_PACING_PULSEWIDTH: 0.4
MDC_IDC_SET_LEADCHNL_RA_SENSING_SENSITIVITY: 0.5
MDC_IDC_SET_LEADCHNL_RV_PACING_AMPLITUDE: 3.5
MDC_IDC_SET_LEADCHNL_RV_PACING_PULSEWIDTH: 0.4
MDC_IDC_SET_LEADCHNL_RV_SENSING_SENSITIVITY: 2
MDC_IDC_STAT_BRADY_RA_PERCENT_PACED: 96
MDC_IDC_STAT_BRADY_RV_PERCENT_PACED: 44
MONOCYTES # BLD AUTO: 0.62 10*3/MM3 (ref 0.1–0.9)
MONOCYTES NFR BLD AUTO: 7.6 % (ref 5–12)
NEUTROPHILS NFR BLD AUTO: 4.65 10*3/MM3 (ref 1.7–7)
NEUTROPHILS NFR BLD AUTO: 56.8 % (ref 42.7–76)
NRBC BLD AUTO-RTO: 0 /100 WBC (ref 0–0.2)
PLATELET # BLD AUTO: 307 10*3/MM3 (ref 140–450)
PMV BLD AUTO: 11 FL (ref 6–12)
POTASSIUM SERPL-SCNC: 4.1 MMOL/L (ref 3.5–5.2)
RBC # BLD AUTO: 4.58 10*6/MM3 (ref 4.14–5.8)
SODIUM SERPL-SCNC: 140 MMOL/L (ref 136–145)
WBC NRBC COR # BLD AUTO: 8.18 10*3/MM3 (ref 3.4–10.8)

## 2025-08-15 PROCEDURE — 80048 BASIC METABOLIC PNL TOTAL CA: CPT | Performed by: FAMILY MEDICINE

## 2025-08-15 PROCEDURE — 85025 COMPLETE CBC W/AUTO DIFF WBC: CPT | Performed by: FAMILY MEDICINE

## 2025-08-22 ENCOUNTER — READMISSION MANAGEMENT (OUTPATIENT)
Dept: CALL CENTER | Facility: HOSPITAL | Age: 80
End: 2025-08-22
Payer: MEDICARE

## (undated) DEVICE — INTRO SHEATH PRELUDE SNAP .038 6F 13CM W/SDPRT

## (undated) DEVICE — 3M™ IOBAN™ 2 ANTIMICROBIAL INCISE DRAPE 6650EZ: Brand: IOBAN™ 2

## (undated) DEVICE — ELECTRD DEFIB M/FUNC PROPADZ RADIOL 2PK

## (undated) DEVICE — 3M™ PATIENT PLATE, CORDED, SPLIT, LARGE, 40 PER CASE, 1179: Brand: 3M™

## (undated) DEVICE — CABL BIPOL W/ALLGTR CLIP/SM 12FT

## (undated) DEVICE — PACEMAKER CDS: Brand: MEDLINE INDUSTRIES, INC.

## (undated) DEVICE — SUT ETHIB 0/0 MO6 I8IN CX45D

## (undated) DEVICE — PENCL SMOKE/EVAC MEGADYNE TELESCP 10FT

## (undated) DEVICE — IMMOB SHLDR CUT/AWAY UNIV

## (undated) DEVICE — ST ACC MICROPUNCTURE STFF/CANN PLAT/TP 4F 21G 40CM